# Patient Record
Sex: FEMALE | Race: WHITE | Employment: UNEMPLOYED | URBAN - METROPOLITAN AREA
[De-identification: names, ages, dates, MRNs, and addresses within clinical notes are randomized per-mention and may not be internally consistent; named-entity substitution may affect disease eponyms.]

---

## 2018-01-22 PROBLEM — K80.20 SYMPTOMATIC CHOLELITHIASIS: Status: ACTIVE | Noted: 2018-01-22

## 2018-05-05 ENCOUNTER — HOSPITAL ENCOUNTER (EMERGENCY)
Age: 33
Discharge: HOME | End: 2018-05-06
Attending: EMERGENCY MEDICINE
Payer: COMMERCIAL

## 2018-05-05 DIAGNOSIS — K50.119 CROHN'S DISEASE OF COLON WITH COMPLICATION (HCC): Primary | ICD-10-CM

## 2018-05-05 LAB
BACTERIA: ABNORMAL /HPF
BASOPHILS ABSOLUTE: 0.09 E9/L (ref 0–0.2)
BASOPHILS RELATIVE PERCENT: 0.4 % (ref 0–2)
BILIRUBIN URINE: NEGATIVE
BLOOD, URINE: NEGATIVE
CHP ED QC CHECK: YES
CLARITY: CLEAR
COLOR: YELLOW
EOSINOPHILS ABSOLUTE: 0.07 E9/L (ref 0.05–0.5)
EOSINOPHILS RELATIVE PERCENT: 0.3 % (ref 0–6)
EPITHELIAL CELLS, UA: ABNORMAL /HPF
GLUCOSE URINE: NEGATIVE MG/DL
HCT VFR BLD CALC: 46.9 % (ref 34–48)
HEMOGLOBIN: 15.9 G/DL (ref 11.5–15.5)
IMMATURE GRANULOCYTES #: 0.18 E9/L
IMMATURE GRANULOCYTES %: 0.8 % (ref 0–5)
KETONES, URINE: >=80 MG/DL
LACTIC ACID: 2.6 MMOL/L (ref 0.5–2.2)
LEUKOCYTE ESTERASE, URINE: NEGATIVE
LYMPHOCYTES ABSOLUTE: 2.35 E9/L (ref 1.5–4)
LYMPHOCYTES RELATIVE PERCENT: 10.6 % (ref 20–42)
MCH RBC QN AUTO: 32.1 PG (ref 26–35)
MCHC RBC AUTO-ENTMCNC: 33.9 % (ref 32–34.5)
MCV RBC AUTO: 94.6 FL (ref 80–99.9)
MONOCYTES ABSOLUTE: 0.66 E9/L (ref 0.1–0.95)
MONOCYTES RELATIVE PERCENT: 3 % (ref 2–12)
NEUTROPHILS ABSOLUTE: 18.83 E9/L (ref 1.8–7.3)
NEUTROPHILS RELATIVE PERCENT: 84.9 % (ref 43–80)
NITRITE, URINE: NEGATIVE
PDW BLD-RTO: 14.1 FL (ref 11.5–15)
PH UA: 7 (ref 5–9)
PLATELET # BLD: 448 E9/L (ref 130–450)
PMV BLD AUTO: 9.7 FL (ref 7–12)
PREGNANCY TEST URINE, POC: NORMAL
PROTEIN UA: 30 MG/DL
RBC # BLD: 4.96 E12/L (ref 3.5–5.5)
RBC UA: ABNORMAL /HPF (ref 0–2)
SPECIFIC GRAVITY UA: >=1.03 (ref 1–1.03)
UROBILINOGEN, URINE: 0.2 E.U./DL
WBC # BLD: 22.2 E9/L (ref 4.5–11.5)
WBC UA: ABNORMAL /HPF (ref 0–5)

## 2018-05-05 PROCEDURE — 85651 RBC SED RATE NONAUTOMATED: CPT

## 2018-05-05 PROCEDURE — 96374 THER/PROPH/DIAG INJ IV PUSH: CPT

## 2018-05-05 PROCEDURE — 81001 URINALYSIS AUTO W/SCOPE: CPT

## 2018-05-05 PROCEDURE — 96361 HYDRATE IV INFUSION ADD-ON: CPT

## 2018-05-05 PROCEDURE — 83605 ASSAY OF LACTIC ACID: CPT

## 2018-05-05 PROCEDURE — 80053 COMPREHEN METABOLIC PANEL: CPT

## 2018-05-05 PROCEDURE — 83690 ASSAY OF LIPASE: CPT

## 2018-05-05 PROCEDURE — 6360000002 HC RX W HCPCS: Performed by: EMERGENCY MEDICINE

## 2018-05-05 PROCEDURE — 96375 TX/PRO/DX INJ NEW DRUG ADDON: CPT

## 2018-05-05 PROCEDURE — 85025 COMPLETE CBC W/AUTO DIFF WBC: CPT

## 2018-05-05 PROCEDURE — 99284 EMERGENCY DEPT VISIT MOD MDM: CPT

## 2018-05-05 PROCEDURE — 2580000003 HC RX 258: Performed by: EMERGENCY MEDICINE

## 2018-05-05 RX ORDER — SODIUM CHLORIDE 0.9 % (FLUSH) 0.9 %
10 SYRINGE (ML) INJECTION PRN
Status: DISCONTINUED | OUTPATIENT
Start: 2018-05-05 | End: 2018-05-06 | Stop reason: HOSPADM

## 2018-05-05 RX ORDER — MORPHINE SULFATE 2 MG/ML
INJECTION, SOLUTION INTRAMUSCULAR; INTRAVENOUS
Status: DISCONTINUED
Start: 2018-05-05 | End: 2018-05-06 | Stop reason: HOSPADM

## 2018-05-05 RX ORDER — ONDANSETRON 2 MG/ML
4 INJECTION INTRAMUSCULAR; INTRAVENOUS ONCE
Status: COMPLETED | OUTPATIENT
Start: 2018-05-05 | End: 2018-05-05

## 2018-05-05 RX ORDER — MORPHINE SULFATE 4 MG/ML
4 INJECTION, SOLUTION INTRAMUSCULAR; INTRAVENOUS ONCE
Status: COMPLETED | OUTPATIENT
Start: 2018-05-05 | End: 2018-05-05

## 2018-05-05 RX ORDER — 0.9 % SODIUM CHLORIDE 0.9 %
1000 INTRAVENOUS SOLUTION INTRAVENOUS ONCE
Status: COMPLETED | OUTPATIENT
Start: 2018-05-05 | End: 2018-05-06

## 2018-05-05 RX ADMIN — ONDANSETRON 4 MG: 2 SOLUTION INTRAMUSCULAR; INTRAVENOUS at 23:38

## 2018-05-05 RX ADMIN — Medication 10 ML: at 23:40

## 2018-05-05 RX ADMIN — SODIUM CHLORIDE 1000 ML: 9 INJECTION, SOLUTION INTRAVENOUS at 23:37

## 2018-05-05 RX ADMIN — MORPHINE SULFATE 4 MG: 4 INJECTION, SOLUTION INTRAMUSCULAR; INTRAVENOUS at 23:39

## 2018-05-05 ASSESSMENT — PAIN DESCRIPTION - PAIN TYPE: TYPE: ACUTE PAIN

## 2018-05-05 ASSESSMENT — PAIN SCALES - GENERAL
PAINLEVEL_OUTOF10: 10
PAINLEVEL_OUTOF10: 10

## 2018-05-05 ASSESSMENT — PAIN DESCRIPTION - FREQUENCY: FREQUENCY: CONTINUOUS

## 2018-05-05 ASSESSMENT — PAIN DESCRIPTION - ONSET: ONSET: ON-GOING

## 2018-05-05 ASSESSMENT — PAIN DESCRIPTION - ORIENTATION: ORIENTATION: RIGHT;LEFT;LOWER

## 2018-05-05 ASSESSMENT — PAIN DESCRIPTION - LOCATION: LOCATION: ABDOMEN

## 2018-05-05 ASSESSMENT — PAIN DESCRIPTION - PROGRESSION: CLINICAL_PROGRESSION: GRADUALLY WORSENING

## 2018-05-05 ASSESSMENT — PAIN DESCRIPTION - DESCRIPTORS: DESCRIPTORS: ACHING

## 2018-05-06 ENCOUNTER — APPOINTMENT (OUTPATIENT)
Dept: CT IMAGING | Age: 33
End: 2018-05-06
Payer: COMMERCIAL

## 2018-05-06 VITALS
TEMPERATURE: 98 F | SYSTOLIC BLOOD PRESSURE: 152 MMHG | OXYGEN SATURATION: 98 % | HEIGHT: 64 IN | HEART RATE: 64 BPM | RESPIRATION RATE: 18 BRPM | BODY MASS INDEX: 23.9 KG/M2 | WEIGHT: 140 LBS | DIASTOLIC BLOOD PRESSURE: 96 MMHG

## 2018-05-06 LAB
ALBUMIN SERPL-MCNC: 5 G/DL (ref 3.5–5.2)
ALP BLD-CCNC: 57 U/L (ref 35–104)
ALT SERPL-CCNC: 22 U/L (ref 0–32)
ANION GAP SERPL CALCULATED.3IONS-SCNC: 19 MMOL/L (ref 7–16)
AST SERPL-CCNC: 21 U/L (ref 0–31)
BILIRUB SERPL-MCNC: 0.6 MG/DL (ref 0–1.2)
BUN BLDV-MCNC: 15 MG/DL (ref 6–20)
CALCIUM SERPL-MCNC: 10.3 MG/DL (ref 8.6–10.2)
CHLORIDE BLD-SCNC: 96 MMOL/L (ref 98–107)
CO2: 25 MMOL/L (ref 22–29)
CREAT SERPL-MCNC: 0.8 MG/DL (ref 0.5–1)
GFR AFRICAN AMERICAN: >60
GFR NON-AFRICAN AMERICAN: >60 ML/MIN/1.73
GLUCOSE BLD-MCNC: 134 MG/DL (ref 74–109)
LIPASE: 12 U/L (ref 13–60)
POTASSIUM SERPL-SCNC: 4.9 MMOL/L (ref 3.5–5)
SEDIMENTATION RATE, ERYTHROCYTE: 2 MM/HR (ref 0–20)
SODIUM BLD-SCNC: 140 MMOL/L (ref 132–146)
TOTAL PROTEIN: 8.4 G/DL (ref 6.4–8.3)

## 2018-05-06 PROCEDURE — 6360000004 HC RX CONTRAST MEDICATION: Performed by: RADIOLOGY

## 2018-05-06 PROCEDURE — 96376 TX/PRO/DX INJ SAME DRUG ADON: CPT

## 2018-05-06 PROCEDURE — 6360000002 HC RX W HCPCS

## 2018-05-06 PROCEDURE — 74177 CT ABD & PELVIS W/CONTRAST: CPT

## 2018-05-06 PROCEDURE — 96361 HYDRATE IV INFUSION ADD-ON: CPT

## 2018-05-06 PROCEDURE — 6360000002 HC RX W HCPCS: Performed by: EMERGENCY MEDICINE

## 2018-05-06 RX ORDER — ONDANSETRON 2 MG/ML
4 INJECTION INTRAMUSCULAR; INTRAVENOUS ONCE
Status: COMPLETED | OUTPATIENT
Start: 2018-05-06 | End: 2018-05-06

## 2018-05-06 RX ORDER — MORPHINE SULFATE 4 MG/ML
4 INJECTION, SOLUTION INTRAMUSCULAR; INTRAVENOUS ONCE
Status: DISCONTINUED | OUTPATIENT
Start: 2018-05-06 | End: 2018-05-06 | Stop reason: HOSPADM

## 2018-05-06 RX ORDER — ONDANSETRON 4 MG/1
4 TABLET, ORALLY DISINTEGRATING ORAL EVERY 8 HOURS PRN
Qty: 24 TABLET | Refills: 0 | Status: ON HOLD | OUTPATIENT
Start: 2018-05-06 | End: 2018-07-19 | Stop reason: HOSPADM

## 2018-05-06 RX ORDER — HYDROCODONE BITARTRATE AND ACETAMINOPHEN 5; 325 MG/1; MG/1
1 TABLET ORAL EVERY 6 HOURS PRN
Qty: 10 TABLET | Refills: 0 | Status: SHIPPED | OUTPATIENT
Start: 2018-05-06 | End: 2018-05-09

## 2018-05-06 RX ORDER — MORPHINE SULFATE 2 MG/ML
INJECTION, SOLUTION INTRAMUSCULAR; INTRAVENOUS
Status: COMPLETED
Start: 2018-05-06 | End: 2018-05-06

## 2018-05-06 RX ORDER — CIPROFLOXACIN 500 MG/1
500 TABLET, FILM COATED ORAL 2 TIMES DAILY
Qty: 14 TABLET | Refills: 0 | Status: SHIPPED | OUTPATIENT
Start: 2018-05-06 | End: 2018-05-13

## 2018-05-06 RX ORDER — METRONIDAZOLE 500 MG/1
500 TABLET ORAL 2 TIMES DAILY
Qty: 14 TABLET | Refills: 0 | Status: SHIPPED | OUTPATIENT
Start: 2018-05-06 | End: 2018-05-13

## 2018-05-06 RX ADMIN — MORPHINE SULFATE 4 MG: 2 INJECTION, SOLUTION INTRAMUSCULAR; INTRAVENOUS at 01:42

## 2018-05-06 RX ADMIN — IOPAMIDOL 110 ML: 755 INJECTION, SOLUTION INTRAVENOUS at 00:43

## 2018-05-06 RX ADMIN — ONDANSETRON 4 MG: 2 SOLUTION INTRAMUSCULAR; INTRAVENOUS at 01:42

## 2018-05-06 ASSESSMENT — ENCOUNTER SYMPTOMS
ABDOMINAL PAIN: 1
BACK PAIN: 0
NAUSEA: 1
SHORTNESS OF BREATH: 0
VOMITING: 1
COUGH: 0
BLOOD IN STOOL: 0

## 2018-05-06 ASSESSMENT — PAIN SCALES - GENERAL
PAINLEVEL_OUTOF10: 6
PAINLEVEL_OUTOF10: 4

## 2018-05-06 ASSESSMENT — PAIN DESCRIPTION - ONSET: ONSET: ON-GOING

## 2018-05-06 ASSESSMENT — PAIN DESCRIPTION - FREQUENCY: FREQUENCY: CONTINUOUS

## 2018-05-06 ASSESSMENT — PAIN DESCRIPTION - DESCRIPTORS: DESCRIPTORS: ACHING;DULL

## 2018-05-06 ASSESSMENT — PAIN DESCRIPTION - PAIN TYPE: TYPE: ACUTE PAIN

## 2018-05-06 ASSESSMENT — PAIN DESCRIPTION - PROGRESSION: CLINICAL_PROGRESSION: GRADUALLY IMPROVING

## 2018-05-06 ASSESSMENT — PAIN DESCRIPTION - LOCATION: LOCATION: ABDOMEN

## 2018-07-16 ENCOUNTER — HOSPITAL ENCOUNTER (INPATIENT)
Age: 33
LOS: 3 days | Discharge: HOME | DRG: 245 | End: 2018-07-19
Attending: EMERGENCY MEDICINE | Admitting: INTERNAL MEDICINE
Payer: COMMERCIAL

## 2018-07-16 ENCOUNTER — APPOINTMENT (OUTPATIENT)
Dept: CT IMAGING | Age: 33
DRG: 245 | End: 2018-07-16
Payer: COMMERCIAL

## 2018-07-16 DIAGNOSIS — K50.119 CROHN'S DISEASE OF COLON WITH COMPLICATION (HCC): ICD-10-CM

## 2018-07-16 DIAGNOSIS — R11.2 NON-INTRACTABLE VOMITING WITH NAUSEA, UNSPECIFIED VOMITING TYPE: ICD-10-CM

## 2018-07-16 DIAGNOSIS — R10.9 ABDOMINAL PAIN, UNSPECIFIED ABDOMINAL LOCATION: Primary | ICD-10-CM

## 2018-07-16 PROBLEM — K50.90 CROHN DISEASE (HCC): Status: ACTIVE | Noted: 2018-07-16

## 2018-07-16 PROBLEM — K50.10 CROHN'S COLITIS, WITHOUT COMPLICATIONS (HCC): Status: ACTIVE | Noted: 2018-07-16

## 2018-07-16 LAB
ALBUMIN SERPL-MCNC: 4.7 G/DL (ref 3.5–5.2)
ALP BLD-CCNC: 62 U/L (ref 35–104)
ALT SERPL-CCNC: 10 U/L (ref 0–32)
AMPHETAMINE SCREEN, URINE: NOT DETECTED
ANION GAP SERPL CALCULATED.3IONS-SCNC: 14 MMOL/L (ref 7–16)
AST SERPL-CCNC: 15 U/L (ref 0–31)
BACTERIA: ABNORMAL /HPF
BARBITURATE SCREEN URINE: NOT DETECTED
BASOPHILS ABSOLUTE: 0.08 E9/L (ref 0–0.2)
BASOPHILS RELATIVE PERCENT: 0.5 % (ref 0–2)
BENZODIAZEPINE SCREEN, URINE: POSITIVE
BILIRUB SERPL-MCNC: 0.4 MG/DL (ref 0–1.2)
BILIRUBIN URINE: NEGATIVE
BLOOD, URINE: NEGATIVE
BUN BLDV-MCNC: 9 MG/DL (ref 6–20)
CALCIUM SERPL-MCNC: 9.6 MG/DL (ref 8.6–10.2)
CANNABINOID SCREEN URINE: POSITIVE
CHLORIDE BLD-SCNC: 102 MMOL/L (ref 98–107)
CHP ED QC CHECK: NORMAL
CLARITY: ABNORMAL
CO2: 25 MMOL/L (ref 22–29)
COCAINE METABOLITE SCREEN URINE: NOT DETECTED
COLOR: ABNORMAL
CREAT SERPL-MCNC: 0.7 MG/DL (ref 0.5–1)
EKG ATRIAL RATE: 72 BPM
EKG P AXIS: 29 DEGREES
EKG P-R INTERVAL: 112 MS
EKG Q-T INTERVAL: 382 MS
EKG QRS DURATION: 102 MS
EKG QTC CALCULATION (BAZETT): 418 MS
EKG R AXIS: 1 DEGREES
EKG T AXIS: 58 DEGREES
EKG VENTRICULAR RATE: 72 BPM
EOSINOPHILS ABSOLUTE: 0.12 E9/L (ref 0.05–0.5)
EOSINOPHILS RELATIVE PERCENT: 0.7 % (ref 0–6)
EPITHELIAL CELLS, UA: ABNORMAL /HPF
GFR AFRICAN AMERICAN: >60
GFR NON-AFRICAN AMERICAN: >60 ML/MIN/1.73
GLUCOSE BLD-MCNC: 139 MG/DL (ref 74–109)
GLUCOSE URINE: NEGATIVE MG/DL
HCT VFR BLD CALC: 42.7 % (ref 34–48)
HEMOGLOBIN: 14.5 G/DL (ref 11.5–15.5)
IMMATURE GRANULOCYTES #: 0.09 E9/L
IMMATURE GRANULOCYTES %: 0.5 % (ref 0–5)
KETONES, URINE: ABNORMAL MG/DL
LACTIC ACID: 2 MMOL/L (ref 0.5–2.2)
LEUKOCYTE ESTERASE, URINE: NEGATIVE
LIPASE: 17 U/L (ref 13–60)
LYMPHOCYTES ABSOLUTE: 1.93 E9/L (ref 1.5–4)
LYMPHOCYTES RELATIVE PERCENT: 11.5 % (ref 20–42)
MCH RBC QN AUTO: 32.1 PG (ref 26–35)
MCHC RBC AUTO-ENTMCNC: 34 % (ref 32–34.5)
MCV RBC AUTO: 94.5 FL (ref 80–99.9)
METHADONE SCREEN, URINE: NOT DETECTED
MONOCYTES ABSOLUTE: 0.44 E9/L (ref 0.1–0.95)
MONOCYTES RELATIVE PERCENT: 2.6 % (ref 2–12)
MUCUS: PRESENT
NEUTROPHILS ABSOLUTE: 14.06 E9/L (ref 1.8–7.3)
NEUTROPHILS RELATIVE PERCENT: 84.2 % (ref 43–80)
NITRITE, URINE: NEGATIVE
OPIATE SCREEN URINE: NOT DETECTED
PDW BLD-RTO: 13.3 FL (ref 11.5–15)
PH UA: 6.5 (ref 5–9)
PHENCYCLIDINE SCREEN URINE: NOT DETECTED
PLATELET # BLD: 454 E9/L (ref 130–450)
PMV BLD AUTO: 9.4 FL (ref 7–12)
POTASSIUM SERPL-SCNC: 4.2 MMOL/L (ref 3.5–5)
PREGNANCY TEST URINE, POC: NORMAL
PROPOXYPHENE SCREEN: NOT DETECTED
PROTEIN UA: 30 MG/DL
RBC # BLD: 4.52 E12/L (ref 3.5–5.5)
RBC UA: ABNORMAL /HPF (ref 0–2)
SODIUM BLD-SCNC: 141 MMOL/L (ref 132–146)
SPECIFIC GRAVITY UA: 1.02 (ref 1–1.03)
TOTAL PROTEIN: 7.3 G/DL (ref 6.4–8.3)
UROBILINOGEN, URINE: 1 E.U./DL
WBC # BLD: 16.7 E9/L (ref 4.5–11.5)
WBC UA: ABNORMAL /HPF (ref 0–5)

## 2018-07-16 PROCEDURE — 6360000002 HC RX W HCPCS: Performed by: EMERGENCY MEDICINE

## 2018-07-16 PROCEDURE — 2580000003 HC RX 258: Performed by: EMERGENCY MEDICINE

## 2018-07-16 PROCEDURE — C9113 INJ PANTOPRAZOLE SODIUM, VIA: HCPCS | Performed by: INTERNAL MEDICINE

## 2018-07-16 PROCEDURE — 85025 COMPLETE CBC W/AUTO DIFF WBC: CPT

## 2018-07-16 PROCEDURE — 93005 ELECTROCARDIOGRAM TRACING: CPT | Performed by: PHYSICIAN ASSISTANT

## 2018-07-16 PROCEDURE — 96376 TX/PRO/DX INJ SAME DRUG ADON: CPT

## 2018-07-16 PROCEDURE — G0480 DRUG TEST DEF 1-7 CLASSES: HCPCS

## 2018-07-16 PROCEDURE — 96374 THER/PROPH/DIAG INJ IV PUSH: CPT

## 2018-07-16 PROCEDURE — 81001 URINALYSIS AUTO W/SCOPE: CPT

## 2018-07-16 PROCEDURE — 83690 ASSAY OF LIPASE: CPT

## 2018-07-16 PROCEDURE — 6360000004 HC RX CONTRAST MEDICATION: Performed by: RADIOLOGY

## 2018-07-16 PROCEDURE — 96375 TX/PRO/DX INJ NEW DRUG ADDON: CPT

## 2018-07-16 PROCEDURE — 2580000003 HC RX 258

## 2018-07-16 PROCEDURE — 83605 ASSAY OF LACTIC ACID: CPT

## 2018-07-16 PROCEDURE — 80307 DRUG TEST PRSMV CHEM ANLYZR: CPT

## 2018-07-16 PROCEDURE — 74177 CT ABD & PELVIS W/CONTRAST: CPT

## 2018-07-16 PROCEDURE — 80053 COMPREHEN METABOLIC PANEL: CPT

## 2018-07-16 PROCEDURE — 1200000000 HC SEMI PRIVATE

## 2018-07-16 PROCEDURE — 6360000002 HC RX W HCPCS: Performed by: INTERNAL MEDICINE

## 2018-07-16 PROCEDURE — 2580000003 HC RX 258: Performed by: INTERNAL MEDICINE

## 2018-07-16 PROCEDURE — 99285 EMERGENCY DEPT VISIT HI MDM: CPT

## 2018-07-16 RX ORDER — DICYCLOMINE HYDROCHLORIDE 10 MG/1
20 CAPSULE ORAL 4 TIMES DAILY PRN
Qty: 20 CAPSULE | Refills: 0 | Status: SHIPPED | OUTPATIENT
Start: 2018-07-16 | End: 2018-10-24

## 2018-07-16 RX ORDER — METHYLPREDNISOLONE SODIUM SUCCINATE 125 MG/2ML
60 INJECTION, POWDER, LYOPHILIZED, FOR SOLUTION INTRAMUSCULAR; INTRAVENOUS EVERY 6 HOURS
Status: DISCONTINUED | OUTPATIENT
Start: 2018-07-16 | End: 2018-07-17

## 2018-07-16 RX ORDER — PROMETHAZINE HYDROCHLORIDE 25 MG/ML
12.5 INJECTION, SOLUTION INTRAMUSCULAR; INTRAVENOUS ONCE
Status: COMPLETED | OUTPATIENT
Start: 2018-07-16 | End: 2018-07-16

## 2018-07-16 RX ORDER — 0.9 % SODIUM CHLORIDE 0.9 %
1000 INTRAVENOUS SOLUTION INTRAVENOUS ONCE
Status: COMPLETED | OUTPATIENT
Start: 2018-07-16 | End: 2018-07-16

## 2018-07-16 RX ORDER — METHYLPREDNISOLONE SODIUM SUCCINATE 125 MG/2ML
125 INJECTION, POWDER, LYOPHILIZED, FOR SOLUTION INTRAMUSCULAR; INTRAVENOUS ONCE
Status: COMPLETED | OUTPATIENT
Start: 2018-07-16 | End: 2018-07-16

## 2018-07-16 RX ORDER — PANTOPRAZOLE SODIUM 40 MG/10ML
40 INJECTION, POWDER, LYOPHILIZED, FOR SOLUTION INTRAVENOUS DAILY
Status: DISCONTINUED | OUTPATIENT
Start: 2018-07-16 | End: 2018-07-19 | Stop reason: HOSPADM

## 2018-07-16 RX ORDER — KETOROLAC TROMETHAMINE 30 MG/ML
30 INJECTION, SOLUTION INTRAMUSCULAR; INTRAVENOUS EVERY 6 HOURS PRN
Status: DISCONTINUED | OUTPATIENT
Start: 2018-07-16 | End: 2018-07-19 | Stop reason: HOSPADM

## 2018-07-16 RX ORDER — ONDANSETRON 4 MG/1
4 TABLET, FILM COATED ORAL EVERY 8 HOURS PRN
Qty: 12 TABLET | Refills: 0 | Status: SHIPPED | OUTPATIENT
Start: 2018-07-16 | End: 2018-07-21

## 2018-07-16 RX ORDER — PROMETHAZINE HYDROCHLORIDE 25 MG/1
25 SUPPOSITORY RECTAL EVERY 4 HOURS PRN
Qty: 10 SUPPOSITORY | Refills: 0 | Status: SHIPPED | OUTPATIENT
Start: 2018-07-16 | End: 2018-07-16

## 2018-07-16 RX ORDER — MORPHINE SULFATE 4 MG/ML
4 INJECTION, SOLUTION INTRAMUSCULAR; INTRAVENOUS ONCE
Status: COMPLETED | OUTPATIENT
Start: 2018-07-16 | End: 2018-07-16

## 2018-07-16 RX ORDER — SODIUM CHLORIDE 0.9 % (FLUSH) 0.9 %
SYRINGE (ML) INJECTION
Status: DISPENSED
Start: 2018-07-16 | End: 2018-07-17

## 2018-07-16 RX ORDER — SODIUM CHLORIDE 9 MG/ML
INJECTION, SOLUTION INTRAVENOUS CONTINUOUS
Status: DISCONTINUED | OUTPATIENT
Start: 2018-07-16 | End: 2018-07-19 | Stop reason: HOSPADM

## 2018-07-16 RX ORDER — PROMETHAZINE HYDROCHLORIDE 25 MG/1
25 SUPPOSITORY RECTAL EVERY 4 HOURS PRN
Qty: 10 SUPPOSITORY | Refills: 0 | Status: SHIPPED | OUTPATIENT
Start: 2018-07-16 | End: 2018-07-23

## 2018-07-16 RX ORDER — SODIUM CHLORIDE 0.9 % (FLUSH) 0.9 %
10 SYRINGE (ML) INJECTION EVERY 12 HOURS SCHEDULED
Status: DISCONTINUED | OUTPATIENT
Start: 2018-07-16 | End: 2018-07-19 | Stop reason: HOSPADM

## 2018-07-16 RX ORDER — ACETAMINOPHEN 325 MG/1
650 TABLET ORAL EVERY 4 HOURS PRN
Status: DISCONTINUED | OUTPATIENT
Start: 2018-07-16 | End: 2018-07-19 | Stop reason: HOSPADM

## 2018-07-16 RX ORDER — ONDANSETRON 2 MG/ML
4 INJECTION INTRAMUSCULAR; INTRAVENOUS EVERY 6 HOURS PRN
Status: DISCONTINUED | OUTPATIENT
Start: 2018-07-16 | End: 2018-07-19 | Stop reason: HOSPADM

## 2018-07-16 RX ORDER — ONDANSETRON 2 MG/ML
8 INJECTION INTRAMUSCULAR; INTRAVENOUS ONCE
Status: COMPLETED | OUTPATIENT
Start: 2018-07-16 | End: 2018-07-16

## 2018-07-16 RX ORDER — SODIUM CHLORIDE 0.9 % (FLUSH) 0.9 %
10 SYRINGE (ML) INJECTION PRN
Status: DISCONTINUED | OUTPATIENT
Start: 2018-07-16 | End: 2018-07-19 | Stop reason: HOSPADM

## 2018-07-16 RX ORDER — DICYCLOMINE HYDROCHLORIDE 10 MG/1
20 CAPSULE ORAL 4 TIMES DAILY PRN
Qty: 20 CAPSULE | Refills: 0 | Status: SHIPPED | OUTPATIENT
Start: 2018-07-16 | End: 2018-07-16

## 2018-07-16 RX ORDER — DICYCLOMINE HYDROCHLORIDE 10 MG/1
CAPSULE ORAL 3 TIMES DAILY
Status: ON HOLD | COMMUNITY
End: 2018-07-19 | Stop reason: HOSPADM

## 2018-07-16 RX ORDER — MORPHINE SULFATE 8 MG/ML
6 INJECTION, SOLUTION INTRAMUSCULAR; INTRAVENOUS ONCE
Status: COMPLETED | OUTPATIENT
Start: 2018-07-16 | End: 2018-07-16

## 2018-07-16 RX ADMIN — METHYLPREDNISOLONE SODIUM SUCCINATE 60 MG: 125 INJECTION, POWDER, LYOPHILIZED, FOR SOLUTION INTRAMUSCULAR; INTRAVENOUS at 21:53

## 2018-07-16 RX ADMIN — MORPHINE SULFATE 6 MG: 8 INJECTION INTRAVENOUS at 17:01

## 2018-07-16 RX ADMIN — SODIUM CHLORIDE: 9 INJECTION, SOLUTION INTRAVENOUS at 21:49

## 2018-07-16 RX ADMIN — MORPHINE SULFATE 4 MG: 4 INJECTION, SOLUTION INTRAMUSCULAR; INTRAVENOUS at 19:01

## 2018-07-16 RX ADMIN — WATER 10 ML: 1 INJECTION INTRAMUSCULAR; INTRAVENOUS; SUBCUTANEOUS at 21:53

## 2018-07-16 RX ADMIN — PROMETHAZINE HYDROCHLORIDE 12.5 MG: 25 INJECTION INTRAMUSCULAR; INTRAVENOUS at 19:01

## 2018-07-16 RX ADMIN — ONDANSETRON 4 MG: 2 INJECTION INTRAMUSCULAR; INTRAVENOUS at 21:15

## 2018-07-16 RX ADMIN — SODIUM CHLORIDE 1000 ML: 9 INJECTION, SOLUTION INTRAVENOUS at 19:00

## 2018-07-16 RX ADMIN — PANTOPRAZOLE SODIUM 40 MG: 40 INJECTION, POWDER, FOR SOLUTION INTRAVENOUS at 21:52

## 2018-07-16 RX ADMIN — SODIUM CHLORIDE 1000 ML: 9 INJECTION, SOLUTION INTRAVENOUS at 17:00

## 2018-07-16 RX ADMIN — ONDANSETRON 8 MG: 2 INJECTION, SOLUTION INTRAMUSCULAR; INTRAVENOUS at 17:01

## 2018-07-16 RX ADMIN — METHYLPREDNISOLONE SODIUM SUCCINATE 125 MG: 125 INJECTION, POWDER, FOR SOLUTION INTRAMUSCULAR; INTRAVENOUS at 19:47

## 2018-07-16 RX ADMIN — IOPAMIDOL 110 ML: 755 INJECTION, SOLUTION INTRAVENOUS at 18:08

## 2018-07-16 RX ADMIN — ENOXAPARIN SODIUM 40 MG: 40 INJECTION, SOLUTION INTRAVENOUS; SUBCUTANEOUS at 21:59

## 2018-07-16 RX ADMIN — Medication 10 ML: at 21:16

## 2018-07-16 ASSESSMENT — ENCOUNTER SYMPTOMS
SINUS PRESSURE: 0
SHORTNESS OF BREATH: 0
CONSTIPATION: 0
COUGH: 0
WHEEZING: 0
VOMITING: 1
SORE THROAT: 0
COLOR CHANGE: 0
DIARRHEA: 1
EYE PAIN: 0
EYE REDNESS: 0
NAUSEA: 1
ABDOMINAL PAIN: 1
HEMATEMESIS: 0
HEMATOCHEZIA: 0
RHINORRHEA: 0

## 2018-07-16 ASSESSMENT — PAIN SCALES - GENERAL
PAINLEVEL_OUTOF10: 9
PAINLEVEL_OUTOF10: 9
PAINLEVEL_OUTOF10: 8
PAINLEVEL_OUTOF10: 8

## 2018-07-16 ASSESSMENT — PAIN DESCRIPTION - LOCATION: LOCATION: ABDOMEN

## 2018-07-16 ASSESSMENT — PAIN DESCRIPTION - PAIN TYPE: TYPE: ACUTE PAIN

## 2018-07-16 NOTE — ED PROVIDER NOTES
g/dL    Hematocrit 42.7 34.0 - 48.0 %    MCV 94.5 80.0 - 99.9 fL    MCH 32.1 26.0 - 35.0 pg    MCHC 34.0 32.0 - 34.5 %    RDW 13.3 11.5 - 15.0 fL    Platelets 328 (H) 783 - 450 E9/L    MPV 9.4 7.0 - 12.0 fL    Neutrophils % 84.2 (H) 43.0 - 80.0 %    Immature Granulocytes % 0.5 0.0 - 5.0 %    Lymphocytes % 11.5 (L) 20.0 - 42.0 %    Monocytes % 2.6 2.0 - 12.0 %    Eosinophils % 0.7 0.0 - 6.0 %    Basophils % 0.5 0.0 - 2.0 %    Neutrophils # 14.06 (H) 1.80 - 7.30 E9/L    Immature Granulocytes # 0.09 E9/L    Lymphocytes # 1.93 1.50 - 4.00 E9/L    Monocytes # 0.44 0.10 - 0.95 E9/L    Eosinophils # 0.12 0.05 - 0.50 E9/L    Basophils # 0.08 0.00 - 0.20 E9/L   Comprehensive Metabolic Panel   Result Value Ref Range    Sodium 141 132 - 146 mmol/L    Potassium 4.2 3.5 - 5.0 mmol/L    Chloride 102 98 - 107 mmol/L    CO2 25 22 - 29 mmol/L    Anion Gap 14 7 - 16 mmol/L    Glucose 139 (H) 74 - 109 mg/dL    BUN 9 6 - 20 mg/dL    CREATININE 0.7 0.5 - 1.0 mg/dL    GFR Non-African American >60 >=60 mL/min/1.73    GFR African American >60     Calcium 9.6 8.6 - 10.2 mg/dL    Total Protein 7.3 6.4 - 8.3 g/dL    Alb 4.7 3.5 - 5.2 g/dL    Total Bilirubin 0.4 0.0 - 1.2 mg/dL    Alkaline Phosphatase 62 35 - 104 U/L    ALT 10 0 - 32 U/L    AST 15 0 - 31 U/L   Lipase   Result Value Ref Range    Lipase 17 13 - 60 U/L   Lactic Acid, Plasma   Result Value Ref Range    Lactic Acid 2.0 0.5 - 2.2 mmol/L   Urinalysis   Result Value Ref Range    Color, UA DARK YELLOW (A) Straw/Yellow    Clarity, UA CLOUDY (A) Clear    Glucose, Ur Negative Negative mg/dL    Bilirubin Urine Negative Negative    Ketones, Urine TRACE (A) Negative mg/dL    Specific Gravity, UA 1.025 1.005 - 1.030    Blood, Urine Negative Negative    pH, UA 6.5 5.0 - 9.0    Protein, UA 30 (A) Negative mg/dL    Urobilinogen, Urine 1.0 <2.0 E.U./dL    Nitrite, Urine Negative Negative    Leukocyte Esterase, Urine Negative Negative   Microscopic Urinalysis   Result Value Ref Range    Mucus, UA spoken with the patient and discussed todays results, in addition to providing specific details for the plan of care and counseling regarding the diagnosis and prognosis. Their questions are answered at this time and they are agreeable with the plan. I discussed at length with them reasons for immediate return here for re evaluation. They will followup with their Gastroenterologist and primary care physician by calling their office tomorrow. RE_EVALUATION  7pm  Pt unable to tolerate po, unable to take fluids. Pt to be admitted      --------------------------------- ADDITIONAL PROVIDER NOTES ---------------------------------  At this time the patient is without objective evidence of an acute process requiring hospitalization or inpatient management. They have remained hemodynamically stable throughout their entire ED visit and are stable for discharge with outpatient follow-up. The plan has been discussed in detail and they are aware of the specific conditions for emergent return, as well as the importance of follow-up. Current Discharge Medication List      START taking these medications    Details   ondansetron (ZOFRAN) 4 MG tablet Take 1 tablet by mouth every 8 hours as needed for Nausea or Vomiting  Qty: 12 tablet, Refills: 0      !! dicyclomine (BENTYL) 10 MG capsule Take 2 capsules by mouth 4 times daily as needed (abdominal pain)  Qty: 20 capsule, Refills: none      promethazine (PROMETHEGAN) 25 MG suppository Place 1 suppository rectally every 4 hours as needed for Nausea  Qty: 10 suppository, Refills: 0       !! - Potential duplicate medications found. Please discuss with provider. Diagnosis:  1. Abdominal pain, unspecified abdominal location    2. Non-intractable vomiting with nausea, unspecified vomiting type    3. [facility]'s disease of colon with complication ([facility]        Disposition:  Patient's disposition: admit  Patient's condition is stable.       ATTENDING PROVIDER ATTESTATION: [address] presented to the emergency department for evaluation of Abdominal Pain (LL abd pain, reports \"feels like crohns flare\" started this morning) and Emesis   and was initially evaluated by the Medical Resident. See Original ED Note for H&P and ED course above. I have reviewed and discussed the case, including pertinent history (medical, surgical, family and social) and exam findings with the Medical Resident assigned to [address] I have personally performed and/or participated in the history, exam, medical decision making, and procedures and agree with all pertinent clinical information. I have reviewed my findings and recommendations with the assigned Medical Resident, [address] [place] [address] and members of family present at the time of disposition. My findings/plan: The primary encounter diagnosis was Abdominal pain, unspecified abdominal location. Diagnoses of Non-intractable vomiting with nausea, unspecified vomiting type and Crohn's disease of colon with complication [facility]) were also pertinent to this visit. Current Discharge Medication List      START taking these medications    Details   ondansetron (ZOFRAN) 4 MG tablet Take 1 tablet by mouth every 8 hours as needed for Nausea or Vomiting  Qty: 12 tablet, Refills: 0      !! dicyclomine (BENTYL) 10 MG capsule Take 2 capsules by mouth 4 times daily as needed (abdominal pain)  Qty: 20 capsule, Refills: none      promethazine (PROMETHEGAN) 25 MG suppository Place 1 suppository rectally every 4 hours as needed for Nausea  Qty: 10 suppository, Refills: 0       !! - Potential duplicate medications found. Please discuss with provider.         DO Dorothea Marin DO  07/17/18 0131

## 2018-07-16 NOTE — ED NOTES
FIRST PROVIDER CONTACT ASSESSMENT NOTE      Department of Emergency Medicine   Admit Date: No admission date for patient encounter.     Chief Complaint: Abdominal Pain (LL abd pain, reports \"feels like crohns flare\" started this morning) and Emesis      History of Present Illness:    Terese Will is a 29 y.o. female who presents to the ED for diffuse abdominal pain consistent with chron's with vomiting        -----------------END OF FIRST PROVIDER CONTACT ASSESSMENT NOTE--------------  Electronically signed by TIMMY Diaz   DD: 7/16/18               TIMMY Diaz  07/16/18 1534

## 2018-07-17 LAB
ANION GAP SERPL CALCULATED.3IONS-SCNC: 15 MMOL/L (ref 7–16)
BUN BLDV-MCNC: 6 MG/DL (ref 6–20)
C-REACTIVE PROTEIN: 0.2 MG/DL (ref 0–0.4)
CALCIUM SERPL-MCNC: 8.8 MG/DL (ref 8.6–10.2)
CHLORIDE BLD-SCNC: 101 MMOL/L (ref 98–107)
CO2: 22 MMOL/L (ref 22–29)
CREAT SERPL-MCNC: 0.5 MG/DL (ref 0.5–1)
GFR AFRICAN AMERICAN: >60
GFR NON-AFRICAN AMERICAN: >60 ML/MIN/1.73
GLUCOSE BLD-MCNC: 138 MG/DL (ref 74–109)
HCT VFR BLD CALC: 40.9 % (ref 34–48)
HEMOGLOBIN: 13.9 G/DL (ref 11.5–15.5)
LIPASE: 14 U/L (ref 13–60)
MCH RBC QN AUTO: 32.3 PG (ref 26–35)
MCHC RBC AUTO-ENTMCNC: 34 % (ref 32–34.5)
MCV RBC AUTO: 95.1 FL (ref 80–99.9)
PDW BLD-RTO: 13.4 FL (ref 11.5–15)
PLATELET # BLD: 361 E9/L (ref 130–450)
PMV BLD AUTO: 10.2 FL (ref 7–12)
POTASSIUM SERPL-SCNC: 3.7 MMOL/L (ref 3.5–5)
RBC # BLD: 4.3 E12/L (ref 3.5–5.5)
SEDIMENTATION RATE, ERYTHROCYTE: 3 MM/HR (ref 0–20)
SODIUM BLD-SCNC: 138 MMOL/L (ref 132–146)
WBC # BLD: 16.9 E9/L (ref 4.5–11.5)

## 2018-07-17 PROCEDURE — 1200000000 HC SEMI PRIVATE

## 2018-07-17 PROCEDURE — 6370000000 HC RX 637 (ALT 250 FOR IP): Performed by: NURSE PRACTITIONER

## 2018-07-17 PROCEDURE — 83690 ASSAY OF LIPASE: CPT

## 2018-07-17 PROCEDURE — 85027 COMPLETE CBC AUTOMATED: CPT

## 2018-07-17 PROCEDURE — 36415 COLL VENOUS BLD VENIPUNCTURE: CPT

## 2018-07-17 PROCEDURE — 83993 ASSAY FOR CALPROTECTIN FECAL: CPT

## 2018-07-17 PROCEDURE — 85651 RBC SED RATE NONAUTOMATED: CPT

## 2018-07-17 PROCEDURE — 2580000003 HC RX 258: Performed by: INTERNAL MEDICINE

## 2018-07-17 PROCEDURE — 80048 BASIC METABOLIC PNL TOTAL CA: CPT

## 2018-07-17 PROCEDURE — 2580000003 HC RX 258

## 2018-07-17 PROCEDURE — C9113 INJ PANTOPRAZOLE SODIUM, VIA: HCPCS | Performed by: INTERNAL MEDICINE

## 2018-07-17 PROCEDURE — 6360000002 HC RX W HCPCS: Performed by: NURSE PRACTITIONER

## 2018-07-17 PROCEDURE — 6360000002 HC RX W HCPCS: Performed by: INTERNAL MEDICINE

## 2018-07-17 PROCEDURE — 86140 C-REACTIVE PROTEIN: CPT

## 2018-07-17 RX ORDER — METHYLPREDNISOLONE SODIUM SUCCINATE 40 MG/ML
40 INJECTION, POWDER, LYOPHILIZED, FOR SOLUTION INTRAMUSCULAR; INTRAVENOUS EVERY 8 HOURS
Status: DISCONTINUED | OUTPATIENT
Start: 2018-07-17 | End: 2018-07-17

## 2018-07-17 RX ORDER — MORPHINE SULFATE 2 MG/ML
4 INJECTION, SOLUTION INTRAMUSCULAR; INTRAVENOUS EVERY 4 HOURS PRN
Status: DISCONTINUED | OUTPATIENT
Start: 2018-07-17 | End: 2018-07-19 | Stop reason: HOSPADM

## 2018-07-17 RX ORDER — METOCLOPRAMIDE HYDROCHLORIDE 5 MG/ML
5 INJECTION INTRAMUSCULAR; INTRAVENOUS EVERY 6 HOURS
Status: DISCONTINUED | OUTPATIENT
Start: 2018-07-17 | End: 2018-07-17

## 2018-07-17 RX ORDER — DOCUSATE SODIUM 100 MG/1
200 CAPSULE, LIQUID FILLED ORAL ONCE
Status: COMPLETED | OUTPATIENT
Start: 2018-07-17 | End: 2018-07-17

## 2018-07-17 RX ORDER — METHYLPREDNISOLONE SODIUM SUCCINATE 40 MG/ML
40 INJECTION, POWDER, LYOPHILIZED, FOR SOLUTION INTRAMUSCULAR; INTRAVENOUS EVERY 12 HOURS
Status: DISCONTINUED | OUTPATIENT
Start: 2018-07-17 | End: 2018-07-19 | Stop reason: HOSPADM

## 2018-07-17 RX ORDER — METOCLOPRAMIDE HYDROCHLORIDE 5 MG/ML
5 INJECTION INTRAMUSCULAR; INTRAVENOUS 3 TIMES DAILY
Status: DISCONTINUED | OUTPATIENT
Start: 2018-07-17 | End: 2018-07-19 | Stop reason: HOSPADM

## 2018-07-17 RX ORDER — MORPHINE SULFATE 4 MG/ML
4 INJECTION, SOLUTION INTRAMUSCULAR; INTRAVENOUS EVERY 4 HOURS PRN
Status: DISCONTINUED | OUTPATIENT
Start: 2018-07-17 | End: 2018-07-17 | Stop reason: RX

## 2018-07-17 RX ADMIN — ONDANSETRON 4 MG: 2 INJECTION INTRAMUSCULAR; INTRAVENOUS at 12:27

## 2018-07-17 RX ADMIN — METHYLPREDNISOLONE SODIUM SUCCINATE 40 MG: 40 INJECTION, POWDER, LYOPHILIZED, FOR SOLUTION INTRAMUSCULAR; INTRAVENOUS at 16:20

## 2018-07-17 RX ADMIN — KETOROLAC TROMETHAMINE 30 MG: 30 INJECTION, SOLUTION INTRAMUSCULAR at 06:18

## 2018-07-17 RX ADMIN — Medication 10 ML: at 09:09

## 2018-07-17 RX ADMIN — METHYLPREDNISOLONE SODIUM SUCCINATE 60 MG: 125 INJECTION, POWDER, LYOPHILIZED, FOR SOLUTION INTRAMUSCULAR; INTRAVENOUS at 03:51

## 2018-07-17 RX ADMIN — PANTOPRAZOLE SODIUM 40 MG: 40 INJECTION, POWDER, FOR SOLUTION INTRAVENOUS at 09:08

## 2018-07-17 RX ADMIN — SODIUM CHLORIDE: 9 INJECTION, SOLUTION INTRAVENOUS at 16:20

## 2018-07-17 RX ADMIN — METOCLOPRAMIDE 5 MG: 5 INJECTION, SOLUTION INTRAMUSCULAR; INTRAVENOUS at 01:31

## 2018-07-17 RX ADMIN — ONDANSETRON 4 MG: 2 INJECTION INTRAMUSCULAR; INTRAVENOUS at 18:31

## 2018-07-17 RX ADMIN — DOCUSATE SODIUM 200 MG: 100 CAPSULE, LIQUID FILLED ORAL at 13:54

## 2018-07-17 RX ADMIN — ENOXAPARIN SODIUM 40 MG: 40 INJECTION, SOLUTION INTRAVENOUS; SUBCUTANEOUS at 09:08

## 2018-07-17 RX ADMIN — ONDANSETRON 4 MG: 2 INJECTION INTRAMUSCULAR; INTRAVENOUS at 03:48

## 2018-07-17 RX ADMIN — METOCLOPRAMIDE 5 MG: 5 INJECTION, SOLUTION INTRAMUSCULAR; INTRAVENOUS at 21:43

## 2018-07-17 RX ADMIN — METOCLOPRAMIDE 5 MG: 5 INJECTION, SOLUTION INTRAMUSCULAR; INTRAVENOUS at 13:53

## 2018-07-17 RX ADMIN — WATER: 1 INJECTION INTRAMUSCULAR; INTRAVENOUS; SUBCUTANEOUS at 16:21

## 2018-07-17 RX ADMIN — METOCLOPRAMIDE 5 MG: 5 INJECTION, SOLUTION INTRAMUSCULAR; INTRAVENOUS at 09:08

## 2018-07-17 RX ADMIN — KETOROLAC TROMETHAMINE 30 MG: 30 INJECTION, SOLUTION INTRAMUSCULAR at 00:10

## 2018-07-17 RX ADMIN — KETOROLAC TROMETHAMINE 30 MG: 30 INJECTION, SOLUTION INTRAMUSCULAR at 18:31

## 2018-07-17 RX ADMIN — KETOROLAC TROMETHAMINE 30 MG: 30 INJECTION, SOLUTION INTRAMUSCULAR at 12:27

## 2018-07-17 RX ADMIN — MORPHINE SULFATE 4 MG: 2 INJECTION, SOLUTION INTRAMUSCULAR; INTRAVENOUS at 21:41

## 2018-07-17 ASSESSMENT — PAIN SCALES - GENERAL
PAINLEVEL_OUTOF10: 0
PAINLEVEL_OUTOF10: 7
PAINLEVEL_OUTOF10: 6
PAINLEVEL_OUTOF10: 7
PAINLEVEL_OUTOF10: 0
PAINLEVEL_OUTOF10: 7
PAINLEVEL_OUTOF10: 8
PAINLEVEL_OUTOF10: 7
PAINLEVEL_OUTOF10: 5

## 2018-07-17 ASSESSMENT — PAIN DESCRIPTION - FREQUENCY
FREQUENCY: CONTINUOUS
FREQUENCY: CONTINUOUS

## 2018-07-17 ASSESSMENT — PAIN DESCRIPTION - ORIENTATION
ORIENTATION: RIGHT;MID;LOWER
ORIENTATION: MID;LOWER

## 2018-07-17 ASSESSMENT — PAIN DESCRIPTION - PAIN TYPE
TYPE: ACUTE PAIN

## 2018-07-17 ASSESSMENT — PAIN DESCRIPTION - LOCATION
LOCATION: ABDOMEN

## 2018-07-17 ASSESSMENT — PAIN DESCRIPTION - DESCRIPTORS
DESCRIPTORS: ACHING;CRAMPING;DISCOMFORT
DESCRIPTORS: ACHING;CRAMPING;SHARP
DESCRIPTORS: CRAMPING;STABBING
DESCRIPTORS: ACHING;CRAMPING;CONSTANT;DISCOMFORT
DESCRIPTORS: ACHING;CRAMPING

## 2018-07-17 NOTE — PLAN OF CARE
Problem: Nausea/Vomiting:  Goal: Absence of nausea/vomiting  Absence of nausea/vomiting  Outcome: Ongoing

## 2018-07-17 NOTE — CONSULTS
Gastroenterology Consult Note   Maddison Peraza NP-C with Sayra Alabrado M.D. Consult Note        Date of Service: 7/17/2018  Reason for Consult: Crohn's colitis  Requesting Physician: Dr Loulou Ocasio:  Abdominal pain    History Obtained From:  patient, electronic medical record    HISTORY OF PRESENT ILLNESS:       Claudell Best is a 29 y.o. female with significant past medical history of Crohn's disease, asthma and marijuana abuse admitted via ED for abdominal pain. Pt reports onset of nausea with emesis of bile colored liquid onset yesterday at 7 AM.  Reports she was having emesis every 7-10 minutes. States the night prior she ate a salad at [employer] and also had a few bites of [employer] bell. Denies fever, chills or sweats. S/P lap piper 1/22/18. Reports prior to admission she had one mushy burgundy stool and than another stool that remained mushy but changed to dark brown color. Reports she has had a 10# weight loss since January of this year. Denies any recent change in medications. Reports prior to being admitted, she had a \"descen\"t appetite and would normally move her bowels once a day, normal brown formed stool. Admission labs LFT's WNL, lipase 17, WBC 16.7, Hgb 14.5, Hct 42.7,Neutrophils 84.2%, Lymph 11.5%, . CT Abdomen/pelvis W IV contrast demonstrated no acute inflammatory process; stable wall thickening involving ascending colon and moderate retained stool. Consultation for Crohn's disease. Pt is  known to Dr. Lien Carter, last seen in office on [address] for follow up for her terminal ileitis, Crohn's and diarrhea--she was maintained on Budesonide 9 mg daily at that visit. EGD/Colonoscopy per Dr Kieran Real on 1/5/18. Surgical pathology from procedure demonstrated mild chronic gastritis with immunostain negative for H Pylori; Terminal ileum demonstrated mild active ileitis  Currently, pt reports she continues to be nauseated with emesis.   Has not moved her bowels since being admitted. Reports abdominal pain mainly to RLQ with radiation to suprapubic area. Labs today WBC 16.9, Hgb 13.9, Hct 40.9, . Past Medical History:        Diagnosis Date    Anxiety attack since age 16    diagnosed with seizure due to convulsions from this    Asthma     seasonal; no inhaler     [facility]     Marijuana abuse     occas    Multiple lacerations 2015    stabbing; resolved    Seizure ([facility]     loses focus, disoriented unpon arousing; etiology unknown per pt; none since 2015    Tobacco abuse      Past Surgical History:        Procedure Laterality Date    CHOLECYSTECTOMY  01/22/2018    COLONOSCOPY      DILATION AND CURETTAGE OF UTERUS  2011    ENDOSCOPY, COLON, DIAGNOSTIC      HERNIA REPAIR      HERNIA REPAIR       Current Medications:    Current Facility-Administered Medications: metoclopramide (REGLAN) injection 5 mg, 5 mg, Intravenous, Q6H  methylPREDNISolone sodium (SOLU-MEDROL) injection 40 mg, 40 mg, Intravenous, Q12H  pantoprazole (PROTONIX) injection 40 mg, 40 mg, Intravenous, Daily  ondansetron (ZOFRAN) injection 4 mg, 4 mg, Intravenous, Q6H PRN  sodium chloride flush 0.9 % injection 10 mL, 10 mL, Intravenous, 2 times per day  sodium chloride flush 0.9 % injection 10 mL, 10 mL, Intravenous, PRN  enoxaparin (LOVENOX) injection 40 mg, 40 mg, Subcutaneous, Daily  0.9 % sodium chloride infusion, , Intravenous, Continuous  acetaminophen (TYLENOL) tablet 650 mg, 650 mg, Oral, Q4H PRN  ketorolac (TORADOL) injection 30 mg, 30 mg, Intravenous, Q6H PRN    Allergies:  Patient has no known allergies. Social History:    Tobacco:  Reports she has been smoking for 15 yrs anywhere from 1/2-1 ppd  Alcohol:  States she drinks socially  Illicit Drugs: Reports Marijuana use--states last use was 1 1/2 weeks ago (noted drug screen positive for Marijuana)    Family History:    Mother--alive with medical hx of diabetes, heart disease, leukemia, ovarian cancer, breast cancer  Father--alive with medical history of schizophrenia, rheumatoid arthritis  1 Sister alive and healthy  1 Daughter--alive with type 1 diabetes  1 Son--alive with nerve damage secondary to Guillain [place]'s Syndrome at age 3    REVIEW OF SYSTEMS:    Aside from what was mentioned in the PMH and HPI, essentially unremarkable, all others negative. PHYSICAL EXAM:      Vitals:    /67   Pulse 86   Temp 98.5 °F (36.9 °C) (Oral)   Resp 16   Ht 5' 4\" (1.626 m)   Wt 133 lb 4.8 oz (60.5 kg)   SpO2 97%   BMI 22.88 kg/m²        CONSTITUTIONAL:  awake, alert, cooperative, ill appearing and appears stated age  EYES:  pupils equal, round and reactive to light, sclera anicteric and conjunctiva normal  ENT:  normocephalic, oral pharynx with moist mucous membranes  NECK:  supple   LUNGS:  No increased work of breathing, good air exchange, clear to auscultation bilaterally.   CARDIOVASCULAR:  Normal apical impulse, regular rate and rhythm, no murmur noted; 2+ pulses; without edema  ABDOMEN:  normal bowel sounds, soft, non-distended, tender to palpation of RLQ and suprapubic area without guarding or rebound, no masses palpated, no hepatosplenomegally  MUSCULOSKELETAL:  full range of motion noted  motor strength is 5 out of 5 all extremities bilaterally  NEUROLOGIC:  Mental Status Exam:  Level of Alertness:   awake  Orientation:   person, place, time  Motor Exam:  Motor exam is symmetrical 5 out of 5 all extremities bilaterally  SKIN:  normal skin color, texture, turgor    DATA:    CBC with Differential:    Lab Results   Component Value Date    WBC 16.9 07/17/2018    RBC 4.30 07/17/2018    HGB 13.9 07/17/2018    HCT 40.9 07/17/2018     07/17/2018    MCV 95.1 07/17/2018    MCH 32.3 07/17/2018    MCHC 34.0 07/17/2018    RDW 13.4 07/17/2018    SEGSPCT 88 12/31/2013    LYMPHOPCT 11.5 07/16/2018    MONOPCT 2.6 07/16/2018    BASOPCT 0.5 07/16/2018    MONOSABS 0.44 07/16/2018    LYMPHSABS 1.93 07/16/2018    EOSABS 0.12 07/16/2018    BASOSABS 0.08 07/16/2018     CMP:    Lab Results   Component Value Date     07/17/2018    K 3.7 07/17/2018     07/17/2018    CO2 22 07/17/2018    BUN 6 07/17/2018    CREATININE 0.5 07/17/2018    GFRAA >60 07/17/2018    LABGLOM >60 07/17/2018    GLUCOSE 138 07/17/2018    GLUCOSE 92 05/08/2011    PROT 7.3 07/16/2018    LABALBU 4.7 07/16/2018    CALCIUM 8.8 07/17/2018    BILITOT 0.4 07/16/2018    ALKPHOS 62 07/16/2018    AST 15 07/16/2018    ALT 10 07/16/2018     Hepatic Function Panel:    Lab Results   Component Value Date    ALKPHOS 62 07/16/2018    ALT 10 07/16/2018    AST 15 07/16/2018    PROT 7.3 07/16/2018    BILITOT 0.4 07/16/2018    BILIDIR <0.2 01/22/2018    IBILI see below 01/22/2018    LABALBU 4.7 07/16/2018     PT/INR:    Lab Results   Component Value Date    PROTIME 10.5 09/25/2016    INR 1.0 09/25/2016     PTT:    Lab Results   Component Value Date    APTT 24.3 09/25/2016   [APTT}  Last 3 Troponin:    Lab Results   Component Value Date    TROPONINI <0.01 05/02/2014     TSH:    Lab Results   Component Value Date    TSH 3.470 03/27/2015        Ref.  Range 7/16/2018 17:04   Color, UA Latest Ref Range: Straw/Yellow  DARK YELLOW (A)   Clarity, UA Latest Ref Range: Clear  CLOUDY (A)   Glucose, UA Latest Ref Range: Negative mg/dL Negative   Bilirubin, Urine Latest Ref Range: Negative  Negative   Ketones, Urine Latest Ref Range: Negative mg/dL TRACE (A)   Specific [place], UA Latest Ref Range: 1.005 - 1.030  1.025   Blood, Urine Latest Ref Range: Negative  Negative   pH, UA Latest Ref Range: 5.0 - 9.0  6.5   Protein, UA Latest Ref Range: Negative mg/dL 30 (A)   Urobilinogen, Urine Latest Ref Range: <2.0 E.U./dL 1.0   Nitrite, Urine Latest Ref Range: Negative  Negative   Leukocyte Esterase, Urine Latest Ref Range: Negative  Negative   Mucus, UA Unknown Present   WBC, UA Latest Ref Range: 0 - 5 /HPF 1-3   RBC, UA Latest Ref Range: 0 - 2 /HPF 0-1   Epi Cells Latest Units: /HPF MODERATE you.    Discussed with Dr. Juan J Pearson developed by Dr. Stephanie FORBES-CNP 7/17/18 12:13 PM  for Dr. Cookie Baumann

## 2018-07-17 NOTE — PLAN OF CARE
Problem: Pain:  Goal: Pain level will decrease  Pain level will decrease   Outcome: Ongoing  Remains comfortable. Monitor bowel function. Remains on clear liquids at this time.  Encouraged to call for assistance as needed

## 2018-07-17 NOTE — PROGRESS NOTES
[facility] Dr. Yesy Hines and was instructed to call and leave a detail message at this time. Updated Dr. Yesy Hines that patient continues to be nauseated and could we have have something else to help with nausea. Awaiting return call/orders. Hourly rounding continues.

## 2018-07-17 NOTE — CARE COORDINATION
Introduced my self and provided explanation of CM role to patient. Patient is awake, alert, and aware of current diagnosis and treatment plan including iv steroids, iv fluids, pain control. Patient verbalizes she lives at home with parents and her own children. She plans to return to same environment upon discharge where she functions independently. Patient verbalizes no concerns and identifies no barriers to discharge. Parents will be available for transportation home. BRUCE 3 days  Brodie Daya.  Donna, MSN, RN  [facility] Case Management  [phone]

## 2018-07-17 NOTE — H&P
obvious abnormality, atraumatic  Eyes: conjunctivae/corneas clear. PERRL, EOM's intact. Fundi benign. Ears: normal TM's and external ear canals both ears  Nose: Nares normal. Septum midline. Mucosa normal. No drainage or sinus tenderness.   Throat: lips, mucosa, and tongue normal; teeth and gums normal  Neck: no adenopathy, no carotid bruit, no JVD, supple, symmetrical, trachea midline and thyroid not enlarged, symmetric, no tenderness/mass/nodules  Lungs: clear to auscultation bilaterally  Heart: regular rate and rhythm, S1, S2 normal, no murmur, click, rub or gallop  Abdomen: Soft, tender periumbilical and suprapubic area; bowel sounds hyperactive; voluntary guarding, no rebound; no organomegaly or masses  Extremities: extremities normal, atraumatic, no cyanosis or edema  Pulses: 2+ and symmetric  Skin: Skin color, texture, turgor normal. No rashes or lesions  Neurologic: Grossly normal    LABS:    CBC with Differential:    Lab Results   Component Value Date    WBC 16.9 07/17/2018    RBC 4.30 07/17/2018    HGB 13.9 07/17/2018    HCT 40.9 07/17/2018     07/17/2018    MCV 95.1 07/17/2018    MCH 32.3 07/17/2018    MCHC 34.0 07/17/2018    RDW 13.4 07/17/2018    SEGSPCT 88 12/31/2013    LYMPHOPCT 11.5 07/16/2018    MONOPCT 2.6 07/16/2018    BASOPCT 0.5 07/16/2018    MONOSABS 0.44 07/16/2018    LYMPHSABS 1.93 07/16/2018    EOSABS 0.12 07/16/2018    BASOSABS 0.08 07/16/2018     BMP:    Lab Results   Component Value Date     07/17/2018    K 3.7 07/17/2018     07/17/2018    CO2 22 07/17/2018    BUN 6 07/17/2018    LABALBU 4.7 07/16/2018    CREATININE 0.5 07/17/2018    CALCIUM 8.8 07/17/2018    GFRAA >60 07/17/2018    LABGLOM >60 07/17/2018    GLUCOSE 138 07/17/2018    GLUCOSE 92 05/08/2011     Results for orders placed or performed during the hospital encounter of 07/16/18   CBC Auto Differential   Result Value Ref Range     WBC 16.7 (H) 4.5 - 11.5 E9/L     RBC 4.52 3.50 - 5.50 E12/L     Hemoglobin 14.5 Microscopic Urinalysis   Result Value Ref Range     Mucus, UA Present       WBC, UA 1-3 0 - 5 /HPF     RBC, UA 0-1 0 - 2 /HPF     Epi Cells MODERATE /HPF     Bacteria, UA RARE (A) /HPF   Urine Drug Screen   Result Value Ref Range     Amphetamine Screen, Urine NOT DETECTED Negative <1000 ng/mL     Barbiturate Screen, Ur NOT DETECTED Negative < 200 ng/mL     Benzodiazepine Screen, Urine POSITIVE (A) Negative < 200 ng/mL     Cannabinoid Scrn, Ur POSITIVE (A) Negative < 50ng/mL     COCAINE METABOLITE SCREEN URINE NOT DETECTED Negative < 300 ng/mL     Opiate Scrn, Ur NOT DETECTED Negative < 300ng/mL     PCP Scrn, Ur NOT DETECTED Negative < 25 ng/mL     Methadone Screen, Urine NOT DETECTED Negative <300 ng/mL     Propoxyphene Scrn, Ur NOT DETECTED Negative <300 ng/mL   POC Pregnancy Urine Qual   Result Value Ref Range     Preg Test, Ur neg       QC OK? ok         Narrative   Patient MRN:  [id number]   : 1985   Age: 29 years   Gender: Female       EXAM: CT ABDOMEN PELVIS W IV CONTRAST       INDICATION:  Lower abdominal pain, N/V/D, Hx crohn's         COMPARISON: 2018       TECHNIQUE: Low-dose CT  acquisition technique included one of   following options:   1 . Automated exposure control   2. Adjustment of MA and or KV according to patient's size or    3. Use of iterative reconstruction.       FINDINGS:   Lung bases are clear. Mild periportal hypodensity suggestive of edema. Spleen, adrenal glands, kidneys, and pancreas are unremarkable. Moderate retained stool throughout the colon without bowel   obstruction. The appendix unremarkable. Mild mural thickening right   hemicolon noted could related to underdistention versus chronic   inflammatory changes. No pericolonic fat stranding. IUD appears   suboptimally positioned within the lower uterine segment as on prior   examination. No adnexal mass. No free air or free fluid.  Stable   gray-white diseases L5 on S1 due to pars interarticularis defects   measuring 6 mm.           Impression       1. No acute inflammatory process. 2. Stable periportal edema. 3. Stable suboptimal positioning of the IUD device    4. Stable wall thickening involving the ascending colon. 5. 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     EKG: This EKG is signed and interpreted by me. Rate: 72  Rhythm: Sinus  Interpretation: no acute changes, non-specific EKG and sinus arrhythmia  Comparison: stable as compared to patient's most recent EKG (03/31/2015)  Problem list:    Patient Active Problem List   Diagnosis    Cannabis dependence ([facility]    Asthma    Tobacco abuse    Symptomatic cholelithiasis    Crohn disease ([facility]    Crohn's colitis, without complications ([facility]         ASSESSMENT:      1. Acute exacerbation of Crohn's disease. 2. Cannabis abuse    3. Chronic stable asthma    4. Tobacco abuse    PLAN:     1. Admit for intravenous hydration and antiemetic therapy    2. Intravenous Solumedrol    3. GI consult pending    4. Continue clear liquid diet    5.  Continue PPI    ESTEFANI Gordon D.O.  11:18 AM  7/17/2018

## 2018-07-17 NOTE — PLAN OF CARE
Problem: Pain:  Goal: Pain level will decrease  Pain level will decrease   Outcome: Met This Shift      Problem: Nausea/Vomiting:  Goal: Absence of nausea/vomiting  Absence of nausea/vomiting   Outcome: Ongoing

## 2018-07-18 LAB
BASOPHILS ABSOLUTE: 0.02 E9/L (ref 0–0.2)
BASOPHILS RELATIVE PERCENT: 0.1 % (ref 0–2)
EOSINOPHILS ABSOLUTE: 0.02 E9/L (ref 0.05–0.5)
EOSINOPHILS RELATIVE PERCENT: 0.1 % (ref 0–6)
HCT VFR BLD CALC: 36 % (ref 34–48)
HEMOGLOBIN: 12.2 G/DL (ref 11.5–15.5)
IMMATURE GRANULOCYTES #: 0.09 E9/L
IMMATURE GRANULOCYTES %: 0.6 % (ref 0–5)
LYMPHOCYTES ABSOLUTE: 1.75 E9/L (ref 1.5–4)
LYMPHOCYTES RELATIVE PERCENT: 12.4 % (ref 20–42)
MCH RBC QN AUTO: 31.8 PG (ref 26–35)
MCHC RBC AUTO-ENTMCNC: 33.9 % (ref 32–34.5)
MCV RBC AUTO: 93.8 FL (ref 80–99.9)
MONOCYTES ABSOLUTE: 0.43 E9/L (ref 0.1–0.95)
MONOCYTES RELATIVE PERCENT: 3 % (ref 2–12)
NEUTROPHILS ABSOLUTE: 11.82 E9/L (ref 1.8–7.3)
NEUTROPHILS RELATIVE PERCENT: 83.8 % (ref 43–80)
PDW BLD-RTO: 13.7 FL (ref 11.5–15)
PLATELET # BLD: 326 E9/L (ref 130–450)
PMV BLD AUTO: 9.9 FL (ref 7–12)
RBC # BLD: 3.84 E12/L (ref 3.5–5.5)
WBC # BLD: 14.1 E9/L (ref 4.5–11.5)

## 2018-07-18 PROCEDURE — 6360000002 HC RX W HCPCS: Performed by: NURSE PRACTITIONER

## 2018-07-18 PROCEDURE — 85025 COMPLETE CBC W/AUTO DIFF WBC: CPT

## 2018-07-18 PROCEDURE — 36415 COLL VENOUS BLD VENIPUNCTURE: CPT

## 2018-07-18 PROCEDURE — 2580000003 HC RX 258: Performed by: INTERNAL MEDICINE

## 2018-07-18 PROCEDURE — 6360000002 HC RX W HCPCS: Performed by: INTERNAL MEDICINE

## 2018-07-18 PROCEDURE — 1200000000 HC SEMI PRIVATE

## 2018-07-18 PROCEDURE — C9113 INJ PANTOPRAZOLE SODIUM, VIA: HCPCS | Performed by: INTERNAL MEDICINE

## 2018-07-18 RX ADMIN — KETOROLAC TROMETHAMINE 30 MG: 30 INJECTION, SOLUTION INTRAMUSCULAR at 04:58

## 2018-07-18 RX ADMIN — PANTOPRAZOLE SODIUM 40 MG: 40 INJECTION, POWDER, FOR SOLUTION INTRAVENOUS at 08:56

## 2018-07-18 RX ADMIN — MORPHINE SULFATE 4 MG: 2 INJECTION, SOLUTION INTRAMUSCULAR; INTRAVENOUS at 13:14

## 2018-07-18 RX ADMIN — METHYLPREDNISOLONE SODIUM SUCCINATE 40 MG: 40 INJECTION, POWDER, LYOPHILIZED, FOR SOLUTION INTRAMUSCULAR; INTRAVENOUS at 16:38

## 2018-07-18 RX ADMIN — ENOXAPARIN SODIUM 40 MG: 40 INJECTION, SOLUTION INTRAVENOUS; SUBCUTANEOUS at 08:56

## 2018-07-18 RX ADMIN — METHYLPREDNISOLONE SODIUM SUCCINATE 40 MG: 40 INJECTION, POWDER, LYOPHILIZED, FOR SOLUTION INTRAMUSCULAR; INTRAVENOUS at 05:00

## 2018-07-18 RX ADMIN — MORPHINE SULFATE 4 MG: 2 INJECTION, SOLUTION INTRAMUSCULAR; INTRAVENOUS at 21:55

## 2018-07-18 RX ADMIN — Medication 10 ML: at 08:57

## 2018-07-18 RX ADMIN — METOCLOPRAMIDE 5 MG: 5 INJECTION, SOLUTION INTRAMUSCULAR; INTRAVENOUS at 19:47

## 2018-07-18 RX ADMIN — Medication 10 ML: at 22:54

## 2018-07-18 RX ADMIN — SODIUM CHLORIDE: 9 INJECTION, SOLUTION INTRAVENOUS at 14:50

## 2018-07-18 RX ADMIN — Medication 10 ML: at 21:55

## 2018-07-18 RX ADMIN — METOCLOPRAMIDE 5 MG: 5 INJECTION, SOLUTION INTRAMUSCULAR; INTRAVENOUS at 08:56

## 2018-07-18 RX ADMIN — METOCLOPRAMIDE 5 MG: 5 INJECTION, SOLUTION INTRAMUSCULAR; INTRAVENOUS at 14:30

## 2018-07-18 ASSESSMENT — PAIN DESCRIPTION - PAIN TYPE
TYPE: ACUTE PAIN
TYPE: ACUTE PAIN

## 2018-07-18 ASSESSMENT — PAIN SCALES - GENERAL
PAINLEVEL_OUTOF10: 8
PAINLEVEL_OUTOF10: 2
PAINLEVEL_OUTOF10: 7
PAINLEVEL_OUTOF10: 0
PAINLEVEL_OUTOF10: 0
PAINLEVEL_OUTOF10: 9
PAINLEVEL_OUTOF10: 2

## 2018-07-18 ASSESSMENT — PAIN DESCRIPTION - DESCRIPTORS
DESCRIPTORS: ACHING;DISCOMFORT
DESCRIPTORS: ACHING;DISCOMFORT

## 2018-07-18 ASSESSMENT — PAIN DESCRIPTION - PROGRESSION
CLINICAL_PROGRESSION: GRADUALLY WORSENING
CLINICAL_PROGRESSION: GRADUALLY WORSENING

## 2018-07-18 ASSESSMENT — PAIN DESCRIPTION - ORIENTATION
ORIENTATION: RIGHT;MID;LOWER
ORIENTATION: RIGHT;MID;LOWER

## 2018-07-18 ASSESSMENT — PAIN DESCRIPTION - FREQUENCY
FREQUENCY: CONTINUOUS
FREQUENCY: CONTINUOUS

## 2018-07-18 ASSESSMENT — PAIN DESCRIPTION - LOCATION
LOCATION: ABDOMEN
LOCATION: ABDOMEN

## 2018-07-18 NOTE — PATIENT CARE CONFERENCE
P Quality Flow/Interdisciplinary Rounds Progress Note        Quality Flow Rounds held on July 18, 2018    Disciplines Attending:  Bedside Nurse, ,  and Nursing Unit [address] ALBERT Garay was admitted on 7/16/2018  3:58 PM    Anticipated Discharge Date:  Expected Discharge Date: 07/19/18    Disposition:    King Score:  King Scale Score: 20    Readmission Risk              Risk of Unplanned Readmission:        11             Discussed patient goal for the day, patient clinical progression, and barriers to discharge. The following Goal(s) of the Day/Commitment(s) have been identified:   Tolerate diet and discharge planning      Saint Braun  July 18, 2018

## 2018-07-18 NOTE — PROGRESS NOTES
MCH 31.8 07/18/2018    MCHC 33.9 07/18/2018    RDW 13.7 07/18/2018     07/18/2018    MPV 9.9 07/18/2018     CMP:  Lab Results   Component Value Date     07/17/2018    K 3.7 07/17/2018     07/17/2018    CO2 22 07/17/2018    BUN 6 07/17/2018    CREATININE 0.5 07/17/2018    GFRAA >60 07/17/2018    LABGLOM >60 07/17/2018    GLUCOSE 138 07/17/2018    GLUCOSE 92 05/08/2011    PROT 7.3 07/16/2018    LABALBU 4.7 07/16/2018    CALCIUM 8.8 07/17/2018    BILITOT 0.4 07/16/2018    ALKPHOS 62 07/16/2018    AST 15 07/16/2018    ALT 10 07/16/2018     Hepatic Function Panel:  Lab Results   Component Value Date    ALKPHOS 62 07/16/2018    ALT 10 07/16/2018    AST 15 07/16/2018    PROT 7.3 07/16/2018    BILITOT 0.4 07/16/2018    BILIDIR <0.2 01/22/2018    IBILI see below 01/22/2018    LABALBU 4.7 07/16/2018     No components found for: CHLPL  No results found for: TRIG  No results found for: HDL  No results found for: LDLCALC  No results found for: LABVLDL   PT/INR:    Lab Results   Component Value Date    PROTIME 10.5 09/25/2016    INR 1.0 09/25/2016         Assessment:     Principal Problem:    Crohn disease ([facility]  Active Problems:  · Cyclic vomiting from Marijuana use  · Cannabis dependence  · Hx asthma      Plan:     · Clear liquid diet, advance as tolerated  · Continue IV fluids  · Calprotectin- pending  · IV steroids as ordered  · Continue Colace as ordered   · Continue Protonix as ordered  · Antiemetics and pain management as per PCP  · Monitor CBC  · Will continue to follow. Discussed with Dr. Olga Santana per Dr. Chauncey Knott, NP-C 7/18/2018 8:59 AM For Dr. Vela [facility]. I HAVE REVIEWED AND AGREE WITH THE PLAN OF CARE. REVIEWED LABS/RADIOLOGY.   HISTORY AND EXAM BY ME SHOWS: ABDOMEN IS SOFT, LAX, WITH TENDERNESS   LIKELY SECONDARY TO MARIJUANA SMOKING RECENTLY  SINCE CROHNS INDICATORS ARE WNL, GIVE 2 MORE DOSES OF SOLUMEDROL THEN DC.  OK FOR DISCHARGE IN AM, HOME WITH Talia Barrett

## 2018-07-19 VITALS
HEART RATE: 51 BPM | WEIGHT: 140 LBS | OXYGEN SATURATION: 98 % | SYSTOLIC BLOOD PRESSURE: 129 MMHG | RESPIRATION RATE: 14 BRPM | DIASTOLIC BLOOD PRESSURE: 90 MMHG | HEIGHT: 64 IN | TEMPERATURE: 97.8 F | BODY MASS INDEX: 23.9 KG/M2

## 2018-07-19 PROCEDURE — C9113 INJ PANTOPRAZOLE SODIUM, VIA: HCPCS | Performed by: INTERNAL MEDICINE

## 2018-07-19 PROCEDURE — 6360000002 HC RX W HCPCS: Performed by: INTERNAL MEDICINE

## 2018-07-19 PROCEDURE — 6360000002 HC RX W HCPCS: Performed by: NURSE PRACTITIONER

## 2018-07-19 RX ADMIN — ONDANSETRON 4 MG: 2 INJECTION INTRAMUSCULAR; INTRAVENOUS at 02:52

## 2018-07-19 RX ADMIN — METHYLPREDNISOLONE SODIUM SUCCINATE 40 MG: 40 INJECTION, POWDER, LYOPHILIZED, FOR SOLUTION INTRAMUSCULAR; INTRAVENOUS at 02:57

## 2018-07-19 RX ADMIN — PANTOPRAZOLE SODIUM 40 MG: 40 INJECTION, POWDER, FOR SOLUTION INTRAVENOUS at 08:33

## 2018-07-19 RX ADMIN — MORPHINE SULFATE 4 MG: 2 INJECTION, SOLUTION INTRAMUSCULAR; INTRAVENOUS at 02:52

## 2018-07-19 RX ADMIN — METOCLOPRAMIDE 5 MG: 5 INJECTION, SOLUTION INTRAMUSCULAR; INTRAVENOUS at 08:33

## 2018-07-19 ASSESSMENT — PAIN DESCRIPTION - PAIN TYPE: TYPE: ACUTE PAIN

## 2018-07-19 ASSESSMENT — PAIN DESCRIPTION - DESCRIPTORS: DESCRIPTORS: ACHING;DISCOMFORT

## 2018-07-19 ASSESSMENT — PAIN DESCRIPTION - FREQUENCY: FREQUENCY: CONTINUOUS

## 2018-07-19 ASSESSMENT — PAIN SCALES - GENERAL
PAINLEVEL_OUTOF10: 0
PAINLEVEL_OUTOF10: 8

## 2018-07-19 ASSESSMENT — PAIN DESCRIPTION - PROGRESSION: CLINICAL_PROGRESSION: GRADUALLY WORSENING

## 2018-07-19 ASSESSMENT — PAIN DESCRIPTION - ORIENTATION: ORIENTATION: RIGHT;MID;LOWER

## 2018-07-19 ASSESSMENT — PAIN DESCRIPTION - LOCATION: LOCATION: ABDOMEN

## 2018-07-19 NOTE — PATIENT CARE CONFERENCE
[facility] Quality Flow/Interdisciplinary Rounds Progress Note        Quality Flow Rounds held on July 19, 2018    Disciplines Attending:  Bedside Nurse, ,  and Nursing Unit 321 ALBERT Garay was admitted on 7/16/2018  3:58 PM    Anticipated Discharge Date:  Expected Discharge Date: 07/19/18    Disposition:    King Score:  King Scale Score: 20    Readmission Risk              Risk of Unplanned Readmission:        11             Discussed patient goal for the day, patient clinical progression, and barriers to discharge. The following Goal(s) of the Day/Commitment(s) have been identified:  Discharge planning.        Ivette Lozano  July 19, 2018

## 2018-07-19 NOTE — PROGRESS NOTES
Called Nurse Practioner  [phone]. Per RN [address]. Patient will need CBC with Diff,CMP,CRP and ESR  3 to 5 days prior to office visit with Dr. Jill Corbett.   Lashell Martinez  7/19/2018  9:27 AM

## 2018-07-20 LAB
7-AMINOCLONAZEPAM, URINE: <5 NG/ML
ALPHA-HYDROXYALPRAZOLAM, URINE: <5 NG/ML
ALPHA-HYDROXYMIDAZOLAM, URINE: <20 NG/ML
ALPRAZOLAM, URINE: <5 NG/ML
CHLORDIAZEPOXIDE, URINE: <20 NG/ML
CLONAZEPAM, URINE: <5 NG/ML
DIAZEPAM, URINE: <20 NG/ML
LORAZEPAM, URINE: <20 NG/ML
MIDAZOLAM, URINE: <20 NG/ML
NORDIAZEPAM, URINE: 463 NG/ML
OXAZEPAM, URINE: 400 NG/ML
TEMAZEPAM, URINE: 1042 NG/ML

## 2018-07-21 LAB — CANNABINOIDS CONF, URINE: >500 NG/ML

## 2018-07-26 LAB
Lab: NORMAL
REPORT: NORMAL
THIS TEST SENT TO: NORMAL

## 2018-10-24 ENCOUNTER — APPOINTMENT (OUTPATIENT)
Dept: CT IMAGING | Age: 33
End: 2018-10-24
Payer: COMMERCIAL

## 2018-10-24 ENCOUNTER — HOSPITAL ENCOUNTER (EMERGENCY)
Age: 33
Discharge: HOME | End: 2018-10-24
Attending: EMERGENCY MEDICINE
Payer: COMMERCIAL

## 2018-10-24 VITALS
WEIGHT: 132 LBS | HEART RATE: 51 BPM | SYSTOLIC BLOOD PRESSURE: 148 MMHG | RESPIRATION RATE: 16 BRPM | OXYGEN SATURATION: 97 % | DIASTOLIC BLOOD PRESSURE: 72 MMHG | TEMPERATURE: 98.4 F | BODY MASS INDEX: 22.53 KG/M2 | HEIGHT: 64 IN

## 2018-10-24 DIAGNOSIS — R10.31 RIGHT LOWER QUADRANT ABDOMINAL PAIN: Primary | ICD-10-CM

## 2018-10-24 DIAGNOSIS — R11.2 NON-INTRACTABLE VOMITING WITH NAUSEA, UNSPECIFIED VOMITING TYPE: ICD-10-CM

## 2018-10-24 LAB
ALBUMIN SERPL-MCNC: 5.1 G/DL (ref 3.5–5.2)
ALP BLD-CCNC: 62 U/L (ref 35–104)
ALT SERPL-CCNC: 13 U/L (ref 0–32)
ANION GAP SERPL CALCULATED.3IONS-SCNC: 18 MMOL/L (ref 7–16)
AST SERPL-CCNC: 21 U/L (ref 0–31)
BACTERIA: NORMAL /HPF
BASOPHILS ABSOLUTE: 0.03 E9/L (ref 0–0.2)
BASOPHILS RELATIVE PERCENT: 0.2 % (ref 0–2)
BILIRUB SERPL-MCNC: 0.6 MG/DL (ref 0–1.2)
BILIRUBIN URINE: NEGATIVE
BLOOD, URINE: ABNORMAL
BUN BLDV-MCNC: 14 MG/DL (ref 6–20)
CALCIUM SERPL-MCNC: 9.5 MG/DL (ref 8.6–10.2)
CHLORIDE BLD-SCNC: 97 MMOL/L (ref 98–107)
CHP ED QC CHECK: NORMAL
CLARITY: CLEAR
CO2: 22 MMOL/L (ref 22–29)
COLOR: YELLOW
CREAT SERPL-MCNC: 0.6 MG/DL (ref 0.5–1)
EOSINOPHILS ABSOLUTE: 0 E9/L (ref 0.05–0.5)
EOSINOPHILS RELATIVE PERCENT: 0 % (ref 0–6)
GFR AFRICAN AMERICAN: >60
GFR NON-AFRICAN AMERICAN: >60 ML/MIN/1.73
GLUCOSE BLD-MCNC: 138 MG/DL (ref 74–109)
GLUCOSE URINE: NEGATIVE MG/DL
HCT VFR BLD CALC: 43.7 % (ref 34–48)
HEMOGLOBIN: 15.1 G/DL (ref 11.5–15.5)
IMMATURE GRANULOCYTES #: 0.1 E9/L
IMMATURE GRANULOCYTES %: 0.6 % (ref 0–5)
KETONES, URINE: >=80 MG/DL
LACTIC ACID: 1.5 MMOL/L (ref 0.5–2.2)
LACTIC ACID: 2.5 MMOL/L (ref 0.5–2.2)
LEUKOCYTE ESTERASE, URINE: NEGATIVE
LIPASE: 9 U/L (ref 13–60)
LYMPHOCYTES ABSOLUTE: 1.36 E9/L (ref 1.5–4)
LYMPHOCYTES RELATIVE PERCENT: 7.9 % (ref 20–42)
MCH RBC QN AUTO: 32.2 PG (ref 26–35)
MCHC RBC AUTO-ENTMCNC: 34.6 % (ref 32–34.5)
MCV RBC AUTO: 93.2 FL (ref 80–99.9)
MONOCYTES ABSOLUTE: 0.25 E9/L (ref 0.1–0.95)
MONOCYTES RELATIVE PERCENT: 1.5 % (ref 2–12)
NEUTROPHILS ABSOLUTE: 15.37 E9/L (ref 1.8–7.3)
NEUTROPHILS RELATIVE PERCENT: 89.8 % (ref 43–80)
NITRITE, URINE: NEGATIVE
PDW BLD-RTO: 13.5 FL (ref 11.5–15)
PH UA: 6.5 (ref 5–9)
PLATELET # BLD: 366 E9/L (ref 130–450)
PMV BLD AUTO: 9.4 FL (ref 7–12)
POTASSIUM SERPL-SCNC: 4.1 MMOL/L (ref 3.5–5)
PREGNANCY TEST URINE, POC: NEGATIVE
PROTEIN UA: 30 MG/DL
RBC # BLD: 4.69 E12/L (ref 3.5–5.5)
RBC UA: NORMAL /HPF (ref 0–2)
SODIUM BLD-SCNC: 137 MMOL/L (ref 132–146)
SPECIFIC GRAVITY UA: >=1.03 (ref 1–1.03)
TOTAL PROTEIN: 8.1 G/DL (ref 6.4–8.3)
UROBILINOGEN, URINE: 0.2 E.U./DL
WBC # BLD: 17.1 E9/L (ref 4.5–11.5)
WBC UA: NORMAL /HPF (ref 0–5)

## 2018-10-24 PROCEDURE — 96374 THER/PROPH/DIAG INJ IV PUSH: CPT

## 2018-10-24 PROCEDURE — 83605 ASSAY OF LACTIC ACID: CPT

## 2018-10-24 PROCEDURE — 85025 COMPLETE CBC W/AUTO DIFF WBC: CPT

## 2018-10-24 PROCEDURE — 99284 EMERGENCY DEPT VISIT MOD MDM: CPT

## 2018-10-24 PROCEDURE — 6360000004 HC RX CONTRAST MEDICATION: Performed by: RADIOLOGY

## 2018-10-24 PROCEDURE — 2580000003 HC RX 258: Performed by: NURSE PRACTITIONER

## 2018-10-24 PROCEDURE — 96376 TX/PRO/DX INJ SAME DRUG ADON: CPT

## 2018-10-24 PROCEDURE — 6360000002 HC RX W HCPCS: Performed by: NURSE PRACTITIONER

## 2018-10-24 PROCEDURE — 74177 CT ABD & PELVIS W/CONTRAST: CPT

## 2018-10-24 PROCEDURE — 96375 TX/PRO/DX INJ NEW DRUG ADDON: CPT

## 2018-10-24 PROCEDURE — 83690 ASSAY OF LIPASE: CPT

## 2018-10-24 PROCEDURE — 81001 URINALYSIS AUTO W/SCOPE: CPT

## 2018-10-24 PROCEDURE — 80053 COMPREHEN METABOLIC PANEL: CPT

## 2018-10-24 RX ORDER — DICYCLOMINE HYDROCHLORIDE 10 MG/1
20 CAPSULE ORAL 3 TIMES DAILY PRN
Qty: 18 CAPSULE | Refills: 0 | Status: SHIPPED | OUTPATIENT
Start: 2018-10-24 | End: 2021-04-24 | Stop reason: ALTCHOICE

## 2018-10-24 RX ORDER — MORPHINE SULFATE 2 MG/ML
4 INJECTION, SOLUTION INTRAMUSCULAR; INTRAVENOUS ONCE
Status: COMPLETED | OUTPATIENT
Start: 2018-10-24 | End: 2018-10-24

## 2018-10-24 RX ORDER — ONDANSETRON 2 MG/ML
4 INJECTION INTRAMUSCULAR; INTRAVENOUS ONCE
Status: COMPLETED | OUTPATIENT
Start: 2018-10-24 | End: 2018-10-24

## 2018-10-24 RX ORDER — PROMETHAZINE HYDROCHLORIDE 25 MG/1
25 TABLET ORAL EVERY 8 HOURS PRN
Qty: 12 TABLET | Refills: 0 | Status: SHIPPED | OUTPATIENT
Start: 2018-10-24 | End: 2018-10-31

## 2018-10-24 RX ORDER — 0.9 % SODIUM CHLORIDE 0.9 %
1000 INTRAVENOUS SOLUTION INTRAVENOUS ONCE
Status: COMPLETED | OUTPATIENT
Start: 2018-10-24 | End: 2018-10-24

## 2018-10-24 RX ORDER — PROMETHAZINE HYDROCHLORIDE 25 MG/ML
12.5 INJECTION, SOLUTION INTRAMUSCULAR; INTRAVENOUS ONCE
Status: COMPLETED | OUTPATIENT
Start: 2018-10-24 | End: 2018-10-24

## 2018-10-24 RX ORDER — FAMOTIDINE 20 MG/1
20 TABLET, FILM COATED ORAL 2 TIMES DAILY
Qty: 14 TABLET | Refills: 0 | Status: SHIPPED | OUTPATIENT
Start: 2018-10-24 | End: 2022-02-18

## 2018-10-24 RX ADMIN — IOPAMIDOL 110 ML: 755 INJECTION, SOLUTION INTRAVENOUS at 20:46

## 2018-10-24 RX ADMIN — MORPHINE SULFATE 4 MG: 2 INJECTION, SOLUTION INTRAMUSCULAR; INTRAVENOUS at 21:49

## 2018-10-24 RX ADMIN — MORPHINE SULFATE 4 MG: 2 INJECTION, SOLUTION INTRAMUSCULAR; INTRAVENOUS at 20:07

## 2018-10-24 RX ADMIN — SODIUM CHLORIDE 1000 ML: 9 INJECTION, SOLUTION INTRAVENOUS at 20:07

## 2018-10-24 RX ADMIN — PROMETHAZINE HYDROCHLORIDE 12.5 MG: 25 INJECTION INTRAMUSCULAR; INTRAVENOUS at 20:07

## 2018-10-24 RX ADMIN — ONDANSETRON 4 MG: 2 INJECTION INTRAMUSCULAR; INTRAVENOUS at 21:49

## 2018-10-24 ASSESSMENT — PAIN SCALES - WONG BAKER
WONGBAKER_NUMERICALRESPONSE: 2
WONGBAKER_NUMERICALRESPONSE: 2

## 2018-10-24 ASSESSMENT — PAIN DESCRIPTION - DESCRIPTORS
DESCRIPTORS: ACHING
DESCRIPTORS: OTHER (COMMENT)
DESCRIPTORS: DISCOMFORT

## 2018-10-24 ASSESSMENT — PAIN DESCRIPTION - LOCATION
LOCATION: ABDOMEN

## 2018-10-24 ASSESSMENT — PAIN SCALES - GENERAL
PAINLEVEL_OUTOF10: 6
PAINLEVEL_OUTOF10: 7
PAINLEVEL_OUTOF10: 10
PAINLEVEL_OUTOF10: 4

## 2018-10-24 ASSESSMENT — PAIN DESCRIPTION - PAIN TYPE
TYPE: ACUTE PAIN

## 2018-10-24 ASSESSMENT — PAIN DESCRIPTION - FREQUENCY: FREQUENCY: CONTINUOUS

## 2018-10-24 ASSESSMENT — PAIN DESCRIPTION - ORIENTATION: ORIENTATION: LOWER;RIGHT

## 2018-10-24 NOTE — ED NOTES
Bed:  [place] [address]  Expected date:   Expected time:   Means of arrival:   Comments:  Desiree Laboy RN  10/24/18 1936

## 2018-10-25 NOTE — ED PROVIDER NOTES
verbally. All questions were answered. Patient is comfortable and agreeable with discharge plan. Patient in no acute distress and non-toxic in appearance. At this time the patient is without objective evidence of an acute process requiring hospitalization or inpatient management. They have remained hemodynamically stable throughout their entire ED visit and are stable for discharge with outpatient follow-up. The plan has been discussed in detail and they are aware of the specific conditions for emergent return, as well as the importance of follow-up. Counseling:   I have spoken with the significant other and patient and discussed todays results, in addition to providing specific details for the plan of care and counseling regarding the diagnosis and prognosis and are agreeable with the plan. Assessment      1. Right lower quadrant abdominal pain    2. Non-intractable vomiting with nausea, unspecified vomiting type      This patient's ED course included: a personal history and physicial examination, re-evaluation prior to disposition, multiple bedside re-evaluations, IV medications and complex medical decision making and emergency management  This patient has remained hemodynamically stable during their ED course. Plan   Discharge to home. Patient condition is stable. New Medications     New Prescriptions    DICYCLOMINE (BENTYL) 10 MG CAPSULE    Take 2 capsules by mouth 3 times daily as needed (abdominal pain)    FAMOTIDINE (PEPCID) 20 MG TABLET    Take 1 tablet by mouth 2 times daily    PROMETHAZINE (PHENERGAN) 25 MG TABLET    Take 1 tablet by mouth every 8 hours as needed for Nausea     Electronically signed by LEIDY Lockett CNP   DD: 10/24/18  **This report was transcribed using voice recognition software. Every effort was made to ensure accuracy; however, inadvertent computerized transcription errors may be present.   END OF PROVIDER NOTE          LEIDY Lockett -

## 2020-05-22 ENCOUNTER — APPOINTMENT (OUTPATIENT)
Dept: CT IMAGING | Age: 35
End: 2020-05-22
Payer: COMMERCIAL

## 2020-05-22 ENCOUNTER — HOSPITAL ENCOUNTER (EMERGENCY)
Age: 35
Discharge: HOME | End: 2020-05-22
Attending: EMERGENCY MEDICINE
Payer: COMMERCIAL

## 2020-05-22 VITALS
BODY MASS INDEX: 23.39 KG/M2 | DIASTOLIC BLOOD PRESSURE: 80 MMHG | HEIGHT: 64 IN | HEART RATE: 80 BPM | OXYGEN SATURATION: 100 % | WEIGHT: 137 LBS | RESPIRATION RATE: 18 BRPM | TEMPERATURE: 96.8 F | SYSTOLIC BLOOD PRESSURE: 119 MMHG

## 2020-05-22 LAB
ALBUMIN SERPL-MCNC: 4.7 G/DL (ref 3.5–5.2)
ALP BLD-CCNC: 72 U/L (ref 35–104)
ALT SERPL-CCNC: 17 U/L (ref 0–32)
ANION GAP SERPL CALCULATED.3IONS-SCNC: 13 MMOL/L (ref 7–16)
AST SERPL-CCNC: 21 U/L (ref 0–31)
BASOPHILS ABSOLUTE: 0.05 E9/L (ref 0–0.2)
BASOPHILS RELATIVE PERCENT: 0.4 % (ref 0–2)
BILIRUB SERPL-MCNC: 0.4 MG/DL (ref 0–1.2)
BUN BLDV-MCNC: 11 MG/DL (ref 6–20)
CALCIUM SERPL-MCNC: 9.8 MG/DL (ref 8.6–10.2)
CHLORIDE BLD-SCNC: 103 MMOL/L (ref 98–107)
CO2: 20 MMOL/L (ref 22–29)
CREAT SERPL-MCNC: 0.6 MG/DL (ref 0.5–1)
EOSINOPHILS ABSOLUTE: 0.01 E9/L (ref 0.05–0.5)
EOSINOPHILS RELATIVE PERCENT: 0.1 % (ref 0–6)
GFR AFRICAN AMERICAN: >60
GFR NON-AFRICAN AMERICAN: >60 ML/MIN/1.73
GLUCOSE BLD-MCNC: 130 MG/DL (ref 74–99)
HCG, URINE, POC: NEGATIVE
HCT VFR BLD CALC: 49.6 % (ref 34–48)
HEMOGLOBIN: 16.5 G/DL (ref 11.5–15.5)
IMMATURE GRANULOCYTES #: 0.08 E9/L
IMMATURE GRANULOCYTES %: 0.6 % (ref 0–5)
LACTIC ACID: 2.4 MMOL/L (ref 0.5–2.2)
LIPASE: 12 U/L (ref 13–60)
LYMPHOCYTES ABSOLUTE: 1.36 E9/L (ref 1.5–4)
LYMPHOCYTES RELATIVE PERCENT: 9.5 % (ref 20–42)
Lab: NORMAL
MCH RBC QN AUTO: 32 PG (ref 26–35)
MCHC RBC AUTO-ENTMCNC: 33.3 % (ref 32–34.5)
MCV RBC AUTO: 96.3 FL (ref 80–99.9)
MONOCYTES ABSOLUTE: 0.18 E9/L (ref 0.1–0.95)
MONOCYTES RELATIVE PERCENT: 1.3 % (ref 2–12)
NEGATIVE QC PASS/FAIL: NORMAL
NEUTROPHILS ABSOLUTE: 12.58 E9/L (ref 1.8–7.3)
NEUTROPHILS RELATIVE PERCENT: 88.1 % (ref 43–80)
PDW BLD-RTO: 13.2 FL (ref 11.5–15)
PLATELET # BLD: 399 E9/L (ref 130–450)
PMV BLD AUTO: 10 FL (ref 7–12)
POSITIVE QC PASS/FAIL: NORMAL
POTASSIUM REFLEX MAGNESIUM: 4.1 MMOL/L (ref 3.5–5)
RBC # BLD: 5.15 E12/L (ref 3.5–5.5)
SODIUM BLD-SCNC: 136 MMOL/L (ref 132–146)
TOTAL PROTEIN: 8.2 G/DL (ref 6.4–8.3)
WBC # BLD: 14.3 E9/L (ref 4.5–11.5)

## 2020-05-22 PROCEDURE — 83690 ASSAY OF LIPASE: CPT

## 2020-05-22 PROCEDURE — 74177 CT ABD & PELVIS W/CONTRAST: CPT

## 2020-05-22 PROCEDURE — 96372 THER/PROPH/DIAG INJ SC/IM: CPT

## 2020-05-22 PROCEDURE — 83605 ASSAY OF LACTIC ACID: CPT

## 2020-05-22 PROCEDURE — 80053 COMPREHEN METABOLIC PANEL: CPT

## 2020-05-22 PROCEDURE — 6360000002 HC RX W HCPCS: Performed by: EMERGENCY MEDICINE

## 2020-05-22 PROCEDURE — 36415 COLL VENOUS BLD VENIPUNCTURE: CPT

## 2020-05-22 PROCEDURE — 96375 TX/PRO/DX INJ NEW DRUG ADDON: CPT

## 2020-05-22 PROCEDURE — 2580000003 HC RX 258: Performed by: EMERGENCY MEDICINE

## 2020-05-22 PROCEDURE — 96374 THER/PROPH/DIAG INJ IV PUSH: CPT

## 2020-05-22 PROCEDURE — 99284 EMERGENCY DEPT VISIT MOD MDM: CPT

## 2020-05-22 PROCEDURE — 6360000004 HC RX CONTRAST MEDICATION: Performed by: RADIOLOGY

## 2020-05-22 PROCEDURE — 85025 COMPLETE CBC W/AUTO DIFF WBC: CPT

## 2020-05-22 RX ORDER — ONDANSETRON 2 MG/ML
4 INJECTION INTRAMUSCULAR; INTRAVENOUS ONCE
Status: COMPLETED | OUTPATIENT
Start: 2020-05-22 | End: 2020-05-22

## 2020-05-22 RX ORDER — ONDANSETRON 4 MG/1
4 TABLET, FILM COATED ORAL 3 TIMES DAILY PRN
Qty: 15 TABLET | Refills: 0 | Status: SHIPPED | OUTPATIENT
Start: 2020-05-22 | End: 2021-11-03

## 2020-05-22 RX ORDER — MORPHINE SULFATE 4 MG/ML
6 INJECTION, SOLUTION INTRAMUSCULAR; INTRAVENOUS ONCE
Status: COMPLETED | OUTPATIENT
Start: 2020-05-22 | End: 2020-05-22

## 2020-05-22 RX ORDER — HYDROCODONE BITARTRATE AND ACETAMINOPHEN 5; 325 MG/1; MG/1
1 TABLET ORAL EVERY 6 HOURS PRN
Qty: 10 TABLET | Refills: 0 | Status: SHIPPED | OUTPATIENT
Start: 2020-05-22 | End: 2020-05-25

## 2020-05-22 RX ORDER — 0.9 % SODIUM CHLORIDE 0.9 %
2000 INTRAVENOUS SOLUTION INTRAVENOUS ONCE
Status: COMPLETED | OUTPATIENT
Start: 2020-05-22 | End: 2020-05-22

## 2020-05-22 RX ORDER — HALOPERIDOL 5 MG/ML
5 INJECTION INTRAMUSCULAR ONCE
Status: COMPLETED | OUTPATIENT
Start: 2020-05-22 | End: 2020-05-22

## 2020-05-22 RX ADMIN — SODIUM CHLORIDE 2000 ML: 9 INJECTION, SOLUTION INTRAVENOUS at 18:08

## 2020-05-22 RX ADMIN — MORPHINE SULFATE 6 MG: 4 INJECTION, SOLUTION INTRAMUSCULAR; INTRAVENOUS at 18:16

## 2020-05-22 RX ADMIN — HALOPERIDOL LACTATE 5 MG: 5 INJECTION INTRAMUSCULAR at 18:16

## 2020-05-22 RX ADMIN — ONDANSETRON 4 MG: 2 INJECTION INTRAMUSCULAR; INTRAVENOUS at 18:16

## 2020-05-22 RX ADMIN — IOPAMIDOL 110 ML: 755 INJECTION, SOLUTION INTRAVENOUS at 18:47

## 2020-05-22 ASSESSMENT — PAIN SCALES - GENERAL
PAINLEVEL_OUTOF10: 10
PAINLEVEL_OUTOF10: 0

## 2020-05-22 NOTE — ED PROVIDER NOTES
jejunal bowel   loops. No submucosal or mesenteric edema appreciated. 2.  Mild submucosal fat in the colon is a nonspecific finding and may   be seen incidentally or signify chronic inflammation. 3.  Malpositioned IUD, with the stem in the cervix. 4.  Bilateral L5 spondylolysis with grade 1 spondylolisthesis.          ------------------------- NURSING NOTES AND VITALS REVIEWED ---------------------------   The nursing notes within the ED encounter and vital signs as below have been reviewed. /81   Pulse 63   Temp 96.8 °F (36 °C) (Tympanic)   Resp 18   Ht 5' 4\" (1.626 m)   Wt 137 lb (62.1 kg)   SpO2 96%   BMI 23.52 kg/m²   Oxygen Saturation Interpretation: Normal      ---------------------------------------------------PHYSICAL EXAM--------------------------------------      Constitutional/General: Alert and oriented x3, well appearing, non toxic in NAD  Head: NC/AT  Eyes: PERRL, EOMI  Mouth: Oropharynx clear, handling secretions, no trismus  Neck: Supple, full ROM, no meningeal signs  Pulmonary: Lungs clear to auscultation bilaterally, no wheezes, rales, or rhonchi. Not in respiratory distress  Cardiovascular:  Regular rate and rhythm, no murmurs, gallops, or rubs. 2+ distal pulses  Abdomen: Soft, diffusely tender without guarding or rebound   extremities: Moves all extremities x 4.  Warm and well perfused  Skin: warm and dry without rash  Neurologic: GCS 15,  Psych: Normal Affect      ------------------------------ ED COURSE/MEDICAL DECISION MAKING----------------------  Medications   0.9 % sodium chloride bolus (2,000 mLs Intravenous New Bag 5/22/20 1808)   haloperidol lactate (HALDOL) injection 5 mg (5 mg Intramuscular Given 5/22/20 1816)   morphine (PF) injection 6 mg (6 mg Intravenous Given 5/22/20 1816)   ondansetron (ZOFRAN) injection 4 mg (4 mg Intravenous Given 5/22/20 1816)   iopamidol (ISOVUE-370) 76 % injection 110 mL (110 mLs Intravenous Given 5/22/20 1847)         Medical Decision

## 2020-05-23 ENCOUNTER — CARE COORDINATION (OUTPATIENT)
Dept: CARE COORDINATION | Age: 35
End: 2020-05-23

## 2020-05-26 NOTE — CARE COORDINATION
Unable to leave Second message for patient to return call re: ED Follow-up for At Risk COVID-19   Plan to offer Loop Enrollment  Episode to be resolved
explained to patient. Patient Agrees and note routed to [address] [place] [address] to enroll  Email Address patient wants to use:  [email or web address]  Phone Number of patient:  [phone]     Patient said she feels \"a whole lot better. \" Pt denies questions/concerns. Agreeable to utilize LOOP.

## 2021-01-06 ENCOUNTER — APPOINTMENT (OUTPATIENT)
Dept: CT IMAGING | Age: 36
End: 2021-01-06
Payer: COMMERCIAL

## 2021-01-06 ENCOUNTER — HOSPITAL ENCOUNTER (OUTPATIENT)
Age: 36
Setting detail: OBSERVATION
Discharge: HOME | End: 2021-01-08
Attending: EMERGENCY MEDICINE | Admitting: INTERNAL MEDICINE
Payer: COMMERCIAL

## 2021-01-06 DIAGNOSIS — K50.919 ACUTE CROHN'S DISEASE WITH COMPLICATION (HCC): Primary | ICD-10-CM

## 2021-01-06 DIAGNOSIS — R11.2 INTRACTABLE VOMITING WITH NAUSEA, UNSPECIFIED VOMITING TYPE: ICD-10-CM

## 2021-01-06 DIAGNOSIS — R10.84 GENERALIZED ABDOMINAL PAIN: ICD-10-CM

## 2021-01-06 LAB
ALBUMIN SERPL-MCNC: 4.6 G/DL (ref 3.5–5.2)
ALP BLD-CCNC: 76 U/L (ref 35–104)
ALT SERPL-CCNC: 19 U/L (ref 0–32)
ANION GAP SERPL CALCULATED.3IONS-SCNC: 10 MMOL/L (ref 7–16)
AST SERPL-CCNC: 27 U/L (ref 0–31)
BASOPHILS ABSOLUTE: 0.1 E9/L (ref 0–0.2)
BASOPHILS RELATIVE PERCENT: 0.5 % (ref 0–2)
BILIRUB SERPL-MCNC: 0.5 MG/DL (ref 0–1.2)
BUN BLDV-MCNC: 10 MG/DL (ref 6–20)
CALCIUM SERPL-MCNC: 9.4 MG/DL (ref 8.6–10.2)
CHLORIDE BLD-SCNC: 104 MMOL/L (ref 98–107)
CO2: 28 MMOL/L (ref 22–29)
CREAT SERPL-MCNC: 0.7 MG/DL (ref 0.5–1)
EOSINOPHILS ABSOLUTE: 0.1 E9/L (ref 0.05–0.5)
EOSINOPHILS RELATIVE PERCENT: 0.5 % (ref 0–6)
GFR AFRICAN AMERICAN: >60
GFR NON-AFRICAN AMERICAN: >60 ML/MIN/1.73
GLUCOSE BLD-MCNC: 117 MG/DL (ref 74–99)
HCG QUALITATIVE: NEGATIVE
HCT VFR BLD CALC: 41.3 % (ref 34–48)
HEMOGLOBIN: 14.1 G/DL (ref 11.5–15.5)
IMMATURE GRANULOCYTES #: 0.09 E9/L
IMMATURE GRANULOCYTES %: 0.5 % (ref 0–5)
LACTIC ACID: 1.6 MMOL/L (ref 0.5–2.2)
LIPASE: 15 U/L (ref 13–60)
LYMPHOCYTES ABSOLUTE: 2.18 E9/L (ref 1.5–4)
LYMPHOCYTES RELATIVE PERCENT: 11.8 % (ref 20–42)
MCH RBC QN AUTO: 32.4 PG (ref 26–35)
MCHC RBC AUTO-ENTMCNC: 34.1 % (ref 32–34.5)
MCV RBC AUTO: 94.9 FL (ref 80–99.9)
MONOCYTES ABSOLUTE: 0.41 E9/L (ref 0.1–0.95)
MONOCYTES RELATIVE PERCENT: 2.2 % (ref 2–12)
NEUTROPHILS ABSOLUTE: 15.62 E9/L (ref 1.8–7.3)
NEUTROPHILS RELATIVE PERCENT: 84.5 % (ref 43–80)
PDW BLD-RTO: 13.1 FL (ref 11.5–15)
PLATELET # BLD: 444 E9/L (ref 130–450)
PMV BLD AUTO: 9.2 FL (ref 7–12)
POTASSIUM REFLEX MAGNESIUM: 3.7 MMOL/L (ref 3.5–5)
RBC # BLD: 4.35 E12/L (ref 3.5–5.5)
SODIUM BLD-SCNC: 142 MMOL/L (ref 132–146)
TOTAL PROTEIN: 7.5 G/DL (ref 6.4–8.3)
WBC # BLD: 18.5 E9/L (ref 4.5–11.5)

## 2021-01-06 PROCEDURE — 96375 TX/PRO/DX INJ NEW DRUG ADDON: CPT

## 2021-01-06 PROCEDURE — 6360000004 HC RX CONTRAST MEDICATION: Performed by: RADIOLOGY

## 2021-01-06 PROCEDURE — 2580000003 HC RX 258: Performed by: STUDENT IN AN ORGANIZED HEALTH CARE EDUCATION/TRAINING PROGRAM

## 2021-01-06 PROCEDURE — 85025 COMPLETE CBC W/AUTO DIFF WBC: CPT

## 2021-01-06 PROCEDURE — 80053 COMPREHEN METABOLIC PANEL: CPT

## 2021-01-06 PROCEDURE — 2580000003 HC RX 258: Performed by: RADIOLOGY

## 2021-01-06 PROCEDURE — 6360000002 HC RX W HCPCS: Performed by: STUDENT IN AN ORGANIZED HEALTH CARE EDUCATION/TRAINING PROGRAM

## 2021-01-06 PROCEDURE — 99219 PR INITIAL OBSERVATION CARE/DAY 50 MINUTES: CPT | Performed by: INTERNAL MEDICINE

## 2021-01-06 PROCEDURE — 2580000003 HC RX 258: Performed by: INTERNAL MEDICINE

## 2021-01-06 PROCEDURE — G0378 HOSPITAL OBSERVATION PER HR: HCPCS

## 2021-01-06 PROCEDURE — 96361 HYDRATE IV INFUSION ADD-ON: CPT

## 2021-01-06 PROCEDURE — 6360000002 HC RX W HCPCS: Performed by: EMERGENCY MEDICINE

## 2021-01-06 PROCEDURE — 6360000002 HC RX W HCPCS: Performed by: INTERNAL MEDICINE

## 2021-01-06 PROCEDURE — 96372 THER/PROPH/DIAG INJ SC/IM: CPT

## 2021-01-06 PROCEDURE — 83605 ASSAY OF LACTIC ACID: CPT

## 2021-01-06 PROCEDURE — 36415 COLL VENOUS BLD VENIPUNCTURE: CPT

## 2021-01-06 PROCEDURE — 96374 THER/PROPH/DIAG INJ IV PUSH: CPT

## 2021-01-06 PROCEDURE — 99284 EMERGENCY DEPT VISIT MOD MDM: CPT

## 2021-01-06 PROCEDURE — 83690 ASSAY OF LIPASE: CPT

## 2021-01-06 PROCEDURE — 84703 CHORIONIC GONADOTROPIN ASSAY: CPT

## 2021-01-06 PROCEDURE — 74177 CT ABD & PELVIS W/CONTRAST: CPT

## 2021-01-06 PROCEDURE — 2500000003 HC RX 250 WO HCPCS: Performed by: INTERNAL MEDICINE

## 2021-01-06 PROCEDURE — 96376 TX/PRO/DX INJ SAME DRUG ADON: CPT

## 2021-01-06 RX ORDER — SODIUM CHLORIDE 0.9 % (FLUSH) 0.9 %
10 SYRINGE (ML) INJECTION PRN
Status: COMPLETED | OUTPATIENT
Start: 2021-01-06 | End: 2021-01-06

## 2021-01-06 RX ORDER — ONDANSETRON 2 MG/ML
4 INJECTION INTRAMUSCULAR; INTRAVENOUS ONCE
Status: DISCONTINUED | OUTPATIENT
Start: 2021-01-06 | End: 2021-01-06

## 2021-01-06 RX ORDER — 0.9 % SODIUM CHLORIDE 0.9 %
1000 INTRAVENOUS SOLUTION INTRAVENOUS ONCE
Status: COMPLETED | OUTPATIENT
Start: 2021-01-06 | End: 2021-01-06

## 2021-01-06 RX ORDER — POLYETHYLENE GLYCOL 3350 17 G/17G
17 POWDER, FOR SOLUTION ORAL DAILY PRN
Status: DISCONTINUED | OUTPATIENT
Start: 2021-01-06 | End: 2021-01-08 | Stop reason: HOSPADM

## 2021-01-06 RX ORDER — KETOROLAC TROMETHAMINE 30 MG/ML
30 INJECTION, SOLUTION INTRAMUSCULAR; INTRAVENOUS ONCE
Status: COMPLETED | OUTPATIENT
Start: 2021-01-06 | End: 2021-01-06

## 2021-01-06 RX ORDER — ACETAMINOPHEN 325 MG/1
650 TABLET ORAL EVERY 6 HOURS PRN
Status: DISCONTINUED | OUTPATIENT
Start: 2021-01-06 | End: 2021-01-08 | Stop reason: HOSPADM

## 2021-01-06 RX ORDER — CAPSAICIN 0.025 %
CREAM (GRAM) TOPICAL ONCE
Status: DISCONTINUED | OUTPATIENT
Start: 2021-01-06 | End: 2021-01-08 | Stop reason: HOSPADM

## 2021-01-06 RX ORDER — PROMETHAZINE HYDROCHLORIDE 25 MG/1
12.5 TABLET ORAL EVERY 6 HOURS PRN
Status: DISCONTINUED | OUTPATIENT
Start: 2021-01-06 | End: 2021-01-08 | Stop reason: HOSPADM

## 2021-01-06 RX ORDER — DEXTROSE, SODIUM CHLORIDE, AND POTASSIUM CHLORIDE 5; .45; .15 G/100ML; G/100ML; G/100ML
INJECTION INTRAVENOUS CONTINUOUS
Status: DISCONTINUED | OUTPATIENT
Start: 2021-01-06 | End: 2021-01-08 | Stop reason: HOSPADM

## 2021-01-06 RX ORDER — MORPHINE SULFATE 2 MG/ML
2 INJECTION, SOLUTION INTRAMUSCULAR; INTRAVENOUS EVERY 4 HOURS PRN
Status: DISCONTINUED | OUTPATIENT
Start: 2021-01-06 | End: 2021-01-08 | Stop reason: HOSPADM

## 2021-01-06 RX ORDER — ONDANSETRON 2 MG/ML
4 INJECTION INTRAMUSCULAR; INTRAVENOUS EVERY 6 HOURS PRN
Status: DISCONTINUED | OUTPATIENT
Start: 2021-01-06 | End: 2021-01-08 | Stop reason: HOSPADM

## 2021-01-06 RX ORDER — MORPHINE SULFATE 4 MG/ML
4 INJECTION, SOLUTION INTRAMUSCULAR; INTRAVENOUS ONCE
Status: COMPLETED | OUTPATIENT
Start: 2021-01-06 | End: 2021-01-06

## 2021-01-06 RX ORDER — SODIUM CHLORIDE 0.9 % (FLUSH) 0.9 %
10 SYRINGE (ML) INJECTION PRN
Status: DISCONTINUED | OUTPATIENT
Start: 2021-01-06 | End: 2021-01-08 | Stop reason: HOSPADM

## 2021-01-06 RX ORDER — PROMETHAZINE HYDROCHLORIDE 25 MG/ML
25 INJECTION, SOLUTION INTRAMUSCULAR; INTRAVENOUS ONCE
Status: COMPLETED | OUTPATIENT
Start: 2021-01-06 | End: 2021-01-06

## 2021-01-06 RX ORDER — HALOPERIDOL 5 MG/ML
1 INJECTION INTRAMUSCULAR ONCE
Status: DISCONTINUED | OUTPATIENT
Start: 2021-01-06 | End: 2021-01-06

## 2021-01-06 RX ORDER — PANTOPRAZOLE SODIUM 40 MG/10ML
40 INJECTION, POWDER, LYOPHILIZED, FOR SOLUTION INTRAVENOUS DAILY
Status: DISCONTINUED | OUTPATIENT
Start: 2021-01-07 | End: 2021-01-08 | Stop reason: HOSPADM

## 2021-01-06 RX ORDER — SODIUM CHLORIDE 0.9 % (FLUSH) 0.9 %
10 SYRINGE (ML) INJECTION EVERY 12 HOURS SCHEDULED
Status: DISCONTINUED | OUTPATIENT
Start: 2021-01-06 | End: 2021-01-08 | Stop reason: HOSPADM

## 2021-01-06 RX ORDER — DICYCLOMINE HYDROCHLORIDE 10 MG/1
20 CAPSULE ORAL 3 TIMES DAILY PRN
Status: DISCONTINUED | OUTPATIENT
Start: 2021-01-06 | End: 2021-01-08 | Stop reason: HOSPADM

## 2021-01-06 RX ORDER — ACETAMINOPHEN 650 MG/1
650 SUPPOSITORY RECTAL EVERY 6 HOURS PRN
Status: DISCONTINUED | OUTPATIENT
Start: 2021-01-06 | End: 2021-01-08 | Stop reason: HOSPADM

## 2021-01-06 RX ADMIN — SODIUM CHLORIDE, PRESERVATIVE FREE 10 ML: 5 INJECTION INTRAVENOUS at 21:36

## 2021-01-06 RX ADMIN — PROMETHAZINE HYDROCHLORIDE 25 MG: 25 INJECTION INTRAMUSCULAR; INTRAVENOUS at 13:00

## 2021-01-06 RX ADMIN — SODIUM CHLORIDE 1000 ML: 9 INJECTION, SOLUTION INTRAVENOUS at 13:00

## 2021-01-06 RX ADMIN — POTASSIUM CHLORIDE, DEXTROSE MONOHYDRATE AND SODIUM CHLORIDE: 150; 5; 450 INJECTION, SOLUTION INTRAVENOUS at 21:35

## 2021-01-06 RX ADMIN — MORPHINE SULFATE 4 MG: 4 INJECTION, SOLUTION INTRAMUSCULAR; INTRAVENOUS at 13:00

## 2021-01-06 RX ADMIN — ONDANSETRON 4 MG: 2 INJECTION INTRAMUSCULAR; INTRAVENOUS at 21:36

## 2021-01-06 RX ADMIN — Medication 10 ML: at 13:58

## 2021-01-06 RX ADMIN — MORPHINE SULFATE 2 MG: 2 INJECTION, SOLUTION INTRAMUSCULAR; INTRAVENOUS at 21:44

## 2021-01-06 RX ADMIN — KETOROLAC TROMETHAMINE 30 MG: 30 INJECTION, SOLUTION INTRAMUSCULAR at 15:47

## 2021-01-06 RX ADMIN — IOPAMIDOL 75 ML: 755 INJECTION, SOLUTION INTRAVENOUS at 14:00

## 2021-01-06 RX ADMIN — TRIMETHOBENZAMIDE HYDROCHLORIDE 200 MG: 100 INJECTION INTRAMUSCULAR at 15:47

## 2021-01-06 ASSESSMENT — PAIN SCALES - GENERAL
PAINLEVEL_OUTOF10: 10
PAINLEVEL_OUTOF10: 10
PAINLEVEL_OUTOF10: 8

## 2021-01-06 ASSESSMENT — ENCOUNTER SYMPTOMS
SHORTNESS OF BREATH: 0
ABDOMINAL PAIN: 1
RECTAL PAIN: 0
CONSTIPATION: 0
SINUS PRESSURE: 0
VOMITING: 1
NAUSEA: 1
DIARRHEA: 1
ABDOMINAL DISTENTION: 0
CHEST TIGHTNESS: 0
BLOOD IN STOOL: 0
COUGH: 0

## 2021-01-06 ASSESSMENT — PAIN DESCRIPTION - FREQUENCY: FREQUENCY: CONTINUOUS

## 2021-01-06 ASSESSMENT — PAIN DESCRIPTION - ORIENTATION: ORIENTATION: RIGHT;INNER
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
## 2021-01-06 NOTE — ED NOTES
Bed:  [facility]-  Expected date:   Expected time:   Means of arrival:   Comments:  saba Mendiola, ADEEL  01/06/21 0386

## 2021-01-07 LAB
ALBUMIN SERPL-MCNC: 4.2 G/DL (ref 3.5–5.2)
ALP BLD-CCNC: 71 U/L (ref 35–104)
ALT SERPL-CCNC: 26 U/L (ref 0–32)
ANION GAP SERPL CALCULATED.3IONS-SCNC: 9 MMOL/L (ref 7–16)
AST SERPL-CCNC: 32 U/L (ref 0–31)
BASOPHILS ABSOLUTE: 0.03 E9/L (ref 0–0.2)
BASOPHILS RELATIVE PERCENT: 0.2 % (ref 0–2)
BILIRUB SERPL-MCNC: 0.4 MG/DL (ref 0–1.2)
BUN BLDV-MCNC: 10 MG/DL (ref 6–20)
C-REACTIVE PROTEIN: <0.1 MG/DL (ref 0–0.4)
CALCIUM SERPL-MCNC: 9.1 MG/DL (ref 8.6–10.2)
CHLORIDE BLD-SCNC: 103 MMOL/L (ref 98–107)
CO2: 23 MMOL/L (ref 22–29)
CREAT SERPL-MCNC: 0.7 MG/DL (ref 0.5–1)
EOSINOPHILS ABSOLUTE: 0 E9/L (ref 0.05–0.5)
EOSINOPHILS RELATIVE PERCENT: 0 % (ref 0–6)
GFR AFRICAN AMERICAN: >60
GFR NON-AFRICAN AMERICAN: >60 ML/MIN/1.73
GLUCOSE BLD-MCNC: 136 MG/DL (ref 74–99)
HCT VFR BLD CALC: 41.7 % (ref 34–48)
HEMOGLOBIN: 13.9 G/DL (ref 11.5–15.5)
IMMATURE GRANULOCYTES #: 0.07 E9/L
IMMATURE GRANULOCYTES %: 0.4 % (ref 0–5)
LYMPHOCYTES ABSOLUTE: 2.47 E9/L (ref 1.5–4)
LYMPHOCYTES RELATIVE PERCENT: 15 % (ref 20–42)
MCH RBC QN AUTO: 32.3 PG (ref 26–35)
MCHC RBC AUTO-ENTMCNC: 33.3 % (ref 32–34.5)
MCV RBC AUTO: 97 FL (ref 80–99.9)
MONOCYTES ABSOLUTE: 0.83 E9/L (ref 0.1–0.95)
MONOCYTES RELATIVE PERCENT: 5 % (ref 2–12)
NEUTROPHILS ABSOLUTE: 13.06 E9/L (ref 1.8–7.3)
NEUTROPHILS RELATIVE PERCENT: 79.4 % (ref 43–80)
PDW BLD-RTO: 13.2 FL (ref 11.5–15)
PLATELET # BLD: 392 E9/L (ref 130–450)
PMV BLD AUTO: 9.2 FL (ref 7–12)
POTASSIUM REFLEX MAGNESIUM: 3.6 MMOL/L (ref 3.5–5)
RBC # BLD: 4.3 E12/L (ref 3.5–5.5)
SEDIMENTATION RATE, ERYTHROCYTE: 0 MM/HR (ref 0–20)
SODIUM BLD-SCNC: 135 MMOL/L (ref 132–146)
TOTAL PROTEIN: 6.5 G/DL (ref 6.4–8.3)
WBC # BLD: 16.5 E9/L (ref 4.5–11.5)

## 2021-01-07 PROCEDURE — 99226 PR SBSQ OBSERVATION CARE/DAY 35 MINUTES: CPT | Performed by: INTERNAL MEDICINE

## 2021-01-07 PROCEDURE — C9113 INJ PANTOPRAZOLE SODIUM, VIA: HCPCS | Performed by: INTERNAL MEDICINE

## 2021-01-07 PROCEDURE — 96376 TX/PRO/DX INJ SAME DRUG ADON: CPT

## 2021-01-07 PROCEDURE — 2500000003 HC RX 250 WO HCPCS: Performed by: INTERNAL MEDICINE

## 2021-01-07 PROCEDURE — 85651 RBC SED RATE NONAUTOMATED: CPT

## 2021-01-07 PROCEDURE — G0378 HOSPITAL OBSERVATION PER HR: HCPCS

## 2021-01-07 PROCEDURE — 85025 COMPLETE CBC W/AUTO DIFF WBC: CPT

## 2021-01-07 PROCEDURE — 2580000003 HC RX 258: Performed by: INTERNAL MEDICINE

## 2021-01-07 PROCEDURE — 6370000000 HC RX 637 (ALT 250 FOR IP): Performed by: INTERNAL MEDICINE

## 2021-01-07 PROCEDURE — 86140 C-REACTIVE PROTEIN: CPT

## 2021-01-07 PROCEDURE — 80053 COMPREHEN METABOLIC PANEL: CPT

## 2021-01-07 PROCEDURE — 96372 THER/PROPH/DIAG INJ SC/IM: CPT

## 2021-01-07 PROCEDURE — 36415 COLL VENOUS BLD VENIPUNCTURE: CPT

## 2021-01-07 PROCEDURE — 6360000002 HC RX W HCPCS: Performed by: INTERNAL MEDICINE

## 2021-01-07 RX ADMIN — ONDANSETRON 4 MG: 2 INJECTION INTRAMUSCULAR; INTRAVENOUS at 04:14

## 2021-01-07 RX ADMIN — MORPHINE SULFATE 2 MG: 2 INJECTION, SOLUTION INTRAMUSCULAR; INTRAVENOUS at 03:19

## 2021-01-07 RX ADMIN — PANTOPRAZOLE SODIUM 40 MG: 40 INJECTION, POWDER, FOR SOLUTION INTRAVENOUS at 08:13

## 2021-01-07 RX ADMIN — ENOXAPARIN SODIUM 40 MG: 40 INJECTION SUBCUTANEOUS at 08:13

## 2021-01-07 RX ADMIN — MORPHINE SULFATE 2 MG: 2 INJECTION, SOLUTION INTRAMUSCULAR; INTRAVENOUS at 16:27

## 2021-01-07 RX ADMIN — DICYCLOMINE HYDROCHLORIDE 20 MG: 10 CAPSULE ORAL at 11:33

## 2021-01-07 RX ADMIN — SODIUM CHLORIDE, PRESERVATIVE FREE 10 ML: 5 INJECTION INTRAVENOUS at 08:13

## 2021-01-07 RX ADMIN — DICYCLOMINE HYDROCHLORIDE 20 MG: 10 CAPSULE ORAL at 21:53

## 2021-01-07 RX ADMIN — POTASSIUM CHLORIDE, DEXTROSE MONOHYDRATE AND SODIUM CHLORIDE: 150; 5; 450 INJECTION, SOLUTION INTRAVENOUS at 16:26

## 2021-01-07 RX ADMIN — POTASSIUM CHLORIDE, DEXTROSE MONOHYDRATE AND SODIUM CHLORIDE: 150; 5; 450 INJECTION, SOLUTION INTRAVENOUS at 05:19

## 2021-01-07 ASSESSMENT — PAIN SCALES - GENERAL
PAINLEVEL_OUTOF10: 9
PAINLEVEL_OUTOF10: 0

## 2021-01-07 NOTE — PROGRESS NOTES
Consult out to Dr. Bello [place] via [employer]. Spoke to service.     Electronically signed by Karina Perea RN on 1/6/2021 at 11:30 PM

## 2021-01-07 NOTE — PATIENT CARE CONFERENCE
[facility] Quality Flow/Interdisciplinary Rounds Progress Note        Quality Flow Rounds held on January 7, 2021    Disciplines Attending:  , Case management, Nursing unit leadership     Priscila Valdez was admitted on 1/6/2021 12:01 PM    Anticipated Discharge Date:  Expected Discharge Date: 01/09/21    Disposition:    King Score:  King Scale Score: 20    Readmission Risk              Risk of Unplanned Readmission:        0           Discussed patient goal for the day, patient clinical progression, and barriers to discharge.   The following Goal(s) of the Day/Commitment(s) have been identified:  Diagnostics - Report Results      Guillermo Alegre  January 7, 2021

## 2021-01-07 NOTE — CONSULTS
Gastroenterology Consult Note   LEIDY Bridges NP-C with Margo Strauss M.D. Consult Note        Date of Service: 1/7/2021  Reason for Consult: crohn's exacerbation  Requesting Physician: Dr. Providence Rubinstein:  Abdominal pain    History Obtained From:  Patient, EMR    HISTORY OF PRESENT ILLNESS:       Abdias Erickson is a 30 y.o. female with significant past medical history of Crohn's disease, asthma and marijuana abuse admitted via ED for abdominal pain. Pt reports she was in a \"flare\". Stating last time was in September of 2018. Patient denies any medication use for her Crohn's and states she's been controlling it with her diet. Patient reports sharp, cramping 8/10 lower abdominal pain and nausea at home. She states she was nauseous and vomited once, \"stomach acid\", however able to tolerate diet. Patient states over the last couple weeks she's had watery, brown bowel movements, 3-4 daily. Last BM was yesterday, once brown and soft. Patient denies recent illness, change in medication, fever, chills, weight loss, hematemesis, melena or hematochezia. EGD/Colonoscopy per Dr Neil Hammer on 1/5/18. Surgical pathology from procedure demonstrated mild chronic gastritis with immunostain negative for H Pylori; Terminal ileum demonstrated mild active ileitis. Admission labs WBC 18.5; neut 84.5%; lymph 11.8%; absolute neut 15.6. CT abdomen/pelvis W IV contrast- 1. no evidence of acute active inflammation within the small bowel or large bowel at this time. Grossly normal appearance of the terminal ileum. Diffuse small bowel and large bowel mucosal thickening and deposition of submucosal fat which are findings which suggest a chronic sequela of infection/inflammation. 2. No obstructive uropathy. Nonobstructing nephrolith in the midpole the right kidney. 3. Nonspecific periportal edema and hepatic perivascular lymphedema which is new from prior exam. 4. Minimal atherosclerotic disease.  5. Bilateral L5 spondylolysis with 11 mm of anterolisthesis of L5 on S1. Advanced degenerative disc disease at L5-S1. Consultation for crohn's exacerbation. Pt is known to Dr. Romelia Walls, last seen on inpatient 7/17/18. Patient was lost to follow up. Currently, pt reports lower abdominal pain 6/10, cramping stating the sharp pains have gone away. Pt with mild nausea no vomiting. Last BM yesterday soft brown. Pt denies hematochezia, melena or hematemesis. Labs today WBC 16.5; lymph 15%; absolute dylon 13.06.     Past Medical History:        Diagnosis Date    Anxiety attack since age 16    diagnosed with seizure due to convulsions from this    Asthma     seasonal; no inhaler     Crohn's colitis ([facility] [address]     Marijuana abuse     occas    Multiple lacerations 2015    stabbing; resolved    Seizure ([address]     loses focus, disoriented unpon arousing; etiology unknown per pt; none since 2015    Tobacco abuse      Past Surgical History:        Procedure Laterality Date    CHOLECYSTECTOMY  01/22/2018    COLONOSCOPY      DILATION AND CURETTAGE OF UTERUS  2011    ENDOSCOPY, COLON, DIAGNOSTIC      HERNIA REPAIR      HERNIA REPAIR       Current Medications:    Current Facility-Administered Medications: capsaicin (ZOSTRIX) 0.025 % cream, , Topical, Once  morphine (PF) injection 2 mg, 2 mg, Intravenous, Q4H PRN  dicyclomine (BENTYL) capsule 20 mg, 20 mg, Oral, TID PRN  pantoprazole (PROTONIX) injection 40 mg, 40 mg, Intravenous, Daily  sodium chloride flush 0.9 % injection 10 mL, 10 mL, Intravenous, 2 times per day  sodium chloride flush 0.9 % injection 10 mL, 10 mL, Intravenous, PRN  enoxaparin (LOVENOX) injection 40 mg, 40 mg, Subcutaneous, Daily  promethazine (PHENERGAN) tablet 12.5 mg, 12.5 mg, Oral, Q6H PRN **OR** ondansetron (ZOFRAN) injection 4 mg, 4 mg, Intravenous, Q6H PRN  polyethylene glycol (GLYCOLAX) packet 17 g, 17 g, Oral, Daily PRN  acetaminophen (TYLENOL) tablet 650 mg, 650 mg, Oral, Q6H PRN **OR** acetaminophen (TYLENOL) suppository 650 mg, 650 mg, Rectal, Q6H PRN  dextrose 5 % and 0.45 % NaCl with KCl 20 mEq infusion, , Intravenous, Continuous  influenza quadrivalent split vaccine (FLUZONE;FLUARIX;FLULAVAL;AFLURIA) injection 0.5 mL, 0.5 mL, Intramuscular, Prior to discharge    Allergies:  Patient has no known allergies. Social History:    Tobacco:  Pt reports 1PPD for \"long time\"  Alcohol:  Pt reports occasional, once weekly  Illicit Drugs: Pt reports occasional marijuana use    Family History: Mother--alive with medical hx of diabetes, heart disease, leukemia, ovarian cancer, breast cancer  Father--alive with medical history of schizophrenia, rheumatoid arthritis  1 Sister alive and healthy  1 Daughter--alive with type 1 diabetes  1 Son--alive with nerve damage secondary to Guillain Eldridge's Syndrome at age 3    REVIEW OF SYSTEMS:    Aside from what was mentioned in the PMH and HPI, essentially unremarkable, all others negative. PHYSICAL EXAM:      Vitals:    BP (!) 144/87   Pulse 72   Temp 98.8 °F (37.1 °C) (Oral)   Resp 18   Ht 5' 4\" (1.626 m)   Wt 137 lb 9.6 oz (62.4 kg)   SpO2 98%   BMI 23.62 kg/m²       CONSTITUTIONAL:  awake, alert, cooperative, no apparent distress, and appears stated age  EYES:  pupils equal, round and reactive to light, sclera anicteric and conjunctiva normal  ENT:  normocephalic, oral pharynx with moist mucous membranes  NECK:  supple   HEMATOLOGIC/LYMPHATICS:  no cervical lymphadenopathy and no supraclavicular lymphadenopathy  LUNGS:  No increased work of breathing, good air exchange, clear to auscultation bilaterally.   CARDIOVASCULAR:  Normal apical impulse, regular rate and rhythm, no murmur noted; 2+ pulses; no edema  ABDOMEN:  normal bowel sounds, soft, non-distended, tender, no masses palpated, no hepatosplenomegally  MUSCULOSKELETAL:  full range of motion noted  motor strength is 5 out of 5 all extremities bilaterally  NEUROLOGIC:  Mental Status Exam:  Level of Alertness: awake  Orientation:   person, place, time  Motor Exam:  Motor exam is symmetrical 5 out of 5 all extremities bilaterally  SKIN:  normal skin color, texture, turgor    DATA:    CBC with Differential:    Lab Results   Component Value Date    WBC 16.5 01/07/2021    RBC 4.30 01/07/2021    HGB 13.9 01/07/2021    HCT 41.7 01/07/2021     01/07/2021    MCV 97.0 01/07/2021    MCH 32.3 01/07/2021    MCHC 33.3 01/07/2021    RDW 13.2 01/07/2021    SEGSPCT 88 12/31/2013    LYMPHOPCT 15.0 01/07/2021    MONOPCT 5.0 01/07/2021    BASOPCT 0.2 01/07/2021    MONOSABS 0.83 01/07/2021    LYMPHSABS 2.47 01/07/2021    EOSABS 0.00 01/07/2021    BASOSABS 0.03 01/07/2021     CMP:    Lab Results   Component Value Date     01/07/2021    K 3.6 01/07/2021     01/07/2021    CO2 23 01/07/2021    BUN 10 01/07/2021    CREATININE 0.7 01/07/2021    GFRAA >60 01/07/2021    LABGLOM >60 01/07/2021    GLUCOSE 136 01/07/2021    GLUCOSE 92 05/08/2011    PROT 6.5 01/07/2021    LABALBU 4.2 01/07/2021    CALCIUM 9.1 01/07/2021    BILITOT 0.4 01/07/2021    ALKPHOS 71 01/07/2021    AST 32 01/07/2021    ALT 26 01/07/2021     Hepatic Function Panel:    Lab Results   Component Value Date    ALKPHOS 71 01/07/2021    ALT 26 01/07/2021    AST 32 01/07/2021    PROT 6.5 01/07/2021    BILITOT 0.4 01/07/2021    BILIDIR <0.2 01/22/2018    IBILI see below 01/22/2018    LABALBU 4.2 01/07/2021     PT/INR:    Lab Results   Component Value Date    PROTIME 10.5 09/25/2016    INR 1.0 09/25/2016     PTT:    Lab Results   Component Value Date    APTT 24.3 09/25/2016   [APTT}  Last 3 Troponin:    Lab Results   Component Value Date    TROPONINI <0.01 05/02/2014     TSH:    Lab Results   Component Value Date    TSH 3.470 03/27/2015         Ct Abdomen Pelvis W Iv Contrast Additional Contrast? None    Result Date: 1/6/2021  EXAMINATION: CT OF THE ABDOMEN AND PELVIS WITH CONTRAST 1/6/2021 2:01 pm TECHNIQUE: CT of the abdomen and pelvis was performed with the administration of intravenous contrast. Multiplanar reformatted images are provided for review. Dose modulation, iterative reconstruction, and/or weight based adjustment of the mA/kV was utilized to reduce the radiation dose to as low as reasonably achievable. COMPARISON: CT abdomen and pelvis from May 22, 2020 HISTORY: ORDERING SYSTEM PROVIDED HISTORY: diffuse abdominal tenderness TECHNOLOGIST PROVIDED HISTORY: Reason for exam:->diffuse abdominal tenderness Additional Contrast?->None FINDINGS: Lower Chest: Lung bases appear clear. The mediastinum as imaged is unremarkable. Organs: Liver parenchyma demonstrates no mass lesions nor evidence of intrahepatic ductal dilatation. There is prominent periportal edema and prominent hepatic perivascular lymphedema. The gallbladder is not present. Normal appearance of the pancreas parenchyma, spleen, and adrenal glands. Kidneys, ureters, bladder: No definite renal mass or cyst.  2 mm nonobstructing nephrolith in the midpole the right kidney series 2, image 82. Symmetric enhancement of the bilateral kidneys. No hydronephrosis. No obstructive uropathy. Normal appearance of the bladder. Vasculature: There are few scattered foci of atherosclerotic plaque in the distal abdominal aorta and in the proximal branch vessels. GI/Bowel: Normal appearance of the stomach. No evidence of active inflammation in the small bowel. No evidence of active inflammation in the colon. The majority of the small bowel and large bowel demonstrates a larger than normal deposition of submucosal fat within the walls and diffuse mild mucosal thickening which is a nonspecific finding that suggests sequela of chronic infection/inflammation. The appendix is normal. Pelvis: Normal appearance of the uterus. There are no adnexal masses. Intrauterine device previously described is not identified on this exam. Peritoneum/Retroperitoneum: There is no free fluid nor free intraperitoneal air.   No significant mesenteric stranding. There are few scattered non pathologically enlarged mesenteric lymph nodes which also likely reflect a sequela of chronic infection/inflammation. These are unchanged from prior exam. Bones/Soft Tissues: Anterior abdominal wall appears intact. Bilateral mild sacroiliitis. Bilateral pars interarticularis defects at L5 with 11 mm of anterolisthesis of L5 on S1. Severe disc space narrowing at L5-S1. 1. no evidence of acute active inflammation within the small bowel or large bowel at this time. Grossly normal appearance of the terminal ileum. Diffuse small bowel and large bowel mucosal thickening and deposition of submucosal fat which are findings which suggest a chronic sequela of infection/inflammation. 2.  No obstructive uropathy. Nonobstructing nephrolith in the midpole the right kidney. 3.  Nonspecific periportal edema and hepatic perivascular lymphedema which is new from prior exam. 4.  Minimal atherosclerotic disease. 5.  Bilateral L5 spondylolysis with 11 mm of anterolisthesis of L5 on S1. Advanced degenerative disc disease at L5-S1. IMPRESSION:  · Abdominal pain, lower- questionable crohn's exascerbation  · Nausea  · Marijuana abuse  · Gastritis  · Hx ileitis     RECOMMENDATIONS:    · CRP, sed rate today  · Calprotectin stool  · UGI and SBFT today  · OK for soft diet after SBFT  · Continue Protonix as ordered  · Medicate for pain and nausea per PCP  · Medical management per PCP  · Supportive care  · Continue to monitor    Note: This report was completed utilizing computer voice recognition software. Every effort has been made to ensure accuracy, however; inadvertent computerized transcription errors may be present. Thank you very much for your consultation. We will follow closely with you. Discussed with Dr. Cris Tam developed by Dr. Nayeli Cheng, APRN, NP-C 1/7/2021 2:34 PM for Dr. Damon Pinzon.  I HAD A FACE TO FACE ENCOUNTER WITH THE PATIENT. AGREE WITH THE EXAM, ASSESSMENT, AND PLAN AS OUTLINED ABOVE. ADDITION AND CORRECTIONS WERE DONE AS DEEMED APPROPRIATE. MY EXAM AND PLAN INCLUDE:   ODD CLINICAL HISTORY. [address] IN 2018 AND LOST TO FOLLOW UP. 2018 HAD COLON BY SURGICAL COLLEAGUE WITH ACTIVE ILEITIS AS DIAGNOSIS. HAD BRIEF COURSE OF PREDNISONE AND SHE IS MANAGING HER DISEASE WITH DIET ONLY. CT NEGATIVE. CHRONIC MARIJUANA USE WHICH CAN PRODUCE SAME SYMPTOMS TO HER CURRENT  WE NEED FIRM DIAGNOSIS PRIOR TO RECOMMENDATIONS . SINCE THERE IS A POSSIBILITY SHE DOESN'T HAVE CROHN'S. WILL CHECK SBFT AND SEROLOGY WAS ORDERED.

## 2021-01-07 NOTE — ED NOTES
Ambulatory in [facility]. Awaiting admission. Emesis intermittently and also dozing intermittently.       Pantera Gtz RN  01/06/21 2022

## 2021-01-07 NOTE — PROGRESS NOTES
[facility] Progress Note    Admitting Date and Time: 1/6/2021 12:01 PM  Admit Dx: Intractable vomiting with nausea [R11.2]    Subjective:  Patient is being followed for Intractable vomiting with nausea [R11.2]   Pt feels terrible, frustrated that no one has answers for her.        capsaicin   Topical Once    pantoprazole  40 mg Intravenous Daily    sodium chloride flush  10 mL Intravenous 2 times per day    enoxaparin  40 mg Subcutaneous Daily    influenza virus vaccine  0.5 mL Intramuscular Prior to discharge         morphine, 2 mg, Q4H PRN      dicyclomine, 20 mg, TID PRN      sodium chloride flush, 10 mL, PRN      promethazine, 12.5 mg, Q6H PRN    Or      ondansetron, 4 mg, Q6H PRN      polyethylene glycol, 17 g, Daily PRN      acetaminophen, 650 mg, Q6H PRN    Or      acetaminophen, 650 mg, Q6H PRN         Objective:    BP (!) 137/92   Pulse 65   Temp 98.2 °F (36.8 °C) (Oral)   Resp 16   Ht 5' 4\" (1.626 m)   Wt 137 lb 9.6 oz (62.4 kg)   SpO2 98%   BMI 23.62 kg/m²     General Appearance: alert and oriented to person, place and time and in moderate acute distress  Skin: warm and dry  Head: normocephalic and atraumatic  Eyes: pupils equal, round, and reactive to light, extraocular eye movements intact, conjunctivae normal  Neck: neck supple and non tender without mass   Pulmonary/Chest: clear to auscultation bilaterally- no wheezes, rales or rhonchi, normal air movement, no respiratory distress  Cardiovascular: normal rate, normal S1 and S2 and no carotid bruits  Abdomen: soft, lower abd tenderness, non-distended, normal bowel sounds  Extremities: no cyanosis, no clubbing and no edema  Neurologic: no cranial nerve deficit and speech normal        Recent Labs     01/06/21  1222 01/07/21  0520    135   K 3.7 3.6    103   CO2 28 23   BUN 10 10   CREATININE 0.7 0.7   GLUCOSE 117* 136*   CALCIUM 9.4 9.1       Recent Labs     01/06/21  1222 01/07/21  0520   WBC 18.5* 16.5*   RBC 4.35 4.30   HGB 14.1 13.9   HCT 41.3 41.7   MCV 94.9 97.0   MCH 32.4 32.3   MCHC 34.1 33.3   RDW 13.1 13.2    392   MPV 9.2 9.2         Assessment:    Active Problems:    Intractable vomiting with nausea  Resolved Problems:    * No resolved hospital problems. *      Plan:  1. Abdominal pain with intractable nausea and vomiting, not sure about the etiology whether related to Crohn's disease versus marijuana induced CT of the abdomen did not show diffuse small and large bowel mucosal thickening questioning a flare of Crohn's disease. We consulted GI. I will start the patient on steroids  2. Leukocytosis, might be reflective of inflammation/reactive and contraction/dehydration, trending down, will monitor off antibiotics      NOTE: This report was transcribed using voice recognition software. Every effort was made to ensure accuracy; however, inadvertent computerized transcription errors may be present.   Electronically signed by Sanya Staley MD on 1/7/2021 at 10:37 AM

## 2021-01-07 NOTE — CARE COORDINATION
Spoke with patient, introduced myself and explained my role as RN case manager. Discussed plan of care and discharge planning. Pt noted that she is independent and lives home alone. She jonn any discharge needs. GI consulted- await their eval and further plan of care. Await clinical progress for further plan of care.      Informed patient that case management and social work will continue to follow to assist with the transition of care

## 2021-01-08 ENCOUNTER — APPOINTMENT (OUTPATIENT)
Dept: GENERAL RADIOLOGY | Age: 36
End: 2021-01-08
Payer: COMMERCIAL

## 2021-01-08 VITALS
RESPIRATION RATE: 16 BRPM | HEART RATE: 54 BPM | TEMPERATURE: 97.7 F | BODY MASS INDEX: 23.22 KG/M2 | OXYGEN SATURATION: 96 % | HEIGHT: 64 IN | DIASTOLIC BLOOD PRESSURE: 83 MMHG | SYSTOLIC BLOOD PRESSURE: 148 MMHG | WEIGHT: 136 LBS

## 2021-01-08 PROCEDURE — G0008 ADMIN INFLUENZA VIRUS VAC: HCPCS | Performed by: INTERNAL MEDICINE

## 2021-01-08 PROCEDURE — 74240 X-RAY XM UPR GI TRC 1CNTRST: CPT

## 2021-01-08 PROCEDURE — 90686 IIV4 VACC NO PRSV 0.5 ML IM: CPT | Performed by: INTERNAL MEDICINE

## 2021-01-08 PROCEDURE — 96376 TX/PRO/DX INJ SAME DRUG ADON: CPT

## 2021-01-08 PROCEDURE — 6360000002 HC RX W HCPCS: Performed by: INTERNAL MEDICINE

## 2021-01-08 PROCEDURE — 86671 FUNGUS NES ANTIBODY: CPT

## 2021-01-08 PROCEDURE — 2500000003 HC RX 250 WO HCPCS: Performed by: RADIOLOGY

## 2021-01-08 PROCEDURE — C9113 INJ PANTOPRAZOLE SODIUM, VIA: HCPCS | Performed by: INTERNAL MEDICINE

## 2021-01-08 PROCEDURE — G0378 HOSPITAL OBSERVATION PER HR: HCPCS

## 2021-01-08 PROCEDURE — 2500000003 HC RX 250 WO HCPCS: Performed by: INTERNAL MEDICINE

## 2021-01-08 PROCEDURE — 96372 THER/PROPH/DIAG INJ SC/IM: CPT

## 2021-01-08 PROCEDURE — 6370000000 HC RX 637 (ALT 250 FOR IP): Performed by: RADIOLOGY

## 2021-01-08 PROCEDURE — 83516 IMMUNOASSAY NONANTIBODY: CPT

## 2021-01-08 PROCEDURE — 99217 PR OBSERVATION CARE DISCHARGE MANAGEMENT: CPT | Performed by: INTERNAL MEDICINE

## 2021-01-08 RX ORDER — HYDROCODONE BITARTRATE AND ACETAMINOPHEN 5; 325 MG/1; MG/1
1 TABLET ORAL EVERY 8 HOURS PRN
Qty: 9 TABLET | Refills: 0 | Status: SHIPPED | OUTPATIENT
Start: 2021-01-08 | End: 2021-01-11

## 2021-01-08 RX ADMIN — BARIUM SULFATE 176 G: 960 POWDER, FOR SUSPENSION ORAL at 09:58

## 2021-01-08 RX ADMIN — ENOXAPARIN SODIUM 40 MG: 40 INJECTION SUBCUTANEOUS at 09:15

## 2021-01-08 RX ADMIN — ANTACID/ANTIFLATULENT 1 EACH: 380; 550; 10; 10 GRANULE, EFFERVESCENT ORAL at 09:59

## 2021-01-08 RX ADMIN — MORPHINE SULFATE 2 MG: 2 INJECTION, SOLUTION INTRAMUSCULAR; INTRAVENOUS at 07:24

## 2021-01-08 RX ADMIN — MORPHINE SULFATE 2 MG: 2 INJECTION, SOLUTION INTRAMUSCULAR; INTRAVENOUS at 16:40

## 2021-01-08 RX ADMIN — POTASSIUM CHLORIDE, DEXTROSE MONOHYDRATE AND SODIUM CHLORIDE: 150; 5; 450 INJECTION, SOLUTION INTRAVENOUS at 03:14

## 2021-01-08 RX ADMIN — PANTOPRAZOLE SODIUM 40 MG: 40 INJECTION, POWDER, FOR SOLUTION INTRAVENOUS at 09:15

## 2021-01-08 RX ADMIN — INFLUENZA A VIRUS A/VICTORIA/2454/2019 IVR-207 (H1N1) ANTIGEN (PROPIOLACTONE INACTIVATED), INFLUENZA A VIRUS A/HONG KONG/2671/2019 IVR-208 (H3N2) ANTIGEN (PROPIOLACTONE INACTIVATED), INFLUENZA B VIRUS B/VICTORIA/705/2018 BVR-11 ANTIGEN (PROPIOLACTONE INACTIVATED), INFLUENZA B VIRUS B/PHUKET/3073/2013 BVR-1B ANTIGEN (PROPIOLACTONE INACTIVATED) 0.5 ML: 15; 15; 15; 15 INJECTION, SUSPENSION INTRAMUSCULAR at 19:00

## 2021-01-08 RX ADMIN — BARIUM SULFATE 140 ML: 980 POWDER, FOR SUSPENSION ORAL at 09:59

## 2021-01-08 RX ADMIN — MORPHINE SULFATE 2 MG: 2 INJECTION, SOLUTION INTRAMUSCULAR; INTRAVENOUS at 12:38

## 2021-01-08 RX ADMIN — MORPHINE SULFATE 2 MG: 2 INJECTION, SOLUTION INTRAMUSCULAR; INTRAVENOUS at 03:18

## 2021-01-08 RX ADMIN — POTASSIUM CHLORIDE, DEXTROSE MONOHYDRATE AND SODIUM CHLORIDE: 150; 5; 450 INJECTION, SOLUTION INTRAVENOUS at 12:38

## 2021-01-08 ASSESSMENT — PAIN DESCRIPTION - PROGRESSION: CLINICAL_PROGRESSION: NOT CHANGED

## 2021-01-08 ASSESSMENT — PAIN - FUNCTIONAL ASSESSMENT: PAIN_FUNCTIONAL_ASSESSMENT: PREVENTS OR INTERFERES SOME ACTIVE ACTIVITIES AND ADLS

## 2021-01-08 ASSESSMENT — PAIN DESCRIPTION - LOCATION: LOCATION: ABDOMEN

## 2021-01-08 ASSESSMENT — PAIN DESCRIPTION - FREQUENCY: FREQUENCY: CONTINUOUS

## 2021-01-08 ASSESSMENT — PAIN DESCRIPTION - PAIN TYPE: TYPE: ACUTE PAIN

## 2021-01-08 ASSESSMENT — PAIN DESCRIPTION - ORIENTATION: ORIENTATION: RIGHT

## 2021-01-08 NOTE — PLAN OF CARE
Problem: Pain:  Goal: Pain level will decrease  Description: Pain level will decrease  Outcome: Ongoing  Goal: Control of acute pain  Description: Control of acute pain  1/8/2021 0120 by Deb Neff  Outcome: Ongoing  1/7/2021 1842 by Eamon Catherine RN  Outcome: Met This Shift  Goal: Control of chronic pain  Description: Control of chronic pain  Outcome: Ongoing

## 2021-01-08 NOTE — PROGRESS NOTES
PROGRESS NOTE    Patient Presents with/Seen in Consultation For      *Reason for Consult: crohn's exacerbation  CHIEF COMPLAINT:  Abdominal pain    Subjective:     Patient seen laying in bed in no apparent distress. Patient complains of lower abdominal pain rated 8/10 and described as stabbing, nonradiating. States no bowel movement today and states she has not eaten since admission. Patient denies nausea, vomiting, hematochezia, hematemesis and melena. Review of Systems  Aside from what was mentioned in the PMH and HPI, essentially unremarkable, all others negative. Objective:     BP (!) 148/83   Pulse 54   Temp 97.7 °F (36.5 °C) (Oral)   Resp 16   Ht 5' 4\" (1.626 m)   Wt 136 lb (61.7 kg)   SpO2 96%   BMI 23.34 kg/m²     General appearance: alert, awake, laying in bed, and cooperative  Eyes: conjunctiva normal, sclera anicteric. PERRL.   Lungs: clear to auscultation bilaterally  Heart: regular rate and rhythm, no murmur, 2+ pulses; no edema  Abdomen: soft, tender to palpation; bowel sounds normal; no masses,  no organomegaly  Extremities: extremities without edema  Pulses: 2+ and symmetric  Skin: Skin color, texture, turgor normal.   Neurologic: Grossly normal        capsaicin (ZOSTRIX) 0.025 % cream, Once      morphine (PF) injection 2 mg, Q4H PRN      dicyclomine (BENTYL) capsule 20 mg, TID PRN      pantoprazole (PROTONIX) injection 40 mg, Daily      sodium chloride flush 0.9 % injection 10 mL, 2 times per day      sodium chloride flush 0.9 % injection 10 mL, PRN      enoxaparin (LOVENOX) injection 40 mg, Daily      promethazine (PHENERGAN) tablet 12.5 mg, Q6H PRN    Or      ondansetron (ZOFRAN) injection 4 mg, Q6H PRN      polyethylene glycol (GLYCOLAX) packet 17 g, Daily PRN      acetaminophen (TYLENOL) tablet 650 mg, Q6H PRN    Or      acetaminophen (TYLENOL) suppository 650 mg, Q6H PRN      dextrose 5 % and 0.45 % NaCl with KCl 20 mEq infusion, Continuous      influenza quadrivalent split vaccine (FLUZONE;FLUARIX;FLULAVAL;AFLURIA) injection 0.5 mL, Prior to discharge         Data Review  CBC:   Lab Results   Component Value Date    WBC 16.5 01/07/2021    RBC 4.30 01/07/2021    HGB 13.9 01/07/2021    HCT 41.7 01/07/2021    MCV 97.0 01/07/2021    MCH 32.3 01/07/2021    MCHC 33.3 01/07/2021    RDW 13.2 01/07/2021     01/07/2021    MPV 9.2 01/07/2021     CMP:    Lab Results   Component Value Date     01/07/2021    K 3.6 01/07/2021     01/07/2021    CO2 23 01/07/2021    BUN 10 01/07/2021    CREATININE 0.7 01/07/2021    GFRAA >60 01/07/2021    LABGLOM >60 01/07/2021    GLUCOSE 136 01/07/2021    GLUCOSE 92 05/08/2011    PROT 6.5 01/07/2021    LABALBU 4.2 01/07/2021    CALCIUM 9.1 01/07/2021    BILITOT 0.4 01/07/2021    ALKPHOS 71 01/07/2021    AST 32 01/07/2021    ALT 26 01/07/2021     Hepatic Function Panel:    Lab Results   Component Value Date    ALKPHOS 71 01/07/2021    ALT 26 01/07/2021    AST 32 01/07/2021    PROT 6.5 01/07/2021    BILITOT 0.4 01/07/2021    BILIDIR <0.2 01/22/2018    IBILI see below 01/22/2018    LABALBU 4.2 01/07/2021     No components found for: CHLPL  No results found for: TRIG  No results found for: HDL  No results found for: LDLCALC  No results found for: LABVLDL   PT/INR:    Lab Results   Component Value Date    PROTIME 10.5 09/25/2016    INR 1.0 09/25/2016     IRON:  No results found for: IRON  Iron Saturation:  No components found for: PERCENTFE  FERRITIN:  No results found for: FERRITIN      Assessment:     ? Abdominal pain, lower- questionable crohn's exascerbation  ? Nausea- resolved  ? Marijuana abuse  ? Gastritis  ? Hx ileitis       Plan:     ? OK to discharge from GI POV-follow up in the office  ? UGI and SBFT results noted  ? Crohn's panel as ordered  ? CRP, sed rate results noted  ? Calprotectin stool- pending  ? OK for soft diet after SBFT  ? Continue Protonix as ordered  ? Medicate for nausea per PCP  ?  Medical management per PCP  ? Supportive care  ? Continue to monitor      Note: This report was completed utilizing computer voice recognition software. Every effort has been made to ensure accuracy, however; inadvertent computerized transcription errors may be present.      Discussed with Dr. Bautista Magdaleno per Dr. Salvatore Decker, APRN-NP-C 1/8/2021  3:32 PM For Dr. Ranjan Pan

## 2021-01-08 NOTE — DISCHARGE SUMMARY
[facility] Physician Discharge Summary       No follow-up provider specified. Activity level: As tolerated     Dispo: home    Condition on discharge: Stable     Patient ID:  Heike Kyle  [id number]  89 y.o.  1985    Admit date: 1/6/2021    Discharge date and time:  1/8/2021  6:18 PM    Admission Diagnoses: Active Problems:    Intractable vomiting with nausea  Resolved Problems:    * No resolved hospital problems. *      Discharge Diagnoses: Active Problems:    Intractable vomiting with nausea  Resolved Problems:    * No resolved hospital problems. *      Consults:  IP CONSULT TO GI      Hospital Course:   Patient Heike Kyle is a 30 y.o. presented with Intractable vomiting with nausea [R11.2]    1. Abdominal pain with intractable nausea and vomiting, not sure about the etiology whether related to gastritis vs marijuana induced, CT of the abdomen did not show diffuse small and large bowel mucosal thickening questioning a flare of Crohn's disease. sed rate is 0 and CRP is negative, goes against crohn's. Appreciate GI input, SBFT showed possible hypertrophic gastritis, ok to discharge the patient home to follow up with GI as outpatient, patient tolerated diet before discharge  2.   Leukocytosis, might be reflective of inflammation/reactive and contraction/dehydration, trended down, off antibiotics    Discharge Exam:    General Appearance: alert and oriented to person, place and time and in no acute distress  Skin: warm and dry  Head: normocephalic and atraumatic  Eyes: pupils equal, round, and reactive to light, extraocular eye movements intact, conjunctivae normal  Neck: neck supple and non tender without mass   Pulmonary/Chest: clear to auscultation bilaterally- no wheezes, rales or rhonchi, normal air movement, no respiratory distress  Cardiovascular: normal rate, normal S1 and S2 and no carotid bruits  Abdomen: soft, mild diffused tenderness, non-distended, normal bowel sounds, no masses or organomegaly  Extremities: no cyanosis, no clubbing and no edema  Neurologic: no cranial nerve deficit and speech normal    I/O last 3 completed shifts: In: 2227 [I.V.:2227]  Out: -   No intake/output data recorded. LABS:  Recent Labs     01/06/21  1222 01/07/21  0520    135   K 3.7 3.6    103   CO2 28 23   BUN 10 10   CREATININE 0.7 0.7   GLUCOSE 117* 136*   CALCIUM 9.4 9.1       Recent Labs     01/06/21  1222 01/07/21  0520   WBC 18.5* 16.5*   RBC 4.35 4.30   HGB 14.1 13.9   HCT 41.3 41.7   MCV 94.9 97.0   MCH 32.4 32.3   MCHC 34.1 33.3   RDW 13.1 13.2    392   MPV 9.2 9.2       No results for input(s): POCGLU in the last 72 hours. Imaging:  Ct Abdomen Pelvis W Iv Contrast Additional Contrast? None    Result Date: 1/6/2021  EXAMINATION: CT OF THE ABDOMEN AND PELVIS WITH CONTRAST 1/6/2021 2:01 pm TECHNIQUE: CT of the abdomen and pelvis was performed with the administration of intravenous contrast. Multiplanar reformatted images are provided for review. Dose modulation, iterative reconstruction, and/or weight based adjustment of the mA/kV was utilized to reduce the radiation dose to as low as reasonably achievable. COMPARISON: CT abdomen and pelvis from May 22, 2020 HISTORY: ORDERING SYSTEM PROVIDED HISTORY: diffuse abdominal tenderness TECHNOLOGIST PROVIDED HISTORY: Reason for exam:->diffuse abdominal tenderness Additional Contrast?->None FINDINGS: Lower Chest: Lung bases appear clear. The mediastinum as imaged is unremarkable. Organs: Liver parenchyma demonstrates no mass lesions nor evidence of intrahepatic ductal dilatation. There is prominent periportal edema and prominent hepatic perivascular lymphedema. The gallbladder is not present. Normal appearance of the pancreas parenchyma, spleen, and adrenal glands. Kidneys, ureters, bladder: No definite renal mass or cyst.  2 mm nonobstructing nephrolith in the midpole the right kidney series 2, image 82.  Symmetric enhancement of the bilateral kidneys. No hydronephrosis. No obstructive uropathy. Normal appearance of the bladder. Vasculature: There are few scattered foci of atherosclerotic plaque in the distal abdominal aorta and in the proximal branch vessels. GI/Bowel: Normal appearance of the stomach. No evidence of active inflammation in the small bowel. No evidence of active inflammation in the colon. The majority of the small bowel and large bowel demonstrates a larger than normal deposition of submucosal fat within the walls and diffuse mild mucosal thickening which is a nonspecific finding that suggests sequela of chronic infection/inflammation. The appendix is normal. Pelvis: Normal appearance of the uterus. There are no adnexal masses. Intrauterine device previously described is not identified on this exam. Peritoneum/Retroperitoneum: There is no free fluid nor free intraperitoneal air. No significant mesenteric stranding. There are few scattered non pathologically enlarged mesenteric lymph nodes which also likely reflect a sequela of chronic infection/inflammation. These are unchanged from prior exam. Bones/Soft Tissues: Anterior abdominal wall appears intact. Bilateral mild sacroiliitis. Bilateral pars interarticularis defects at L5 with 11 mm of anterolisthesis of L5 on S1. Severe disc space narrowing at L5-S1. 1. no evidence of acute active inflammation within the small bowel or large bowel at this time. Grossly normal appearance of the terminal ileum. Diffuse small bowel and large bowel mucosal thickening and deposition of submucosal fat which are findings which suggest a chronic sequela of infection/inflammation. 2.  No obstructive uropathy. Nonobstructing nephrolith in the midpole the right kidney. 3.  Nonspecific periportal edema and hepatic perivascular lymphedema which is new from prior exam. 4.  Minimal atherosclerotic disease.  5.  Bilateral L5 spondylolysis with 11 mm of anterolisthesis of L5 on S1. Advanced degenerative disc disease at L5-S1. Patient Instructions:      Medication List      START taking these medications    HYDROcodone-acetaminophen 5-325 MG per tablet  Commonly known as: Norco  Take 1 tablet by mouth every 8 hours as needed for Pain for up to 3 days. Intended supply: 3 days.  Take lowest dose possible to manage pain        CONTINUE taking these medications    dicyclomine 10 MG capsule  Commonly known as: Bentyl  Take 2 capsules by mouth 3 times daily as needed (abdominal pain)     famotidine 20 MG tablet  Commonly known as: Pepcid  Take 1 tablet by mouth 2 times daily     ondansetron 4 MG tablet  Commonly known as: ZOFRAN  Take 1 tablet by mouth 3 times daily as needed for Nausea or Vomiting     PriLOSEC 40 MG delayed release capsule  Generic drug: omeprazole           Where to Get Your Medications      These medications were sent to [facility] [address]  [address] [address]    Phone: [phone]   · HYDROcodone-acetaminophen 5-325 MG per tablet           Note that more than 30 minutes was spent in preparing discharge papers, discussing discharge with patient, medication review, etc.    Signed:  Electronically signed by Juli Man MD on 1/8/2021 at 6:18 PM

## 2021-01-08 NOTE — PATIENT CARE CONFERENCE
[facility] Quality Flow/Interdisciplinary Rounds Progress Note        Quality Flow Rounds held on January 8, 2021    Disciplines Attending:  Bedside Nurse, ,  and Nursing Unit [address] ALBERT Garay was admitted on 1/6/2021 12:01 PM    Anticipated Discharge Date:  Expected Discharge Date: 01/09/21    Disposition:    King Score:  King Scale Score: 21    Readmission Risk              Risk of Unplanned Readmission:        0           Discussed patient goal for the day, patient clinical progression, and barriers to discharge.   The following Goal(s) of the Day/Commitment(s) have been identified:  Diagnostics - Report Results      Judy Harding  January 8, 2021

## 2021-01-08 NOTE — CARE COORDINATION
Pt for SBFT today- await results for further plan of care. Discharge plan remains home- pt independent Denies any discharge needs.

## 2021-01-12 ENCOUNTER — APPOINTMENT (OUTPATIENT)
Dept: CT IMAGING | Age: 36
End: 2021-01-12
Payer: COMMERCIAL

## 2021-01-12 ENCOUNTER — HOSPITAL ENCOUNTER (EMERGENCY)
Age: 36
Discharge: HOME | End: 2021-01-12
Payer: COMMERCIAL

## 2021-01-12 VITALS
OXYGEN SATURATION: 95 % | DIASTOLIC BLOOD PRESSURE: 79 MMHG | TEMPERATURE: 98 F | HEART RATE: 70 BPM | BODY MASS INDEX: 23.22 KG/M2 | SYSTOLIC BLOOD PRESSURE: 157 MMHG | WEIGHT: 136 LBS | HEIGHT: 64 IN | RESPIRATION RATE: 14 BRPM

## 2021-01-12 DIAGNOSIS — R11.15 CYCLICAL VOMITING WITH NAUSEA: ICD-10-CM

## 2021-01-12 DIAGNOSIS — Z87.19 HISTORY OF CROHN'S DISEASE: ICD-10-CM

## 2021-01-12 DIAGNOSIS — F12.90 MARIJUANA USER: ICD-10-CM

## 2021-01-12 DIAGNOSIS — R10.9 ABDOMINAL PAIN, UNSPECIFIED ABDOMINAL LOCATION: Primary | ICD-10-CM

## 2021-01-12 LAB
ALBUMIN SERPL-MCNC: 4.9 G/DL (ref 3.5–5.2)
ALP BLD-CCNC: 98 U/L (ref 35–104)
ALT SERPL-CCNC: 45 U/L (ref 0–32)
ANION GAP SERPL CALCULATED.3IONS-SCNC: 10 MMOL/L (ref 7–16)
APTT: 29.1 SEC (ref 24.5–35.1)
AST SERPL-CCNC: 39 U/L (ref 0–31)
BASOPHILS ABSOLUTE: 0.08 E9/L (ref 0–0.2)
BASOPHILS RELATIVE PERCENT: 0.6 % (ref 0–2)
BILIRUB SERPL-MCNC: <0.2 MG/DL (ref 0–1.2)
BILIRUBIN URINE: NEGATIVE
BLOOD, URINE: NEGATIVE
BUN BLDV-MCNC: 10 MG/DL (ref 6–20)
C-REACTIVE PROTEIN: 0.2 MG/DL (ref 0–0.4)
CALCIUM SERPL-MCNC: 9.9 MG/DL (ref 8.6–10.2)
CHLORIDE BLD-SCNC: 96 MMOL/L (ref 98–107)
CLARITY: CLEAR
CO2: 31 MMOL/L (ref 22–29)
COLOR: YELLOW
CREAT SERPL-MCNC: 0.7 MG/DL (ref 0.5–1)
EOSINOPHILS ABSOLUTE: 0.51 E9/L (ref 0.05–0.5)
EOSINOPHILS RELATIVE PERCENT: 3.7 % (ref 0–6)
GFR AFRICAN AMERICAN: >60
GFR NON-AFRICAN AMERICAN: >60 ML/MIN/1.73
GLUCOSE BLD-MCNC: 102 MG/DL (ref 74–99)
GLUCOSE URINE: NEGATIVE MG/DL
GONADOTROPIN, CHORIONIC (HCG) QUANT: <0.1 MIU/ML
HCG, URINE, POC: NEGATIVE
HCT VFR BLD CALC: 49.5 % (ref 34–48)
HEMOGLOBIN: 16.5 G/DL (ref 11.5–15.5)
IMMATURE GRANULOCYTES #: 0.1 E9/L
IMMATURE GRANULOCYTES %: 0.7 % (ref 0–5)
KETONES, URINE: NEGATIVE MG/DL
LEUKOCYTE ESTERASE, URINE: NEGATIVE
LIPASE: 27 U/L (ref 13–60)
LYMPHOCYTES ABSOLUTE: 3.82 E9/L (ref 1.5–4)
LYMPHOCYTES RELATIVE PERCENT: 28 % (ref 20–42)
Lab: NORMAL
MCH RBC QN AUTO: 32.4 PG (ref 26–35)
MCHC RBC AUTO-ENTMCNC: 33.3 % (ref 32–34.5)
MCV RBC AUTO: 97.2 FL (ref 80–99.9)
MONOCYTES ABSOLUTE: 0.94 E9/L (ref 0.1–0.95)
MONOCYTES RELATIVE PERCENT: 6.9 % (ref 2–12)
NEGATIVE QC PASS/FAIL: NORMAL
NEUTROPHILS ABSOLUTE: 8.18 E9/L (ref 1.8–7.3)
NEUTROPHILS RELATIVE PERCENT: 60.1 % (ref 43–80)
NITRITE, URINE: NEGATIVE
PDW BLD-RTO: 13.1 FL (ref 11.5–15)
PH UA: 8 (ref 5–9)
PLATELET # BLD: 423 E9/L (ref 130–450)
PMV BLD AUTO: 9.4 FL (ref 7–12)
POSITIVE QC PASS/FAIL: NORMAL
POTASSIUM SERPL-SCNC: 4.1 MMOL/L (ref 3.5–5)
PROTEIN UA: NEGATIVE MG/DL
RBC # BLD: 5.09 E12/L (ref 3.5–5.5)
REASON FOR REJECTION: NORMAL
REJECTED TEST: NORMAL
SEDIMENTATION RATE, ERYTHROCYTE: 2 MM/HR (ref 0–20)
SODIUM BLD-SCNC: 137 MMOL/L (ref 132–146)
SPECIFIC GRAVITY UA: 1.01 (ref 1–1.03)
TOTAL PROTEIN: 8.4 G/DL (ref 6.4–8.3)
UROBILINOGEN, URINE: 0.2 E.U./DL
WBC # BLD: 13.6 E9/L (ref 4.5–11.5)

## 2021-01-12 PROCEDURE — 36415 COLL VENOUS BLD VENIPUNCTURE: CPT

## 2021-01-12 PROCEDURE — 96374 THER/PROPH/DIAG INJ IV PUSH: CPT

## 2021-01-12 PROCEDURE — 96372 THER/PROPH/DIAG INJ SC/IM: CPT

## 2021-01-12 PROCEDURE — 85651 RBC SED RATE NONAUTOMATED: CPT

## 2021-01-12 PROCEDURE — 99284 EMERGENCY DEPT VISIT MOD MDM: CPT

## 2021-01-12 PROCEDURE — 2580000003 HC RX 258: Performed by: NURSE PRACTITIONER

## 2021-01-12 PROCEDURE — 85025 COMPLETE CBC W/AUTO DIFF WBC: CPT

## 2021-01-12 PROCEDURE — 83690 ASSAY OF LIPASE: CPT

## 2021-01-12 PROCEDURE — 93005 ELECTROCARDIOGRAM TRACING: CPT | Performed by: NURSE PRACTITIONER

## 2021-01-12 PROCEDURE — 74177 CT ABD & PELVIS W/CONTRAST: CPT

## 2021-01-12 PROCEDURE — 80053 COMPREHEN METABOLIC PANEL: CPT

## 2021-01-12 PROCEDURE — 6360000002 HC RX W HCPCS: Performed by: NURSE PRACTITIONER

## 2021-01-12 PROCEDURE — 81003 URINALYSIS AUTO W/O SCOPE: CPT

## 2021-01-12 PROCEDURE — 84702 CHORIONIC GONADOTROPIN TEST: CPT

## 2021-01-12 PROCEDURE — 6360000004 HC RX CONTRAST MEDICATION: Performed by: RADIOLOGY

## 2021-01-12 PROCEDURE — 85730 THROMBOPLASTIN TIME PARTIAL: CPT

## 2021-01-12 PROCEDURE — 86140 C-REACTIVE PROTEIN: CPT

## 2021-01-12 RX ORDER — HYDROCODONE BITARTRATE AND ACETAMINOPHEN 5; 325 MG/1; MG/1
1 TABLET ORAL EVERY 8 HOURS PRN
Qty: 3 TABLET | Refills: 0 | Status: SHIPPED | OUTPATIENT
Start: 2021-01-12 | End: 2021-01-13

## 2021-01-12 RX ORDER — HALOPERIDOL 5 MG/ML
1 INJECTION INTRAMUSCULAR ONCE
Status: COMPLETED | OUTPATIENT
Start: 2021-01-12 | End: 2021-01-12

## 2021-01-12 RX ORDER — 0.9 % SODIUM CHLORIDE 0.9 %
1000 INTRAVENOUS SOLUTION INTRAVENOUS ONCE
Status: COMPLETED | OUTPATIENT
Start: 2021-01-12 | End: 2021-01-12

## 2021-01-12 RX ORDER — PROMETHAZINE HYDROCHLORIDE 25 MG/1
25 TABLET ORAL EVERY 8 HOURS PRN
Qty: 12 TABLET | Refills: 0 | Status: SHIPPED | OUTPATIENT
Start: 2021-01-12 | End: 2021-01-16

## 2021-01-12 RX ORDER — ONDANSETRON 2 MG/ML
4 INJECTION INTRAMUSCULAR; INTRAVENOUS ONCE
Status: COMPLETED | OUTPATIENT
Start: 2021-01-12 | End: 2021-01-12

## 2021-01-12 RX ADMIN — IOPAMIDOL 75 ML: 755 INJECTION, SOLUTION INTRAVENOUS at 15:34

## 2021-01-12 RX ADMIN — SODIUM CHLORIDE 1000 ML: 9 INJECTION, SOLUTION INTRAVENOUS at 16:13

## 2021-01-12 RX ADMIN — ONDANSETRON 4 MG: 2 INJECTION INTRAMUSCULAR; INTRAVENOUS at 14:30

## 2021-01-12 RX ADMIN — SODIUM CHLORIDE 1000 ML: 9 INJECTION, SOLUTION INTRAVENOUS at 13:48

## 2021-01-12 RX ADMIN — HALOPERIDOL LACTATE 1 MG: 5 INJECTION, SOLUTION INTRAMUSCULAR at 16:13

## 2021-01-12 ASSESSMENT — PAIN DESCRIPTION - PAIN TYPE: TYPE: ACUTE PAIN

## 2021-01-12 ASSESSMENT — PAIN DESCRIPTION - LOCATION: LOCATION: ABDOMEN

## 2021-01-12 NOTE — ED NOTES
Pt in waiting room heaving loudly but not actually vomiting. C/o abdominal pain then seemed to \"loose consciousness\" Resp unlabored and regular with SaO2 99% on R/A Pt sitting forward in wheelchair, flaccid, not following commands but does grimace and open eyes to deep sternal rub. Skin w/d color pink.     Mukesh Arenas examined pt and pt to room [address] [place] [address] Sutton, RN  01/12/21 [address] [facility]  01/12/21 134

## 2021-01-12 NOTE — ED PROVIDER NOTES
[facility]  Department of Emergency Medicine   ED  Encounter Note  Admit Date/RoomTime: 2021  1:32 PM  ED Room:     NAME: Sandra Watson  : 1985  MRN: [id number]     Chief Complaint:  Emesis (pt recently admitted for crohns, pt friend states they ate crab last night and thinks that its food poisoning) and Loss of Consciousness (according to triage RN, pt passed out during triage)    History of Present Illness       Sandra Watson is a 30 y.o. old female who presents to the emergency department by private vehicle, for nausea and vomiting that started this morning boyfriend states that she ate seafood and thinks she had food poisoning and he did not get the symptoms. He reports she was recently discharged from the hospital 2 days ago and states she has a history of Crohn's disease and she is having worsening pain and is out of pain medication. Patient also has a past history of cannabis use and denies any use today. Patient having several green bile emesis in the ED. She states her last BM was today soft formed brown denies any bloody or tarry stools. There has been similar episodes in the past.  Since onset the symptoms have been persistent and worsening. The pain is associated with nothing additional.  The pain is aggravated by nothing in particular and relieved by nothing. There has been NO back pain, chills, cloudy urine, constipation, diarrhea, dysuria, headache, hematuria, sweating, urinary frequency, urinary incontinence, urinary urgency, vaginal discharge or vaginal itching. No LMP recorded. [id number]. No prior history of sexually transmitted disease. ROS   Pertinent positives and negatives are stated within HPI, all other systems reviewed and are negative. Past Medical History:  has a past medical history of Anxiety attack, Asthma, Crohn's colitis ([address] Marijuana abuse, Multiple lacerations, Seizure ([facility] [address] and Tobacco abuse. Surgical History has a past surgical history that includes Dilation and curettage of uterus (2011); Colonoscopy; Endoscopy, colon, diagnostic; Cholecystectomy (01/22/2018); hernia repair; and hernia repair. Social History:  reports that she has been smoking cigarettes. She has a 10.00 pack-year smoking history. She has never used smokeless tobacco. She reports current alcohol use. She reports current drug use. Frequency: 2.00 times per week. Drug: Marijuana. Family History: family history includes Cancer in her mother; Depression in her father and mother; Heart Disease in her father. Allergies: Patient has no known allergies. Physical Exam   Oxygen Saturation Interpretation: Normal.        ED Triage Vitals   BP Temp Temp Source Pulse Resp SpO2 Height Weight   01/12/21 1327 01/12/21 1339 01/12/21 1339 01/12/21 1327 01/12/21 1327 01/12/21 1327 01/12/21 1339 01/12/21 1339   (!) 158/103 98 °F (36.7 °C) Axillary 61 14 99 % 5' 4\" (1.626 m) 136 lb (61.7 kg)          General Appearance/Constitutional:  Alert, development consistent with age. HEENT:  NC/NT. PERRLA. Airway patent. Neck:  Supple. No lymphadenopathy. Respiratory:  No retractions. Lungs Clear to auscultation and breath sounds equal.  CV:  Regular rate and rhythm. GI:  normal appearing, non-distended with no visible hernias. Bowel sounds: normal bowel sounds. Tenderness: There is moderate tenderness present - located diffusely in the lower abdomen., There is no rebound tenderness. , There is no guarding. , There is no distension. , There is no pulsatile mass. .           Liver: non-tender and non-palpable. Spleen:  non-tender and non-palpable. Back: CVA Tenderness: No.  : (chaperone present during examination). Denies any vaginal discharge or concern for STI. Integument:  Normal turgor. Warm, dry, without visible rash, unless noted elsewhere. Lymphatics: No edema, cap.refill <3sec. Neurological:  Orientation age-appropriate. Motor functions intact.     Lab / Imaging Results   (All laboratory and radiology results have been personally reviewed by myself)  Labs:  Results for orders placed or performed during the hospital encounter of 01/12/21   Urinalysis   Result Value Ref Range    Color, UA Yellow Straw/Yellow    Clarity, UA Clear Clear    Glucose, Ur Negative Negative mg/dL    Bilirubin Urine Negative Negative    Ketones, Urine Negative Negative mg/dL    Specific Gravity, UA 1.010 1.005 - 1.030    Blood, Urine Negative Negative    pH, UA 8.0 5.0 - 9.0    Protein, UA Negative Negative mg/dL    Urobilinogen, Urine 0.2 <2.0 E.U./dL    Nitrite, Urine Negative Negative    Leukocyte Esterase, Urine Negative Negative   CBC Auto Differential   Result Value Ref Range    WBC 13.6 (H) 4.5 - 11.5 E9/L    RBC 5.09 3.50 - 5.50 E12/L    Hemoglobin 16.5 (H) 11.5 - 15.5 g/dL    Hematocrit 49.5 (H) 34.0 - 48.0 %    MCV 97.2 80.0 - 99.9 fL    MCH 32.4 26.0 - 35.0 pg    MCHC 33.3 32.0 - 34.5 %    RDW 13.1 11.5 - 15.0 fL    Platelets 538 949 - 347 E9/L    MPV 9.4 7.0 - 12.0 fL    Neutrophils % 60.1 43.0 - 80.0 %    Immature Granulocytes % 0.7 0.0 - 5.0 %    Lymphocytes % 28.0 20.0 - 42.0 %    Monocytes % 6.9 2.0 - 12.0 %    Eosinophils % 3.7 0.0 - 6.0 %    Basophils % 0.6 0.0 - 2.0 %    Neutrophils Absolute 8.18 (H) 1.80 - 7.30 E9/L    Immature Granulocytes # 0.10 E9/L    Lymphocytes Absolute 3.82 1.50 - 4.00 E9/L    Monocytes Absolute 0.94 0.10 - 0.95 E9/L    Eosinophils Absolute 0.51 (H) 0.05 - 0.50 E9/L    Basophils Absolute 0.08 0.00 - 0.20 E9/L   Comprehensive Metabolic Panel   Result Value Ref Range    Sodium 137 132 - 146 mmol/L    Potassium 4.1 3.5 - 5.0 mmol/L    Chloride 96 (L) 98 - 107 mmol/L    CO2 31 (H) 22 - 29 mmol/L    Anion Gap 10 7 - 16 mmol/L    Glucose 102 (H) 74 - 99 mg/dL    BUN 10 6 - 20 mg/dL    CREATININE 0.7 0.5 - 1.0 mg/dL GFR Non-African American >60 >=60 mL/min/1.73    GFR African American >60     Calcium 9.9 8.6 - 10.2 mg/dL    Total Protein 8.4 (H) 6.4 - 8.3 g/dL    Alb 4.9 3.5 - 5.2 g/dL    Total Bilirubin <0.2 0.0 - 1.2 mg/dL    Alkaline Phosphatase 98 35 - 104 U/L    ALT 45 (H) 0 - 32 U/L    AST 39 (H) 0 - 31 U/L   APTT   Result Value Ref Range    aPTT 29.1 24.5 - 35.1 sec   hCG, quantitative, pregnancy   Result Value Ref Range    hCG Quant <0.1 <10 mIU/mL   Lipase   Result Value Ref Range    Lipase 27 13 - 60 U/L   Sedimentation Rate   Result Value Ref Range    Sed Rate 2 0 - 20 mm/Hr   C-reactive protein   Result Value Ref Range    CRP 0.2 0.0 - 0.4 mg/dL   SPECIMEN REJECTION   Result Value Ref Range    Rejected Test lacid     Reason for Rejection see below    POC Pregnancy Urine Qual   Result Value Ref Range    HCG, Urine, POC Negative Negative    Lot Number [id number]     Positive QC Pass/Fail Pass     Negative QC Pass/Fail Pass    EKG 12 Lead   Result Value Ref Range    Ventricular Rate 55 BPM    Atrial Rate 55 BPM    P-R Interval 122 ms    QRS Duration 104 ms    Q-T Interval 412 ms    QTc Calculation (Bazett) 394 ms    P Axis 18 degrees    R Axis 38 degrees    T Axis 63 degrees     Imaging: All Radiology results interpreted by Radiologist unless otherwise noted. CT ABDOMEN PELVIS W IV CONTRAST Additional Contrast? None   Final Result   Underdistention versus circumferential wall thickening involving the distal   descending and proximal sigmoid colon. Findings are nonspecific but   suggestive of colitis, in spite of a lack of significant pericolonic   inflammatory changes. New, metallic density material within the right lower quadrant. This was not   present on the study from January 6, 0374 and is of uncertain etiology or   clinical significance. This may reflect concentrated, residual barium   contrast related to the patient's upper GI/small bowel follow-through. Please correlate for interval surgical history or trauma to the area. Additional findings as above. ED Course / Medical Decision Making     Medications   0.9 % sodium chloride bolus (0 mLs Intravenous Stopped 1/12/21 1525)   ondansetron (ZOFRAN) injection 4 mg (4 mg Intravenous Given 1/12/21 1430)   0.9 % sodium chloride bolus (0 mLs Intravenous Stopped 1/12/21 1750)   iopamidol (ISOVUE-370) 76 % injection 75 mL (75 mLs Intravenous Given 1/12/21 1534)   haloperidol lactate (HALDOL) injection 1 mg (1 mg Intramuscular Given 1/12/21 1613)       EKG Interpretation  Time: 1523  Interpreted by emergency department physician  Rhythm: sinus bradycardia  Rate: 55  Axis: normal  Ectopy: none  Conduction: right bundle branch block (incomplete)  ST Segments: no acute change  T Waves: no acute change  Q Waves: none  Clinical Impression: no acute changes; stable as compared to most recent ECG on 7/16/2018. Dave Garcia     Re-examination:  1/12/21       Time: Patient in ER triage room and was in a wheelchair leaning back and not responding verbally to questions and had a good oxygen saturation, pulse and blood pressure when RN Santiago Mercado did sternal rub patient responded immediately and woke up and was placed in room 29.    1458Patients condition is normally improving after Zofran and continues to have several small green emesis with dry heaving. 32 61 16 will order Haldol since EKG not have a prolonged QT.1640 patient appears to be resting comfortably no further emesis noted. 1625 CT of the abdomen results discussed with patient who is resting comfortably and had to be awakened. 1800 patient had to be awakened and is not longer having emesis or nausea since getting 1849 discussed results with patient and she will call for a ride she has no further nausea or vomiting she does want a prescription for pain medication. Patient was additionally given Phenergan and instructed to use either the Zofran or the Phenergan but not both. Consults:   None    Procedures:   none    MDM:   Patient here and has nausea and vomiting has a history of being admitted to the hospital recently discharged and states that she woke up this morning had nausea and several episodes of emesis that were greenish in her and think she was food poisoning but has a history of Crohn's and has left lower quadrant diffuse pain and will obtain labs, urinalysis and CT of the abdomen. Patient was medicated with Zofran experiencing no relief continues to have dry heaving. Patient denies any recent marijuana use. WBCs 13.6; Hgb 16.5; lipase normal.  Sed rate 2; CRP is 0.2 which is reassuring. Urinalysis negative for UTI or pregnancy. Lactic acid normal.  Advised on marijuana use and cyclic vomiting and she reports she thinks it is the food poisoning. Patient has no bloody stools. EKG obtained and will administer Haldol for cyclic vomiting. Patient experiencing complete relief of nausea and vomiting after Haldol and will discharge. CT the abdomen reveals a new metallic density which may be from barium residual MI: Multiple right ovarian cyst.  She has no concerns for any STDs. Patient states that she has no more pain medication for the abdominal pain and will only give her 1 day supply otherwise she can use Tylenol and she does have Bentyl that was previously prescribed. Patient advised to follow-up with her PCP and her gastroenterologist for reevaluation and instructed on signs and symptoms warranting immediate return. Case and CT of the abdomen was discussed with Dr. Urmila Gann who feels that she can be discharged. Controlled Substance Monitoring:    Acute and Chronic Pain Monitoring:   RX Monitoring 1/12/2021   Periodic Controlled Substance Monitoring Possible medication side effects, risk of tolerance/dependence & alternative treatments discussed. ;No signs of potential drug abuse or diversion identified. Plan of Care/Counseling:  I reviewed today's visit with the patient in addition to providing specific details for the plan of care and counseling regarding the diagnosis and prognosis. Questions are answered at this time and are agreeable with the plan. Assessment      1. Abdominal pain, unspecified abdominal location    2. Cyclical vomiting with nausea    3. History of Crohn's disease    4. Marijuana user      Plan   Discharged home  Patient condition is good    New Medications     Discharge Medication List as of 1/12/2021  6:49 PM      START taking these medications    Details   HYDROcodone-acetaminophen (NORCO) 5-325 MG per tablet Take 1 tablet by mouth every 8 hours as needed for Pain for up to 3 doses. , Disp-3 tablet, R-0Print      promethazine (PHENERGAN) 25 MG tablet Take 1 tablet by mouth every 8 hours as needed for Nausea, Disp-12 tablet, R-0Print           Electronically signed by LEIDY Wong CNP   DD: 1/12/21  **This report was transcribed using voice recognition software. Every effort was made to ensure accuracy; however, inadvertent computerized transcription errors may be present.   END OF ED PROVIDER NOTE     LEIDY Wong CNP  01/12/21 4449

## 2021-01-13 LAB
EKG ATRIAL RATE: 55 BPM
EKG P AXIS: 18 DEGREES
EKG P-R INTERVAL: 122 MS
EKG Q-T INTERVAL: 412 MS
EKG QRS DURATION: 104 MS
EKG QTC CALCULATION (BAZETT): 394 MS
EKG R AXIS: 38 DEGREES
EKG T AXIS: 63 DEGREES
EKG VENTRICULAR RATE: 55 BPM
Lab: NORMAL
REPORT: NORMAL
THIS TEST SENT TO: NORMAL

## 2021-01-13 PROCEDURE — 93010 ELECTROCARDIOGRAM REPORT: CPT | Performed by: INTERNAL MEDICINE

## 2021-04-24 ENCOUNTER — HOSPITAL ENCOUNTER (EMERGENCY)
Age: 36
Discharge: HOME | End: 2021-04-24
Attending: EMERGENCY MEDICINE
Payer: COMMERCIAL

## 2021-04-24 ENCOUNTER — APPOINTMENT (OUTPATIENT)
Dept: CT IMAGING | Age: 36
End: 2021-04-24
Payer: COMMERCIAL

## 2021-04-24 VITALS
SYSTOLIC BLOOD PRESSURE: 175 MMHG | HEIGHT: 64 IN | DIASTOLIC BLOOD PRESSURE: 98 MMHG | BODY MASS INDEX: 23.05 KG/M2 | HEART RATE: 60 BPM | TEMPERATURE: 98 F | OXYGEN SATURATION: 100 % | WEIGHT: 135 LBS | RESPIRATION RATE: 14 BRPM

## 2021-04-24 DIAGNOSIS — R11.2 NON-INTRACTABLE VOMITING WITH NAUSEA, UNSPECIFIED VOMITING TYPE: ICD-10-CM

## 2021-04-24 DIAGNOSIS — K52.9 ENTERITIS: ICD-10-CM

## 2021-04-24 DIAGNOSIS — R10.30 LOWER ABDOMINAL PAIN: Primary | ICD-10-CM

## 2021-04-24 LAB
ALBUMIN SERPL-MCNC: 4.5 G/DL (ref 3.5–5.2)
ALP BLD-CCNC: 59 U/L (ref 35–104)
ALT SERPL-CCNC: 18 U/L (ref 0–32)
AMPHETAMINE SCREEN, URINE: NOT DETECTED
ANION GAP SERPL CALCULATED.3IONS-SCNC: 15 MMOL/L (ref 7–16)
AST SERPL-CCNC: 23 U/L (ref 0–31)
BACTERIA: ABNORMAL /HPF
BARBITURATE SCREEN URINE: NOT DETECTED
BASOPHILS ABSOLUTE: 0.06 E9/L (ref 0–0.2)
BASOPHILS RELATIVE PERCENT: 0.3 % (ref 0–2)
BENZODIAZEPINE SCREEN, URINE: NOT DETECTED
BILIRUB SERPL-MCNC: 0.4 MG/DL (ref 0–1.2)
BILIRUBIN URINE: NEGATIVE
BLOOD, URINE: NEGATIVE
BUN BLDV-MCNC: 12 MG/DL (ref 6–20)
CALCIUM SERPL-MCNC: 9.3 MG/DL (ref 8.6–10.2)
CANNABINOID SCREEN URINE: POSITIVE
CHLORIDE BLD-SCNC: 98 MMOL/L (ref 98–107)
CLARITY: CLEAR
CO2: 24 MMOL/L (ref 22–29)
COCAINE METABOLITE SCREEN URINE: NOT DETECTED
COLOR: YELLOW
CREAT SERPL-MCNC: 0.6 MG/DL (ref 0.5–1)
EOSINOPHILS ABSOLUTE: 0.01 E9/L (ref 0.05–0.5)
EOSINOPHILS RELATIVE PERCENT: 0.1 % (ref 0–6)
FENTANYL SCREEN, URINE: NOT DETECTED
GFR AFRICAN AMERICAN: >60
GFR NON-AFRICAN AMERICAN: >60 ML/MIN/1.73
GLUCOSE BLD-MCNC: 106 MG/DL (ref 74–99)
GLUCOSE URINE: NEGATIVE MG/DL
GONADOTROPIN, CHORIONIC (HCG) QUANT: <0.1 MIU/ML
HCT VFR BLD CALC: 46.1 % (ref 34–48)
HEMOGLOBIN: 15.7 G/DL (ref 11.5–15.5)
IMMATURE GRANULOCYTES #: 0.08 E9/L
IMMATURE GRANULOCYTES %: 0.4 % (ref 0–5)
KETONES, URINE: NEGATIVE MG/DL
LACTIC ACID: 2.1 MMOL/L (ref 0.5–2.2)
LEUKOCYTE ESTERASE, URINE: NEGATIVE
LIPASE: 21 U/L (ref 13–60)
LYMPHOCYTES ABSOLUTE: 1.89 E9/L (ref 1.5–4)
LYMPHOCYTES RELATIVE PERCENT: 9.6 % (ref 20–42)
Lab: ABNORMAL
MCH RBC QN AUTO: 32.5 PG (ref 26–35)
MCHC RBC AUTO-ENTMCNC: 34.1 % (ref 32–34.5)
MCV RBC AUTO: 95.4 FL (ref 80–99.9)
METHADONE SCREEN, URINE: NOT DETECTED
MONOCYTES ABSOLUTE: 0.43 E9/L (ref 0.1–0.95)
MONOCYTES RELATIVE PERCENT: 2.2 % (ref 2–12)
NEUTROPHILS ABSOLUTE: 17.18 E9/L (ref 1.8–7.3)
NEUTROPHILS RELATIVE PERCENT: 87.4 % (ref 43–80)
NITRITE, URINE: NEGATIVE
OPIATE SCREEN URINE: POSITIVE
OXYCODONE URINE: NOT DETECTED
PDW BLD-RTO: 13.2 FL (ref 11.5–15)
PH UA: 7.5 (ref 5–9)
PHENCYCLIDINE SCREEN URINE: NOT DETECTED
PLATELET # BLD: 418 E9/L (ref 130–450)
PMV BLD AUTO: 9.4 FL (ref 7–12)
POTASSIUM SERPL-SCNC: 3.5 MMOL/L (ref 3.5–5)
PROTEIN UA: NEGATIVE MG/DL
RBC # BLD: 4.83 E12/L (ref 3.5–5.5)
RBC UA: ABNORMAL /HPF (ref 0–2)
SODIUM BLD-SCNC: 137 MMOL/L (ref 132–146)
SPECIFIC GRAVITY UA: <=1.005 (ref 1–1.03)
TOTAL PROTEIN: 7.2 G/DL (ref 6.4–8.3)
UROBILINOGEN, URINE: 0.2 E.U./DL
WBC # BLD: 19.7 E9/L (ref 4.5–11.5)
WBC UA: ABNORMAL /HPF (ref 0–5)

## 2021-04-24 PROCEDURE — 99283 EMERGENCY DEPT VISIT LOW MDM: CPT

## 2021-04-24 PROCEDURE — 83690 ASSAY OF LIPASE: CPT

## 2021-04-24 PROCEDURE — 2580000003 HC RX 258: Performed by: PHYSICIAN ASSISTANT

## 2021-04-24 PROCEDURE — 80053 COMPREHEN METABOLIC PANEL: CPT

## 2021-04-24 PROCEDURE — 96374 THER/PROPH/DIAG INJ IV PUSH: CPT

## 2021-04-24 PROCEDURE — 80307 DRUG TEST PRSMV CHEM ANLYZR: CPT

## 2021-04-24 PROCEDURE — 85025 COMPLETE CBC W/AUTO DIFF WBC: CPT

## 2021-04-24 PROCEDURE — 83605 ASSAY OF LACTIC ACID: CPT

## 2021-04-24 PROCEDURE — 6360000004 HC RX CONTRAST MEDICATION: Performed by: RADIOLOGY

## 2021-04-24 PROCEDURE — 81001 URINALYSIS AUTO W/SCOPE: CPT

## 2021-04-24 PROCEDURE — 96376 TX/PRO/DX INJ SAME DRUG ADON: CPT

## 2021-04-24 PROCEDURE — 84702 CHORIONIC GONADOTROPIN TEST: CPT

## 2021-04-24 PROCEDURE — 6360000002 HC RX W HCPCS: Performed by: PHYSICIAN ASSISTANT

## 2021-04-24 PROCEDURE — 74177 CT ABD & PELVIS W/CONTRAST: CPT

## 2021-04-24 PROCEDURE — 96375 TX/PRO/DX INJ NEW DRUG ADDON: CPT

## 2021-04-24 RX ORDER — MORPHINE SULFATE 4 MG/ML
4 INJECTION, SOLUTION INTRAMUSCULAR; INTRAVENOUS ONCE
Status: COMPLETED | OUTPATIENT
Start: 2021-04-24 | End: 2021-04-24

## 2021-04-24 RX ORDER — 0.9 % SODIUM CHLORIDE 0.9 %
1000 INTRAVENOUS SOLUTION INTRAVENOUS ONCE
Status: DISCONTINUED | OUTPATIENT
Start: 2021-04-24 | End: 2021-04-24 | Stop reason: HOSPADM

## 2021-04-24 RX ORDER — ONDANSETRON 4 MG/1
4 TABLET, ORALLY DISINTEGRATING ORAL EVERY 8 HOURS PRN
Qty: 30 TABLET | Refills: 0 | Status: SHIPPED | OUTPATIENT
Start: 2021-04-24 | End: 2021-05-04

## 2021-04-24 RX ORDER — 0.9 % SODIUM CHLORIDE 0.9 %
1000 INTRAVENOUS SOLUTION INTRAVENOUS ONCE
Status: COMPLETED | OUTPATIENT
Start: 2021-04-24 | End: 2021-04-24

## 2021-04-24 RX ORDER — DICYCLOMINE HCL 20 MG
20 TABLET ORAL 4 TIMES DAILY
Qty: 40 TABLET | Refills: 0 | Status: SHIPPED | OUTPATIENT
Start: 2021-04-24 | End: 2022-02-18

## 2021-04-24 RX ORDER — HALOPERIDOL 5 MG/ML
5 INJECTION INTRAMUSCULAR ONCE
Status: DISCONTINUED | OUTPATIENT
Start: 2021-04-24 | End: 2021-04-24

## 2021-04-24 RX ORDER — ONDANSETRON 2 MG/ML
4 INJECTION INTRAMUSCULAR; INTRAVENOUS ONCE
Status: COMPLETED | OUTPATIENT
Start: 2021-04-24 | End: 2021-04-24

## 2021-04-24 RX ADMIN — IOPAMIDOL 90 ML: 755 INJECTION, SOLUTION INTRAVENOUS at 16:16

## 2021-04-24 RX ADMIN — MORPHINE SULFATE 4 MG: 4 INJECTION, SOLUTION INTRAMUSCULAR; INTRAVENOUS at 14:28

## 2021-04-24 RX ADMIN — SODIUM CHLORIDE 1000 ML: 0.9 INJECTION, SOLUTION INTRAVENOUS at 14:28

## 2021-04-24 RX ADMIN — ONDANSETRON 4 MG: 2 INJECTION INTRAMUSCULAR; INTRAVENOUS at 14:28

## 2021-04-24 RX ADMIN — MORPHINE SULFATE 4 MG: 4 INJECTION, SOLUTION INTRAMUSCULAR; INTRAVENOUS at 17:21

## 2021-04-24 ASSESSMENT — PAIN SCALES - GENERAL: PAINLEVEL_OUTOF10: 10

## 2021-04-24 NOTE — ED PROVIDER NOTES
ED Attending  CC: Crista Joya [address] Sanje[id number]  Department of Emergency Medicine   ED  Encounter Note  Admit Date/RoomTime: 2021  1:35 PM  ED Room: ALICIA/ALICIA    NAME: Prerna Brooks  : 1985  MRN: [id number]     Chief Complaint:  Abdominal Pain (generalized abdominal pain, NV x5 days)    History of Present Illness       Prerna Brooks is a 30 y.o. old female who presents to the emergency department by ambulance, for gradual onset of significant lower abdominal pain, nausea, and intermittent vomiting and diarrhea. Patient has been evaluated for similar symptoms before and reports that she has received the diagnosis of Crohn's from a GI doctor. Patient has followed with 3 GI doctors but continues to switch to has not been initiated on any medications. Pt notes that she has not been able to eat in the last 5 days, and is only having minimal fluid intake. Since onset the symptoms have been persistent. The pain is associated with abdominal pain, diarrhea and vomiting. The pain is aggravated by eating, drinking and certain movements and relieved by nothing. There has been NO back pain, dysuria, headache, urinary frequency, urinary urgency or vaginal discharge. Patient's last menstrual period was 2021. . [id number]. She reports she just started her last menstrual period yesterday and took a home pregnancy test prior to that which was negative. Pt denies chest pain, SOB, headache, urinary sx, fever, and chills. ROS   Pertinent positives and negatives are stated within HPI, all other systems reviewed and are negative. Past Medical History:  has a past medical history of Anxiety attack, Asthma, Crohn's colitis ([address] Marijuana abuse, Multiple lacerations, Seizure ([address] and Tobacco abuse. Surgical History has a past surgical history that includes Dilation and curettage of uterus (); Colonoscopy; Endoscopy, colon, diagnostic;  Cholecystectomy (2018); hernia repair; and hernia repair. Social History:  reports that she has been smoking cigarettes. She has a 10.00 pack-year smoking history. She has never used smokeless tobacco. She reports current alcohol use. She reports current drug use. Frequency: 2.00 times per week. Drug: Marijuana. Family History: family history includes Cancer in her mother; Depression in her father and mother; Heart Disease in her father. Allergies: Patient has no known allergies. Physical Exam   Oxygen Saturation Interpretation: Normal.        ED Triage Vitals [04/24/21 1337]   BP Temp Temp src Pulse Resp SpO2 Height Weight   (!) 175/98 98 °F (36.7 °C) -- 60 14 100 % 5' 4\" (1.626 m) 135 lb (61.2 kg)          General Appearance/Constitutional:  Alert, development consistent with age. HEENT:  NC/NT. PERRLA. Airway patent. Neck:  Supple. No lymphadenopathy. Respiratory:  No retractions. Lungs Clear to auscultation and breath sounds equal.  CV:  Regular rate and rhythm. GI:  normal appearing, non-distended with no visible hernias. Bowel sounds: hypoactive bowel sounds. Tenderness: There is moderate tenderness present - located diffusely in the lower abdomen. No guarding, rebound, rigidity or pulsatile mass. Nonsurgical abdomen          liver: non-tender. Spleen:  non-tender. Back: CVA Tenderness: No.  Integument:  Normal turgor. Warm, dry, without visible rash, unless noted elsewhere. Lymphatics: No edema, cap.refill <3sec. Neurological:  Orientation age-appropriate. Motor functions intact.     Lab / Imaging Results   (All laboratory and radiology results have been personally reviewed by myself)  Labs:  Results for orders placed or performed during the hospital encounter of 04/24/21   Comprehensive Metabolic Panel   Result Value Ref Range    Sodium 137 132 - 146 mmol/L    Potassium 3.5 3.5 - 5.0 mmol/L    Chloride 98 98 - 107 mmol/L    CO2 24 22 - 29 mmol/L Anion Gap 15 7 - 16 mmol/L    Glucose 106 (H) 74 - 99 mg/dL    BUN 12 6 - 20 mg/dL    CREATININE 0.6 0.5 - 1.0 mg/dL    GFR Non-African American >60 >=60 mL/min/1.73    GFR African American >60     Calcium 9.3 8.6 - 10.2 mg/dL    Total Protein 7.2 6.4 - 8.3 g/dL    Albumin 4.5 3.5 - 5.2 g/dL    Total Bilirubin 0.4 0.0 - 1.2 mg/dL    Alkaline Phosphatase 59 35 - 104 U/L    ALT 18 0 - 32 U/L    AST 23 0 - 31 U/L   CBC Auto Differential   Result Value Ref Range    WBC 19.7 (H) 4.5 - 11.5 E9/L    RBC 4.83 3.50 - 5.50 E12/L    Hemoglobin 15.7 (H) 11.5 - 15.5 g/dL    Hematocrit 46.1 34.0 - 48.0 %    MCV 95.4 80.0 - 99.9 fL    MCH 32.5 26.0 - 35.0 pg    MCHC 34.1 32.0 - 34.5 %    RDW 13.2 11.5 - 15.0 fL    Platelets 670 815 - 676 E9/L    MPV 9.4 7.0 - 12.0 fL    Neutrophils % 87.4 (H) 43.0 - 80.0 %    Immature Granulocytes % 0.4 0.0 - 5.0 %    Lymphocytes % 9.6 (L) 20.0 - 42.0 %    Monocytes % 2.2 2.0 - 12.0 %    Eosinophils % 0.1 0.0 - 6.0 %    Basophils % 0.3 0.0 - 2.0 %    Neutrophils Absolute 17.18 (H) 1.80 - 7.30 E9/L    Immature Granulocytes # 0.08 E9/L    Lymphocytes Absolute 1.89 1.50 - 4.00 E9/L    Monocytes Absolute 0.43 0.10 - 0.95 E9/L    Eosinophils Absolute 0.01 (L) 0.05 - 0.50 E9/L    Basophils Absolute 0.06 0.00 - 0.20 E9/L   Lipase   Result Value Ref Range    Lipase 21 13 - 60 U/L   Urinalysis with Microscopic   Result Value Ref Range    Color, UA Yellow Straw/Yellow    Clarity, UA Clear Clear    Glucose, Ur Negative Negative mg/dL    Bilirubin Urine Negative Negative    Ketones, Urine Negative Negative mg/dL    Specific Gravity, UA <=1.005 1.005 - 1.030    Blood, Urine Negative Negative    pH, UA 7.5 5.0 - 9.0    Protein, UA Negative Negative mg/dL    Urobilinogen, Urine 0.2 <2.0 E.U./dL    Nitrite, Urine Negative Negative    Leukocyte Esterase, Urine Negative Negative    WBC, UA NONE 0 - 5 /HPF    RBC, UA NONE 0 - 2 /HPF    Bacteria, UA RARE (A) None Seen /HPF   Lactic Acid, Plasma   Result Value Ref Range    Lactic Acid 2.1 0.5 - 2.2 mmol/L   hCG, quantitative, pregnancy   Result Value Ref Range    hCG Quant <0.1 <10 mIU/mL   Urine Drug Screen   Result Value Ref Range    Amphetamine Screen, Urine NOT DETECTED Negative <1000 ng/mL    Barbiturate Screen, Ur NOT DETECTED Negative < 200 ng/mL    Benzodiazepine Screen, Urine NOT DETECTED Negative < 200 ng/mL    Cannabinoid Scrn, Ur POSITIVE (A) Negative < 50ng/mL    Cocaine Metabolite Screen, Urine NOT DETECTED Negative < 300 ng/mL    Opiate Scrn, Ur POSITIVE (A) Negative < 300ng/mL    PCP Screen, Urine NOT DETECTED Negative < 25 ng/mL    Methadone Screen, Urine NOT DETECTED Negative <300 ng/mL    Oxycodone Urine NOT DETECTED Negative <100 ng/mL    FENTANYL SCREEN, URINE NOT DETECTED Negative <1 ng/mL    Drug Screen Comment: see below      Imaging: All Radiology results interpreted by Radiologist unless otherwise noted. CT ABDOMEN PELVIS W IV CONTRAST Additional Contrast? None   Final Result   Mildly dilated and thickened loops of small bowel, suspicious for enteritis. No evidence of obstructive uropathy. ED Course / Medical Decision Making     Medications   0.9 % sodium chloride bolus (1,000 mLs Intravenous Not Given 4/24/21 1923)   0.9 % sodium chloride bolus (0 mLs Intravenous Stopped 4/24/21 1654)   ondansetron (ZOFRAN) injection 4 mg (4 mg Intravenous Given 4/24/21 1428)   morphine sulfate (PF) injection 4 mg (4 mg Intravenous Given 4/24/21 1428)   iopamidol (ISOVUE-370) 76 % injection 90 mL (90 mLs Intravenous Given 4/24/21 1616)   morphine sulfate (PF) injection 4 mg (4 mg Intravenous Given 4/24/21 1721)        Re-examination:  4/24/21       Time: 14:13  I took the time to review the patient's past medical history. She had a GI consult when she was in the hospital in January 2021. Per consult note, the patient has history of ileitis diagnosed by colonoscopy in 2018 which she chose to manage her disease with diet only.   The patient does not have a firm diagnosis of Crohn's. She was concerned that chronic marijuana use may be the cause of symptoms. 4/24/21  17:10  Pt reports her symptoms initially improved with the pain medication. She was able to rest comfortably for a period of time. Report her pain is increasing again and rated a 7 out of 10. Pt will be given morphine for pain control and IV fluids. Consults:   None    Procedures:   none    MDM:   Pt is a 41-year-old female ending for evaluation of severe abdominal pain, nausea, vomiting, and diarrhea over the last 5 days. Pt has experienced similar episodes in the past and reports she has been diagnosed with Crohn's. Pt was hospitalized in January and was seen by GI who did not give pt a firm diagnosis of Crohn's and expressed concern that her marijuana use was contributing to symptoms. Pt denies sick contacts. Denies urinary symptoms, chest pain, SOB, headache, fever, and chills. Pt has moderate diffuse lower abdominal tenderness to palpation. Non surgical abdomen. Pt will be given zofran, IV fluids, and morphine. Pt has leukocytosis of 19.7. CT scan showed mild dilation and thickened loops of small bowel, suspicious for enteritis. No evidence of appendicitis or diverticulitis. UA negative for UTI. Pt is in no acute distress and nontoxic appearing. Advised pt to avoid smoking marijuana as it may be contributing to her symptoms. Non-surgical abdomen. Pt will be discharged home with prescription for bentyl and zofran. Pt instructed to use medications for symptom relief. Pt was instructed to follow up with GI and her PCP. Pt instructed to return to the ED for fever, chills, severe abdominal pain, back pain, chest pain, SOB, hematochezia, or rectal bleeding. Pt verbalized understanding and was agreeable to plan of discharge. Patient was explicitly instructed on specific signs and symptoms on which to return to the emergency room for.  Patient was instructed to return to the ER for any new or worsening symptoms. Additional discharge instructions were given verbally. All questions were answered. Patient is comfortable and agreeable with discharge plan. Patient in no acute distress and non-toxic in appearance. Plan of Care/Counseling:  I reviewed today's visit with the patient in addition to providing specific details for the plan of care and counseling regarding the diagnosis and prognosis. Questions are answered at this time and are agreeable with the plan. Assessment      1. Lower abdominal pain    2. Non-intractable vomiting with nausea, unspecified vomiting type    3. Enteritis      Plan   Discharge home  Patient condition is good    New Medications     New Prescriptions    DICYCLOMINE (BENTYL) 20 MG TABLET    Take 1 tablet by mouth 4 times daily for 10 days    ONDANSETRON (ZOFRAN ODT) 4 MG DISINTEGRATING TABLET    Take 1 tablet by mouth every 8 hours as needed for Nausea or Vomiting     Electronically signed by Briana Warren PA-C   DD: 4/24/21  **This report was transcribed using voice recognition software. Every effort was made to ensure accuracy; however, inadvertent computerized transcription errors may be present.   END OF ED PROVIDER NOTE        Briana Warren PA-C  04/24/21 1936

## 2021-04-26 ENCOUNTER — HOSPITAL ENCOUNTER (EMERGENCY)
Age: 36
Discharge: HOME | End: 2021-04-26
Attending: EMERGENCY MEDICINE
Payer: COMMERCIAL

## 2021-04-26 VITALS
DIASTOLIC BLOOD PRESSURE: 88 MMHG | SYSTOLIC BLOOD PRESSURE: 184 MMHG | HEART RATE: 64 BPM | OXYGEN SATURATION: 98 % | RESPIRATION RATE: 20 BRPM | HEIGHT: 64 IN | WEIGHT: 135 LBS | BODY MASS INDEX: 23.05 KG/M2 | TEMPERATURE: 98.2 F

## 2021-04-26 DIAGNOSIS — Z87.19 HISTORY OF CROHN'S DISEASE: ICD-10-CM

## 2021-04-26 DIAGNOSIS — R11.2 NON-INTRACTABLE VOMITING WITH NAUSEA, UNSPECIFIED VOMITING TYPE: ICD-10-CM

## 2021-04-26 DIAGNOSIS — R10.30 LOWER ABDOMINAL PAIN: Primary | ICD-10-CM

## 2021-04-26 LAB
ALBUMIN SERPL-MCNC: 4.5 G/DL (ref 3.5–5.2)
ALP BLD-CCNC: 70 U/L (ref 35–104)
ALT SERPL-CCNC: 18 U/L (ref 0–32)
AMPHETAMINE SCREEN, URINE: NOT DETECTED
ANION GAP SERPL CALCULATED.3IONS-SCNC: 13 MMOL/L (ref 7–16)
AST SERPL-CCNC: 19 U/L (ref 0–31)
BARBITURATE SCREEN URINE: NOT DETECTED
BASOPHILS ABSOLUTE: 0.09 E9/L (ref 0–0.2)
BASOPHILS RELATIVE PERCENT: 0.5 % (ref 0–2)
BENZODIAZEPINE SCREEN, URINE: NOT DETECTED
BILIRUB SERPL-MCNC: 0.2 MG/DL (ref 0–1.2)
BILIRUBIN URINE: NEGATIVE
BLOOD, URINE: NEGATIVE
BUN BLDV-MCNC: 11 MG/DL (ref 6–20)
CALCIUM SERPL-MCNC: 9.1 MG/DL (ref 8.6–10.2)
CANNABINOID SCREEN URINE: POSITIVE
CHLORIDE BLD-SCNC: 101 MMOL/L (ref 98–107)
CLARITY: CLEAR
CO2: 25 MMOL/L (ref 22–29)
COCAINE METABOLITE SCREEN URINE: NOT DETECTED
COLOR: YELLOW
CREAT SERPL-MCNC: 0.7 MG/DL (ref 0.5–1)
EOSINOPHILS ABSOLUTE: 0.22 E9/L (ref 0.05–0.5)
EOSINOPHILS RELATIVE PERCENT: 1.1 % (ref 0–6)
FENTANYL SCREEN, URINE: NOT DETECTED
GFR AFRICAN AMERICAN: >60
GFR NON-AFRICAN AMERICAN: >60 ML/MIN/1.73
GLUCOSE BLD-MCNC: 109 MG/DL (ref 74–99)
GLUCOSE URINE: NEGATIVE MG/DL
HCG, URINE, POC: NEGATIVE
HCT VFR BLD CALC: 45.4 % (ref 34–48)
HEMOGLOBIN: 15.4 G/DL (ref 11.5–15.5)
IMMATURE GRANULOCYTES #: 0.11 E9/L
IMMATURE GRANULOCYTES %: 0.6 % (ref 0–5)
KETONES, URINE: NEGATIVE MG/DL
LACTIC ACID: 2 MMOL/L (ref 0.5–2.2)
LEUKOCYTE ESTERASE, URINE: NEGATIVE
LIPASE: 28 U/L (ref 13–60)
LYMPHOCYTES ABSOLUTE: 2.35 E9/L (ref 1.5–4)
LYMPHOCYTES RELATIVE PERCENT: 12 % (ref 20–42)
Lab: ABNORMAL
Lab: NORMAL
MCH RBC QN AUTO: 32.8 PG (ref 26–35)
MCHC RBC AUTO-ENTMCNC: 33.9 % (ref 32–34.5)
MCV RBC AUTO: 96.6 FL (ref 80–99.9)
METHADONE SCREEN, URINE: NOT DETECTED
MONOCYTES ABSOLUTE: 0.9 E9/L (ref 0.1–0.95)
MONOCYTES RELATIVE PERCENT: 4.6 % (ref 2–12)
NEGATIVE QC PASS/FAIL: NORMAL
NEUTROPHILS ABSOLUTE: 15.91 E9/L (ref 1.8–7.3)
NEUTROPHILS RELATIVE PERCENT: 81.2 % (ref 43–80)
NITRITE, URINE: NEGATIVE
OPIATE SCREEN URINE: NOT DETECTED
OXYCODONE URINE: NOT DETECTED
PDW BLD-RTO: 13.2 FL (ref 11.5–15)
PH UA: 7 (ref 5–9)
PHENCYCLIDINE SCREEN URINE: NOT DETECTED
PLATELET # BLD: 426 E9/L (ref 130–450)
PMV BLD AUTO: 9.2 FL (ref 7–12)
POSITIVE QC PASS/FAIL: NORMAL
POTASSIUM REFLEX MAGNESIUM: 3.6 MMOL/L (ref 3.5–5)
PROTEIN UA: NEGATIVE MG/DL
RBC # BLD: 4.7 E12/L (ref 3.5–5.5)
SODIUM BLD-SCNC: 139 MMOL/L (ref 132–146)
SPECIFIC GRAVITY UA: 1.02 (ref 1–1.03)
TOTAL PROTEIN: 7.3 G/DL (ref 6.4–8.3)
UROBILINOGEN, URINE: 0.2 E.U./DL
WBC # BLD: 19.6 E9/L (ref 4.5–11.5)

## 2021-04-26 PROCEDURE — 85025 COMPLETE CBC W/AUTO DIFF WBC: CPT

## 2021-04-26 PROCEDURE — 83605 ASSAY OF LACTIC ACID: CPT

## 2021-04-26 PROCEDURE — 81003 URINALYSIS AUTO W/O SCOPE: CPT

## 2021-04-26 PROCEDURE — 80053 COMPREHEN METABOLIC PANEL: CPT

## 2021-04-26 PROCEDURE — 80307 DRUG TEST PRSMV CHEM ANLYZR: CPT

## 2021-04-26 PROCEDURE — 96375 TX/PRO/DX INJ NEW DRUG ADDON: CPT

## 2021-04-26 PROCEDURE — 99284 EMERGENCY DEPT VISIT MOD MDM: CPT

## 2021-04-26 PROCEDURE — 96376 TX/PRO/DX INJ SAME DRUG ADON: CPT

## 2021-04-26 PROCEDURE — 6360000002 HC RX W HCPCS: Performed by: STUDENT IN AN ORGANIZED HEALTH CARE EDUCATION/TRAINING PROGRAM

## 2021-04-26 PROCEDURE — 83690 ASSAY OF LIPASE: CPT

## 2021-04-26 PROCEDURE — 96374 THER/PROPH/DIAG INJ IV PUSH: CPT

## 2021-04-26 PROCEDURE — 2580000003 HC RX 258: Performed by: STUDENT IN AN ORGANIZED HEALTH CARE EDUCATION/TRAINING PROGRAM

## 2021-04-26 RX ORDER — PROCHLORPERAZINE EDISYLATE 5 MG/ML
10 INJECTION INTRAMUSCULAR; INTRAVENOUS ONCE
Status: COMPLETED | OUTPATIENT
Start: 2021-04-26 | End: 2021-04-26

## 2021-04-26 RX ORDER — PROCHLORPERAZINE MALEATE 10 MG
10 TABLET ORAL EVERY 6 HOURS PRN
Qty: 15 TABLET | Refills: 0 | Status: SHIPPED | OUTPATIENT
Start: 2021-04-26 | End: 2022-02-18

## 2021-04-26 RX ORDER — 0.9 % SODIUM CHLORIDE 0.9 %
1000 INTRAVENOUS SOLUTION INTRAVENOUS ONCE
Status: COMPLETED | OUTPATIENT
Start: 2021-04-26 | End: 2021-04-26

## 2021-04-26 RX ORDER — MORPHINE SULFATE 4 MG/ML
4 INJECTION, SOLUTION INTRAMUSCULAR; INTRAVENOUS ONCE
Status: COMPLETED | OUTPATIENT
Start: 2021-04-26 | End: 2021-04-26

## 2021-04-26 RX ORDER — ONDANSETRON 2 MG/ML
8 INJECTION INTRAMUSCULAR; INTRAVENOUS ONCE
Status: COMPLETED | OUTPATIENT
Start: 2021-04-26 | End: 2021-04-26

## 2021-04-26 RX ADMIN — SODIUM CHLORIDE 1000 ML: 9 INJECTION, SOLUTION INTRAVENOUS at 11:42

## 2021-04-26 RX ADMIN — PROCHLORPERAZINE EDISYLATE 10 MG: 5 INJECTION INTRAMUSCULAR; INTRAVENOUS at 14:48

## 2021-04-26 RX ADMIN — MORPHINE SULFATE 4 MG: 4 INJECTION, SOLUTION INTRAMUSCULAR; INTRAVENOUS at 13:14

## 2021-04-26 RX ADMIN — MORPHINE SULFATE 4 MG: 4 INJECTION, SOLUTION INTRAMUSCULAR; INTRAVENOUS at 11:44

## 2021-04-26 RX ADMIN — ONDANSETRON 8 MG: 2 INJECTION INTRAMUSCULAR; INTRAVENOUS at 13:13

## 2021-04-26 ASSESSMENT — PAIN DESCRIPTION - LOCATION: LOCATION: ABDOMEN

## 2021-04-26 ASSESSMENT — PAIN DESCRIPTION - PAIN TYPE: TYPE: ACUTE PAIN

## 2021-04-26 ASSESSMENT — PAIN DESCRIPTION - DESCRIPTORS
DESCRIPTORS: DISCOMFORT;SHARP
DESCRIPTORS: DISCOMFORT;SHARP

## 2021-04-26 ASSESSMENT — PAIN DESCRIPTION - FREQUENCY: FREQUENCY: CONTINUOUS

## 2021-04-26 ASSESSMENT — PAIN DESCRIPTION - ONSET: ONSET: ON-GOING

## 2021-04-26 ASSESSMENT — PAIN SCALES - GENERAL
PAINLEVEL_OUTOF10: 10
PAINLEVEL_OUTOF10: 8

## 2021-04-26 NOTE — ED PROVIDER NOTES
Is a 61-year-old female with past medical history of Crohn's, marijuana use who presents to the emergency department with a complaint of abdominal pain and reported facial swelling that has resolved. Patient presents complaining of lower abdominal pain, gradual onset, moderate in severity, nothing makes it better, nothing makes it worse, cramping, nonradiating, constant and progressive. Patient denies any chest pain or shortness of breath, nausea or vomiting, urinary symptoms. Patient does endorse intermittent diarrhea, remittent minimal amounts of blood around stool. Patient denies any headache, blurred vision, double vision. Patient has been not eating well for the past few days. Patient states symptoms onset approximately 5 to 6 days ago and have been progressive. Patient states symptoms are consistent with her Crohn's disease. Patient is followed by GI, and endorses having been seen by multiple different GI doctors. Patient is uncertain of who her last one was. Patient is not on any treatment at home. Patient was seen in the emergency department per EMR on 4/24 for similar complaints and was discharged with Zofran and Bentyl. Patient states that she took the Bentyl this morning and had some facial swelling, no airway compromise. Patient states the swelling in her face has resolved. She believes this to be an allergy. This was the first time she took Bentyl. Patient has not yet followed up with her GI doctor from that visit. Patient states that her pain recurred shortly after discharge from the previous visit and has been progressive since that time. She has been intolerant of food and fluids, despite Zofran. Is not on anticoagulation, has not sustained trauma. Review of Systems   Constitutional: Positive for appetite change. Negative for chills, fatigue and fever. HENT: Negative for congestion, rhinorrhea, trouble swallowing and voice change.     Eyes: Negative for photophobia and visual disturbance. Respiratory: Negative for cough and shortness of breath. Cardiovascular: Negative for chest pain and palpitations. Gastrointestinal: Positive for abdominal pain, blood in stool and diarrhea. Negative for constipation, nausea and vomiting. Endocrine: Negative for polyuria. Genitourinary: Negative for dysuria, frequency, hematuria and urgency. Musculoskeletal: Negative for arthralgias and myalgias. Skin: Negative for rash and wound. Allergic/Immunologic: Negative for immunocompromised state. Neurological: Negative for dizziness, syncope, weakness, light-headedness, numbness and headaches. Psychiatric/Behavioral: Negative for confusion. The patient is not nervous/anxious. Physical Exam  Vitals signs and nursing note reviewed. Constitutional:       General: She is awake. She is not in acute distress. Appearance: She is normal weight. She is ill-appearing. She is not toxic-appearing. HENT:      Head: Normocephalic and atraumatic. Mouth/Throat:      Mouth: Mucous membranes are moist.      Pharynx: Oropharynx is clear. Eyes:      Extraocular Movements: Extraocular movements intact. Pupils: Pupils are equal, round, and reactive to light. Neck:      Musculoskeletal: Normal range of motion and neck supple. Cardiovascular:      Rate and Rhythm: Normal rate and regular rhythm. Pulses: Normal pulses. Heart sounds: Normal heart sounds. Pulmonary:      Effort: Pulmonary effort is normal.      Breath sounds: Normal breath sounds. Abdominal:      General: Bowel sounds are increased. There is no distension. Palpations: Abdomen is soft. Tenderness: There is abdominal tenderness. There is no guarding or rebound. Negative signs include Peoples's sign and McBurney's sign. Comments: Patient with lower abdominal tenderness. No suprapubic tenderness. Musculoskeletal: Normal range of motion. Skin:     General: Skin is warm and dry. Capillary Refill: Capillary refill takes less than 2 seconds. Neurological:      General: No focal deficit present. Mental Status: She is alert and oriented to person, place, and time. GCS: GCS eye subscore is 4. GCS verbal subscore is 5. GCS motor subscore is 6. Cranial Nerves: Cranial nerves are intact. Sensory: Sensation is intact. Motor: Motor function is intact. Psychiatric:         Behavior: Behavior is cooperative. MDM  Number of Diagnoses or Management Options  History of Crohn's disease  Lower abdominal pain  Non-intractable vomiting with nausea, unspecified vomiting type  Diagnosis management comments: Patient is a 43-year-old female with a history of Crohn's who presents to the emergency department with lower abdominal pain and tenderness as well as reported facial swelling that resolved. Patient states her facial swelling was after taking Bentyl but it is resolved 100%, and she is not having any airway compromise or swelling. Patient believe this is from the Bentyl that she took last night. Patient complains of severe lower abdominal pain as well as diarrhea with slight blood. Symptoms are consistent with her Crohn's disease. Patient was seen in the emergency department 2 days ago for similar symptoms and CT was reviewed showing enteritis. Patient was discharged on Zofran and Bentyl. Patient now states intensity but similar symptoms. Patient has not yet followed up with GI from that visit, and does not know which GI specialist out of the 3 that she is seen previously is her current. Patient presents awake, alert, ill-appearing, hemodynamic stable, neurologically intact. Physical exam shows tenderness to the lower abdomen and an obviously in discomfort female work-up including labs show leukocytosis, consistent with lab 2 days ago. Anemia. Electrolytes within normal limits.   Lipase is negative, lactic acid is negative, urinalysis is not consistent with --------------------------------------------- PAST HISTORY ---------------------------------------------  Past Medical History:  has a past medical history of Anxiety attack, Asthma, Crohn's colitis ([facility] [address] Marijuana abuse, Multiple lacerations, Seizure ([facility] and Tobacco abuse. Past Surgical History:  has a past surgical history that includes Dilation and curettage of uterus (2011); Colonoscopy; Endoscopy, colon, diagnostic; Cholecystectomy (01/22/2018); hernia repair; and hernia repair. Social History:  reports that she has been smoking cigarettes. She has a 10.00 pack-year smoking history. She has never used smokeless tobacco. She reports current alcohol use. She reports current drug use. Frequency: 2.00 times per week. Drug: Marijuana. Family History: family history includes Cancer in her mother; Depression in her father and mother; Heart Disease in her father. The patients home medications have been reviewed. Allergies: Patient has no known allergies.     -------------------------------------------------- RESULTS -------------------------------------------------  Labs:  Results for orders placed or performed during the hospital encounter of 04/26/21   CBC Auto Differential   Result Value Ref Range    WBC 19.6 (H) 4.5 - 11.5 E9/L    RBC 4.70 3.50 - 5.50 E12/L    Hemoglobin 15.4 11.5 - 15.5 g/dL    Hematocrit 45.4 34.0 - 48.0 %    MCV 96.6 80.0 - 99.9 fL    MCH 32.8 26.0 - 35.0 pg    MCHC 33.9 32.0 - 34.5 %    RDW 13.2 11.5 - 15.0 fL    Platelets 240 991 - 248 E9/L    MPV 9.2 7.0 - 12.0 fL    Neutrophils % 81.2 (H) 43.0 - 80.0 %    Immature Granulocytes % 0.6 0.0 - 5.0 %    Lymphocytes % 12.0 (L) 20.0 - 42.0 %    Monocytes % 4.6 2.0 - 12.0 %    Eosinophils % 1.1 0.0 - 6.0 %    Basophils % 0.5 0.0 - 2.0 %    Neutrophils Absolute 15.91 (H) 1.80 - 7.30 E9/L    Immature Granulocytes # 0.11 E9/L    Lymphocytes Absolute 2.35 1.50 - 4.00 E9/L    Monocytes Absolute 0.90 0.10 - 0.95 E9/L Eosinophils Absolute 0.22 0.05 - 0.50 E9/L    Basophils Absolute 0.09 0.00 - 0.20 E9/L   Comprehensive Metabolic Panel w/ Reflex to MG   Result Value Ref Range    Sodium 139 132 - 146 mmol/L    Potassium reflex Magnesium 3.6 3.5 - 5.0 mmol/L    Chloride 101 98 - 107 mmol/L    CO2 25 22 - 29 mmol/L    Anion Gap 13 7 - 16 mmol/L    Glucose 109 (H) 74 - 99 mg/dL    BUN 11 6 - 20 mg/dL    CREATININE 0.7 0.5 - 1.0 mg/dL    GFR Non-African American >60 >=60 mL/min/1.73    GFR African American >60     Calcium 9.1 8.6 - 10.2 mg/dL    Total Protein 7.3 6.4 - 8.3 g/dL    Albumin 4.5 3.5 - 5.2 g/dL    Total Bilirubin 0.2 0.0 - 1.2 mg/dL    Alkaline Phosphatase 70 35 - 104 U/L    ALT 18 0 - 32 U/L    AST 19 0 - 31 U/L   Lipase   Result Value Ref Range    Lipase 28 13 - 60 U/L   Urinalysis, reflex to microscopic   Result Value Ref Range    Color, UA Yellow Straw/Yellow    Clarity, UA Clear Clear    Glucose, Ur Negative Negative mg/dL    Bilirubin Urine Negative Negative    Ketones, Urine Negative Negative mg/dL    Specific Gravity, UA 1.025 1.005 - 1.030    Blood, Urine Negative Negative    pH, UA 7.0 5.0 - 9.0    Protein, UA Negative Negative mg/dL    Urobilinogen, Urine 0.2 <2.0 E.U./dL    Nitrite, Urine Negative Negative    Leukocyte Esterase, Urine Negative Negative   Lactic Acid, Plasma   Result Value Ref Range    Lactic Acid 2.0 0.5 - 2.2 mmol/L   Urine Drug Screen   Result Value Ref Range    Amphetamine Screen, Urine NOT DETECTED Negative <1000 ng/mL    Barbiturate Screen, Ur NOT DETECTED Negative < 200 ng/mL    Benzodiazepine Screen, Urine NOT DETECTED Negative < 200 ng/mL    Cannabinoid Scrn, Ur POSITIVE (A) Negative < 50ng/mL    Cocaine Metabolite Screen, Urine NOT DETECTED Negative < 300 ng/mL    Opiate Scrn, Ur NOT DETECTED Negative < 300ng/mL    PCP Screen, Urine NOT DETECTED Negative < 25 ng/mL    Methadone Screen, Urine NOT DETECTED Negative <300 ng/mL    Oxycodone Urine NOT DETECTED Negative <100 ng/mL FENTANYL SCREEN, URINE NOT DETECTED Negative <1 ng/mL    Drug Screen Comment: see below    POC Pregnancy Urine   Result Value Ref Range    HCG, Urine, POC Negative Negative    Lot Number [id number]     Positive QC Pass/Fail Pass     Negative QC Pass/Fail Pass        Radiology:  No orders to display     ------------------------- NURSING NOTES AND VITALS REVIEWED ---------------------------  Date / Time Roomed:  4/26/2021 10:39 AM  ED Bed Assignment:  28/28    The nursing notes within the ED encounter and vital signs as below have been reviewed. BP (!) 184/88   Pulse 64   Temp 98.2 °F (36.8 °C) (Oral)   Resp 20   Ht 5' 4\" (1.626 m)   Wt 135 lb (61.2 kg)   LMP 04/23/2021   SpO2 98%   BMI 23.17 kg/m²   Oxygen Saturation Interpretation: Normal      ------------------------------------------ PROGRESS NOTES ------------------------------------------  8:14 PM EDT  I have spoken with the patient and discussed todays results, in addition to providing specific details for the plan of care and counseling regarding the diagnosis and prognosis. Their questions are answered at this time and they are agreeable with the plan. I discussed at length with them reasons for immediate return here for re evaluation. They will followup with their Gastroenterologist and primary care physician by calling their office tomorrow. --------------------------------- ADDITIONAL PROVIDER NOTES ---------------------------------  At this time the patient is without objective evidence of an acute process requiring hospitalization or inpatient management. They have remained hemodynamically stable throughout their entire ED visit and are stable for discharge with outpatient follow-up. The plan has been discussed in detail and they are aware of the specific conditions for emergent return, as well as the importance of follow-up.       Discharge Medication List as of 4/26/2021  3:54 PM      START taking these medications    Details

## 2021-04-28 ASSESSMENT — ENCOUNTER SYMPTOMS
VOMITING: 0
VOICE CHANGE: 0
COUGH: 0
CONSTIPATION: 0
NAUSEA: 0
DIARRHEA: 1
TROUBLE SWALLOWING: 0
RHINORRHEA: 0
SHORTNESS OF BREATH: 0
BLOOD IN STOOL: 1
ABDOMINAL PAIN: 1
PHOTOPHOBIA: 0

## 2021-07-13 ENCOUNTER — APPOINTMENT (OUTPATIENT)
Dept: CT IMAGING | Age: 36
End: 2021-07-13
Payer: COMMERCIAL

## 2021-07-13 ENCOUNTER — HOSPITAL ENCOUNTER (EMERGENCY)
Age: 36
Discharge: HOME | End: 2021-07-14
Attending: EMERGENCY MEDICINE
Payer: COMMERCIAL

## 2021-07-13 DIAGNOSIS — K52.9 COLITIS: Primary | ICD-10-CM

## 2021-07-13 LAB
ALBUMIN SERPL-MCNC: 4.9 G/DL (ref 3.5–5.2)
ALP BLD-CCNC: 71 U/L (ref 35–104)
ALT SERPL-CCNC: 13 U/L (ref 0–32)
ANION GAP SERPL CALCULATED.3IONS-SCNC: 13 MMOL/L (ref 7–16)
AST SERPL-CCNC: 17 U/L (ref 0–31)
BASOPHILS ABSOLUTE: 0.04 E9/L (ref 0–0.2)
BASOPHILS RELATIVE PERCENT: 0.3 % (ref 0–2)
BILIRUB SERPL-MCNC: 0.4 MG/DL (ref 0–1.2)
BUN BLDV-MCNC: 12 MG/DL (ref 6–20)
CALCIUM SERPL-MCNC: 9.8 MG/DL (ref 8.6–10.2)
CHLORIDE BLD-SCNC: 100 MMOL/L (ref 98–107)
CO2: 23 MMOL/L (ref 22–29)
CREAT SERPL-MCNC: 0.6 MG/DL (ref 0.5–1)
EOSINOPHILS ABSOLUTE: 0 E9/L (ref 0.05–0.5)
EOSINOPHILS RELATIVE PERCENT: 0 % (ref 0–6)
GFR AFRICAN AMERICAN: >60
GFR NON-AFRICAN AMERICAN: >60 ML/MIN/1.73
GLUCOSE BLD-MCNC: 151 MG/DL (ref 74–99)
GONADOTROPIN, CHORIONIC (HCG) QUANT: <0.1 MIU/ML
HCT VFR BLD CALC: 42.8 % (ref 34–48)
HEMOGLOBIN: 14.5 G/DL (ref 11.5–15.5)
IMMATURE GRANULOCYTES #: 0.08 E9/L
IMMATURE GRANULOCYTES %: 0.5 % (ref 0–5)
LACTIC ACID: 3.3 MMOL/L (ref 0.5–2.2)
LIPASE: 13 U/L (ref 13–60)
LYMPHOCYTES ABSOLUTE: 1.19 E9/L (ref 1.5–4)
LYMPHOCYTES RELATIVE PERCENT: 7.5 % (ref 20–42)
MCH RBC QN AUTO: 32.2 PG (ref 26–35)
MCHC RBC AUTO-ENTMCNC: 33.9 % (ref 32–34.5)
MCV RBC AUTO: 94.9 FL (ref 80–99.9)
MONOCYTES ABSOLUTE: 0.3 E9/L (ref 0.1–0.95)
MONOCYTES RELATIVE PERCENT: 1.9 % (ref 2–12)
NEUTROPHILS ABSOLUTE: 14.19 E9/L (ref 1.8–7.3)
NEUTROPHILS RELATIVE PERCENT: 89.8 % (ref 43–80)
PDW BLD-RTO: 13.6 FL (ref 11.5–15)
PLATELET # BLD: 438 E9/L (ref 130–450)
PMV BLD AUTO: 9.2 FL (ref 7–12)
POTASSIUM SERPL-SCNC: 3.8 MMOL/L (ref 3.5–5)
RBC # BLD: 4.51 E12/L (ref 3.5–5.5)
SODIUM BLD-SCNC: 136 MMOL/L (ref 132–146)
TOTAL PROTEIN: 7.9 G/DL (ref 6.4–8.3)
WBC # BLD: 15.8 E9/L (ref 4.5–11.5)

## 2021-07-13 PROCEDURE — 83690 ASSAY OF LIPASE: CPT

## 2021-07-13 PROCEDURE — 6360000002 HC RX W HCPCS: Performed by: EMERGENCY MEDICINE

## 2021-07-13 PROCEDURE — 74177 CT ABD & PELVIS W/CONTRAST: CPT

## 2021-07-13 PROCEDURE — 84702 CHORIONIC GONADOTROPIN TEST: CPT

## 2021-07-13 PROCEDURE — 83605 ASSAY OF LACTIC ACID: CPT

## 2021-07-13 PROCEDURE — 96376 TX/PRO/DX INJ SAME DRUG ADON: CPT

## 2021-07-13 PROCEDURE — 96374 THER/PROPH/DIAG INJ IV PUSH: CPT

## 2021-07-13 PROCEDURE — 80053 COMPREHEN METABOLIC PANEL: CPT

## 2021-07-13 PROCEDURE — 99284 EMERGENCY DEPT VISIT MOD MDM: CPT

## 2021-07-13 PROCEDURE — 85025 COMPLETE CBC W/AUTO DIFF WBC: CPT

## 2021-07-13 RX ORDER — ONDANSETRON 2 MG/ML
4 INJECTION INTRAMUSCULAR; INTRAVENOUS ONCE
Status: COMPLETED | OUTPATIENT
Start: 2021-07-13 | End: 2021-07-13

## 2021-07-13 RX ORDER — KETOROLAC TROMETHAMINE 30 MG/ML
15 INJECTION, SOLUTION INTRAMUSCULAR; INTRAVENOUS ONCE
Status: COMPLETED | OUTPATIENT
Start: 2021-07-13 | End: 2021-07-13

## 2021-07-13 RX ORDER — MORPHINE SULFATE 4 MG/ML
4 INJECTION, SOLUTION INTRAMUSCULAR; INTRAVENOUS ONCE
Status: COMPLETED | OUTPATIENT
Start: 2021-07-13 | End: 2021-07-13

## 2021-07-13 RX ADMIN — KETOROLAC TROMETHAMINE 15 MG: 30 INJECTION, SOLUTION INTRAMUSCULAR; INTRAVENOUS at 21:52

## 2021-07-13 RX ADMIN — MORPHINE SULFATE 4 MG: 4 INJECTION, SOLUTION INTRAMUSCULAR; INTRAVENOUS at 21:52

## 2021-07-13 RX ADMIN — ONDANSETRON 4 MG: 2 INJECTION INTRAMUSCULAR; INTRAVENOUS at 21:52

## 2021-07-13 ASSESSMENT — PAIN SCALES - GENERAL
PAINLEVEL_OUTOF10: 8
PAINLEVEL_OUTOF10: 10

## 2021-07-13 NOTE — ED NOTES
FIRST PROVIDER CONTACT ASSESSMENT NOTE      Department of Emergency Medicine   7/13/21  7:41 PM EDT    Chief Complaint: No chief complaint on file. History of Present Illness:   Darek Reyes is a 29 y.o. female who presents to the ED for    Medical History:  has a past medical history of Anxiety attack, Asthma, Crohn's colitis ([facility] [address] Marijuana abuse, Multiple lacerations, Seizure ([facility] [address] and Tobacco abuse. Surgical History:  has a past surgical history that includes Dilation and curettage of uterus (2011); Colonoscopy; Endoscopy, colon, diagnostic; Cholecystectomy (01/22/2018); hernia repair; and hernia repair. Social History:  reports that she has been smoking cigarettes. She has a 10.00 pack-year smoking history. She has never used smokeless tobacco. She reports current alcohol use. She reports current drug use. Frequency: 2.00 times per week. Drug: Marijuana. Family History: family history includes Cancer in her mother; Depression in her father and mother; Heart Disease in her father. *ALLERGIES*     Patient has no known allergies. Physical Exam:      VS:  There were no vitals taken for this visit.      Initial Plan of Care:  Initiate Treatment-Testing, Proceed toTreatment Area When Bed Available for ED Attending/MLP to Continue Care    -----------------END OF FIRST PROVIDER CONTACT ASSESSMENT NOTE--------------  Electronically signed by LEIDY Villagran CNP   DD: 7/13/21             LEIDY Wing CNP  07/13/21 1941

## 2021-07-14 VITALS
OXYGEN SATURATION: 97 % | RESPIRATION RATE: 18 BRPM | HEIGHT: 64 IN | BODY MASS INDEX: 23.39 KG/M2 | DIASTOLIC BLOOD PRESSURE: 78 MMHG | SYSTOLIC BLOOD PRESSURE: 159 MMHG | HEART RATE: 70 BPM | TEMPERATURE: 97.1 F | WEIGHT: 137 LBS

## 2021-07-14 PROCEDURE — 6360000002 HC RX W HCPCS: Performed by: EMERGENCY MEDICINE

## 2021-07-14 PROCEDURE — 2580000003 HC RX 258: Performed by: EMERGENCY MEDICINE

## 2021-07-14 PROCEDURE — 96375 TX/PRO/DX INJ NEW DRUG ADDON: CPT

## 2021-07-14 PROCEDURE — 6360000004 HC RX CONTRAST MEDICATION: Performed by: RADIOLOGY

## 2021-07-14 PROCEDURE — 74177 CT ABD & PELVIS W/CONTRAST: CPT

## 2021-07-14 PROCEDURE — 96376 TX/PRO/DX INJ SAME DRUG ADON: CPT

## 2021-07-14 RX ORDER — 0.9 % SODIUM CHLORIDE 0.9 %
2000 INTRAVENOUS SOLUTION INTRAVENOUS ONCE
Status: COMPLETED | OUTPATIENT
Start: 2021-07-14 | End: 2021-07-14

## 2021-07-14 RX ORDER — MORPHINE SULFATE 4 MG/ML
4 INJECTION, SOLUTION INTRAMUSCULAR; INTRAVENOUS ONCE
Status: COMPLETED | OUTPATIENT
Start: 2021-07-14 | End: 2021-07-14

## 2021-07-14 RX ORDER — DIPHENOXYLATE HYDROCHLORIDE AND ATROPINE SULFATE 2.5; .025 MG/1; MG/1
1 TABLET ORAL 3 TIMES DAILY
Qty: 15 TABLET | Refills: 0 | Status: SHIPPED | OUTPATIENT
Start: 2021-07-14 | End: 2021-07-19

## 2021-07-14 RX ORDER — ONDANSETRON 4 MG/1
4 TABLET, FILM COATED ORAL EVERY 8 HOURS PRN
Qty: 12 TABLET | Refills: 0 | Status: SHIPPED | OUTPATIENT
Start: 2021-07-14 | End: 2021-07-19

## 2021-07-14 RX ORDER — ONDANSETRON 2 MG/ML
4 INJECTION INTRAMUSCULAR; INTRAVENOUS ONCE
Status: COMPLETED | OUTPATIENT
Start: 2021-07-14 | End: 2021-07-14

## 2021-07-14 RX ORDER — METHYLPREDNISOLONE SODIUM SUCCINATE 125 MG/2ML
125 INJECTION, POWDER, LYOPHILIZED, FOR SOLUTION INTRAMUSCULAR; INTRAVENOUS ONCE
Status: COMPLETED | OUTPATIENT
Start: 2021-07-14 | End: 2021-07-14

## 2021-07-14 RX ADMIN — MORPHINE SULFATE 4 MG: 4 INJECTION, SOLUTION INTRAMUSCULAR; INTRAVENOUS at 03:54

## 2021-07-14 RX ADMIN — SODIUM CHLORIDE 2000 ML: 9 INJECTION, SOLUTION INTRAVENOUS at 00:39

## 2021-07-14 RX ADMIN — ONDANSETRON 4 MG: 2 INJECTION INTRAMUSCULAR; INTRAVENOUS at 00:39

## 2021-07-14 RX ADMIN — METHYLPREDNISOLONE SODIUM SUCCINATE 125 MG: 125 INJECTION, POWDER, FOR SOLUTION INTRAMUSCULAR; INTRAVENOUS at 01:34

## 2021-07-14 RX ADMIN — MORPHINE SULFATE 4 MG: 4 INJECTION, SOLUTION INTRAMUSCULAR; INTRAVENOUS at 00:39

## 2021-07-14 RX ADMIN — ONDANSETRON 4 MG: 2 INJECTION INTRAMUSCULAR; INTRAVENOUS at 03:54

## 2021-07-14 RX ADMIN — IOPAMIDOL 75 ML: 755 INJECTION, SOLUTION INTRAVENOUS at 00:06

## 2021-07-14 ASSESSMENT — PAIN SCALES - GENERAL
PAINLEVEL_OUTOF10: 8
PAINLEVEL_OUTOF10: 10

## 2021-07-14 NOTE — ED PROVIDER NOTES
HPI:  7/13/21,   Time: 9:47 PM EDT         Satinder Acosta is a 29 y.o. female presenting to the ED for lower abdominal pain and is on menses at this time but complains primarily of right lower quadrant pain, beginning last few days ago. The complaint has been constant, moderate in severity, and worsened by changing position. No fever chills or night sweats and no chest pain or shortness of breath or cough. No diarrhea but does have nausea and vomiting. No urinary complaints    ROS:   Pertinent positives and negatives are stated within HPI, all other systems reviewed and are negative.  --------------------------------------------- PAST HISTORY ---------------------------------------------  Past Medical History:  has a past medical history of Anxiety attack, Asthma, Crohn's colitis ([facility] [address] Marijuana abuse, Multiple lacerations, Seizure ([facility] [address] and Tobacco abuse. Past Surgical History:  has a past surgical history that includes Dilation and curettage of uterus (2011); Colonoscopy; Endoscopy, colon, diagnostic; Cholecystectomy (01/22/2018); hernia repair; and hernia repair. Social History:  reports that she has been smoking cigarettes. She has a 10.00 pack-year smoking history. She has never used smokeless tobacco. She reports current alcohol use. She reports previous drug use. Frequency: 2.00 times per week. Drug: Marijuana. Family History: family history includes Cancer in her mother; Depression in her father and mother; Heart Disease in her father. The patients home medications have been reviewed. Allergies: Patient has no known allergies.     -------------------------------------------------- RESULTS -------------------------------------------------  All laboratory and radiology results have been personally reviewed by myself   LABS:  Results for orders placed or performed during the hospital encounter of 07/13/21   CBC Auto Differential   Result Value Ref Range    WBC 15.8 (H) 4.5 - 11.5 E9/L RBC 4.51 3.50 - 5.50 E12/L    Hemoglobin 14.5 11.5 - 15.5 g/dL    Hematocrit 42.8 34.0 - 48.0 %    MCV 94.9 80.0 - 99.9 fL    MCH 32.2 26.0 - 35.0 pg    MCHC 33.9 32.0 - 34.5 %    RDW 13.6 11.5 - 15.0 fL    Platelets 059 737 - 492 E9/L    MPV 9.2 7.0 - 12.0 fL    Neutrophils % 89.8 (H) 43.0 - 80.0 %    Immature Granulocytes % 0.5 0.0 - 5.0 %    Lymphocytes % 7.5 (L) 20.0 - 42.0 %    Monocytes % 1.9 (L) 2.0 - 12.0 %    Eosinophils % 0.0 0.0 - 6.0 %    Basophils % 0.3 0.0 - 2.0 %    Neutrophils Absolute 14.19 (H) 1.80 - 7.30 E9/L    Immature Granulocytes # 0.08 E9/L    Lymphocytes Absolute 1.19 (L) 1.50 - 4.00 E9/L    Monocytes Absolute 0.30 0.10 - 0.95 E9/L    Eosinophils Absolute 0.00 (L) 0.05 - 0.50 E9/L    Basophils Absolute 0.04 0.00 - 0.20 E9/L   Comprehensive Metabolic Panel   Result Value Ref Range    Sodium 136 132 - 146 mmol/L    Potassium 3.8 3.5 - 5.0 mmol/L    Chloride 100 98 - 107 mmol/L    CO2 23 22 - 29 mmol/L    Anion Gap 13 7 - 16 mmol/L    Glucose 151 (H) 74 - 99 mg/dL    BUN 12 6 - 20 mg/dL    CREATININE 0.6 0.5 - 1.0 mg/dL    GFR Non-African American >60 >=60 mL/min/1.73    GFR African American >60     Calcium 9.8 8.6 - 10.2 mg/dL    Total Protein 7.9 6.4 - 8.3 g/dL    Albumin 4.9 3.5 - 5.2 g/dL    Total Bilirubin 0.4 0.0 - 1.2 mg/dL    Alkaline Phosphatase 71 35 - 104 U/L    ALT 13 0 - 32 U/L    AST 17 0 - 31 U/L   Lipase   Result Value Ref Range    Lipase 13 13 - 60 U/L   Lactic Acid, Plasma   Result Value Ref Range    Lactic Acid 3.3 (H) 0.5 - 2.2 mmol/L   hCG, quantitative, pregnancy   Result Value Ref Range    hCG Quant <0.1 <10 mIU/mL       RADIOLOGY:  Interpreted by Radiologist.  CT ABDOMEN PELVIS W IV CONTRAST Additional Contrast? None   Final Result   Normal appendix. Infectious/inflammatory colitis involving the ascending, transverse, and   descending colon. A 0.3 cm nonobstructing stone in the right kidney.              ------------------------- NURSING NOTES AND VITALS REVIEWED ---------------------------   The nursing notes within the ED encounter and vital signs as below have been reviewed. BP (!) 162/86   Pulse 60   Temp 97.1 °F (36.2 °C) (Temporal)   Resp 18   Ht 5' 4\" (1.626 m)   Wt 137 lb (62.1 kg)   SpO2 100%   BMI 23.52 kg/m²   Oxygen Saturation Interpretation: Normal      ---------------------------------------------------PHYSICAL EXAM--------------------------------------      Constitutional/General: Alert and oriented x3, well appearing, non toxic in moderate distress secondary to pain  Head: NC/AT  Eyes: PERRL, EOMI  Mouth: Oropharynx clear, handling secretions, no trismus  Neck: Supple, full ROM, no meningeal signs  Pulmonary: Lungs clear to auscultation bilaterally, no wheezes, rales, or rhonchi. Not in respiratory distress  Cardiovascular:  Regular rate and rhythm, no murmurs, gallops, or rubs. 2+ distal pulses  Abdomen: Soft, moderate lower abdominal tenderness especially right lower quadrant with guarding no rebound, non distended,   Extremities: Moves all extremities x 4.  Warm and well perfused  Skin: warm and dry without rash  Neurologic: GCS 15,  Psych: Normal Affect      ------------------------------ ED COURSE/MEDICAL DECISION MAKING----------------------  Medications   0.9 % sodium chloride bolus (2,000 mLs Intravenous New Bag 7/14/21 0039)   ketorolac (TORADOL) injection 15 mg (15 mg Intravenous Given 7/13/21 2152)   morphine sulfate (PF) injection 4 mg (4 mg Intravenous Given 7/13/21 2152)   ondansetron (ZOFRAN) injection 4 mg (4 mg Intravenous Given 7/13/21 2152)   iopamidol (ISOVUE-370) 76 % injection 75 mL (75 mLs Intravenous Given 7/14/21 0006)   morphine sulfate (PF) injection 4 mg (4 mg Intravenous Given 7/14/21 0039)   ondansetron (ZOFRAN) injection 4 mg (4 mg Intravenous Given 7/14/21 0039)         Medical Decision Making:    Lower abdominal pain prominently right lower quadrant rule out appendicitis but probably secondary to dysmenorrhea    Counseling: The emergency provider has spoken with the patient and discussed todays results, in addition to providing specific details for the plan of care and counseling regarding the diagnosis and prognosis. Questions are answered at this time and they are agreeable with the plan.      --------------------------------- IMPRESSION AND DISPOSITION ---------------------------------    IMPRESSION  1. Colitis New Problem       DISPOSITION  Disposition: Discharge to home  Patient condition is stable      ED course:  We will give 2 L of fluid and control pain and sent home with Crystal and Nida Rashid MD  07/14/21 0109

## 2021-07-15 ENCOUNTER — HOSPITAL ENCOUNTER (EMERGENCY)
Age: 36
Discharge: HOME | End: 2021-07-15
Attending: EMERGENCY MEDICINE
Payer: COMMERCIAL

## 2021-07-15 VITALS
DIASTOLIC BLOOD PRESSURE: 82 MMHG | SYSTOLIC BLOOD PRESSURE: 150 MMHG | WEIGHT: 137 LBS | HEIGHT: 64 IN | OXYGEN SATURATION: 98 % | HEART RATE: 60 BPM | BODY MASS INDEX: 23.39 KG/M2 | TEMPERATURE: 98 F | RESPIRATION RATE: 16 BRPM

## 2021-07-15 DIAGNOSIS — R10.9 ABDOMINAL PAIN, UNSPECIFIED ABDOMINAL LOCATION: Primary | ICD-10-CM

## 2021-07-15 DIAGNOSIS — K52.9 COLITIS: ICD-10-CM

## 2021-07-15 LAB
ALBUMIN SERPL-MCNC: 4.3 G/DL (ref 3.5–5.2)
ALP BLD-CCNC: 59 U/L (ref 35–104)
ALT SERPL-CCNC: 18 U/L (ref 0–32)
ANION GAP SERPL CALCULATED.3IONS-SCNC: 12 MMOL/L (ref 7–16)
AST SERPL-CCNC: 23 U/L (ref 0–31)
BACTERIA: ABNORMAL /HPF
BILIRUB SERPL-MCNC: 0.5 MG/DL (ref 0–1.2)
BILIRUBIN URINE: NEGATIVE
BLOOD, URINE: ABNORMAL
BUN BLDV-MCNC: 12 MG/DL (ref 6–20)
CALCIUM SERPL-MCNC: 8.7 MG/DL (ref 8.6–10.2)
CHLORIDE BLD-SCNC: 106 MMOL/L (ref 98–107)
CLARITY: CLEAR
CO2: 22 MMOL/L (ref 22–29)
COLOR: YELLOW
CREAT SERPL-MCNC: 0.7 MG/DL (ref 0.5–1)
EKG ATRIAL RATE: 66 BPM
EKG P AXIS: 4 DEGREES
EKG P-R INTERVAL: 100 MS
EKG Q-T INTERVAL: 414 MS
EKG QRS DURATION: 102 MS
EKG QTC CALCULATION (BAZETT): 434 MS
EKG R AXIS: 35 DEGREES
EKG T AXIS: 56 DEGREES
EKG VENTRICULAR RATE: 66 BPM
GFR AFRICAN AMERICAN: >60
GFR NON-AFRICAN AMERICAN: >60 ML/MIN/1.73
GLUCOSE BLD-MCNC: 111 MG/DL (ref 74–99)
GLUCOSE URINE: NEGATIVE MG/DL
HCG, URINE, POC: NEGATIVE
HCT VFR BLD CALC: 40.7 % (ref 34–48)
HEMOGLOBIN: 14 G/DL (ref 11.5–15.5)
KETONES, URINE: NEGATIVE MG/DL
LEUKOCYTE ESTERASE, URINE: ABNORMAL
LIPASE: 22 U/L (ref 13–60)
Lab: NORMAL
MCH RBC QN AUTO: 32.7 PG (ref 26–35)
MCHC RBC AUTO-ENTMCNC: 34.4 % (ref 32–34.5)
MCV RBC AUTO: 95.1 FL (ref 80–99.9)
NEGATIVE QC PASS/FAIL: NORMAL
NITRITE, URINE: POSITIVE
PDW BLD-RTO: 13.8 FL (ref 11.5–15)
PH UA: 6 (ref 5–9)
PLATELET # BLD: 458 E9/L (ref 130–450)
PMV BLD AUTO: 9.9 FL (ref 7–12)
POSITIVE QC PASS/FAIL: NORMAL
POTASSIUM SERPL-SCNC: 3.5 MMOL/L (ref 3.5–5)
PROTEIN UA: NEGATIVE MG/DL
RBC # BLD: 4.28 E12/L (ref 3.5–5.5)
RBC UA: ABNORMAL /HPF (ref 0–2)
SODIUM BLD-SCNC: 140 MMOL/L (ref 132–146)
SPECIFIC GRAVITY UA: >=1.03 (ref 1–1.03)
TOTAL PROTEIN: 6.8 G/DL (ref 6.4–8.3)
UROBILINOGEN, URINE: 0.2 E.U./DL
WBC # BLD: 20.6 E9/L (ref 4.5–11.5)
WBC UA: ABNORMAL /HPF (ref 0–5)

## 2021-07-15 PROCEDURE — 85027 COMPLETE CBC AUTOMATED: CPT

## 2021-07-15 PROCEDURE — 83690 ASSAY OF LIPASE: CPT

## 2021-07-15 PROCEDURE — 6360000002 HC RX W HCPCS: Performed by: EMERGENCY MEDICINE

## 2021-07-15 PROCEDURE — 96366 THER/PROPH/DIAG IV INF ADDON: CPT

## 2021-07-15 PROCEDURE — 96372 THER/PROPH/DIAG INJ SC/IM: CPT

## 2021-07-15 PROCEDURE — 96375 TX/PRO/DX INJ NEW DRUG ADDON: CPT

## 2021-07-15 PROCEDURE — 2580000003 HC RX 258: Performed by: EMERGENCY MEDICINE

## 2021-07-15 PROCEDURE — 93010 ELECTROCARDIOGRAM REPORT: CPT | Performed by: INTERNAL MEDICINE

## 2021-07-15 PROCEDURE — 93005 ELECTROCARDIOGRAM TRACING: CPT | Performed by: EMERGENCY MEDICINE

## 2021-07-15 PROCEDURE — 87186 SC STD MICRODIL/AGAR DIL: CPT

## 2021-07-15 PROCEDURE — 2500000003 HC RX 250 WO HCPCS: Performed by: EMERGENCY MEDICINE

## 2021-07-15 PROCEDURE — 96365 THER/PROPH/DIAG IV INF INIT: CPT

## 2021-07-15 PROCEDURE — 81001 URINALYSIS AUTO W/SCOPE: CPT

## 2021-07-15 PROCEDURE — 99284 EMERGENCY DEPT VISIT MOD MDM: CPT

## 2021-07-15 PROCEDURE — 6370000000 HC RX 637 (ALT 250 FOR IP): Performed by: EMERGENCY MEDICINE

## 2021-07-15 PROCEDURE — 87088 URINE BACTERIA CULTURE: CPT

## 2021-07-15 PROCEDURE — 6360000002 HC RX W HCPCS

## 2021-07-15 PROCEDURE — 80053 COMPREHEN METABOLIC PANEL: CPT

## 2021-07-15 RX ORDER — ONDANSETRON 2 MG/ML
INJECTION INTRAMUSCULAR; INTRAVENOUS
Status: DISCONTINUED
Start: 2021-07-15 | End: 2021-07-15 | Stop reason: WASHOUT

## 2021-07-15 RX ORDER — KETOROLAC TROMETHAMINE 30 MG/ML
30 INJECTION, SOLUTION INTRAMUSCULAR; INTRAVENOUS ONCE
Status: COMPLETED | OUTPATIENT
Start: 2021-07-15 | End: 2021-07-15

## 2021-07-15 RX ORDER — CAPSAICIN 0.07 G/100G
CREAM TOPICAL 3 TIMES DAILY
Status: DISCONTINUED | OUTPATIENT
Start: 2021-07-15 | End: 2021-07-15 | Stop reason: HOSPADM

## 2021-07-15 RX ORDER — 0.9 % SODIUM CHLORIDE 0.9 %
1000 INTRAVENOUS SOLUTION INTRAVENOUS ONCE
Status: COMPLETED | OUTPATIENT
Start: 2021-07-15 | End: 2021-07-15

## 2021-07-15 RX ORDER — ONDANSETRON 2 MG/ML
8 INJECTION INTRAMUSCULAR; INTRAVENOUS ONCE
Status: COMPLETED | OUTPATIENT
Start: 2021-07-15 | End: 2021-07-15

## 2021-07-15 RX ORDER — METOCLOPRAMIDE HYDROCHLORIDE 5 MG/ML
10 INJECTION INTRAMUSCULAR; INTRAVENOUS ONCE
Status: COMPLETED | OUTPATIENT
Start: 2021-07-15 | End: 2021-07-15

## 2021-07-15 RX ORDER — METRONIDAZOLE 500 MG/1
500 TABLET ORAL 2 TIMES DAILY
Qty: 20 TABLET | Refills: 0 | Status: SHIPPED | OUTPATIENT
Start: 2021-07-15 | End: 2021-07-25

## 2021-07-15 RX ORDER — CEFDINIR 300 MG/1
300 CAPSULE ORAL 2 TIMES DAILY
Qty: 20 CAPSULE | Refills: 0 | Status: SHIPPED | OUTPATIENT
Start: 2021-07-15 | End: 2021-07-25

## 2021-07-15 RX ORDER — HALOPERIDOL 5 MG/ML
5 INJECTION INTRAMUSCULAR ONCE
Status: COMPLETED | OUTPATIENT
Start: 2021-07-15 | End: 2021-07-15

## 2021-07-15 RX ORDER — DROPERIDOL 2.5 MG/ML
1.25 INJECTION, SOLUTION INTRAMUSCULAR; INTRAVENOUS EVERY 6 HOURS PRN
Status: DISCONTINUED | OUTPATIENT
Start: 2021-07-15 | End: 2021-07-15 | Stop reason: HOSPADM

## 2021-07-15 RX ORDER — PROMETHAZINE HYDROCHLORIDE 25 MG/1
25 TABLET ORAL EVERY 6 HOURS PRN
Qty: 20 TABLET | Refills: 0 | Status: SHIPPED | OUTPATIENT
Start: 2021-07-15 | End: 2021-07-20

## 2021-07-15 RX ORDER — PROMETHAZINE HYDROCHLORIDE 25 MG/ML
25 INJECTION, SOLUTION INTRAMUSCULAR; INTRAVENOUS ONCE
Status: COMPLETED | OUTPATIENT
Start: 2021-07-15 | End: 2021-07-15

## 2021-07-15 RX ADMIN — ONDANSETRON 8 MG: 2 INJECTION INTRAMUSCULAR; INTRAVENOUS at 12:24

## 2021-07-15 RX ADMIN — HALOPERIDOL LACTATE 5 MG: 5 INJECTION, SOLUTION INTRAMUSCULAR at 13:44

## 2021-07-15 RX ADMIN — KETOROLAC TROMETHAMINE 30 MG: 30 INJECTION, SOLUTION INTRAMUSCULAR; INTRAVENOUS at 12:23

## 2021-07-15 RX ADMIN — SODIUM CHLORIDE 1000 ML: 9 INJECTION, SOLUTION INTRAVENOUS at 12:22

## 2021-07-15 RX ADMIN — METRONIDAZOLE 500 MG: 500 INJECTION, SOLUTION INTRAVENOUS at 16:31

## 2021-07-15 RX ADMIN — PROMETHAZINE HYDROCHLORIDE 25 MG: 25 INJECTION INTRAMUSCULAR; INTRAVENOUS at 12:23

## 2021-07-15 RX ADMIN — METOCLOPRAMIDE HYDROCHLORIDE 10 MG: 5 INJECTION INTRAMUSCULAR; INTRAVENOUS at 12:22

## 2021-07-15 RX ADMIN — CAPSAICIN: 0.75 CREAM TOPICAL at 18:25

## 2021-07-15 RX ADMIN — CEFTRIAXONE 1000 MG: 1 INJECTION, POWDER, FOR SOLUTION INTRAMUSCULAR; INTRAVENOUS at 16:31

## 2021-07-15 RX ADMIN — DROPERIDOL 1.25 MG: 2.5 INJECTION, SOLUTION INTRAMUSCULAR; INTRAVENOUS at 16:32

## 2021-07-15 ASSESSMENT — ENCOUNTER SYMPTOMS
DIARRHEA: 0
VOMITING: 1
ABDOMINAL PAIN: 1
NAUSEA: 1
CHEST TIGHTNESS: 0
SHORTNESS OF BREATH: 0

## 2021-07-15 ASSESSMENT — PAIN SCALES - GENERAL
PAINLEVEL_OUTOF10: 10
PAINLEVEL_OUTOF10: 10

## 2021-07-15 ASSESSMENT — PAIN DESCRIPTION - LOCATION: LOCATION: ABDOMEN

## 2021-07-15 ASSESSMENT — PAIN DESCRIPTION - PAIN TYPE: TYPE: ACUTE PAIN

## 2021-07-15 NOTE — ED PROVIDER NOTES
50-year-old female presenting from home with concern about ongoing emesis and abdominal discomfort. States she was at an outside facility few days ago and diagnosed with \"infection\" in her intestines. She has a history of cyclic vomiting and abdominal discomforts. Stop smoking 1 about 2 weeks ag  No diarrhea currently, no fevers or chills, appears uncomfortable. This is gradual onset, persistent, moderate severity, several days duration, associated with her prior Pranav discomfort, and or possible marijuana usage. Family History   Problem Relation Age of Onset    Depression Mother     Cancer Mother     Heart Disease Father     Depression Father      Past Surgical History:   Procedure Laterality Date    CHOLECYSTECTOMY  01/22/2018    COLONOSCOPY      DILATION AND CURETTAGE OF UTERUS  2011    ENDOSCOPY, COLON, DIAGNOSTIC      HERNIA REPAIR      HERNIA REPAIR         Review of Systems   Constitutional: Negative for chills and fever. Respiratory: Negative for chest tightness and shortness of breath. Cardiovascular: Negative for chest pain. Gastrointestinal: Positive for abdominal pain, nausea and vomiting. Negative for diarrhea. All other systems reviewed and are negative. Physical Exam  Constitutional:       Appearance: She is well-developed. Comments: Appears very anxious, fidgeting and jittery   HENT:      Head: Normocephalic and atraumatic. Eyes:      Conjunctiva/sclera: Conjunctivae normal.      Pupils: Pupils are equal, round, and reactive to light. Neck:      Thyroid: No thyromegaly. Cardiovascular:      Rate and Rhythm: Normal rate and regular rhythm. Pulmonary:      Effort: Pulmonary effort is normal. No respiratory distress. Breath sounds: Normal breath sounds. Abdominal:      General: There is no distension. Palpations: Abdomen is soft. Tenderness: There is abdominal tenderness. There is no guarding or rebound.       Comments: General reports previous drug use. Frequency: 2.00 times per week. Drug: Marijuana. Family History: family history includes Cancer in her mother; Depression in her father and mother; Heart Disease in her father. The patients home medications have been reviewed. Allergies: Patient has no known allergies.     -------------------------------------------------- RESULTS -------------------------------------------------  Labs:  Results for orders placed or performed during the hospital encounter of 07/15/21   CBC   Result Value Ref Range    WBC 20.6 (H) 4.5 - 11.5 E9/L    RBC 4.28 3.50 - 5.50 E12/L    Hemoglobin 14.0 11.5 - 15.5 g/dL    Hematocrit 40.7 34.0 - 48.0 %    MCV 95.1 80.0 - 99.9 fL    MCH 32.7 26.0 - 35.0 pg    MCHC 34.4 32.0 - 34.5 %    RDW 13.8 11.5 - 15.0 fL    Platelets 717 (H) 885 - 450 E9/L    MPV 9.9 7.0 - 12.0 fL   Comprehensive metabolic panel   Result Value Ref Range    Sodium 140 132 - 146 mmol/L    Potassium 3.5 3.5 - 5.0 mmol/L    Chloride 106 98 - 107 mmol/L    CO2 22 22 - 29 mmol/L    Anion Gap 12 7 - 16 mmol/L    Glucose 111 (H) 74 - 99 mg/dL    BUN 12 6 - 20 mg/dL    CREATININE 0.7 0.5 - 1.0 mg/dL    GFR Non-African American >60 >=60 mL/min/1.73    GFR African American >60     Calcium 8.7 8.6 - 10.2 mg/dL    Total Protein 6.8 6.4 - 8.3 g/dL    Albumin 4.3 3.5 - 5.2 g/dL    Total Bilirubin 0.5 0.0 - 1.2 mg/dL    Alkaline Phosphatase 59 35 - 104 U/L    ALT 18 0 - 32 U/L    AST 23 0 - 31 U/L   Lipase   Result Value Ref Range    Lipase 22 13 - 60 U/L   Urinalysis   Result Value Ref Range    Color, UA Yellow Straw/Yellow    Clarity, UA Clear Clear    Glucose, Ur Negative Negative mg/dL    Bilirubin Urine Negative Negative    Ketones, Urine Negative Negative mg/dL    Specific Gravity, UA >=1.030 1.005 - 1.030    Blood, Urine MODERATE (A) Negative    pH, UA 6.0 5.0 - 9.0    Protein, UA Negative Negative mg/dL    Urobilinogen, Urine 0.2 <2.0 E.U./dL    Nitrite, Urine POSITIVE (A) Negative Leukocyte Esterase, Urine SMALL (A) Negative   Microscopic Urinalysis   Result Value Ref Range    WBC, UA 2-5 0 - 5 /HPF    RBC, UA 2-5 0 - 2 /HPF    Bacteria, UA MANY (A) None Seen /HPF   POC Pregnancy Urine Qual   Result Value Ref Range    HCG, Urine, POC Negative Negative    Lot Number [id number]     Positive QC Pass/Fail Pass     Negative QC Pass/Fail Pass    EKG 12 Lead   Result Value Ref Range    Ventricular Rate 66 BPM    Atrial Rate 66 BPM    P-R Interval 100 ms    QRS Duration 102 ms    Q-T Interval 414 ms    QTc Calculation (Bazett) 434 ms    P Axis 4 degrees    R Axis 35 degrees    T Axis 56 degrees       Radiology:  No orders to display       ------------------------- NURSING NOTES AND VITALS REVIEWED ---------------------------  Date / Time Roomed:  7/15/2021 11:44 AM  ED Bed Assignment:  17B/17B-17    The nursing notes within the ED encounter and vital signs as below have been reviewed. BP (!) 150/82   Pulse 60   Temp 98 °F (36.7 °C)   Resp 16   Ht 5' 4\" (1.626 m)   Wt 137 lb (62.1 kg)   LMP 07/10/2021   SpO2 98%   BMI 23.52 kg/m²   Oxygen Saturation Interpretation: Normal      ------------------------------------------ PROGRESS NOTES ------------------------------------------  I have spoken with the patient and discussed todays results, in addition to providing specific details for the plan of care and counseling regarding the diagnosis and prognosis. Their questions are answered at this time and they are agreeable with the plan. I discussed at length with them reasons for immediate return here for re evaluation. They will followup with primary care by calling their office tomorrow. --------------------------------- ADDITIONAL PROVIDER NOTES ---------------------------------  At this time the patient is without objective evidence of an acute process requiring hospitalization or inpatient management.   They have remained hemodynamically stable throughout their entire ED visit and are stable for discharge with outpatient follow-up. The plan has been discussed in detail and they are aware of the specific conditions for emergent return, as well as the importance of follow-up. Discharge Medication List as of 7/15/2021  6:20 PM      START taking these medications    Details   cefdinir (OMNICEF) 300 MG capsule Take 1 capsule by mouth 2 times daily for 10 days, Disp-20 capsule, R-0Print      metroNIDAZOLE (FLAGYL) 500 MG tablet Take 1 tablet by mouth 2 times daily for 10 days, Disp-20 tablet, R-0Print      promethazine (PHENERGAN) 25 MG tablet Take 1 tablet by mouth every 6 hours as needed for Nausea, Disp-20 tablet, R-0Print             Diagnosis:  1. Abdominal pain, unspecified abdominal location    2. Colitis        Disposition:  Patient's disposition: Discharge to home  Patient's condition is stable.            Carlos Palacio DO  07/15/21 2050

## 2021-07-15 NOTE — ED NOTES
Bed: 17B-17  Expected date:   Expected time:   Means of arrival:   Comments:  lee Álvarez RN  07/15/21 1142

## 2021-07-17 LAB
ORGANISM: ABNORMAL
URINE CULTURE, ROUTINE: ABNORMAL

## 2021-11-02 PROCEDURE — 96374 THER/PROPH/DIAG INJ IV PUSH: CPT

## 2021-11-02 PROCEDURE — 99284 EMERGENCY DEPT VISIT MOD MDM: CPT

## 2021-11-02 PROCEDURE — 96361 HYDRATE IV INFUSION ADD-ON: CPT

## 2021-11-02 RX ORDER — 0.9 % SODIUM CHLORIDE 0.9 %
1000 INTRAVENOUS SOLUTION INTRAVENOUS ONCE
Status: COMPLETED | OUTPATIENT
Start: 2021-11-02 | End: 2021-11-03

## 2021-11-02 ASSESSMENT — PAIN SCALES - GENERAL
PAINLEVEL_OUTOF10: 8
PAINLEVEL_OUTOF10: 8

## 2021-11-02 ASSESSMENT — PAIN DESCRIPTION - LOCATION: LOCATION: GENERALIZED

## 2021-11-02 ASSESSMENT — PAIN DESCRIPTION - PAIN TYPE: TYPE: ACUTE PAIN

## 2021-11-03 ENCOUNTER — HOSPITAL ENCOUNTER (EMERGENCY)
Age: 36
Discharge: HOME | End: 2021-11-03
Attending: EMERGENCY MEDICINE
Payer: COMMERCIAL

## 2021-11-03 VITALS
BODY MASS INDEX: 23.9 KG/M2 | DIASTOLIC BLOOD PRESSURE: 62 MMHG | HEART RATE: 70 BPM | HEIGHT: 64 IN | OXYGEN SATURATION: 97 % | SYSTOLIC BLOOD PRESSURE: 111 MMHG | WEIGHT: 140 LBS | RESPIRATION RATE: 16 BRPM | TEMPERATURE: 97.5 F

## 2021-11-03 DIAGNOSIS — O21.9 NAUSEA AND VOMITING IN PREGNANCY: Primary | ICD-10-CM

## 2021-11-03 DIAGNOSIS — E86.0 DEHYDRATION: ICD-10-CM

## 2021-11-03 LAB
ALBUMIN SERPL-MCNC: 4.5 G/DL (ref 3.5–5.2)
ALP BLD-CCNC: 71 U/L (ref 35–104)
ALT SERPL-CCNC: 12 U/L (ref 0–32)
ANION GAP SERPL CALCULATED.3IONS-SCNC: 15 MMOL/L (ref 7–16)
AST SERPL-CCNC: 15 U/L (ref 0–31)
BASOPHILS ABSOLUTE: 0.09 E9/L (ref 0–0.2)
BASOPHILS RELATIVE PERCENT: 0.6 % (ref 0–2)
BILIRUB SERPL-MCNC: 1 MG/DL (ref 0–1.2)
BILIRUBIN URINE: ABNORMAL
BLOOD, URINE: NEGATIVE
BUN BLDV-MCNC: 11 MG/DL (ref 6–20)
CALCIUM SERPL-MCNC: 9.1 MG/DL (ref 8.6–10.2)
CHLORIDE BLD-SCNC: 98 MMOL/L (ref 98–107)
CLARITY: CLEAR
CO2: 23 MMOL/L (ref 22–29)
COLOR: YELLOW
CREAT SERPL-MCNC: 0.6 MG/DL (ref 0.5–1)
EOSINOPHILS ABSOLUTE: 0.22 E9/L (ref 0.05–0.5)
EOSINOPHILS RELATIVE PERCENT: 1.5 % (ref 0–6)
GFR AFRICAN AMERICAN: >60
GFR NON-AFRICAN AMERICAN: >60 ML/MIN/1.73
GLUCOSE BLD-MCNC: 120 MG/DL (ref 74–99)
GLUCOSE URINE: NEGATIVE MG/DL
GONADOTROPIN, CHORIONIC (HCG) QUANT: 2346 MIU/ML
HCT VFR BLD CALC: 42.8 % (ref 34–48)
HEMOGLOBIN: 14.6 G/DL (ref 11.5–15.5)
IMMATURE GRANULOCYTES #: 0.08 E9/L
IMMATURE GRANULOCYTES %: 0.6 % (ref 0–5)
KETONES, URINE: 15 MG/DL
LEUKOCYTE ESTERASE, URINE: NEGATIVE
LYMPHOCYTES ABSOLUTE: 6.11 E9/L (ref 1.5–4)
LYMPHOCYTES RELATIVE PERCENT: 42.5 % (ref 20–42)
MCH RBC QN AUTO: 31.6 PG (ref 26–35)
MCHC RBC AUTO-ENTMCNC: 34.1 % (ref 32–34.5)
MCV RBC AUTO: 92.6 FL (ref 80–99.9)
MONOCYTES ABSOLUTE: 1.01 E9/L (ref 0.1–0.95)
MONOCYTES RELATIVE PERCENT: 7 % (ref 2–12)
NEUTROPHILS ABSOLUTE: 6.88 E9/L (ref 1.8–7.3)
NEUTROPHILS RELATIVE PERCENT: 47.8 % (ref 43–80)
NITRITE, URINE: NEGATIVE
PDW BLD-RTO: 12.8 FL (ref 11.5–15)
PH UA: 7 (ref 5–9)
PLATELET # BLD: 476 E9/L (ref 130–450)
PMV BLD AUTO: 8.8 FL (ref 7–12)
POTASSIUM SERPL-SCNC: 3.2 MMOL/L (ref 3.5–5)
PROTEIN UA: NEGATIVE MG/DL
RBC # BLD: 4.62 E12/L (ref 3.5–5.5)
RBC # BLD: NORMAL 10*6/UL
SODIUM BLD-SCNC: 136 MMOL/L (ref 132–146)
SPECIFIC GRAVITY UA: 1.01 (ref 1–1.03)
TOTAL PROTEIN: 7.5 G/DL (ref 6.4–8.3)
UROBILINOGEN, URINE: 0.2 E.U./DL
WBC # BLD: 14.4 E9/L (ref 4.5–11.5)

## 2021-11-03 PROCEDURE — 85025 COMPLETE CBC W/AUTO DIFF WBC: CPT

## 2021-11-03 PROCEDURE — 2580000003 HC RX 258: Performed by: PHYSICIAN ASSISTANT

## 2021-11-03 PROCEDURE — 6370000000 HC RX 637 (ALT 250 FOR IP): Performed by: EMERGENCY MEDICINE

## 2021-11-03 PROCEDURE — 96374 THER/PROPH/DIAG INJ IV PUSH: CPT

## 2021-11-03 PROCEDURE — 2580000003 HC RX 258: Performed by: EMERGENCY MEDICINE

## 2021-11-03 PROCEDURE — 80053 COMPREHEN METABOLIC PANEL: CPT

## 2021-11-03 PROCEDURE — 84702 CHORIONIC GONADOTROPIN TEST: CPT

## 2021-11-03 PROCEDURE — 81003 URINALYSIS AUTO W/O SCOPE: CPT

## 2021-11-03 PROCEDURE — 6360000002 HC RX W HCPCS: Performed by: PHYSICIAN ASSISTANT

## 2021-11-03 PROCEDURE — 96361 HYDRATE IV INFUSION ADD-ON: CPT

## 2021-11-03 RX ORDER — 0.9 % SODIUM CHLORIDE 0.9 %
1000 INTRAVENOUS SOLUTION INTRAVENOUS ONCE
Status: COMPLETED | OUTPATIENT
Start: 2021-11-03 | End: 2021-11-03

## 2021-11-03 RX ORDER — ONDANSETRON 4 MG/1
4 TABLET, FILM COATED ORAL EVERY 8 HOURS PRN
Qty: 12 TABLET | Refills: 0 | Status: SHIPPED | OUTPATIENT
Start: 2021-11-03 | End: 2021-11-08

## 2021-11-03 RX ORDER — ONDANSETRON 2 MG/ML
4 INJECTION INTRAMUSCULAR; INTRAVENOUS ONCE
Status: COMPLETED | OUTPATIENT
Start: 2021-11-03 | End: 2021-11-03

## 2021-11-03 RX ORDER — SODIUM CHLORIDE, SODIUM LACTATE, POTASSIUM CHLORIDE, CALCIUM CHLORIDE 600; 310; 30; 20 MG/100ML; MG/100ML; MG/100ML; MG/100ML
INJECTION, SOLUTION INTRAVENOUS ONCE
Status: COMPLETED | OUTPATIENT
Start: 2021-11-03 | End: 2021-11-03

## 2021-11-03 RX ORDER — DOXYLAMINE SUCCINATE AND PYRIDOXINE HYDROCHLORIDE, DELAYED RELEASE TABLETS 10 MG/10 MG 10; 10 MG/1; MG/1
1 TABLET, DELAYED RELEASE ORAL DAILY
Qty: 60 TABLET | Refills: 0 | Status: SHIPPED | OUTPATIENT
Start: 2021-11-03 | End: 2022-02-18

## 2021-11-03 RX ORDER — POTASSIUM CHLORIDE 20 MEQ/1
40 TABLET, EXTENDED RELEASE ORAL ONCE
Status: COMPLETED | OUTPATIENT
Start: 2021-11-03 | End: 2021-11-03

## 2021-11-03 RX ADMIN — SODIUM CHLORIDE 1000 ML: 9 INJECTION, SOLUTION INTRAVENOUS at 04:55

## 2021-11-03 RX ADMIN — POTASSIUM CHLORIDE 40 MEQ: 1500 TABLET, EXTENDED RELEASE ORAL at 06:16

## 2021-11-03 RX ADMIN — SODIUM CHLORIDE, POTASSIUM CHLORIDE, SODIUM LACTATE AND CALCIUM CHLORIDE: 600; 310; 30; 20 INJECTION, SOLUTION INTRAVENOUS at 04:54

## 2021-11-03 RX ADMIN — ONDANSETRON 4 MG: 2 INJECTION INTRAMUSCULAR; INTRAVENOUS at 04:55

## 2021-11-03 RX ADMIN — SODIUM CHLORIDE 1000 ML: 9 INJECTION, SOLUTION INTRAVENOUS at 06:18

## 2021-11-03 NOTE — Clinical Note
Morales Gibson was seen and treated in our emergency department on 11/2/2021. She may return to work on 11/04/2021. If you have any questions or concerns, please don't hesitate to call.       Luis Enrique Daly PA-C

## 2021-11-03 NOTE — Clinical Note
Joseph Letitia was seen and treated in our emergency department on 11/2/2021. She may return to work on 11/04/2021. If you have any questions or concerns, please don't hesitate to call.       Cinthya Saundres PA-C

## 2021-11-03 NOTE — ED NOTES
Assumed care of patient. Introduced self to patient as RN. Patient states she feels  A little nauseated. But otherwise feels better. Call light within reach, continue to monitor. Awaiting disposititoning.      Nusrat Garcia RN  11/03/21 3416

## 2021-11-03 NOTE — ED PROVIDER NOTES
[address] [place] [address]  Department of Emergency Medicine   ED  Encounter Note  Admit Date/RoomTime: 11/3/2021  2:39 AM  ED Room:     NAME: Alison Severance  : 1985  MRN: [id number]     Chief Complaint:  Emesis During Pregnancy (5 wks pregnant, emesis since saturday, unable to keep anything down ) and Generalized Body Aches    History of Present Illness        Alison Severance is a 29 y.o. old female who presents to the emergency department by private vehicle, with persistent of nausea and vomiting of dry heaves or undigested food which began 3 day(s) prior to arrival.  There has been similar episodes in the past (patient reports she has had this with every one of her other pregnancies which has been 3. Her oldest child is 25 her youngest is 9). She reports she was scheduled to have a tubal ligation today. She reports she is 5 weeks and 4 days pregnant. The symptoms are associated with no additional symptoms. Since inset the symptoms have been persistent. Her symptoms are aggravated by eating and liquids and relieved by nothing. There has been no back pain, chest pain, shortness of breath, fever, chills, urinary urgency, vaginal spotting or vaginal bleeding. ROS   Pertinent positives and negatives are stated within HPI, all other systems reviewed and are negative. Past Medical History:  has a past medical history of Anxiety attack, Asthma, Crohn's colitis ([address] Marijuana abuse, Multiple lacerations, Seizure ([address] and Tobacco abuse. Surgical History:  has a past surgical history that includes Dilation and curettage of uterus (); Colonoscopy; Endoscopy, colon, diagnostic; Cholecystectomy (2018); hernia repair; and hernia repair. Social History:  reports that she has been smoking cigarettes. She has a 10.00 pack-year smoking history. She has never used smokeless tobacco. She reports current alcohol use. She reports previous drug use.  Frequency: 2.00 times per week. Drug: Marijuana Letha Dickinson). Family History: family history includes Cancer in her mother; Depression in her father and mother; Heart Disease in her father. Allergies: Patient has no known allergies. Physical Exam   Oxygen Saturation Interpretation: Normal on room air analysis. ED Triage Vitals [11/02/21 1756]   BP Temp Temp Source Pulse Resp SpO2 Height Weight   (!) 173/89 97.5 °F (36.4 °C) Temporal 73 18 99 % 5' 4\" (1.626 m) 140 lb (63.5 kg)         Constitutional:  Alert, development consistent with age. HEENT:  NC/NT. Airway patent. Neck:  Normal ROM. Supple. Respiratory:  Clear to auscultation and breath sounds equal.  CV:  Regular rate and rhythm, normal heart sounds, without pathological murmurs, ectopy, gallops, or rubs. GI:  normal appearing, non-distended with no visible hernias. Bowel sounds: normal bowel sounds. Tenderness: No abdominal tenderness, guarding, rebound, rigidity or pulsatile mass. .        Liver: non-tender. Spleen:  non-tender. /Rectal: Rectal Examination deferred, N/A or declined by patient  Back: CVA Tenderness: No CVA tenderness. Integument:  Normal turgor. Warm, dry, without visible rash, unless noted elsewhere. Lymphatics: No lymphangitis or adenopathy noted. Neurological:  Oriented. Motor functions intact.     Lab / Imaging Results   (All laboratory and radiology results have been personally reviewed by myself)  Labs:  Results for orders placed or performed during the hospital encounter of 11/03/21   CBC Auto Differential   Result Value Ref Range    WBC 14.4 (H) 4.5 - 11.5 E9/L    RBC 4.62 3.50 - 5.50 E12/L    Hemoglobin 14.6 11.5 - 15.5 g/dL    Hematocrit 42.8 34.0 - 48.0 %    MCV 92.6 80.0 - 99.9 fL    MCH 31.6 26.0 - 35.0 pg    MCHC 34.1 32.0 - 34.5 %    RDW 12.8 11.5 - 15.0 fL    Platelets 202 (H) 525 - 450 E9/L    MPV 8.8 7.0 - 12.0 fL    Neutrophils % 47.8 43.0 - 80.0 %    Immature Granulocytes % 0.6 0.0 - 5.0 %    Lymphocytes % 42.5 (H) 20.0 - 42.0 %    Monocytes % 7.0 2.0 - 12.0 %    Eosinophils % 1.5 0.0 - 6.0 %    Basophils % 0.6 0.0 - 2.0 %    Neutrophils Absolute 6.88 1.80 - 7.30 E9/L    Immature Granulocytes # 0.08 E9/L    Lymphocytes Absolute 6.11 (H) 1.50 - 4.00 E9/L    Monocytes Absolute 1.01 (H) 0.10 - 0.95 E9/L    Eosinophils Absolute 0.22 0.05 - 0.50 E9/L    Basophils Absolute 0.09 0.00 - 0.20 E9/L    RBC Morphology Normal    Comprehensive Metabolic Panel   Result Value Ref Range    Sodium 136 132 - 146 mmol/L    Potassium 3.2 (L) 3.5 - 5.0 mmol/L    Chloride 98 98 - 107 mmol/L    CO2 23 22 - 29 mmol/L    Anion Gap 15 7 - 16 mmol/L    Glucose 120 (H) 74 - 99 mg/dL    BUN 11 6 - 20 mg/dL    CREATININE 0.6 0.5 - 1.0 mg/dL    GFR Non-African American >60 >=60 mL/min/1.73    GFR African American >60     Calcium 9.1 8.6 - 10.2 mg/dL    Total Protein 7.5 6.4 - 8.3 g/dL    Albumin 4.5 3.5 - 5.2 g/dL    Total Bilirubin 1.0 0.0 - 1.2 mg/dL    Alkaline Phosphatase 71 35 - 104 U/L    ALT 12 0 - 32 U/L    AST 15 0 - 31 U/L   Urinalysis   Result Value Ref Range    Color, UA Yellow Straw/Yellow    Clarity, UA Clear Clear    Glucose, Ur Negative Negative mg/dL    Bilirubin Urine SMALL (A) Negative    Ketones, Urine 15 (A) Negative mg/dL    Specific Gravity, UA 1.015 1.005 - 1.030    Blood, Urine Negative Negative    pH, UA 7.0 5.0 - 9.0    Protein, UA Negative Negative mg/dL    Urobilinogen, Urine 0.2 <2.0 E.U./dL    Nitrite, Urine Negative Negative    Leukocyte Esterase, Urine Negative Negative   HCG, Quantitative, Pregnancy   Result Value Ref Range    hCG Quant 2346.0 (H) <10 mIU/mL     Imaging: All Radiology results interpreted by Radiologist unless otherwise noted.   No orders to display     ED Course / Medical Decision Making     Medications   0.9 % sodium chloride bolus (0 mLs IntraVENous Stopped 11/3/21 0622)   0.9 % sodium chloride bolus (0 mLs IntraVENous Stopped 11/3/21 0946)   lactated ringers infusion ( IntraVENous Stopped 11/3/21 0622)   ondansetron (ZOFRAN) injection 4 mg (4 mg IntraVENous Given 11/3/21 5237)   potassium chloride (KLOR-CON M) extended release tablet 40 mEq (40 mEq Oral Given 11/3/21 0616)        Re-examination:  11/3/21    Per Attending    Consultations:             None    Procedure(s):   none    MDM:   Presents to emergency with 3-day history of nausea and vomiting. She she is 5 weeks 4 days pregnant. She reports this happens her with every pregnancy worse when she was pregnant with boys them with her daughter. Patient denies abdominal pain or vaginal discharge/bleeding. Plan is for hydration and blood work in department. Care is endorsed to Dr. Ran Godinez to follow-up with laboratory and reevaluation. Patient already has appointment established with OB/GYN. Plan of Care/Counseling:  Daniel Parra PA-C reviewed today's visit with the patient in addition to providing specific details for the plan of care and counseling regarding the diagnosis and prognosis. Questions are answered at this time and are agreeable with the plan. Assessment     1. Nausea and vomiting in pregnancy    2. Dehydration      Plan   Discharged home. Patient condition is stable    New Medications     Discharge Medication List as of 11/3/2021 10:31 AM      START taking these medications    Details   doxylamine-pyridoxine (DICLEGIS) 10-10 MG TBEC Take 1 tablet by mouth daily Start: 2 tabs PO qhs, if sx persist after 2 days, increase to 1 tab PO qam and 2 tabs PO qhs. May further increased to 1 tab by mouth in the morning and one tablet by mouth every midafternoon and 2 tabs by mouth at night. Max  4 tabs/day,, Disp-60 tablet, R-0Print           Electronically signed by Daniel Parra PA-C   DD: 11/3/21  **This report was transcribed using voice recognition software. Every effort was made to ensure accuracy; however, inadvertent computerized transcription errors may be present.   END OF ED PROVIDER NOTE        Lula Potter Julianne Price PA-C  11/04/21 6231

## 2022-02-17 ENCOUNTER — APPOINTMENT (OUTPATIENT)
Dept: ULTRASOUND IMAGING | Age: 37
DRG: 566 | End: 2022-02-17
Payer: COMMERCIAL

## 2022-02-17 ENCOUNTER — HOSPITAL ENCOUNTER (EMERGENCY)
Age: 37
Discharge: HOME | DRG: 566 | End: 2022-02-17
Attending: STUDENT IN AN ORGANIZED HEALTH CARE EDUCATION/TRAINING PROGRAM
Payer: COMMERCIAL

## 2022-02-17 VITALS
SYSTOLIC BLOOD PRESSURE: 128 MMHG | OXYGEN SATURATION: 99 % | WEIGHT: 127 LBS | RESPIRATION RATE: 18 BRPM | BODY MASS INDEX: 21.68 KG/M2 | HEIGHT: 64 IN | HEART RATE: 100 BPM | DIASTOLIC BLOOD PRESSURE: 88 MMHG | TEMPERATURE: 98.4 F

## 2022-02-17 DIAGNOSIS — R10.31 RIGHT LOWER QUADRANT ABDOMINAL PAIN: Primary | ICD-10-CM

## 2022-02-17 DIAGNOSIS — E87.6 HYPOKALEMIA: ICD-10-CM

## 2022-02-17 DIAGNOSIS — D72.829 LEUKOCYTOSIS, UNSPECIFIED TYPE: ICD-10-CM

## 2022-02-17 DIAGNOSIS — O36.80X0 PREGNANCY, LOCATION UNKNOWN: ICD-10-CM

## 2022-02-17 LAB
ALBUMIN SERPL-MCNC: 4.8 G/DL (ref 3.5–5.2)
ALP BLD-CCNC: 56 U/L (ref 35–104)
ALT SERPL-CCNC: 11 U/L (ref 0–32)
ANION GAP SERPL CALCULATED.3IONS-SCNC: 11 MMOL/L (ref 7–16)
AST SERPL-CCNC: 16 U/L (ref 0–31)
BACTERIA: ABNORMAL /HPF
BASOPHILS ABSOLUTE: 0.04 E9/L (ref 0–0.2)
BASOPHILS RELATIVE PERCENT: 0.2 % (ref 0–2)
BILIRUB SERPL-MCNC: 0.7 MG/DL (ref 0–1.2)
BILIRUBIN URINE: NEGATIVE
BLOOD, URINE: NEGATIVE
BUN BLDV-MCNC: 9 MG/DL (ref 6–20)
CALCIUM SERPL-MCNC: 9.6 MG/DL (ref 8.6–10.2)
CHLORIDE BLD-SCNC: 98 MMOL/L (ref 98–107)
CLARITY: CLEAR
CO2: 26 MMOL/L (ref 22–29)
COLOR: YELLOW
CREAT SERPL-MCNC: 0.6 MG/DL (ref 0.5–1)
EOSINOPHILS ABSOLUTE: 0.05 E9/L (ref 0.05–0.5)
EOSINOPHILS RELATIVE PERCENT: 0.3 % (ref 0–6)
EPITHELIAL CELLS, UA: ABNORMAL /HPF
GFR AFRICAN AMERICAN: >60
GFR NON-AFRICAN AMERICAN: >60 ML/MIN/1.73
GLUCOSE BLD-MCNC: 118 MG/DL (ref 74–99)
GLUCOSE URINE: NEGATIVE MG/DL
GONADOTROPIN, CHORIONIC (HCG) QUANT: 91.9 MIU/ML
HCG(URINE) PREGNANCY TEST: POSITIVE
HCT VFR BLD CALC: 42.7 % (ref 34–48)
HEMOGLOBIN: 14.9 G/DL (ref 11.5–15.5)
IMMATURE GRANULOCYTES #: 0.1 E9/L
IMMATURE GRANULOCYTES %: 0.6 % (ref 0–5)
KETONES, URINE: 15 MG/DL
LEUKOCYTE ESTERASE, URINE: NEGATIVE
LIPASE: 18 U/L (ref 13–60)
LYMPHOCYTES ABSOLUTE: 3.67 E9/L (ref 1.5–4)
LYMPHOCYTES RELATIVE PERCENT: 20.5 % (ref 20–42)
MAGNESIUM: 2.1 MG/DL (ref 1.6–2.6)
MCH RBC QN AUTO: 31.4 PG (ref 26–35)
MCHC RBC AUTO-ENTMCNC: 34.9 % (ref 32–34.5)
MCV RBC AUTO: 89.9 FL (ref 80–99.9)
MONOCYTES ABSOLUTE: 1.07 E9/L (ref 0.1–0.95)
MONOCYTES RELATIVE PERCENT: 6 % (ref 2–12)
NEUTROPHILS ABSOLUTE: 13.01 E9/L (ref 1.8–7.3)
NEUTROPHILS RELATIVE PERCENT: 72.4 % (ref 43–80)
NITRITE, URINE: NEGATIVE
PDW BLD-RTO: 13.2 FL (ref 11.5–15)
PH UA: 7 (ref 5–9)
PLATELET # BLD: 471 E9/L (ref 130–450)
PMV BLD AUTO: 9 FL (ref 7–12)
POTASSIUM REFLEX MAGNESIUM: 3.3 MMOL/L (ref 3.5–5)
PROTEIN UA: NEGATIVE MG/DL
RBC # BLD: 4.75 E12/L (ref 3.5–5.5)
RBC UA: ABNORMAL /HPF (ref 0–2)
SODIUM BLD-SCNC: 135 MMOL/L (ref 132–146)
SPECIFIC GRAVITY UA: <=1.005 (ref 1–1.03)
TOTAL PROTEIN: 7.8 G/DL (ref 6.4–8.3)
UROBILINOGEN, URINE: 0.2 E.U./DL
WBC # BLD: 17.9 E9/L (ref 4.5–11.5)
WBC UA: ABNORMAL /HPF (ref 0–5)

## 2022-02-17 PROCEDURE — 84702 CHORIONIC GONADOTROPIN TEST: CPT

## 2022-02-17 PROCEDURE — 83690 ASSAY OF LIPASE: CPT

## 2022-02-17 PROCEDURE — 36415 COLL VENOUS BLD VENIPUNCTURE: CPT

## 2022-02-17 PROCEDURE — 96374 THER/PROPH/DIAG INJ IV PUSH: CPT

## 2022-02-17 PROCEDURE — 76801 OB US < 14 WKS SINGLE FETUS: CPT

## 2022-02-17 PROCEDURE — 6370000000 HC RX 637 (ALT 250 FOR IP): Performed by: STUDENT IN AN ORGANIZED HEALTH CARE EDUCATION/TRAINING PROGRAM

## 2022-02-17 PROCEDURE — 83735 ASSAY OF MAGNESIUM: CPT

## 2022-02-17 PROCEDURE — 81025 URINE PREGNANCY TEST: CPT

## 2022-02-17 PROCEDURE — 81001 URINALYSIS AUTO W/SCOPE: CPT

## 2022-02-17 PROCEDURE — 85025 COMPLETE CBC W/AUTO DIFF WBC: CPT

## 2022-02-17 PROCEDURE — 2580000003 HC RX 258: Performed by: STUDENT IN AN ORGANIZED HEALTH CARE EDUCATION/TRAINING PROGRAM

## 2022-02-17 PROCEDURE — 93975 VASCULAR STUDY: CPT

## 2022-02-17 PROCEDURE — 80053 COMPREHEN METABOLIC PANEL: CPT

## 2022-02-17 PROCEDURE — 96361 HYDRATE IV INFUSION ADD-ON: CPT

## 2022-02-17 PROCEDURE — 99284 EMERGENCY DEPT VISIT MOD MDM: CPT

## 2022-02-17 PROCEDURE — 2500000003 HC RX 250 WO HCPCS: Performed by: STUDENT IN AN ORGANIZED HEALTH CARE EDUCATION/TRAINING PROGRAM

## 2022-02-17 PROCEDURE — 76817 TRANSVAGINAL US OBSTETRIC: CPT

## 2022-02-17 RX ORDER — PNV NO.95/FERROUS FUM/FOLIC AC 28MG-0.8MG
1 TABLET ORAL DAILY
Qty: 30 TABLET | Refills: 0 | Status: SHIPPED | OUTPATIENT
Start: 2022-02-17 | End: 2022-06-07

## 2022-02-17 RX ORDER — 0.9 % SODIUM CHLORIDE 0.9 %
1000 INTRAVENOUS SOLUTION INTRAVENOUS ONCE
Status: COMPLETED | OUTPATIENT
Start: 2022-02-17 | End: 2022-02-17

## 2022-02-17 RX ORDER — POTASSIUM CHLORIDE 20 MEQ/1
40 TABLET, EXTENDED RELEASE ORAL ONCE
Status: COMPLETED | OUTPATIENT
Start: 2022-02-17 | End: 2022-02-17

## 2022-02-17 RX ADMIN — SODIUM CHLORIDE 1000 ML: 9 INJECTION, SOLUTION INTRAVENOUS at 13:40

## 2022-02-17 RX ADMIN — POTASSIUM CHLORIDE 40 MEQ: 20 TABLET, EXTENDED RELEASE ORAL at 19:34

## 2022-02-17 RX ADMIN — FAMOTIDINE 20 MG: 10 INJECTION, SOLUTION INTRAVENOUS at 13:40

## 2022-02-17 NOTE — ED PROVIDER NOTES
Department of Emergency Medicine   ED  Provider Note  Admit Date/RoomTime: 2/17/2022 12:34 PM  [facility]          History of Present Illness:  2/17/22, Time: 1:10 PM EST  Chief Complaint   Patient presents with    Abdominal Pain     ruq abd pain    Hematemesis       [address] is a 29 y.o. female presenting to the ED for abdominal pain and vomiting. The patient has past medical history of Crohn's disease, ovarian cyst and cannabinoid hyperemesis syndrome. She states that she has been having 4 days of abdominal pain. It is worse in the right lower quadrant. It is a constant stabbing sensation. Taking a shower seems to help this. Nothing seems to improve it. For the past 2 days she has been having associated nausea and vomiting. More than 10 episodes of emesis a day. She did try to take her Zofran but is unable to keep anything down. She originally was noting clear emesis but has subsequently become bloody. She feels this is similar to her Crohn's disease and prior ovarian cysts. She denies any vaginal discharge, dysuria. She is starting to have hematuria and states that this is the beginning of her menstrual cycle. She denies any chest pain, shortness breath, fevers or cough. Patient is status post cholecystectomy but still has her appendix. She also had hernia surgery as a child. Prior to arrival, EMS administered 500 cc of normal saline and Zofran. This is improved the patient's symptoms.     Review of Systems:   Constitutional symptoms: Denies fever  Eyes: Denies eye pain  Ears, Nose, Mouth, Throat: Denies ear pain  Cardiovascular: Denies chest pain  Respiratory: Denies shortness of breath  Gastrointestinal: Positive for abdominal pain nausea and vomiting  Genitourinary: Denies vaginal discharge  Skin: Denies rashes  Neurological: Denies headache  Musculoskeletal: Denies extremity pain    --------------------------------------------- PAST HISTORY ---------------------------------------------  Past Medical History:  has a past medical history of Anxiety attack, Asthma, Crohn's colitis ([facility] [address] Marijuana abuse, Multiple lacerations, [facility] and Tobacco abuse. Past Surgical History:  has a past surgical history that includes Dilation and curettage of uterus (2011); Colonoscopy; Endoscopy, colon, diagnostic; Cholecystectomy (01/22/2018); hernia repair; and hernia repair. Social History:  reports that she has been smoking cigarettes. She has a 10.00 pack-year smoking history. She has never used smokeless tobacco. She reports current alcohol use. She reports previous drug use. Frequency: 2.00 times per week. Drug: Marijuana Veldon Bunting). Family History: family history includes Cancer in her mother; Depression in her father and mother; Heart Disease in her father. . Unless otherwise noted, family history is non contributory    The patients home medications have been reviewed. Allergies: Patient has no known allergies. I have reviewed the past medical history, past surgical history, social history, and family history    ---------------------------------------------------PHYSICAL EXAM--------------------------------------    General: The patient is conversational and lying comfortably in bed. Head: Normocephalic and atraumatic. Eyes: Sclera non-icteric and no conjunctival injection  ENT: Nasal and oral membranes appear dry  Neck: Trachea midline. No JVD  Respiratory: Lungs clear to auscultation bilaterally. Patient speaking in full sentences. Cardiovascular: Regular rate. No pedal edema. Gastrointestinal:  Abdomen is soft and nondistended. Bowel sounds present. Positive for right lower quadrant pain. Positive psoas and obturator. There is no guarding or rebound. Musculoskeletal: No deformity  Skin: Skin warm and dry. No rashes. Neurologic: No gross motor deficits. No aphasia.   Psychiatric: Normal affect.    -------------------------------------------------- RESULTS -------------------------------------------------  I have personally reviewed all laboratory and imaging results for this patient. Results are listed below.      LABS: (Lab results interpreted by me)  Results for orders placed or performed during the hospital encounter of 02/17/22   Comprehensive Metabolic Panel w/ Reflex to MG   Result Value Ref Range    Sodium 135 132 - 146 mmol/L    Potassium reflex Magnesium 3.3 (L) 3.5 - 5.0 mmol/L    Chloride 98 98 - 107 mmol/L    CO2 26 22 - 29 mmol/L    Anion Gap 11 7 - 16 mmol/L    Glucose 118 (H) 74 - 99 mg/dL    BUN 9 6 - 20 mg/dL    CREATININE 0.6 0.5 - 1.0 mg/dL    GFR Non-African American >60 >=60 mL/min/1.73    GFR African American >60     Calcium 9.6 8.6 - 10.2 mg/dL    Total Protein 7.8 6.4 - 8.3 g/dL    Albumin 4.8 3.5 - 5.2 g/dL    Total Bilirubin 0.7 0.0 - 1.2 mg/dL    Alkaline Phosphatase 56 35 - 104 U/L    ALT 11 0 - 32 U/L    AST 16 0 - 31 U/L   CBC with Auto Differential   Result Value Ref Range    WBC 17.9 (H) 4.5 - 11.5 E9/L    RBC 4.75 3.50 - 5.50 E12/L    Hemoglobin 14.9 11.5 - 15.5 g/dL    Hematocrit 42.7 34.0 - 48.0 %    MCV 89.9 80.0 - 99.9 fL    MCH 31.4 26.0 - 35.0 pg    MCHC 34.9 (H) 32.0 - 34.5 %    RDW 13.2 11.5 - 15.0 fL    Platelets 316 (H) 819 - 450 E9/L    MPV 9.0 7.0 - 12.0 fL    Neutrophils % 72.4 43.0 - 80.0 %    Immature Granulocytes % 0.6 0.0 - 5.0 %    Lymphocytes % 20.5 20.0 - 42.0 %    Monocytes % 6.0 2.0 - 12.0 %    Eosinophils % 0.3 0.0 - 6.0 %    Basophils % 0.2 0.0 - 2.0 %    Neutrophils Absolute 13.01 (H) 1.80 - 7.30 E9/L    Immature Granulocytes # 0.10 E9/L    Lymphocytes Absolute 3.67 1.50 - 4.00 E9/L    Monocytes Absolute 1.07 (H) 0.10 - 0.95 E9/L    Eosinophils Absolute 0.05 0.05 - 0.50 E9/L    Basophils Absolute 0.04 0.00 - 0.20 E9/L   Lipase   Result Value Ref Range    Lipase 18 13 - 60 U/L   Urinalysis with Microscopic   Result Value Ref Range    Color, UA Yellow Straw/Yellow    Clarity, UA Clear Clear    Glucose, Ur Negative Negative mg/dL    Bilirubin Urine Negative Negative    Ketones, Urine 15 (A) Negative mg/dL    Specific Gravity, UA <=1.005 1.005 - 1.030    Blood, Urine Negative Negative    pH, UA 7.0 5.0 - 9.0    Protein, UA Negative Negative mg/dL    Urobilinogen, Urine 0.2 <2.0 E.U./dL    Nitrite, Urine Negative Negative    Leukocyte Esterase, Urine Negative Negative    WBC, UA 0-1 0 - 5 /HPF    RBC, UA NONE 0 - 2 /HPF    Epithelial Cells, UA FEW /HPF    Bacteria, UA NONE SEEN None Seen /HPF   Pregnancy, urine   Result Value Ref Range    HCG(Urine) Pregnancy Test POSITIVE NEGATIVE   Magnesium   Result Value Ref Range    Magnesium 2.1 1.6 - 2.6 mg/dL   hCG, quantitative, pregnancy   Result Value Ref Range    hCG Quant 91.9 (H) <10 mIU/mL   ,     RADIOLOGY:  Interpreted by Radiologist unless otherwise specified  US DUP ABD PEL RETRO SCROT COMPLETE   Final Result   1. Pregnancy of unknown location. An intrauterine gestational sac is not   identified on this exam.  Endometrium is thickened and mildly heterogeneous. 2.  Normal appearance of the bilateral ovaries. There are no adnexal masses. Normal ovarian vascularity. No free fluid in the pelvis. 3.  Multiple small echogenic foci in the subendometrial region. Possibly   reflect changes related to adenomyosis. Recommendation: Suggest obtaining a quantitative beta hCG value. Repeat   pelvic ultrasound in 4-6 weeks also suggested. Imaging should occur sooner   for worsening symptoms. US OB TRANSVAGINAL   Final Result   1. Pregnancy of unknown location. An intrauterine gestational sac is not   identified on this exam.  Endometrium is thickened and mildly heterogeneous. 2.  Normal appearance of the bilateral ovaries. There are no adnexal masses. Normal ovarian vascularity. No free fluid in the pelvis. 3.  Multiple small echogenic foci in the subendometrial region. Possibly   reflect changes related to adenomyosis. Recommendation: Suggest obtaining a quantitative beta hCG value. Repeat   pelvic ultrasound in 4-6 weeks also suggested. Imaging should occur sooner   for worsening symptoms. US OB LESS THAN 14 WEEKS SINGLE OR FIRST GESTATION   Final Result   1. Pregnancy of unknown location. An intrauterine gestational sac is not   identified on this exam.  Endometrium is thickened and mildly heterogeneous. 2.  Normal appearance of the bilateral ovaries. There are no adnexal masses. Normal ovarian vascularity. No free fluid in the pelvis. 3.  Multiple small echogenic foci in the subendometrial region. Possibly   reflect changes related to adenomyosis. Recommendation: Suggest obtaining a quantitative beta hCG value. Repeat   pelvic ultrasound in 4-6 weeks also suggested. Imaging should occur sooner   for worsening symptoms. ------------------------- NURSING NOTES AND VITALS REVIEWED ---------------------------   The nursing notes within the ED encounter and vital signs as below have been reviewed by myself  /88   Pulse 100   Temp 98.4 °F (36.9 °C)   Resp 18   Ht 5' 4\" (1.626 m)   Wt 127 lb (57.6 kg)   LMP 02/17/2022   SpO2 99%   BMI 21.80 kg/m²     Oxygen Saturation Interpretation: Normal    The patients available past medical records and past encounters were reviewed. ------------------------------ ED COURSE/MEDICAL DECISION MAKING----------------------  Medications   0.9 % sodium chloride bolus (0 mLs IntraVENous Stopped 2/17/22 1410)   famotidine (PEPCID) injection 20 mg (20 mg IntraVENous Given 2/17/22 1340)   potassium chloride (KLOR-CON M) extended release tablet 40 mEq (40 mEq Oral Given 2/17/22 1934)       Medical Decision Making: This is a 29 y.o. female presenting to the ED for abdominal pain, nausea and vomiting.  On initial presentation, the patient's vital signs are interpreted as normotensive, afebrile and saturating well on room air. Based on history and physical exam, we have a broad differential.  We considered colitis, Crohn's exacerbation, ovarian cyst or torsion, cannabinoid hyperemesis syndrome, gastroenteritis and gastritis. Patient's abdominal exam is nonsurgical.  I did consider cervicitis or pelvic inflammatory disease but patient denies chance of STDs at this time. She does not want to be tested. She is of childbearing age and pregnancy also considered as well as ectopic. At this time the patient be symptomatically treated with famotidine and fluids. Her nausea is improved with the Zofran administered prior to arrival.  Will obtain CT abdomen pelvis, laboratory studies as well as an ultrasound. Laboratory studies show potassium of hypokalemia 3.3. Electrolytes otherwise within normal limits. LFTs within normal limits. Patient does have significant leukocytosis of 17.9. No anemia. Urinalysis shows ketonuria but no evidence of urinary tract infection. Pregnancy test positive. At this point, beta hCG is added on. Review of EMR shows the patient is O+ for blood type and does not require RhoGam.  I discussed the findings with her. She endorses that she had a miscarriage in November. She does follow with OB/GYN. She does endorse there is a chance this could be new pregnancy. She does not want a CT scan performed at this time because of this. We have had extensive conversations about evaluating for appendicitis. She continues to decline CT scan. Ultrasound shows unknown location pregnancy. Intrauterine gestational sac not identified. Thickened endometrium. Normal bilateral ovaries. No adnexal masses. Normal ovarian vascularity. No free fluid in the pelvis. Small echogenic foci of the subendometrial region. Possible adenomyosis. This is interpreted by radiology. Patient is hCG is 91.9. This is significantly low. She will require repeat hCG in 2 days.   Again we discussed CT scan. Patient is declining. We discussed strict return precautions in regards to appendicitis. We also offered the patient a pelvic exam.  She states that she just had one performed by her OB/GYN and had STD testing at that time. She does not want one today. She is not concerned for STDs. Patient is able to tolerate by mouth and is supplemented with potassium. She endorses improvement of her symptoms. We did discuss possible ectopic pregnancy versus threatened miscarriage. Patient is to follow-up with primary care physician as well as OB/GYN. She is given prescriptions for doxylamine and prenatal vitamins as well as vitamin B6. Abdominal exam nonsurgical.  Patient verbalized understanding and agreeable to the plan. This patient's ED course included: a personal history and physicial examination and re-evaluation prior to disposition    This patient has improved during their ED course. Consultations:  None    Oxygen Saturation Interpretation: 99 % on room air. Normal    Counseling:   I have spoken with the patient and discussed todays results, in addition to providing specific details for the plan of care and counseling regarding the diagnosis and prognosis. Questions are answered at this time and they are agreeable with the plan.     --------------------------------- IMPRESSION AND DISPOSITION ---------------------------------    IMPRESSION  1. Right lower quadrant abdominal pain    2. Pregnancy, location unknown    3. Leukocytosis, unspecified type    4. Hypokalemia        DISPOSITION  Disposition: Discharge to home  Patient condition is fair    I, Dr. Lorrie Scheuermann, am the primary provider of record    NOTE: This report was transcribed using voice recognition software.  Every effort was made to ensure accuracy; however, inadvertent computerized transcription errors may be present        Lorrie Scheuermann, MD  02/17/22 2205

## 2022-02-18 ENCOUNTER — HOSPITAL ENCOUNTER (INPATIENT)
Age: 37
LOS: 5 days | Discharge: HOME | DRG: 566 | End: 2022-02-23
Attending: EMERGENCY MEDICINE | Admitting: SURGERY
Payer: COMMERCIAL

## 2022-02-18 ENCOUNTER — APPOINTMENT (OUTPATIENT)
Dept: MRI IMAGING | Age: 37
DRG: 566 | End: 2022-02-18
Payer: COMMERCIAL

## 2022-02-18 DIAGNOSIS — R11.15 EMESIS, PERSISTENT: ICD-10-CM

## 2022-02-18 DIAGNOSIS — K50.10 CROHN'S COLITIS, WITHOUT COMPLICATIONS (HCC): ICD-10-CM

## 2022-02-18 DIAGNOSIS — R11.2 INTRACTABLE VOMITING WITH NAUSEA: ICD-10-CM

## 2022-02-18 DIAGNOSIS — K35.20 ACUTE APPENDICITIS WITH GENERALIZED PERITONITIS, UNSPECIFIED WHETHER ABSCESS PRESENT, UNSPECIFIED WHETHER GANGRENE PRESENT, UNSPECIFIED WHETHER PERFORATION PRESENT: Primary | ICD-10-CM

## 2022-02-18 DIAGNOSIS — F12.20 CANNABIS DEPENDENCE (HCC): ICD-10-CM

## 2022-02-18 DIAGNOSIS — K80.20 SYMPTOMATIC CHOLELITHIASIS: ICD-10-CM

## 2022-02-18 DIAGNOSIS — Z72.0 TOBACCO ABUSE: ICD-10-CM

## 2022-02-18 LAB
ALBUMIN SERPL-MCNC: 4.3 G/DL (ref 3.5–5.2)
ALP BLD-CCNC: 51 U/L (ref 35–104)
ALT SERPL-CCNC: 11 U/L (ref 0–32)
ANION GAP SERPL CALCULATED.3IONS-SCNC: 11 MMOL/L (ref 7–16)
AST SERPL-CCNC: 16 U/L (ref 0–31)
BACTERIA: ABNORMAL /HPF
BASOPHILS ABSOLUTE: 0.07 E9/L (ref 0–0.2)
BASOPHILS RELATIVE PERCENT: 0.4 % (ref 0–2)
BILIRUB SERPL-MCNC: 0.9 MG/DL (ref 0–1.2)
BILIRUBIN URINE: NEGATIVE
BLOOD, URINE: NEGATIVE
BUN BLDV-MCNC: 11 MG/DL (ref 6–20)
CALCIUM SERPL-MCNC: 9.1 MG/DL (ref 8.6–10.2)
CHLORIDE BLD-SCNC: 103 MMOL/L (ref 98–107)
CLARITY: CLEAR
CO2: 23 MMOL/L (ref 22–29)
COLOR: YELLOW
CREAT SERPL-MCNC: 0.7 MG/DL (ref 0.5–1)
EOSINOPHILS ABSOLUTE: 0.18 E9/L (ref 0.05–0.5)
EOSINOPHILS RELATIVE PERCENT: 1 % (ref 0–6)
GFR AFRICAN AMERICAN: >60
GFR NON-AFRICAN AMERICAN: >60 ML/MIN/1.73
GLUCOSE BLD-MCNC: 102 MG/DL (ref 74–99)
GLUCOSE URINE: NEGATIVE MG/DL
GONADOTROPIN, CHORIONIC (HCG) QUANT: 144.3 MIU/ML
HCT VFR BLD CALC: 42.2 % (ref 34–48)
HEMOGLOBIN: 14.2 G/DL (ref 11.5–15.5)
IMMATURE GRANULOCYTES #: 0.1 E9/L
IMMATURE GRANULOCYTES %: 0.5 % (ref 0–5)
KETONES, URINE: ABNORMAL MG/DL
LACTIC ACID, SEPSIS: 1.3 MMOL/L (ref 0.5–1.9)
LEUKOCYTE ESTERASE, URINE: ABNORMAL
LIPASE: 36 U/L (ref 13–60)
LYMPHOCYTES ABSOLUTE: 3.76 E9/L (ref 1.5–4)
LYMPHOCYTES RELATIVE PERCENT: 20.3 % (ref 20–42)
MCH RBC QN AUTO: 31.2 PG (ref 26–35)
MCHC RBC AUTO-ENTMCNC: 33.6 % (ref 32–34.5)
MCV RBC AUTO: 92.7 FL (ref 80–99.9)
MONOCYTES ABSOLUTE: 0.88 E9/L (ref 0.1–0.95)
MONOCYTES RELATIVE PERCENT: 4.7 % (ref 2–12)
NEUTROPHILS ABSOLUTE: 13.56 E9/L (ref 1.8–7.3)
NEUTROPHILS RELATIVE PERCENT: 73.1 % (ref 43–80)
NITRITE, URINE: NEGATIVE
PDW BLD-RTO: 13.7 FL (ref 11.5–15)
PH UA: 8 (ref 5–9)
PLATELET # BLD: 451 E9/L (ref 130–450)
PMV BLD AUTO: 9.1 FL (ref 7–12)
POTASSIUM SERPL-SCNC: 3.7 MMOL/L (ref 3.5–5)
PROTEIN UA: NEGATIVE MG/DL
RBC # BLD: 4.55 E12/L (ref 3.5–5.5)
RBC UA: ABNORMAL /HPF (ref 0–2)
RENAL EPITHELIAL, UA: ABNORMAL /HPF
SODIUM BLD-SCNC: 137 MMOL/L (ref 132–146)
SPECIFIC GRAVITY UA: 1.01 (ref 1–1.03)
TOTAL PROTEIN: 7 G/DL (ref 6.4–8.3)
UROBILINOGEN, URINE: 1 E.U./DL
WBC # BLD: 18.6 E9/L (ref 4.5–11.5)
WBC UA: ABNORMAL /HPF (ref 0–5)

## 2022-02-18 PROCEDURE — 6360000002 HC RX W HCPCS: Performed by: NURSE PRACTITIONER

## 2022-02-18 PROCEDURE — 2580000003 HC RX 258: Performed by: NURSE PRACTITIONER

## 2022-02-18 PROCEDURE — 2580000003 HC RX 258: Performed by: STUDENT IN AN ORGANIZED HEALTH CARE EDUCATION/TRAINING PROGRAM

## 2022-02-18 PROCEDURE — 96375 TX/PRO/DX INJ NEW DRUG ADDON: CPT

## 2022-02-18 PROCEDURE — 6370000000 HC RX 637 (ALT 250 FOR IP): Performed by: STUDENT IN AN ORGANIZED HEALTH CARE EDUCATION/TRAINING PROGRAM

## 2022-02-18 PROCEDURE — 1200000000 HC SEMI PRIVATE

## 2022-02-18 PROCEDURE — 96365 THER/PROPH/DIAG IV INF INIT: CPT

## 2022-02-18 PROCEDURE — 80053 COMPREHEN METABOLIC PANEL: CPT

## 2022-02-18 PROCEDURE — 36415 COLL VENOUS BLD VENIPUNCTURE: CPT

## 2022-02-18 PROCEDURE — 83690 ASSAY OF LIPASE: CPT

## 2022-02-18 PROCEDURE — 85025 COMPLETE CBC W/AUTO DIFF WBC: CPT

## 2022-02-18 PROCEDURE — 81001 URINALYSIS AUTO W/SCOPE: CPT

## 2022-02-18 PROCEDURE — 83605 ASSAY OF LACTIC ACID: CPT

## 2022-02-18 PROCEDURE — 6360000002 HC RX W HCPCS: Performed by: STUDENT IN AN ORGANIZED HEALTH CARE EDUCATION/TRAINING PROGRAM

## 2022-02-18 PROCEDURE — 84702 CHORIONIC GONADOTROPIN TEST: CPT

## 2022-02-18 PROCEDURE — 74181 MRI ABDOMEN W/O CONTRAST: CPT

## 2022-02-18 PROCEDURE — 99285 EMERGENCY DEPT VISIT HI MDM: CPT

## 2022-02-18 PROCEDURE — A4216 STERILE WATER/SALINE, 10 ML: HCPCS | Performed by: STUDENT IN AN ORGANIZED HEALTH CARE EDUCATION/TRAINING PROGRAM

## 2022-02-18 PROCEDURE — 96376 TX/PRO/DX INJ SAME DRUG ADON: CPT

## 2022-02-18 RX ORDER — FENTANYL CITRATE 50 UG/ML
25 INJECTION, SOLUTION INTRAMUSCULAR; INTRAVENOUS ONCE
Status: COMPLETED | OUTPATIENT
Start: 2022-02-18 | End: 2022-02-18

## 2022-02-18 RX ORDER — ONDANSETRON 2 MG/ML
4 INJECTION INTRAMUSCULAR; INTRAVENOUS EVERY 6 HOURS PRN
Status: DISCONTINUED | OUTPATIENT
Start: 2022-02-18 | End: 2022-02-23 | Stop reason: HOSPADM

## 2022-02-18 RX ORDER — PANTOPRAZOLE SODIUM 40 MG/10ML
40 INJECTION, POWDER, LYOPHILIZED, FOR SOLUTION INTRAVENOUS DAILY
Status: DISCONTINUED | OUTPATIENT
Start: 2022-02-19 | End: 2022-02-23 | Stop reason: HOSPADM

## 2022-02-18 RX ORDER — METOCLOPRAMIDE HYDROCHLORIDE 5 MG/ML
10 INJECTION INTRAMUSCULAR; INTRAVENOUS ONCE
Status: COMPLETED | OUTPATIENT
Start: 2022-02-18 | End: 2022-02-18

## 2022-02-18 RX ORDER — ONDANSETRON 4 MG/1
4 TABLET, FILM COATED ORAL EVERY 8 HOURS PRN
COMMUNITY
End: 2022-10-10

## 2022-02-18 RX ORDER — SODIUM CHLORIDE 9 MG/ML
10 INJECTION INTRAVENOUS 2 TIMES DAILY
Status: DISCONTINUED | OUTPATIENT
Start: 2022-02-18 | End: 2022-02-23 | Stop reason: HOSPADM

## 2022-02-18 RX ORDER — DIPHENHYDRAMINE HYDROCHLORIDE 50 MG/ML
25 INJECTION INTRAMUSCULAR; INTRAVENOUS ONCE
Status: COMPLETED | OUTPATIENT
Start: 2022-02-18 | End: 2022-02-18

## 2022-02-18 RX ORDER — SODIUM CHLORIDE 9 MG/ML
1000 INJECTION, SOLUTION INTRAVENOUS ONCE
Status: COMPLETED | OUTPATIENT
Start: 2022-02-18 | End: 2022-02-18

## 2022-02-18 RX ORDER — 0.9 % SODIUM CHLORIDE 0.9 %
1000 INTRAVENOUS SOLUTION INTRAVENOUS ONCE
Status: COMPLETED | OUTPATIENT
Start: 2022-02-18 | End: 2022-02-18

## 2022-02-18 RX ADMIN — SODIUM CHLORIDE 1000 ML: 9 INJECTION, SOLUTION INTRAVENOUS at 21:50

## 2022-02-18 RX ADMIN — HYDROMORPHONE HYDROCHLORIDE 0.5 MG: 1 INJECTION, SOLUTION INTRAMUSCULAR; INTRAVENOUS; SUBCUTANEOUS at 21:50

## 2022-02-18 RX ADMIN — METOCLOPRAMIDE HYDROCHLORIDE 10 MG: 5 INJECTION INTRAMUSCULAR; INTRAVENOUS at 09:55

## 2022-02-18 RX ADMIN — DIPHENHYDRAMINE HYDROCHLORIDE 25 MG: 50 INJECTION, SOLUTION INTRAMUSCULAR; INTRAVENOUS at 09:55

## 2022-02-18 RX ADMIN — ONDANSETRON 4 MG: 2 INJECTION INTRAMUSCULAR; INTRAVENOUS at 21:50

## 2022-02-18 RX ADMIN — FENTANYL CITRATE 25 MCG: 0.05 INJECTION, SOLUTION INTRAMUSCULAR; INTRAVENOUS at 13:21

## 2022-02-18 RX ADMIN — SODIUM CHLORIDE 1000 ML: 9 INJECTION, SOLUTION INTRAVENOUS at 09:55

## 2022-02-18 RX ADMIN — CEFOXITIN 2000 MG: 2 INJECTION, POWDER, FOR SOLUTION INTRAVENOUS at 20:58

## 2022-02-18 RX ADMIN — FENTANYL CITRATE 25 MCG: 0.05 INJECTION, SOLUTION INTRAMUSCULAR; INTRAVENOUS at 10:47

## 2022-02-18 RX ADMIN — LIDOCAINE HYDROCHLORIDE: 20 SOLUTION ORAL; TOPICAL at 21:37

## 2022-02-18 RX ADMIN — FENTANYL CITRATE 25 MCG: 0.05 INJECTION, SOLUTION INTRAMUSCULAR; INTRAVENOUS at 17:56

## 2022-02-18 RX ADMIN — SODIUM CHLORIDE, PRESERVATIVE FREE 10 ML: 5 INJECTION INTRAVENOUS at 21:36

## 2022-02-18 ASSESSMENT — PAIN SCALES - GENERAL
PAINLEVEL_OUTOF10: 0
PAINLEVEL_OUTOF10: 6
PAINLEVEL_OUTOF10: 9
PAINLEVEL_OUTOF10: 0
PAINLEVEL_OUTOF10: 0
PAINLEVEL_OUTOF10: 7
PAINLEVEL_OUTOF10: 4

## 2022-02-18 ASSESSMENT — PAIN DESCRIPTION - LOCATION
LOCATION: ABDOMEN
LOCATION: ABDOMEN

## 2022-02-18 ASSESSMENT — PAIN DESCRIPTION - PAIN TYPE: TYPE: ACUTE PAIN

## 2022-02-18 NOTE — ED PROVIDER NOTES
ED Attending shared visit  CC: [address]  Department of Emergency Medicine   ED  Encounter Note  Admit Date/RoomTime: 2022  9:37 AM  ED Room: 5254/7146-N    NAME: Sandra Silva  : 1985  MRN: [id number]     Chief Complaint:  Abdominal Pain (right sided, sharp/shooting to the ribs and back. seen yesterday for same thing, didn't get scanned because they found out she is pregnant.  )    History of Present Illness       Sandra Silva is a 29 y.o. old female who presents to the emergency department by ambulance, for still present aching, sharp pain in the RLQ without radiation which began 5 day(s) prior to arrival.   There has been similar episodes in the past she does have a history of crohn's disease. She was seen here yesterday for same and was found to be pregnant. Since onset the symptoms have been stable. The pain is associated with nausea and vomiting. The pain is aggravated by nothing in particular and relieved by nothing. She has tried zofran without improvement. She does endorse marijuana use. There has been NO additional complaints, denies any urinary symptoms, vaginal bleeding or discharge. Patient's last menstrual period was 2022. .   OB History        5    Para   2    Term   1       1    AB   1    Living   2       SAB   1    IAB        Ectopic        Molar        Multiple        Live Births   2              ROS   Pertinent positives and negatives are stated within HPI, all other systems reviewed and are negative. Past Medical History:  has a past medical history of Anxiety attack, Asthma, Crohn's colitis ([address] Marijuana abuse, Multiple lacerations, Seizure ([address] and Tobacco abuse. Surgical History has a past surgical history that includes Dilation and curettage of uterus (); Colonoscopy; Endoscopy, colon, diagnostic; Cholecystectomy (2018); hernia repair; and hernia repair.     Social History:  reports that she has 48.0 %    MCV 92.7 80.0 - 99.9 fL    MCH 31.2 26.0 - 35.0 pg    MCHC 33.6 32.0 - 34.5 %    RDW 13.7 11.5 - 15.0 fL    Platelets 144 (H) 574 - 450 E9/L    MPV 9.1 7.0 - 12.0 fL    Neutrophils % 73.1 43.0 - 80.0 %    Immature Granulocytes % 0.5 0.0 - 5.0 %    Lymphocytes % 20.3 20.0 - 42.0 %    Monocytes % 4.7 2.0 - 12.0 %    Eosinophils % 1.0 0.0 - 6.0 %    Basophils % 0.4 0.0 - 2.0 %    Neutrophils Absolute 13.56 (H) 1.80 - 7.30 E9/L    Immature Granulocytes # 0.10 E9/L    Lymphocytes Absolute 3.76 1.50 - 4.00 E9/L    Monocytes Absolute 0.88 0.10 - 0.95 E9/L    Eosinophils Absolute 0.18 0.05 - 0.50 E9/L    Basophils Absolute 0.07 0.00 - 0.20 E9/L   Comprehensive Metabolic Panel   Result Value Ref Range    Sodium 137 132 - 146 mmol/L    Potassium 3.7 3.5 - 5.0 mmol/L    Chloride 103 98 - 107 mmol/L    CO2 23 22 - 29 mmol/L    Anion Gap 11 7 - 16 mmol/L    Glucose 102 (H) 74 - 99 mg/dL    BUN 11 6 - 20 mg/dL    CREATININE 0.7 0.5 - 1.0 mg/dL    GFR Non-African American >60 >=60 mL/min/1.73    GFR African American >60     Calcium 9.1 8.6 - 10.2 mg/dL    Total Protein 7.0 6.4 - 8.3 g/dL    Albumin 4.3 3.5 - 5.2 g/dL    Total Bilirubin 0.9 0.0 - 1.2 mg/dL    Alkaline Phosphatase 51 35 - 104 U/L    ALT 11 0 - 32 U/L    AST 16 0 - 31 U/L   Lactate, Sepsis   Result Value Ref Range    Lactic Acid, Sepsis 1.3 0.5 - 1.9 mmol/L   Lipase   Result Value Ref Range    Lipase 36 13 - 60 U/L   Urinalysis   Result Value Ref Range    Color, UA Yellow Straw/Yellow    Clarity, UA Clear Clear    Glucose, Ur Negative Negative mg/dL    Bilirubin Urine Negative Negative    Ketones, Urine TRACE (A) Negative mg/dL    Specific Gravity, UA 1.010 1.005 - 1.030    Blood, Urine Negative Negative    pH, UA 8.0 5.0 - 9.0    Protein, UA Negative Negative mg/dL    Urobilinogen, Urine 1.0 <2.0 E.U./dL    Nitrite, Urine Negative Negative    Leukocyte Esterase, Urine TRACE (A) Negative   hCG, quantitative, pregnancy   Result Value Ref Range    hCG Quant 144.3 (H) <10 mIU/mL   Microscopic Urinalysis   Result Value Ref Range    WBC, UA 1-3 0 - 5 /HPF    RBC, UA NONE 0 - 2 /HPF    Renal Epithelial, UA FEW /HPF    Bacteria, UA FEW (A) None Seen /HPF   Comprehensive Metabolic Panel w/ Reflex to MG   Result Value Ref Range    Sodium 134 132 - 146 mmol/L    Potassium reflex Magnesium 3.3 (L) 3.5 - 5.0 mmol/L    Chloride 101 98 - 107 mmol/L    CO2 22 22 - 29 mmol/L    Anion Gap 11 7 - 16 mmol/L    Glucose 79 74 - 99 mg/dL    BUN 5 (L) 6 - 20 mg/dL    CREATININE 0.6 0.5 - 1.0 mg/dL    GFR Non-African American >60 >=60 mL/min/1.73    GFR African American >60     Calcium 7.9 (L) 8.6 - 10.2 mg/dL    Total Protein 6.0 (L) 6.4 - 8.3 g/dL    Albumin 3.6 3.5 - 5.2 g/dL    Total Bilirubin 0.7 0.0 - 1.2 mg/dL    Alkaline Phosphatase 46 35 - 104 U/L    ALT 12 0 - 32 U/L    AST 17 0 - 31 U/L   CBC auto differential   Result Value Ref Range    WBC 11.3 4.5 - 11.5 E9/L    RBC 4.09 3.50 - 5.50 E12/L    Hemoglobin 12.9 11.5 - 15.5 g/dL    Hematocrit 38.4 34.0 - 48.0 %    MCV 93.9 80.0 - 99.9 fL    MCH 31.5 26.0 - 35.0 pg    MCHC 33.6 32.0 - 34.5 %    RDW 13.4 11.5 - 15.0 fL    Platelets 986 494 - 488 E9/L    MPV 9.2 7.0 - 12.0 fL    Neutrophils % 43.8 43.0 - 80.0 %    Immature Granulocytes % 0.4 0.0 - 5.0 %    Lymphocytes % 46.3 (H) 20.0 - 42.0 %    Monocytes % 6.4 2.0 - 12.0 %    Eosinophils % 2.6 0.0 - 6.0 %    Basophils % 0.5 0.0 - 2.0 %    Neutrophils Absolute 4.92 1.80 - 7.30 E9/L    Immature Granulocytes # 0.05 E9/L    Lymphocytes Absolute 5.21 (H) 1.50 - 4.00 E9/L    Monocytes Absolute 0.72 0.10 - 0.95 E9/L    Eosinophils Absolute 0.29 0.05 - 0.50 E9/L    Basophils Absolute 0.06 0.00 - 0.20 E9/L   Magnesium   Result Value Ref Range    Magnesium 2.0 1.6 - 2.6 mg/dL     Imaging: All Radiology results interpreted by Radiologist unless otherwise noted. MRI ABDOMEN WO CONTRAST   Final Result   1.   A structure likely representing the appendix in the right lower quadrant   demonstrates a similar course to the definitive appendix seen on prior CT. This structure is dilated measuring 1.0 cm and demonstrates wall thickening,   concerning for acute appendicitis. Small volume free fluid is seen within   the pelvis, nonspecific. 2.  Thickened endometrium without definitive gestational sac seen.       Discussed with Dr.Katie Dhaval Valenzuela by Dr. Maddie Treviño at 8:33 pm on 2/18/2022           ED Course / Medical Decision Making     Medications   pantoprazole (PROTONIX) injection 40 mg (has no administration in time range)     And   sodium chloride (PF) 0.9 % injection 10 mL (10 mLs IntraVENous Given 2/18/22 2136)   HYDROmorphone (DILAUDID) injection 0.5 mg (0.5 mg IntraVENous Given 2/19/22 0714)   ondansetron (ZOFRAN) injection 4 mg (4 mg IntraVENous Given 2/19/22 0356)   sodium chloride flush 0.9 % injection 5-40 mL (has no administration in time range)   sodium chloride flush 0.9 % injection 10 mL (has no administration in time range)   0.9 % sodium chloride infusion (has no administration in time range)   0.9 % sodium chloride infusion ( IntraVENous New Bag 2/19/22 0100)   metoclopramide (REGLAN) injection 10 mg (10 mg IntraVENous Given 2/18/22 0955)   diphenhydrAMINE (BENADRYL) injection 25 mg (25 mg IntraVENous Given 2/18/22 0955)   0.9 % sodium chloride bolus (0 mLs IntraVENous Stopped 2/18/22 1047)   fentaNYL (SUBLIMAZE) injection 25 mcg (25 mcg IntraVENous Given 2/18/22 1047)   fentaNYL (SUBLIMAZE) injection 25 mcg (25 mcg IntraVENous Given 2/18/22 1321)   fentaNYL (SUBLIMAZE) injection 25 mcg (25 mcg IntraVENous Given 2/18/22 1756)   cefOXitin (MEFOXIN) 2000 mg in dextrose 5% 50 mL (mini-bag) (0 mg IntraVENous Stopped 2/18/22 2134)   0.9 % sodium chloride infusion (1,000 mLs IntraVENous New Bag 2/18/22 2150)   aluminum & magnesium hydroxide-simethicone (MAALOX) 30 mL, lidocaine viscous hcl (XYLOCAINE) 5 mL (GI COCKTAIL) ( Oral Given 2/18/22 2137)       Consultations:             [facility] SURGERY    Procedures:   none    MDM: Patient is well-appearing, afebrile. Presents with lower abdominal pain that began several days prior to arrival.  She was actually seen here yesterday and found out that she was pregnant at that time. Yesterday's labs reviewed slight leukocytosis beta hCG 91. Patient did have an ultrasound during yesterday's visit imaging was reviewed indicative of pregnancy however unknown location intrauterine gestational sac not identified endometrium thickened normal appearance of bilateral ovaries with normal ovarian vascularity. Patient declined further imaging at that time however returned today as her pain is worsening. Labs today leukocytosis WBC 18.6 CMP within normal limits lactate negative at 1.3 lipase 36. UA positive for trace ketones and trace leukocytes 1-3 WBCs no RBCs with few bacteria. Repeat beta hCG 144.3 trending up. MRI abdomen was ordered and pending, patient signed out to Dr. Francesco Madera at 1800. Plan of Care/Counseling:  LEIDY Preston CNP reviewed today's visit with the patient in addition to providing specific details for the plan of care and counseling regarding the diagnosis and prognosis. Questions are answered at this time and are agreeable with the plan. Assessment      1. Acute appendicitis with generalized peritonitis, unspecified whether abscess present, unspecified whether gangrene present, unspecified whether perforation present      This patient's ED course included: a personal history and physicial examination, re-evaluation prior to disposition and IV medications  This patient has remained hemodynamically stable during their ED course. Plan       New Medications     Current Discharge Medication List        Electronically signed by LEIDY Preston CNP   DD: 2/18/22  **This report was transcribed using voice recognition software.  Every effort was made to ensure accuracy; however, inadvertent computerized transcription errors may

## 2022-02-18 NOTE — ED TRIAGE NOTES
Radiology Procedure Waiver   Name: Nohemy Aguirre  : 1985  MRN: [id number]    Date:  22    Time: 1:17 PM EST    Benefits of immediately proceeding with Radiology exam(s) without pre-testing outweigh the risks or are not indicated as specified below and therefore the following is/are being waived:    [x] Pregnancy test   [] Patients LMP on-time and regular.   [] Patient had Tubal Ligation or has other Contraception Device. [] Patient  is Menopausal or Premenarcheal.    [] Patient had Full or Partial Hysterectomy. PREGNANT, WAIVER SIGNED  [] Protocol for Iodine allergy    [] MRI Questionnaire     [] BUN/Creatinine   [] Patient age w/no hx of renal dysfunction. [] Patient on Dialysis. [] Recent Normal Labs.   Electronically signed by LEIDY Blanca CNP on 22 at 1:17 PM EST

## 2022-02-18 NOTE — ED NOTES
Bed: 17A-17  Expected date:   Expected time:   Means of arrival:   Comments:  ABIMAEL Vargas RN  02/18/22 8103

## 2022-02-19 ENCOUNTER — ANESTHESIA EVENT (OUTPATIENT)
Dept: ENDOSCOPY | Age: 37
DRG: 566 | End: 2022-02-19
Payer: COMMERCIAL

## 2022-02-19 ENCOUNTER — ANESTHESIA (OUTPATIENT)
Dept: ENDOSCOPY | Age: 37
DRG: 566 | End: 2022-02-19
Payer: COMMERCIAL

## 2022-02-19 VITALS
DIASTOLIC BLOOD PRESSURE: 57 MMHG | SYSTOLIC BLOOD PRESSURE: 106 MMHG | RESPIRATION RATE: 9 BRPM | OXYGEN SATURATION: 100 %

## 2022-02-19 LAB
ALBUMIN SERPL-MCNC: 3.6 G/DL (ref 3.5–5.2)
ALP BLD-CCNC: 46 U/L (ref 35–104)
ALT SERPL-CCNC: 12 U/L (ref 0–32)
ANION GAP SERPL CALCULATED.3IONS-SCNC: 11 MMOL/L (ref 7–16)
AST SERPL-CCNC: 17 U/L (ref 0–31)
BASOPHILS ABSOLUTE: 0.06 E9/L (ref 0–0.2)
BASOPHILS RELATIVE PERCENT: 0.5 % (ref 0–2)
BILIRUB SERPL-MCNC: 0.7 MG/DL (ref 0–1.2)
BUN BLDV-MCNC: 5 MG/DL (ref 6–20)
CALCIUM SERPL-MCNC: 7.9 MG/DL (ref 8.6–10.2)
CHLORIDE BLD-SCNC: 101 MMOL/L (ref 98–107)
CO2: 22 MMOL/L (ref 22–29)
CREAT SERPL-MCNC: 0.6 MG/DL (ref 0.5–1)
EOSINOPHILS ABSOLUTE: 0.29 E9/L (ref 0.05–0.5)
EOSINOPHILS RELATIVE PERCENT: 2.6 % (ref 0–6)
GFR AFRICAN AMERICAN: >60
GFR NON-AFRICAN AMERICAN: >60 ML/MIN/1.73
GLUCOSE BLD-MCNC: 79 MG/DL (ref 74–99)
HCT VFR BLD CALC: 38.4 % (ref 34–48)
HEMOGLOBIN: 12.9 G/DL (ref 11.5–15.5)
IMMATURE GRANULOCYTES #: 0.05 E9/L
IMMATURE GRANULOCYTES %: 0.4 % (ref 0–5)
LYMPHOCYTES ABSOLUTE: 5.21 E9/L (ref 1.5–4)
LYMPHOCYTES RELATIVE PERCENT: 46.3 % (ref 20–42)
MAGNESIUM: 2 MG/DL (ref 1.6–2.6)
MCH RBC QN AUTO: 31.5 PG (ref 26–35)
MCHC RBC AUTO-ENTMCNC: 33.6 % (ref 32–34.5)
MCV RBC AUTO: 93.9 FL (ref 80–99.9)
MONOCYTES ABSOLUTE: 0.72 E9/L (ref 0.1–0.95)
MONOCYTES RELATIVE PERCENT: 6.4 % (ref 2–12)
NEUTROPHILS ABSOLUTE: 4.92 E9/L (ref 1.8–7.3)
NEUTROPHILS RELATIVE PERCENT: 43.8 % (ref 43–80)
PDW BLD-RTO: 13.4 FL (ref 11.5–15)
PLATELET # BLD: 386 E9/L (ref 130–450)
PMV BLD AUTO: 9.2 FL (ref 7–12)
POTASSIUM REFLEX MAGNESIUM: 3.3 MMOL/L (ref 3.5–5)
RBC # BLD: 4.09 E12/L (ref 3.5–5.5)
SODIUM BLD-SCNC: 134 MMOL/L (ref 132–146)
TOTAL PROTEIN: 6 G/DL (ref 6.4–8.3)
WBC # BLD: 11.3 E9/L (ref 4.5–11.5)

## 2022-02-19 PROCEDURE — 2709999900 HC NON-CHARGEABLE SUPPLY: Performed by: SURGERY

## 2022-02-19 PROCEDURE — 2580000003 HC RX 258: Performed by: STUDENT IN AN ORGANIZED HEALTH CARE EDUCATION/TRAINING PROGRAM

## 2022-02-19 PROCEDURE — 6360000002 HC RX W HCPCS: Performed by: ANESTHESIOLOGY

## 2022-02-19 PROCEDURE — 3700000001 HC ADD 15 MINUTES (ANESTHESIA): Performed by: SURGERY

## 2022-02-19 PROCEDURE — 0DB68ZX EXCISION OF STOMACH, VIA NATURAL OR ARTIFICIAL OPENING ENDOSCOPIC, DIAGNOSTIC: ICD-10-PCS | Performed by: SURGERY

## 2022-02-19 PROCEDURE — C9113 INJ PANTOPRAZOLE SODIUM, VIA: HCPCS | Performed by: STUDENT IN AN ORGANIZED HEALTH CARE EDUCATION/TRAINING PROGRAM

## 2022-02-19 PROCEDURE — 85025 COMPLETE CBC W/AUTO DIFF WBC: CPT

## 2022-02-19 PROCEDURE — 6360000002 HC RX W HCPCS: Performed by: STUDENT IN AN ORGANIZED HEALTH CARE EDUCATION/TRAINING PROGRAM

## 2022-02-19 PROCEDURE — 80053 COMPREHEN METABOLIC PANEL: CPT

## 2022-02-19 PROCEDURE — 3700000000 HC ANESTHESIA ATTENDED CARE: Performed by: SURGERY

## 2022-02-19 PROCEDURE — 83735 ASSAY OF MAGNESIUM: CPT

## 2022-02-19 PROCEDURE — 7100000000 HC PACU RECOVERY - FIRST 15 MIN: Performed by: SURGERY

## 2022-02-19 PROCEDURE — 36415 COLL VENOUS BLD VENIPUNCTURE: CPT

## 2022-02-19 PROCEDURE — 1200000000 HC SEMI PRIVATE

## 2022-02-19 PROCEDURE — 88305 TISSUE EXAM BY PATHOLOGIST: CPT

## 2022-02-19 PROCEDURE — 7100000001 HC PACU RECOVERY - ADDTL 15 MIN: Performed by: SURGERY

## 2022-02-19 PROCEDURE — 3609012400 HC EGD TRANSORAL BIOPSY SINGLE/MULTIPLE: Performed by: SURGERY

## 2022-02-19 RX ORDER — POTASSIUM CHLORIDE 7.45 MG/ML
10 INJECTION INTRAVENOUS
Status: DISPENSED | OUTPATIENT
Start: 2022-02-19 | End: 2022-02-19

## 2022-02-19 RX ORDER — POTASSIUM CHLORIDE 7.45 MG/ML
10 INJECTION INTRAVENOUS
Status: COMPLETED | OUTPATIENT
Start: 2022-02-19 | End: 2022-02-19

## 2022-02-19 RX ORDER — SODIUM CHLORIDE 0.9 % (FLUSH) 0.9 %
5-40 SYRINGE (ML) INJECTION EVERY 12 HOURS SCHEDULED
Status: DISCONTINUED | OUTPATIENT
Start: 2022-02-19 | End: 2022-02-23 | Stop reason: HOSPADM

## 2022-02-19 RX ORDER — SODIUM CHLORIDE 9 MG/ML
25 INJECTION, SOLUTION INTRAVENOUS PRN
Status: DISCONTINUED | OUTPATIENT
Start: 2022-02-19 | End: 2022-02-23 | Stop reason: HOSPADM

## 2022-02-19 RX ORDER — SODIUM CHLORIDE 9 MG/ML
INJECTION, SOLUTION INTRAVENOUS CONTINUOUS
Status: DISCONTINUED | OUTPATIENT
Start: 2022-02-19 | End: 2022-02-20

## 2022-02-19 RX ORDER — PROPOFOL 10 MG/ML
INJECTION, EMULSION INTRAVENOUS PRN
Status: DISCONTINUED | OUTPATIENT
Start: 2022-02-19 | End: 2022-02-19 | Stop reason: SDUPTHER

## 2022-02-19 RX ORDER — SODIUM CHLORIDE 0.9 % (FLUSH) 0.9 %
10 SYRINGE (ML) INJECTION PRN
Status: DISCONTINUED | OUTPATIENT
Start: 2022-02-19 | End: 2022-02-23 | Stop reason: HOSPADM

## 2022-02-19 RX ADMIN — ONDANSETRON 4 MG: 2 INJECTION INTRAMUSCULAR; INTRAVENOUS at 03:56

## 2022-02-19 RX ADMIN — HYDROMORPHONE HYDROCHLORIDE 0.5 MG: 1 INJECTION, SOLUTION INTRAMUSCULAR; INTRAVENOUS; SUBCUTANEOUS at 00:55

## 2022-02-19 RX ADMIN — SODIUM CHLORIDE: 9 INJECTION, SOLUTION INTRAVENOUS at 01:00

## 2022-02-19 RX ADMIN — HYDROMORPHONE HYDROCHLORIDE 0.5 MG: 1 INJECTION, SOLUTION INTRAMUSCULAR; INTRAVENOUS; SUBCUTANEOUS at 10:43

## 2022-02-19 RX ADMIN — SODIUM CHLORIDE, PRESERVATIVE FREE 10 ML: 5 INJECTION INTRAVENOUS at 09:23

## 2022-02-19 RX ADMIN — HYDROMORPHONE HYDROCHLORIDE 0.5 MG: 1 INJECTION, SOLUTION INTRAMUSCULAR; INTRAVENOUS; SUBCUTANEOUS at 22:27

## 2022-02-19 RX ADMIN — ONDANSETRON 4 MG: 2 INJECTION INTRAMUSCULAR; INTRAVENOUS at 18:57

## 2022-02-19 RX ADMIN — PANTOPRAZOLE SODIUM 40 MG: 40 INJECTION, POWDER, FOR SOLUTION INTRAVENOUS at 09:22

## 2022-02-19 RX ADMIN — POTASSIUM CHLORIDE 10 MEQ: 7.46 INJECTION, SOLUTION INTRAVENOUS at 18:56

## 2022-02-19 RX ADMIN — PROPOFOL 150 MG: 10 INJECTION, EMULSION INTRAVENOUS at 13:05

## 2022-02-19 RX ADMIN — PROPOFOL 100 MG: 10 INJECTION, EMULSION INTRAVENOUS at 13:04

## 2022-02-19 RX ADMIN — POTASSIUM CHLORIDE 10 MEQ: 7.46 INJECTION, SOLUTION INTRAVENOUS at 16:22

## 2022-02-19 RX ADMIN — POTASSIUM CHLORIDE 10 MEQ: 7.46 INJECTION, SOLUTION INTRAVENOUS at 12:23

## 2022-02-19 RX ADMIN — HYDROMORPHONE HYDROCHLORIDE 0.5 MG: 1 INJECTION, SOLUTION INTRAMUSCULAR; INTRAVENOUS; SUBCUTANEOUS at 03:56

## 2022-02-19 RX ADMIN — ONDANSETRON 4 MG: 2 INJECTION INTRAMUSCULAR; INTRAVENOUS at 10:43

## 2022-02-19 RX ADMIN — POTASSIUM CHLORIDE 10 MEQ: 7.46 INJECTION, SOLUTION INTRAVENOUS at 17:27

## 2022-02-19 RX ADMIN — HYDROMORPHONE HYDROCHLORIDE 0.5 MG: 1 INJECTION, SOLUTION INTRAMUSCULAR; INTRAVENOUS; SUBCUTANEOUS at 18:57

## 2022-02-19 RX ADMIN — HYDROMORPHONE HYDROCHLORIDE 0.5 MG: 1 INJECTION, SOLUTION INTRAMUSCULAR; INTRAVENOUS; SUBCUTANEOUS at 15:13

## 2022-02-19 RX ADMIN — HYDROMORPHONE HYDROCHLORIDE 0.5 MG: 1 INJECTION, SOLUTION INTRAMUSCULAR; INTRAVENOUS; SUBCUTANEOUS at 07:14

## 2022-02-19 ASSESSMENT — PAIN SCALES - GENERAL
PAINLEVEL_OUTOF10: 8
PAINLEVEL_OUTOF10: 3
PAINLEVEL_OUTOF10: 0
PAINLEVEL_OUTOF10: 9
PAINLEVEL_OUTOF10: 10
PAINLEVEL_OUTOF10: 9
PAINLEVEL_OUTOF10: 3
PAINLEVEL_OUTOF10: 9
PAINLEVEL_OUTOF10: 7
PAINLEVEL_OUTOF10: 7
PAINLEVEL_OUTOF10: 8
PAINLEVEL_OUTOF10: 4

## 2022-02-19 ASSESSMENT — PAIN DESCRIPTION - DESCRIPTORS
DESCRIPTORS: ACHING;DISCOMFORT
DESCRIPTORS: ACHING;DISCOMFORT;SORE
DESCRIPTORS: ACHING;DISCOMFORT;SORE
DESCRIPTORS: ACHING;CONSTANT;DISCOMFORT

## 2022-02-19 ASSESSMENT — PAIN DESCRIPTION - PAIN TYPE
TYPE: ACUTE PAIN

## 2022-02-19 ASSESSMENT — PAIN DESCRIPTION - ORIENTATION
ORIENTATION: LEFT;LOWER
ORIENTATION: RIGHT;LEFT;LOWER;MID;UPPER
ORIENTATION: LOWER;LEFT
ORIENTATION: LEFT;LOWER

## 2022-02-19 ASSESSMENT — PAIN DESCRIPTION - PROGRESSION
CLINICAL_PROGRESSION: GRADUALLY WORSENING
CLINICAL_PROGRESSION: GRADUALLY WORSENING

## 2022-02-19 ASSESSMENT — PAIN DESCRIPTION - LOCATION
LOCATION: ABDOMEN

## 2022-02-19 ASSESSMENT — PAIN - FUNCTIONAL ASSESSMENT: PAIN_FUNCTIONAL_ASSESSMENT: PREVENTS OR INTERFERES SOME ACTIVE ACTIVITIES AND ADLS

## 2022-02-19 ASSESSMENT — PAIN DESCRIPTION - FREQUENCY: FREQUENCY: CONTINUOUS

## 2022-02-19 ASSESSMENT — PAIN DESCRIPTION - ONSET: ONSET: ON-GOING

## 2022-02-19 NOTE — ANESTHESIA POSTPROCEDURE EVALUATION
Department of Anesthesiology  Postprocedure Note    Patient: Jacinta Winter  MRN: [id number]  YOB: 1985  Date of evaluation: 2/19/2022  Time:  1:22 PM     Procedure Summary     Date: 02/19/22 Room / Location: [address] [place] [address] 75    Anesthesia Start: 1301 Anesthesia Stop: 8814    Procedure: EGD BIOPSY (N/A ) Diagnosis: (.)    Surgeons: Denise Landrum MD Responsible Provider: Anastasiya Montes MD    Anesthesia Type: MAC ASA Status: 2          Anesthesia Type: MAC    Alan Phase I:      Alan Phase II:      Last vitals: Reviewed and per EMR flowsheets.        Anesthesia Post Evaluation    Patient location during evaluation: PACU  Patient participation: complete - patient participated  Level of consciousness: awake and alert  Airway patency: patent  Nausea & Vomiting: no nausea and no vomiting  Complications: no  Cardiovascular status: hemodynamically stable  Respiratory status: acceptable  Hydration status: euvolemic

## 2022-02-19 NOTE — H&P
GENERAL SURGERY  HISTORY AND PHYSICAL  2/18/2022    Chief Complaint   Patient presents with    Abdominal Pain     right sided, sharp/shooting to the ribs and back. seen yesterday for same thing, didn't get scanned because they found out she is pregnant. STEVAN Holley is a 29 y.o. female with a history of Crohn's Disease, cholecystectomy, cannabinoid hyperemesis, who presents for evaluation of nausea, vomiting, and upper abdominal pain. Pain worse epigastric and RUQ area and has been going on for 5 days. She states she was vomiting for 33 hours straight and had a couple episodes of hematemesis. She was seen in ED yesterday and said she thinks its her crohn's disease, found to be ?2 weeks pregnant. Symptoms improved and she went home.  states she had a Crohn's flare-up last week. MRI today showed 1cm dilated appendix with wall thickening without mesenteric stranding. She is afebrile with WBC 18k. Past Medical History:   Diagnosis Date    Anxiety attack since age 16    diagnosed with seizure due to convulsions from this    Asthma     seasonal; no inhaler     Crohn's colitis ([address]     Marijuana abuse     occas    Multiple lacerations 2015    stabbing; resolved    Seizure ([address]     loses focus, disoriented unpon arousing; etiology unknown per pt; none since 2015    Tobacco abuse        Past Surgical History:   Procedure Laterality Date    CHOLECYSTECTOMY  01/22/2018    COLONOSCOPY      DILATION AND CURETTAGE OF UTERUS  2011    ENDOSCOPY, COLON, DIAGNOSTIC      HERNIA REPAIR      HERNIA REPAIR         Prior to Admission medications    Medication Sig Start Date End Date Taking?  Authorizing Provider   ondansetron (ZOFRAN) 4 MG tablet Take 4 mg by mouth every 8 hours as needed for Nausea or Vomiting   Yes Historical Provider, MD   Prenatal Vit-Fe Fumarate-FA (PRENATAL VITAMIN) 27-0.8 MG TABS Take 1 tablet by mouth daily 2/17/22   Lorrie Scheuermann, MD       No Known Allergies    Family History   Problem Relation Age of Onset    Depression Mother     Cancer Mother     Heart Disease Father     Depression Father        Social History     Tobacco Use    Smoking status: Current Every Day Smoker     Packs/day: 1.00     Years: 10.00     Pack years: 10.00     Types: Cigarettes    Smokeless tobacco: Never Used   Vaping Use    Vaping Use: Never used   Substance Use Topics    Alcohol use: Yes     Comment: socially    Drug use: Not Currently     Frequency: 2.0 times per week     Types: Marijuana (Weed)         Review of Systems: pertinent ROS listed in HPI, all others negative       PHYSICAL EXAM:    Vitals:    02/18/22 1931   BP: (!) 140/66   Pulse: 70   Resp: 16   Temp: 98.1 °F (36.7 °C)   SpO2: 98%       GENERAL:  NAD. A&Ox3. Uncomfortable. HEAD:  Normocephalic, atraumatic. EYES:   No scleral icterus. PERRLA. LUNGS:  No increased work of breathing. CARDIOVASCULAR: RR  ABDOMEN:  Soft, non-distended, tender RUQ and epigastric. Minimal to no tenderness RLQ. No guarding, rigidity, rebound. ASSESSMENT/PLAN:  29 y.o. female with abdominal pain and flank pain, emesis for 5 days, +hCG. Hx Crohn's Disease, hyperemesis, cholecystectomy. Multiple episodes of emesis then 1-2 episodes of hematemesis  States pain worse epigastric, RUQ, and wraps to flank  Presentation fits more with ulcer or Kyra Falling tear rather than appendicitis  Would expect more inflammatory changes and possible perforation if her symptoms have been going on for 5 days. Also with her history of Crohn's Disease and no medication, hyperemesis related to cannabis, not a slam dunk for appendicitis  Will observe overnight, treat with PPI and GI cocktail, and monitor  May require diagnostic lap vs EGD depending on clinical course  NPO, IVF, PPI, Pain and nausea control     Plan discussed with Dr. Iain Stearns.     Sabrina Izaguirre DO  Surgery Resident PGY-4  2/18/2022  9:18 PM

## 2022-02-19 NOTE — PROGRESS NOTES
GENERAL SURGERY  DAILY PROGRESS NOTE  2/19/2022    CHIEF COMPLAINT:  Chief Complaint   Patient presents with    Abdominal Pain     right sided, sharp/shooting to the ribs and back. seen yesterday for same thing, didn't get scanned because they found out she is pregnant. SUBJECTIVE:  Patient with continued pain, states it is a little worse this morning. Having nausea, no vomiting. No BM. OBJECTIVE:  BP (!) 136/90   Pulse 89   Temp 97.6 °F (36.4 °C) (Oral)   Resp 18   Ht 5' 4\" (1.626 m)   Wt 127 lb (57.6 kg)   LMP 01/24/2022 Comment: states she found out yesterday here, that she was pregnant   SpO2 97%   BMI 21.80 kg/m²     GENERAL:  NAD. A&Ox3. Appears uncomfortable. LUNGS:  No increased work of breathing. CARDIOVASCULAR: RR  ABDOMEN:  Soft, non-distended, moderately tender to palpation LUQ, RUQ, and epigastrum. No guarding, rigidity, rebound. EXT: warm and well perfused    ASSESSMENT/PLAN:  29 y.o. female with PMH Crohn's, hyperemesis, cholecystectomy who presented to the ED with abdominal and flank pain, +hCG.      - plan for EGD today 2/19  - NPO + mIVF  - prn pain control  - prn anti-emetic    Francis Jo MD  Surgery Resident PGY-2  2/19/2022  6:05 AM

## 2022-02-19 NOTE — ANESTHESIA PRE PROCEDURE
Department of Anesthesiology  Preprocedure Note       Name:  Chelsie Lu   Age:  29 y.o.  :  1985                                          MRN:  [id number]         Date:  2022      Surgeon: Muna Logan):  Janet Nava MD    Procedure: Procedure(s):  EGD DIAGNOSTIC ONLY    Medications prior to admission:   Prior to Admission medications    Medication Sig Start Date End Date Taking?  Authorizing Provider   ondansetron (ZOFRAN) 4 MG tablet Take 4 mg by mouth every 8 hours as needed for Nausea or Vomiting   Yes Historical Provider, MD   Prenatal Vit-Fe Fumarate-FA (PRENATAL VITAMIN) 27-0.8 MG TABS Take 1 tablet by mouth daily 22   Jeniffer Lechuga MD       Current medications:    Current Facility-Administered Medications   Medication Dose Route Frequency Provider Last Rate Last Admin    sodium chloride flush 0.9 % injection 5-40 mL  5-40 mL IntraVENous 2 times per day Zo Shy B Capal, DO   10 mL at 22 0923    sodium chloride flush 0.9 % injection 10 mL  10 mL IntraVENous PRN Lucian B Capal, DO        0.9 % sodium chloride infusion  25 mL IntraVENous PRN Zo Shy B Capal, DO        0.9 % sodium chloride infusion   IntraVENous Continuous Lucian B Capal,  mL/hr at 22 0100 New Bag at 22 0100    potassium chloride 10 mEq/100 mL IVPB (Peripheral Line)  10 mEq IntraVENous Q1H Kristin Stewart  mL/hr at 22 1223 10 mEq at 22 1223    pantoprazole (PROTONIX) injection 40 mg  40 mg IntraVENous Daily Lucian B Capal, DO   40 mg at 22 7239    And    sodium chloride (PF) 0.9 % injection 10 mL  10 mL IntraVENous BID Lucian B Capal, DO   10 mL at 22 0923    HYDROmorphone (DILAUDID) injection 0.5 mg  0.5 mg IntraVENous Q3H PRN Zo Shy B Capal, DO   0.5 mg at 22 1043    ondansetron (ZOFRAN) injection 4 mg  4 mg IntraVENous Q6H PRN Jr Do, DO   4 mg at 22 1043       Allergies:  No Known Allergies    Problem List:    Patient Active Problem List   Diagnosis Code    Cannabis dependence ([facility] F12.20    Asthma J45.909    Tobacco abuse Z72.0    Symptomatic cholelithiasis K80.20    Crohn disease ([facility] K50.90    Crohn's colitis, without complications ([facility] C22.15    Intractable vomiting with nausea R11.2    Emesis, persistent R11.15       Past Medical History:        Diagnosis Date    Anxiety attack since age 16    diagnosed with seizure due to convulsions from this    Asthma     seasonal; no inhaler     Crohn's colitis ([facility]     Marijuana abuse     occas    Multiple lacerations 2015    stabbing; resolved    Seizure ([facility]     loses focus, disoriented unpon arousing; etiology unknown per pt; none since 2015    Tobacco abuse        Past Surgical History:        Procedure Laterality Date    CHOLECYSTECTOMY  01/22/2018    COLONOSCOPY      DILATION AND CURETTAGE OF UTERUS  2011    ENDOSCOPY, COLON, DIAGNOSTIC      HERNIA REPAIR      HERNIA REPAIR         Social History:    Social History     Tobacco Use    Smoking status: Current Every Day Smoker     Packs/day: 1.00     Years: 10.00     Pack years: 10.00     Types: Cigarettes    Smokeless tobacco: Never Used   Substance Use Topics    Alcohol use: Yes     Comment: socially                                Ready to quit: Not Answered  Counseling given: Not Answered      Vital Signs (Current):   Vitals:    02/18/22 1931 02/18/22 2347 02/19/22 0035 02/19/22 0915   BP: (!) 140/66 (!) 157/78 (!) 136/90 (!) 141/96   Pulse: 70 59 89 93   Resp: 16 20 18 18   Temp: 98.1 °F (36.7 °C)  97.6 °F (36.4 °C) 98.3 °F (36.8 °C)   TempSrc: Oral  Oral Temporal   SpO2: 98% 100% 97% 97%   Weight:       Height:                                                  BP Readings from Last 3 Encounters:   02/19/22 (!) 141/96   02/17/22 128/88   11/03/21 111/62       NPO Status:                                                                                 BMI:   Wt Readings from Last 3 Encounters:   02/18/22 127 lb (57.6 kg)   02/17/22 127 lb (57.6 kg)   11/02/21 140 lb (63.5 kg)     Body mass index is 21.8 kg/m². CBC:   Lab Results   Component Value Date    WBC 11.3 02/19/2022    RBC 4.09 02/19/2022    HGB 12.9 02/19/2022    HCT 38.4 02/19/2022    MCV 93.9 02/19/2022    RDW 13.4 02/19/2022     02/19/2022       CMP:   Lab Results   Component Value Date     02/19/2022    K 3.3 02/19/2022     02/19/2022    CO2 22 02/19/2022    BUN 5 02/19/2022    CREATININE 0.6 02/19/2022    GFRAA >60 02/19/2022    LABGLOM >60 02/19/2022    GLUCOSE 79 02/19/2022    GLUCOSE 92 05/08/2011    PROT 6.0 02/19/2022    CALCIUM 7.9 02/19/2022    BILITOT 0.7 02/19/2022    ALKPHOS 46 02/19/2022    AST 17 02/19/2022    ALT 12 02/19/2022       POC Tests: No results for input(s): POCGLU, POCNA, POCK, POCCL, POCBUN, POCHEMO, POCHCT in the last 72 hours. Coags:   Lab Results   Component Value Date    PROTIME 10.5 09/25/2016    INR 1.0 09/25/2016    APTT 29.1 01/12/2021       HCG (If Applicable):   Lab Results   Component Value Date    PREGTESTUR POSITIVE 02/17/2022    HCG 03921.5 (H) 07/30/2012        ABGs: No results found for: PHART, PO2ART, EUG7NXT, EUE3MDQ, BEART, D0QLKYBF     Type & Screen (If Applicable):  No results found for: LABABO, LABRH    Drug/Infectious Status (If Applicable):  No results found for: HIV, HEPCAB    COVID-19 Screening (If Applicable): No results found for: COVID19        Anesthesia Evaluation  Patient summary reviewed no history of anesthetic complications:   Airway: Mallampati: II  TM distance: >3 FB   Neck ROM: full  Mouth opening: > = 3 FB Dental:          Pulmonary: breath sounds clear to auscultation  (+) asthma:                            Cardiovascular:Negative CV ROS  Exercise tolerance: good (>4 METS),           Rhythm: regular  Rate: normal                    Neuro/Psych:   Negative Neuro/Psych ROS              GI/Hepatic/Renal:            ROS comment: Crohn's disease.    Endo/Other: ROS comment: Pregnancy  Cannabanoid hyperemesis Abdominal:             Vascular: negative vascular ROS. Other Findings:             Anesthesia Plan      MAC     ASA 2       Induction: intravenous. Anesthetic plan and risks discussed with patient.                       Colt Mujica MD   2/19/2022

## 2022-02-19 NOTE — ED NOTES
I received this patient at sign out from Dr. Davian Nicholson. I have discussed the patient's initial exam, treatment and plan of care with the out going physician. I have introduced my self to the patient / family and have answered their questions to this point. I have examined the patient myself and reviewed ordered tests / medications and  reviewed any available results to this point. If a resident is involved in the Emergency Department care, I have discussed my findings and plan with them as well. The patient was signed out to me by Dr. Davian Nicholson with MRI pending. Patient has a significant leukocytosis of over 18,000. MRI does show acute appendicitis. She is currently pregnant and was given a dose of cefoxitin and surgery consult was placed.      Jacqueline Wheeler DO  02/23/22 1420

## 2022-02-19 NOTE — PROGRESS NOTES
Patient admitted to unit form Ed, Pt assessed, c/o abd pain voiced, and N/V. No emesis noted. Oriented to room and call light , reviewed meds. Bed in lowest position , orders received, will monitor.

## 2022-02-20 LAB
ALBUMIN SERPL-MCNC: 2.9 G/DL (ref 3.5–5.2)
ALP BLD-CCNC: 42 U/L (ref 35–104)
ALT SERPL-CCNC: 9 U/L (ref 0–32)
ANION GAP SERPL CALCULATED.3IONS-SCNC: 10 MMOL/L (ref 7–16)
AST SERPL-CCNC: 11 U/L (ref 0–31)
BASOPHILS ABSOLUTE: 0.07 E9/L (ref 0–0.2)
BASOPHILS RELATIVE PERCENT: 0.8 % (ref 0–2)
BILIRUB SERPL-MCNC: 0.4 MG/DL (ref 0–1.2)
BUN BLDV-MCNC: 7 MG/DL (ref 6–20)
CALCIUM SERPL-MCNC: 6.6 MG/DL (ref 8.6–10.2)
CHLORIDE BLD-SCNC: 111 MMOL/L (ref 98–107)
CO2: 18 MMOL/L (ref 22–29)
CREAT SERPL-MCNC: 0.5 MG/DL (ref 0.5–1)
EOSINOPHILS ABSOLUTE: 0.31 E9/L (ref 0.05–0.5)
EOSINOPHILS RELATIVE PERCENT: 3.4 % (ref 0–6)
GFR AFRICAN AMERICAN: >60
GFR NON-AFRICAN AMERICAN: >60 ML/MIN/1.73
GLUCOSE BLD-MCNC: 66 MG/DL (ref 74–99)
HCT VFR BLD CALC: 34.7 % (ref 34–48)
HEMOGLOBIN: 11.9 G/DL (ref 11.5–15.5)
IMMATURE GRANULOCYTES #: 0.04 E9/L
IMMATURE GRANULOCYTES %: 0.4 % (ref 0–5)
LYMPHOCYTES ABSOLUTE: 3.8 E9/L (ref 1.5–4)
LYMPHOCYTES RELATIVE PERCENT: 42 % (ref 20–42)
MAGNESIUM: 1.7 MG/DL (ref 1.6–2.6)
MCH RBC QN AUTO: 31.6 PG (ref 26–35)
MCHC RBC AUTO-ENTMCNC: 34.3 % (ref 32–34.5)
MCV RBC AUTO: 92.3 FL (ref 80–99.9)
MONOCYTES ABSOLUTE: 0.61 E9/L (ref 0.1–0.95)
MONOCYTES RELATIVE PERCENT: 6.7 % (ref 2–12)
NEUTROPHILS ABSOLUTE: 4.21 E9/L (ref 1.8–7.3)
NEUTROPHILS RELATIVE PERCENT: 46.7 % (ref 43–80)
PDW BLD-RTO: 13 FL (ref 11.5–15)
PLATELET # BLD: 360 E9/L (ref 130–450)
PMV BLD AUTO: 8.9 FL (ref 7–12)
POTASSIUM REFLEX MAGNESIUM: 3 MMOL/L (ref 3.5–5)
RBC # BLD: 3.76 E12/L (ref 3.5–5.5)
SODIUM BLD-SCNC: 139 MMOL/L (ref 132–146)
TOTAL PROTEIN: 4.9 G/DL (ref 6.4–8.3)
WBC # BLD: 9 E9/L (ref 4.5–11.5)

## 2022-02-20 PROCEDURE — 2580000003 HC RX 258: Performed by: STUDENT IN AN ORGANIZED HEALTH CARE EDUCATION/TRAINING PROGRAM

## 2022-02-20 PROCEDURE — 6370000000 HC RX 637 (ALT 250 FOR IP): Performed by: STUDENT IN AN ORGANIZED HEALTH CARE EDUCATION/TRAINING PROGRAM

## 2022-02-20 PROCEDURE — 85025 COMPLETE CBC W/AUTO DIFF WBC: CPT

## 2022-02-20 PROCEDURE — 80053 COMPREHEN METABOLIC PANEL: CPT

## 2022-02-20 PROCEDURE — C9113 INJ PANTOPRAZOLE SODIUM, VIA: HCPCS | Performed by: STUDENT IN AN ORGANIZED HEALTH CARE EDUCATION/TRAINING PROGRAM

## 2022-02-20 PROCEDURE — 6360000002 HC RX W HCPCS: Performed by: STUDENT IN AN ORGANIZED HEALTH CARE EDUCATION/TRAINING PROGRAM

## 2022-02-20 PROCEDURE — 36415 COLL VENOUS BLD VENIPUNCTURE: CPT

## 2022-02-20 PROCEDURE — 1200000000 HC SEMI PRIVATE

## 2022-02-20 PROCEDURE — 83735 ASSAY OF MAGNESIUM: CPT

## 2022-02-20 RX ORDER — ACETAMINOPHEN 325 MG/1
650 TABLET ORAL EVERY 6 HOURS PRN
Status: DISCONTINUED | OUTPATIENT
Start: 2022-02-20 | End: 2022-02-23 | Stop reason: HOSPADM

## 2022-02-20 RX ORDER — POTASSIUM CHLORIDE 20 MEQ/1
20 TABLET, EXTENDED RELEASE ORAL ONCE
Status: COMPLETED | OUTPATIENT
Start: 2022-02-20 | End: 2022-02-20

## 2022-02-20 RX ADMIN — ONDANSETRON 4 MG: 2 INJECTION INTRAMUSCULAR; INTRAVENOUS at 21:05

## 2022-02-20 RX ADMIN — SODIUM CHLORIDE, PRESERVATIVE FREE 10 ML: 5 INJECTION INTRAVENOUS at 09:14

## 2022-02-20 RX ADMIN — POTASSIUM CHLORIDE 20 MEQ: 20 TABLET, EXTENDED RELEASE ORAL at 09:14

## 2022-02-20 RX ADMIN — ONDANSETRON 4 MG: 2 INJECTION INTRAMUSCULAR; INTRAVENOUS at 04:08

## 2022-02-20 RX ADMIN — PANTOPRAZOLE SODIUM 40 MG: 40 INJECTION, POWDER, FOR SOLUTION INTRAVENOUS at 09:14

## 2022-02-20 RX ADMIN — HYDROMORPHONE HYDROCHLORIDE 0.5 MG: 1 INJECTION, SOLUTION INTRAMUSCULAR; INTRAVENOUS; SUBCUTANEOUS at 04:08

## 2022-02-20 RX ADMIN — SODIUM CHLORIDE, PRESERVATIVE FREE 10 ML: 5 INJECTION INTRAVENOUS at 21:04

## 2022-02-20 RX ADMIN — ONDANSETRON 4 MG: 2 INJECTION INTRAMUSCULAR; INTRAVENOUS at 13:43

## 2022-02-20 RX ADMIN — HYDROMORPHONE HYDROCHLORIDE 0.25 MG: 1 INJECTION, SOLUTION INTRAMUSCULAR; INTRAVENOUS; SUBCUTANEOUS at 17:51

## 2022-02-20 RX ADMIN — HYDROMORPHONE HYDROCHLORIDE 0.25 MG: 1 INJECTION, SOLUTION INTRAMUSCULAR; INTRAVENOUS; SUBCUTANEOUS at 13:08

## 2022-02-20 RX ADMIN — HYDROMORPHONE HYDROCHLORIDE 0.25 MG: 1 INJECTION, SOLUTION INTRAMUSCULAR; INTRAVENOUS; SUBCUTANEOUS at 09:14

## 2022-02-20 RX ADMIN — HYDROMORPHONE HYDROCHLORIDE 0.25 MG: 1 INJECTION, SOLUTION INTRAMUSCULAR; INTRAVENOUS; SUBCUTANEOUS at 21:05

## 2022-02-20 ASSESSMENT — PAIN DESCRIPTION - DESCRIPTORS: DESCRIPTORS: ACHING;CONSTANT;DISCOMFORT

## 2022-02-20 ASSESSMENT — PAIN SCALES - GENERAL
PAINLEVEL_OUTOF10: 10
PAINLEVEL_OUTOF10: 9
PAINLEVEL_OUTOF10: 8

## 2022-02-20 ASSESSMENT — PAIN DESCRIPTION - FREQUENCY: FREQUENCY: CONTINUOUS

## 2022-02-20 ASSESSMENT — PAIN DESCRIPTION - PAIN TYPE: TYPE: ACUTE PAIN

## 2022-02-20 ASSESSMENT — PAIN DESCRIPTION - ORIENTATION: ORIENTATION: RIGHT;LEFT;LOWER;MID

## 2022-02-20 ASSESSMENT — PAIN - FUNCTIONAL ASSESSMENT: PAIN_FUNCTIONAL_ASSESSMENT: PREVENTS OR INTERFERES SOME ACTIVE ACTIVITIES AND ADLS

## 2022-02-20 ASSESSMENT — PAIN DESCRIPTION - LOCATION: LOCATION: ABDOMEN

## 2022-02-20 ASSESSMENT — PAIN DESCRIPTION - PROGRESSION: CLINICAL_PROGRESSION: GRADUALLY WORSENING

## 2022-02-20 ASSESSMENT — PAIN DESCRIPTION - ONSET: ONSET: ON-GOING

## 2022-02-20 NOTE — PLAN OF CARE
Problem: Pain:  Goal: Pain level will decrease  Description: Pain level will decrease  2/20/2022 0140 by Karey Rodriguez RN  Outcome: Met This Shift  2/19/2022 1628 by Hortencia Lafleur RN  Outcome: Met This Shift  Goal: Control of acute pain  Description: Control of acute pain  2/20/2022 0140 by Karey Rodriguez RN  Outcome: Met This Shift  2/19/2022 1628 by Hortencia Lafleur RN  Outcome: Met This Shift  Goal: Control of chronic pain  Description: Control of chronic pain  2/20/2022 0140 by Karey Rodriguez RN  Outcome: Met This Shift  2/19/2022 1628 by Hortencia Lafleur RN  Outcome: Met This Shift

## 2022-02-20 NOTE — PROGRESS NOTES
GENERAL SURGERY  DAILY PROGRESS NOTE  2/20/2022    Chief Complaint   Patient presents with    Abdominal Pain     right sided, sharp/shooting to the ribs and back. seen yesterday for same thing, didn't get scanned because they found out she is pregnant. SUBJECTIVE:  Pain is improving today, has some right sided pain still. Tolerating clears without much nausea    OBJECTIVE:  BP (!) 128/93   Pulse 82   Temp 98.2 °F (36.8 °C) (Temporal)   Resp 16   Ht 5' 4\" (1.626 m)   Wt 127 lb (57.6 kg)   LMP 01/24/2022 Comment: states she found out yesterday here, that she was pregnant   SpO2 98%   BMI 21.80 kg/m²     General appearance: alert, cooperative and in no acute distress. Eyes: grossly normal  Lungs: Nonlabored breathing on room air   Heart: regular rate and rhythm  Abdomen: soft, minimal right sided tenderness, non distended,   Skin: No skin abnormalities  Neurologic: Alert and oriented x 3. Grossly normal  Musculoskeletal: No clubbing cyanosis     ASSESSMENT/PLAN:  29 y.o. female with PMH Crohn's, hyperemesis, cholecystectomy who presented to the ED with abdominal and flank pain, +hCG. EGD yesterday unremarkable, biopsies sent to r/o H pylori    - decrease dilaudid  - zofran as needed  - hypokalemia, replace  - advance diet, continue IVF for now  - continue protonix, follow up biopsies    Electronically signed by Renata Ortiz MD on 2/20/2022 at 8:32 AM     Attending Note:    Patient seen/examined 2/20/2022. I discussed case with the resident and reviewed all relevant labs/imaging. Agree with resident's assessment and plan with the following corrections/additions which summarizes my clinical findings and independent assessment: Abdominal exam benign, WBC WNL. No further surgical intervention needed.

## 2022-02-21 LAB
ALBUMIN SERPL-MCNC: 4 G/DL (ref 3.5–5.2)
ALP BLD-CCNC: 48 U/L (ref 35–104)
ALT SERPL-CCNC: 12 U/L (ref 0–32)
ANION GAP SERPL CALCULATED.3IONS-SCNC: 13 MMOL/L (ref 7–16)
AST SERPL-CCNC: 16 U/L (ref 0–31)
BASOPHILS ABSOLUTE: 0.08 E9/L (ref 0–0.2)
BASOPHILS RELATIVE PERCENT: 0.7 % (ref 0–2)
BILIRUB SERPL-MCNC: 0.5 MG/DL (ref 0–1.2)
BUN BLDV-MCNC: 8 MG/DL (ref 6–20)
CALCIUM SERPL-MCNC: 8.9 MG/DL (ref 8.6–10.2)
CHLORIDE BLD-SCNC: 100 MMOL/L (ref 98–107)
CO2: 21 MMOL/L (ref 22–29)
CREAT SERPL-MCNC: 0.6 MG/DL (ref 0.5–1)
EOSINOPHILS ABSOLUTE: 0.44 E9/L (ref 0.05–0.5)
EOSINOPHILS RELATIVE PERCENT: 3.6 % (ref 0–6)
GFR AFRICAN AMERICAN: >60
GFR NON-AFRICAN AMERICAN: >60 ML/MIN/1.73
GLUCOSE BLD-MCNC: 89 MG/DL (ref 74–99)
HCT VFR BLD CALC: 43.6 % (ref 34–48)
HEMOGLOBIN: 14.9 G/DL (ref 11.5–15.5)
IMMATURE GRANULOCYTES #: 0.06 E9/L
IMMATURE GRANULOCYTES %: 0.5 % (ref 0–5)
LYMPHOCYTES ABSOLUTE: 5.02 E9/L (ref 1.5–4)
LYMPHOCYTES RELATIVE PERCENT: 41 % (ref 20–42)
MCH RBC QN AUTO: 31.6 PG (ref 26–35)
MCHC RBC AUTO-ENTMCNC: 34.2 % (ref 32–34.5)
MCV RBC AUTO: 92.4 FL (ref 80–99.9)
MONOCYTES ABSOLUTE: 0.73 E9/L (ref 0.1–0.95)
MONOCYTES RELATIVE PERCENT: 6 % (ref 2–12)
NEUTROPHILS ABSOLUTE: 5.91 E9/L (ref 1.8–7.3)
NEUTROPHILS RELATIVE PERCENT: 48.2 % (ref 43–80)
PDW BLD-RTO: 13.2 FL (ref 11.5–15)
PLATELET # BLD: 446 E9/L (ref 130–450)
PMV BLD AUTO: 9.2 FL (ref 7–12)
POTASSIUM REFLEX MAGNESIUM: 3.8 MMOL/L (ref 3.5–5)
RBC # BLD: 4.72 E12/L (ref 3.5–5.5)
SODIUM BLD-SCNC: 134 MMOL/L (ref 132–146)
TOTAL PROTEIN: 6.5 G/DL (ref 6.4–8.3)
WBC # BLD: 12.2 E9/L (ref 4.5–11.5)

## 2022-02-21 PROCEDURE — 6360000002 HC RX W HCPCS: Performed by: STUDENT IN AN ORGANIZED HEALTH CARE EDUCATION/TRAINING PROGRAM

## 2022-02-21 PROCEDURE — C9113 INJ PANTOPRAZOLE SODIUM, VIA: HCPCS | Performed by: STUDENT IN AN ORGANIZED HEALTH CARE EDUCATION/TRAINING PROGRAM

## 2022-02-21 PROCEDURE — 2580000003 HC RX 258: Performed by: STUDENT IN AN ORGANIZED HEALTH CARE EDUCATION/TRAINING PROGRAM

## 2022-02-21 PROCEDURE — 85025 COMPLETE CBC W/AUTO DIFF WBC: CPT

## 2022-02-21 PROCEDURE — 80053 COMPREHEN METABOLIC PANEL: CPT

## 2022-02-21 PROCEDURE — A4216 STERILE WATER/SALINE, 10 ML: HCPCS | Performed by: STUDENT IN AN ORGANIZED HEALTH CARE EDUCATION/TRAINING PROGRAM

## 2022-02-21 PROCEDURE — 1200000000 HC SEMI PRIVATE

## 2022-02-21 PROCEDURE — 36415 COLL VENOUS BLD VENIPUNCTURE: CPT

## 2022-02-21 RX ADMIN — ONDANSETRON 4 MG: 2 INJECTION INTRAMUSCULAR; INTRAVENOUS at 20:31

## 2022-02-21 RX ADMIN — HYDROMORPHONE HYDROCHLORIDE 0.25 MG: 1 INJECTION, SOLUTION INTRAMUSCULAR; INTRAVENOUS; SUBCUTANEOUS at 11:38

## 2022-02-21 RX ADMIN — SODIUM CHLORIDE, PRESERVATIVE FREE 10 ML: 5 INJECTION INTRAVENOUS at 20:34

## 2022-02-21 RX ADMIN — HYDROMORPHONE HYDROCHLORIDE 0.25 MG: 1 INJECTION, SOLUTION INTRAMUSCULAR; INTRAVENOUS; SUBCUTANEOUS at 22:12

## 2022-02-21 RX ADMIN — HYDROMORPHONE HYDROCHLORIDE 0.25 MG: 1 INJECTION, SOLUTION INTRAMUSCULAR; INTRAVENOUS; SUBCUTANEOUS at 08:14

## 2022-02-21 RX ADMIN — SODIUM CHLORIDE, PRESERVATIVE FREE 10 ML: 5 INJECTION INTRAVENOUS at 08:14

## 2022-02-21 RX ADMIN — ONDANSETRON 4 MG: 2 INJECTION INTRAMUSCULAR; INTRAVENOUS at 11:38

## 2022-02-21 RX ADMIN — HYDROMORPHONE HYDROCHLORIDE 0.25 MG: 1 INJECTION, SOLUTION INTRAMUSCULAR; INTRAVENOUS; SUBCUTANEOUS at 00:34

## 2022-02-21 RX ADMIN — HYDROMORPHONE HYDROCHLORIDE 0.25 MG: 1 INJECTION, SOLUTION INTRAMUSCULAR; INTRAVENOUS; SUBCUTANEOUS at 04:19

## 2022-02-21 RX ADMIN — PANTOPRAZOLE SODIUM 40 MG: 40 INJECTION, POWDER, FOR SOLUTION INTRAVENOUS at 08:14

## 2022-02-21 RX ADMIN — ONDANSETRON 4 MG: 2 INJECTION INTRAMUSCULAR; INTRAVENOUS at 04:19

## 2022-02-21 RX ADMIN — HYDROMORPHONE HYDROCHLORIDE 0.25 MG: 1 INJECTION, SOLUTION INTRAMUSCULAR; INTRAVENOUS; SUBCUTANEOUS at 19:07

## 2022-02-21 RX ADMIN — HYDROMORPHONE HYDROCHLORIDE 0.25 MG: 1 INJECTION, SOLUTION INTRAMUSCULAR; INTRAVENOUS; SUBCUTANEOUS at 15:51

## 2022-02-21 ASSESSMENT — PAIN DESCRIPTION - LOCATION: LOCATION: ABDOMEN

## 2022-02-21 ASSESSMENT — PAIN DESCRIPTION - PAIN TYPE: TYPE: ACUTE PAIN

## 2022-02-21 ASSESSMENT — PAIN SCALES - GENERAL
PAINLEVEL_OUTOF10: 7
PAINLEVEL_OUTOF10: 7
PAINLEVEL_OUTOF10: 8
PAINLEVEL_OUTOF10: 9
PAINLEVEL_OUTOF10: 0
PAINLEVEL_OUTOF10: 8
PAINLEVEL_OUTOF10: 8

## 2022-02-21 ASSESSMENT — PAIN DESCRIPTION - PROGRESSION
CLINICAL_PROGRESSION: GRADUALLY WORSENING
CLINICAL_PROGRESSION: GRADUALLY WORSENING

## 2022-02-21 ASSESSMENT — PAIN DESCRIPTION - FREQUENCY: FREQUENCY: CONTINUOUS

## 2022-02-21 ASSESSMENT — PAIN DESCRIPTION - DESCRIPTORS: DESCRIPTORS: ACHING;CONSTANT;DISCOMFORT

## 2022-02-21 ASSESSMENT — PAIN - FUNCTIONAL ASSESSMENT: PAIN_FUNCTIONAL_ASSESSMENT: PREVENTS OR INTERFERES SOME ACTIVE ACTIVITIES AND ADLS

## 2022-02-21 ASSESSMENT — PAIN DESCRIPTION - ONSET: ONSET: ON-GOING

## 2022-02-21 ASSESSMENT — PAIN DESCRIPTION - ORIENTATION: ORIENTATION: RIGHT;LEFT;LOWER;MID;UPPER

## 2022-02-21 NOTE — PLAN OF CARE
Problem: Pain:  Goal: Pain level will decrease  Description: Pain level will decrease  2/21/2022 0001 by Ruddy Peguero RN  Outcome: Met This Shift  2/20/2022 1724 by Marlena Peña RN  Outcome: Met This Shift  Goal: Control of acute pain  Description: Control of acute pain  2/21/2022 0001 by Ruddy Peguero RN  Outcome: Met This Shift  2/20/2022 1724 by Marlena Peña RN  Outcome: Met This Shift  Goal: Control of chronic pain  Description: Control of chronic pain  2/21/2022 0001 by Ruddy Peguero RN  Outcome: Met This Shift  2/20/2022 1724 by Marlena Peña RN  Outcome: Met This Shift

## 2022-02-21 NOTE — CARE COORDINATION
Patient with a history of Crohn's Disease, cholecystectomy, cannabinoid hyperemesis is admitted with  abdominal and flank pain. Patient is 2 weeks pregnant. Per general surgery note today, Pain is improving today, has some right sided pain still. Tolerating clears without much nausea. Cm/Sw will follow for any discharge needs.   Serina Martinez RN CM

## 2022-02-22 LAB
ALBUMIN SERPL-MCNC: 4 G/DL (ref 3.5–5.2)
ALP BLD-CCNC: 54 U/L (ref 35–104)
ALT SERPL-CCNC: 11 U/L (ref 0–32)
ANION GAP SERPL CALCULATED.3IONS-SCNC: 16 MMOL/L (ref 7–16)
AST SERPL-CCNC: 13 U/L (ref 0–31)
BASOPHILS ABSOLUTE: 0.08 E9/L (ref 0–0.2)
BASOPHILS RELATIVE PERCENT: 0.5 % (ref 0–2)
BILIRUB SERPL-MCNC: 0.3 MG/DL (ref 0–1.2)
BUN BLDV-MCNC: 10 MG/DL (ref 6–20)
CALCIUM SERPL-MCNC: 8.8 MG/DL (ref 8.6–10.2)
CHLORIDE BLD-SCNC: 103 MMOL/L (ref 98–107)
CO2: 19 MMOL/L (ref 22–29)
CREAT SERPL-MCNC: 0.7 MG/DL (ref 0.5–1)
EOSINOPHILS ABSOLUTE: 0.44 E9/L (ref 0.05–0.5)
EOSINOPHILS RELATIVE PERCENT: 3 % (ref 0–6)
GFR AFRICAN AMERICAN: >60
GFR NON-AFRICAN AMERICAN: >60 ML/MIN/1.73
GLUCOSE BLD-MCNC: 80 MG/DL (ref 74–99)
HCT VFR BLD CALC: 45.5 % (ref 34–48)
HEMOGLOBIN: 15.7 G/DL (ref 11.5–15.5)
IMMATURE GRANULOCYTES #: 0.06 E9/L
IMMATURE GRANULOCYTES %: 0.4 % (ref 0–5)
LYMPHOCYTES ABSOLUTE: 5.96 E9/L (ref 1.5–4)
LYMPHOCYTES RELATIVE PERCENT: 40.5 % (ref 20–42)
MCH RBC QN AUTO: 31.7 PG (ref 26–35)
MCHC RBC AUTO-ENTMCNC: 34.5 % (ref 32–34.5)
MCV RBC AUTO: 91.9 FL (ref 80–99.9)
MONOCYTES ABSOLUTE: 0.98 E9/L (ref 0.1–0.95)
MONOCYTES RELATIVE PERCENT: 6.7 % (ref 2–12)
NEUTROPHILS ABSOLUTE: 7.18 E9/L (ref 1.8–7.3)
NEUTROPHILS RELATIVE PERCENT: 48.9 % (ref 43–80)
PDW BLD-RTO: 13.2 FL (ref 11.5–15)
PLATELET # BLD: 449 E9/L (ref 130–450)
PMV BLD AUTO: 8.9 FL (ref 7–12)
POTASSIUM REFLEX MAGNESIUM: 3.7 MMOL/L (ref 3.5–5)
RBC # BLD: 4.95 E12/L (ref 3.5–5.5)
SODIUM BLD-SCNC: 138 MMOL/L (ref 132–146)
TOTAL PROTEIN: 6.7 G/DL (ref 6.4–8.3)
WBC # BLD: 14.7 E9/L (ref 4.5–11.5)

## 2022-02-22 PROCEDURE — 85025 COMPLETE CBC W/AUTO DIFF WBC: CPT

## 2022-02-22 PROCEDURE — 2580000003 HC RX 258: Performed by: STUDENT IN AN ORGANIZED HEALTH CARE EDUCATION/TRAINING PROGRAM

## 2022-02-22 PROCEDURE — 1200000000 HC SEMI PRIVATE

## 2022-02-22 PROCEDURE — 36415 COLL VENOUS BLD VENIPUNCTURE: CPT

## 2022-02-22 PROCEDURE — C9113 INJ PANTOPRAZOLE SODIUM, VIA: HCPCS | Performed by: STUDENT IN AN ORGANIZED HEALTH CARE EDUCATION/TRAINING PROGRAM

## 2022-02-22 PROCEDURE — 6360000002 HC RX W HCPCS: Performed by: STUDENT IN AN ORGANIZED HEALTH CARE EDUCATION/TRAINING PROGRAM

## 2022-02-22 PROCEDURE — 80053 COMPREHEN METABOLIC PANEL: CPT

## 2022-02-22 RX ADMIN — SODIUM CHLORIDE, PRESERVATIVE FREE 10 ML: 5 INJECTION INTRAVENOUS at 20:30

## 2022-02-22 RX ADMIN — ONDANSETRON 4 MG: 2 INJECTION INTRAMUSCULAR; INTRAVENOUS at 08:36

## 2022-02-22 RX ADMIN — HYDROMORPHONE HYDROCHLORIDE 0.25 MG: 1 INJECTION, SOLUTION INTRAMUSCULAR; INTRAVENOUS; SUBCUTANEOUS at 20:30

## 2022-02-22 RX ADMIN — ONDANSETRON 4 MG: 2 INJECTION INTRAMUSCULAR; INTRAVENOUS at 16:23

## 2022-02-22 RX ADMIN — SODIUM CHLORIDE, PRESERVATIVE FREE 10 ML: 5 INJECTION INTRAVENOUS at 08:37

## 2022-02-22 RX ADMIN — HYDROMORPHONE HYDROCHLORIDE 0.25 MG: 1 INJECTION, SOLUTION INTRAMUSCULAR; INTRAVENOUS; SUBCUTANEOUS at 08:37

## 2022-02-22 RX ADMIN — HYDROMORPHONE HYDROCHLORIDE 0.25 MG: 1 INJECTION, SOLUTION INTRAMUSCULAR; INTRAVENOUS; SUBCUTANEOUS at 13:03

## 2022-02-22 RX ADMIN — SODIUM CHLORIDE, PRESERVATIVE FREE 10 ML: 5 INJECTION INTRAVENOUS at 20:31

## 2022-02-22 RX ADMIN — HYDROMORPHONE HYDROCHLORIDE 0.25 MG: 1 INJECTION, SOLUTION INTRAMUSCULAR; INTRAVENOUS; SUBCUTANEOUS at 16:22

## 2022-02-22 RX ADMIN — HYDROMORPHONE HYDROCHLORIDE 0.25 MG: 1 INJECTION, SOLUTION INTRAMUSCULAR; INTRAVENOUS; SUBCUTANEOUS at 02:16

## 2022-02-22 RX ADMIN — PANTOPRAZOLE SODIUM 40 MG: 40 INJECTION, POWDER, FOR SOLUTION INTRAVENOUS at 08:36

## 2022-02-22 ASSESSMENT — PAIN SCALES - GENERAL
PAINLEVEL_OUTOF10: 7
PAINLEVEL_OUTOF10: 9
PAINLEVEL_OUTOF10: 0
PAINLEVEL_OUTOF10: 6
PAINLEVEL_OUTOF10: 0
PAINLEVEL_OUTOF10: 7
PAINLEVEL_OUTOF10: 0
PAINLEVEL_OUTOF10: 8
PAINLEVEL_OUTOF10: 6
PAINLEVEL_OUTOF10: 8

## 2022-02-22 ASSESSMENT — PAIN DESCRIPTION - LOCATION: LOCATION: ABDOMEN

## 2022-02-22 ASSESSMENT — PAIN DESCRIPTION - DESCRIPTORS: DESCRIPTORS: ACHING;DULL;DISCOMFORT

## 2022-02-22 ASSESSMENT — PAIN DESCRIPTION - PROGRESSION: CLINICAL_PROGRESSION: GRADUALLY WORSENING

## 2022-02-22 ASSESSMENT — PAIN DESCRIPTION - ONSET: ONSET: ON-GOING

## 2022-02-22 ASSESSMENT — PAIN - FUNCTIONAL ASSESSMENT: PAIN_FUNCTIONAL_ASSESSMENT: ACTIVITIES ARE NOT PREVENTED

## 2022-02-22 ASSESSMENT — PAIN DESCRIPTION - PAIN TYPE: TYPE: ACUTE PAIN

## 2022-02-22 ASSESSMENT — PAIN DESCRIPTION - FREQUENCY: FREQUENCY: CONTINUOUS

## 2022-02-22 ASSESSMENT — PAIN DESCRIPTION - ORIENTATION: ORIENTATION: RIGHT;LEFT

## 2022-02-23 VITALS
WEIGHT: 127 LBS | HEIGHT: 64 IN | BODY MASS INDEX: 21.68 KG/M2 | DIASTOLIC BLOOD PRESSURE: 70 MMHG | TEMPERATURE: 98 F | SYSTOLIC BLOOD PRESSURE: 120 MMHG | OXYGEN SATURATION: 99 % | RESPIRATION RATE: 16 BRPM | HEART RATE: 76 BPM

## 2022-02-23 LAB
ALBUMIN SERPL-MCNC: 4 G/DL (ref 3.5–5.2)
ALP BLD-CCNC: 61 U/L (ref 35–104)
ALT SERPL-CCNC: 11 U/L (ref 0–32)
ANION GAP SERPL CALCULATED.3IONS-SCNC: 10 MMOL/L (ref 7–16)
AST SERPL-CCNC: 13 U/L (ref 0–31)
BASOPHILS ABSOLUTE: 0.1 E9/L (ref 0–0.2)
BASOPHILS RELATIVE PERCENT: 0.7 % (ref 0–2)
BILIRUB SERPL-MCNC: <0.2 MG/DL (ref 0–1.2)
BUN BLDV-MCNC: 11 MG/DL (ref 6–20)
CALCIUM SERPL-MCNC: 8.9 MG/DL (ref 8.6–10.2)
CHLORIDE BLD-SCNC: 102 MMOL/L (ref 98–107)
CO2: 24 MMOL/L (ref 22–29)
CREAT SERPL-MCNC: 0.6 MG/DL (ref 0.5–1)
EOSINOPHILS ABSOLUTE: 0.5 E9/L (ref 0.05–0.5)
EOSINOPHILS RELATIVE PERCENT: 3.6 % (ref 0–6)
GFR AFRICAN AMERICAN: >60
GFR NON-AFRICAN AMERICAN: >60 ML/MIN/1.73
GLUCOSE BLD-MCNC: 74 MG/DL (ref 74–99)
HCT VFR BLD CALC: 42.8 % (ref 34–48)
HEMOGLOBIN: 14.5 G/DL (ref 11.5–15.5)
IMMATURE GRANULOCYTES #: 0.06 E9/L
IMMATURE GRANULOCYTES %: 0.4 % (ref 0–5)
LYMPHOCYTES ABSOLUTE: 5.96 E9/L (ref 1.5–4)
LYMPHOCYTES RELATIVE PERCENT: 42.4 % (ref 20–42)
MCH RBC QN AUTO: 31 PG (ref 26–35)
MCHC RBC AUTO-ENTMCNC: 33.9 % (ref 32–34.5)
MCV RBC AUTO: 91.5 FL (ref 80–99.9)
MONOCYTES ABSOLUTE: 0.98 E9/L (ref 0.1–0.95)
MONOCYTES RELATIVE PERCENT: 7 % (ref 2–12)
NEUTROPHILS ABSOLUTE: 6.46 E9/L (ref 1.8–7.3)
NEUTROPHILS RELATIVE PERCENT: 45.9 % (ref 43–80)
PDW BLD-RTO: 13.4 FL (ref 11.5–15)
PLATELET # BLD: 447 E9/L (ref 130–450)
PMV BLD AUTO: 9.3 FL (ref 7–12)
POTASSIUM REFLEX MAGNESIUM: 3.8 MMOL/L (ref 3.5–5)
RBC # BLD: 4.68 E12/L (ref 3.5–5.5)
SODIUM BLD-SCNC: 136 MMOL/L (ref 132–146)
TOTAL PROTEIN: 6.3 G/DL (ref 6.4–8.3)
WBC # BLD: 14.1 E9/L (ref 4.5–11.5)

## 2022-02-23 PROCEDURE — 6360000002 HC RX W HCPCS: Performed by: STUDENT IN AN ORGANIZED HEALTH CARE EDUCATION/TRAINING PROGRAM

## 2022-02-23 PROCEDURE — 2580000003 HC RX 258: Performed by: STUDENT IN AN ORGANIZED HEALTH CARE EDUCATION/TRAINING PROGRAM

## 2022-02-23 PROCEDURE — A4216 STERILE WATER/SALINE, 10 ML: HCPCS | Performed by: STUDENT IN AN ORGANIZED HEALTH CARE EDUCATION/TRAINING PROGRAM

## 2022-02-23 PROCEDURE — C9113 INJ PANTOPRAZOLE SODIUM, VIA: HCPCS | Performed by: STUDENT IN AN ORGANIZED HEALTH CARE EDUCATION/TRAINING PROGRAM

## 2022-02-23 PROCEDURE — 80053 COMPREHEN METABOLIC PANEL: CPT

## 2022-02-23 PROCEDURE — 36415 COLL VENOUS BLD VENIPUNCTURE: CPT

## 2022-02-23 PROCEDURE — 85025 COMPLETE CBC W/AUTO DIFF WBC: CPT

## 2022-02-23 RX ORDER — HYDROCODONE BITARTRATE AND ACETAMINOPHEN 5; 325 MG/1; MG/1
1 TABLET ORAL EVERY 6 HOURS PRN
Qty: 20 TABLET | Refills: 0 | Status: SHIPPED | OUTPATIENT
Start: 2022-02-23 | End: 2022-02-28

## 2022-02-23 RX ADMIN — ONDANSETRON 4 MG: 2 INJECTION INTRAMUSCULAR; INTRAVENOUS at 06:07

## 2022-02-23 RX ADMIN — SODIUM CHLORIDE, PRESERVATIVE FREE 10 ML: 5 INJECTION INTRAVENOUS at 07:57

## 2022-02-23 RX ADMIN — SODIUM CHLORIDE, PRESERVATIVE FREE 10 ML: 5 INJECTION INTRAVENOUS at 07:58

## 2022-02-23 RX ADMIN — PANTOPRAZOLE SODIUM 40 MG: 40 INJECTION, POWDER, FOR SOLUTION INTRAVENOUS at 07:57

## 2022-02-23 RX ADMIN — ONDANSETRON 4 MG: 2 INJECTION INTRAMUSCULAR; INTRAVENOUS at 00:34

## 2022-02-23 RX ADMIN — HYDROMORPHONE HYDROCHLORIDE 0.25 MG: 1 INJECTION, SOLUTION INTRAMUSCULAR; INTRAVENOUS; SUBCUTANEOUS at 09:47

## 2022-02-23 RX ADMIN — ONDANSETRON 4 MG: 2 INJECTION INTRAMUSCULAR; INTRAVENOUS at 13:55

## 2022-02-23 RX ADMIN — HYDROMORPHONE HYDROCHLORIDE 0.25 MG: 1 INJECTION, SOLUTION INTRAMUSCULAR; INTRAVENOUS; SUBCUTANEOUS at 00:31

## 2022-02-23 RX ADMIN — HYDROMORPHONE HYDROCHLORIDE 0.25 MG: 1 INJECTION, SOLUTION INTRAMUSCULAR; INTRAVENOUS; SUBCUTANEOUS at 05:44

## 2022-02-23 RX ADMIN — HYDROMORPHONE HYDROCHLORIDE 0.25 MG: 1 INJECTION, SOLUTION INTRAMUSCULAR; INTRAVENOUS; SUBCUTANEOUS at 13:51

## 2022-02-23 RX ADMIN — HYDROMORPHONE HYDROCHLORIDE 0.25 MG: 1 INJECTION, SOLUTION INTRAMUSCULAR; INTRAVENOUS; SUBCUTANEOUS at 02:50

## 2022-02-23 ASSESSMENT — PAIN DESCRIPTION - DESCRIPTORS
DESCRIPTORS: SHARP
DESCRIPTORS: TENDER

## 2022-02-23 ASSESSMENT — PAIN SCALES - GENERAL
PAINLEVEL_OUTOF10: 9
PAINLEVEL_OUTOF10: 0
PAINLEVEL_OUTOF10: 8
PAINLEVEL_OUTOF10: 8
PAINLEVEL_OUTOF10: 0
PAINLEVEL_OUTOF10: 4
PAINLEVEL_OUTOF10: 9
PAINLEVEL_OUTOF10: 0
PAINLEVEL_OUTOF10: 5
PAINLEVEL_OUTOF10: 8
PAINLEVEL_OUTOF10: 8

## 2022-02-23 ASSESSMENT — PAIN DESCRIPTION - LOCATION
LOCATION: ABDOMEN
LOCATION: ABDOMEN

## 2022-02-23 ASSESSMENT — PAIN DESCRIPTION - ORIENTATION
ORIENTATION: UPPER
ORIENTATION: LEFT;RIGHT

## 2022-02-23 ASSESSMENT — PAIN DESCRIPTION - FREQUENCY
FREQUENCY: CONTINUOUS
FREQUENCY: CONTINUOUS

## 2022-02-23 ASSESSMENT — PAIN DESCRIPTION - ONSET: ONSET: ON-GOING

## 2022-02-23 ASSESSMENT — PAIN DESCRIPTION - PROGRESSION: CLINICAL_PROGRESSION: GRADUALLY WORSENING

## 2022-02-23 NOTE — PROGRESS NOTES
Reached out to [facility] to get an updated plan. They have no current inpatient plans for patient and therefore should be able to D/C, also made them aware that it seems they are the Admitting provider so if they are ready to D/C, then orders will need placed by them. Awaiting new orders.

## 2022-02-23 NOTE — CARE COORDINATION
Discharge order noted. I met with patient in room to explain role and discuss transition of care. She said she has no needs for discharge and her baby's father will be transporting her home today.   Dory Evans RN CM

## 2022-02-24 NOTE — DISCHARGE SUMMARY
Physician Discharge Summary     Patient ID:  Jessica Carr  [id number]  29 y.o.  1985    Admit date: 2/18/2022    Discharge date and time: 2/23/2022  3:35 PM     Admitting Physician: Christy Price MD     Admission Diagnoses: Emesis, persistent [R11.15]  Acute appendicitis with generalized peritonitis, unspecified whether abscess present, unspecified whether gangrene present, unspecified whether perforation present [K35.20]    Discharge Diagnoses: Principal Problem:    Emesis, persistent  Resolved Problems:    * No resolved hospital problems. *      Admission Condition: fair    Discharged Condition: stable    Indication for Admission: Nausea/Vomiting    Hospital Course/Procedures/Operation/treatments:   318: 29year old female admitted for nausea, vomiting, and upper abdominal pain x 5d. Couple episodes of hematemesis. Questionably 2 weeks pregnant. Had a Crohn's flare up last week. Called for possible appendecitis. Admitted to med/surg, pain control PRN, PPI/GI cocktaile. 2/19: Still with pain and nausea. EGD was normal.  2/20: Doing well. No pain or nausea. Advanced diet. 2/21: No acute issues  2/22: No acute issues  2/23: No acute issues. DC home today. Consults:   IP CONSULT TO GENERAL SURGERY    Significant Diagnostic Studies:   MRI ABDOMEN WO CONTRAST    Result Date: 2/18/2022  EXAMINATION: MRI OF THE ABDOMEN WITHOUT CONTRAST, 2/18/2022 3:45 pm TECHNIQUE: Multiplanar multisequence MRI of the abdomen was performed without the administration of intravenous contrast. COMPARISON: None.  HISTORY: ORDERING SYSTEM PROVIDED HISTORY: RLQ pain, concern for apply, 2 weeks pregnant TECHNOLOGIST PROVIDED HISTORY: Reason for exam:->RLQ pain, concern for apply, 2 weeks pregnant What is the sedation requirement?->None Decision Support Exception - unselect if not a suspected or confirmed emergency medical condition->Emergency Medical Condition (MA) What reading provider will be dictating this exam?->CRC FINDINGS: Lung Bases: Lung bases are unremarkable. The heart is normal in size. Liver: The liver is normal in contour. No focal suspicious liver lesion. Biliary and Gallbladder: No biliary ductal dilation. Spleen: The spleen is unremarkable. Pancreas: The pancreas is unremarkable. Adrenal Glands: The adrenal glands are normal in appearance. Kidneys: Normal renal contour. No suspicious renal lesion. No hydronephrosis. Bladder: Tiny anterior bladder diverticulum. Abdominal Vasculature: The abdominal aorta is normal in diameter. Gastrointestinal Tract: No evidence of bowel obstruction. A structure likely representing the appendix in the right lower quadrant demonstrates a similar course to the definitive appendix seen on prior CT. The structure is mildly dilated measuring 1.0 cm and demonstrates wall thickening. There is small volume free fluid within the pelvis. Peritoneum/Mesentery/Retroperitoneum: Small volume pelvic free fluid. Lymph Nodes: No retroperitoneal lymphadenopathy. Pelvic Organs: The endometrium is thickened, consistent with history of early gestation. Musculoskeletal: No suspicious osseous findings. There is grade 2 anterolisthesis of L5 on S1, slightly increased from prior     1. A structure likely representing the appendix in the right lower quadrant demonstrates a similar course to the definitive appendix seen on prior CT. This structure is dilated measuring 1.0 cm and demonstrates wall thickening, concerning for acute appendicitis. Small volume free fluid is seen within the pelvis, nonspecific. 2.  Thickened endometrium without definitive gestational sac seen. Discussed with Miguel Angel Doan by Dr. Corrinne Celeste at 8:33 pm on 2/18/2022       Discharge Exam:  General appearance: alert, cooperative and in no acute distress.   Eyes: grossly normal  Lungs: Nonlabored breathing on room air   Heart: regular rate and rhythm  Abdomen: soft, minimal right sided tenderness, non distended,   Skin: No skin abnormalities  Neurologic: Alert and oriented x 3. Grossly normal  Musculoskeletal: No clubbing cyanosis    Disposition: home    In process/preliminary results:  Outstanding Order Results     No orders found from 1/20/2022 to 2/19/2022. Patient Instructions:   Discharge Medication List as of 2/23/2022 12:16 PM           Details   HYDROcodone-acetaminophen (NORCO) 5-325 MG per tablet Take 1 tablet by mouth every 6 hours as needed for Pain for up to 5 days. Intended supply: 3 days.  Take lowest dose possible to manage pain, Disp-20 tablet, R-0Normal              Details   ondansetron (ZOFRAN) 4 MG tablet Take 4 mg by mouth every 8 hours as needed for Nausea or VomitingHistorical Med      Prenatal Vit-Fe Fumarate-FA (PRENATAL VITAMIN) 27-0.8 MG TABS Take 1 tablet by mouth daily, Disp-30 tablet, R-0Print              Follow up:   Elizabeth Hastings, [address]  [phone]    Schedule an appointment as soon as possible for a visit  As needed       Signed:  Peyton Hardin MD  2/24/2022  6:46 AM

## 2022-03-01 NOTE — OP NOTE
Operative Note      Patient: Salena Tidwell  YOB: 1985  MRN: [id number]    Date of Procedure: 2/19/2022    Pre-Op Diagnosis: Epigastric pain    Post-Op Diagnosis: Minimal gastritis       Procedure(s):  EGD BIOPSY    Surgeon(s):  Jessica Hutchins MD    Assistant:   * No surgical staff found *    Anesthesia: Monitor Anesthesia Care    Estimated Blood Loss (mL): 1    Complications: none    Specimens:   ID Type Source Tests Collected by Time Destination   A : egd stomach antrum biopsy r/o h pylori Tissue Stomach SURGICAL PATHOLOGY Jessica Hutchins MD 2/19/2022 1317        Implants:  * No implants in log *      Drains: * No LDAs found *        BRIEF HISTORY:  This is a 29 y.o. female who presents with the complaint of epigastric pain. It was recommended the patient undergo an esophagogastrduodenoscopy for further evaluation. The risks/benefits/alternatives/expected outcomes were explained to the patient which include but are not limited to, bleeding, perforation, miscarriage and aspiration. The patient understands and agrees to proceed. PROCEDURE:  The patient was brought into the endoscopy suite and placed in the left lateral decubitus position. A bite block was placed in the patients mouth. After the initiation of LMAC anesthesia, an endoscope was inserted into the patient's mouth and passed into the esophagus and into the stomach. The scope was advanced through the pylorus into the first and second portion of the duodenum making the note of grossly normal mucosa. The scope was then withdrawn back into the stomach where it was retroflexed. There was no hiatal hernia noted. The scope was then straightened and minimal gastritis was visualized. Cold forceps were used for random biopsies in the antrum to rule out H. Pylori. The stomach was desufflated.  The scope was then withdrawn the entire length of the esophagus, making the note of a normal esophagus without masses, ulcerations, or lesions noted. The scope was withdrawn entirely. The patient tolerated the procedure well and there were no complications. The patient was taken to PACU in stable condition.     Elliott Ferguson MD  2/19/2022

## 2022-03-28 ENCOUNTER — APPOINTMENT (OUTPATIENT)
Dept: ULTRASOUND IMAGING | Age: 37
DRG: 566 | End: 2022-03-28
Payer: COMMERCIAL

## 2022-03-28 ENCOUNTER — HOSPITAL ENCOUNTER (INPATIENT)
Age: 37
LOS: 5 days | Discharge: HOME | DRG: 566 | End: 2022-04-02
Attending: STUDENT IN AN ORGANIZED HEALTH CARE EDUCATION/TRAINING PROGRAM | Admitting: INTERNAL MEDICINE
Payer: COMMERCIAL

## 2022-03-28 ENCOUNTER — APPOINTMENT (OUTPATIENT)
Dept: MRI IMAGING | Age: 37
DRG: 566 | End: 2022-03-28
Payer: COMMERCIAL

## 2022-03-28 DIAGNOSIS — R11.2 INTRACTABLE VOMITING WITH NAUSEA, UNSPECIFIED VOMITING TYPE: Primary | ICD-10-CM

## 2022-03-28 DIAGNOSIS — R10.31 ABDOMINAL PAIN, RIGHT LOWER QUADRANT: ICD-10-CM

## 2022-03-28 DIAGNOSIS — R19.7 DIARRHEA OF PRESUMED INFECTIOUS ORIGIN: ICD-10-CM

## 2022-03-28 PROBLEM — R10.9 ABDOMINAL PAIN: Status: ACTIVE | Noted: 2022-03-28

## 2022-03-28 LAB
ALBUMIN SERPL-MCNC: 4.8 G/DL (ref 3.5–5.2)
ALP BLD-CCNC: 58 U/L (ref 35–104)
ALT SERPL-CCNC: 17 U/L (ref 0–32)
ANION GAP SERPL CALCULATED.3IONS-SCNC: 12 MMOL/L (ref 7–16)
AST SERPL-CCNC: 19 U/L (ref 0–31)
BACTERIA: ABNORMAL /HPF
BASOPHILS ABSOLUTE: 0.08 E9/L (ref 0–0.2)
BASOPHILS RELATIVE PERCENT: 0.4 % (ref 0–2)
BILIRUB SERPL-MCNC: 0.5 MG/DL (ref 0–1.2)
BILIRUBIN URINE: NEGATIVE
BLOOD, URINE: NEGATIVE
BUN BLDV-MCNC: 8 MG/DL (ref 6–20)
CALCIUM SERPL-MCNC: 9.2 MG/DL (ref 8.6–10.2)
CHLORIDE BLD-SCNC: 100 MMOL/L (ref 98–107)
CLARITY: CLEAR
CO2: 23 MMOL/L (ref 22–29)
COLOR: YELLOW
CREAT SERPL-MCNC: 0.5 MG/DL (ref 0.5–1)
EOSINOPHILS ABSOLUTE: 0.06 E9/L (ref 0.05–0.5)
EOSINOPHILS RELATIVE PERCENT: 0.3 % (ref 0–6)
GFR AFRICAN AMERICAN: >60
GFR NON-AFRICAN AMERICAN: >60 ML/MIN/1.73
GLUCOSE BLD-MCNC: 128 MG/DL (ref 74–99)
GLUCOSE URINE: NEGATIVE MG/DL
GONADOTROPIN, CHORIONIC (HCG) QUANT: ABNORMAL MIU/ML
HCT VFR BLD CALC: 41 % (ref 34–48)
HEMOGLOBIN: 14.1 G/DL (ref 11.5–15.5)
IMMATURE GRANULOCYTES #: 0.13 E9/L
IMMATURE GRANULOCYTES %: 0.7 % (ref 0–5)
KETONES, URINE: 15 MG/DL
LACTIC ACID: 2.5 MMOL/L (ref 0.5–2.2)
LEUKOCYTE ESTERASE, URINE: NEGATIVE
LIPASE: 20 U/L (ref 13–60)
LYMPHOCYTES ABSOLUTE: 1.86 E9/L (ref 1.5–4)
LYMPHOCYTES RELATIVE PERCENT: 9.7 % (ref 20–42)
MCH RBC QN AUTO: 32.3 PG (ref 26–35)
MCHC RBC AUTO-ENTMCNC: 34.4 % (ref 32–34.5)
MCV RBC AUTO: 93.8 FL (ref 80–99.9)
MONOCYTES ABSOLUTE: 0.51 E9/L (ref 0.1–0.95)
MONOCYTES RELATIVE PERCENT: 2.7 % (ref 2–12)
MUCUS: PRESENT /LPF
NEUTROPHILS ABSOLUTE: 16.56 E9/L (ref 1.8–7.3)
NEUTROPHILS RELATIVE PERCENT: 86.2 % (ref 43–80)
NITRITE, URINE: NEGATIVE
PDW BLD-RTO: 14.9 FL (ref 11.5–15)
PH UA: 7 (ref 5–9)
PLATELET # BLD: 403 E9/L (ref 130–450)
PMV BLD AUTO: 8.9 FL (ref 7–12)
POTASSIUM SERPL-SCNC: 3.8 MMOL/L (ref 3.5–5)
PROTEIN UA: ABNORMAL MG/DL
RBC # BLD: 4.37 E12/L (ref 3.5–5.5)
RBC UA: ABNORMAL /HPF (ref 0–2)
SODIUM BLD-SCNC: 135 MMOL/L (ref 132–146)
SPECIFIC GRAVITY UA: 1.02 (ref 1–1.03)
TOTAL PROTEIN: 7.6 G/DL (ref 6.4–8.3)
UROBILINOGEN, URINE: 0.2 E.U./DL
WBC # BLD: 19.2 E9/L (ref 4.5–11.5)
WBC UA: ABNORMAL /HPF (ref 0–5)

## 2022-03-28 PROCEDURE — 85025 COMPLETE CBC W/AUTO DIFF WBC: CPT

## 2022-03-28 PROCEDURE — 76705 ECHO EXAM OF ABDOMEN: CPT

## 2022-03-28 PROCEDURE — 83605 ASSAY OF LACTIC ACID: CPT

## 2022-03-28 PROCEDURE — 99285 EMERGENCY DEPT VISIT HI MDM: CPT

## 2022-03-28 PROCEDURE — 1200000000 HC SEMI PRIVATE

## 2022-03-28 PROCEDURE — 96376 TX/PRO/DX INJ SAME DRUG ADON: CPT

## 2022-03-28 PROCEDURE — 36415 COLL VENOUS BLD VENIPUNCTURE: CPT

## 2022-03-28 PROCEDURE — 96375 TX/PRO/DX INJ NEW DRUG ADDON: CPT

## 2022-03-28 PROCEDURE — 6360000002 HC RX W HCPCS

## 2022-03-28 PROCEDURE — 99222 1ST HOSP IP/OBS MODERATE 55: CPT | Performed by: INTERNAL MEDICINE

## 2022-03-28 PROCEDURE — 84702 CHORIONIC GONADOTROPIN TEST: CPT

## 2022-03-28 PROCEDURE — 2580000003 HC RX 258: Performed by: INTERNAL MEDICINE

## 2022-03-28 PROCEDURE — 81001 URINALYSIS AUTO W/SCOPE: CPT

## 2022-03-28 PROCEDURE — 96361 HYDRATE IV INFUSION ADD-ON: CPT

## 2022-03-28 PROCEDURE — 83690 ASSAY OF LIPASE: CPT

## 2022-03-28 PROCEDURE — 87088 URINE BACTERIA CULTURE: CPT

## 2022-03-28 PROCEDURE — 2580000003 HC RX 258

## 2022-03-28 PROCEDURE — 96374 THER/PROPH/DIAG INJ IV PUSH: CPT

## 2022-03-28 PROCEDURE — 6360000002 HC RX W HCPCS: Performed by: INTERNAL MEDICINE

## 2022-03-28 PROCEDURE — 87040 BLOOD CULTURE FOR BACTERIA: CPT

## 2022-03-28 PROCEDURE — 80053 COMPREHEN METABOLIC PANEL: CPT

## 2022-03-28 PROCEDURE — 74181 MRI ABDOMEN W/O CONTRAST: CPT

## 2022-03-28 RX ORDER — ONDANSETRON 2 MG/ML
4 INJECTION INTRAMUSCULAR; INTRAVENOUS EVERY 6 HOURS PRN
Status: DISCONTINUED | OUTPATIENT
Start: 2022-03-28 | End: 2022-04-02 | Stop reason: HOSPADM

## 2022-03-28 RX ORDER — SODIUM CHLORIDE 9 MG/ML
INJECTION, SOLUTION INTRAVENOUS PRN
Status: DISCONTINUED | OUTPATIENT
Start: 2022-03-28 | End: 2022-04-02 | Stop reason: HOSPADM

## 2022-03-28 RX ORDER — SODIUM CHLORIDE 0.9 % (FLUSH) 0.9 %
5-40 SYRINGE (ML) INJECTION EVERY 12 HOURS SCHEDULED
Status: DISCONTINUED | OUTPATIENT
Start: 2022-03-28 | End: 2022-04-02 | Stop reason: HOSPADM

## 2022-03-28 RX ORDER — 0.9 % SODIUM CHLORIDE 0.9 %
1000 INTRAVENOUS SOLUTION INTRAVENOUS ONCE
Status: COMPLETED | OUTPATIENT
Start: 2022-03-28 | End: 2022-03-28

## 2022-03-28 RX ORDER — POLYETHYLENE GLYCOL 3350 17 G/17G
17 POWDER, FOR SOLUTION ORAL DAILY PRN
Status: DISCONTINUED | OUTPATIENT
Start: 2022-03-28 | End: 2022-04-02 | Stop reason: HOSPADM

## 2022-03-28 RX ORDER — ACETAMINOPHEN 650 MG/1
650 SUPPOSITORY RECTAL EVERY 6 HOURS PRN
Status: DISCONTINUED | OUTPATIENT
Start: 2022-03-28 | End: 2022-04-02 | Stop reason: HOSPADM

## 2022-03-28 RX ORDER — ONDANSETRON 2 MG/ML
4 INJECTION INTRAMUSCULAR; INTRAVENOUS ONCE
Status: COMPLETED | OUTPATIENT
Start: 2022-03-28 | End: 2022-03-28

## 2022-03-28 RX ORDER — METOCLOPRAMIDE HYDROCHLORIDE 5 MG/ML
10 INJECTION INTRAMUSCULAR; INTRAVENOUS ONCE
Status: COMPLETED | OUTPATIENT
Start: 2022-03-28 | End: 2022-03-28

## 2022-03-28 RX ORDER — ACETAMINOPHEN 325 MG/1
650 TABLET ORAL EVERY 6 HOURS PRN
Status: DISCONTINUED | OUTPATIENT
Start: 2022-03-28 | End: 2022-04-02 | Stop reason: HOSPADM

## 2022-03-28 RX ORDER — PROMETHAZINE HYDROCHLORIDE 25 MG/ML
25 INJECTION, SOLUTION INTRAMUSCULAR; INTRAVENOUS ONCE
Status: COMPLETED | OUTPATIENT
Start: 2022-03-28 | End: 2022-03-28

## 2022-03-28 RX ORDER — SODIUM CHLORIDE 0.9 % (FLUSH) 0.9 %
5-40 SYRINGE (ML) INJECTION PRN
Status: DISCONTINUED | OUTPATIENT
Start: 2022-03-28 | End: 2022-04-02 | Stop reason: HOSPADM

## 2022-03-28 RX ADMIN — HYDROMORPHONE HYDROCHLORIDE 0.5 MG: 1 INJECTION, SOLUTION INTRAMUSCULAR; INTRAVENOUS; SUBCUTANEOUS at 12:03

## 2022-03-28 RX ADMIN — SODIUM CHLORIDE 1000 ML: 9 INJECTION, SOLUTION INTRAVENOUS at 12:04

## 2022-03-28 RX ADMIN — ENOXAPARIN SODIUM 40 MG: 100 INJECTION SUBCUTANEOUS at 20:36

## 2022-03-28 RX ADMIN — SODIUM CHLORIDE, PRESERVATIVE FREE 1000 MG: 5 INJECTION INTRAVENOUS at 20:37

## 2022-03-28 RX ADMIN — SODIUM CHLORIDE, PRESERVATIVE FREE 10 ML: 5 INJECTION INTRAVENOUS at 20:38

## 2022-03-28 RX ADMIN — PROMETHAZINE HYDROCHLORIDE 25 MG: 25 INJECTION INTRAMUSCULAR; INTRAVENOUS at 18:30

## 2022-03-28 RX ADMIN — ONDANSETRON 4 MG: 2 INJECTION INTRAMUSCULAR; INTRAVENOUS at 13:58

## 2022-03-28 RX ADMIN — ONDANSETRON 4 MG: 2 INJECTION INTRAMUSCULAR; INTRAVENOUS at 22:30

## 2022-03-28 RX ADMIN — HYDROMORPHONE HYDROCHLORIDE 1 MG: 1 INJECTION, SOLUTION INTRAMUSCULAR; INTRAVENOUS; SUBCUTANEOUS at 13:58

## 2022-03-28 RX ADMIN — HYDROMORPHONE HYDROCHLORIDE 0.5 MG: 1 INJECTION, SOLUTION INTRAMUSCULAR; INTRAVENOUS; SUBCUTANEOUS at 18:59

## 2022-03-28 RX ADMIN — METOCLOPRAMIDE 10 MG: 5 INJECTION, SOLUTION INTRAMUSCULAR; INTRAVENOUS at 12:03

## 2022-03-28 ASSESSMENT — ENCOUNTER SYMPTOMS
ABDOMINAL PAIN: 1
CONSTIPATION: 0
DIARRHEA: 1
CHEST TIGHTNESS: 0
WHEEZING: 0
NAUSEA: 1
BLOOD IN STOOL: 0
VOMITING: 1
COUGH: 0
BACK PAIN: 0
SHORTNESS OF BREATH: 0

## 2022-03-28 ASSESSMENT — PAIN DESCRIPTION - LOCATION: LOCATION: ABDOMEN

## 2022-03-28 ASSESSMENT — PAIN DESCRIPTION - ORIENTATION: ORIENTATION: RIGHT;LOWER;MID

## 2022-03-28 ASSESSMENT — PAIN DESCRIPTION - DESCRIPTORS: DESCRIPTORS: SHARP;STABBING

## 2022-03-28 ASSESSMENT — PAIN DESCRIPTION - PAIN TYPE
TYPE: ACUTE PAIN
TYPE: ACUTE PAIN

## 2022-03-28 ASSESSMENT — PAIN DESCRIPTION - FREQUENCY: FREQUENCY: CONTINUOUS

## 2022-03-28 ASSESSMENT — PAIN SCALES - GENERAL
PAINLEVEL_OUTOF10: 6
PAINLEVEL_OUTOF10: 9
PAINLEVEL_OUTOF10: 3
PAINLEVEL_OUTOF10: 9
PAINLEVEL_OUTOF10: 9

## 2022-03-28 ASSESSMENT — PAIN - FUNCTIONAL ASSESSMENT: PAIN_FUNCTIONAL_ASSESSMENT: PREVENTS OR INTERFERES SOME ACTIVE ACTIVITIES AND ADLS

## 2022-03-28 ASSESSMENT — PAIN DESCRIPTION - PROGRESSION: CLINICAL_PROGRESSION: NOT CHANGED

## 2022-03-28 ASSESSMENT — PAIN DESCRIPTION - ONSET: ONSET: ON-GOING

## 2022-03-28 NOTE — H&P
[facility] Hospitalist Group   History and Physical      CHIEF COMPLAINT: Lower abdominal pain and nausea/vomiting intermittently over last few weeks worse over last 3 days prior to arrival.    History of Present Illness:    She is a 50-year-old 5 wks  pregnant  female with past medical history of asthma, Crohn's disease, seizures,marijuanause , tobacco use , ongoing intermittent right lower abdominal pain over the last few weeks worse over last 3 days prior to arrival.  As per patient she was admitted at [facility] and [facility] recently, was treated conservatively. As per discharge note on 2/23/2022 she was admitted with nausea vomiting and upper abdominal pain, couple of episodes of hematemesis  & pregnancy. General surgery was called for possible appendicitis. She had a EGD which was normal. As per records she was treated conservatively at [facility]. She has ongoing abdominal pain /n/v/over last few wks with recurrent visits / admission. She returns with rt lower intermittent  abdominal pain/n/v 8-9/10 with no relation to food. No exacerbating / relieving factors. Nothing at home has helped her. She denies f/c/r/cough/sob /dyuria/urinary problems. Trearted with dilaudid / Toan [employer] in er with partial relief. She is admitted for further symptomatic rx & surg/ob evaluation. REVIEW OF SYSTEMS:  no fevers, chills, cp, sob, ha, vision/hearing changes, wt changes, hot/cold flashes, other open skin lesions, diarrhea, constipation, dysuria/hematuria unless noted in HPI. Complete ROS performed with the patient and is otherwise negative.       PMH:  Past Medical History:   Diagnosis Date    Anxiety attack since age 16    diagnosed with seizure due to convulsions from this    Asthma     seasonal; no inhaler     Crohn's colitis ([address]     Marijuana abuse     occas    Multiple lacerations 2015    stabbing; resolved    Seizure ([address]     loses focus, disoriented unpon arousing; etiology unknown per pt; none since 2015    Tobacco abuse        Surgical History:  Past Surgical History:   Procedure Laterality Date    CHOLECYSTECTOMY  01/22/2018    COLONOSCOPY      DILATION AND CURETTAGE OF UTERUS  2011    ENDOSCOPY, COLON, DIAGNOSTIC      HERNIA REPAIR      HERNIA REPAIR      UPPER GASTROINTESTINAL ENDOSCOPY N/A 2/19/2022    EGD BIOPSY performed by Anne Mcknight MD at [address] [place] [address]       Medications Prior to Admission:    Prior to Admission medications    Medication Sig Start Date End Date Taking? Authorizing Provider   ondansetron (ZOFRAN) 4 MG tablet Take 4 mg by mouth every 8 hours as needed for Nausea or Vomiting    Historical Provider, MD   Prenatal Vit-Fe Fumarate-FA (PRENATAL VITAMIN) 27-0.8 MG TABS Take 1 tablet by mouth daily 2/17/22   Carri Madden MD       Allergies:    Patient has no known allergies. Social History:    reports that she has been smoking cigarettes. She has a 10.00 pack-year smoking history. She has never used smokeless tobacco. She reports current alcohol use. She reports previous drug use. Frequency: 2.00 times per week. Drug: Marijuana Lu Awkward).     Family History:       Problem Relation Age of Onset    Depression Mother     Cancer Mother     Heart Disease Father     Depression Father        PHYSICAL EXAM:  Vitals:  BP (!) 182/92   Pulse 63   Temp 97.5 °F (36.4 °C) (Tympanic)   Resp 18   Ht 5' 4\" (1.626 m)   Wt 136 lb (61.7 kg)   LMP 02/17/2022   SpO2 97%   BMI 23.34 kg/m²   General Appearance: alert and oriented to person, place and time, uncomfortable secondary to pain, curled up in bed, in no acute distress  Skin: warm and dry  Head: normocephalic and atraumatic  Eyes:  extraocular eye movements intact, conjunctivae normal  Neck: supple and non-tender without mass  Pulmonary/Chest: clear to auscultation bilaterally  Cardiovascular: normal rate, regular rhythm, normal S1 and S2  Abdomen: soft, right lower quadrant tenderness, positive McBurney's, non-distended, normal bowel sounds, no masses or organomegaly  Extremities: no cyanosis, clubbing   Neurologic:no cranial nerve deficit,  speech normal      LABS:  Recent Labs     03/28/22  1153      K 3.8      CO2 23   BUN 8   CREATININE 0.5   GLUCOSE 128*   CALCIUM 9.2       Recent Labs     03/28/22  1153   WBC 19.2*   RBC 4.37   HGB 14.1   HCT 41.0   MCV 93.8   MCH 32.3   MCHC 34.4   RDW 14.9      MPV 8.9       No results for input(s): POCGLU in the last 72 hours. Labs and images reviewed    Radiology: US ABDOMEN LIMITED    Result Date: 3/28/2022  EXAMINATION: RIGHT UPPER QUADRANT ULTRASOUND 3/28/2022 11:38 am COMPARISON: Correlation with MRI dated 02/18/2022. HISTORY: ORDERING SYSTEM PROVIDED HISTORY: rlq pain, r/o appy TECHNOLOGIST PROVIDED HISTORY: Reason for exam:->rlq pain, r/o appy What reading provider will be dictating this exam?->CRC FINDINGS: Of note, per the previous MRI report, there was concern for acute appendicitis on that examination. The current ultrasound does not definitively visualize the appendix. There is no obvious free fluid or focal fluid collection identified. Partial visualization of an intrauterine pregnancy although this was not performed as an obstetric ultrasound and the fetus was not evaluated on this examination. Appendix not definitively identified. Therefore, appendicitis cannot be entirely excluded. If there is persistent clinical concern, correlation with repeat MRI could be considered.  RECOMMENDATIONS: Unavailable     MRI ABDOMEN WO CONTRAST    Result Date: 3/28/2022  EXAMINATION: MRI OF THE ABDOMEN WITHOUT CONTRAST 3/28/2022 2:33 pm TECHNIQUE: Multiplanar multisequence MRI of the abdomen was performed without the administration of intravenous contrast. COMPARISON: Limited abdominal sonogram 03/28/2022, abdomen MRI 02/18/2022, abdomen and pelvis CT 07/14/2021 HISTORY: ORDERING SYSTEM PROVIDED HISTORY: evaluation for appendicitis, has had before but never underwent appendectomy TECHNOLOGIST PROVIDED HISTORY: Reason for exam:->evaluation for appendicitis, has had before but never underwent appendectomy What is the sedation requirement?->None Decision Support Exception - unselect if not a suspected or confirmed emergency medical condition->Emergency Medical Condition (MA) FINDINGS: Lack of intravenous contrast administration, partially limiting evaluation of the soft tissues and vasculature. LOWER CHEST:  Minimal inclusion with clear appearance of the lungs and apparent normal heart size. ORGANS:  Surgically absent gallbladder. No intrahepatic nor extrahepatic biliary dilation. Normal appearance of the liver, spleen, adrenal glands, and kidneys. No hydroureteronephrosis. GI/BOWEL:  Normal course and caliber of the stomach, small bowel, colon, and rectum without obstruction. Suspected normal appendix along the right pelvic sidewall. PELVIS:  Intrauterine gestational sac containing a single fetus with no evident subchronic hemorrhage. Normal appearance of the ovaries with physiologic follicles bilaterally. Normal appearance of the urinary bladder. PERITONEUM/RETROPERITONEUM:  No evident abdominal nor pelvic lymphadenopathy. Persistent though decreased trace free intraperitoneal fluid in the pelvis, likely physiologic. Unchanged 1.2 cm cystic lesion anterior to the uterus and urinary bladder, potentially a lymphatic malformation or duplication cyst. SOFT TISSUES/BONES:  Normal marrow signal with no suspicious lesions. Severe degenerative disc disease at L5/S1. 1. No evident acute findings in the abdomen or pelvis. Specifically, no findings of acute appendicitis. 2. Incomplete evaluation of a single intrauterine fetus. ASSESSMENT:      Principal Problem:    Intractable nausea and vomiting  Active Problems:    Abdominal pain  Resolved Problems:    * No resolved hospital problems.  *      PLAN:    Abdominal pain RLQ and nausea/vomiting with hx crohn's / in 9 wks pregnant patient - pain has been an  ongoing issue since she got pregnant. Off & on over last few weeks. Admitted at [facility] ([facility]) as well as [facility] . Treated conservatively / IV abx were given as per patient. Has elevated WBCs , had elevated WBCs during last admission too. UA clear without UTI. MRI no evidence of acute findings in the  abdomen and pelvis. Specifically no findings suggestive of acute appendicitis. Incomplete evaluation of single intrauterine fetus. Continue current supportive treatment. Could be hyperemesis secondary to pregnancy. Surgery OB/GYN consulted. With elevated WBC will treat with empiric IV Ceftriax x1 . Will defer to surg /ob regarding further continuation of abx. Blood & Urine cxs already  send from er - will follow on this. Pregnancy & possible hyperemesis -OB GYN consulted. Continue supportive care       Code Status: Full  DVT prophylaxis: Lovenox      Electronically signed by Fabiana Pablo MD on 3/28/2022 at 5:31 PM      NOTE: This report was transcribed using voice recognition software. Every effort was made to ensure accuracy; however, inadvertent computerized transcription errors may be present.

## 2022-03-28 NOTE — ED NOTES
RN faxed SBAR to floor. Confirmation received and spoke with Michel Gray. Pt ready for transport to room.        Jane Dacosta RN  03/28/22 5915

## 2022-03-28 NOTE — PROGRESS NOTES
Spoke with brigitte Luna to give pt 1gm Rocephin and 40mg Lovenox.  Electronically signed by Sarwat Eubanks RN on 3/28/2022 at 7:00 PM

## 2022-03-28 NOTE — ED PROVIDER NOTES
[facility]W MED SURG  EMERGENCY DEPARTMENT ENCOUNTER      Pt Name: Bj Maier  MRN: [id number]  [place] 1985  Date of evaluation: 3/28/2022      CHIEF COMPLAINT       Chief Complaint   Patient presents with    Abdominal Pain     lower 9 wks preg. HPI  Bj Maier is a 29 y.o. female   @9 wks presents with abdominal pain, nausea, vomiting, diarrhea. Patient states that her abdominal pain has been on and off for the past couple weeks, she was recently admitted in the hospital for appendicitis and received antibiotics for a week and discharged home with pain medications. States that for the past couple days her pain has been intermittent however since this morning her pain has become constant, states that the pain is located in the right lower quadrant and flank region, sharp, constant, rated 9 out of 10. Associated with nausea and vomiting started this morning, states that she had too many episodes of vomiting but it is nonbilious nonbloody. Endorses diarrhea that also started this morning bloody. Describes symptoms moderate in severity with no alleviating or exacerbating factors. Denies any fever, chills, , headache, dizziness, vision changes, neck tenderness or stiffness, weakness, cp, palpitations, leg swelling/tenderness, sob, cough, dysuria, hematuria, constipation. She states that she was at her OBs office- Dr. Gerardo Martinez this morning her first ultrasound appointment they did a vaginal exam that was normal and sent her to the ED for further work-up. Denies any vaginal bleeding, vaginal discharge, gush of fluids. Except as noted above the remainder of the review of systems was reviewed and negative. Review of Systems   Constitutional: Negative for activity change, appetite change, chills, fatigue and fever. HENT: Negative for congestion, nosebleeds and tinnitus. Eyes: Negative for visual disturbance.    Respiratory: Negative for cough, chest tightness, shortness of breath and wheezing. Cardiovascular: Negative for chest pain, palpitations and leg swelling. Gastrointestinal: Positive for abdominal pain, diarrhea, nausea and vomiting. Negative for blood in stool and constipation. Endocrine: Negative for polydipsia and polyphagia. Genitourinary: Negative for dysuria, flank pain, hematuria, vaginal bleeding, vaginal discharge and vaginal pain. Musculoskeletal: Negative for back pain, gait problem, joint swelling and myalgias. Skin: Negative for rash. Allergic/Immunologic: Negative for immunocompromised state. Neurological: Negative for dizziness, syncope, weakness, numbness and headaches. Hematological: Negative for adenopathy. Psychiatric/Behavioral: Negative for behavioral problems, confusion and hallucinations. Physical Exam  Constitutional:       General: She is not in acute distress. Appearance: Normal appearance. She is normal weight. She is ill-appearing. She is not toxic-appearing or diaphoretic. HENT:      Head: Normocephalic and atraumatic. Right Ear: External ear normal.      Left Ear: External ear normal.      Nose: Nose normal. No congestion or rhinorrhea. Mouth/Throat:      Mouth: Mucous membranes are moist.      Pharynx: Oropharynx is clear. No oropharyngeal exudate or posterior oropharyngeal erythema. Eyes:      Conjunctiva/sclera: Conjunctivae normal.      Pupils: Pupils are equal, round, and reactive to light. Cardiovascular:      Rate and Rhythm: Normal rate and regular rhythm. Pulses: Normal pulses. Heart sounds: Normal heart sounds. Pulmonary:      Effort: Pulmonary effort is normal.      Breath sounds: Normal breath sounds. Abdominal:      General: Abdomen is flat. Bowel sounds are normal. There is no distension. Palpations: Abdomen is soft. There is no mass. Tenderness: There is abdominal tenderness in the right lower quadrant.  There is no right CVA tenderness, left CVA tenderness, guarding or rebound. Positive signs include McBurney's sign. Negative signs include Peoples's sign, Rovsing's sign and psoas sign. Hernia: No hernia is present. Musculoskeletal:      Cervical back: Normal range of motion. Skin:     General: Skin is warm and dry. Capillary Refill: Capillary refill takes less than 2 seconds. Neurological:      General: No focal deficit present. Mental Status: She is alert and oriented to person, place, and time. Mental status is at baseline. Psychiatric:         Mood and Affect: Mood normal.         Behavior: Behavior normal.         Thought Content: Thought content normal.          Procedures     MDM      29 y.o. female   @9 wks presents with abdominal pain, nausea, vomiting, diarrhea. While in the ED patient was hypertensive but otherwise hemodynamically stable, afebrile, nontoxic-appearing, in no respiratory distress. Physical exam remarkable for abdominal tenderness generalized, but abdomen is soft, nondistended, with + BS. Patient is actively vomiting and in distress. Labs remarkable for ketones and bacteria in urine, elevated lactic acid and leukocytosis. Cultures sent. US and MRI abdomen unremarkable for appendicitis with unchanged 1.2 cm cystic lesion anterior to the uterus and urinary bladder  Patient received reglan, zofran and phenergan, fluids and dilaudid with minimal and temporary symptomatic relief. Spoke to Dr. Julia Mcneill who will follow patient during admission. Patient admitted to the Medicine team for intractable nausea. Patient in agreement with plan of admission. ED Course as of 22 0017   Mon Mar 28, 2022   1648 Spoke to Dr. Julia Mcneill, he will follow.   [TC]      ED Course User Index  [TC] Neha Luna MD       --------------------------------------------- PAST HISTORY ---------------------------------------------  Past Medical History:  has a past medical history of Anxiety attack, Asthma, Crohn's colitis ([facility] [address] Marijuana abuse, Multiple lacerations, Seizure ([facility] [address] and Tobacco abuse. Past Surgical History:  has a past surgical history that includes Dilation and curettage of uterus (2011); Colonoscopy; Endoscopy, colon, diagnostic; Cholecystectomy (01/22/2018); hernia repair; hernia repair; and Upper gastrointestinal endoscopy (N/A, 2/19/2022). Social History:  reports that she has been smoking cigarettes. She has a 10.00 pack-year smoking history. She has never used smokeless tobacco. She reports current alcohol use. She reports previous drug use. Frequency: 2.00 times per week. Drug: Marijuana Margcezar Gary). Family History: family history includes Cancer in her mother; Depression in her father and mother; Heart Disease in her father. The patients home medications have been reviewed. Allergies: Patient has no known allergies.     -------------------------------------------------- RESULTS -------------------------------------------------    LABS:  Results for orders placed or performed during the hospital encounter of 03/28/22   Comprehensive Metabolic Panel   Result Value Ref Range    Sodium 135 132 - 146 mmol/L    Potassium 3.8 3.5 - 5.0 mmol/L    Chloride 100 98 - 107 mmol/L    CO2 23 22 - 29 mmol/L    Anion Gap 12 7 - 16 mmol/L    Glucose 128 (H) 74 - 99 mg/dL    BUN 8 6 - 20 mg/dL    CREATININE 0.5 0.5 - 1.0 mg/dL    GFR Non-African American >60 >=60 mL/min/1.73    GFR African American >60     Calcium 9.2 8.6 - 10.2 mg/dL    Total Protein 7.6 6.4 - 8.3 g/dL    Albumin 4.8 3.5 - 5.2 g/dL    Total Bilirubin 0.5 0.0 - 1.2 mg/dL    Alkaline Phosphatase 58 35 - 104 U/L    ALT 17 0 - 32 U/L    AST 19 0 - 31 U/L   CBC with Auto Differential   Result Value Ref Range    WBC 19.2 (H) 4.5 - 11.5 E9/L    RBC 4.37 3.50 - 5.50 E12/L    Hemoglobin 14.1 11.5 - 15.5 g/dL    Hematocrit 41.0 34.0 - 48.0 %    MCV 93.8 80.0 - 99.9 fL    MCH 32.3 26.0 - 35.0 pg    MCHC 34.4 32.0 - 34.5 %    RDW 14.9 11.5 - 15.0 fL    Platelets 535 730 - 106 E9/L    MPV 8.9 7.0 - 12.0 fL    Neutrophils % 86.2 (H) 43.0 - 80.0 %    Immature Granulocytes % 0.7 0.0 - 5.0 %    Lymphocytes % 9.7 (L) 20.0 - 42.0 %    Monocytes % 2.7 2.0 - 12.0 %    Eosinophils % 0.3 0.0 - 6.0 %    Basophils % 0.4 0.0 - 2.0 %    Neutrophils Absolute 16.56 (H) 1.80 - 7.30 E9/L    Immature Granulocytes # 0.13 E9/L    Lymphocytes Absolute 1.86 1.50 - 4.00 E9/L    Monocytes Absolute 0.51 0.10 - 0.95 E9/L    Eosinophils Absolute 0.06 0.05 - 0.50 E9/L    Basophils Absolute 0.08 0.00 - 0.20 E9/L   Lipase   Result Value Ref Range    Lipase 20 13 - 60 U/L   Urinalysis with Microscopic   Result Value Ref Range    Color, UA Yellow Straw/Yellow    Clarity, UA Clear Clear    Glucose, Ur Negative Negative mg/dL    Bilirubin Urine Negative Negative    Ketones, Urine 15 (A) Negative mg/dL    Specific Gravity, UA 1.025 1.005 - 1.030    Blood, Urine Negative Negative    pH, UA 7.0 5.0 - 9.0    Protein, UA TRACE Negative mg/dL    Urobilinogen, Urine 0.2 <2.0 E.U./dL    Nitrite, Urine Negative Negative    Leukocyte Esterase, Urine Negative Negative    Mucus, UA Present (A) None Seen /LPF    WBC, UA 0-1 0 - 5 /HPF    RBC, UA 1-3 0 - 2 /HPF    Bacteria, UA RARE (A) None Seen /HPF   Lactic Acid   Result Value Ref Range    Lactic Acid 2.5 (H) 0.5 - 2.2 mmol/L   HCG, Quantitative, Pregnancy   Result Value Ref Range    hCG Quant 71767.0 (H) <10 mIU/mL       RADIOLOGY:  MRI ABDOMEN WO CONTRAST   Final Result   1. No evident acute findings in the abdomen or pelvis. Specifically, no   findings of acute appendicitis. 2. Incomplete evaluation of a single intrauterine fetus. US ABDOMEN LIMITED   Final Result   Appendix not definitively identified. Therefore, appendicitis cannot be   entirely excluded. If there is persistent clinical concern, correlation with   repeat MRI could be considered.       RECOMMENDATIONS:   Unavailable               ------------------------- NURSING NOTES AND VITALS REVIEWED ---------------------------  Date / Time Roomed:  3/28/2022 11:17 AM  ED Bed Assignment:  [id number]    The nursing notes within the ED encounter and vital signs as below have been reviewed. Patient Vitals for the past 24 hrs:   BP Temp Temp src Pulse Resp SpO2 Height Weight   03/28/22 1943 (!) 162/79 98.7 °F (37.1 °C) Oral 85 18 99 % -- --   03/28/22 1810 (!) 153/70 97.7 °F (36.5 °C) Oral 70 18 99 % -- --   03/28/22 1739 (!) 165/87 98 °F (36.7 °C) Oral 81 18 98 % -- --   03/28/22 1601 (!) 182/92 -- -- 63 18 97 % -- --   03/28/22 1206 -- -- -- -- -- -- -- 136 lb (61.7 kg)   03/28/22 1101 (!) 152/93 97.5 °F (36.4 °C) Tympanic 70 20 100 % 5' 4\" (1.626 m) 136 lb (61.7 kg)       Oxygen Saturation Interpretation: Normal    ------------------------------------------ PROGRESS NOTES ------------------------------------------  Re-evaluation(s):  Time: 1120  Patients symptoms show no change  Repeat physical examination is not changed    Counseling:  I have spoken with the patient and discussed todays results, in addition to providing specific details for the plan of care and counseling regarding the diagnosis and prognosis. Their questions are answered at this time and they are agreeable with the plan of admission.    --------------------------------- ADDITIONAL PROVIDER NOTES ---------------------------------  Consultations:  Time: [phone] with Dr. Dov Joel. Discussed case. They will admit the patient. This patient's ED course included: a personal history and physicial examination, re-evaluation prior to disposition, multiple bedside re-evaluations, IV medications, cardiac monitoring and continuous pulse oximetry    This patient has remained hemodynamically stable during their ED course. Diagnosis:  1. Intractable vomiting with nausea, unspecified vomiting type    2. Abdominal pain, right lower quadrant    3.  Diarrhea of presumed infectious origin        Disposition:  Patient's disposition: Admit to med/surg floor  Patient's condition is stable.                Keith Eddy MD  Resident  03/29/22 2352

## 2022-03-29 LAB
ALBUMIN SERPL-MCNC: 4.3 G/DL (ref 3.5–5.2)
ALP BLD-CCNC: 53 U/L (ref 35–104)
ALT SERPL-CCNC: 15 U/L (ref 0–32)
AMORPHOUS: NORMAL
AMPHETAMINE SCREEN, URINE: NOT DETECTED
ANION GAP SERPL CALCULATED.3IONS-SCNC: 14 MMOL/L (ref 7–16)
AST SERPL-CCNC: 17 U/L (ref 0–31)
BACTERIA: NORMAL /HPF
BARBITURATE SCREEN URINE: NOT DETECTED
BASOPHILS ABSOLUTE: 0.04 E9/L (ref 0–0.2)
BASOPHILS RELATIVE PERCENT: 0.2 % (ref 0–2)
BENZODIAZEPINE SCREEN, URINE: NOT DETECTED
BILIRUB SERPL-MCNC: 0.6 MG/DL (ref 0–1.2)
BILIRUBIN URINE: NEGATIVE
BLOOD, URINE: NEGATIVE
BUN BLDV-MCNC: 8 MG/DL (ref 6–20)
CALCIUM SERPL-MCNC: 8.9 MG/DL (ref 8.6–10.2)
CANNABINOID SCREEN URINE: POSITIVE
CHLORIDE BLD-SCNC: 101 MMOL/L (ref 98–107)
CLARITY: CLEAR
CO2: 21 MMOL/L (ref 22–29)
COCAINE METABOLITE SCREEN URINE: NOT DETECTED
COLOR: YELLOW
CREAT SERPL-MCNC: 0.4 MG/DL (ref 0.5–1)
EOSINOPHILS ABSOLUTE: 0.02 E9/L (ref 0.05–0.5)
EOSINOPHILS RELATIVE PERCENT: 0.1 % (ref 0–6)
EPITHELIAL CELLS, UA: NORMAL /HPF
FENTANYL SCREEN, URINE: NOT DETECTED
GFR AFRICAN AMERICAN: >60
GFR NON-AFRICAN AMERICAN: >60 ML/MIN/1.73
GLUCOSE BLD-MCNC: 103 MG/DL (ref 74–99)
GLUCOSE URINE: NEGATIVE MG/DL
HCT VFR BLD CALC: 34.6 % (ref 34–48)
HEMOGLOBIN: 11.8 G/DL (ref 11.5–15.5)
IMMATURE GRANULOCYTES #: 0.14 E9/L
IMMATURE GRANULOCYTES %: 0.7 % (ref 0–5)
KETONES, URINE: NEGATIVE MG/DL
LEUKOCYTE ESTERASE, URINE: NEGATIVE
LYMPHOCYTES ABSOLUTE: 3.02 E9/L (ref 1.5–4)
LYMPHOCYTES RELATIVE PERCENT: 14.4 % (ref 20–42)
Lab: ABNORMAL
MAGNESIUM: 2.1 MG/DL (ref 1.6–2.6)
MCH RBC QN AUTO: 31.4 PG (ref 26–35)
MCHC RBC AUTO-ENTMCNC: 34.1 % (ref 32–34.5)
MCV RBC AUTO: 92 FL (ref 80–99.9)
METHADONE SCREEN, URINE: NOT DETECTED
MONOCYTES ABSOLUTE: 1.13 E9/L (ref 0.1–0.95)
MONOCYTES RELATIVE PERCENT: 5.4 % (ref 2–12)
NEUTROPHILS ABSOLUTE: 16.56 E9/L (ref 1.8–7.3)
NEUTROPHILS RELATIVE PERCENT: 79.2 % (ref 43–80)
NITRITE, URINE: NEGATIVE
OPIATE SCREEN URINE: NOT DETECTED
OXYCODONE URINE: NOT DETECTED
PDW BLD-RTO: 15.1 FL (ref 11.5–15)
PH UA: 7.5 (ref 5–9)
PHENCYCLIDINE SCREEN URINE: NOT DETECTED
PLATELET # BLD: 374 E9/L (ref 130–450)
PMV BLD AUTO: 9.4 FL (ref 7–12)
POTASSIUM REFLEX MAGNESIUM: 3.2 MMOL/L (ref 3.5–5)
PROTEIN UA: NEGATIVE MG/DL
RBC # BLD: 3.76 E12/L (ref 3.5–5.5)
RBC UA: NORMAL /HPF (ref 0–2)
SODIUM BLD-SCNC: 136 MMOL/L (ref 132–146)
SPECIFIC GRAVITY UA: 1.01 (ref 1–1.03)
TOTAL PROTEIN: 6.7 G/DL (ref 6.4–8.3)
UROBILINOGEN, URINE: 0.2 E.U./DL
WBC # BLD: 20.9 E9/L (ref 4.5–11.5)
WBC UA: NORMAL /HPF (ref 0–5)

## 2022-03-29 PROCEDURE — 80307 DRUG TEST PRSMV CHEM ANLYZR: CPT

## 2022-03-29 PROCEDURE — 36415 COLL VENOUS BLD VENIPUNCTURE: CPT

## 2022-03-29 PROCEDURE — 6370000000 HC RX 637 (ALT 250 FOR IP): Performed by: INTERNAL MEDICINE

## 2022-03-29 PROCEDURE — 80053 COMPREHEN METABOLIC PANEL: CPT

## 2022-03-29 PROCEDURE — 1200000000 HC SEMI PRIVATE

## 2022-03-29 PROCEDURE — 2580000003 HC RX 258: Performed by: OBSTETRICS & GYNECOLOGY

## 2022-03-29 PROCEDURE — 6360000002 HC RX W HCPCS: Performed by: INTERNAL MEDICINE

## 2022-03-29 PROCEDURE — 85025 COMPLETE CBC W/AUTO DIFF WBC: CPT

## 2022-03-29 PROCEDURE — 83735 ASSAY OF MAGNESIUM: CPT

## 2022-03-29 PROCEDURE — 2580000003 HC RX 258: Performed by: INTERNAL MEDICINE

## 2022-03-29 PROCEDURE — 81001 URINALYSIS AUTO W/SCOPE: CPT

## 2022-03-29 PROCEDURE — 99233 SBSQ HOSP IP/OBS HIGH 50: CPT | Performed by: INTERNAL MEDICINE

## 2022-03-29 RX ORDER — POTASSIUM CHLORIDE 20 MEQ/1
40 TABLET, EXTENDED RELEASE ORAL ONCE
Status: COMPLETED | OUTPATIENT
Start: 2022-03-29 | End: 2022-03-29

## 2022-03-29 RX ORDER — DEXTROSE, SODIUM CHLORIDE, SODIUM LACTATE, POTASSIUM CHLORIDE, AND CALCIUM CHLORIDE 5; .6; .31; .03; .02 G/100ML; G/100ML; G/100ML; G/100ML; G/100ML
INJECTION, SOLUTION INTRAVENOUS ONCE
Status: COMPLETED | OUTPATIENT
Start: 2022-03-29 | End: 2022-03-29

## 2022-03-29 RX ORDER — METRONIDAZOLE 500 MG/1
500 TABLET ORAL EVERY 12 HOURS SCHEDULED
Status: DISCONTINUED | OUTPATIENT
Start: 2022-03-29 | End: 2022-03-29

## 2022-03-29 RX ORDER — DEXTROSE, SODIUM CHLORIDE, SODIUM LACTATE, POTASSIUM CHLORIDE, AND CALCIUM CHLORIDE 5; .6; .31; .03; .02 G/100ML; G/100ML; G/100ML; G/100ML; G/100ML
INJECTION, SOLUTION INTRAVENOUS CONTINUOUS
Status: DISCONTINUED | OUTPATIENT
Start: 2022-03-29 | End: 2022-04-02 | Stop reason: HOSPADM

## 2022-03-29 RX ADMIN — POTASSIUM CHLORIDE 40 MEQ: 20 TABLET, EXTENDED RELEASE ORAL at 12:35

## 2022-03-29 RX ADMIN — SODIUM CHLORIDE, PRESERVATIVE FREE 10 ML: 5 INJECTION INTRAVENOUS at 10:43

## 2022-03-29 RX ADMIN — ENOXAPARIN SODIUM 40 MG: 100 INJECTION SUBCUTANEOUS at 09:33

## 2022-03-29 RX ADMIN — HYDROMORPHONE HYDROCHLORIDE 0.5 MG: 1 INJECTION, SOLUTION INTRAMUSCULAR; INTRAVENOUS; SUBCUTANEOUS at 17:48

## 2022-03-29 RX ADMIN — HYDROMORPHONE HYDROCHLORIDE 0.5 MG: 1 INJECTION, SOLUTION INTRAMUSCULAR; INTRAVENOUS; SUBCUTANEOUS at 09:33

## 2022-03-29 RX ADMIN — HYDROMORPHONE HYDROCHLORIDE 0.5 MG: 1 INJECTION, SOLUTION INTRAMUSCULAR; INTRAVENOUS; SUBCUTANEOUS at 22:13

## 2022-03-29 RX ADMIN — ONDANSETRON 4 MG: 2 INJECTION INTRAMUSCULAR; INTRAVENOUS at 04:36

## 2022-03-29 RX ADMIN — HYDROMORPHONE HYDROCHLORIDE 0.5 MG: 1 INJECTION, SOLUTION INTRAMUSCULAR; INTRAVENOUS; SUBCUTANEOUS at 13:38

## 2022-03-29 RX ADMIN — ONDANSETRON 4 MG: 2 INJECTION INTRAMUSCULAR; INTRAVENOUS at 17:48

## 2022-03-29 RX ADMIN — HYDROMORPHONE HYDROCHLORIDE 0.5 MG: 1 INJECTION, SOLUTION INTRAMUSCULAR; INTRAVENOUS; SUBCUTANEOUS at 00:05

## 2022-03-29 RX ADMIN — SODIUM CHLORIDE, PRESERVATIVE FREE 10 ML: 5 INJECTION INTRAVENOUS at 09:33

## 2022-03-29 RX ADMIN — SODIUM CHLORIDE, SODIUM LACTATE, POTASSIUM CHLORIDE, CALCIUM CHLORIDE AND DEXTROSE MONOHYDRATE: 5; 600; 310; 30; 20 INJECTION, SOLUTION INTRAVENOUS at 13:23

## 2022-03-29 RX ADMIN — ONDANSETRON 4 MG: 2 INJECTION INTRAMUSCULAR; INTRAVENOUS at 10:43

## 2022-03-29 RX ADMIN — SODIUM CHLORIDE, PRESERVATIVE FREE 10 ML: 5 INJECTION INTRAVENOUS at 20:58

## 2022-03-29 RX ADMIN — SODIUM CHLORIDE, PRESERVATIVE FREE 1000 MG: 5 INJECTION INTRAVENOUS at 20:58

## 2022-03-29 RX ADMIN — SODIUM CHLORIDE, SODIUM LACTATE, POTASSIUM CHLORIDE, CALCIUM CHLORIDE AND DEXTROSE MONOHYDRATE: 5; 600; 310; 30; 20 INJECTION, SOLUTION INTRAVENOUS at 22:14

## 2022-03-29 RX ADMIN — SODIUM CHLORIDE, SODIUM LACTATE, POTASSIUM CHLORIDE, CALCIUM CHLORIDE AND DEXTROSE MONOHYDRATE: 5; 600; 310; 30; 20 INJECTION, SOLUTION INTRAVENOUS at 15:50

## 2022-03-29 RX ADMIN — HYDROMORPHONE HYDROCHLORIDE 0.5 MG: 1 INJECTION, SOLUTION INTRAMUSCULAR; INTRAVENOUS; SUBCUTANEOUS at 04:09

## 2022-03-29 ASSESSMENT — PAIN SCALES - GENERAL
PAINLEVEL_OUTOF10: 9
PAINLEVEL_OUTOF10: 8
PAINLEVEL_OUTOF10: 9
PAINLEVEL_OUTOF10: 7
PAINLEVEL_OUTOF10: 7
PAINLEVEL_OUTOF10: 8

## 2022-03-29 NOTE — PATIENT CARE CONFERENCE
P Quality Flow/Interdisciplinary Rounds Progress Note        Quality Flow Rounds held on March 29, 2022    Disciplines Attending:  Bedside Nurse, ,  and Nursing Unit 321 ALBERT Garay was admitted on 3/28/2022 11:17 AM    Anticipated Discharge Date:  Expected Discharge Date: 04/16/22    Disposition:    King Score:  King Scale Score: 21    Readmission Risk              Risk of Unplanned Readmission:  13           Discussed patient goal for the day, patient clinical progression, and barriers to discharge.   The following Goal(s) of the Day/Commitment(s) have been identified:  IV Fluids        Emanuel Omalley RN  March 29, 2022
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
## 2022-03-29 NOTE — PROGRESS NOTES
Spoke to lab, confirmed that if trichomonas was seen on urinalysis it would be alerted as present. If nothing see won't be added. Therefore patient's last urinalysis trichomonas was negative. Dr. Yarely Jones wants daily urinalysis to monitor ketones.   Electronically signed by Roderick Robledo RN on 3/29/2022 at 3:53 PM

## 2022-03-29 NOTE — PROGRESS NOTES
[facility] Hospitalist   Progress Note    Admitting Date and Time: 3/28/2022 11:17 AM  Admit Dx: Intractable nausea and vomiting [R11.2]  Abdominal pain [R10.9]    Subjective: Admitted on 28th, came with lower abdominal pain, nausea/vomiting, pregnancy 9 weeks, known asthma, Crohn's disease, seizures, marijuana use, tobacco use, ongoing but worsened right lower quadrant pain, recent hospitalization and conservative treatment at [facility], normal EGD-except mild gastritis, MRI negative for acute appendicitis, single intrauterine pregnancy, evaluated by surgery, patient is s/p cholecystectomy. Impression of hyperemesis related to cannabis. As per social work discussion today with patient she is trying to quit marijuana. Patient was admitted with Intractable nausea and vomiting [R11.2]  Abdominal pain [R10.9]. Patient is awake, alert, comfortable, resting in bed, does communicate well, still in denial that her vomiting is not improved with marijuana use, did have 5 pregnancies, which included 2 miscarriages, oldest child of 25years old. Youngest child is 5years old. 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 Per RN: Patient gets Dilaudid and Zofran, does keep taking persistent shower for relief. ROS: denies fever, chills, cp, sob, n/v, HA unless stated above.      sodium chloride flush  5-40 mL IntraVENous 2 times per day    enoxaparin  40 mg SubCUTAneous Daily    cefTRIAXone (ROCEPHIN) IV  1,000 mg IntraVENous Q24H     HYDROmorphone, 0.5 mg, Q4H PRN  ondansetron, 4 mg, Q6H PRN  sodium chloride flush, 5-40 mL, PRN  sodium chloride, , PRN  polyethylene glycol, 17 g, Daily PRN  acetaminophen, 650 mg, Q6H PRN   Or  acetaminophen, 650 mg, Q6H PRN         Objective:    /70   Pulse 100   Temp 98.9 °F (37.2 °C) (Oral)   Resp 18   Ht 5' 4\" (1.626 m)   Wt 136 lb (61.7 kg)   LMP 02/17/2022   SpO2 97%   BMI 23.34 kg/m²   General Appearance: alert and oriented to person, place and time, well-developed and well-nourished, in no acute distress  Skin: warm and dry, no rash or erythema  Head: normocephalic and atraumatic  Eyes: pupils equal, round, and reactive to light, extraocular eye movements intact, conjunctivae normal  ENT: tympanic membrane, external ear and ear canal normal bilaterally, oropharynx clear and moist with normal mucous membranes  Neck: neck supple and non tender without mass, no thyromegaly or thyroid nodules, no cervical lymphadenopathy   Pulmonary/Chest: clear to auscultation bilaterally- no wheezes, rales or rhonchi, normal air movement, no respiratory distress  Cardiovascular: normal rate, normal S1 and S2, no gallops, intact distal pulses and no carotid bruits  Abdomen: soft, non-tender, non-distended, normal bowel sounds, no masses or organomegaly      Recent Labs     03/28/22  1153 03/29/22  0557    136   K 3.8 3.2*    101   CO2 23 21*   BUN 8 8   CREATININE 0.5 0.4*   GLUCOSE 128* 103*   CALCIUM 9.2 8.9       Recent Labs     03/28/22  1153 03/29/22  0557   WBC 19.2* 20.9*   RBC 4.37 3.76   HGB 14.1 11.8   HCT 41.0 34.6   MCV 93.8 92.0   MCH 32.3 31.4   MCHC 34.4 34.1   RDW 14.9 15.1*    374   MPV 8.9 9.4     K 3.2  WBC 20.9  hCG 97,082 on 20  Micro data pending    Radiology:   MRI ABDOMEN WO CONTRAST   Final Result   1. No evident acute findings in the abdomen or pelvis. Specifically, no   findings of acute appendicitis. 2. Incomplete evaluation of a single intrauterine fetus. US ABDOMEN LIMITED   Final Result   Appendix not definitively identified. Therefore, appendicitis cannot be   entirely excluded. If there is persistent clinical concern, correlation with   repeat MRI could be considered. RECOMMENDATIONS:   Unavailable             Assessment:    Principal Problem:    Intractable nausea and vomiting  Active Problems:    Abdominal pain  Resolved Problems:    * No resolved hospital problems. *      Plan:  1.  Hypokalemia, supplemented with 40 M EQ of KCl.  2. Persistent nausea and vomiting, will continue with supportive care. 3. Pregnancy 9 weeks, input from OB pending. 4. Leukocytosis, not new, continue observing.   5. DC planning, when improvement in symptomatic complaints        Electronically signed by Hugh Tim MD on 3/29/2022 at 10:15 AM

## 2022-03-29 NOTE — CARE COORDINATION
Social Work discharge 1 \[facility] [place] [facility]\"" met with pt, who advised she lives with her children (ages 6-24) and her parents live next door to them. Pt said her parents are caring for her other children while she is here. Pt said 'I am trying to quit marijuana\", and that is why she is here trying to get nausea under control. Pt receptive to baby supply resources, in which SW advised her she can get a pack n play and car seat for attending classes at [employer]. Pt denied other needs at this time. Social Work to follow for support and discharge planning with CM.   Electronically signed by Juan Duke on 3/29/2022 at 10:18 AM

## 2022-03-29 NOTE — PROGRESS NOTES
Progress Note    SUBJECTIVE:  Pt somewhat improved    OBJECTIVE:    VITALS:  /70   Pulse 100   Temp 98.9 °F (37.2 °C) (Oral)   Resp 18   Ht 5' 4\" (1.626 m)   Wt 136 lb (61.7 kg)   LMP 02/17/2022   SpO2 97%   BMI 23.34 kg/m²   Physical Exam      DATA:  CBC:   Lab Results   Component Value Date    WBC 20.9 03/29/2022    RBC 3.76 03/29/2022    HGB 11.8 03/29/2022    HCT 34.6 03/29/2022    MCV 92.0 03/29/2022    MCH 31.4 03/29/2022    MCHC 34.1 03/29/2022    RDW 15.1 03/29/2022     03/29/2022    MPV 9.4 03/29/2022     CMP:    Lab Results   Component Value Date     03/29/2022    K 3.2 03/29/2022     03/29/2022    CO2 21 03/29/2022    BUN 8 03/29/2022    CREATININE 0.4 03/29/2022    GFRAA >60 03/29/2022    LABGLOM >60 03/29/2022    GLUCOSE 103 03/29/2022    GLUCOSE 92 05/08/2011    PROT 6.7 03/29/2022    LABALBU 4.3 03/29/2022    CALCIUM 8.9 03/29/2022    BILITOT 0.6 03/29/2022    ALKPHOS 53 03/29/2022    AST 17 03/29/2022    ALT 15 03/29/2022     U/A:    Lab Results   Component Value Date    COLORU Yellow 03/28/2022    PROTEINU TRACE 03/28/2022    PHUR 7.0 03/28/2022    LABCAST RARE  07/30/2012    WBCUA 0-1 03/28/2022    WBCUA NONE 02/09/2012    RBCUA 1-3 03/28/2022    RBCUA NONE 12/31/2013    MUCUS Present 03/28/2022    TRICHOMONAS MODERATE 12/15/2014    YEAST FEW 08/01/2012    BACTERIA RARE 03/28/2022    CLARITYU Clear 03/28/2022    SPECGRAV 1.025 03/28/2022    LEUKOCYTESUR Negative 03/28/2022    UROBILINOGEN 0.2 03/28/2022    BILIRUBINUR Negative 03/28/2022    BILIRUBINUR NEGATIVE 02/09/2012    BLOODU Negative 03/28/2022    GLUCOSEU Negative 03/28/2022    GLUCOSEU NEGATIVE 02/09/2012    AMORPHOUS MANY 12/06/2017       ASSESSMENT AND PLAN  Hyperemesis - also uses 3 bowls marijuana daily = recommend decrease   IV hydration   zofran  Trich - flagyl   [place] PARTNER  APPENDICITIS = mri NEG        Lion Gary MD 3/29/2022 12:51 PM

## 2022-03-29 NOTE — PROGRESS NOTES
Comprehensive Nutrition Assessment    Type and Reason for Visit:  Initial,Positive Nutrition Screen    Nutrition Recommendations/Plan: Continue Current Diet, ADAT and Start Ensure Clear TID    Nutrition Assessment:  9 wks gestation pt w/ intractable N/V, hyperemesis r/t cannabis. Hx Crohns/seizure. Currently on a clear liquid diet, will add ONS TID & monitor. Malnutrition Assessment:  Malnutrition Status: At risk for malnutrition (Comment)    Context:  Acute Illness     Findings of the 6 clinical characteristics of malnutrition:  Energy Intake:  Mild decrease in energy intake (Comment)  Weight Loss:  Unable to assess (d/t lack of actual wt hx per EMR X1 year)     Body Fat Loss:  No significant body fat loss     Muscle Mass Loss:  No significant muscle mass loss    Fluid Accumulation:  No significant fluid accumulation     Strength:  Not Performed    Estimated Daily Nutrient Needs:  Energy (kcal):  ; Weight Used for Energy Requirements:  Current     Protein (g):  65-75 (1-1.2); Weight Used for Protein Requirements:  Current        Fluid (ml/day):  ; Method Used for Fluid Requirements:  1 ml/kcal      Nutrition Related Findings:  I&Os WDL, no edema, abd soft, BS hypoactive, N/V, K+ 3.2      Wounds:  None       Current Nutrition Therapies:    ADULT DIET; Clear Liquid    Anthropometric Measures:  · Height: 5' 4\" (162.6 cm)  · Current Body Weight: 136 lb (61.7 kg) (3/29 bed)   · Admission Body Weight: 136 lb (61.7 kg) (3/29 bed)    · Usual Body Weight: 137 lb (62.1 kg) (1/2021 actual per EMR)     · Ideal Body Weight: 120 lbs; % Ideal Body Weight 113.3 %   · BMI: 23.3  · BMI Categories: Normal Weight (BMI 18.5-24. 9)       Nutrition Diagnosis:   · Inadequate oral intake related to altered GI function as evidenced by nausea,vomiting,NPO or clear liquid status due to medical condition    Nutrition Interventions:   Nutrition Education/Counseling:  Education not indicated   Coordination of Nutrition Care:  Continue to monitor while inpatient    Goals:  nutrition progression       Nutrition Monitoring and Evaluation:   Food/Nutrient Intake Outcomes:  Diet Advancement/Tolerance,Food and Nutrient Intake,Supplement Intake  Physical Signs/Symptoms Outcomes:  Biochemical Data,GI Status,Nausea or Vomiting,Fluid Status or Edema,Nutrition Focused Physical Findings,Skin,Weight     Discharge Planning:     Too soon to determine     Electronically signed by Omayra Watson RD, LD on 3/29/22 at 1:09 PM EDT  Contact: [phone]

## 2022-03-29 NOTE — PROGRESS NOTES
Notified Dr. Ori Kim of K 3.2.  Electronically signed by Emanuel Omalley RN on 3/29/2022 at 12:53 PM

## 2022-03-29 NOTE — ED PROVIDER NOTES
ATTENDING PROVIDER ATTESTATION:     Simran Eddy presented to the emergency department for evaluation of Abdominal Pain (lower 9 wks preg.)   and was initially evaluated by the Medical Resident. See Original ED Note for H&P and ED course above. I have reviewed and discussed the case, including pertinent history (medical, surgical, family and social) and exam findings with the Medical Resident assigned to Simran Eddy. I have personally performed and/or participated in the history, exam, medical decision making, and procedures and agree with all pertinent clinical information and any additional changes or corrections are noted below in my assessment and plan. I have discussed this patient in detail with the resident, and provided the instruction and education,    I have reviewed my findings and recommendations with the assigned Medical Resident, Simran Eddy and members of family present at the time of disposition. I have performed a history and physical examination of this patient and directly supervised the resident caring for this patient. History of Present Illness: This patient presents to the ED for Nausea, vomiting and right lower quadrant abdominal pain. There is severe right lower quadrant pain. This is happened in the past.  She has a prior episode of having a diagnosis. Appendicitis with MRI which improved with antibiotics. Unsure whether or not patient was truly offered appendectomy or not as she alludes to that she was not offered this but medical records seem to indicate otherwise. She had had a EGD in the past which had revealed gastritis. Reports no prior episodes of cannabis hyperemesis. Denies fever chills or myalgias. Nothing is her symptoms better or worse she is taking Zofran at home.     Review of Systems:   A complete review of systems was performed and pertinent positives and negatives are stated within HPI, all other systems reviewed and are negative.    --------------------------------------------- PAST HISTORY ---------------------------------------------  Past Medical History:  has a past medical history of Anxiety attack, Asthma, Crohn's colitis ([facility] [address] Marijuana abuse, Multiple lacerations, Seizure ([facility] [address] and Tobacco abuse. Past Surgical History:  has a past surgical history that includes Dilation and curettage of uterus (2011); Colonoscopy; Endoscopy, colon, diagnostic; Cholecystectomy (01/22/2018); hernia repair; hernia repair; and Upper gastrointestinal endoscopy (N/A, 2/19/2022). Social History:  reports that she has been smoking cigarettes. She has a 10.00 pack-year smoking history. She has never used smokeless tobacco. She reports current alcohol use. She reports previous drug use. Frequency: 2.00 times per week. Drug: Marijuana Jenny Norton). Family History: family history includes Cancer in her mother; Depression in her father and mother; Heart Disease in her father. Unless otherwise noted, family history is non contributory    The patients home medications have been reviewed. Allergies: Patient has no known allergies. Physical Exam:  Constitutional: [Appears in no distress]  Head: [Normocephalic, atraumatic]   Eyes: [Non-icteric slcera, no conjunctival injection]  ENT: [Moist mucous membranes,]  Neck: [Trachea midline, no JVD]  Respiratory: [Nonlabored respirations. Lungs clear to auscultation bilaterally, no wheezes, rales, or rhonchi.]   Cardiovascular:  [Regular rate. Regular rhythm. No murmurs, no gallops, no rubs.]   Gastrointestinal:  [Abdomen Soft, Non tender, Non distended.  No rebound tenderness, guarding, or rigidity.]  Extremities: [No lower extremity edema]  Genitourinary: [No CVA tenderness, no suprapubic tenderness]  Musculoskeletal: [Moves all extremities, no deformity]  Skin: [Pink, warm, dry without rash.]  Neurologic: [Alert, symmetric facies, no aphasia]    My Medical Decision Making:   Patient presents emergency software.  Every effort was made to ensure accuracy; however, inadvertent computerized transcription errors may be present       Arlene Zaragoza DO  03/29/22 1227

## 2022-03-30 LAB
BACTERIA: NORMAL /HPF
BILIRUBIN URINE: NEGATIVE
BLOOD, URINE: NEGATIVE
CLARITY: CLEAR
COLOR: YELLOW
EPITHELIAL CELLS, UA: NORMAL /HPF
GLUCOSE URINE: NEGATIVE MG/DL
KETONES, URINE: NEGATIVE MG/DL
LEUKOCYTE ESTERASE, URINE: NEGATIVE
NITRITE, URINE: NEGATIVE
PH UA: 8 (ref 5–9)
PROTEIN UA: NEGATIVE MG/DL
RBC UA: NORMAL /HPF (ref 0–2)
SPECIFIC GRAVITY UA: 1.01 (ref 1–1.03)
UROBILINOGEN, URINE: 0.2 E.U./DL
WBC UA: NORMAL /HPF (ref 0–5)

## 2022-03-30 PROCEDURE — 6360000002 HC RX W HCPCS: Performed by: INTERNAL MEDICINE

## 2022-03-30 PROCEDURE — 81001 URINALYSIS AUTO W/SCOPE: CPT

## 2022-03-30 PROCEDURE — 99221 1ST HOSP IP/OBS SF/LOW 40: CPT | Performed by: PSYCHIATRY & NEUROLOGY

## 2022-03-30 PROCEDURE — 2580000003 HC RX 258: Performed by: OBSTETRICS & GYNECOLOGY

## 2022-03-30 PROCEDURE — 6370000000 HC RX 637 (ALT 250 FOR IP): Performed by: PSYCHIATRY & NEUROLOGY

## 2022-03-30 PROCEDURE — 1200000000 HC SEMI PRIVATE

## 2022-03-30 PROCEDURE — 99232 SBSQ HOSP IP/OBS MODERATE 35: CPT | Performed by: INTERNAL MEDICINE

## 2022-03-30 PROCEDURE — 2580000003 HC RX 258: Performed by: INTERNAL MEDICINE

## 2022-03-30 RX ORDER — SERTRALINE HYDROCHLORIDE 25 MG/1
25 TABLET, FILM COATED ORAL NIGHTLY
Qty: 30 TABLET | Refills: 0 | Status: SHIPPED | OUTPATIENT
Start: 2022-03-30 | End: 2022-10-10

## 2022-03-30 RX ADMIN — SODIUM CHLORIDE, SODIUM LACTATE, POTASSIUM CHLORIDE, CALCIUM CHLORIDE AND DEXTROSE MONOHYDRATE: 5; 600; 310; 30; 20 INJECTION, SOLUTION INTRAVENOUS at 06:36

## 2022-03-30 RX ADMIN — HYDROMORPHONE HYDROCHLORIDE 0.5 MG: 1 INJECTION, SOLUTION INTRAMUSCULAR; INTRAVENOUS; SUBCUTANEOUS at 19:54

## 2022-03-30 RX ADMIN — ENOXAPARIN SODIUM 40 MG: 100 INJECTION SUBCUTANEOUS at 09:36

## 2022-03-30 RX ADMIN — ONDANSETRON 4 MG: 2 INJECTION INTRAMUSCULAR; INTRAVENOUS at 16:57

## 2022-03-30 RX ADMIN — SODIUM CHLORIDE, SODIUM LACTATE, POTASSIUM CHLORIDE, CALCIUM CHLORIDE AND DEXTROSE MONOHYDRATE: 5; 600; 310; 30; 20 INJECTION, SOLUTION INTRAVENOUS at 19:46

## 2022-03-30 RX ADMIN — HYDROMORPHONE HYDROCHLORIDE 0.5 MG: 1 INJECTION, SOLUTION INTRAMUSCULAR; INTRAVENOUS; SUBCUTANEOUS at 15:24

## 2022-03-30 RX ADMIN — SODIUM CHLORIDE, SODIUM LACTATE, POTASSIUM CHLORIDE, CALCIUM CHLORIDE AND DEXTROSE MONOHYDRATE: 5; 600; 310; 30; 20 INJECTION, SOLUTION INTRAVENOUS at 13:16

## 2022-03-30 RX ADMIN — HYDROMORPHONE HYDROCHLORIDE 0.5 MG: 1 INJECTION, SOLUTION INTRAMUSCULAR; INTRAVENOUS; SUBCUTANEOUS at 02:17

## 2022-03-30 RX ADMIN — SODIUM CHLORIDE, PRESERVATIVE FREE 10 ML: 5 INJECTION INTRAVENOUS at 20:10

## 2022-03-30 RX ADMIN — HYDROMORPHONE HYDROCHLORIDE 0.5 MG: 1 INJECTION, SOLUTION INTRAMUSCULAR; INTRAVENOUS; SUBCUTANEOUS at 10:56

## 2022-03-30 RX ADMIN — ONDANSETRON 4 MG: 2 INJECTION INTRAMUSCULAR; INTRAVENOUS at 09:37

## 2022-03-30 RX ADMIN — SERTRALINE HYDROCHLORIDE 25 MG: 50 TABLET ORAL at 20:05

## 2022-03-30 RX ADMIN — HYDROMORPHONE HYDROCHLORIDE 0.5 MG: 1 INJECTION, SOLUTION INTRAMUSCULAR; INTRAVENOUS; SUBCUTANEOUS at 06:36

## 2022-03-30 RX ADMIN — SODIUM CHLORIDE, PRESERVATIVE FREE 1000 MG: 5 INJECTION INTRAVENOUS at 20:04

## 2022-03-30 RX ADMIN — ONDANSETRON 4 MG: 2 INJECTION INTRAMUSCULAR; INTRAVENOUS at 00:45

## 2022-03-30 ASSESSMENT — PAIN SCALES - GENERAL
PAINLEVEL_OUTOF10: 6
PAINLEVEL_OUTOF10: 7
PAINLEVEL_OUTOF10: 8
PAINLEVEL_OUTOF10: 7
PAINLEVEL_OUTOF10: 5
PAINLEVEL_OUTOF10: 8

## 2022-03-30 ASSESSMENT — PAIN DESCRIPTION - PROGRESSION: CLINICAL_PROGRESSION: NOT CHANGED

## 2022-03-30 ASSESSMENT — PAIN DESCRIPTION - PAIN TYPE: TYPE: ACUTE PAIN

## 2022-03-30 ASSESSMENT — PAIN DESCRIPTION - LOCATION: LOCATION: ABDOMEN

## 2022-03-30 NOTE — PATIENT CARE CONFERENCE
P Quality Flow/Interdisciplinary Rounds Progress Note        Quality Flow Rounds held on March 30, 2022    Disciplines Attending:  Bedside Nurse, ,  and Nursing Unit 321 ALBERT Garay was admitted on 3/28/2022 11:17 AM    Anticipated Discharge Date:  Expected Discharge Date: 04/16/22    Disposition:    King Score:  King Scale Score: 21    Readmission Risk              Risk of Unplanned Readmission:  13           Discussed patient goal for the day, patient clinical progression, and barriers to discharge.   The following Goal(s) of the Day/Commitment(s) have been identified:  Diagnostics - Report Results      Abel Tinajero RN  March 30, 2022

## 2022-03-30 NOTE — CONSULTS
PSYCHIATRY ATTENDING CONSULT    REASON FOR CONSULT:  Depression    REQUESTING PHYSICIAN:  Dr. Suma Gomez: \"I've been a little down. \"    HISTORY OF PRESENT ILLNESS:  Satinder Acosta is a 29 y.o. female who was admitted on 3/28/22 due to abdominal pain in first trimester. Chart reviewed. No home psychotropics. Currently patient is alert and oriented. [facility] reports she started feeling depressed in November after a miscarriage and Dr. Nu Talavera started her on Paxil at the time. She responded well to this but had to stop in early February after she found out she was pregnant again. Since stopping the Paxil patient acknowledges gradual return of depressive symptoms and endorses depressed mood, anhedonia, diminished energy and motivation, decreased appetite, irritability and tearfulness. She denies suicidal ideations, intentions or plans. Patient is dealing with stressors related to health of her other children and is currently  from father of baby after they started having problems following the earlier miscarriage. In further review of symptoms she acknowledges anxiety in the form of excessive worry, inability to relax, trouble concentration, feeling on edge, and frequent somatic symptoms. Patient is not suicidal, homicidal, manic or psychotic. PAST PSYCHIATRIC HISTORY:  No admissions or suicide attempts. No current psychiatrist or therapist. No medication trials prior to Paxil.     PAST MEDICAL HISTORY:       Diagnosis Date    Anxiety attack since age 16    diagnosed with seizure due to convulsions from this    Asthma     seasonal; no inhaler     Crohn's colitis ([address]     Marijuana abuse     occas    Multiple lacerations 2015    stabbing; resolved    Seizure ([address]     loses focus, disoriented unpon arousing; etiology unknown per pt; none since 2015    Tobacco abuse      PAST SURGICAL HISTORY:       Procedure Laterality Date    CHOLECYSTECTOMY  01/22/2018    COLONOSCOPY  DILATION AND CURETTAGE OF UTERUS  2011    ENDOSCOPY, COLON, DIAGNOSTIC      HERNIA REPAIR      HERNIA REPAIR      UPPER GASTROINTESTINAL ENDOSCOPY N/A 2/19/2022    EGD BIOPSY performed by Vida Malhotra MD at [facility]: Current Facility-Administered Medications: dextrose 5 % in lactated ringers infusion, , IntraVENous, Continuous  HYDROmorphone (DILAUDID) injection 0.5 mg, 0.5 mg, IntraVENous, Q4H PRN  ondansetron (ZOFRAN) injection 4 mg, 4 mg, IntraVENous, Q6H PRN  sodium chloride flush 0.9 % injection 5-40 mL, 5-40 mL, IntraVENous, 2 times per day  sodium chloride flush 0.9 % injection 5-40 mL, 5-40 mL, IntraVENous, PRN  0.9 % sodium chloride infusion, , IntraVENous, PRN  enoxaparin (LOVENOX) injection 40 mg, 40 mg, SubCUTAneous, Daily  polyethylene glycol (GLYCOLAX) packet 17 g, 17 g, Oral, Daily PRN  acetaminophen (TYLENOL) tablet 650 mg, 650 mg, Oral, Q6H PRN **OR** acetaminophen (TYLENOL) suppository 650 mg, 650 mg, Rectal, Q6H PRN  cefTRIAXone (ROCEPHIN) 1,000 mg in sodium chloride flush 10 mL IV syringe, 1,000 mg, IntraVENous, Q24H    ALLERGIES:  Patient has no known allergies. FAMILY PSYCHIATRIC HISTORY: Mother has attempted suicide by overdose and father has been admitted due to schizophrenia. SOCIAL HISTORY: Patient was born and raised locally. She has one sister who is estranged from family. Patient has an 25year-old from previous relationship and has two children, 9 and 7, with ex . Patient is currently  from father of baby. Patient lives alone in a house with her children and her parents live next door. 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 SUBSTANCE ABUSE HISTORY: Reports no alcohol in close to a year. Reports stopped tobacco on 2/15/22.  Reports occasional marijuana in past.     VITALS:   Vitals:    03/30/22 0930   BP: 127/83   Pulse: 82   Resp: 18   Temp: 98 °F (36.7 °C)   SpO2: 98%     LABS:   Admission on 03/28/2022   Component Date Value Ref Range Status    Sodium 03/28/2022 135  132 - 146 mmol/L Final    Potassium 03/28/2022 3.8  3.5 - 5.0 mmol/L Final    Chloride 03/28/2022 100  98 - 107 mmol/L Final    CO2 03/28/2022 23  22 - 29 mmol/L Final    Anion Gap 03/28/2022 12  7 - 16 mmol/L Final    Glucose 03/28/2022 128* 74 - 99 mg/dL Final    BUN 03/28/2022 8  6 - 20 mg/dL Final    CREATININE 03/28/2022 0.5  0.5 - 1.0 mg/dL Final    GFR Non- 03/28/2022 >60  >=60 mL/min/1.73 Final    Comment: Chronic Kidney Disease: less than 60 ml/min/1.73 sq.m. Kidney Failure: less than 15 ml/min/1.73 sq.m. Results valid for patients 18 years and older.       GFR  03/28/2022 >60   Final    Calcium 03/28/2022 9.2  8.6 - 10.2 mg/dL Final    Total Protein 03/28/2022 7.6  6.4 - 8.3 g/dL Final    Albumin 03/28/2022 4.8  3.5 - 5.2 g/dL Final    Total Bilirubin 03/28/2022 0.5  0.0 - 1.2 mg/dL Final    Alkaline Phosphatase 03/28/2022 58  35 - 104 U/L Final    ALT 03/28/2022 17  0 - 32 U/L Final    AST 03/28/2022 19  0 - 31 U/L Final    WBC 03/28/2022 19.2* 4.5 - 11.5 E9/L Final    RBC 03/28/2022 4.37  3.50 - 5.50 E12/L Final    Hemoglobin 03/28/2022 14.1  11.5 - 15.5 g/dL Final    Hematocrit 03/28/2022 41.0  34.0 - 48.0 % Final    MCV 03/28/2022 93.8  80.0 - 99.9 fL Final    MCH 03/28/2022 32.3  26.0 - 35.0 pg Final    MCHC 03/28/2022 34.4  32.0 - 34.5 % Final    RDW 03/28/2022 14.9  11.5 - 15.0 fL Final    Platelets 18/85/2420 403  130 - 450 E9/L Final    MPV 03/28/2022 8.9  7.0 - 12.0 fL Final    Neutrophils % 03/28/2022 86.2* 43.0 - 80.0 % Final    Immature Granulocytes % 03/28/2022 0.7  0.0 - 5.0 % Final    Lymphocytes % 03/28/2022 9.7* 20.0 - 42.0 % Final    Monocytes % 03/28/2022 2.7  2.0 - 12.0 % Final    Eosinophils % 03/28/2022 0.3  0.0 - 6.0 % Final    Basophils % 03/28/2022 0.4  0.0 - 2.0 % Final    Neutrophils Absolute 03/28/2022 16.56* 1.80 - 7.30 E9/L Final    Immature Granulocytes # 03/28/2022 0.13  E9/L Final    Lymphocytes Absolute 03/28/2022 1.86  1.50 - 4.00 E9/L Final    Monocytes Absolute 03/28/2022 0.51  0.10 - 0.95 E9/L Final    Eosinophils Absolute 03/28/2022 0.06  0.05 - 0.50 E9/L Final    Basophils Absolute 03/28/2022 0.08  0.00 - 0.20 E9/L Final    Lipase 03/28/2022 20  13 - 60 U/L Final    Color, UA 03/28/2022 Yellow  Straw/Yellow Final    Clarity, UA 03/28/2022 Clear  Clear Final    Glucose, Ur 03/28/2022 Negative  Negative mg/dL Final    Bilirubin Urine 03/28/2022 Negative  Negative Final    Ketones, Urine 03/28/2022 15* Negative mg/dL Final    Specific Gravity, UA 03/28/2022 1.025  1.005 - 1.030 Final    Blood, Urine 03/28/2022 Negative  Negative Final    pH, UA 03/28/2022 7.0  5.0 - 9.0 Final    Protein, UA 03/28/2022 TRACE  Negative mg/dL Final    Urobilinogen, Urine 03/28/2022 0.2  <2.0 E.U./dL Final    Nitrite, Urine 03/28/2022 Negative  Negative Final    Leukocyte Esterase, Urine 03/28/2022 Negative  Negative Final    Mucus, UA 03/28/2022 Present* None Seen /LPF Final    WBC, UA 03/28/2022 0-1  0 - 5 /HPF Final    RBC, UA 03/28/2022 1-3  0 - 2 /HPF Final    Bacteria, UA 03/28/2022 RARE* None Seen /HPF Final    Lactic Acid 03/28/2022 2.5* 0.5 - 2.2 mmol/L Final    hCG Quant 03/28/2022 12711.0* <10 mIU/mL Final    Comment: The TOTAL HCG QUANT test is not intended for any  use other than assessment of pregnancy status.       Blood Culture, Routine 03/28/2022 24 Hours no growth   Preliminary    Culture, Blood 2 03/28/2022 24 Hours no growth   Preliminary    Sodium 03/29/2022 136  132 - 146 mmol/L Final    Potassium reflex Magnesium 03/29/2022 3.2* 3.5 - 5.0 mmol/L Final    Chloride 03/29/2022 101  98 - 107 mmol/L Final    CO2 03/29/2022 21* 22 - 29 mmol/L Final    Anion Gap 03/29/2022 14  7 - 16 mmol/L Final    Glucose 03/29/2022 103* 74 - 99 mg/dL Final    BUN 03/29/2022 8  6 - 20 mg/dL Final    CREATININE 03/29/2022 0.4* 0.5 - 1.0 mg/dL Final    GFR Non- 03/29/2022 >60  >=60 mL/min/1.73 Final    Comment: Chronic Kidney Disease: less than 60 ml/min/1.73 sq.m. Kidney Failure: less than 15 ml/min/1.73 sq.m. Results valid for patients 18 years and older.       GFR  03/29/2022 >60   Final    Calcium 03/29/2022 8.9  8.6 - 10.2 mg/dL Final    Total Protein 03/29/2022 6.7  6.4 - 8.3 g/dL Final    Albumin 03/29/2022 4.3  3.5 - 5.2 g/dL Final    Total Bilirubin 03/29/2022 0.6  0.0 - 1.2 mg/dL Final    Alkaline Phosphatase 03/29/2022 53  35 - 104 U/L Final    ALT 03/29/2022 15  0 - 32 U/L Final    AST 03/29/2022 17  0 - 31 U/L Final    WBC 03/29/2022 20.9* 4.5 - 11.5 E9/L Final    RBC 03/29/2022 3.76  3.50 - 5.50 E12/L Final    Hemoglobin 03/29/2022 11.8  11.5 - 15.5 g/dL Final    Hematocrit 03/29/2022 34.6  34.0 - 48.0 % Final    MCV 03/29/2022 92.0  80.0 - 99.9 fL Final    MCH 03/29/2022 31.4  26.0 - 35.0 pg Final    MCHC 03/29/2022 34.1  32.0 - 34.5 % Final    RDW 03/29/2022 15.1* 11.5 - 15.0 fL Final    Platelets 79/76/8706 374  130 - 450 E9/L Final    MPV 03/29/2022 9.4  7.0 - 12.0 fL Final    Neutrophils % 03/29/2022 79.2  43.0 - 80.0 % Final    Immature Granulocytes % 03/29/2022 0.7  0.0 - 5.0 % Final    Lymphocytes % 03/29/2022 14.4* 20.0 - 42.0 % Final    Monocytes % 03/29/2022 5.4  2.0 - 12.0 % Final    Eosinophils % 03/29/2022 0.1  0.0 - 6.0 % Final    Basophils % 03/29/2022 0.2  0.0 - 2.0 % Final    Neutrophils Absolute 03/29/2022 16.56* 1.80 - 7.30 E9/L Final    Immature Granulocytes # 03/29/2022 0.14  E9/L Final    Lymphocytes Absolute 03/29/2022 3.02  1.50 - 4.00 E9/L Final    Monocytes Absolute 03/29/2022 1.13* 0.10 - 0.95 E9/L Final    Eosinophils Absolute 03/29/2022 0.02* 0.05 - 0.50 E9/L Final    Basophils Absolute 03/29/2022 0.04  0.00 - 0.20 E9/L Final    Magnesium 03/29/2022 2.1  1.6 - 2.6 mg/dL Final    Color, UA 03/29/2022 Yellow Straw/Yellow Final    Clarity, UA 03/29/2022 Clear  Clear Final    Glucose, Ur 03/29/2022 Negative  Negative mg/dL Final    Bilirubin Urine 03/29/2022 Negative  Negative Final    Ketones, Urine 03/29/2022 Negative  Negative mg/dL Final    Specific Gravity, UA 03/29/2022 1.010  1.005 - 1.030 Final    Blood, Urine 03/29/2022 Negative  Negative Final    pH, UA 03/29/2022 7.5  5.0 - 9.0 Final    Protein, UA 03/29/2022 Negative  Negative mg/dL Final    Urobilinogen, Urine 03/29/2022 0.2  <2.0 E.U./dL Final    Nitrite, Urine 03/29/2022 Negative  Negative Final    Leukocyte Esterase, Urine 03/29/2022 Negative  Negative Final    WBC, UA 03/29/2022 1-3  0 - 5 /HPF Final    RBC, UA 03/29/2022 0-1  0 - 2 /HPF Final    Epithelial Cells, UA 03/29/2022 RARE  /HPF Final    Bacteria, UA 03/29/2022 NONE SEEN  None Seen /HPF Final    Amorphous, UA 03/29/2022 FEW   Final    Amphetamine Screen, Urine 03/29/2022 NOT DETECTED  Negative <1000 ng/mL Final    Barbiturate Screen, Ur 03/29/2022 NOT DETECTED  Negative < 200 ng/mL Final    Benzodiazepine Screen, Urine 03/29/2022 NOT DETECTED  Negative < 200 ng/mL Final    Cannabinoid Scrn, Ur 03/29/2022 POSITIVE* Negative < 50ng/mL Final    Cocaine Metabolite Screen, Urine 03/29/2022 NOT DETECTED  Negative < 300 ng/mL Final    Opiate Scrn, Ur 03/29/2022 NOT DETECTED  Negative < 300ng/mL Final    Note:  The Opiate Screen is not intended to detect Oxycodone.  PCP Screen, Urine 03/29/2022 NOT DETECTED  Negative < 25 ng/mL Final    Methadone Screen, Urine 03/29/2022 NOT DETECTED  Negative <300 ng/mL Final    Oxycodone Urine 03/29/2022 NOT DETECTED  Negative <100 ng/mL Final    FENTANYL SCREEN, URINE 03/29/2022 NOT DETECTED  Negative <1 ng/mL Final    Drug Screen Comment: 03/29/2022 see below   Final    Comment: These drug screen results are for medical purposes only and  should not be considered definitive or confirmed.  The drug  methodology concentration value must be greater than or equal  to the cutoff to be reported as positive. Confirmatory testing  orders and/or interpretive screening questions can be directed  to toxicology at [phone]. The absence of expected drug(s) and/or metabolite(s) may be due  to inappropriate timing of specimen collection relative to drug  administration, poor drug absorption, diluted/adulterated urine,  or limitations of screening testing methodology.  Color, UA 03/30/2022 Yellow  Straw/Yellow Final    Clarity, UA 03/30/2022 Clear  Clear Final    Glucose, Ur 03/30/2022 Negative  Negative mg/dL Final    Bilirubin Urine 03/30/2022 Negative  Negative Final    Ketones, Urine 03/30/2022 Negative  Negative mg/dL Final    Specific Cassandra, UA 03/30/2022 1.015  1.005 - 1.030 Final    Blood, Urine 03/30/2022 Negative  Negative Final    pH, UA 03/30/2022 8.0  5.0 - 9.0 Final    Protein, UA 03/30/2022 Negative  Negative mg/dL Final    Urobilinogen, Urine 03/30/2022 0.2  <2.0 E.U./dL Final    Nitrite, Urine 03/30/2022 Negative  Negative Final    Leukocyte Esterase, Urine 03/30/2022 Negative  Negative Final    WBC, UA 03/30/2022 NONE  0 - 5 /HPF Final    RBC, UA 03/30/2022 0-1  0 - 2 /HPF Final    Epithelial Cells, UA 03/30/2022 RARE  /HPF Final    Bacteria, UA 03/30/2022 NONE SEEN  None Seen /HPF Final         MENTAL STATUS EXAMINATION  Female appears age. Pleasant, cooperative, forthcoming. Normal psychomotor activity, strength, tone, eye contact. Gait not assessed. Mood dysphoric. Affect tearful at times but flexible. Speech clear. Thought process organized without loosening. Content future-oriented. Themes of depression and generalized anxiety. No suicidal or homicidal ideations. No paranoia, delusions or hallucinations. Orientation, concentration, recent and remote memory are grossly intact. Fund of knowledge fair. Language use fair. Insight and judgment fair.     ASSESSMENT:  MDD single episode moderate       RUDY    PLAN & RECOMMENDATIONS: Start Zoloft for anxiety and depression. Risks, benefits and alternatives were discussed and patient verbalized informed consent. Discussed with patient starting 25 mg and this can be increased in a week or two if she is tolerating from GI standpoint. Prescription was sent electronically to [facility] Also discussed outpatient agencies for counseling which she is interested in. No indication for psychiatric admission. OK to discharge from my standpoint. Patient to follow-up with obstetrics and psychiatry prn.     Fiona Hay MD 3/30/2022 12:10 PM

## 2022-03-30 NOTE — PROGRESS NOTES
[facility] CARE AT [facility]   Progress Note    Admitting Date and Time: 3/28/2022 11:17 AM  Admit Dx: Intractable nausea and vomiting [R11.2]  Abdominal pain [R10.9]    Subjective: Admitted on 28th, came with lower abdominal pain, nausea/vomiting, pregnancy 9 weeks, known asthma, Crohn's disease, seizures, marijuana use, tobacco use, ongoing but worsened right lower quadrant pain, recent hospitalization and conservative treatment at [facility], normal EGD-except mild gastritis, MRI negative for acute appendicitis, single intrauterine pregnancy, evaluated by surgery, patient is s/p cholecystectomy. Impression of hyperemesis related to cannabis. As per social work discussion today with patient she is trying to quit marijuana. Seen by GYN, in addition to hyperemesis also evidence of trichomoniasis, treated with Flagyl, do recommend treatment of partner. Not noted Flagyl in orders. As per patient there was a mistake in reviewing chart from GYN side, she insisted on getting retested which is negative and GYN side has been updated. Patient was admitted with Intractable nausea and vomiting [R11.2]  Abdominal pain [R10.9]. Patient is awake, alert, comfortable, ambulating well, does feel better, has only thrown up once today, also only once showered today. Per RN: Psych input was requested from GYN side. ROS: denies fever, chills, cp, sob, n/v, HA unless stated above.      sodium chloride flush  5-40 mL IntraVENous 2 times per day    enoxaparin  40 mg SubCUTAneous Daily    cefTRIAXone (ROCEPHIN) IV  1,000 mg IntraVENous Q24H     HYDROmorphone, 0.5 mg, Q4H PRN  ondansetron, 4 mg, Q6H PRN  sodium chloride flush, 5-40 mL, PRN  sodium chloride, , PRN  polyethylene glycol, 17 g, Daily PRN  acetaminophen, 650 mg, Q6H PRN   Or  acetaminophen, 650 mg, Q6H PRN         Objective:    BP (!) 158/90   Pulse 87   Temp 97.5 °F (36.4 °C) (Oral)   Resp 16   Ht 5' 4\" (1.626 m)   Wt 136 lb (61.7 kg)   LMP 02/17/2022   SpO2 99%   BMI 23.34 kg/m²   General Appearance: alert and oriented to person, place and time, well-developed and well-nourished, in no acute distress  Skin: warm and dry, no rash or erythema  Head: normocephalic and atraumatic  Eyes: pupils equal, round, and reactive to light, extraocular eye movements intact, conjunctivae normal  ENT: tympanic membrane, external ear and ear canal normal bilaterally, oropharynx clear and moist with normal mucous membranes  Neck: neck supple and non tender without mass, no thyromegaly or thyroid nodules, no cervical lymphadenopathy   Pulmonary/Chest: clear to auscultation bilaterally- no wheezes, rales or rhonchi, normal air movement, no respiratory distress  Cardiovascular: normal rate, normal S1 and S2, no gallops, intact distal pulses and no carotid bruits  Abdomen: soft, non-tender, non-distended, normal bowel sounds, no masses or organomegaly      Recent Labs     03/28/22  1153 03/29/22  0557    136   K 3.8 3.2*    101   CO2 23 21*   BUN 8 8   CREATININE 0.5 0.4*   GLUCOSE 128* 103*   CALCIUM 9.2 8.9       Recent Labs     03/28/22  1153 03/29/22  0557   WBC 19.2* 20.9*   RBC 4.37 3.76   HGB 14.1 11.8   HCT 41.0 34.6   MCV 93.8 92.0   MCH 32.3 31.4   MCHC 34.4 34.1   RDW 14.9 15.1*    374   MPV 8.9 9.4     K 3.2  WBC 20.9  hCG 97,082 on 20  Micro data pending  Tox positive for [facility]    Radiology:   MRI ABDOMEN WO CONTRAST   Final Result   1. No evident acute findings in the abdomen or pelvis. Specifically, no   findings of acute appendicitis. 2. Incomplete evaluation of a single intrauterine fetus. US ABDOMEN LIMITED   Final Result   Appendix not definitively identified. Therefore, appendicitis cannot be   entirely excluded. If there is persistent clinical concern, correlation with   repeat MRI could be considered.       RECOMMENDATIONS:   Unavailable             Assessment:    Principal Problem:    Intractable nausea and vomiting  Active Problems:    Abdominal pain  Resolved Problems:    * No resolved hospital problems. *      Plan:  1. Hypokalemia, supplemented, will repeat electrolytes tomorrow. 2. Persistent nausea and vomiting, will continue with supportive care. Improving at this time. 3. Pregnancy 9 weeks, evaluated from OB side, defer to OB. 4. Leukocytosis, not new, recheck in a.m.  5. Possible trichomonas infection, seems to be error in evaluating data, will defer to GYN side. 6. Psych input pending  7. DC planning, when improvement in symptomatic complaints, hopefully by tomorrow.         Electronically signed by Cierra Arnold MD on 3/30/2022 at 9:17 AM

## 2022-03-31 ENCOUNTER — ANCILLARY PROCEDURE (OUTPATIENT)
Dept: OBGYN CLINIC | Age: 37
DRG: 566 | End: 2022-03-31
Payer: COMMERCIAL

## 2022-03-31 LAB
BACTERIA: NORMAL /HPF
BILIRUBIN URINE: NEGATIVE
BLOOD, URINE: NEGATIVE
CLARITY: CLEAR
COLOR: YELLOW
EPITHELIAL CELLS, UA: NORMAL /HPF
GLUCOSE URINE: NEGATIVE MG/DL
KETONES, URINE: NEGATIVE MG/DL
LEUKOCYTE ESTERASE, URINE: NEGATIVE
METER GLUCOSE: 119 MG/DL (ref 74–99)
NITRITE, URINE: NEGATIVE
PH UA: 7 (ref 5–9)
PROTEIN UA: NEGATIVE MG/DL
RBC UA: NORMAL /HPF (ref 0–2)
SPECIFIC GRAVITY UA: <=1.005 (ref 1–1.03)
URINE CULTURE, ROUTINE: NORMAL
UROBILINOGEN, URINE: 0.2 E.U./DL
WBC UA: NORMAL /HPF (ref 0–5)

## 2022-03-31 PROCEDURE — 82962 GLUCOSE BLOOD TEST: CPT

## 2022-03-31 PROCEDURE — 81001 URINALYSIS AUTO W/SCOPE: CPT

## 2022-03-31 PROCEDURE — 6360000002 HC RX W HCPCS: Performed by: INTERNAL MEDICINE

## 2022-03-31 PROCEDURE — 2580000003 HC RX 258: Performed by: OBSTETRICS & GYNECOLOGY

## 2022-03-31 PROCEDURE — 99222 1ST HOSP IP/OBS MODERATE 55: CPT | Performed by: OBSTETRICS & GYNECOLOGY

## 2022-03-31 PROCEDURE — 1200000000 HC SEMI PRIVATE

## 2022-03-31 PROCEDURE — 99232 SBSQ HOSP IP/OBS MODERATE 35: CPT | Performed by: INTERNAL MEDICINE

## 2022-03-31 PROCEDURE — 6370000000 HC RX 637 (ALT 250 FOR IP): Performed by: OBSTETRICS & GYNECOLOGY

## 2022-03-31 PROCEDURE — 76801 OB US < 14 WKS SINGLE FETUS: CPT | Performed by: OBSTETRICS & GYNECOLOGY

## 2022-03-31 PROCEDURE — 2580000003 HC RX 258: Performed by: INTERNAL MEDICINE

## 2022-03-31 PROCEDURE — 6370000000 HC RX 637 (ALT 250 FOR IP): Performed by: PSYCHIATRY & NEUROLOGY

## 2022-03-31 RX ORDER — LANOLIN ALCOHOL/MO/W.PET/CERES
25 CREAM (GRAM) TOPICAL 2 TIMES DAILY
Status: DISCONTINUED | OUTPATIENT
Start: 2022-03-31 | End: 2022-04-02 | Stop reason: HOSPADM

## 2022-03-31 RX ADMIN — PYRIDOXINE HCL TAB 50 MG 25 MG: 50 TAB at 11:01

## 2022-03-31 RX ADMIN — HYDROMORPHONE HYDROCHLORIDE 0.5 MG: 1 INJECTION, SOLUTION INTRAMUSCULAR; INTRAVENOUS; SUBCUTANEOUS at 16:47

## 2022-03-31 RX ADMIN — HYDROMORPHONE HYDROCHLORIDE 0.5 MG: 1 INJECTION, SOLUTION INTRAMUSCULAR; INTRAVENOUS; SUBCUTANEOUS at 11:00

## 2022-03-31 RX ADMIN — SODIUM CHLORIDE, SODIUM LACTATE, POTASSIUM CHLORIDE, CALCIUM CHLORIDE AND DEXTROSE MONOHYDRATE: 5; 600; 310; 30; 20 INJECTION, SOLUTION INTRAVENOUS at 16:46

## 2022-03-31 RX ADMIN — SODIUM CHLORIDE, SODIUM LACTATE, POTASSIUM CHLORIDE, CALCIUM CHLORIDE AND DEXTROSE MONOHYDRATE: 5; 600; 310; 30; 20 INJECTION, SOLUTION INTRAVENOUS at 23:38

## 2022-03-31 RX ADMIN — PYRIDOXINE HCL TAB 50 MG 25 MG: 50 TAB at 21:18

## 2022-03-31 RX ADMIN — DOXYLAMINE SUCCINATE 25 MG: 25 TABLET ORAL at 13:07

## 2022-03-31 RX ADMIN — SODIUM CHLORIDE, SODIUM LACTATE, POTASSIUM CHLORIDE, CALCIUM CHLORIDE AND DEXTROSE MONOHYDRATE: 5; 600; 310; 30; 20 INJECTION, SOLUTION INTRAVENOUS at 02:35

## 2022-03-31 RX ADMIN — ONDANSETRON 4 MG: 2 INJECTION INTRAMUSCULAR; INTRAVENOUS at 12:12

## 2022-03-31 RX ADMIN — SODIUM CHLORIDE, PRESERVATIVE FREE 10 ML: 5 INJECTION INTRAVENOUS at 21:18

## 2022-03-31 RX ADMIN — DOXYLAMINE SUCCINATE 25 MG: 25 TABLET ORAL at 21:18

## 2022-03-31 RX ADMIN — HYDROMORPHONE HYDROCHLORIDE 0.5 MG: 1 INJECTION, SOLUTION INTRAMUSCULAR; INTRAVENOUS; SUBCUTANEOUS at 00:09

## 2022-03-31 RX ADMIN — SERTRALINE HYDROCHLORIDE 25 MG: 50 TABLET ORAL at 21:18

## 2022-03-31 RX ADMIN — HYDROMORPHONE HYDROCHLORIDE 0.5 MG: 1 INJECTION, SOLUTION INTRAMUSCULAR; INTRAVENOUS; SUBCUTANEOUS at 06:14

## 2022-03-31 RX ADMIN — ONDANSETRON 4 MG: 2 INJECTION INTRAMUSCULAR; INTRAVENOUS at 18:16

## 2022-03-31 RX ADMIN — ONDANSETRON 4 MG: 2 INJECTION INTRAMUSCULAR; INTRAVENOUS at 06:14

## 2022-03-31 RX ADMIN — SODIUM CHLORIDE, SODIUM LACTATE, POTASSIUM CHLORIDE, CALCIUM CHLORIDE AND DEXTROSE MONOHYDRATE: 5; 600; 310; 30; 20 INJECTION, SOLUTION INTRAVENOUS at 09:33

## 2022-03-31 RX ADMIN — HYDROMORPHONE HYDROCHLORIDE 0.5 MG: 1 INJECTION, SOLUTION INTRAMUSCULAR; INTRAVENOUS; SUBCUTANEOUS at 21:18

## 2022-03-31 RX ADMIN — ONDANSETRON 4 MG: 2 INJECTION INTRAMUSCULAR; INTRAVENOUS at 00:09

## 2022-03-31 RX ADMIN — ENOXAPARIN SODIUM 40 MG: 100 INJECTION SUBCUTANEOUS at 09:39

## 2022-03-31 ASSESSMENT — PAIN DESCRIPTION - PROGRESSION: CLINICAL_PROGRESSION: NOT CHANGED

## 2022-03-31 ASSESSMENT — PAIN SCALES - GENERAL
PAINLEVEL_OUTOF10: 8
PAINLEVEL_OUTOF10: 7
PAINLEVEL_OUTOF10: 7
PAINLEVEL_OUTOF10: 8
PAINLEVEL_OUTOF10: 7
PAINLEVEL_OUTOF10: 6

## 2022-03-31 ASSESSMENT — PAIN DESCRIPTION - DESCRIPTORS
DESCRIPTORS: SHARP;STABBING
DESCRIPTORS: SHARP

## 2022-03-31 ASSESSMENT — PAIN DESCRIPTION - LOCATION
LOCATION: ABDOMEN
LOCATION: ABDOMEN

## 2022-03-31 ASSESSMENT — PAIN DESCRIPTION - PAIN TYPE
TYPE: ACUTE PAIN
TYPE: ACUTE PAIN

## 2022-03-31 ASSESSMENT — PAIN DESCRIPTION - ONSET
ONSET: ON-GOING
ONSET: ON-GOING

## 2022-03-31 ASSESSMENT — PAIN - FUNCTIONAL ASSESSMENT: PAIN_FUNCTIONAL_ASSESSMENT: PREVENTS OR INTERFERES SOME ACTIVE ACTIVITIES AND ADLS

## 2022-03-31 ASSESSMENT — PAIN DESCRIPTION - ORIENTATION: ORIENTATION: RIGHT;LOWER

## 2022-03-31 ASSESSMENT — PAIN DESCRIPTION - FREQUENCY: FREQUENCY: CONTINUOUS

## 2022-03-31 NOTE — PATIENT CARE CONFERENCE
[facility] Quality Flow/Interdisciplinary Rounds Progress Note        Quality Flow Rounds held on March 31, 2022    Disciplines Attending:  Bedside Nurse, ,  and Nursing Unit [address] ALBERT Garay was admitted on 3/28/2022 11:17 AM    Anticipated Discharge Date:  Expected Discharge Date: 04/16/22    Disposition:    King Score:  King Scale Score: 21    Readmission Risk              Risk of Unplanned Readmission:  14           Discussed patient goal for the day, patient clinical progression, and barriers to discharge.   The following Goal(s) of the Day/Commitment(s) have been identified:  Diagnostics - Report Results and Labs - Report Results      Rosalinda Lara RN  March 31, 2022

## 2022-03-31 NOTE — PROGRESS NOTES
Progress Note    SUBJECTIVE: Patient continues nausea vomiting and right flank pain    OBJECTIVE:    VITALS:  /72   Pulse 84   Temp 98.8 °F (37.1 °C) (Oral)   Resp 18   Ht 5' 4\" (1.626 m)   Wt 143 lb (64.9 kg)   LMP 02/17/2022   SpO2 99%   BMI 24.55 kg/m²   Physical Exam  ABDOMEN: Pain on right abdomen/flank    DATA:  CBC:   Lab Results   Component Value Date    WBC 20.9 03/29/2022    RBC 3.76 03/29/2022    HGB 11.8 03/29/2022    HCT 34.6 03/29/2022    MCV 92.0 03/29/2022    MCH 31.4 03/29/2022    MCHC 34.1 03/29/2022    RDW 15.1 03/29/2022     03/29/2022    MPV 9.4 03/29/2022     U/A:    Lab Results   Component Value Date    COLORU Yellow 03/31/2022    PROTEINU Negative 03/31/2022    PHUR 7.0 03/31/2022    LABCAST RARE  07/30/2012    WBCUA 0-1 03/31/2022    WBCUA NONE 02/09/2012    RBCUA NONE 03/31/2022    RBCUA NONE 12/31/2013    MUCUS Present 03/28/2022    TRICHOMONAS MODERATE 12/15/2014    YEAST FEW 08/01/2012    BACTERIA NONE SEEN 03/31/2022    CLARITYU Clear 03/31/2022    SPECGRAV <=1.005 03/31/2022    LEUKOCYTESUR Negative 03/31/2022    UROBILINOGEN 0.2 03/31/2022    BILIRUBINUR Negative 03/31/2022    BILIRUBINUR NEGATIVE 02/09/2012    BLOODU Negative 03/31/2022    GLUCOSEU Negative 03/31/2022    GLUCOSEU NEGATIVE 02/09/2012    AMORPHOUS FEW 03/29/2022       ASSESSMENT AND PLAN  Hyperemesis continues   MFM consult today   Consider secondary to cannabinoid use   Continue IV fluids  Abdominal pain reliant on Dilaudid   Recommend trying to wean to something oral  Depression/anxiety   Psychiatry consult appreciated Zoloft michel Becerra MD 3/31/2022 7:01 AM

## 2022-03-31 NOTE — CARE COORDINATION
Social Work discharge planning   SW follow up from initial meeting 3/29/22. Gave pt counseling resources, including free maternity counseling through [facility] [employer] program ph [phone].    Electronically signed by Stone De Los Santos on 3/31/2022 at 1:58 PM

## 2022-03-31 NOTE — PROGRESS NOTES
Progress Note    SUBJECTIVE: Patient in the shower tolerating liquids intermittently per nursing    OBJECTIVE:    VITALS:  /72   Pulse 84   Temp 98.8 °F (37.1 °C) (Oral)   Resp 18   Ht 5' 4\" (1.626 m)   Wt 136 lb (61.7 kg)   LMP 02/17/2022   SpO2 99%   BMI 23.34 kg/m²   Physical Exam  Deferred    DATA:  CBC:   Lab Results   Component Value Date    WBC 20.9 03/29/2022    RBC 3.76 03/29/2022    HGB 11.8 03/29/2022    HCT 34.6 03/29/2022    MCV 92.0 03/29/2022    MCH 31.4 03/29/2022    MCHC 34.1 03/29/2022    RDW 15.1 03/29/2022     03/29/2022    MPV 9.4 03/29/2022       ASSESSMENT AND PLAN  IUP at 9-week  Cannabinoid hyperemesis   Consult [facility] for further evaluation   Monitor ketones and urinalysis daily   Patient states she grows her own marijuana and smokes 3 bowls daily  Abdominal pain with history of Crohn's   Requiring Dilaudid at this point would recommend tapering switching to   oral medication  Depression   Zoloft per psych  Trichomonas   Old lab not current      Froilan Ochoa MD 3/30/2022 11:05 PM

## 2022-03-31 NOTE — PROGRESS NOTES
[facility] Hospitalist   Progress Note    Admitting Date and Time: 3/28/2022 11:17 AM  Admit Dx: Intractable nausea and vomiting [R11.2]  Abdominal pain [R10.9]    Subjective: Patient was admitted with Intractable nausea and vomiting [R11.2]  Abdominal pain [R10.9]. Patient seems to be resting in bed comfortably. Says she has a cyst that is very painful in her lower abdomen. Per patient she has had 2 liquid bowel movements yesterday. She vomited twice yesterday and has been nauseated but has not vomited today. ROS: denies fever, chills, cp, sob, n/v, HA unless stated above.  vitamin B-6  25 mg Oral BID    doxyLAMINE succinate  25 mg Oral BID    sertraline  25 mg Oral Nightly    sodium chloride flush  5-40 mL IntraVENous 2 times per day    enoxaparin  40 mg SubCUTAneous Daily    cefTRIAXone (ROCEPHIN) IV  1,000 mg IntraVENous Q24H     HYDROmorphone, 0.5 mg, Q4H PRN  ondansetron, 4 mg, Q6H PRN  sodium chloride flush, 5-40 mL, PRN  sodium chloride, , PRN  polyethylene glycol, 17 g, Daily PRN  acetaminophen, 650 mg, Q6H PRN   Or  acetaminophen, 650 mg, Q6H PRN         Objective:    BP (!) 163/98   Pulse 72   Temp 97.8 °F (36.6 °C) (Oral)   Resp 20   Ht 5' 4\" (1.626 m)   Wt 143 lb (64.9 kg)   LMP 02/17/2022   SpO2 99%   BMI 24.55 kg/m²   General Appearance: alert and oriented to person, place and time, well-developed and well-nourished, in no acute distress  Skin: warm and dry, no rash or erythema  Neck: neck supple and non tender   Pulmonary/Chest: clear to auscultation bilaterally  Cardiovascular: Regular, no murmur  Abdomen: soft, patient seems to be distractibility soft with tenderness bilateral lower quadrants but no guarding.   When not distracted patient guards  Ext: No peripheral edema    Recent Labs     03/29/22  0557      K 3.2*      CO2 21*   BUN 8   CREATININE 0.4*   GLUCOSE 103*   CALCIUM 8.9       Recent Labs     03/29/22  0557   WBC 20.9*   RBC 3.76 HGB 11.8   HCT 34.6   MCV 92.0   MCH 31.4   MCHC 34.1   RDW 15.1*      MPV 9.4     K 3.2  WBC 20.9  hCG 97,082 on 20  Micro data pending  Tox positive for [facility]    Radiology:   MRI ABDOMEN WO CONTRAST   Final Result   1. No evident acute findings in the abdomen or pelvis. Specifically, no   findings of acute appendicitis. 2. Incomplete evaluation of a single intrauterine fetus. US ABDOMEN LIMITED   Final Result   Appendix not definitively identified. Therefore, appendicitis cannot be   entirely excluded. If there is persistent clinical concern, correlation with   repeat MRI could be considered. RECOMMENDATIONS:   Unavailable         US OB LESS THAN 14 WEEKS SINGLE OR FIRST GESTATION    (Results Pending)       Assessment:    Principal Problem:    Intractable nausea and vomiting  Active Problems:    Abdominal pain    Intractable vomiting    Abdominal pain, right lower quadrant    Diarrhea of presumed infectious origin    Marijuana abuse    AMA (advanced maternal age) multigravida 33+, first trimester  Resolved Problems:    * No resolved hospital problems. *      Plan:    1. Persistent nausea and vomiting  Possibly hyperemesis gravidarum. Appreciate OB and now high risk OB advice. Continue Zofran for today and encourage some p.o. intake. Patient on D5 LR. Consider adding Reglan if vomiting does not allow patient to eat. 2.  Pregnancy 9 weeks  Plan is ultrasound to confirm dates. 3.  Depression  Seen by psychiatry and started on Zoloft. It has been confirmed that patient does not have an active trichomonas infection. All antibiotics stopped.     Electronically signed by Lake Machado MD on 3/31/2022 at 11:58 AM

## 2022-03-31 NOTE — CONSULTS
MATERNAL FETAL MEDICINE CONSULT    NAME: Judy Torres  MRN: [id number]            SERVICE DATE: March 31, 2022  SERVICE TIME: 11:45 AM  REQUESTING PROVIDER: Joan Lawler MD         I was asked by Jie Nichols MD to provide an inpatient consult for Judy Torres. As I am sure you will recall, Judy Torres is a 29 y.o. female, [id number] at Unknown with an EMELIA of Not found. dating method. Patient is here with the following problems:  Principal Problem:    Intractable nausea and vomiting  Active Problems:    Abdominal pain  Resolved Problems:    * No resolved hospital problems.  *      SUBJECTIVE:  HISTORY OF THE PRESENT ILLNESS:  HISTORY REVIEW  Past Medical History:   Diagnosis Date    Anxiety attack since age 16    diagnosed with seizure due to convulsions from this    Asthma     seasonal; no inhaler     Crohn's colitis ([address]     Marijuana abuse     occas    Multiple lacerations 2015    stabbing; resolved    Seizure ([address]     loses focus, disoriented unpon arousing; etiology unknown per pt; none since 2015    Tobacco abuse      Past Surgical History:   Procedure Laterality Date    CHOLECYSTECTOMY  01/22/2018    COLONOSCOPY      DILATION AND CURETTAGE OF UTERUS  2011    ENDOSCOPY, COLON, DIAGNOSTIC      HERNIA REPAIR      HERNIA REPAIR      UPPER GASTROINTESTINAL ENDOSCOPY N/A 2/19/2022    EGD BIOPSY performed by Devan Alfaro MD at [facility] ENDOSCOPY     Family History   Problem Relation Age of Onset    Depression Mother     Cancer Mother     Heart Disease Father     Depression Father      Social History     Socioeconomic History    Marital status: Legally      Spouse name: Not on file    Number of children: Not on file    Years of education: Not on file    Highest education level: Not on file   Occupational History    Not on file   Tobacco Use    Smoking status: Current Every Day Smoker     Packs/day: 1.00     Years: 10.00     Pack years: 10.00     Types: Cigarettes    Smokeless tobacco: Never Used   Vaping Use    Vaping Use: Never used   Substance and Sexual Activity    Alcohol use: Yes     Comment: socially    Drug use: Not Currently     Frequency: 2.0 times per week     Types: Marijuana Lovena Mems)    Sexual activity: Yes     Partners: Male   Other Topics Concern    Not on file   Social History Narrative    Not on file     Social Determinants of Health     Financial Resource Strain:     Difficulty of Paying Living Expenses: Not on file   Food Insecurity:     Worried About Running Out of Food in the Last Year: Not on file    Filemon of Food in the Last Year: Not on file   Transportation Needs:     Lack of Transportation (Medical): Not on file    Lack of Transportation (Non-Medical):  Not on file   Physical Activity:     Days of Exercise per Week: Not on file    Minutes of Exercise per Session: Not on file   Stress:     Feeling of Stress : Not on file   Social Connections:     Frequency of Communication with Friends and Family: Not on file    Frequency of Social Gatherings with Friends and Family: Not on file    Attends Hinduism Services: Not on file    Active Member of [address] [place] [address] or Organizations: Not on file    Attends Club or Organization Meetings: Not on file    Marital Status: Not on file   Intimate Partner Violence:     Fear of Current or Ex-Partner: Not on file    Emotionally Abused: Not on file    Physically Abused: Not on file    Sexually Abused: Not on file   Housing Stability:     Unable to Pay for Housing in the Last Year: Not on file    Number of [place] in the Last Year: Not on file    Unstable Housing in the Last Year: Not on file     OB History    Para Term  AB Living   5 2 1 1 1 2   SAB IAB Ectopic Molar Multiple Live Births   1 0 0 0 0 2      # Outcome Date GA Lbr Marco/2nd Weight Sex Delivery Anes PTL Lv   5 Current            4 Term / 37w0d  5 lb 1 oz (2.296 kg) F Vag-Spont EPI Y NARENDRA      Apgar1: 8 Apgar5: 9   3  12 35w4d  4 lb 8 oz (2.041 kg) M Vag-Spont         Name: Arnie Galan: 9  Apgar5: 9   2 2011 11w0d          1 Term 04 42w0d  7 lb 8 oz (3.402 kg) F Vag-Spont EPI  NARENDRA         ALLERGIES: Patient has no known allergies. CURRENT MEDS:   vitamin B-6  25 mg Oral BID    doxyLAMINE succinate  25 mg Oral BID    sertraline  25 mg Oral Nightly    sodium chloride flush  5-40 mL IntraVENous 2 times per day    enoxaparin  40 mg SubCUTAneous Daily    cefTRIAXone (ROCEPHIN) IV  1,000 mg IntraVENous Q24H      dextrose 5% in lactated ringers 150 mL/hr at 22 0933    sodium chloride       HYDROmorphone, ondansetron, sodium chloride flush, sodium chloride, polyethylene glycol, acetaminophen **OR** acetaminophen    PRIOR TO ADMISSION MEDICATIONS:  Prior to Admission medications    Medication Sig Start Date End Date Taking?  Authorizing Provider   sertraline (ZOLOFT) 25 MG tablet Take 1 tablet by mouth at bedtime 3/30/22  Yes Mahamed Hook MD   ondansetron (ZOFRAN) 4 MG tablet Take 4 mg by mouth every 8 hours as needed for Nausea or Vomiting    Historical Provider, MD   Prenatal Vit-Fe Fumarate-FA (PRENATAL VITAMIN) 27-0.8 MG TABS Take 1 tablet by mouth daily 22   Anne Marie Araujo MD       OBJECTIVE:    REVIEW OF SYSTEMS:  Fetal Movement: None yet not expected  Vaginal Bleeding: Denies  ROM/Loss of Fluid: Denies  Contractions: Denies  S/Sx Pre-eclampsia: Denies      PHYSICAL EXAM:  Well developed, Well nourished white female in no apparent distress  Respiratory exam: Regular, unlabored  Cardiovascular exam: Regular rhythm rate  Abdominal Exam : Gravid, soft, some right lower quadrant tenderness, no rebound no guarding  Extremities: Full range of motion in all    LAST VITALS:  BP (!) 163/98   Pulse 72   Temp 97.8 °F (36.6 °C) (Oral)   Resp 20   Ht 5' 4\" (1.626 m)   Wt 143 lb (64.9 kg)   LMP 2022   SpO2 99%   BMI 24.55 kg/m²          PRENATAL LABS  Diagnostic tests reviewed for today's visit:   Recent Results (from the past 24 hour(s))   Urinalysis with Microscopic    Collection Time: 03/31/22  2:50 AM   Result Value Ref Range    Color, UA Yellow Straw/Yellow    Clarity, UA Clear Clear    Glucose, Ur Negative Negative mg/dL    Bilirubin Urine Negative Negative    Ketones, Urine Negative Negative mg/dL    Specific Gravity, UA <=1.005 1.005 - 1.030    Blood, Urine Negative Negative    pH, UA 7.0 5.0 - 9.0    Protein, UA Negative Negative mg/dL    Urobilinogen, Urine 0.2 <2.0 E.U./dL    Nitrite, Urine Negative Negative    Leukocyte Esterase, Urine Negative Negative    WBC, UA 0-1 0 - 5 /HPF    RBC, UA NONE 0 - 2 /HPF    Epithelial Cells, UA RARE /HPF    Bacteria, UA NONE SEEN None Seen /HPF   POCT Glucose    Collection Time: 03/31/22 11:17 AM   Result Value Ref Range    Meter Glucose 119 (H) 74 - 99 mg/dL         IMP:  1. Abdulkadir Nova is a 29 y.o. weight female [id number] at Unknown with nausea and vomiting of pregnancy. She has had less than a 10% weight loss. 2. This is an unplanned unexpected pregnancy. 3. She is currently on Zofran. She has tried Diclegis with the equivalent with good results. 4. She is AMA. PLAN:  1. We will obtain a formal ultrasound today to confirm dates and confirm viability and confirm the number of babies. 2. We will start likely just of the equivalent which is doxylamine and vitamin B6.  3. Continue IV fluids. If there is no dextrose in the IV fluids would add dextrose to the IV fluids. 4. Would consider intermittent IV hydration as an outpatient. 5. Management of right lower quadrant pain per general surgery. 6. Follow-up would be otherwise as clinically indicated. I spent 15 minutes of direct contact time with the patient of which greater than 50% of the time was used to  the patient, discuss complications and problems related to her pregnancy, or coordinating her care.  I answered all of her questions to her satisfaction.     SIGNATURE: Román Hardy MD  DATE: March 31, 2022  TIME: 11:45 AM

## 2022-04-01 LAB
ALBUMIN SERPL-MCNC: 3.9 G/DL (ref 3.5–5.2)
ALP BLD-CCNC: 44 U/L (ref 35–104)
ALT SERPL-CCNC: 16 U/L (ref 0–32)
ANION GAP SERPL CALCULATED.3IONS-SCNC: 9 MMOL/L (ref 7–16)
AST SERPL-CCNC: 16 U/L (ref 0–31)
BACTERIA: ABNORMAL /HPF
BASOPHILS ABSOLUTE: 0.07 E9/L (ref 0–0.2)
BASOPHILS RELATIVE PERCENT: 0.6 % (ref 0–2)
BILIRUB SERPL-MCNC: 0.6 MG/DL (ref 0–1.2)
BILIRUBIN URINE: NEGATIVE
BLOOD, URINE: NEGATIVE
BUN BLDV-MCNC: 4 MG/DL (ref 6–20)
CALCIUM SERPL-MCNC: 8.9 MG/DL (ref 8.6–10.2)
CHLORIDE BLD-SCNC: 102 MMOL/L (ref 98–107)
CLARITY: CLEAR
CO2: 25 MMOL/L (ref 22–29)
COLOR: YELLOW
CREAT SERPL-MCNC: 0.5 MG/DL (ref 0.5–1)
EOSINOPHILS ABSOLUTE: 0.28 E9/L (ref 0.05–0.5)
EOSINOPHILS RELATIVE PERCENT: 2.4 % (ref 0–6)
GFR AFRICAN AMERICAN: >60
GFR NON-AFRICAN AMERICAN: >60 ML/MIN/1.73
GLUCOSE BLD-MCNC: 95 MG/DL (ref 74–99)
GLUCOSE URINE: NEGATIVE MG/DL
HCT VFR BLD CALC: 37 % (ref 34–48)
HEMOGLOBIN: 12.7 G/DL (ref 11.5–15.5)
IMMATURE GRANULOCYTES #: 0.07 E9/L
IMMATURE GRANULOCYTES %: 0.6 % (ref 0–5)
KETONES, URINE: NEGATIVE MG/DL
LEUKOCYTE ESTERASE, URINE: NEGATIVE
LYMPHOCYTES ABSOLUTE: 3.9 E9/L (ref 1.5–4)
LYMPHOCYTES RELATIVE PERCENT: 34.1 % (ref 20–42)
MAGNESIUM: 1.9 MG/DL (ref 1.6–2.6)
MCH RBC QN AUTO: 32.4 PG (ref 26–35)
MCHC RBC AUTO-ENTMCNC: 34.3 % (ref 32–34.5)
MCV RBC AUTO: 94.4 FL (ref 80–99.9)
MONOCYTES ABSOLUTE: 0.83 E9/L (ref 0.1–0.95)
MONOCYTES RELATIVE PERCENT: 7.3 % (ref 2–12)
MUCUS: PRESENT /LPF
NEUTROPHILS ABSOLUTE: 6.28 E9/L (ref 1.8–7.3)
NEUTROPHILS RELATIVE PERCENT: 55 % (ref 43–80)
NITRITE, URINE: NEGATIVE
PDW BLD-RTO: 14.1 FL (ref 11.5–15)
PH UA: 8 (ref 5–9)
PLATELET # BLD: 363 E9/L (ref 130–450)
PMV BLD AUTO: 8.8 FL (ref 7–12)
POTASSIUM REFLEX MAGNESIUM: 3.5 MMOL/L (ref 3.5–5)
PROTEIN UA: NEGATIVE MG/DL
RBC # BLD: 3.92 E12/L (ref 3.5–5.5)
RBC UA: ABNORMAL /HPF (ref 0–2)
SODIUM BLD-SCNC: 136 MMOL/L (ref 132–146)
SPECIFIC GRAVITY UA: 1.01 (ref 1–1.03)
TOTAL PROTEIN: 6.1 G/DL (ref 6.4–8.3)
UROBILINOGEN, URINE: 0.2 E.U./DL
WBC # BLD: 11.4 E9/L (ref 4.5–11.5)
WBC UA: ABNORMAL /HPF (ref 0–5)

## 2022-04-01 PROCEDURE — 2580000003 HC RX 258: Performed by: INTERNAL MEDICINE

## 2022-04-01 PROCEDURE — 6360000002 HC RX W HCPCS: Performed by: INTERNAL MEDICINE

## 2022-04-01 PROCEDURE — 6370000000 HC RX 637 (ALT 250 FOR IP): Performed by: INTERNAL MEDICINE

## 2022-04-01 PROCEDURE — 6370000000 HC RX 637 (ALT 250 FOR IP): Performed by: PSYCHIATRY & NEUROLOGY

## 2022-04-01 PROCEDURE — 36415 COLL VENOUS BLD VENIPUNCTURE: CPT

## 2022-04-01 PROCEDURE — 6370000000 HC RX 637 (ALT 250 FOR IP): Performed by: OBSTETRICS & GYNECOLOGY

## 2022-04-01 PROCEDURE — 80053 COMPREHEN METABOLIC PANEL: CPT

## 2022-04-01 PROCEDURE — 81001 URINALYSIS AUTO W/SCOPE: CPT

## 2022-04-01 PROCEDURE — 85025 COMPLETE CBC W/AUTO DIFF WBC: CPT

## 2022-04-01 PROCEDURE — 83735 ASSAY OF MAGNESIUM: CPT

## 2022-04-01 PROCEDURE — 99232 SBSQ HOSP IP/OBS MODERATE 35: CPT | Performed by: INTERNAL MEDICINE

## 2022-04-01 PROCEDURE — 1200000000 HC SEMI PRIVATE

## 2022-04-01 PROCEDURE — 2580000003 HC RX 258: Performed by: OBSTETRICS & GYNECOLOGY

## 2022-04-01 RX ORDER — HYDROCODONE BITARTRATE AND ACETAMINOPHEN 5; 325 MG/1; MG/1
1 TABLET ORAL EVERY 6 HOURS PRN
Status: DISCONTINUED | OUTPATIENT
Start: 2022-04-01 | End: 2022-04-02 | Stop reason: HOSPADM

## 2022-04-01 RX ORDER — METOCLOPRAMIDE HYDROCHLORIDE 5 MG/ML
10 INJECTION INTRAMUSCULAR; INTRAVENOUS
Status: DISCONTINUED | OUTPATIENT
Start: 2022-04-01 | End: 2022-04-02 | Stop reason: HOSPADM

## 2022-04-01 RX ADMIN — SODIUM CHLORIDE, PRESERVATIVE FREE 10 ML: 5 INJECTION INTRAVENOUS at 11:24

## 2022-04-01 RX ADMIN — ENOXAPARIN SODIUM 40 MG: 100 INJECTION SUBCUTANEOUS at 08:56

## 2022-04-01 RX ADMIN — METOCLOPRAMIDE 10 MG: 5 INJECTION, SOLUTION INTRAMUSCULAR; INTRAVENOUS at 11:22

## 2022-04-01 RX ADMIN — HYDROCODONE BITARTRATE AND ACETAMINOPHEN 1 TABLET: 5; 325 TABLET ORAL at 18:01

## 2022-04-01 RX ADMIN — HYDROCODONE BITARTRATE AND ACETAMINOPHEN 1 TABLET: 5; 325 TABLET ORAL at 11:16

## 2022-04-01 RX ADMIN — METOCLOPRAMIDE 10 MG: 5 INJECTION, SOLUTION INTRAMUSCULAR; INTRAVENOUS at 16:39

## 2022-04-01 RX ADMIN — SODIUM CHLORIDE, SODIUM LACTATE, POTASSIUM CHLORIDE, CALCIUM CHLORIDE AND DEXTROSE MONOHYDRATE: 5; 600; 310; 30; 20 INJECTION, SOLUTION INTRAVENOUS at 14:45

## 2022-04-01 RX ADMIN — DOXYLAMINE SUCCINATE 25 MG: 25 TABLET ORAL at 08:59

## 2022-04-01 RX ADMIN — SODIUM CHLORIDE, PRESERVATIVE FREE 10 ML: 5 INJECTION INTRAVENOUS at 08:57

## 2022-04-01 RX ADMIN — ONDANSETRON 4 MG: 2 INJECTION INTRAMUSCULAR; INTRAVENOUS at 08:56

## 2022-04-01 RX ADMIN — HYDROMORPHONE HYDROCHLORIDE 0.5 MG: 1 INJECTION, SOLUTION INTRAMUSCULAR; INTRAVENOUS; SUBCUTANEOUS at 06:14

## 2022-04-01 RX ADMIN — SODIUM CHLORIDE, SODIUM LACTATE, POTASSIUM CHLORIDE, CALCIUM CHLORIDE AND DEXTROSE MONOHYDRATE: 5; 600; 310; 30; 20 INJECTION, SOLUTION INTRAVENOUS at 21:44

## 2022-04-01 RX ADMIN — ONDANSETRON 4 MG: 2 INJECTION INTRAMUSCULAR; INTRAVENOUS at 19:54

## 2022-04-01 RX ADMIN — SODIUM CHLORIDE, SODIUM LACTATE, POTASSIUM CHLORIDE, CALCIUM CHLORIDE AND DEXTROSE MONOHYDRATE: 5; 600; 310; 30; 20 INJECTION, SOLUTION INTRAVENOUS at 06:16

## 2022-04-01 RX ADMIN — PYRIDOXINE HCL TAB 50 MG 25 MG: 50 TAB at 08:56

## 2022-04-01 RX ADMIN — SERTRALINE HYDROCHLORIDE 25 MG: 50 TABLET ORAL at 19:54

## 2022-04-01 RX ADMIN — SODIUM CHLORIDE, PRESERVATIVE FREE 10 ML: 5 INJECTION INTRAVENOUS at 16:39

## 2022-04-01 RX ADMIN — HYDROMORPHONE HYDROCHLORIDE 0.5 MG: 1 INJECTION, SOLUTION INTRAMUSCULAR; INTRAVENOUS; SUBCUTANEOUS at 01:44

## 2022-04-01 RX ADMIN — SODIUM CHLORIDE, PRESERVATIVE FREE 10 ML: 5 INJECTION INTRAVENOUS at 19:55

## 2022-04-01 RX ADMIN — ONDANSETRON 4 MG: 2 INJECTION INTRAMUSCULAR; INTRAVENOUS at 01:44

## 2022-04-01 RX ADMIN — PYRIDOXINE HCL TAB 50 MG 25 MG: 50 TAB at 19:54

## 2022-04-01 RX ADMIN — DOXYLAMINE SUCCINATE 25 MG: 25 TABLET ORAL at 19:54

## 2022-04-01 ASSESSMENT — PAIN DESCRIPTION - PAIN TYPE
TYPE: ACUTE PAIN
TYPE: ACUTE PAIN

## 2022-04-01 ASSESSMENT — PAIN SCALES - GENERAL
PAINLEVEL_OUTOF10: 8
PAINLEVEL_OUTOF10: 2
PAINLEVEL_OUTOF10: 8

## 2022-04-01 ASSESSMENT — PAIN DESCRIPTION - DESCRIPTORS: DESCRIPTORS: SHARP

## 2022-04-01 ASSESSMENT — PAIN DESCRIPTION - FREQUENCY: FREQUENCY: CONTINUOUS

## 2022-04-01 ASSESSMENT — PAIN DESCRIPTION - ORIENTATION
ORIENTATION: LOWER;RIGHT
ORIENTATION: LOWER;RIGHT

## 2022-04-01 ASSESSMENT — PAIN DESCRIPTION - LOCATION
LOCATION: ABDOMEN
LOCATION: ABDOMEN

## 2022-04-01 ASSESSMENT — PAIN DESCRIPTION - ONSET: ONSET: ON-GOING

## 2022-04-01 ASSESSMENT — PAIN - FUNCTIONAL ASSESSMENT: PAIN_FUNCTIONAL_ASSESSMENT: PREVENTS OR INTERFERES SOME ACTIVE ACTIVITIES AND ADLS

## 2022-04-01 ASSESSMENT — PAIN DESCRIPTION - PROGRESSION: CLINICAL_PROGRESSION: NOT CHANGED

## 2022-04-01 NOTE — PATIENT CARE CONFERENCE
[facility] Quality Flow/Interdisciplinary Rounds Progress Note        Quality Flow Rounds held on April 1, 2022    Disciplines Attending:  Bedside Nurse, ,  and Nursing Unit [address] Jonn Garsia was admitted on 3/28/2022 11:17 AM    Anticipated Discharge Date:  Expected Discharge Date: 04/16/22    Disposition:    King Score:  King Scale Score: 21    Readmission Risk              Risk of Unplanned Readmission:  14           Discussed patient goal for the day, patient clinical progression, and barriers to discharge.   The following Goal(s) of the Day/Commitment(s) have been identified:  Labs - Report Results      Sadaf Morton RN  April 1, 2022

## 2022-04-01 NOTE — PROGRESS NOTES
OB Progress Note    SUBJECTIVE: Patient reports feeling much better, pain controlled vomiting and nausea improved    OBJECTIVE:    VITALS:  BP (!) 150/86   Pulse 83   Temp 98.2 °F (36.8 °C) (Oral)   Resp 16   Ht 5' 4\" (1.626 m)   Wt 144 lb (65.3 kg)   LMP 02/17/2022   SpO2 96%   BMI 24.72 kg/m²   Physical Exam      DATA:  CBC:   Lab Results   Component Value Date    WBC 11.4 04/01/2022    RBC 3.92 04/01/2022    HGB 12.7 04/01/2022    HCT 37.0 04/01/2022    MCV 94.4 04/01/2022    MCH 32.4 04/01/2022    MCHC 34.3 04/01/2022    RDW 14.1 04/01/2022     04/01/2022    MPV 8.8 04/01/2022     CMP:    Lab Results   Component Value Date     04/01/2022    K 3.5 04/01/2022     04/01/2022    CO2 25 04/01/2022    BUN 4 04/01/2022    CREATININE 0.5 04/01/2022    GFRAA >60 04/01/2022    LABGLOM >60 04/01/2022    GLUCOSE 95 04/01/2022    GLUCOSE 92 05/08/2011    PROT 6.1 04/01/2022    LABALBU 3.9 04/01/2022    CALCIUM 8.9 04/01/2022    BILITOT 0.6 04/01/2022    ALKPHOS 44 04/01/2022    AST 16 04/01/2022    ALT 16 04/01/2022       ASSESSMENT AND PLAN  Intrauterine pregnancy   MFM consult appreciated  Hyperemesis multifactorial improving   DC home per medicine  Pain improved in the abdomen   Disposition per surgery      Raya Kaplan MD 4/1/2022 1:57 PM

## 2022-04-02 VITALS
HEART RATE: 91 BPM | TEMPERATURE: 98.5 F | SYSTOLIC BLOOD PRESSURE: 114 MMHG | HEIGHT: 64 IN | BODY MASS INDEX: 24.86 KG/M2 | DIASTOLIC BLOOD PRESSURE: 78 MMHG | OXYGEN SATURATION: 98 % | RESPIRATION RATE: 18 BRPM | WEIGHT: 145.6 LBS

## 2022-04-02 LAB
BACTERIA: NORMAL /HPF
BILIRUBIN URINE: NEGATIVE
BLOOD CULTURE, ROUTINE: NORMAL
BLOOD, URINE: NEGATIVE
CLARITY: CLEAR
COLOR: YELLOW
CULTURE, BLOOD 2: NORMAL
GLUCOSE URINE: NEGATIVE MG/DL
KETONES, URINE: NEGATIVE MG/DL
LEUKOCYTE ESTERASE, URINE: NEGATIVE
NITRITE, URINE: NEGATIVE
PH UA: 7.5 (ref 5–9)
PROTEIN UA: NEGATIVE MG/DL
RBC UA: NORMAL /HPF (ref 0–2)
SPECIFIC GRAVITY UA: 1.01 (ref 1–1.03)
UROBILINOGEN, URINE: 0.2 E.U./DL
WBC UA: NORMAL /HPF (ref 0–5)

## 2022-04-02 PROCEDURE — 81001 URINALYSIS AUTO W/SCOPE: CPT

## 2022-04-02 PROCEDURE — 99239 HOSP IP/OBS DSCHRG MGMT >30: CPT | Performed by: INTERNAL MEDICINE

## 2022-04-02 PROCEDURE — 2580000003 HC RX 258: Performed by: OBSTETRICS & GYNECOLOGY

## 2022-04-02 PROCEDURE — 6370000000 HC RX 637 (ALT 250 FOR IP): Performed by: OBSTETRICS & GYNECOLOGY

## 2022-04-02 PROCEDURE — 6360000002 HC RX W HCPCS: Performed by: INTERNAL MEDICINE

## 2022-04-02 PROCEDURE — 6370000000 HC RX 637 (ALT 250 FOR IP): Performed by: INTERNAL MEDICINE

## 2022-04-02 RX ORDER — METOCLOPRAMIDE 10 MG/1
10 TABLET ORAL 3 TIMES DAILY PRN
Qty: 60 TABLET | Refills: 3 | Status: SHIPPED | OUTPATIENT
Start: 2022-04-02 | End: 2022-07-25 | Stop reason: SDUPTHER

## 2022-04-02 RX ADMIN — PYRIDOXINE HCL TAB 50 MG 25 MG: 50 TAB at 08:46

## 2022-04-02 RX ADMIN — DOXYLAMINE SUCCINATE 25 MG: 25 TABLET ORAL at 08:46

## 2022-04-02 RX ADMIN — HYDROCODONE BITARTRATE AND ACETAMINOPHEN 1 TABLET: 5; 325 TABLET ORAL at 00:13

## 2022-04-02 RX ADMIN — METOCLOPRAMIDE 10 MG: 5 INJECTION, SOLUTION INTRAMUSCULAR; INTRAVENOUS at 06:30

## 2022-04-02 RX ADMIN — METOCLOPRAMIDE 10 MG: 5 INJECTION, SOLUTION INTRAMUSCULAR; INTRAVENOUS at 10:41

## 2022-04-02 RX ADMIN — ENOXAPARIN SODIUM 40 MG: 100 INJECTION SUBCUTANEOUS at 08:46

## 2022-04-02 RX ADMIN — SODIUM CHLORIDE, SODIUM LACTATE, POTASSIUM CHLORIDE, CALCIUM CHLORIDE AND DEXTROSE MONOHYDRATE: 5; 600; 310; 30; 20 INJECTION, SOLUTION INTRAVENOUS at 04:31

## 2022-04-02 RX ADMIN — ONDANSETRON 4 MG: 2 INJECTION INTRAMUSCULAR; INTRAVENOUS at 07:02

## 2022-04-02 RX ADMIN — HYDROCODONE BITARTRATE AND ACETAMINOPHEN 1 TABLET: 5; 325 TABLET ORAL at 06:30

## 2022-04-02 ASSESSMENT — PAIN SCALES - GENERAL
PAINLEVEL_OUTOF10: 8
PAINLEVEL_OUTOF10: 8
PAINLEVEL_OUTOF10: 0

## 2022-04-02 NOTE — DISCHARGE SUMMARY
[facility]       Hospitalist Physician Discharge Summary       Baby on [facility] [address]  PHONE [phone]  Call  Including counseling for pre- and post- depression, anxiety, and mood disorder. Adjustments difficulties. Miscarriage and loss. [address] [facility] [address] [phone]    Schedule an appointment as soon as possible for a visit  follow up    DO Bill Dumont  [facility] [address]  [facility] [address] [phone]    Schedule an appointment as soon as possible for a visit  As needed, follow up      Activity level: As tolerated    Diet: ADULT ORAL NUTRITION SUPPLEMENT; Breakfast, Lunch, Dinner; Clear Liquid Oral Supplement  ADULT DIET; Regular    Dispo: Home    Condition at discharge: Stable    Patient ID:  Jimbo Mast  [id number]  29 y.o.  1985    Admit date: 3/28/2022    Discharge date and time:  2022  12:40 PM    Admission Diagnoses: Principal Problem:    Intractable nausea and vomiting  Active Problems:    Abdominal pain    Intractable vomiting    Abdominal pain, right lower quadrant    Diarrhea of presumed infectious origin    Marijuana abuse    AMA (advanced maternal age) multigravida 35+, first trimester  Resolved Problems:    * No resolved hospital problems. *      Discharge Diagnoses: Principal Problem:    Intractable nausea and vomiting  Active Problems:    Abdominal pain    Intractable vomiting    Abdominal pain, right lower quadrant    Diarrhea of presumed infectious origin    Marijuana abuse    AMA (advanced maternal age) multigravida 35+, first trimester  Resolved Problems:    * No resolved hospital problems.  *      Consults:  IP CONSULT TO OB GYN  IP CONSULT TO GENERAL SURGERY  IP CONSULT TO PSYCHIATRY  IP CONSULT TO PERINATOLOGY  IP CONSULT TO SOCIAL WORK    Procedures: None    Hospital Course: Patient was admitted with Intractable nausea and vomiting [R11.2]  Abdominal pain [R10.9]. 46-year-old female with history of Crohn's disease although not on any treatment currently, asthma, seizure disorder, who is 9 weeks pregnant. Patient also uses marijuana every single day at least 3 times a day. Patient has had nausea, vomiting, and abdominal pain. Recently discharged from [facility] after conservative treatment. Patient seen by OB, and then by high risk OB. Vomiting felt to be multifactorial with possibly some contribution of Crohn's disease as all of her stools continue to be loose but they are only 2-3 a day without blood. Also contribution from hyperemesis gravidarum, and a possible contribution by hyperemesis due to marijuana abuse. Patient placed on IV fluids, used IV narcotics initially for abdominal pain for which surgery was also consulted as pain was localizing to right lower quadrant. After MRI they felt that there was no appendicitis or any other surgical intra-abdominal pathology causing her pain. With conservative treatment patient felt somewhat better, she was started on Reglan before meals and this seemed to improve her tolerance for food and decrease nausea greatly. Patient was weaned from IV narcotics to p.o. narcotics then off narcotics at the time of discharge    A great deal of time was spent counseling patient about marijuana use and she agreed to stop smoking marijuana at this time. She has a plan to go to the [facility] to see gastroenterology about her Crohn's disease and this was encouraged.     Discharge Exam:  Vitals:    04/01/22 1013 04/01/22 1945 04/02/22 0630 04/02/22 0830   BP: (!) 150/86 131/83  114/78   Pulse: 83 83  91   Resp: 16 16  18   Temp: 98.2 °F (36.8 °C) 99.3 °F (37.4 °C)  98.5 °F (36.9 °C)   TempSrc: Oral Oral  Oral   SpO2: 96% 98%  98%   Weight:   145 lb 9.6 oz (66 kg)    Height:         General Appearance: alert and oriented to person, place and time, well-developed and well-nourished, in no acute distress  Skin: warm and dry, no rash or erythema  Neck: neck supple and non tender   Pulmonary/Chest: clear to auscultation bilaterally  Cardiovascular: Regular, no murmur  Abdomen: soft, less tender, no guarding today  Ext: No peripheral edema    LABS:  Recent Labs     04/01/22  0255      K 3.5      CO2 25   BUN 4*   CREATININE 0.5   GLUCOSE 95   CALCIUM 8.9       Recent Labs     04/01/22  0255   WBC 11.4   RBC 3.92   HGB 12.7   HCT 37.0   MCV 94.4   MCH 32.4   MCHC 34.3   RDW 14.1      MPV 8.8       Imaging:   US OB LESS THAN 14 WEEKS SINGLE OR FIRST GESTATION   Final Result      MRI ABDOMEN WO CONTRAST   Final Result   1. No evident acute findings in the abdomen or pelvis. Specifically, no   findings of acute appendicitis. 2. Incomplete evaluation of a single intrauterine fetus. US ABDOMEN LIMITED   Final Result   Appendix not definitively identified. Therefore, appendicitis cannot be   entirely excluded. If there is persistent clinical concern, correlation with   repeat MRI could be considered.       RECOMMENDATIONS:   Unavailable             Patient Instructions:      Medication List      START taking these medications    metoclopramide 10 MG tablet  Commonly known as: REGLAN  Take 1 tablet by mouth 3 times daily as needed (nausea, vomiting)     sertraline 25 MG tablet  Commonly known as: ZOLOFT  Take 1 tablet by mouth at bedtime        CONTINUE taking these medications    ondansetron 4 MG tablet  Commonly known as: ZOFRAN     Prenatal Vitamin 27-0.8 MG Tabs  Take 1 tablet by mouth daily           Where to Get Your Medications      These medications were sent to [facility] [address]  [address] [facility], [address] [place] [address]    Phone: [phone]   · sertraline 25 MG tablet     You can get these medications from any pharmacy    Bring a paper prescription for each of these medications  · metoclopramide 10 MG tablet           Note that more than 30 minutes was spent in preparing discharge papers, discussing discharge with patient, medication review, etc.    Signed:  Electronically signed by Arlene Michael MD on 4/2/2022 at 12:40 PM    NOTE: This report was transcribed using voice recognition software. Every effort was made to ensure accuracy; however, inadvertent computerized transcription errors may be present.

## 2022-04-13 ENCOUNTER — HOSPITAL ENCOUNTER (INPATIENT)
Age: 37
LOS: 5 days | Discharge: HOME | DRG: 566 | End: 2022-04-18
Attending: EMERGENCY MEDICINE | Admitting: OBSTETRICS & GYNECOLOGY
Payer: COMMERCIAL

## 2022-04-13 DIAGNOSIS — R11.2 INTRACTABLE NAUSEA AND VOMITING: Primary | ICD-10-CM

## 2022-04-13 LAB
ANION GAP SERPL CALCULATED.3IONS-SCNC: 15 MMOL/L (ref 7–16)
BASOPHILS ABSOLUTE: 0.05 E9/L (ref 0–0.2)
BASOPHILS RELATIVE PERCENT: 0.2 % (ref 0–2)
BUN BLDV-MCNC: 14 MG/DL (ref 6–20)
BURR CELLS: ABNORMAL
CALCIUM SERPL-MCNC: 8.9 MG/DL (ref 8.6–10.2)
CHLORIDE BLD-SCNC: 102 MMOL/L (ref 98–107)
CO2: 20 MMOL/L (ref 22–29)
CREAT SERPL-MCNC: 0.4 MG/DL (ref 0.5–1)
EOSINOPHILS ABSOLUTE: 0.14 E9/L (ref 0.05–0.5)
EOSINOPHILS RELATIVE PERCENT: 0.7 % (ref 0–6)
GFR AFRICAN AMERICAN: >60
GFR NON-AFRICAN AMERICAN: >60 ML/MIN/1.73
GLUCOSE BLD-MCNC: 128 MG/DL (ref 74–99)
HCT VFR BLD CALC: 40.8 % (ref 34–48)
HEMOGLOBIN: 14 G/DL (ref 11.5–15.5)
IMMATURE GRANULOCYTES #: 0.13 E9/L
IMMATURE GRANULOCYTES %: 0.6 % (ref 0–5)
LYMPHOCYTES ABSOLUTE: 0.59 E9/L (ref 1.5–4)
LYMPHOCYTES RELATIVE PERCENT: 2.9 % (ref 20–42)
MCH RBC QN AUTO: 32.4 PG (ref 26–35)
MCHC RBC AUTO-ENTMCNC: 34.3 % (ref 32–34.5)
MCV RBC AUTO: 94.4 FL (ref 80–99.9)
METER GLUCOSE: 148 MG/DL (ref 74–99)
MONOCYTES ABSOLUTE: 0.65 E9/L (ref 0.1–0.95)
MONOCYTES RELATIVE PERCENT: 3.1 % (ref 2–12)
NEUTROPHILS ABSOLUTE: 19.08 E9/L (ref 1.8–7.3)
NEUTROPHILS RELATIVE PERCENT: 92.5 % (ref 43–80)
PDW BLD-RTO: 14.7 FL (ref 11.5–15)
PLATELET # BLD: 429 E9/L (ref 130–450)
PMV BLD AUTO: 9.7 FL (ref 7–12)
POIKILOCYTES: ABNORMAL
POTASSIUM REFLEX MAGNESIUM: 3.6 MMOL/L (ref 3.5–5)
RBC # BLD: 4.32 E12/L (ref 3.5–5.5)
SODIUM BLD-SCNC: 137 MMOL/L (ref 132–146)
VACUOLATED NEUTROPHILS: ABNORMAL
WBC # BLD: 20.6 E9/L (ref 4.5–11.5)

## 2022-04-13 PROCEDURE — 6360000002 HC RX W HCPCS: Performed by: STUDENT IN AN ORGANIZED HEALTH CARE EDUCATION/TRAINING PROGRAM

## 2022-04-13 PROCEDURE — 2580000003 HC RX 258: Performed by: EMERGENCY MEDICINE

## 2022-04-13 PROCEDURE — 82962 GLUCOSE BLOOD TEST: CPT

## 2022-04-13 PROCEDURE — 99285 EMERGENCY DEPT VISIT HI MDM: CPT

## 2022-04-13 PROCEDURE — 1200000000 HC SEMI PRIVATE

## 2022-04-13 PROCEDURE — 96375 TX/PRO/DX INJ NEW DRUG ADDON: CPT

## 2022-04-13 PROCEDURE — 80048 BASIC METABOLIC PNL TOTAL CA: CPT

## 2022-04-13 PROCEDURE — 85025 COMPLETE CBC W/AUTO DIFF WBC: CPT

## 2022-04-13 PROCEDURE — 96374 THER/PROPH/DIAG INJ IV PUSH: CPT

## 2022-04-13 PROCEDURE — 2580000003 HC RX 258: Performed by: STUDENT IN AN ORGANIZED HEALTH CARE EDUCATION/TRAINING PROGRAM

## 2022-04-13 PROCEDURE — 96372 THER/PROPH/DIAG INJ SC/IM: CPT

## 2022-04-13 RX ORDER — ACETAMINOPHEN 325 MG/1
650 TABLET ORAL ONCE
Status: DISCONTINUED | OUTPATIENT
Start: 2022-04-13 | End: 2022-04-18 | Stop reason: HOSPADM

## 2022-04-13 RX ORDER — ONDANSETRON 4 MG/1
4 TABLET, ORALLY DISINTEGRATING ORAL EVERY 8 HOURS PRN
Status: DISCONTINUED | OUTPATIENT
Start: 2022-04-13 | End: 2022-04-18 | Stop reason: HOSPADM

## 2022-04-13 RX ORDER — METOCLOPRAMIDE HYDROCHLORIDE 5 MG/ML
10 INJECTION INTRAMUSCULAR; INTRAVENOUS ONCE
Status: COMPLETED | OUTPATIENT
Start: 2022-04-13 | End: 2022-04-13

## 2022-04-13 RX ORDER — SODIUM CHLORIDE 9 MG/ML
INJECTION, SOLUTION INTRAVENOUS PRN
Status: DISCONTINUED | OUTPATIENT
Start: 2022-04-13 | End: 2022-04-18 | Stop reason: HOSPADM

## 2022-04-13 RX ORDER — PROMETHAZINE HYDROCHLORIDE 25 MG/ML
25 INJECTION, SOLUTION INTRAMUSCULAR; INTRAVENOUS ONCE
Status: COMPLETED | OUTPATIENT
Start: 2022-04-13 | End: 2022-04-13

## 2022-04-13 RX ORDER — SODIUM CHLORIDE 0.9 % (FLUSH) 0.9 %
5-40 SYRINGE (ML) INJECTION PRN
Status: DISCONTINUED | OUTPATIENT
Start: 2022-04-13 | End: 2022-04-18 | Stop reason: HOSPADM

## 2022-04-13 RX ORDER — 0.9 % SODIUM CHLORIDE 0.9 %
1000 INTRAVENOUS SOLUTION INTRAVENOUS ONCE
Status: COMPLETED | OUTPATIENT
Start: 2022-04-13 | End: 2022-04-13

## 2022-04-13 RX ORDER — SODIUM CHLORIDE 0.9 % (FLUSH) 0.9 %
5-40 SYRINGE (ML) INJECTION EVERY 12 HOURS SCHEDULED
Status: DISCONTINUED | OUTPATIENT
Start: 2022-04-13 | End: 2022-04-18 | Stop reason: HOSPADM

## 2022-04-13 RX ORDER — ONDANSETRON 2 MG/ML
8 INJECTION INTRAMUSCULAR; INTRAVENOUS ONCE
Status: COMPLETED | OUTPATIENT
Start: 2022-04-13 | End: 2022-04-13

## 2022-04-13 RX ORDER — ACETAMINOPHEN 325 MG/1
650 TABLET ORAL EVERY 4 HOURS PRN
Status: DISCONTINUED | OUTPATIENT
Start: 2022-04-13 | End: 2022-04-18 | Stop reason: HOSPADM

## 2022-04-13 RX ORDER — ONDANSETRON 2 MG/ML
4 INJECTION INTRAMUSCULAR; INTRAVENOUS EVERY 6 HOURS PRN
Status: DISCONTINUED | OUTPATIENT
Start: 2022-04-13 | End: 2022-04-18 | Stop reason: HOSPADM

## 2022-04-13 RX ADMIN — PROMETHAZINE HYDROCHLORIDE 25 MG: 25 INJECTION INTRAMUSCULAR; INTRAVENOUS at 12:55

## 2022-04-13 RX ADMIN — SODIUM CHLORIDE 1000 ML: 9 INJECTION, SOLUTION INTRAVENOUS at 15:43

## 2022-04-13 RX ADMIN — ONDANSETRON 8 MG: 2 INJECTION INTRAMUSCULAR; INTRAVENOUS at 20:04

## 2022-04-13 RX ADMIN — SODIUM CHLORIDE 1000 ML: 9 INJECTION, SOLUTION INTRAVENOUS at 13:07

## 2022-04-13 RX ADMIN — HYDROMORPHONE HYDROCHLORIDE 1 MG: 1 INJECTION, SOLUTION INTRAMUSCULAR; INTRAVENOUS; SUBCUTANEOUS at 22:21

## 2022-04-13 RX ADMIN — METOCLOPRAMIDE 10 MG: 5 INJECTION, SOLUTION INTRAMUSCULAR; INTRAVENOUS at 16:39

## 2022-04-13 ASSESSMENT — PAIN SCALES - GENERAL
PAINLEVEL_OUTOF10: 10
PAINLEVEL_OUTOF10: 9
PAINLEVEL_OUTOF10: 6

## 2022-04-13 ASSESSMENT — ENCOUNTER SYMPTOMS
ABDOMINAL PAIN: 0
TROUBLE SWALLOWING: 0
COUGH: 0
SHORTNESS OF BREATH: 0
NAUSEA: 1
PHOTOPHOBIA: 0
DIARRHEA: 0
VOICE CHANGE: 0
VOMITING: 1

## 2022-04-13 NOTE — ED NOTES
Attempted to assess fetal heart tones. Patient can not lay still in the bed. Unable to find fetal heart tones at this time. Dr. Tamar Sotelo was informed.      Pat Elizalde RN  04/13/22 1821

## 2022-04-13 NOTE — ED PROVIDER NOTES
ATTENDING PROVIDER ATTESTATION:     Kiana Lama presented to the emergency department for evaluation of Nausea & Vomiting (12 weeks pregnant)   and was initially evaluated by the Medical Resident. See Original ED Note for H&P and ED course above. I have reviewed and discussed the case, including pertinent history (medical, surgical, family and social) and exam findings with the Medical Resident assigned to Kiana Lama. I have personally performed and/or participated in the history, exam, medical decision making, and procedures and agree with all pertinent clinical information. I, Dr. Allyssa Rocha MD, am the primary provider of this record. I have reviewed my findings and recommendations with the assigned Medical Resident, Kiana Lama and members of family present at the time of disposition. My findings/plan: The encounter diagnosis was Intractable nausea and vomiting. Current Discharge Medication List        Allyssa Rocha MD    HPI   Patient is a 29-year-old 12-week pregnant female presenting emergency department for intractable nausea and vomiting.  . She notes that she has been vomiting since 3:30 AM.  Patient has been trying Zofran, Reglan and B6 at home with no relief in her symptoms. Symptoms have been severe in severity, intermittent. She is denying any other associated symptoms. She has a history of hyperemesis gravidarum. She is a patient of Dr. Milena Huffman, has had ultrasound for this pregnancy. Patient reports no complication with the pregnancy thus far. She does admit to history of tobacco use and heavy marijuana use however patient states she has quit smoking since becoming pregnant. Of note, patient recently hospitalized for intractable nausea, vomiting abdominal pain. Patient worked up and seen by OB on that admission. Had MRI done that was unremarkable.   Suspected multifactorial cause for the patient's nausea and vomiting including hyperemesis gravidarum, cannabis hyperemesis and symptoms associated with underlying Crohn's. Today, patient noting just nausea and vomiting with no abdominal pain. She also has no chest pain, shortness of breath, fever, chills, lightheadedness, syncope, urinary symptoms, constipation or diarrhea. Review of Systems   Constitutional: Negative for chills and fever. HENT: Negative for trouble swallowing and voice change. Eyes: Negative for photophobia and visual disturbance. Respiratory: Negative for cough and shortness of breath. Cardiovascular: Negative for chest pain and palpitations. Gastrointestinal: Positive for nausea and vomiting. Negative for abdominal pain and diarrhea. Genitourinary: Negative for dysuria. Musculoskeletal: Negative for arthralgias. Skin: Negative for rash and wound. Neurological: Negative for dizziness and headaches. Psychiatric/Behavioral: Negative for behavioral problems and confusion. Physical Exam  Constitutional:       General: She is in acute distress. Appearance: Normal appearance. She is not ill-appearing or toxic-appearing. Comments: Patient is in mild to moderate acute distress secondary to her symptoms. Tearful on exam but nontoxic-appearing. HENT:      Head: Normocephalic and atraumatic. Right Ear: External ear normal.      Left Ear: External ear normal.      Nose: Nose normal.      Mouth/Throat:      Mouth: Mucous membranes are moist.      Pharynx: Oropharynx is clear. Eyes:      Conjunctiva/sclera: Conjunctivae normal.      Pupils: Pupils are equal, round, and reactive to light. Cardiovascular:      Rate and Rhythm: Normal rate and regular rhythm. Pulses: Normal pulses. Heart sounds: Normal heart sounds. Pulmonary:      Effort: Pulmonary effort is normal. No respiratory distress. Breath sounds: Normal breath sounds. No wheezing or rales. Abdominal:      General: Abdomen is flat.       Palpations: Abdomen is soft. Tenderness: There is no abdominal tenderness. There is no guarding or rebound. Musculoskeletal:      Cervical back: Normal range of motion and neck supple. Right lower leg: No edema. Left lower leg: No edema. Skin:     General: Skin is warm and dry. Capillary Refill: Capillary refill takes less than 2 seconds. Neurological:      General: No focal deficit present. Mental Status: She is alert and oriented to person, place, and time. Cranial Nerves: No cranial nerve deficit. Coordination: Coordination normal.   Psychiatric:         Mood and Affect: Mood normal.         Behavior: Behavior normal.          MDM   Patient is a 29-year-old 12-week pregnant female presenting emergency department for intractable nausea and vomiting.  . Patient does have extensive history of polysubstance abuse and Crohn's. She apparently uses large amounts of marijuana normally. She is claiming that she has quit for the pregnancy however this is questionable. On initial evaluation, patient was in mild to moderate acute distress secondary to her presenting symptoms. Patient was ill-appearing but nontoxic. Remaining vitals within appropriate limits. Work-up in emergency department generally unremarkable. Patient does have elevated white count of 20.6 although there is no evidence of acute infection. She was given Phenergan, Reglan and Zofran in the ED with no relief in her symptoms. Patient was also given IV fluid. She was demanding narcotic pain medication however given risk with pregnancy, no Dilaudid was given until consulted OB/GYN agreed this is okay. Plan to admit the patient for intractable nausea and vomiting. Still with Dr. Juan Manuel Mazariegos who agreed to accept the patient under Dr. Ritu Dunne' service. Patient agreeable with this plan.     ED Course as of 22 0427      1232 Patient is a 29-year-old female at approximately 12 weeks of pregnancy presenting with intractable nausea and emesis. She denies any black or bloody emesis, bloody stools, or vaginal discharge. I reviewed the patient's chart. She was recently admitted for hyperemesis. She follows with [facility]. She states she has been taking Zofran, Reglan, and vitamin B6 at home with minimal relief. She said no fevers. She has had an ultrasound this pregnancy. She also had an MRI of her abdomen for similar symptoms recently during admission which showed no acute findings on examination, the patient appears uncomfortable but is nontoxic. Her abdomen is benign. We will treat symptomatically. Labs are pending. [JA]   65 Patient's boyfriend called and requesting narcotics for the patient because she is complaining of pain however I discussed the risk of giving narcotics in the setting of pregnancy. [PP]   1284 Called to the bedside as patient's boyfriend came out of her room stating that she had passed out. When I arrived to the room patient was in the bed, patient boyfriend states that she had just been to the restroom and he was assisting her back to her bed when she started to syncopize; he placed her back in the bed, she did not have any falls or injuries. This occurred just seconds prior to my examination. On exam patient is shivering, she does not have any seizure-like activity; she responds to examination, she is fully oriented, PERRLA, she moves all extremities. She was placed immediately on the monitor and vitals were taken; vitals show systolic blood pressure 256, heart rate 80, pulse ox 100% on room air, with normal respirations. Patient is oriented to person, place, time, and purpose. Point-of-care blood glucose is being obtained. [VG]   6201 POC glucose is 146. [VG]   [id number] Spoke with Dr. Francis Brewster concerning the patient. She recommends trying IV Zofran to help with her symptoms and reevaluate.   Discussed giving narcotics and recommend we hold off and try to control her nausea and vomiting first. Dr. Luis E Condon is somewhat familiar with the patient and had concerns about possible drug seeking behavior. [PP]   3097 Spoke with Dr. Luis E Condon again concerning the patient she is agreeable to admission for further work-up and evaluation. She is also okay with dilaudid for the patient for pain control [PP]      ED Course User Index  [JA] Bassem Man MD  [PP] Chelle Duran DO  [VG] Dwight Ojeda DO        --------------------------------------------- PAST HISTORY ---------------------------------------------  Past Medical History:  has a past medical history of Anxiety attack, Asthma, Crohn's colitis ([facility] [address] Marijuana abuse, Multiple lacerations, Seizure ([facility] [address] and Tobacco abuse. Past Surgical History:  has a past surgical history that includes Dilation and curettage of uterus (2011); Colonoscopy; Endoscopy, colon, diagnostic; Cholecystectomy (01/22/2018); hernia repair; hernia repair; and Upper gastrointestinal endoscopy (N/A, 2/19/2022). Social History:  reports that she has been smoking cigarettes. She has a 10.00 pack-year smoking history. She has never used smokeless tobacco. She reports current alcohol use. She reports previous drug use. Frequency: 2.00 times per week. Drug: Marijuana Cindy Rodrigo). Family History: family history includes Cancer in her mother; Depression in her father and mother; Heart Disease in her father. The patients home medications have been reviewed. Allergies: Patient has no known allergies.     -------------------------------------------------- RESULTS -------------------------------------------------    LABS:  Results for orders placed or performed during the hospital encounter of 72/47/04   Basic Metabolic Panel w/ Reflex to MG   Result Value Ref Range    Sodium 137 132 - 146 mmol/L    Potassium reflex Magnesium 3.6 3.5 - 5.0 mmol/L    Chloride 102 98 - 107 mmol/L    CO2 20 (L) 22 - 29 mmol/L    Anion Gap 15 7 - 16 mmol/L    Glucose 128 (H) 74 - 99 mg/dL BUN 14 6 - 20 mg/dL    CREATININE 0.4 (L) 0.5 - 1.0 mg/dL    GFR Non-African American >60 >=60 mL/min/1.73    GFR African American >60     Calcium 8.9 8.6 - 10.2 mg/dL   CBC with Auto Differential   Result Value Ref Range    WBC 20.6 (H) 4.5 - 11.5 E9/L    RBC 4.32 3.50 - 5.50 E12/L    Hemoglobin 14.0 11.5 - 15.5 g/dL    Hematocrit 40.8 34.0 - 48.0 %    MCV 94.4 80.0 - 99.9 fL    MCH 32.4 26.0 - 35.0 pg    MCHC 34.3 32.0 - 34.5 %    RDW 14.7 11.5 - 15.0 fL    Platelets 397 150 - 200 E9/L    MPV 9.7 7.0 - 12.0 fL    Neutrophils % 92.5 (H) 43.0 - 80.0 %    Immature Granulocytes % 0.6 0.0 - 5.0 %    Lymphocytes % 2.9 (L) 20.0 - 42.0 %    Monocytes % 3.1 2.0 - 12.0 %    Eosinophils % 0.7 0.0 - 6.0 %    Basophils % 0.2 0.0 - 2.0 %    Neutrophils Absolute 19.08 (H) 1.80 - 7.30 E9/L    Immature Granulocytes # 0.13 E9/L    Lymphocytes Absolute 0.59 (L) 1.50 - 4.00 E9/L    Monocytes Absolute 0.65 0.10 - 0.95 E9/L    Eosinophils Absolute 0.14 0.05 - 0.50 E9/L    Basophils Absolute 0.05 0.00 - 0.20 E9/L    Vacuolated Neutrophils 1+     Poikilocytes 1+     Beverly Cells 1+    CBC with Auto Differential   Result Value Ref Range    WBC 12.3 (H) 4.5 - 11.5 E9/L    RBC 3.37 (L) 3.50 - 5.50 E12/L    Hemoglobin 10.8 (L) 11.5 - 15.5 g/dL    Hematocrit 31.9 (L) 34.0 - 48.0 %    MCV 94.7 80.0 - 99.9 fL    MCH 32.0 26.0 - 35.0 pg    MCHC 33.9 32.0 - 34.5 %    RDW 14.6 11.5 - 15.0 fL    Platelets 961 259 - 022 E9/L    MPV 9.1 7.0 - 12.0 fL    Neutrophils % 88.3 (H) 43.0 - 80.0 %    Immature Granulocytes % 0.6 0.0 - 5.0 %    Lymphocytes % 7.5 (L) 20.0 - 42.0 %    Monocytes % 3.4 2.0 - 12.0 %    Eosinophils % 0.1 0.0 - 6.0 %    Basophils % 0.1 0.0 - 2.0 %    Neutrophils Absolute 10.89 (H) 1.80 - 7.30 E9/L    Immature Granulocytes # 0.08 E9/L    Lymphocytes Absolute 0.93 (L) 1.50 - 4.00 E9/L    Monocytes Absolute 0.42 0.10 - 0.95 E9/L    Eosinophils Absolute 0.01 (L) 0.05 - 0.50 E9/L    Basophils Absolute 0.01 0.00 - 0.20 E9/L   POCT Glucose Result Value Ref Range    Meter Glucose 148 (H) 74 - 99 mg/dL       RADIOLOGY:  No orders to display       ------------------------- NURSING NOTES AND VITALS REVIEWED ---------------------------  Date / Time Roomed:  4/13/2022 12:14 PM  ED Bed Assignment:  [id number]    The nursing notes within the ED encounter and vital signs as below have been reviewed. Patient Vitals for the past 24 hrs:   BP Temp Temp src Pulse Resp SpO2 Height Weight   04/14/22 0030 -- -- -- -- -- -- 5' 4\" (1.626 m) 149 lb (67.6 kg)   04/14/22 0015 118/60 98 °F (36.7 °C) Oral 80 16 96 % -- --   04/13/22 2228 135/76 -- -- -- -- -- -- --   04/13/22 2011 134/76 -- -- 76 17 98 % -- --   04/13/22 2010 (!) 143/71 -- -- 92 17 95 % -- --   04/13/22 1641 (!) 140/82 -- -- 92 18 98 % -- --   04/13/22 1408 -- -- -- 102 -- -- -- --   04/13/22 1253 -- 97.2 °F (36.2 °C) -- 102 18 98 % 5' 4\" (1.626 m) 145 lb (65.8 kg)   04/13/22 1249 (!) 154/90 -- -- -- -- -- -- --       Oxygen Saturation Interpretation: Normal    ------------------------------------------ PROGRESS NOTES ------------------------------------------    Counseling:  I have spoken with the patient and discussed todays results, in addition to providing specific details for the plan of care and counseling regarding the diagnosis and prognosis. Their questions are answered at this time and they are agreeable with the plan of admission.    --------------------------------- ADDITIONAL PROVIDER NOTES ---------------------------------  Consultations:  Time: 2218. Spoke with Dr. Luis E Condon. Discussed case. They will admit the patient. This patient's ED course included: a personal history and physicial examination, re-evaluation prior to disposition, multiple bedside re-evaluations, IV medications and cardiac monitoring    This patient has remained hemodynamically stable during their ED course. Diagnosis:  1.  Intractable nausea and vomiting        Disposition:  Patient's disposition: Admit to med/surg floor  Patient's condition is stable.            Chelle Duran DO  Resident  04/14/22 5846       Bassem Man MD  04/14/22 3878

## 2022-04-14 LAB
ALBUMIN SERPL-MCNC: 3.6 G/DL (ref 3.5–5.2)
ALP BLD-CCNC: 45 U/L (ref 35–104)
ALT SERPL-CCNC: 60 U/L (ref 0–32)
ANION GAP SERPL CALCULATED.3IONS-SCNC: 11 MMOL/L (ref 7–16)
AST SERPL-CCNC: 56 U/L (ref 0–31)
BACTERIA: ABNORMAL /HPF
BASOPHILS ABSOLUTE: 0.01 E9/L (ref 0–0.2)
BASOPHILS RELATIVE PERCENT: 0.1 % (ref 0–2)
BILIRUB SERPL-MCNC: 0.4 MG/DL (ref 0–1.2)
BILIRUBIN URINE: NEGATIVE
BLOOD, URINE: NEGATIVE
BUN BLDV-MCNC: 6 MG/DL (ref 6–20)
CALCIUM SERPL-MCNC: 8.3 MG/DL (ref 8.6–10.2)
CHLORIDE BLD-SCNC: 102 MMOL/L (ref 98–107)
CLARITY: CLEAR
CO2: 20 MMOL/L (ref 22–29)
COLOR: YELLOW
CREAT SERPL-MCNC: 0.3 MG/DL (ref 0.5–1)
EOSINOPHILS ABSOLUTE: 0.01 E9/L (ref 0.05–0.5)
EOSINOPHILS RELATIVE PERCENT: 0.1 % (ref 0–6)
GFR AFRICAN AMERICAN: >60
GFR NON-AFRICAN AMERICAN: >60 ML/MIN/1.73
GLUCOSE BLD-MCNC: 110 MG/DL (ref 74–99)
GLUCOSE URINE: NEGATIVE MG/DL
HCT VFR BLD CALC: 31.9 % (ref 34–48)
HEMOGLOBIN: 10.8 G/DL (ref 11.5–15.5)
IMMATURE GRANULOCYTES #: 0.08 E9/L
IMMATURE GRANULOCYTES %: 0.6 % (ref 0–5)
KETONES, URINE: >=80 MG/DL
LEUKOCYTE ESTERASE, URINE: NEGATIVE
LYMPHOCYTES ABSOLUTE: 0.93 E9/L (ref 1.5–4)
LYMPHOCYTES RELATIVE PERCENT: 7.5 % (ref 20–42)
MCH RBC QN AUTO: 32 PG (ref 26–35)
MCHC RBC AUTO-ENTMCNC: 33.9 % (ref 32–34.5)
MCV RBC AUTO: 94.7 FL (ref 80–99.9)
MONOCYTES ABSOLUTE: 0.42 E9/L (ref 0.1–0.95)
MONOCYTES RELATIVE PERCENT: 3.4 % (ref 2–12)
NEUTROPHILS ABSOLUTE: 10.89 E9/L (ref 1.8–7.3)
NEUTROPHILS RELATIVE PERCENT: 88.3 % (ref 43–80)
NITRITE, URINE: NEGATIVE
PDW BLD-RTO: 14.6 FL (ref 11.5–15)
PH UA: 6 (ref 5–9)
PLATELET # BLD: 361 E9/L (ref 130–450)
PMV BLD AUTO: 9.1 FL (ref 7–12)
POTASSIUM SERPL-SCNC: 2.9 MMOL/L (ref 3.5–5)
PROTEIN UA: NEGATIVE MG/DL
RBC # BLD: 3.37 E12/L (ref 3.5–5.5)
RBC UA: ABNORMAL /HPF (ref 0–2)
SODIUM BLD-SCNC: 133 MMOL/L (ref 132–146)
SPECIFIC GRAVITY UA: 1.01 (ref 1–1.03)
TOTAL PROTEIN: 6 G/DL (ref 6.4–8.3)
UROBILINOGEN, URINE: 0.2 E.U./DL
WBC # BLD: 12.3 E9/L (ref 4.5–11.5)
WBC UA: ABNORMAL /HPF (ref 0–5)

## 2022-04-14 PROCEDURE — 87045 FECES CULTURE AEROBIC BACT: CPT

## 2022-04-14 PROCEDURE — 80053 COMPREHEN METABOLIC PANEL: CPT

## 2022-04-14 PROCEDURE — A4216 STERILE WATER/SALINE, 10 ML: HCPCS | Performed by: OBSTETRICS & GYNECOLOGY

## 2022-04-14 PROCEDURE — 89055 LEUKOCYTE ASSESSMENT FECAL: CPT

## 2022-04-14 PROCEDURE — 87493 C DIFF AMPLIFIED PROBE: CPT

## 2022-04-14 PROCEDURE — 2580000003 HC RX 258: Performed by: OBSTETRICS & GYNECOLOGY

## 2022-04-14 PROCEDURE — 81001 URINALYSIS AUTO W/SCOPE: CPT

## 2022-04-14 PROCEDURE — 87324 CLOSTRIDIUM AG IA: CPT

## 2022-04-14 PROCEDURE — 83735 ASSAY OF MAGNESIUM: CPT

## 2022-04-14 PROCEDURE — 87449 NOS EACH ORGANISM AG IA: CPT

## 2022-04-14 PROCEDURE — 87328 CRYPTOSPORIDIUM AG IA: CPT

## 2022-04-14 PROCEDURE — 99254 IP/OBS CNSLTJ NEW/EST MOD 60: CPT | Performed by: FAMILY MEDICINE

## 2022-04-14 PROCEDURE — 6360000002 HC RX W HCPCS: Performed by: OBSTETRICS & GYNECOLOGY

## 2022-04-14 PROCEDURE — 2500000003 HC RX 250 WO HCPCS: Performed by: OBSTETRICS & GYNECOLOGY

## 2022-04-14 PROCEDURE — 85025 COMPLETE CBC W/AUTO DIFF WBC: CPT

## 2022-04-14 PROCEDURE — 6370000000 HC RX 637 (ALT 250 FOR IP): Performed by: OBSTETRICS & GYNECOLOGY

## 2022-04-14 PROCEDURE — 87425 ROTAVIRUS AG IA: CPT

## 2022-04-14 PROCEDURE — 82272 OCCULT BLD FECES 1-3 TESTS: CPT

## 2022-04-14 PROCEDURE — 83520 IMMUNOASSAY QUANT NOS NONAB: CPT

## 2022-04-14 PROCEDURE — 87329 GIARDIA AG IA: CPT

## 2022-04-14 PROCEDURE — 1200000000 HC SEMI PRIVATE

## 2022-04-14 PROCEDURE — 36415 COLL VENOUS BLD VENIPUNCTURE: CPT

## 2022-04-14 PROCEDURE — 87088 URINE BACTERIA CULTURE: CPT

## 2022-04-14 RX ORDER — PRENATAL WITH FERROUS FUM AND FOLIC ACID 3080; 920; 120; 400; 22; 1.84; 3; 20; 10; 1; 12; 200; 27; 25; 2 [IU]/1; [IU]/1; MG/1; [IU]/1; MG/1; MG/1; MG/1; MG/1; MG/1; MG/1; UG/1; MG/1; MG/1; MG/1; MG/1
1 TABLET ORAL DAILY
Status: DISCONTINUED | OUTPATIENT
Start: 2022-04-14 | End: 2022-04-18 | Stop reason: HOSPADM

## 2022-04-14 RX ORDER — FAMOTIDINE 20 MG/1
20 TABLET, FILM COATED ORAL 2 TIMES DAILY
COMMUNITY
End: 2022-06-07 | Stop reason: ALTCHOICE

## 2022-04-14 RX ORDER — SODIUM CHLORIDE, SODIUM LACTATE, POTASSIUM CHLORIDE, CALCIUM CHLORIDE 600; 310; 30; 20 MG/100ML; MG/100ML; MG/100ML; MG/100ML
INJECTION, SOLUTION INTRAVENOUS CONTINUOUS
Status: DISCONTINUED | OUTPATIENT
Start: 2022-04-14 | End: 2022-04-18 | Stop reason: HOSPADM

## 2022-04-14 RX ORDER — LANOLIN ALCOHOL/MO/W.PET/CERES
50 CREAM (GRAM) TOPICAL 3 TIMES DAILY
Status: DISCONTINUED | OUTPATIENT
Start: 2022-04-14 | End: 2022-04-18 | Stop reason: HOSPADM

## 2022-04-14 RX ORDER — LANOLIN ALCOHOL/MO/W.PET/CERES
50 CREAM (GRAM) TOPICAL DAILY
COMMUNITY
End: 2022-05-24 | Stop reason: SDUPTHER

## 2022-04-14 RX ORDER — METOCLOPRAMIDE 10 MG/1
10 TABLET ORAL 3 TIMES DAILY PRN
Status: DISCONTINUED | OUTPATIENT
Start: 2022-04-14 | End: 2022-04-18 | Stop reason: HOSPADM

## 2022-04-14 RX ORDER — ONDANSETRON 4 MG/1
4 TABLET, FILM COATED ORAL EVERY 6 HOURS PRN
Status: DISCONTINUED | OUTPATIENT
Start: 2022-04-14 | End: 2022-04-14

## 2022-04-14 RX ADMIN — HYDROMORPHONE HYDROCHLORIDE 1 MG: 1 INJECTION, SOLUTION INTRAMUSCULAR; INTRAVENOUS; SUBCUTANEOUS at 08:15

## 2022-04-14 RX ADMIN — SODIUM CHLORIDE, POTASSIUM CHLORIDE, SODIUM LACTATE AND CALCIUM CHLORIDE: 600; 310; 30; 20 INJECTION, SOLUTION INTRAVENOUS at 23:12

## 2022-04-14 RX ADMIN — SODIUM CHLORIDE, PRESERVATIVE FREE 20 MG: 5 INJECTION INTRAVENOUS at 20:39

## 2022-04-14 RX ADMIN — ONDANSETRON 4 MG: 2 INJECTION INTRAMUSCULAR; INTRAVENOUS at 02:52

## 2022-04-14 RX ADMIN — ONDANSETRON 4 MG: 2 INJECTION INTRAMUSCULAR; INTRAVENOUS at 09:14

## 2022-04-14 RX ADMIN — HYDROMORPHONE HYDROCHLORIDE 1 MG: 1 INJECTION, SOLUTION INTRAMUSCULAR; INTRAVENOUS; SUBCUTANEOUS at 12:36

## 2022-04-14 RX ADMIN — METOCLOPRAMIDE 10 MG: 10 TABLET ORAL at 16:46

## 2022-04-14 RX ADMIN — METFORMIN HYDROCHLORIDE 1 TABLET: 500 TABLET, EXTENDED RELEASE ORAL at 08:16

## 2022-04-14 RX ADMIN — METOCLOPRAMIDE 10 MG: 10 TABLET ORAL at 11:20

## 2022-04-14 RX ADMIN — POTASSIUM CHLORIDE 125 ML/HR: 2 INJECTION, SOLUTION, CONCENTRATE INTRAVENOUS at 15:04

## 2022-04-14 RX ADMIN — METOCLOPRAMIDE 10 MG: 10 TABLET ORAL at 20:38

## 2022-04-14 RX ADMIN — SODIUM CHLORIDE, POTASSIUM CHLORIDE, SODIUM LACTATE AND CALCIUM CHLORIDE: 600; 310; 30; 20 INJECTION, SOLUTION INTRAVENOUS at 09:16

## 2022-04-14 RX ADMIN — SODIUM CHLORIDE, POTASSIUM CHLORIDE, SODIUM LACTATE AND CALCIUM CHLORIDE: 600; 310; 30; 20 INJECTION, SOLUTION INTRAVENOUS at 01:21

## 2022-04-14 RX ADMIN — SERTRALINE HYDROCHLORIDE 25 MG: 50 TABLET ORAL at 20:39

## 2022-04-14 RX ADMIN — ONDANSETRON 4 MG: 2 INJECTION INTRAMUSCULAR; INTRAVENOUS at 23:14

## 2022-04-14 RX ADMIN — PYRIDOXINE HCL TAB 50 MG 50 MG: 50 TAB at 13:37

## 2022-04-14 RX ADMIN — ONDANSETRON 4 MG: 2 INJECTION INTRAMUSCULAR; INTRAVENOUS at 16:34

## 2022-04-14 RX ADMIN — HYDROMORPHONE HYDROCHLORIDE 1 MG: 1 INJECTION, SOLUTION INTRAMUSCULAR; INTRAVENOUS; SUBCUTANEOUS at 02:52

## 2022-04-14 RX ADMIN — HYDROMORPHONE HYDROCHLORIDE 1 MG: 1 INJECTION, SOLUTION INTRAMUSCULAR; INTRAVENOUS; SUBCUTANEOUS at 16:34

## 2022-04-14 RX ADMIN — HYDROMORPHONE HYDROCHLORIDE 1 MG: 1 INJECTION, SOLUTION INTRAMUSCULAR; INTRAVENOUS; SUBCUTANEOUS at 20:40

## 2022-04-14 RX ADMIN — SODIUM CHLORIDE, PRESERVATIVE FREE 20 MG: 5 INJECTION INTRAVENOUS at 08:16

## 2022-04-14 RX ADMIN — PYRIDOXINE HCL TAB 50 MG 50 MG: 50 TAB at 08:16

## 2022-04-14 RX ADMIN — PYRIDOXINE HCL TAB 50 MG 50 MG: 50 TAB at 20:39

## 2022-04-14 ASSESSMENT — PAIN DESCRIPTION - LOCATION
LOCATION: FLANK

## 2022-04-14 ASSESSMENT — PAIN DESCRIPTION - ORIENTATION
ORIENTATION: RIGHT

## 2022-04-14 ASSESSMENT — PAIN SCALES - GENERAL
PAINLEVEL_OUTOF10: 8
PAINLEVEL_OUTOF10: 0
PAINLEVEL_OUTOF10: 0
PAINLEVEL_OUTOF10: 8
PAINLEVEL_OUTOF10: 8
PAINLEVEL_OUTOF10: 9
PAINLEVEL_OUTOF10: 7
PAINLEVEL_OUTOF10: 0
PAINLEVEL_OUTOF10: 0
PAINLEVEL_OUTOF10: 4

## 2022-04-14 ASSESSMENT — PAIN DESCRIPTION - ONSET: ONSET: ON-GOING

## 2022-04-14 ASSESSMENT — PAIN DESCRIPTION - PAIN TYPE
TYPE: ACUTE PAIN

## 2022-04-14 ASSESSMENT — PAIN DESCRIPTION - FREQUENCY: FREQUENCY: CONTINUOUS

## 2022-04-14 ASSESSMENT — PAIN DESCRIPTION - PROGRESSION: CLINICAL_PROGRESSION: NOT CHANGED

## 2022-04-14 ASSESSMENT — PAIN DESCRIPTION - DESCRIPTORS: DESCRIPTORS: ACHING;CONSTANT;DISCOMFORT

## 2022-04-14 ASSESSMENT — PAIN - FUNCTIONAL ASSESSMENT: PAIN_FUNCTIONAL_ASSESSMENT: ACTIVITIES ARE NOT PREVENTED

## 2022-04-14 NOTE — CONSULTS
[address] for Medical Management      Reason for Consult: Medical management    History of Present Illness: Patient is a 34-year old female diagnosed with Crohn's disease at age 30 who presented back to the hospital for intractable nausea and vomiting/hyperemesis gravidarum. She is 12 weeks pregnant. She was last admitted on 3/28/2022 and discharged on 4/2/2022. She was given Reglan with her meals which did help with the nausea until today. She reports frequent loose stools averaging less than 3 a day. She was found to have hypokalemia and admission was advised. Her admitting physician is Dr. Fara Torrez. Hospitalist service has been consulted for medical management. Patient reports sweats, chills, dizziness, GERD, vomiting (last occurrence last evening), near syncope in the emergency room (caught by family/friend before hitting anything). She denies fever, dysuria, visual changes, edema, rashes. She reports having quit tobacco use and marijuana since her pregnancy. She lives with her 3 kids 26 yo [de-identified], 6 yo B and 10 yo B. There are no pets at home. Her medications include the Zoloft 25 mg daily, Reglan 10 mg with meals and a prenatal vitamin daily. Patient has no known drug allergies. Family history Mom has stomach problem, Cancer and Depression. Dad has depression and heart disease. Patient received her flu vaccine in the fall 2021. She has received 2 COVID-19 vaccinations.     Informant(s) for H&P: Patient    REVIEW OF SYSTEMS:  A comprehensive review of systems was negative except for: what is in the HPI    PMH:  Past Medical History:   Diagnosis Date    Anxiety attack since age 16    diagnosed with seizure due to convulsions from this    Asthma     seasonal; no inhaler     Crohn's colitis ([address]     Marijuana abuse     occas    Multiple lacerations 2015    stabbing; resolved    Seizure ([address]     loses focus, disoriented unpon arousing; etiology unknown per pt; none since 2015    Tobacco abuse      Surgical History:  Past Surgical History:   Procedure Laterality Date    CHOLECYSTECTOMY  01/22/2018    COLONOSCOPY      DILATION AND CURETTAGE OF UTERUS  2011    ENDOSCOPY, COLON, DIAGNOSTIC      HERNIA REPAIR      HERNIA REPAIR      UPPER GASTROINTESTINAL ENDOSCOPY N/A 2/19/2022    EGD BIOPSY performed by Vida Malhotra MD at [facility]     Medications Prior to Admission:    Prior to Admission medications    Medication Sig Start Date End Date Taking? Authorizing Provider   vitamin D (CHOLECALCIFEROL) 25 MCG (1000 UT) TABS tablet Take 1,000 Units by mouth daily   Yes Historical Provider, MD   vitamin B-6 (PYRIDOXINE) 50 MG tablet Take 50 mg by mouth daily   Yes Historical Provider, MD   Doxylamine Succinate, Sleep, (UNISOM PO) Take by mouth Patient unsure of dose   Yes Historical Provider, MD   famotidine (PEPCID) 20 MG tablet Take 20 mg by mouth 2 times daily   Yes Historical Provider, MD   metoclopramide (REGLAN) 10 MG tablet Take 1 tablet by mouth 3 times daily as needed (nausea, vomiting) 4/2/22   Alli Santiago MD   sertraline (ZOLOFT) 25 MG tablet Take 1 tablet by mouth at bedtime 3/30/22   Yaakov Trotter MD   ondansetron (ZOFRAN) 4 MG tablet Take 4 mg by mouth every 8 hours as needed for Nausea or Vomiting    Historical Provider, MD   Prenatal Vit-Fe Fumarate-FA (PRENATAL VITAMIN) 27-0.8 MG TABS Take 1 tablet by mouth daily 2/17/22   Jamie Swenson MD     Allergies:    Patient has no known allergies. Social History:    reports that she has been smoking cigarettes. She has a 10.00 pack-year smoking history. She has never used smokeless tobacco. She reports current alcohol use. She reports previous drug use. Frequency: 2.00 times per week. Drug: Marijuana Manny Pare). Family History:   family history includes Cancer in her mother; Depression in her father and mother; Heart Disease in her father.     PHYSICAL EXAM:  Vitals:  /69 Pulse 83   Temp 97.9 °F (36.6 °C) (Oral)   Resp 16   Ht 5' 4\" (1.626 m)   Wt 149 lb (67.6 kg)   LMP 02/17/2022   SpO2 100%   BMI 25.58 kg/m²     General Appearance: alert and oriented to person, place and time and in no acute distress. Skin: warm and dry  Head: normocephalic and atraumatic  Eyes: pupils equal, round, and reactive to light, extraocular eye movements intact, conjunctivae normal  Neck: neck supple and non tender without mass   Pulmonary/Chest: clear to auscultation bilaterally- no wheezes, rales or rhonchi, normal air movement, no respiratory distress  Cardiovascular: normal rate, normal S1 and S2 and no carotid bruits  Abdomen: soft, non-tender, non-distended, normal bowel sounds, no masses or organomegaly  Extremities: no cyanosis, no clubbing and no edema  Neurologic: no cranial nerve deficit and speech normal    LABS:  Recent Labs     04/13/22  1309 04/14/22  0306    133   K 3.6 2.9*    102   CO2 20* 20*   BUN 14 6   CREATININE 0.4* 0.3*   GLUCOSE 128* 110*   CALCIUM 8.9 8.3*     Recent Labs     04/13/22  1309 04/14/22  0306   WBC 20.6* 12.3*   RBC 4.32 3.37*   HGB 14.0 10.8*   HCT 40.8 31.9*   MCV 94.4 94.7   MCH 32.4 32.0   MCHC 34.3 33.9   RDW 14.7 14.6    361   MPV 9.7 9.1     Radiology:     No orders to display     EKG:   N/A    ASSESSMENT:      Principal Problem:    Intractable nausea and vomiting  Resolved Problems:    * No resolved hospital problems. *    PLAN:    1. Hyperemesis gravidarum -continue IV fluids, motility agents and antinausea agents. Trend electrolytes and treat accordingly. 2.  Hypokalemia -continue IV hydration and potassium repletion. Trend labs and treat accordingly. Check magnesium level and replete if less than 2.  3.  Crohn's disease -has seen Dr. Elayne Holloway. Consult with GI. Stools being checked for C. Difficile. 4.  Mildly elevated LFTs -most likely due to intractable vomiting. Platelets are normal.  Trend labs.   Treat accordingly. 5.  Near syncope -improved since IV hydration. Check orthostatics. Adjust accordingly. 6.  Anxiety -trolled on Zoloft. Continue same. Thank you very much for this interesting consult and we will continue to follow along. Feel free to call with any questions at [phone]. NOTE: This report was transcribed using voice recognition software. Every effort was made to ensure accuracy; however, inadvertent computerized transcription errors may be present.     Electronically signed by Himanshu Lilly MD on 4/14/2022 at 3:13 PM
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
## 2022-04-14 NOTE — PATIENT CARE CONFERENCE
P Quality Flow/Interdisciplinary Rounds Progress Note        Quality Flow Rounds held on April 14, 2022    Disciplines Attending:  Bedside Nurse, ,  and Nursing Unit [address] ALBERT Garay was admitted on 4/13/2022 12:14 PM    Anticipated Discharge Date:  Expected Discharge Date: 04/16/22    Disposition:    King Score:  King Scale Score: 22    Readmission Risk              Risk of Unplanned Readmission:  20           Discussed patient goal for the day, patient clinical progression, and barriers to discharge.   The following Goal(s) of the Day/Commitment(s) have been identified:  Labs - Report Results      Remedios Rodriguez RN  April 14, 2022

## 2022-04-14 NOTE — CARE COORDINATION
Social Work / Discharge Planning : Patient is pregnant and admitted with intractable vomiting. SW saw patient two weeks ago on last admit and provided resources for her in regards to her cannabis use. Patient from HOME with her children . Parents resides next door to patient. Patient independent. Follows with OB. Will contact [facility]. AWait plan. SW to follow.  Electronically signed by Lorita Halsted, LSW on 4/14/22 at 7:51 AM EDT

## 2022-04-14 NOTE — ED NOTES
ALICE faxed to [facility], confirmation received. Called and confirmed with Rusty Perez was received.      Judge Ladd RN  04/13/22 8299

## 2022-04-15 LAB
ALBUMIN SERPL-MCNC: 4.1 G/DL (ref 3.5–5.2)
ALP BLD-CCNC: 44 U/L (ref 35–104)
ALT SERPL-CCNC: 65 U/L (ref 0–32)
ANION GAP SERPL CALCULATED.3IONS-SCNC: 11 MMOL/L (ref 7–16)
AST SERPL-CCNC: 45 U/L (ref 0–31)
BILIRUB SERPL-MCNC: 0.3 MG/DL (ref 0–1.2)
BILIRUBIN DIRECT: <0.2 MG/DL (ref 0–0.3)
BILIRUBIN, INDIRECT: ABNORMAL MG/DL (ref 0–1)
BUN BLDV-MCNC: 3 MG/DL (ref 6–20)
C DIFF TOXIN/ANTIGEN: NORMAL
C. DIFFICILE TOXIN MOLECULAR: NORMAL
CALCIUM SERPL-MCNC: 8.7 MG/DL (ref 8.6–10.2)
CHLORIDE BLD-SCNC: 100 MMOL/L (ref 98–107)
CO2: 24 MMOL/L (ref 22–29)
CREAT SERPL-MCNC: 0.4 MG/DL (ref 0.5–1)
CRYPTOSPORIDIUM ANTIGEN STOOL: NORMAL
GFR AFRICAN AMERICAN: >60
GFR NON-AFRICAN AMERICAN: >60 ML/MIN/1.73
GIARDIA ANTIGEN STOOL: NORMAL
GLUCOSE BLD-MCNC: 90 MG/DL (ref 74–99)
MAGNESIUM: 1.9 MG/DL (ref 1.6–2.6)
MAGNESIUM: 1.9 MG/DL (ref 1.6–2.6)
OCCULT BLOOD DIAGNOSTIC: NORMAL
POTASSIUM SERPL-SCNC: 3.4 MMOL/L (ref 3.5–5)
ROTAVIRUS ANTIGEN: NORMAL
SODIUM BLD-SCNC: 135 MMOL/L (ref 132–146)
TOTAL PROTEIN: 6.2 G/DL (ref 6.4–8.3)

## 2022-04-15 PROCEDURE — 80076 HEPATIC FUNCTION PANEL: CPT

## 2022-04-15 PROCEDURE — 80048 BASIC METABOLIC PNL TOTAL CA: CPT

## 2022-04-15 PROCEDURE — 36415 COLL VENOUS BLD VENIPUNCTURE: CPT

## 2022-04-15 PROCEDURE — 6360000002 HC RX W HCPCS: Performed by: OBSTETRICS & GYNECOLOGY

## 2022-04-15 PROCEDURE — 83735 ASSAY OF MAGNESIUM: CPT

## 2022-04-15 PROCEDURE — A4216 STERILE WATER/SALINE, 10 ML: HCPCS | Performed by: OBSTETRICS & GYNECOLOGY

## 2022-04-15 PROCEDURE — 6370000000 HC RX 637 (ALT 250 FOR IP): Performed by: OBSTETRICS & GYNECOLOGY

## 2022-04-15 PROCEDURE — 2580000003 HC RX 258: Performed by: OBSTETRICS & GYNECOLOGY

## 2022-04-15 PROCEDURE — 99233 SBSQ HOSP IP/OBS HIGH 50: CPT | Performed by: FAMILY MEDICINE

## 2022-04-15 PROCEDURE — 2500000003 HC RX 250 WO HCPCS: Performed by: OBSTETRICS & GYNECOLOGY

## 2022-04-15 PROCEDURE — 1200000000 HC SEMI PRIVATE

## 2022-04-15 RX ADMIN — HYDROMORPHONE HYDROCHLORIDE 1 MG: 1 INJECTION, SOLUTION INTRAMUSCULAR; INTRAVENOUS; SUBCUTANEOUS at 09:51

## 2022-04-15 RX ADMIN — METOCLOPRAMIDE 10 MG: 10 TABLET ORAL at 21:48

## 2022-04-15 RX ADMIN — HYDROMORPHONE HYDROCHLORIDE 1 MG: 1 INJECTION, SOLUTION INTRAMUSCULAR; INTRAVENOUS; SUBCUTANEOUS at 18:52

## 2022-04-15 RX ADMIN — HYDROMORPHONE HYDROCHLORIDE 1 MG: 1 INJECTION, SOLUTION INTRAMUSCULAR; INTRAVENOUS; SUBCUTANEOUS at 00:58

## 2022-04-15 RX ADMIN — ONDANSETRON 4 MG: 2 INJECTION INTRAMUSCULAR; INTRAVENOUS at 05:46

## 2022-04-15 RX ADMIN — SODIUM CHLORIDE, PRESERVATIVE FREE 20 MG: 5 INJECTION INTRAVENOUS at 08:55

## 2022-04-15 RX ADMIN — SODIUM CHLORIDE, PRESERVATIVE FREE 10 ML: 5 INJECTION INTRAVENOUS at 21:50

## 2022-04-15 RX ADMIN — METFORMIN HYDROCHLORIDE 1 TABLET: 500 TABLET, EXTENDED RELEASE ORAL at 08:55

## 2022-04-15 RX ADMIN — PYRIDOXINE HCL TAB 50 MG 50 MG: 50 TAB at 21:48

## 2022-04-15 RX ADMIN — PYRIDOXINE HCL TAB 50 MG 50 MG: 50 TAB at 08:55

## 2022-04-15 RX ADMIN — HYDROMORPHONE HYDROCHLORIDE 1 MG: 1 INJECTION, SOLUTION INTRAMUSCULAR; INTRAVENOUS; SUBCUTANEOUS at 13:52

## 2022-04-15 RX ADMIN — HYDROMORPHONE HYDROCHLORIDE 1 MG: 1 INJECTION, SOLUTION INTRAMUSCULAR; INTRAVENOUS; SUBCUTANEOUS at 23:21

## 2022-04-15 RX ADMIN — SODIUM CHLORIDE, PRESERVATIVE FREE 20 MG: 5 INJECTION INTRAVENOUS at 21:48

## 2022-04-15 RX ADMIN — SODIUM CHLORIDE, POTASSIUM CHLORIDE, SODIUM LACTATE AND CALCIUM CHLORIDE: 600; 310; 30; 20 INJECTION, SOLUTION INTRAVENOUS at 13:55

## 2022-04-15 RX ADMIN — SODIUM CHLORIDE, POTASSIUM CHLORIDE, SODIUM LACTATE AND CALCIUM CHLORIDE: 600; 310; 30; 20 INJECTION, SOLUTION INTRAVENOUS at 21:56

## 2022-04-15 RX ADMIN — SERTRALINE HYDROCHLORIDE 25 MG: 50 TABLET ORAL at 21:50

## 2022-04-15 RX ADMIN — ONDANSETRON 4 MG: 2 INJECTION INTRAMUSCULAR; INTRAVENOUS at 21:48

## 2022-04-15 RX ADMIN — ONDANSETRON 4 MG: 2 INJECTION INTRAMUSCULAR; INTRAVENOUS at 13:52

## 2022-04-15 RX ADMIN — METOCLOPRAMIDE 10 MG: 10 TABLET ORAL at 08:55

## 2022-04-15 RX ADMIN — SODIUM CHLORIDE, POTASSIUM CHLORIDE, SODIUM LACTATE AND CALCIUM CHLORIDE: 600; 310; 30; 20 INJECTION, SOLUTION INTRAVENOUS at 05:50

## 2022-04-15 RX ADMIN — HYDROMORPHONE HYDROCHLORIDE 1 MG: 1 INJECTION, SOLUTION INTRAMUSCULAR; INTRAVENOUS; SUBCUTANEOUS at 05:46

## 2022-04-15 RX ADMIN — PYRIDOXINE HCL TAB 50 MG 50 MG: 50 TAB at 13:53

## 2022-04-15 ASSESSMENT — PAIN DESCRIPTION - ORIENTATION
ORIENTATION: RIGHT
ORIENTATION: RIGHT

## 2022-04-15 ASSESSMENT — PAIN DESCRIPTION - LOCATION
LOCATION: FLANK
LOCATION: FLANK

## 2022-04-15 ASSESSMENT — PAIN SCALES - GENERAL
PAINLEVEL_OUTOF10: 0
PAINLEVEL_OUTOF10: 8
PAINLEVEL_OUTOF10: 0
PAINLEVEL_OUTOF10: 9
PAINLEVEL_OUTOF10: 8
PAINLEVEL_OUTOF10: 9
PAINLEVEL_OUTOF10: 4
PAINLEVEL_OUTOF10: 7

## 2022-04-15 ASSESSMENT — PAIN DESCRIPTION - FREQUENCY
FREQUENCY: INTERMITTENT
FREQUENCY: CONTINUOUS

## 2022-04-15 ASSESSMENT — PAIN DESCRIPTION - ONSET
ONSET: ON-GOING
ONSET: ON-GOING

## 2022-04-15 ASSESSMENT — PAIN DESCRIPTION - PAIN TYPE
TYPE: ACUTE PAIN
TYPE: ACUTE PAIN

## 2022-04-15 ASSESSMENT — PAIN DESCRIPTION - DESCRIPTORS
DESCRIPTORS: ACHING;DISCOMFORT
DESCRIPTORS: ACHING;DISCOMFORT

## 2022-04-15 ASSESSMENT — PAIN DESCRIPTION - PROGRESSION
CLINICAL_PROGRESSION: NOT CHANGED
CLINICAL_PROGRESSION: NOT CHANGED

## 2022-04-15 ASSESSMENT — PAIN - FUNCTIONAL ASSESSMENT
PAIN_FUNCTIONAL_ASSESSMENT: ACTIVITIES ARE NOT PREVENTED
PAIN_FUNCTIONAL_ASSESSMENT: ACTIVITIES ARE NOT PREVENTED

## 2022-04-15 NOTE — PROGRESS NOTES
Progress Note    SUBJECTIVE:   Pt ucomfortable; +void; +N/V    OBJECTIVE:    VITALS:  /75   Pulse 85   Temp 97.9 °F (36.6 °C) (Oral)   Resp 16   Ht 5' 4\" (1.626 m)   Wt 149 lb (67.6 kg)   LMP 02/17/2022   SpO2 95%   BMI 25.58 kg/m²   Physical Exam  ABDOMEN: soft, gravid  Ext nt  DATA:  Hemoglobin/Hematocrit:    Lab Results   Component Value Date    HGB 10.8 04/14/2022    HCT 31.9 04/14/2022       ASSESSMENT AND PLAN:  12 week gestation  Crohns  Hyperemesis  Hypokalemia  Principal Problem:    Intractable nausea and vomiting  Resolved Problems:    * No resolved hospital problems. *  Medicine and GI consults obtained.   Replace potassium  Plan discharge home when stable    Electronically signed by Kirsten Bolden DO on 4/15/2022 at 12:57 PM

## 2022-04-15 NOTE — PATIENT CARE CONFERENCE
[facility] Quality Flow/Interdisciplinary Rounds Progress Note        Quality Flow Rounds held on April 15, 2022    Disciplines Attending:  Bedside Nurse, ,  and Nursing Unit 321 ALBERT Garay was admitted on 4/13/2022 12:14 PM    Anticipated Discharge Date:  Expected Discharge Date: 04/16/22    Disposition:    King Score:  King Scale Score: 20    Readmission Risk              Risk of Unplanned Readmission:  20           Discussed patient goal for the day, patient clinical progression, and barriers to discharge.   The following Goal(s) of the Day/Commitment(s) have been identified:  Diagnostics - Report Results and Labs - Report Results      Марина Caballero RN  April 15, 2022

## 2022-04-15 NOTE — PROGRESS NOTES
PROGRESS NOTE  By Michael Issa M.D. [facility]  Dr. Prashant Lutz M.D.,  Dr. Robert Rogers M.D.,   Dr. Andrew Pickens D.O.,  Dr. Bam Godinez M.D.,  Dr. Suma Salinas D.O.,  Nelson Dewey D.O.         [address]  29 y.o.  female    SUBJECTIVE:  Persistent nausea    OBJECTIVE:    /75   Pulse 85   Temp 97.9 °F (36.6 °C) (Oral)   Resp 16   Ht 5' 4\" (1.626 m)   Wt 149 lb (67.6 kg)   LMP 02/17/2022   SpO2 95%   BMI 25.58 kg/m²     General: NAD/ female  HEENT: Anicteric sclera/moist oral mucosa  Neck: Supple/trachea appears midline  Chest: Symmetric excursion/nonlabored respirations  Abd.: Soft/obese  Extr.:  No peripheral edema  Skin: Warm and dry       DATA:         Lab Results   Component Value Date    WBC 12.3 04/14/2022    RBC 3.37 04/14/2022    HGB 10.8 04/14/2022    HCT 31.9 04/14/2022    MCV 94.7 04/14/2022    MCH 32.0 04/14/2022    MCHC 33.9 04/14/2022    RDW 14.6 04/14/2022     04/14/2022    MPV 9.1 04/14/2022     Lab Results   Component Value Date     04/15/2022    K 3.4 04/15/2022    K 3.6 04/13/2022     04/15/2022    CO2 24 04/15/2022    BUN 3 04/15/2022    CREATININE 0.4 04/15/2022    CALCIUM 8.7 04/15/2022    PROT 6.2 04/15/2022    LABALBU 4.1 04/15/2022    BILITOT 0.3 04/15/2022    ALKPHOS 44 04/15/2022    AST 45 04/15/2022    ALT 65 04/15/2022     Lab Results   Component Value Date    LIPASE 20 03/28/2022     Lab Results   Component Value Date    AMYLASE 61 06/10/2014         ASSESSMENT/PLAN:  Patient Active Problem List   Diagnosis    Cannabis dependence ([address]    Asthma    Tobacco abuse    Symptomatic cholelithiasis    [facility]    [facility] colitis, without complications ([facility]    Intractable vomiting with nausea    Emesis, persistent    Intractable nausea and vomiting    Abdominal pain    Intractable vomiting    Abdominal pain, right lower quadrant    Diarrhea of presumed infectious origin    Marijuana abuse    AMA (advanced maternal age) multigravida 33+, first trimester     1. Nausea/vomiting/diarrhea  -Possible hyperemesis of pregnancy  -Previously positive for cannabis -cannot exclude hyperemesis secondary to cannabis   -Diarrhea could be related to Crohn's flare  -Stool studies thus far negative  -Fecal occult blood test negative  -Plan for upper endoscopy/colonoscopy after resolution of pregnancy    No immediate plans for intervention from GI POV  Will see PRN in the hospital -please, call with questions/concerns  Thank you for the opportunity to participate in the care of  Rita Domingo MD  4/15/2022  3:58 PM    NOTE:  This report was transcribed using voice recognition software. Every effort was made to ensure accuracy; however, inadvertent computerized transcription errors may be present.

## 2022-04-15 NOTE — PROGRESS NOTES
[facility] Progress Note    Admitting Date and Time: 4/13/2022 12:14 PM  Admit Dx: Intractable nausea and vomiting [R11.2]    Subjective:  Patient is being followed for Intractable nausea and vomiting [R11.2]   Pt feels worse today. Looks uncomfortable. Per RN: Labs just drawn. Await for results. ROS: denies fever, chills, cp, sob, n/v, HA unless stated above.       sertraline  25 mg Oral Nightly    prenatal vitamin  1 tablet Oral Daily    vitamin B-6  50 mg Oral TID    famotidine (PEPCID) injection  20 mg IntraVENous BID    acetaminophen  650 mg Oral Once    sodium chloride flush  5-40 mL IntraVENous 2 times per day     metoclopramide, 10 mg, TID PRN  sodium chloride flush, 5-40 mL, PRN  sodium chloride, , PRN  acetaminophen, 650 mg, Q4H PRN  ondansetron, 4 mg, Q8H PRN   Or  ondansetron, 4 mg, Q6H PRN  HYDROmorphone, 1 mg, Q4H PRN    Objective:    /75   Pulse 85   Temp 97.9 °F (36.6 °C) (Oral)   Resp 16   Ht 5' 4\" (1.626 m)   Wt 149 lb (67.6 kg)   LMP 02/17/2022   SpO2 95%   BMI 25.58 kg/m²     General Appearance: alert and oriented to person, place and time and in mild pain distress  Skin: warm and dry  Head: normocephalic and atraumatic  Eyes: pupils equal, round, and reactive to light, extraocular eye movements intact, conjunctivae normal  Neck: neck supple and non tender without mass   Pulmonary/Chest: clear to auscultation bilaterally- no wheezes, rales or rhonchi, normal air movement, no respiratory distress  Cardiovascular: normal rate, normal S1 and S2 and no carotid bruits  Abdomen: soft, non-tender, non-distended, normal bowel sounds, no masses or organomegaly  Extremities: no cyanosis, no clubbing and no edema  Neurologic: no cranial nerve deficit and speech normal    Recent Labs     04/13/22  1309 04/14/22  0306    133   K 3.6 2.9*    102   CO2 20* 20*   BUN 14 6   CREATININE 0.4* 0.3*   GLUCOSE 128* 110*   CALCIUM 8.9 8.3*       Recent Labs 04/13/22  1309 04/14/22  0306   WBC 20.6* 12.3*   RBC 4.32 3.37*   HGB 14.0 10.8*   HCT 40.8 31.9*   MCV 94.4 94.7   MCH 32.4 32.0   MCHC 34.3 33.9   RDW 14.7 14.6    361   MPV 9.7 9.1     Radiology:   No results found. Assessment:    Principal Problem:    Intractable nausea and vomiting  Resolved Problems:    * No resolved hospital problems. *    Plan:  1. Hyperemesis gravidarum -continue IV fluids, motility agents and antinausea agents. Trend electrolytes and treat accordingly. 2.  Hypokalemia -continue IV hydration and potassium repletion. Trend labs and treat accordingly. Pending morning labs. 3.  Crohn's disease -has seen Dr. Corey Hendrickson. Consult with GI. Stools being checked for C. Difficile. Deferring work-up until after pregnancy  4. Mildly elevated LFTs -most likely due to intractable vomiting. Platelets are normal.  Trend labs. Treat accordingly. 5.  Near syncope -improved since IV hydration. Check orthostatics. Adjust accordingly. 6.  Anxiety -trolled on Zoloft. Continue same.     NOTE: This report was transcribed using voice recognition software. Every effort was made to ensure accuracy; however, inadvertent computerized transcription errors may be present.     Electronically signed by Jassi Fitzgerald MD on 4/15/2022 at 10:57 AM

## 2022-04-16 LAB
ANION GAP SERPL CALCULATED.3IONS-SCNC: 10 MMOL/L (ref 7–16)
BUN BLDV-MCNC: 3 MG/DL (ref 6–20)
CALCIUM SERPL-MCNC: 8.7 MG/DL (ref 8.6–10.2)
CHLORIDE BLD-SCNC: 100 MMOL/L (ref 98–107)
CO2: 23 MMOL/L (ref 22–29)
CREAT SERPL-MCNC: 0.4 MG/DL (ref 0.5–1)
GFR AFRICAN AMERICAN: >60
GFR NON-AFRICAN AMERICAN: >60 ML/MIN/1.73
GLUCOSE BLD-MCNC: 84 MG/DL (ref 74–99)
MAGNESIUM: 1.9 MG/DL (ref 1.6–2.6)
POTASSIUM REFLEX MAGNESIUM: 3.5 MMOL/L (ref 3.5–5)
SODIUM BLD-SCNC: 133 MMOL/L (ref 132–146)
URINE CULTURE, ROUTINE: NORMAL
WHITE BLOOD CELLS (WBC), STOOL: NORMAL

## 2022-04-16 PROCEDURE — 80048 BASIC METABOLIC PNL TOTAL CA: CPT

## 2022-04-16 PROCEDURE — A4216 STERILE WATER/SALINE, 10 ML: HCPCS | Performed by: OBSTETRICS & GYNECOLOGY

## 2022-04-16 PROCEDURE — 6360000002 HC RX W HCPCS: Performed by: OBSTETRICS & GYNECOLOGY

## 2022-04-16 PROCEDURE — 2580000003 HC RX 258: Performed by: OBSTETRICS & GYNECOLOGY

## 2022-04-16 PROCEDURE — 99233 SBSQ HOSP IP/OBS HIGH 50: CPT | Performed by: FAMILY MEDICINE

## 2022-04-16 PROCEDURE — 6370000000 HC RX 637 (ALT 250 FOR IP): Performed by: OBSTETRICS & GYNECOLOGY

## 2022-04-16 PROCEDURE — 1200000000 HC SEMI PRIVATE

## 2022-04-16 PROCEDURE — 83735 ASSAY OF MAGNESIUM: CPT

## 2022-04-16 PROCEDURE — 2500000003 HC RX 250 WO HCPCS: Performed by: OBSTETRICS & GYNECOLOGY

## 2022-04-16 PROCEDURE — 36415 COLL VENOUS BLD VENIPUNCTURE: CPT

## 2022-04-16 RX ADMIN — PYRIDOXINE HCL TAB 50 MG 50 MG: 50 TAB at 21:37

## 2022-04-16 RX ADMIN — SODIUM CHLORIDE, PRESERVATIVE FREE 20 MG: 5 INJECTION INTRAVENOUS at 09:29

## 2022-04-16 RX ADMIN — HYDROMORPHONE HYDROCHLORIDE 1 MG: 1 INJECTION, SOLUTION INTRAMUSCULAR; INTRAVENOUS; SUBCUTANEOUS at 17:58

## 2022-04-16 RX ADMIN — SODIUM CHLORIDE, PRESERVATIVE FREE 10 ML: 5 INJECTION INTRAVENOUS at 21:35

## 2022-04-16 RX ADMIN — SODIUM CHLORIDE, POTASSIUM CHLORIDE, SODIUM LACTATE AND CALCIUM CHLORIDE: 600; 310; 30; 20 INJECTION, SOLUTION INTRAVENOUS at 07:33

## 2022-04-16 RX ADMIN — ONDANSETRON 4 MG: 2 INJECTION INTRAMUSCULAR; INTRAVENOUS at 17:33

## 2022-04-16 RX ADMIN — PYRIDOXINE HCL TAB 50 MG 50 MG: 50 TAB at 09:29

## 2022-04-16 RX ADMIN — HYDROMORPHONE HYDROCHLORIDE 1 MG: 1 INJECTION, SOLUTION INTRAMUSCULAR; INTRAVENOUS; SUBCUTANEOUS at 21:34

## 2022-04-16 RX ADMIN — METOCLOPRAMIDE 10 MG: 10 TABLET ORAL at 12:11

## 2022-04-16 RX ADMIN — ACETAMINOPHEN 650 MG: 325 TABLET ORAL at 11:01

## 2022-04-16 RX ADMIN — ONDANSETRON 4 MG: 2 INJECTION INTRAMUSCULAR; INTRAVENOUS at 10:58

## 2022-04-16 RX ADMIN — HYDROMORPHONE HYDROCHLORIDE 1 MG: 1 INJECTION, SOLUTION INTRAMUSCULAR; INTRAVENOUS; SUBCUTANEOUS at 13:41

## 2022-04-16 RX ADMIN — HYDROMORPHONE HYDROCHLORIDE 1 MG: 1 INJECTION, SOLUTION INTRAMUSCULAR; INTRAVENOUS; SUBCUTANEOUS at 09:29

## 2022-04-16 RX ADMIN — SERTRALINE HYDROCHLORIDE 25 MG: 50 TABLET ORAL at 21:34

## 2022-04-16 RX ADMIN — PYRIDOXINE HCL TAB 50 MG 50 MG: 50 TAB at 13:41

## 2022-04-16 RX ADMIN — METFORMIN HYDROCHLORIDE 1 TABLET: 500 TABLET, EXTENDED RELEASE ORAL at 09:29

## 2022-04-16 RX ADMIN — METOCLOPRAMIDE 10 MG: 10 TABLET ORAL at 16:40

## 2022-04-16 RX ADMIN — ONDANSETRON 4 MG: 2 INJECTION INTRAMUSCULAR; INTRAVENOUS at 04:54

## 2022-04-16 RX ADMIN — ONDANSETRON 4 MG: 2 INJECTION INTRAMUSCULAR; INTRAVENOUS at 23:59

## 2022-04-16 RX ADMIN — SODIUM CHLORIDE, POTASSIUM CHLORIDE, SODIUM LACTATE AND CALCIUM CHLORIDE: 600; 310; 30; 20 INJECTION, SOLUTION INTRAVENOUS at 16:36

## 2022-04-16 RX ADMIN — HYDROMORPHONE HYDROCHLORIDE 1 MG: 1 INJECTION, SOLUTION INTRAMUSCULAR; INTRAVENOUS; SUBCUTANEOUS at 04:54

## 2022-04-16 RX ADMIN — SODIUM CHLORIDE, PRESERVATIVE FREE 20 MG: 5 INJECTION INTRAVENOUS at 21:34

## 2022-04-16 ASSESSMENT — PAIN DESCRIPTION - DESCRIPTORS
DESCRIPTORS: ACHING;DISCOMFORT
DESCRIPTORS: ACHING;DISCOMFORT
DESCRIPTORS: HEADACHE

## 2022-04-16 ASSESSMENT — PAIN DESCRIPTION - PROGRESSION
CLINICAL_PROGRESSION: NOT CHANGED
CLINICAL_PROGRESSION: GRADUALLY IMPROVING

## 2022-04-16 ASSESSMENT — PAIN DESCRIPTION - DIRECTION
RADIATING_TOWARDS: FLANK
RADIATING_TOWARDS: FLANK

## 2022-04-16 ASSESSMENT — PAIN SCALES - GENERAL
PAINLEVEL_OUTOF10: 9
PAINLEVEL_OUTOF10: 5
PAINLEVEL_OUTOF10: 8
PAINLEVEL_OUTOF10: 9
PAINLEVEL_OUTOF10: 7
PAINLEVEL_OUTOF10: 3
PAINLEVEL_OUTOF10: 8
PAINLEVEL_OUTOF10: 8

## 2022-04-16 ASSESSMENT — PAIN DESCRIPTION - FREQUENCY
FREQUENCY: INTERMITTENT
FREQUENCY: CONTINUOUS

## 2022-04-16 ASSESSMENT — PAIN DESCRIPTION - ONSET
ONSET: ON-GOING

## 2022-04-16 ASSESSMENT — PAIN DESCRIPTION - LOCATION
LOCATION: ABDOMEN

## 2022-04-16 ASSESSMENT — PAIN DESCRIPTION - ORIENTATION
ORIENTATION: RIGHT

## 2022-04-16 ASSESSMENT — PAIN - FUNCTIONAL ASSESSMENT
PAIN_FUNCTIONAL_ASSESSMENT: ACTIVITIES ARE NOT PREVENTED

## 2022-04-16 ASSESSMENT — PAIN DESCRIPTION - PAIN TYPE
TYPE: ACUTE PAIN

## 2022-04-16 NOTE — PROGRESS NOTES
[facility] Progress Note    Admitting Date and Time: 4/13/2022 12:14 PM  Admit Dx: Intractable nausea and vomiting [R11.2]    Subjective:  Patient is being followed for Intractable nausea and vomiting [R11.2]   Pt feels nauseous. Diet increased to soft. Per RN: aware of need for nausea    ROS: denies fever, chills, cp, sob, n/v, HA unless stated above.  sertraline  25 mg Oral Nightly    prenatal vitamin  1 tablet Oral Daily    vitamin B-6  50 mg Oral TID    famotidine (PEPCID) injection  20 mg IntraVENous BID    acetaminophen  650 mg Oral Once    sodium chloride flush  5-40 mL IntraVENous 2 times per day     metoclopramide, 10 mg, TID PRN  sodium chloride flush, 5-40 mL, PRN  sodium chloride, , PRN  acetaminophen, 650 mg, Q4H PRN  ondansetron, 4 mg, Q8H PRN   Or  ondansetron, 4 mg, Q6H PRN  HYDROmorphone, 1 mg, Q4H PRN         Objective:    BP (!) 152/77   Pulse 80   Temp 98.1 °F (36.7 °C) (Oral)   Resp 16   Ht 5' 4\" (1.626 m)   Wt 149 lb (67.6 kg)   LMP 02/17/2022   SpO2 98%   BMI 25.58 kg/m²     General Appearance: alert and oriented to person, place and time and in no acute distress. Looks fatigued.   Skin: warm and dry  Head: normocephalic and atraumatic  Eyes: pupils equal, round, and reactive to light, extraocular eye movements intact, conjunctivae normal  Neck: neck supple and non tender without mass   Pulmonary/Chest: clear to auscultation bilaterally- no wheezes, rales or rhonchi, normal air movement, no respiratory distress  Cardiovascular: normal rate, normal S1 and S2 and no carotid bruits  Abdomen: soft, non-tender, non-distended, normal bowel sounds, no masses or organomegaly  Extremities: no cyanosis, no clubbing and no edema  Neurologic: no cranial nerve deficit and speech normal    Recent Labs     04/13/22  1309 04/14/22  0306 04/15/22  1042    133 135   K 3.6 2.9* 3.4*    102 100   CO2 20* 20* 24   BUN 14 6 3*   CREATININE 0.4* 0.3* 0.4* GLUCOSE 128* 110* 90   CALCIUM 8.9 8.3* 8.7       Recent Labs     04/13/22  1309 04/14/22  0306   WBC 20.6* 12.3*   RBC 4.32 3.37*   HGB 14.0 10.8*   HCT 40.8 31.9*   MCV 94.4 94.7   MCH 32.4 32.0   MCHC 34.3 33.9   RDW 14.7 14.6    361   MPV 9.7 9.1     Radiology:   No results found. Assessment:    Principal Problem:    Intractable nausea and vomiting  Resolved Problems:    * No resolved hospital problems. *    Plan:  1. Hyperemesis gravidarum -continue IV fluids, motility agents and antinausea agents. Trend electrolytes and treat accordingly. 2.  Hypokalemia -continue IV hydration and potassium repletion. Trend labs and treat accordingly. 3.  Crohn's disease -has seen Dr. Nathaniel Francisco. Consult with GI. Stools being checked for C. Difficile. Negative stool tests  4. Mildly elevated LFTs -most likely due to intractable vomiting. Platelets are normal.  Trend labs. Treat accordingly. 5.  Near syncope -improved since IV hydration. Check orthostatics. Adjust accordingly. 6.  Anxiety -trolled on Zoloft. Continue same. NOTE: This report was transcribed using voice recognition software. Every effort was made to ensure accuracy; however, inadvertent computerized transcription errors may be present.     Electronically signed by Reddy Fernandez MD on 4/16/2022 at 10:20 AM

## 2022-04-17 LAB
ANION GAP SERPL CALCULATED.3IONS-SCNC: 10 MMOL/L (ref 7–16)
BUN BLDV-MCNC: 4 MG/DL (ref 6–20)
CALCIUM SERPL-MCNC: 8.9 MG/DL (ref 8.6–10.2)
CHLORIDE BLD-SCNC: 99 MMOL/L (ref 98–107)
CO2: 23 MMOL/L (ref 22–29)
CREAT SERPL-MCNC: 0.4 MG/DL (ref 0.5–1)
CULTURE, STOOL: NORMAL
GFR AFRICAN AMERICAN: >60
GFR NON-AFRICAN AMERICAN: >60 ML/MIN/1.73
GLUCOSE BLD-MCNC: 94 MG/DL (ref 74–99)
POTASSIUM REFLEX MAGNESIUM: 3.6 MMOL/L (ref 3.5–5)
SODIUM BLD-SCNC: 132 MMOL/L (ref 132–146)

## 2022-04-17 PROCEDURE — 6370000000 HC RX 637 (ALT 250 FOR IP): Performed by: FAMILY MEDICINE

## 2022-04-17 PROCEDURE — 2580000003 HC RX 258: Performed by: OBSTETRICS & GYNECOLOGY

## 2022-04-17 PROCEDURE — 6360000002 HC RX W HCPCS: Performed by: OBSTETRICS & GYNECOLOGY

## 2022-04-17 PROCEDURE — 36415 COLL VENOUS BLD VENIPUNCTURE: CPT

## 2022-04-17 PROCEDURE — 99233 SBSQ HOSP IP/OBS HIGH 50: CPT | Performed by: FAMILY MEDICINE

## 2022-04-17 PROCEDURE — 2500000003 HC RX 250 WO HCPCS: Performed by: OBSTETRICS & GYNECOLOGY

## 2022-04-17 PROCEDURE — 80048 BASIC METABOLIC PNL TOTAL CA: CPT

## 2022-04-17 PROCEDURE — A4216 STERILE WATER/SALINE, 10 ML: HCPCS | Performed by: OBSTETRICS & GYNECOLOGY

## 2022-04-17 PROCEDURE — 6370000000 HC RX 637 (ALT 250 FOR IP): Performed by: OBSTETRICS & GYNECOLOGY

## 2022-04-17 PROCEDURE — 1200000000 HC SEMI PRIVATE

## 2022-04-17 RX ORDER — LACTOBACILLUS RHAMNOSUS GG 10B CELL
1 CAPSULE ORAL DAILY
Status: DISCONTINUED | OUTPATIENT
Start: 2022-04-17 | End: 2022-04-18 | Stop reason: HOSPADM

## 2022-04-17 RX ADMIN — SODIUM CHLORIDE, PRESERVATIVE FREE 20 MG: 5 INJECTION INTRAVENOUS at 07:28

## 2022-04-17 RX ADMIN — SODIUM CHLORIDE, PRESERVATIVE FREE 10 ML: 5 INJECTION INTRAVENOUS at 10:26

## 2022-04-17 RX ADMIN — ONDANSETRON 4 MG: 2 INJECTION INTRAMUSCULAR; INTRAVENOUS at 06:05

## 2022-04-17 RX ADMIN — HYDROMORPHONE HYDROCHLORIDE 1 MG: 1 INJECTION, SOLUTION INTRAMUSCULAR; INTRAVENOUS; SUBCUTANEOUS at 20:37

## 2022-04-17 RX ADMIN — PYRIDOXINE HCL TAB 50 MG 50 MG: 50 TAB at 14:22

## 2022-04-17 RX ADMIN — ONDANSETRON 4 MG: 2 INJECTION INTRAMUSCULAR; INTRAVENOUS at 20:37

## 2022-04-17 RX ADMIN — HYDROMORPHONE HYDROCHLORIDE 1 MG: 1 INJECTION, SOLUTION INTRAMUSCULAR; INTRAVENOUS; SUBCUTANEOUS at 16:02

## 2022-04-17 RX ADMIN — SODIUM CHLORIDE, POTASSIUM CHLORIDE, SODIUM LACTATE AND CALCIUM CHLORIDE: 600; 310; 30; 20 INJECTION, SOLUTION INTRAVENOUS at 07:31

## 2022-04-17 RX ADMIN — PYRIDOXINE HCL TAB 50 MG 50 MG: 50 TAB at 07:28

## 2022-04-17 RX ADMIN — PYRIDOXINE HCL TAB 50 MG 50 MG: 50 TAB at 22:12

## 2022-04-17 RX ADMIN — HYDROMORPHONE HYDROCHLORIDE 1 MG: 1 INJECTION, SOLUTION INTRAMUSCULAR; INTRAVENOUS; SUBCUTANEOUS at 23:58

## 2022-04-17 RX ADMIN — METOCLOPRAMIDE 10 MG: 10 TABLET ORAL at 07:28

## 2022-04-17 RX ADMIN — HYDROMORPHONE HYDROCHLORIDE 1 MG: 1 INJECTION, SOLUTION INTRAMUSCULAR; INTRAVENOUS; SUBCUTANEOUS at 06:03

## 2022-04-17 RX ADMIN — METFORMIN HYDROCHLORIDE 1 TABLET: 500 TABLET, EXTENDED RELEASE ORAL at 07:28

## 2022-04-17 RX ADMIN — HYDROMORPHONE HYDROCHLORIDE 1 MG: 1 INJECTION, SOLUTION INTRAMUSCULAR; INTRAVENOUS; SUBCUTANEOUS at 10:26

## 2022-04-17 RX ADMIN — SODIUM CHLORIDE, POTASSIUM CHLORIDE, SODIUM LACTATE AND CALCIUM CHLORIDE: 600; 310; 30; 20 INJECTION, SOLUTION INTRAVENOUS at 15:58

## 2022-04-17 RX ADMIN — HYDROMORPHONE HYDROCHLORIDE 1 MG: 1 INJECTION, SOLUTION INTRAMUSCULAR; INTRAVENOUS; SUBCUTANEOUS at 01:30

## 2022-04-17 RX ADMIN — SODIUM CHLORIDE, PRESERVATIVE FREE 10 ML: 5 INJECTION INTRAVENOUS at 12:29

## 2022-04-17 RX ADMIN — Medication 1 CAPSULE: at 14:21

## 2022-04-17 RX ADMIN — ONDANSETRON 4 MG: 2 INJECTION INTRAMUSCULAR; INTRAVENOUS at 12:29

## 2022-04-17 RX ADMIN — SODIUM CHLORIDE, PRESERVATIVE FREE 20 MG: 5 INJECTION INTRAVENOUS at 20:37

## 2022-04-17 RX ADMIN — SERTRALINE HYDROCHLORIDE 25 MG: 50 TABLET ORAL at 20:43

## 2022-04-17 ASSESSMENT — PAIN DESCRIPTION - ORIENTATION
ORIENTATION: RIGHT;LEFT

## 2022-04-17 ASSESSMENT — PAIN - FUNCTIONAL ASSESSMENT
PAIN_FUNCTIONAL_ASSESSMENT: ACTIVITIES ARE NOT PREVENTED

## 2022-04-17 ASSESSMENT — PAIN DESCRIPTION - PAIN TYPE
TYPE: ACUTE PAIN

## 2022-04-17 ASSESSMENT — PAIN DESCRIPTION - ONSET
ONSET: ON-GOING

## 2022-04-17 ASSESSMENT — PAIN SCALES - GENERAL
PAINLEVEL_OUTOF10: 9
PAINLEVEL_OUTOF10: 8
PAINLEVEL_OUTOF10: 7
PAINLEVEL_OUTOF10: 8
PAINLEVEL_OUTOF10: 8
PAINLEVEL_OUTOF10: 6
PAINLEVEL_OUTOF10: 8

## 2022-04-17 ASSESSMENT — PAIN DESCRIPTION - LOCATION
LOCATION: ABDOMEN

## 2022-04-17 ASSESSMENT — PAIN DESCRIPTION - DESCRIPTORS
DESCRIPTORS: ACHING;DISCOMFORT

## 2022-04-17 ASSESSMENT — PAIN DESCRIPTION - PROGRESSION
CLINICAL_PROGRESSION: GRADUALLY WORSENING
CLINICAL_PROGRESSION: GRADUALLY WORSENING
CLINICAL_PROGRESSION: GRADUALLY IMPROVING

## 2022-04-17 ASSESSMENT — PAIN DESCRIPTION - FREQUENCY
FREQUENCY: CONTINUOUS

## 2022-04-17 NOTE — PROGRESS NOTES
[facility] Progress Note    Admitting Date and Time: 4/13/2022 12:14 PM  Admit Dx: Intractable nausea and vomiting [R11.2]    Subjective:  Patient is being followed for Intractable nausea and vomiting [R11.2]   Pt feels slightly better. No further episodes of nausea and vomiting. Complains of a lot of cramping. Says she has a cyst near her appendix. Was able to find the ultrasound and MRI report from [facility] a cyst is recorded on MRI is being 0.9 cm which is equivalent to 0.3 inches. Advice patient that around the clock Dilaudid is not the best thing especially with the developing fetus. Will add on lactobacillus. Per RN: As above. ROS: denies fever, chills, cp, sob, n/v, HA unless stated above.  sertraline  25 mg Oral Nightly    prenatal vitamin  1 tablet Oral Daily    vitamin B-6  50 mg Oral TID    famotidine (PEPCID) injection  20 mg IntraVENous BID    acetaminophen  650 mg Oral Once    sodium chloride flush  5-40 mL IntraVENous 2 times per day     metoclopramide, 10 mg, TID PRN  sodium chloride flush, 5-40 mL, PRN  sodium chloride, , PRN  acetaminophen, 650 mg, Q4H PRN  ondansetron, 4 mg, Q8H PRN   Or  ondansetron, 4 mg, Q6H PRN  HYDROmorphone, 1 mg, Q4H PRN    Objective:    BP (!) 150/88   Pulse 75   Temp 97.7 °F (36.5 °C) (Oral)   Resp 16   Ht 5' 4\" (1.626 m)   Wt 145 lb 8 oz (66 kg)   LMP 02/17/2022   SpO2 100%   BMI 24.98 kg/m²     General Appearance: alert and oriented to person, place and time and in no acute distress.   Looks tired  Skin: warm and dry  Head: normocephalic and atraumatic  Eyes: pupils equal, round, and reactive to light, extraocular eye movements intact, conjunctivae normal  Neck: neck supple and non tender without mass   Pulmonary/Chest: clear to auscultation bilaterally- no wheezes, rales or rhonchi, normal air movement, no respiratory distress  Cardiovascular: normal rate, normal S1 and S2 and no carotid bruits  Abdomen: soft, non-tender, non-distended, normal bowel sounds, no masses or organomegaly  Extremities: no cyanosis, no clubbing and no edema  Neurologic: no cranial nerve deficit and speech normal    Recent Labs     04/15/22  1042 04/16/22  1135 04/17/22  0303    133 132   K 3.4* 3.5 3.6    100 99   CO2 24 23 23   BUN 3* 3* 4*   CREATININE 0.4* 0.4* 0.4*   GLUCOSE 90 84 94   CALCIUM 8.7 8.7 8.9     Radiology:   No results found. Assessment:    Principal Problem:    Intractable nausea and vomiting  Resolved Problems:    * No resolved hospital problems. *    Plan:  1. Hyperemesis gravidarum -continue IV fluids, motility agents and antinausea agents.  Encouraged patient not to use the Dilaudid around-the-clock. It too can give rise to nausea and vomiting. 2.  Hypokalemia -continue IV hydration and potassium repletion.  Trend labs and treat accordingly.    3.  Crohn's disease -has seen Dr. Willem Hamlin.  Consult with GI.  Stools being checked for C. Difficile. Negative stool tests  4.  Mildly elevated LFTs -most likely due to intractable vomiting.  Platelets are normal.  Trend labs.  Treat accordingly. 5.  Near syncope -improved since IV hydration.  Check orthostatics.  Adjust accordingly. 6.  Abdominal pain -Cyst that she describes and the MRI imaging pictures from [facility] reported a 0.9 cm cyst near the cecum; this is very unlikely to be the etiology of her discomfort. Advised not to use the Dilaudid around-the-clock. Has had 2 loose stools. Cultures have been negative for numerous pathogens. Will add lactobacillus. 6.  Anxiety -trolled on Zoloft.  Continue same. NOTE: This report was transcribed using voice recognition software. Every effort was made to ensure accuracy; however, inadvertent computerized transcription errors may be present.     Electronically signed by Rachel Fregoso MD on 4/17/2022 at 1:05 PM

## 2022-04-17 NOTE — PROGRESS NOTES
Pt seen yesterday  4/16/22. No obstetric complaints. Denies uterine cramping, vaginal bleeding. Admits RLQ pain, N/V which was somewhat relieved w Zofran. Will follow w IM.

## 2022-04-18 VITALS
BODY MASS INDEX: 24.84 KG/M2 | SYSTOLIC BLOOD PRESSURE: 112 MMHG | RESPIRATION RATE: 16 BRPM | TEMPERATURE: 98.4 F | HEIGHT: 64 IN | HEART RATE: 108 BPM | OXYGEN SATURATION: 98 % | DIASTOLIC BLOOD PRESSURE: 95 MMHG | WEIGHT: 145.5 LBS

## 2022-04-18 LAB
ANION GAP SERPL CALCULATED.3IONS-SCNC: 12 MMOL/L (ref 7–16)
BUN BLDV-MCNC: 7 MG/DL (ref 6–20)
CALCIUM SERPL-MCNC: 9.2 MG/DL (ref 8.6–10.2)
CHLORIDE BLD-SCNC: 100 MMOL/L (ref 98–107)
CO2: 23 MMOL/L (ref 22–29)
CREAT SERPL-MCNC: 0.4 MG/DL (ref 0.5–1)
GFR AFRICAN AMERICAN: >60
GFR NON-AFRICAN AMERICAN: >60 ML/MIN/1.73
GLUCOSE BLD-MCNC: 96 MG/DL (ref 74–99)
POTASSIUM REFLEX MAGNESIUM: 3.9 MMOL/L (ref 3.5–5)
SODIUM BLD-SCNC: 135 MMOL/L (ref 132–146)

## 2022-04-18 PROCEDURE — 6370000000 HC RX 637 (ALT 250 FOR IP): Performed by: FAMILY MEDICINE

## 2022-04-18 PROCEDURE — 2500000003 HC RX 250 WO HCPCS: Performed by: OBSTETRICS & GYNECOLOGY

## 2022-04-18 PROCEDURE — 6370000000 HC RX 637 (ALT 250 FOR IP): Performed by: OBSTETRICS & GYNECOLOGY

## 2022-04-18 PROCEDURE — 99233 SBSQ HOSP IP/OBS HIGH 50: CPT | Performed by: FAMILY MEDICINE

## 2022-04-18 PROCEDURE — APPSS30 APP SPLIT SHARED TIME 16-30 MINUTES: Performed by: PHYSICIAN ASSISTANT

## 2022-04-18 PROCEDURE — 36415 COLL VENOUS BLD VENIPUNCTURE: CPT

## 2022-04-18 PROCEDURE — A4216 STERILE WATER/SALINE, 10 ML: HCPCS | Performed by: OBSTETRICS & GYNECOLOGY

## 2022-04-18 PROCEDURE — 2580000003 HC RX 258: Performed by: OBSTETRICS & GYNECOLOGY

## 2022-04-18 PROCEDURE — 6360000002 HC RX W HCPCS: Performed by: OBSTETRICS & GYNECOLOGY

## 2022-04-18 PROCEDURE — 80048 BASIC METABOLIC PNL TOTAL CA: CPT

## 2022-04-18 RX ADMIN — Medication 1 CAPSULE: at 07:55

## 2022-04-18 RX ADMIN — ACETAMINOPHEN 650 MG: 325 TABLET ORAL at 09:43

## 2022-04-18 RX ADMIN — ONDANSETRON 4 MG: 2 INJECTION INTRAMUSCULAR; INTRAVENOUS at 04:19

## 2022-04-18 RX ADMIN — METOCLOPRAMIDE 10 MG: 10 TABLET ORAL at 07:59

## 2022-04-18 RX ADMIN — METFORMIN HYDROCHLORIDE 1 TABLET: 500 TABLET, EXTENDED RELEASE ORAL at 07:55

## 2022-04-18 RX ADMIN — SODIUM CHLORIDE, PRESERVATIVE FREE 20 MG: 5 INJECTION INTRAVENOUS at 07:55

## 2022-04-18 RX ADMIN — SODIUM CHLORIDE, POTASSIUM CHLORIDE, SODIUM LACTATE AND CALCIUM CHLORIDE: 600; 310; 30; 20 INJECTION, SOLUTION INTRAVENOUS at 07:54

## 2022-04-18 RX ADMIN — HYDROMORPHONE HYDROCHLORIDE 1 MG: 1 INJECTION, SOLUTION INTRAMUSCULAR; INTRAVENOUS; SUBCUTANEOUS at 04:19

## 2022-04-18 RX ADMIN — PYRIDOXINE HCL TAB 50 MG 50 MG: 50 TAB at 07:55

## 2022-04-18 ASSESSMENT — PAIN DESCRIPTION - ONSET
ONSET: ON-GOING

## 2022-04-18 ASSESSMENT — PAIN DESCRIPTION - LOCATION
LOCATION: ABDOMEN

## 2022-04-18 ASSESSMENT — PAIN DESCRIPTION - DESCRIPTORS
DESCRIPTORS: ACHING;DISCOMFORT
DESCRIPTORS: ACHING;CRAMPING
DESCRIPTORS: ACHING;CRAMPING

## 2022-04-18 ASSESSMENT — PAIN DESCRIPTION - ORIENTATION
ORIENTATION: RIGHT;LEFT

## 2022-04-18 ASSESSMENT — PAIN DESCRIPTION - FREQUENCY
FREQUENCY: CONTINUOUS

## 2022-04-18 ASSESSMENT — PAIN - FUNCTIONAL ASSESSMENT
PAIN_FUNCTIONAL_ASSESSMENT: ACTIVITIES ARE NOT PREVENTED

## 2022-04-18 ASSESSMENT — PAIN SCALES - GENERAL
PAINLEVEL_OUTOF10: 7
PAINLEVEL_OUTOF10: 5
PAINLEVEL_OUTOF10: 5
PAINLEVEL_OUTOF10: 7

## 2022-04-18 ASSESSMENT — PAIN DESCRIPTION - PROGRESSION
CLINICAL_PROGRESSION: GRADUALLY IMPROVING
CLINICAL_PROGRESSION: GRADUALLY IMPROVING
CLINICAL_PROGRESSION: GRADUALLY WORSENING

## 2022-04-18 ASSESSMENT — PAIN DESCRIPTION - PAIN TYPE
TYPE: ACUTE PAIN

## 2022-04-18 NOTE — PROGRESS NOTES
[facility] Progress Note    Admitting Date and Time: 4/13/2022 12:14 PM  Admit Dx: Intractable nausea and vomiting [R11.2]    Subjective:  Patient is being followed for Intractable nausea and vomiting [R11.2]     Patient was lying in bed in mild acute distress. She continues to report crampy abdominal pain. States she has had some nausea that resolved with Reglan and Zofran. No further vomiting. ROS: denies fever, chills, cp, sob, n/v, HA unless stated above.       lactobacillus  1 capsule Oral Daily    sertraline  25 mg Oral Nightly    prenatal vitamin  1 tablet Oral Daily    vitamin B-6  50 mg Oral TID    famotidine (PEPCID) injection  20 mg IntraVENous BID    acetaminophen  650 mg Oral Once    sodium chloride flush  5-40 mL IntraVENous 2 times per day     metoclopramide, 10 mg, TID PRN  sodium chloride flush, 5-40 mL, PRN  sodium chloride, , PRN  acetaminophen, 650 mg, Q4H PRN  ondansetron, 4 mg, Q8H PRN   Or  ondansetron, 4 mg, Q6H PRN  HYDROmorphone, 1 mg, Q4H PRN         Objective:    BP (!) 112/95   Pulse 108   Temp 97.9 °F (36.6 °C) (Oral)   Resp 16   Ht 5' 4\" (1.626 m)   Wt 145 lb 8 oz (66 kg)   LMP 02/17/2022   SpO2 98%   BMI 24.98 kg/m²   General Appearance: alert and oriented to person, place and time and in no acute distress, 9 weeks pregnant   Skin: warm and dry  Head: normocephalic and atraumatic  Eyes: pupils equal, round, and reactive to light, extraocular eye movements intact, conjunctivae normal  Neck: neck supple and non tender without mass   Pulmonary/Chest: clear to auscultation bilaterally- no wheezes, rales or rhonchi, normal air movement, no respiratory distress  Cardiovascular: normal rate, normal S1 and S2 and no carotid bruits  Abdomen: soft, non-tender, non-distended, normal bowel sounds, no masses or organomegaly  Extremities: no cyanosis, no clubbing and no edema  Neurologic: no cranial nerve deficit and speech normal        Recent Labs 04/16/22  1135 04/17/22  0303 04/18/22  0430    132 135   K 3.5 3.6 3.9    99 100   CO2 23 23 23   BUN 3* 4* 7   CREATININE 0.4* 0.4* 0.4*   GLUCOSE 84 94 96   CALCIUM 8.7 8.9 9.2       No results for input(s): WBC, RBC, HGB, HCT, MCV, MCH, MCHC, RDW, PLT, MPV in the last 72 hours. Radiology: None    Assessment:    Principal Problem:    Intractable nausea and vomiting  Resolved Problems:    * No resolved hospital problems. *      Plan:    1. Hyperemesis gravidarum: Continue IVF. Continue motility agents and antiemetics. OBGYN following. 2. Hypokalemia: Resolved. Monitor and replace prn.     3. Crohn's disease: Diarrhea could be related to flare. GI consulted. Nathalia Lopez studies negative. Fecal occult blood negative. Plans for endoscopy and colonoscopy after pregnancy resolution. No immediate plans for intervention. 4. Mildly elevated LFTs: Likely due to vomiting. Plts normal. Trend labs and treat accordingly. 5. Near syncope: Improved with IV hydration. Continue IVF. Orthostatics negative. 7. Abdominal pain: MRI from [facility] showing 0.9 cm cyst near the cecum, not likely source of discomfort. Had 2 loose stools. Stool studies negative. Continue lactobacillus. 8. Anxiety: Psych consulted. Continue Zoloft. NOTE: This report was transcribed using voice recognition software. Every effort was made to ensure accuracy; however, inadvertent computerized transcription errors may be present.   Electronically signed by Sia More PA-C on 4/18/2022 at 9:28 AM

## 2022-04-18 NOTE — PROGRESS NOTES
Discharge instructions reviewed with pt,verbalzied understanding. All questions answered at this time. Pt declined WC to main lobby.

## 2022-04-18 NOTE — PROGRESS NOTES
Department of Obstetrics and Gynecology  Labor and Delivery   Attending Progress Note      SUBJECTIVE:  {gen pregnancy complaints obgyn:308850::\"has no unusual complaints\"}    OBJECTIVE:  Patient Vitals for the past 24 hrs:   BP Temp Temp src Pulse Resp SpO2   04/18/22 0754 (!) 112/95   108  98 %   04/17/22 2009 (!) 155/109 97.9 °F (36.6 °C) Oral 87 16 100 %       Fetal heart rate:       Baseline Heart Rate:  ***        Accelerations:  {FETAL ACCELERATIONS:375094983}       Long Term Variability:  {FETAL VARIABILITY:017313720}       Decelerations:  {FETAL DECELERATIONS:881024797}         Contraction frequency: {CONTRACTION FREQUENCY:629042991}    Fetal Presentation:  {FETAL PRESENTATION:802392329}    Fetal Position:  {FETAL POSITION:738113859}    Membranes:  {MEMBRANES:056810166}    Cervix:         Dilation:  {CERVIX DILATION 1-9CM:159059961}         Effacement:  {CERVIX EFFACEMENT:240268663}         Station:  {CERVIX STATION:544497340}    Abdomen:  {EXAM; ABDOMINAL TENDERNESS:35857}    Extremities:  {Exam; dvt:18417}      Labs:  {594215433}    ASSESSMENT:  {Diagnoses; obstetrics:94097}    PLAN: ***        Efren Peterson MD

## 2022-04-20 LAB — PANCREATIC ELASTASE, FECAL: 358 UG/G

## 2022-04-26 ENCOUNTER — ANCILLARY PROCEDURE (OUTPATIENT)
Dept: OBGYN CLINIC | Age: 37
End: 2022-04-26
Payer: COMMERCIAL

## 2022-04-26 ENCOUNTER — INITIAL PRENATAL (OUTPATIENT)
Dept: OBGYN CLINIC | Age: 37
End: 2022-04-26
Payer: COMMERCIAL

## 2022-04-26 VITALS
SYSTOLIC BLOOD PRESSURE: 119 MMHG | HEART RATE: 94 BPM | BODY MASS INDEX: 25.43 KG/M2 | WEIGHT: 148.13 LBS | DIASTOLIC BLOOD PRESSURE: 80 MMHG

## 2022-04-26 DIAGNOSIS — O21.0 HYPEREMESIS GRAVIDARUM: ICD-10-CM

## 2022-04-26 DIAGNOSIS — O09.291 HISTORY OF IUGR (INTRAUTERINE GROWTH RETARDATION) AND STILLBIRTH, CURRENTLY PREGNANT, FIRST TRIMESTER: ICD-10-CM

## 2022-04-26 DIAGNOSIS — K80.20 SYMPTOMATIC CHOLELITHIASIS: ICD-10-CM

## 2022-04-26 DIAGNOSIS — Z3A.13 13 WEEKS GESTATION OF PREGNANCY: Primary | ICD-10-CM

## 2022-04-26 DIAGNOSIS — F32.A DEPRESSION, UNSPECIFIED DEPRESSION TYPE: ICD-10-CM

## 2022-04-26 DIAGNOSIS — F12.10 MARIJUANA ABUSE: ICD-10-CM

## 2022-04-26 DIAGNOSIS — Z57.9 OCCUPATIONAL EXPOSURE IN WORKPLACE: ICD-10-CM

## 2022-04-26 DIAGNOSIS — K50.10 CROHN'S COLITIS, WITHOUT COMPLICATIONS (HCC): ICD-10-CM

## 2022-04-26 LAB
GLUCOSE URINE, POC: NORMAL
PROTEIN UA: NEGATIVE

## 2022-04-26 PROCEDURE — 99215 OFFICE O/P EST HI 40 MIN: CPT | Performed by: OBSTETRICS & GYNECOLOGY

## 2022-04-26 PROCEDURE — 81002 URINALYSIS NONAUTO W/O SCOPE: CPT | Performed by: OBSTETRICS & GYNECOLOGY

## 2022-04-26 PROCEDURE — 76813 OB US NUCHAL MEAS 1 GEST: CPT | Performed by: OBSTETRICS & GYNECOLOGY

## 2022-04-26 PROCEDURE — 99203 OFFICE O/P NEW LOW 30 MIN: CPT | Performed by: OBSTETRICS & GYNECOLOGY

## 2022-04-26 PROCEDURE — 1036F TOBACCO NON-USER: CPT | Performed by: OBSTETRICS & GYNECOLOGY

## 2022-04-26 PROCEDURE — 1111F DSCHRG MED/CURRENT MED MERGE: CPT | Performed by: OBSTETRICS & GYNECOLOGY

## 2022-04-26 PROCEDURE — 76801 OB US < 14 WKS SINGLE FETUS: CPT | Performed by: OBSTETRICS & GYNECOLOGY

## 2022-04-26 PROCEDURE — G8427 DOCREV CUR MEDS BY ELIG CLIN: HCPCS | Performed by: OBSTETRICS & GYNECOLOGY

## 2022-04-26 PROCEDURE — G8419 CALC BMI OUT NRM PARAM NOF/U: HCPCS | Performed by: OBSTETRICS & GYNECOLOGY

## 2022-04-26 PROCEDURE — 99417 PROLNG OP E/M EACH 15 MIN: CPT | Performed by: OBSTETRICS & GYNECOLOGY

## 2022-04-26 RX ORDER — FAMOTIDINE 20 MG/1
20 TABLET, FILM COATED ORAL 2 TIMES DAILY
Qty: 60 TABLET | Refills: 3 | Status: SHIPPED
Start: 2022-04-26 | End: 2022-10-10

## 2022-04-26 SDOH — HEALTH STABILITY - PHYSICAL HEALTH: OCCUPATIONAL EXPOSURE TO UNSPECIFIED RISK FACTOR: Z57.9

## 2022-04-26 NOTE — PATIENT INSTRUCTIONS
Patient Education        Weeks 10 to 14 of Your Pregnancy: Care Instructions  Overview     By weeks 10 to 15 of your pregnancy, the placenta has formed inside your uterus. The placenta's main job is to give your baby oxygen and nutrientsthrough the umbilical cord. It's possible to hear your baby's heartbeat with a special ultrasound device. Your baby's organs are developing. The arms and legs can bend. This is a good time to think about testing for birth defects. There are two types of tests: screening and diagnostic. Screening tests show the chance that a baby has a certain birth defect. They can't tell you for sure that your babyhas a problem. Diagnostic tests show if a baby has a certain birth defect. It's your choice whether to have these tests. You and your partner can talk toyour doctor or midwife about tests for birth defects. Follow-up care is a key part of your treatment and safety. Be sure to make and go to all appointments, and call your doctor if you are having problems. It's also a good idea to know your test results and keep alist of the medicines you take. How can you care for yourself at home? Decide about tests   You can have screening tests and diagnostic tests to check for birth defects. The decision to have a test for birth defects is personal. Think about your age, your chance of passing on a family disease, your need to know about any problems, and what you might do after you have the test results. ? Quadruple (quad) blood test. This screening test can be done between 15 and 22 weeks of pregnancy. It checks the amount of four substances in your blood. The doctor looks at these test results, along with your age and other factors, to find out the chance that your baby may have certain problems. ? Amniocentesis. This diagnostic test is used to look for chromosomal problems in the baby's cells.  It can be done between 15 and 20 weeks of pregnancy, usually around week 16.  ? Nuchal translucency test. This test uses ultrasound to measure the thickness of the area at the back of the baby's neck. An increase in the thickness can be an early sign of Down syndrome. ? Chorionic villus sampling (CVS). This is a test that looks for certain genetic problems with your baby. The same genes that are in your baby are in the placenta. A small piece of the placenta is taken out and tested. This test is done when you are 10 to 13 weeks pregnant. Ease discomfort   Slow down and take naps when you feel tired.  If your emotions swing, talk to someone.  If your gums bleed, try a softer toothbrush. If your gums are puffy and bleed a lot, see your dentist.  Eulogio Ramires If you feel dizzy:  ? Get up slowly after sitting or lying down. ? Drink plenty of fluids. ? Eat small snacks to keep your blood sugar stable. ? Put your head between your legs as though you were tying your shoelaces. ? Lie down with your legs higher than your head. Use pillows to prop up your feet.  If you have a headache:  ? Lie down. ? Ask your partner or a good friend for a neck massage. ? Try cool cloths over your forehead or across the back of your neck. ? Use acetaminophen (Tylenol) for pain relief. Do not use nonsteroidal anti-inflammatory drugs (NSAIDs), such as ibuprofen (Advil, Motrin) or naproxen (Aleve), unless your doctor says it is okay.  If you have a nosebleed, pinch your nose gently, and hold it for a short while. To prevent nosebleeds, try massaging a small dab of petroleum jelly, such as Vaseline, in your nostrils.  If your nose is stuffed up, try saline (saltwater) nose sprays. Do not use decongestant sprays. Care for your breasts   Wear a bra that gives you good support.  Know that changes in your breasts are normal.  ? Your breasts may get larger and more tender. Tenderness usually gets better by 12 weeks. ? Your nipples may get darker and larger, and small bumps around your nipples may show more. ?  The veins in your chest and breasts may show more. Where can you learn more? Go to https://chpepiceweb.healthRadiation Monitoring Devicespartners. org and sign in to your [employer] account. Enter J213 in the [facility] box to learn more about \"Weeks 10 to 14 of Your Pregnancy: Care Instructions. \"     If you do not have an account, please click on the \"Sign Up Now\" link. Current as of: June 16, 2021               Content Version: 13.2  © 3004-1236 Healthwise, [employer]. Care instructions adapted under license by [facility] ([facility]). If you have questions about a medical condition or this instruction, always ask your healthcare professional. [place] [address] any warranty or liability for your use of this information. Patient Education        Learning About When to Call Your Doctor During Pregnancy (Up to 20 Weeks)  Overview     It's common to have concerns about what might be a problem during your pregnancy. Most pregnancies don't have any serious problems. But it's stillimportant to know when to call your doctor if you have certain symptoms. These are general suggestions. Your doctor may give you some more informationabout when to call. When to call your doctor (up to 20 weeks)  Call 911 anytime you think you may need emergency care. For example, call if:   You passed out (lost consciousness). Call your doctor now or seek immediate medical care if:   You have a fever.  You have vaginal bleeding.  You are dizzy or lightheaded, or you feel like you may faint.  You have symptoms of a urinary tract infection. These may include:  ? Pain or burning when you urinate. ? A frequent need to urinate without being able to pass much urine. ? Pain in the flank, which is just below the rib cage and above the waist on either side of the back. ? Blood in your urine.  You have belly pain.  You think you are having contractions.  You have a sudden release of fluid from your vagina.   Watch closely for changes in your health, and be sure to contact your doctor if:   You have vaginal discharge that smells bad.  You have other concerns about your pregnancy. Follow-up care is a key part of your treatment and safety. Be sure to make and go to all appointments, and call your doctor if you are having problems. It's also a good idea to know your test results and keep alist of the medicines you take. Where can you learn more? Go to https://chpeirvinewmatthew.[employer] net. org and sign in to your [employer] account. Enter W221 in the [employer] box to learn more about \"Learning About When to Call Your Doctor During Pregnancy (Up to 20 Weeks). \"     If you do not have an account, please click on the \"Sign Up Now\" link. Current as of: June 16, 2021               Content Version: 13.2  © 7426-1165 Healthwise, [employer]orporated. Care instructions adapted under license by [facility] ([facility]). If you have questions about a medical condition or this instruction, always ask your healthcare professional. [address] any warranty or liability for your use of this information. Please arrive for your scheduled appointment at least 15 minutes early with your actual insurance card+ a photo ID. Also if you need any refills ordered or have questions, it may take up 48 hours to reply. Please allow ample time for your refills. Call me when you use last refill. Thank you for your cooperation. Call your primary obstetrician with bleeding, leaking of fluid, abdominal tenderness, headache, blurry vision, epigastric pain and increased urinary frequency. If you are experiencing an emergency and need immediate help, call 911 or go to go emergency room or labor and delivery. if you are sick, not feeling well or have an infectious process going on please reschedule your appointment by calling [phone]. Also if any family members are not feeling well, please do not bring them to your appointment. We appreciate your cooperation.  We are doing this in order to protect our pregnant mothers+ their babies. if you are sick, not feeling well or have an infectious process going on please reschedule your appointment by calling [phone]. Also if any family members are not feeling well, please do not bring them to your appointment. We appreciate your cooperation. We are doing this in order to protect our pregnant mothers+ their babies.

## 2022-04-26 NOTE — PROGRESS NOTES
Pt here for initial pregnancy ultrasound  Pt c/o mild cramping yesterday but no bleeding or lof  Pt has been in hospital 4 times since February for bouts of Chron's/hyperemesis

## 2022-04-26 NOTE — LETTER
[facility] Maternal Fetal Medicine  [address] [facility] [address]  Phone: [phone]  Fax: [phone]           Elaine Rg MD      2022     Patient: Rich Michaud   MR Number: [id number]   YOB: 1985   Date of Visit: 2022       Dear Dr. Kirsten Candelaria: Thank you for referring Diane Oquendo to me for evaluation/treatment. Below are the relevant portions of my assessment and plan of care. If you have questions, please do not hesitate to call me. I look forward to following [employer] along with you. Sincerely,        Elaine Rg MD    CC providers:  Celina Snider MD  [address] [address]  [facility]       2022      Celina Snider MD  [id number]      RE:  Chato Huggins  : 1985   AGE: 29 y.o. This report has been created using voice recognition software. It may contain errors which are inherent in voice recognition technology. Dear Dr. Kirsten Candelaria:    I had the pleasure of meeting with Ms. Ayala for a consultation. As you know, Ms. Katiuska Schmitz is a 29 y.o. [id number] at 13w1d (LMP = [address]) who was referred to our office for counseling secondary to Crohn's disease in pregnancy. The patient's medical records and laboratory workup were reviewed. The patient's history was reviewed and outlined below. Today, Ms. Ayala reports that she feels well. She denies any symptoms of leaking of fluid, vaginal bleeding, and/or contractions. She had a fetal ultrasound that was notable for the following. There is a single intrauterine gestation in a variable presentation with a heart rate of 156 beats per minute. The placenta is anterior. The crown rump length is consistent with 13w3d. The nuchal translucency is normal at 1.3 mm. The nasal bone was visualized.       PAST OBSTETRICAL HISTORY:  2004 -- 42 week  of a 7lb 8oz Negative for congenital abnormalities, autism, genetic disease and mental retardation, not listed above. Cancer Mother -- breast, cervical, skin, leukemia  CAD Father  CVA Father, Mother TIA  Congenital anomalies None  DM Daughter, T1DM, maternal side  DVT None  Bleeding disorders None  Autoimmune disorders None    Review of Systems :   CONSTITUTIONAL : No fever, no chills   HEENT : No headache, no visual changes, no rhinorrhea, no sore throat   CARDIOVASCULAR : No pain, no palpitations, no edema   RESPIRATORY : No pain, no shortness of breath   GASTROINTESTINAL : No N/V, no D/C, no abdominal pain   GENITOURINARY : No dysuria, hematuria and no incontinence   MUSCULOSKELETAL : No myalgia, No back pain  NEUROLOGICAL : No numbness, no tingling, no tremors. No history of seizures  ALL OTHER SYSTEMS WERE REPORTED AS NEGATIVE. PERTINENT PHYSICAL EXAMINATION:   /80   Pulse 94   Wt 148 lb 2 oz (67.2 kg)   LMP 01/24/2022   BMI 25.43 kg/m²     Urine dipstick:   Negative for Glucose    Negative for Albumin      GENERAL:   The patient is a well developed, female who is alert cooperative and oriented times three in no acute distress. HEENT:  Normo cephalic and atraumatic. No facial edema. ABDOMEN:   Her uterus is gravid. She had no complaint of abdominal pain or tenderness. EXTREMITIES:  No peripheral edema is noted. A fetal ultrasound assessment was performed today. A report is enclosed for your review. A long conversation was had with the patient regarding her pregnancy and management. The following counseling was provided to the patient:      Assessment & Plan:  29 y.o. [id number] at 13w1d (LMP = 9 wk US) with:    1. Crohn's disease --  The patient reports a history of Crohn's disease. She reports that she was diagnosed in 2015 or 2016. She follows with Dr. Brenda Pollard. She now follows with Dr. Ayan Thrasher. She is not currently medicated for her Crohn's disease.  With flares, she describes having abdominal pain and diarrhea. She has been having symptoms every 3 weeks. She reports that her weight has been stable. She denies gaining or losing a significant amount of weight. She weighed 148 pounds. Her BMI is 25. The patient was counseled that overall women with inflammatory bowel disease can do well in pregnancy. Generally, if the disease is quiescent at the time of conception, then the risk for relapse during pregnancy is only 30%. However, if the disease is active at the time of conception then the risk for relapse during pregnancy is 70%. Women with inflammatory bowel disease may be at increased risk for obstetric complications such as antepartum bleeding, poor fetal growth, nutritional deficiencies, anemia, and  delivery. Generally, breastfeeding is considered safe and does not appear to affect the disease course. However, women with inflammatory bowel disease often chose not to breastfeed because they were advised not to, because of medication use, and/or personal choice. The patient was counseled that there is also an increased risk for inflammatory bowel disease on her child(jorge). First-degree relatives of patients with IBD are approximately 3 to 20 times more likely to develop IBD as compared with the general population. Thus, this information should be relayed to the pediatrician caring for the child after delivery. The patient should continue to follow with her gastroenterologist during pregnancy. She should have a baseline nutrition panel (CBC, ferritin, folate, vitamin B12, vitamin D 25OH, calcium). Testing was ordered. Given the association with antiphospholipid antibodies and inflammatory bowel disease, screening for antiphospholipid antibodies was recommended. Testing was ordered. A screening ANNEL also recommended. This should be ordered with her next set of labs.     Fetal growth should be followed serially, every 3-4 weeks beginning at 24-26 weeks' gestation. Fetal kick counts should be monitored daily starting at 28 weeks' gestation. Additional surveillance may be indicated should any additional complications arise. 2.   Nausea and vomiting of pregnancy  -- The patients reports having nausea and vomiting the entire pregnancy. She reports having emesis 2-3 times per day. The patient reports daily symptoms of nausea and decreased appetite. She denies any signs of symptoms of dehydration. Precautions were reviewed. She reports that her weight has remained stable. She denies gaining or losing any significant amount of weight. She weighed 148 pounds today. Her BMI was 25. Her prenatal records were reviewed. On March 14, she weighed 138 pounds. On April 8 she also weighed 138 pounds. The patient has been using Zofran, Reglan, vitamin B6, and Unisom for symptoms. Secondary to the patient's continued nausea and vomiting, Pepcid was also recommended. A prescription was provided today. The patient was encouraged to eat small amounts of food every 2-3 hours during the day. She was also encouraged to stay well-hydrated. A baseline nutrition panel (CBC, CMP, magnesium, ferritin, folate, vitamin B12, vitamin D 25 [place]) was recommended. This was ordered today. 3.  Asthma -- The patient reports a history of asthma. She denies having any recent issues. She denies any history of hospitalizations, steroid treatments, and or intubations related to her asthma. She reports that her symptoms are stable. She rarely uses her rescue inhaler. The patient was counseled regarding the implications of asthma in pregnancy. The majority of patients have symptoms that remain stable or improve. In 1/3 of patients, symptoms worsen. Precautions were reviewed and she will need to step up medical therapy if she is using her rescue inhaler >2 times per week.   Poorly controlled asthma is associated with an increased risk for poor fetal growth, hypertensive disorders,  birth, and fetal loss. Fetal growth should be monitored serially, every 3 to 4 weeks beginning at 24 to 26 weeks gestation. A flu vaccination is recommended for all pregnant women regardless of gestational age. This vaccination is especially important in women with underlying asthma. 4.  Right lower quadrant/flank pain, stable/improving -- The patient was hospitalized from  through  secondary to nausea, vomiting, and concern for appendicitis. She was provided with supportive care and her nausea and vomiting symptoms improved. She was also treated with antibiotics, cefoxitin. An MRI was completed on 2022. Per that report, a structure likely representing the appendix was seen in the right lower quadrant. The stricture was dilated and measured 1 cm and demonstrates wall thickening concerning for acute appendicitis. A small amount of free fluid was seen within the pelvis. The patient was approximately 2 weeks pregnant at that time. The patient returned to the hospital on 3/28/2022 with complaints of worsening right lower quadrant pain. She also has complaints of nausea and vomiting. The patient had a repeat MRI on 3/28/2022. Per the impression, no evidence of acute findings were noted in the abdomen or pelvis. Specifically, there was no evidence of acute appendicitis. She had a consultation with surgery. No surgical intervention was recommended given the appendix now appeared normal.    Her hospitalization, she had a consultation with psychiatry and was started on Zoloft for depression. She was treated with supportive care and was discharged home in stable condition on 2022. The patient's symptoms were attributed to her Crohn's disease. Today, the patient reports that her pain symptoms are stable/improving.   The patient was counseled to monitor her symptoms and return with persistent/worsening pain or the development of any associated fevers, chills, or worsening nausea and vomiting. 5.  Anxiety/depression -- The patient reports a history of anxiety and depression for which she takes Zoloft, 25 mg daily. She has an appointment with baby on board this week. She is starting counseling. During the patient's hospitalization in March, she had complaints of worsening depression symptoms. She had a consultation with psychiatry and Zoloft was started. She reports that her mood has improved. She denies any suicidal or homicidal ideations. The patient was counseled regarding the risks of depression and anxiety in pregnancy including worsening symptoms during pregnancy and postpartum, poor fetal growth and  birth. The risks and benefits of treatment were also reviewed. I recommend continuing to monitor the patient's mood throughout pregnancy and postpartum. With recurrent or worsening symptoms, a referral should be made for counseling. Additionally, medication dosing may need to be adjusted. Because of the association of thyroid disease and depression, a baseline thyroid screening is recommended with a TSH, free T4, and thyroid peroxidase antibody. Additionally, depression can be associated with nutritional deficiencies, thus, a baseline nutrition panel is also recommended (CBC, CMP, magnesium, ferritin, folate, vitamin B12, vitamin D 25OH). Testing was ordered. Because of the increased risk for poor fetal growth, fetal growth to be monitored serially, every 3 to 4 weeks starting at 24 to 26 weeks gestation. The patient should monitor fetal kick counts daily starting at 28 weeks gestation. Counseling was provided regarding the risks and benefits of Zoloft use in pregnancy and breast-feeding. Specific to Zoloft, it dose cross the placenta and there is fetal exposure. First trimester exposure is not thought to cause an increased risk for major birth defects.  There may be an association with congenital heart defects. Exposure in the late third trimester is associated with  respiratory distress, cyanosis, apnea, seizures, temperature instability, feeding difficult, emesis, hypoglycemia, hypo/hypertonia, hyperreflexia, irritability, constant crying, and/or tremor. Symptoms may be due to withdrawal.  Persistent pulmonary hypertension of the  ([facility]) has also been reported with SSRI exposure. The long-term effects of in utero SSRI exposure on infant development and behavior are not known. As with any medication, the risk and benefit of use must be weighed. Again, there are risks associated with untreated depression including poor fetal growth and  delivery. The patient indicated that at this time, she did not feel that she could safely taper off of the medication. Thus, the benefit of treatment would outweigh risk at this time. Dose adjustment should be based on patient symptoms. Pregnant women exposed to antidepressants during pregnancy are encouraged to enroll in the [employer]olidated [place] Pregnancy Registry for Antidepressants (NPRAD). Women 25to 39years of age or their health care providers may contact the registry by calling [phone]. Enrollment should be done as early in pregnancy as possible. She was also counseled regarding the risks and benefits of these medications during breast-feeding. Sertraline and its active metabolite are present in breastmilk. The relative infant dose (RID) is estimated to be 0.5-3%. Typically, breast-feeding is considered acceptable when the RID is <10%. When the RID is >25%, breast-feeding should be avoided. Some studies have reported adverse events in breast-feeding infants following maternal use of sertraline. Neonates of mothers taking psychotropic medication should be monitored daily for changes in sleep, feeding patterns, and/or behavior. Infant growth and development should be monitored per protocol.  SSRI use during pregnancy may cause delayed lactation. Sertraline is a first-line agent for postpartum depression in women who have breast-feeding. Women whose symptoms are well controlled with sertraline during pregnancy can continue use while breast-feeding if there are no other contraindications. Per the , the decision to breast-feed during therapy should take into account risks and benefits and be individualized to the patient. 6.  Family history of diabetes -- The patient's daughter has type 1 diabetes. Generally, if a child has type 1 diabetes, then the risk of type 1 diabetes in a sibling is estimated to be 5%. If the sibling is an identical twin, the risk may be as high as 50%. This family history should be relayed to the pediatrician who cares for the . 7.  History of elevated blood pressure -- The patient's blood pressure was noted to be elevated during her hospitalization. The patient denies a history of chronic hypertension. She stated that the blood pressure elevation was likely secondary to her pain. The patient's blood pressure was normal at 119/80. Her urine dip was negative for protein. I recommend continued blood pressure monitoring. 8.  Tobacco use in pregnancy -- The patient reports she quit smoking when she found out she was pregnant. She was congratulated and encouraged to remain tobacco free. She was smoking < 1 pack per day. She was counseled regarding the increased risks of smoking in pregnancy including poor fetal growth, placental abruption, PPROM/ birth, and fetal loss. Additional  risks were reviewed including asthma, allergies, infection, and an association with SIDS. She was urged to remain tobacco free through the pregnancy and postpartum. 9.  THC Use --  The patient reports that she quit smoking marijuana 2 weeks ago. Patient reports that she was using marijuana for her mood and nausea. Counseling was provided to the patient.     The patient was counseled regarding risk of neurodevelopmental issues in children exposed to [facility] during pregnancy. Additionally, marijuana use may pose risks similar to that of cigarette use including increased risk for poor fetal growth, placental bleeding, PPROM/ delivery, and stillbirth. She was counseled not to use THC while pregnant. Random urine drug screens should be monitored during pregnancy. 10.  History of IUGR/poor fetal growth -- The patient's pregnancy is in  and 6333 were complicated by intrauterine growth restriction. The patient was counseled that based on her history, she is at increased risk, ~23%, for having another pregnancy complicated by poor fetal growth. There also appears to be an increased risk for other complications including  delivery, preeclampsia, placental abruption, and fetal loss in subsequent pregnancies. Fetal growth should be monitored serially, every 3 to 4 weeks starting at 24 to 26 weeks gestation. A baseline maternal evaluation is recommended including a baseline nutrition panel, antiphospholipid antibody screening and thyroid function studies. She was counseled regarding the potential utility of LDASA in reducing her risk for poor fetal growth. The risks and benefits were reviewed. She should take a daily low-dose aspirin for the remainder of pregnancy and 6 to 8 weeks postpartum. 6.   Advanced maternal age  -- The patient was counseled regarding the implications of advanced maternal age in pregnancy, specifically that of aneuploidy. At age 29, the risk of having a child with trisomy 24 is ~1/240 and the risk for any chromosomal abnormality may be as high as 1/180. Today's ultrasound was reviewed with the patient. The crown-rump length was appropriate for the gestational age, the nuchal translucency was normal, and the nasal bone was seen. No markers for aneuploidy were noted today.      Her options for genetic screening with cfDNA and/or diagnostic testing with amniocentesis were discussed. The risks and benefits were reviewed. The patient stated that she previously completed screening with NIPT. Her results were normal for review. The patient reports that the results were low risk and she is having a boy. Since the patient had early screening with NIPT, a maternal serum AFP is recommended between 15 and 22 weeks to screen for open neural tube defects. Recent studies have reported that there may be an increased risk for fetal loss in the setting of advanced maternal age. Thus, increased surveillance is recommended. I recommend monitoring fetal growth serially, every 4 weeks beginning at 24-26 weeks' gestation. She should monitor fetal kick counts daily starting at 28 weeks' gestation. At 36 weeks' gestation, she should be scheduled for a weekly non stress test or biophysical profile. I recommend delivery at 39-40 week's gestation. Given there is an increased risk for hypertensive disorders of pregnancy in the setting of advanced maternal age, the possible utility of a low dose aspirin in reducing her risk for hypertensive disorders of pregnancy was reviewed. The risks and benefits of low dose aspirin were discussed. She was counseled that she could take 81 mg of aspirin, daily. 12.  History of oligohydramnios -- The patient reports that her pregnancy in 1548 was complicated by intrauterine growth restriction and oligohydramnios at 35 weeks 4 days. She was induced secondary to these complications. She denies any associated PROM. The patient was counseled that given this history, she would be increased risk for recurrent oligohydramnios. Given this history, increased surveillance is recommended. Fetal growth should be monitored serially, every 3-4 weeks beginning a 24-26 weeks' gestation. The amniotic fluid can be assessed during the studies.  The patient could also start taking a low-dose aspirin in reducing the risk for placental insufficiency. She can take 81 mg daily. 13.   Genital herpes -- The patient reports a history of genital herpes. She denies having any recent outbreaks. The patient was counseled that because she has genital herpes, suppression is recommended at 36 weeks gestation to minimize the risk for viral shedding or an outbreak at the time of delivery. If any lesions are present, then a  would be recommended for delivery. Additionally, any outbreaks should be treated during pregnancy. Precautions were reviewed with the patient. 14.  History of a blood transfusion -- The patient reports a history of a blood transfusion as an infant. Per the patient, she was admitted to the NICU after birth and contracted some type of infection. She required a blood transfusion. Given this history, baseline screening for hepatitis c is recommended. Orders were placed today. 13.  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 She was counseled that she may be at increased risk for viral exposures such as CMV and parvovirus. She was given orders to have baseline screening for these viruses. Precautions were reviewed. 12.  Family history of cancer -- The patient's family history was reviewed and notable in that her mother had breast cancer. Given this family history, genetic counseling should be considered. She was counseled that if interested, your office could refer her for counseling to determine if genetic screening/testing is indicated. The patient expressed verbal understanding of this counseling. 16.  Vaccination in pregnancy -- The patient was counseled regarding the recommendations for vaccination in pregnancy. The patient was counseled regarding the recommendations for the Tdap vaccination in pregnancy. Risks and benefits were discussed.  The vaccination is typically administered between 27 and 36 weeks gestation and recommended to confer protection against whooping cough to the . The patient plans to discuss this with her primary provider at her next visit. The patient was also counseled that her partner in any individuals who will be in close contact with the  should also be vaccinated for whooping cough. The patient was counseled regarding recommendation for the flu vaccination in pregnancy for both maternal and infant safety. Risks and benefits were reviewed. She was encouraged to have this vaccination as an outpatient. Counseling was provided regarding the recommendation for the Covid vaccination in pregnancy. I advised the patient that the [facility] of Obstetrics and Gynecology and The Society for Maternal Fetal Medicine recommend that all pregnant women be vaccinated for COVID-19. \"Data have shown that COVID-19 infection puts pregnant people at increased risk of severe complications and even death; yet only about 22% of pregnant individuals have received 1 more doses of COVID-19 vaccine according to the [employer] for Disease Control and Prevention. \"    \" Recent data have shown that more than 95% of those were hospitalized and/or dying from COVID-19 are those who have remained unvaccinated. Pregnant individuals who have decided to wait until after delivery to be vaccinated may be inadvertently exposing themselves to an increased risk of severe illness or death. \"     \" COVID-19 vaccination is the best testing to reduce maternal and fetal complications of YWDRT-53 infection among pregnant people,\" said Anthony Niño MD, president of the Society for Maternal Fetal Medicine, sub-specialists. The patient was counseled that in the event she opts not to have the Covid vaccination, she should alert her provider immediately if she test positive for Covid. Pregnant women are on the priority list for treatment with monoclonal antibody.   This intervention has been shown to decrease the risk for hospitalization and complications related to Covid in pregnancy. The patient expressed verbal understanding of this counseling. --The patient was advised to call if she has any increased vaginal discharge, vaginal bleeding, contractions, abdominal pain, back pain or any new significant symptomatology prior to her next visit. I advised her that these are signs and symptoms of cervical change and require follow-up assessment when they occur. Preeclampsia precautions were also reviewed with the patient. --I requested the patient return for a follow-up assessment in 3-4 weeks unless there is a clinical reason for her to return prior to that time. She is to call if she has any problems or questions prior to her next visit. Further evaluation and management will be dependent on her clinical presentation and the results of her testing. --The patient is to continue to follow with you in your office for ongoing obstetric care. --The total time spent on today's visit was 80 minutes. This included preparation for the visit (i.e. reviewing prior external notes and test results), performance of a medically appropriate history and examination, counseling, orders for medications, tests or other procedures, and coordination of care. Greater than 50% of the time was spent face-to-face with the patient. This time is exclusive of procedures performed. --At the conclusion of the visit, the patient appeared to have a good understanding of the issues discussed. I answered all of her questions to her satisfaction. I asked her to call if she had any additional questions prior to her next visit. --Thank you for allowing me to participate in the care of this pleasant patient. Please don't hesitate to call me if you have any questions.       Sincerely,        Samantha Romberg, MD, [address] [place] [address]  [phone]    *All or parts of this note may have been generated using a voice recognition program. There may be typo, grammar, or Word substitution errors that have escaped my review of this note.

## 2022-04-26 NOTE — PROGRESS NOTES
2022      Mireille Salinas MD  [address] [facility]     RE:  Michelle Marks  : 1985   AGE: 29 y.o. This report has been created using voice recognition software. It may contain errors which are inherent in voice recognition technology. Dear Dr. Lucien Muñoz:    I had the pleasure of meeting with Ms. Ayala for a consultation. As you know, Ms. Zandra Goltz is a 29 y.o. [id number] at 13w1d (LMP = [address]) who was referred to our office for counseling secondary to Crohn's disease in pregnancy. The patient's medical records and laboratory workup were reviewed. The patient's history was reviewed and outlined below. Today, Ms. Ayala reports that she feels well. She denies any symptoms of leaking of fluid, vaginal bleeding, and/or contractions. She had a fetal ultrasound that was notable for the following. There is a single intrauterine gestation in a variable presentation with a heart rate of 156 beats per minute. The placenta is anterior. The crown rump length is consistent with 13w3d. The nuchal translucency is normal at 1.3 mm. The nasal bone was visualized. PAST OBSTETRICAL HISTORY:  2004 -- 42 week  of a 7lb 8oz female (Soo). She denies having any other complications with the pregnancy, delivery, and/or postpartum recovery. She reports that her daughter is living and well. Her daughter has type 1 diabetes. Partner #1     -- 11 wk MAB, D&C, partner #2    2012 -- 35w4d IOL for oligohydramnios and IUGR. She delivered a 4lb 8oz male (Karin Swift). She had hyperemesis. She denies having any other complications with the pregnancy, delivery, and/or postpartum recovery. She reports that her son is living and well. He had a reaction to his 3 yo shots, he had Guillain barre. Partner #2    2014 -- 37 week IOL for IUGR. She delivered a 5lb 1oz male Claudette Lab). She had hyperemesis.   She denies having any other complications with the pregnancy, delivery, and/or postpartum recovery. She reports that her son is living and well. Partner #3    11/2021 -- Early SAB, no D&C, partner #4    Current pregnancy, partner #4      PAST GYNECOLOGICAL  HISTORY:  Negative for abnormal pap smears. Positive for sexually transmitted diseases. Trichomonas in 2014; history of genital HSV  Negative for cervical LEEP / conization /cryosurgery. Negative for uterine surgery. Negative for ovarian or tubal surgery. PAST MEDICAL HISTORY:    MEDICATIONS:  Prenatal vitamin  Zoloft  Zofran   Reglan  B6  Unisom  Vitamin D  Flonase    ILLNESSES:  Crohn's Disease  Anxiety/Depression  Asthma    BLOOD TRANSFUSIONS:  As an infant, blood infection    IMMUNIZATIONS:  Up-to-date, no flu vaccine, s/p COVID vaccine    SURGERIES:  Hernia repair (right groin)  She denies any issues with anesthesia    HOSPITALIZATIONS:  Child birth, surgery, as an infant for infection    ALLERGIES:  NKDA, she denies any latex or shellfish allergies    SOCIAL HISTORY:  She quit smoking on February 15th. She stopped smoking THC 2 weeks ago. She denies any alcohol use. 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 FAMILY MEDICAL HISTORY:   Negative for congenital abnormalities, autism, genetic disease and mental retardation, not listed above.      Cancer Mother -- breast, cervical, skin, leukemia  CAD Father  CVA Father, Mother TIA  Congenital anomalies None  DM Daughter, T1DM, maternal side  DVT None  Bleeding disorders None  Autoimmune disorders None    Review of Systems :   CONSTITUTIONAL : No fever, no chills   HEENT : No headache, no visual changes, no rhinorrhea, no sore throat   CARDIOVASCULAR : No pain, no palpitations, no edema   RESPIRATORY : No pain, no shortness of breath   GASTROINTESTINAL : No N/V, no D/C, no abdominal pain   GENITOURINARY : No dysuria, hematuria and no incontinence   MUSCULOSKELETAL : No myalgia, No back pain  NEUROLOGICAL : No numbness, no tingling, no tremors. No history of seizures  ALL OTHER SYSTEMS WERE REPORTED AS NEGATIVE. PERTINENT PHYSICAL EXAMINATION:   /80   Pulse 94   Wt 148 lb 2 oz (67.2 kg)   LMP 01/24/2022   BMI 25.43 kg/m²     Urine dipstick:   Negative for Glucose    Negative for Albumin      GENERAL:   The patient is a well developed, female who is alert cooperative and oriented times three in no acute distress. HEENT:  Normo cephalic and atraumatic. No facial edema. ABDOMEN:   Her uterus is gravid. She had no complaint of abdominal pain or tenderness. EXTREMITIES:  No peripheral edema is noted. A fetal ultrasound assessment was performed today. A report is enclosed for your review. A long conversation was had with the patient regarding her pregnancy and management. The following counseling was provided to the patient:      Assessment & Plan:  29 y.o. [id number] at 13w1d (LMP = 9 wk US) with:    1. Crohn's disease --  The patient reports a history of Crohn's disease. She reports that she was diagnosed in 2015 or 2016. She follows with Dr. Ajay Manuel. She now follows with Dr. Roxane Rocha. She is not currently medicated for her Crohn's disease. With flares, she describes having abdominal pain and diarrhea. She has been having symptoms every 3 weeks. She reports that her weight has been stable. She denies gaining or losing a significant amount of weight. She weighed 148 pounds. Her BMI is 25. The patient was counseled that overall women with inflammatory bowel disease can do well in pregnancy. Generally, if the disease is quiescent at the time of conception, then the risk for relapse during pregnancy is only 30%. However, if the disease is active at the time of conception then the risk for relapse during pregnancy is 70%.   Women with inflammatory bowel disease may be at increased risk for obstetric complications such as antepartum bleeding, poor fetal growth, nutritional deficiencies, anemia, and  delivery. Generally, breastfeeding is considered safe and does not appear to affect the disease course. However, women with inflammatory bowel disease often chose not to breastfeed because they were advised not to, because of medication use, and/or personal choice. The patient was counseled that there is also an increased risk for inflammatory bowel disease on her child(jorge). First-degree relatives of patients with IBD are approximately 3 to 20 times more likely to develop IBD as compared with the general population. Thus, this information should be relayed to the pediatrician caring for the child after delivery. The patient should continue to follow with her gastroenterologist during pregnancy. She should have a baseline nutrition panel (CBC, ferritin, folate, vitamin B12, vitamin D 25OH, calcium). Testing was ordered. Given the association with antiphospholipid antibodies and inflammatory bowel disease, screening for antiphospholipid antibodies was recommended. Testing was ordered. A screening ANNEL also recommended. This should be ordered with her next set of labs. Fetal growth should be followed serially, every 3-4 weeks beginning at 24-26 weeks' gestation. Fetal kick counts should be monitored daily starting at 28 weeks' gestation. Additional surveillance may be indicated should any additional complications arise. 2.   Nausea and vomiting of pregnancy  -- The patients reports having nausea and vomiting the entire pregnancy. She reports having emesis 2-3 times per day. The patient reports daily symptoms of nausea and decreased appetite. She denies any signs of symptoms of dehydration. Precautions were reviewed. She reports that her weight has remained stable. She denies gaining or losing any significant amount of weight. She weighed 148 pounds today. Her BMI was 25. Her prenatal records were reviewed. On , she weighed 138 pounds.   On  she also weighed 138 pounds. The patient has been using Zofran, Reglan, vitamin B6, and Unisom for symptoms. Secondary to the patient's continued nausea and vomiting, Pepcid was also recommended. A prescription was provided today. The patient was encouraged to eat small amounts of food every 2-3 hours during the day. She was also encouraged to stay well-hydrated. A baseline nutrition panel (CBC, CMP, magnesium, ferritin, folate, vitamin B12, vitamin D 25 [place]) was recommended. This was ordered today. 3.  Asthma -- The patient reports a history of asthma. She denies having any recent issues. She denies any history of hospitalizations, steroid treatments, and or intubations related to her asthma. She reports that her symptoms are stable. She rarely uses her rescue inhaler. The patient was counseled regarding the implications of asthma in pregnancy. The majority of patients have symptoms that remain stable or improve. In 1/3 of patients, symptoms worsen. Precautions were reviewed and she will need to step up medical therapy if she is using her rescue inhaler >2 times per week. Poorly controlled asthma is associated with an increased risk for poor fetal growth, hypertensive disorders,  birth, and fetal loss. Fetal growth should be monitored serially, every 3 to 4 weeks beginning at 24 to 26 weeks gestation. A flu vaccination is recommended for all pregnant women regardless of gestational age. This vaccination is especially important in women with underlying asthma. 4.  Right lower quadrant/flank pain, stable/improving -- The patient was hospitalized from  through  secondary to nausea, vomiting, and concern for appendicitis. She was provided with supportive care and her nausea and vomiting symptoms improved. She was also treated with antibiotics, cefoxitin. An MRI was completed on 2022.   Per that report, a structure likely representing the appendix was seen in the right lower quadrant. The stricture was dilated and measured 1 cm and demonstrates wall thickening concerning for acute appendicitis. A small amount of free fluid was seen within the pelvis. The patient was approximately 2 weeks pregnant at that time. The patient returned to the hospital on 3/28/2022 with complaints of worsening right lower quadrant pain. She also has complaints of nausea and vomiting. The patient had a repeat MRI on 3/28/2022. Per the impression, no evidence of acute findings were noted in the abdomen or pelvis. Specifically, there was no evidence of acute appendicitis. She had a consultation with surgery. No surgical intervention was recommended given the appendix now appeared normal.    Her hospitalization, she had a consultation with psychiatry and was started on Zoloft for depression. She was treated with supportive care and was discharged home in stable condition on 2022. The patient's symptoms were attributed to her Crohn's disease. Today, the patient reports that her pain symptoms are stable/improving. The patient was counseled to monitor her symptoms and return with persistent/worsening pain or the development of any associated fevers, chills, or worsening nausea and vomiting. 5.  Anxiety/depression -- The patient reports a history of anxiety and depression for which she takes Zoloft, 25 mg daily. She has an appointment with baby on board this week. She is starting counseling. During the patient's hospitalization in March, she had complaints of worsening depression symptoms. She had a consultation with psychiatry and Zoloft was started. She reports that her mood has improved. She denies any suicidal or homicidal ideations. The patient was counseled regarding the risks of depression and anxiety in pregnancy including worsening symptoms during pregnancy and postpartum, poor fetal growth and  birth.   The risks and benefits of treatment were also reviewed. I recommend continuing to monitor the patient's mood throughout pregnancy and postpartum. With recurrent or worsening symptoms, a referral should be made for counseling. Additionally, medication dosing may need to be adjusted. Because of the association of thyroid disease and depression, a baseline thyroid screening is recommended with a TSH, free T4, and thyroid peroxidase antibody. Additionally, depression can be associated with nutritional deficiencies, thus, a baseline nutrition panel is also recommended (CBC, CMP, magnesium, ferritin, folate, vitamin B12, vitamin D 25OH). Testing was ordered. Because of the increased risk for poor fetal growth, fetal growth to be monitored serially, every 3 to 4 weeks starting at 24 to 26 weeks gestation. The patient should monitor fetal kick counts daily starting at 28 weeks gestation. Counseling was provided regarding the risks and benefits of Zoloft use in pregnancy and breast-feeding. Specific to Zoloft, it dose cross the placenta and there is fetal exposure. First trimester exposure is not thought to cause an increased risk for major birth defects. There may be an association with congenital heart defects. Exposure in the late third trimester is associated with  respiratory distress, cyanosis, apnea, seizures, temperature instability, feeding difficult, emesis, hypoglycemia, hypo/hypertonia, hyperreflexia, irritability, constant crying, and/or tremor. Symptoms may be due to withdrawal.  Persistent pulmonary hypertension of the  ([facility]) has also been reported with SSRI exposure. The long-term effects of in utero SSRI exposure on infant development and behavior are not known. As with any medication, the risk and benefit of use must be weighed. Again, there are risks associated with untreated depression including poor fetal growth and  delivery.   The patient indicated that at this time, she did not feel that she could safely taper off of the medication. Thus, the benefit of treatment would outweigh risk at this time. Dose adjustment should be based on patient symptoms. Pregnant women exposed to antidepressants during pregnancy are encouraged to enroll in the [employer] [facility] [employer] Pregnancy Registry for Antidepressants (NPRAD). Women 25to 39years of age or their health care providers may contact the registry by calling [phone]. Enrollment should be done as early in pregnancy as possible. She was also counseled regarding the risks and benefits of these medications during breast-feeding. Sertraline and its active metabolite are present in breastmilk. The relative infant dose (RID) is estimated to be 0.5-3%. Typically, breast-feeding is considered acceptable when the RID is <10%. When the RID is >25%, breast-feeding should be avoided. Some studies have reported adverse events in breast-feeding infants following maternal use of sertraline. Neonates of mothers taking psychotropic medication should be monitored daily for changes in sleep, feeding patterns, and/or behavior. Infant growth and development should be monitored per protocol. SSRI use during pregnancy may cause delayed lactation. Sertraline is a first-line agent for postpartum depression in women who have breast-feeding. Women whose symptoms are well controlled with sertraline during pregnancy can continue use while breast-feeding if there are no other contraindications. Per the , the decision to breast-feed during therapy should take into account risks and benefits and be individualized to the patient. 6.  Family history of diabetes -- The patient's daughter has type 1 diabetes. Generally, if a child has type 1 diabetes, then the risk of type 1 diabetes in a sibling is estimated to be 5%. If the sibling is an identical twin, the risk may be as high as 50%.   This family history should be relayed to the pediatrician who cares for the . 7.  History of elevated blood pressure -- The patient's blood pressure was noted to be elevated during her hospitalization. The patient denies a history of chronic hypertension. She stated that the blood pressure elevation was likely secondary to her pain. The patient's blood pressure was normal at 119/80. Her urine dip was negative for protein. I recommend continued blood pressure monitoring. 8.  Tobacco use in pregnancy -- The patient reports she quit smoking when she found out she was pregnant. She was congratulated and encouraged to remain tobacco free. She was smoking < 1 pack per day. She was counseled regarding the increased risks of smoking in pregnancy including poor fetal growth, placental abruption, PPROM/ birth, and fetal loss. Additional  risks were reviewed including asthma, allergies, infection, and an association with SIDS. She was urged to remain tobacco free through the pregnancy and postpartum. 9.  THC Use --  The patient reports that she quit smoking marijuana 2 weeks ago. Patient reports that she was using marijuana for her mood and nausea. Counseling was provided to the patient. The patient was counseled regarding risk of neurodevelopmental issues in children exposed to [facility] during pregnancy. Additionally, marijuana use may pose risks similar to that of cigarette use including increased risk for poor fetal growth, placental bleeding, PPROM/ delivery, and stillbirth. She was counseled not to use THC while pregnant. Random urine drug screens should be monitored during pregnancy. 10.  History of IUGR/poor fetal growth -- The patient's pregnancy is in  and 1919 were complicated by intrauterine growth restriction. The patient was counseled that based on her history, she is at increased risk, ~23%, for having another pregnancy complicated by poor fetal growth.   There also appears to be an increased risk for other complications including  delivery, preeclampsia, placental abruption, and fetal loss in subsequent pregnancies. Fetal growth should be monitored serially, every 3 to 4 weeks starting at 24 to 26 weeks gestation. A baseline maternal evaluation is recommended including a baseline nutrition panel, antiphospholipid antibody screening and thyroid function studies. She was counseled regarding the potential utility of LDASA in reducing her risk for poor fetal growth. The risks and benefits were reviewed. She should take a daily low-dose aspirin for the remainder of pregnancy and 6 to 8 weeks postpartum. 6.   Advanced maternal age  -- The patient was counseled regarding the implications of advanced maternal age in pregnancy, specifically that of aneuploidy. At age 29, the risk of having a child with trisomy 24 is ~1/240 and the risk for any chromosomal abnormality may be as high as 1/180. Today's ultrasound was reviewed with the patient. The crown-rump length was appropriate for the gestational age, the nuchal translucency was normal, and the nasal bone was seen. No markers for aneuploidy were noted today. Her options for genetic screening with cfDNA and/or diagnostic testing with amniocentesis were discussed. The risks and benefits were reviewed. The patient stated that she previously completed screening with NIPT. Her results were normal for review. The patient reports that the results were low risk and she is having a boy. Since the patient had early screening with NIPT, a maternal serum AFP is recommended between 15 and 22 weeks to screen for open neural tube defects. Recent studies have reported that there may be an increased risk for fetal loss in the setting of advanced maternal age. Thus, increased surveillance is recommended. I recommend monitoring fetal growth serially, every 4 weeks beginning at 24-26 weeks' gestation.   She should monitor fetal kick counts daily starting at 28 weeks' gestation. At 36 weeks' gestation, she should be scheduled for a weekly non stress test or biophysical profile. I recommend delivery at 39-40 week's gestation. Given there is an increased risk for hypertensive disorders of pregnancy in the setting of advanced maternal age, the possible utility of a low dose aspirin in reducing her risk for hypertensive disorders of pregnancy was reviewed. The risks and benefits of low dose aspirin were discussed. She was counseled that she could take 81 mg of aspirin, daily. 12.  History of oligohydramnios -- The patient reports that her pregnancy in  was complicated by intrauterine growth restriction and oligohydramnios at 35 weeks 4 days. She was induced secondary to these complications. She denies any associated PROM. The patient was counseled that given this history, she would be increased risk for recurrent oligohydramnios. Given this history, increased surveillance is recommended. Fetal growth should be monitored serially, every 3-4 weeks beginning a 24-26 weeks' gestation. The amniotic fluid can be assessed during the studies. The patient could also start taking a low-dose aspirin in reducing the risk for placental insufficiency. She can take 81 mg daily. 13.   Genital herpes -- The patient reports a history of genital herpes. She denies having any recent outbreaks. The patient was counseled that because she has genital herpes, suppression is recommended at 36 weeks gestation to minimize the risk for viral shedding or an outbreak at the time of delivery. If any lesions are present, then a  would be recommended for delivery. Additionally, any outbreaks should be treated during pregnancy. Precautions were reviewed with the patient. 14.  History of a blood transfusion -- The patient reports a history of a blood transfusion as an infant.   Per the patient, she was admitted to the NICU after birth and contracted some type of infection. She required a blood transfusion. Given this history, baseline screening for hepatitis c is recommended. Orders were placed today. 13.  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 She was counseled that she may be at increased risk for viral exposures such as CMV and parvovirus. She was given orders to have baseline screening for these viruses. Precautions were reviewed. 12.  Family history of cancer -- The patient's family history was reviewed and notable in that her mother had breast cancer. Given this family history, genetic counseling should be considered. She was counseled that if interested, your office could refer her for counseling to determine if genetic screening/testing is indicated. The patient expressed verbal understanding of this counseling. 16.  Vaccination in pregnancy -- The patient was counseled regarding the recommendations for vaccination in pregnancy. The patient was counseled regarding the recommendations for the Tdap vaccination in pregnancy. Risks and benefits were discussed. The vaccination is typically administered between 27 and 36 weeks gestation and recommended to confer protection against whooping cough to the . The patient plans to discuss this with her primary provider at her next visit. The patient was also counseled that her partner in any individuals who will be in close contact with the  should also be vaccinated for whooping cough. The patient was counseled regarding recommendation for the flu vaccination in pregnancy for both maternal and infant safety. Risks and benefits were reviewed. She was encouraged to have this vaccination as an outpatient. Counseling was provided regarding the recommendation for the Covid vaccination in pregnancy.   I advised the patient that the [employer] of Obstetrics and Gynecology and The Society for Maternal Fetal Medicine recommend that all pregnant women be vaccinated for COVID-19. \"Data have shown that COVID-19 infection puts pregnant people at increased risk of severe complications and even death; yet only about 22% of pregnant individuals have received 1 more doses of COVID-19 vaccine according to the [employer] for Disease Control and Prevention. \"    \" Recent data have shown that more than 95% of those were hospitalized and/or dying from COVID-19 are those who have remained unvaccinated. Pregnant individuals who have decided to wait until after delivery to be vaccinated may be inadvertently exposing themselves to an increased risk of severe illness or death. \"     \" COVID-19 vaccination is the best testing to reduce maternal and fetal complications of [id number] infection among pregnant people,\" said Sohail Zambrano MD, president of the Society for Maternal Fetal Medicine, sub-specialists. The patient was counseled that in the event she opts not to have the Covid vaccination, she should alert her provider immediately if she test positive for Covid. Pregnant women are on the priority list for treatment with monoclonal antibody. This intervention has been shown to decrease the risk for hospitalization and complications related to Covid in pregnancy. The patient expressed verbal understanding of this counseling. --The patient was advised to call if she has any increased vaginal discharge, vaginal bleeding, contractions, abdominal pain, back pain or any new significant symptomatology prior to her next visit. I advised her that these are signs and symptoms of cervical change and require follow-up assessment when they occur. Preeclampsia precautions were also reviewed with the patient. --I requested the patient return for a follow-up assessment in 3-4 weeks unless there is a clinical reason for her to return prior to that time. She is to call if she has any problems or questions prior to her next visit.  Further evaluation and management will be dependent on her clinical presentation and the results of her testing. --The patient is to continue to follow with you in your office for ongoing obstetric care. --The total time spent on today's visit was 80 minutes. This included preparation for the visit (i.e. reviewing prior external notes and test results), performance of a medically appropriate history and examination, counseling, orders for medications, tests or other procedures, and coordination of care. Greater than 50% of the time was spent face-to-face with the patient. This time is exclusive of procedures performed. --At the conclusion of the visit, the patient appeared to have a good understanding of the issues discussed. I answered all of her questions to her satisfaction. I asked her to call if she had any additional questions prior to her next visit. --Thank you for allowing me to participate in the care of this pleasant patient. Please don't hesitate to call me if you have any questions. Sincerely,        Nadeen Neal MD, [address] [place] [address]  [phone]    *All or parts of this note may have been generated using a voice recognition program. There may be typo, grammar, or Word substitution errors that have escaped my review of this note.

## 2022-04-27 ENCOUNTER — HOSPITAL ENCOUNTER (OUTPATIENT)
Age: 37
Discharge: HOME | End: 2022-04-27
Payer: COMMERCIAL

## 2022-04-27 DIAGNOSIS — F32.A DEPRESSION, UNSPECIFIED DEPRESSION TYPE: ICD-10-CM

## 2022-04-27 DIAGNOSIS — K50.10 CROHN'S COLITIS, WITHOUT COMPLICATIONS (HCC): ICD-10-CM

## 2022-04-27 DIAGNOSIS — Z3A.13 13 WEEKS GESTATION OF PREGNANCY: ICD-10-CM

## 2022-04-27 DIAGNOSIS — O09.291 HISTORY OF IUGR (INTRAUTERINE GROWTH RETARDATION) AND STILLBIRTH, CURRENTLY PREGNANT, FIRST TRIMESTER: ICD-10-CM

## 2022-04-27 DIAGNOSIS — Z57.9 OCCUPATIONAL EXPOSURE IN WORKPLACE: ICD-10-CM

## 2022-04-27 DIAGNOSIS — O21.0 HYPEREMESIS GRAVIDARUM: ICD-10-CM

## 2022-04-27 LAB
ALBUMIN SERPL-MCNC: 4 G/DL (ref 3.5–5.2)
ALP BLD-CCNC: 58 U/L (ref 35–104)
ALT SERPL-CCNC: 74 U/L (ref 0–32)
ANION GAP SERPL CALCULATED.3IONS-SCNC: 11 MMOL/L (ref 7–16)
AST SERPL-CCNC: 27 U/L (ref 0–31)
BACTERIA: ABNORMAL /HPF
BASOPHILS ABSOLUTE: 0.06 E9/L (ref 0–0.2)
BASOPHILS RELATIVE PERCENT: 0.5 % (ref 0–2)
BILIRUB SERPL-MCNC: 0.3 MG/DL (ref 0–1.2)
BILIRUBIN URINE: NEGATIVE
BLOOD, URINE: NEGATIVE
BUN BLDV-MCNC: 11 MG/DL (ref 6–20)
CALCIUM SERPL-MCNC: 9.3 MG/DL (ref 8.6–10.2)
CHLORIDE BLD-SCNC: 103 MMOL/L (ref 98–107)
CLARITY: CLEAR
CO2: 21 MMOL/L (ref 22–29)
COLOR: YELLOW
CREAT SERPL-MCNC: 0.4 MG/DL (ref 0.5–1)
CREATININE URINE: 151 MG/DL (ref 29–226)
EOSINOPHILS ABSOLUTE: 0.25 E9/L (ref 0.05–0.5)
EOSINOPHILS RELATIVE PERCENT: 1.9 % (ref 0–6)
FERRITIN: 61 NG/ML
FOLATE: >20 NG/ML (ref 4.8–24.2)
GFR AFRICAN AMERICAN: >60
GFR NON-AFRICAN AMERICAN: >60 ML/MIN/1.73
GLUCOSE BLD-MCNC: 74 MG/DL (ref 74–99)
GLUCOSE URINE: NEGATIVE MG/DL
HBA1C MFR BLD: 4.9 % (ref 4–5.6)
HCT VFR BLD CALC: 36 % (ref 34–48)
HEMOGLOBIN: 12.1 G/DL (ref 11.5–15.5)
IMMATURE GRANULOCYTES #: 0.18 E9/L
IMMATURE GRANULOCYTES %: 1.4 % (ref 0–5)
KETONES, URINE: ABNORMAL MG/DL
LACTATE DEHYDROGENASE: 133 U/L (ref 135–214)
LEUKOCYTE ESTERASE, URINE: NEGATIVE
LYMPHOCYTES ABSOLUTE: 2.74 E9/L (ref 1.5–4)
LYMPHOCYTES RELATIVE PERCENT: 20.8 % (ref 20–42)
MAGNESIUM: 1.9 MG/DL (ref 1.6–2.6)
MCH RBC QN AUTO: 32.4 PG (ref 26–35)
MCHC RBC AUTO-ENTMCNC: 33.6 % (ref 32–34.5)
MCV RBC AUTO: 96.5 FL (ref 80–99.9)
MONOCYTES ABSOLUTE: 0.82 E9/L (ref 0.1–0.95)
MONOCYTES RELATIVE PERCENT: 6.2 % (ref 2–12)
MUCUS: PRESENT /LPF
NEUTROPHILS ABSOLUTE: 9.11 E9/L (ref 1.8–7.3)
NEUTROPHILS RELATIVE PERCENT: 69.2 % (ref 43–80)
NITRITE, URINE: NEGATIVE
PDW BLD-RTO: 14.7 FL (ref 11.5–15)
PH UA: 6 (ref 5–9)
PLATELET # BLD: 409 E9/L (ref 130–450)
PMV BLD AUTO: 9 FL (ref 7–12)
POTASSIUM SERPL-SCNC: 4 MMOL/L (ref 3.5–5)
PROTEIN PROTEIN: 13 MG/DL (ref 0–12)
PROTEIN UA: NEGATIVE MG/DL
PROTEIN/CREAT RATIO: 0.1
PROTEIN/CREAT RATIO: 0.1 (ref 0–0.2)
RBC # BLD: 3.73 E12/L (ref 3.5–5.5)
RBC UA: ABNORMAL /HPF (ref 0–2)
SODIUM BLD-SCNC: 135 MMOL/L (ref 132–146)
SPECIFIC GRAVITY UA: >=1.03 (ref 1–1.03)
T3 FREE: 3.2 PG/ML (ref 2–4.4)
T4 FREE: 1.05 NG/DL (ref 0.93–1.7)
TOTAL PROTEIN: 6.9 G/DL (ref 6.4–8.3)
TSH SERPL DL<=0.05 MIU/L-ACNC: 0.71 UIU/ML (ref 0.27–4.2)
URIC ACID, SERUM: 3.6 MG/DL (ref 2.4–5.7)
UROBILINOGEN, URINE: 0.2 E.U./DL
VITAMIN B-12: 583 PG/ML (ref 211–946)
VITAMIN D 25-HYDROXY: 35 NG/ML (ref 30–100)
WBC # BLD: 13.2 E9/L (ref 4.5–11.5)
WBC UA: ABNORMAL /HPF (ref 0–5)

## 2022-04-27 PROCEDURE — 84481 FREE ASSAY (FT-3): CPT

## 2022-04-27 PROCEDURE — 82306 VITAMIN D 25 HYDROXY: CPT

## 2022-04-27 PROCEDURE — 80053 COMPREHEN METABOLIC PANEL: CPT

## 2022-04-27 PROCEDURE — 84425 ASSAY OF VITAMIN B-1: CPT

## 2022-04-27 PROCEDURE — 86146 BETA-2 GLYCOPROTEIN ANTIBODY: CPT

## 2022-04-27 PROCEDURE — 84156 ASSAY OF PROTEIN URINE: CPT

## 2022-04-27 PROCEDURE — 83615 LACTATE (LD) (LDH) ENZYME: CPT

## 2022-04-27 PROCEDURE — 82728 ASSAY OF FERRITIN: CPT

## 2022-04-27 PROCEDURE — 84439 ASSAY OF FREE THYROXINE: CPT

## 2022-04-27 PROCEDURE — 81001 URINALYSIS AUTO W/SCOPE: CPT

## 2022-04-27 PROCEDURE — 82570 ASSAY OF URINE CREATININE: CPT

## 2022-04-27 PROCEDURE — 86645 CMV ANTIBODY IGM: CPT

## 2022-04-27 PROCEDURE — 85610 PROTHROMBIN TIME: CPT

## 2022-04-27 PROCEDURE — 86747 PARVOVIRUS ANTIBODY: CPT

## 2022-04-27 PROCEDURE — 87088 URINE BACTERIA CULTURE: CPT

## 2022-04-27 PROCEDURE — 86376 MICROSOMAL ANTIBODY EACH: CPT

## 2022-04-27 PROCEDURE — 85730 THROMBOPLASTIN TIME PARTIAL: CPT

## 2022-04-27 PROCEDURE — 36415 COLL VENOUS BLD VENIPUNCTURE: CPT

## 2022-04-27 PROCEDURE — 86800 THYROGLOBULIN ANTIBODY: CPT

## 2022-04-27 PROCEDURE — 82607 VITAMIN B-12: CPT

## 2022-04-27 PROCEDURE — 84443 ASSAY THYROID STIM HORMONE: CPT

## 2022-04-27 PROCEDURE — 85025 COMPLETE CBC W/AUTO DIFF WBC: CPT

## 2022-04-27 PROCEDURE — 86644 CMV ANTIBODY: CPT

## 2022-04-27 PROCEDURE — 85613 RUSSELL VIPER VENOM DILUTED: CPT

## 2022-04-27 PROCEDURE — 84550 ASSAY OF BLOOD/URIC ACID: CPT

## 2022-04-27 PROCEDURE — 86803 HEPATITIS C AB TEST: CPT

## 2022-04-27 PROCEDURE — 86147 CARDIOLIPIN ANTIBODY EA IG: CPT

## 2022-04-27 PROCEDURE — 82746 ASSAY OF FOLIC ACID SERUM: CPT

## 2022-04-27 PROCEDURE — 83036 HEMOGLOBIN GLYCOSYLATED A1C: CPT

## 2022-04-27 PROCEDURE — 83735 ASSAY OF MAGNESIUM: CPT

## 2022-04-27 SDOH — HEALTH STABILITY - PHYSICAL HEALTH: OCCUPATIONAL EXPOSURE TO UNSPECIFIED RISK FACTOR: Z57.9

## 2022-04-28 ENCOUNTER — TELEPHONE (OUTPATIENT)
Dept: OBGYN CLINIC | Age: 37
End: 2022-04-28

## 2022-04-28 LAB — HEPATITIS C ANTIBODY INTERPRETATION: NORMAL

## 2022-04-28 NOTE — TELEPHONE ENCOUNTER
Left message for pt to inform that labs were normal except ALT which Dr felt was to pt not feeling well. Asked to call with any questions.

## 2022-04-29 LAB — URINE CULTURE, ROUTINE: NORMAL

## 2022-04-30 LAB
ANTICARDIOLIPIN IGA ANTIBODY: <10 APL
ANTICARDIOLIPIN IGG ANTIBODY: <10 GPL
CARDIOLIPIN AB IGM: <10 MPL
PARVOVIRUS B19 IGG ANTIBODY: 0.29 IV
PARVOVIRUS B19 IGM ANTIBODY: 0.29 IV
THYROGLOBULIN ANTIBODY: 15 IU/ML (ref 0–40)
THYROID PEROXIDASE (TPO) ABS: <4 IU/ML (ref 0–25)

## 2022-05-01 LAB
BETA-2 GLYCOPROTEIN 1 IGG ANTIBODY: <10 SGU
BETA-2 GLYCOPROTEIN 1 IGM ANTIBODY: <10 SMU

## 2022-05-03 LAB — VITAMIN B1 WHOLE BLOOD: 215 NMOL/L (ref 70–180)

## 2022-05-04 LAB
CYTOMEGALOVIRUS IGG ANTIBODY: NORMAL
CYTOMEGALOVIRUS IGM ANTIBODY: NORMAL

## 2022-05-08 LAB
Lab: NORMAL
REPORT: NORMAL
THIS TEST SENT TO: NORMAL

## 2022-05-12 NOTE — DISCHARGE SUMMARY
Patient admitted to the emergency room April 13 with intractable nausea and vomiting in pregnancy actually 12 weeks pregnant. Significant history for Crohn's disease. Patient admitted in the past with intractable nausea vomiting. Patient also admitted marijuana use describing her usage as 3 bowls of daily to improve her nausea and vomiting. Hospital day #1 she had hypokalemia that was replaced. GI was consulted due to history of Crohn's disease and planned an EGD and colonoscopy after delivery. Stool was cultured for C. difficile due to diarrhea. And patient responded to Zofran.   Upon discharge on April 18 hypokalemia was resolved stool tests were negative LFTs were elevated most likely due to element intractable nausea and vomiting platelets are normal she was discharged with precautions instructions to follow-up with all of her consulting physicians

## 2022-05-24 ENCOUNTER — ANCILLARY PROCEDURE (OUTPATIENT)
Dept: OBGYN CLINIC | Age: 37
End: 2022-05-24
Payer: COMMERCIAL

## 2022-05-24 ENCOUNTER — ROUTINE PRENATAL (OUTPATIENT)
Dept: OBGYN CLINIC | Age: 37
End: 2022-05-24
Payer: COMMERCIAL

## 2022-05-24 VITALS
BODY MASS INDEX: 26 KG/M2 | HEART RATE: 81 BPM | DIASTOLIC BLOOD PRESSURE: 73 MMHG | SYSTOLIC BLOOD PRESSURE: 104 MMHG | WEIGHT: 151.5 LBS

## 2022-05-24 DIAGNOSIS — Z3A.17 17 WEEKS GESTATION OF PREGNANCY: Primary | ICD-10-CM

## 2022-05-24 DIAGNOSIS — F32.A DEPRESSION, UNSPECIFIED DEPRESSION TYPE: ICD-10-CM

## 2022-05-24 DIAGNOSIS — K50.10 CROHN'S COLITIS, WITHOUT COMPLICATIONS (HCC): ICD-10-CM

## 2022-05-24 DIAGNOSIS — O09.291 HISTORY OF IUGR (INTRAUTERINE GROWTH RETARDATION) AND STILLBIRTH, CURRENTLY PREGNANT, FIRST TRIMESTER: ICD-10-CM

## 2022-05-24 DIAGNOSIS — O21.0 HYPEREMESIS GRAVIDARUM: ICD-10-CM

## 2022-05-24 LAB
GLUCOSE URINE, POC: NEGATIVE
PROTEIN UA: NEGATIVE

## 2022-05-24 PROCEDURE — G8419 CALC BMI OUT NRM PARAM NOF/U: HCPCS | Performed by: OBSTETRICS & GYNECOLOGY

## 2022-05-24 PROCEDURE — 99214 OFFICE O/P EST MOD 30 MIN: CPT | Performed by: OBSTETRICS & GYNECOLOGY

## 2022-05-24 PROCEDURE — 76805 OB US >/= 14 WKS SNGL FETUS: CPT | Performed by: OBSTETRICS & GYNECOLOGY

## 2022-05-24 PROCEDURE — 1036F TOBACCO NON-USER: CPT | Performed by: OBSTETRICS & GYNECOLOGY

## 2022-05-24 PROCEDURE — 76817 TRANSVAGINAL US OBSTETRIC: CPT | Performed by: OBSTETRICS & GYNECOLOGY

## 2022-05-24 PROCEDURE — G8427 DOCREV CUR MEDS BY ELIG CLIN: HCPCS | Performed by: OBSTETRICS & GYNECOLOGY

## 2022-05-24 PROCEDURE — 99213 OFFICE O/P EST LOW 20 MIN: CPT | Performed by: OBSTETRICS & GYNECOLOGY

## 2022-05-24 PROCEDURE — 99213 OFFICE O/P EST LOW 20 MIN: CPT

## 2022-05-24 RX ORDER — DIPHENHYDRAMINE HYDROCHLORIDE 25 MG/1
25 CAPSULE ORAL 3 TIMES DAILY
Qty: 90 TABLET | Refills: 2 | Status: SHIPPED
Start: 2022-05-24 | End: 2022-10-10

## 2022-05-24 NOTE — LETTER
[address] [facility]aternal [facility]  [address] [address]  Phone: [phone]  Fax: [phone]           Bhavesh Early MD      May 24, 2022     Patient: Yann Medina   MR Number: [id number]   YOB: 1985   Date of Visit: 2022       Dear Dr. Ricardo Murrieta: Thank you for referring Shaye Kelly to me for evaluation/treatment. Below are the relevant portions of my assessment and plan of care. If you have questions, please do not hesitate to call me. I look forward to following [facility] along with you. Sincerely,        Bhavesh Early MD    CC providers:  Caterina Camacho MD  [address] [address]  Via In Basket       May 24, 2022      Caterina Camacho MD  [address] [facility]     RE:  Diane Saliva  : 1985   AGE: 29 y.o. This report has been created using voice recognition software. It may contain errors which are inherent in voice recognition technology. Dear Dr. Ricardo Murrieta:      I had the pleasure of meeting with Ms. Ayala for a return consultation. As you know, Ms. Rosa Brice  is a 29 y.o. [id number] at 17w1d (LMP = [address]) who is being followed by our office for multiple medical issues. Today, Ms. Ayala reports that she feels well. She notes good fetal movement and denies any symptoms of leaking of fluid, vaginal bleeding, and/or contractions. She had a fetal ultrasound that was notable for the following. There is a single intrauterine gestation in a cephalic presentation with a heart rate of 138 beats per minute. The placenta is anterior. The amniotic fluid index is normal.  The composite gestational age is 17w5d. Transvaginal cervical length 4.4 cm.       PERTINENT PHYSICAL EXAMINATION:   /73   Pulse 81   Wt 151 lb 8 oz (68.7 kg)   LMP 2022   BMI 26.00 kg/m²     Urine dipstick:   Negative for Glucose    Negative for Albumin      A fetal ultrasound assessment was performed today. A report is enclosed for your review. Assessment & Plan:  29 y.o. [id number] at 17w1d (LMP = 9 [address]) with:    1. Crohn's disease --  The patient reported a history of Crohn's disease. She reports that she was diagnosed in  or . She follows with Dr. Rai Glynn. She now follows with Dr. Valentín Freire. She is not currently medicated for her Crohn's disease. With flares, she describes having abdominal pain and diarrhea. She reports that she continues to have symptoms every 3 weeks.     She reports that her weight has been stable. She denies gaining or losing a significant amount of weight. She weighed 151 pounds today. She has gained 3 pounds since her last visit. Tonie Muller Her BMI is 26. Counseling was previously provided to the patient. Counseling was reviewed. The patient did not have any additional questions or concerns.     The patient was counseled that overall women with inflammatory bowel disease can do well in pregnancy. Generally, if the disease is quiescent at the time of conception, then the risk for relapse during pregnancy is only 30%. However, if the disease is active at the time of conception then the risk for relapse during pregnancy is 70%. Women with inflammatory bowel disease may be at increased risk for obstetric complications such as antepartum bleeding, poor fetal growth, nutritional deficiencies, anemia, and  delivery. Generally, breastfeeding is considered safe and does not appear to affect the disease course. However, women with inflammatory bowel disease often chose not to breastfeed because they were advised not to, because of medication use, and/or personal choice.      The patient was counseled that there is also an increased risk for inflammatory bowel disease on her child(jorge).  First-degree relatives of patients with IBD are approximately 3 to 20 times more likely to develop IBD as compared with the general population. Thus, this information should be relayed to the pediatrician caring for the child after delivery.      The patient should continue to follow with her gastroenterologist during pregnancy.     She should have a baseline nutrition panel (CBC, ferritin, folate, vitamin B12, vitamin D 25OH, calcium). Testing was completed on 4/27/2022, her results included: H/H 12.1/36, MCV 96.5, platelet count 293,114, potassium 4, creatinine 0.4, calcium 9.3, ALT 74 (65), AST 27 (45), ferritin 61, folate >20, vitamin B12 583, vitamin D 35, magnesium 1.9, , uric acid 3.6, hepatitis C nonreactive, TSH 0.707, free T4 1.05, free T3 3.2, TPO negative, antithyroglobulin antibody negative, hemoglobin A1c 4.9%, thiamine 215, CMV IgG/IgM negative, parvovirus IgG/IgM negative, lupus anticoagulant negative, anticardiolipin antibody negative, beta-2 glycoprotein antibody negative, urine protein creatinine ratio 0.1, urine culture mixed, urinalysis negative. --Additional recommendations are below.     Given the association with antiphospholipid antibodies and inflammatory bowel disease, screening for antiphospholipid antibodies was recommended. Testing was negative on 4/27/2022. A screening ANNEL also recommended. This should be ordered with her next set of labs.     Fetal growth should be followed serially, every 3-4 weeks beginning at 24-26 weeks' gestation. Fetal kick counts should be monitored daily starting at 28 weeks' gestation. Additional surveillance may be indicated should any additional complications arise.       2. Nausea and vomiting of pregnancy, stable -- The patients reported having nausea and vomiting the entire pregnancy. She reports having emesis 2-3 times per day. The patient reports daily symptoms of nausea and decreased appetite. She denies any signs of symptoms of dehydration. Precautions were reviewed.       She reports that her weight has remained stable. She weighed 151 pounds today.   She has gained 3 pounds since her last visit. Her BMI was 26. Her prenatal records were reviewed. On , she weighed 138 pounds. On  she also weighed 138 pounds. Since the patient's last visit, she has started using a Zofran pump. She also receives IV fluids intermittently at home. She reports that this regimen has helped significantly.     The patient has been using Zofran at (pump), Reglan, vitamin B6, and Unisom for symptoms.     Secondary to the patient's continued nausea and vomiting, Pepcid was also recommended. A prescription was provided previously.     The patient was again encouraged to eat small amounts of food every 2-3 hours during the day. She was also encouraged to stay well-hydrated.     A baseline nutrition panel (CBC, CMP, magnesium, ferritin, folate, vitamin B12, vitamin D 25 OH) was recommended. Baseline testing was completed on 2022, the results are summarized above, additional recommendations below. Of note, the patient's AST and ALT were mildly elevated, but trending down. This is likely related to her chronic nausea and vomiting. The patient's liver function test should be repeated with her next set of labs.     3. Asthma -- The patient reported a history of asthma. She denies having any recent issues. She denies any history of hospitalizations, steroid treatments, and or intubations related to her asthma. She again reports that her symptoms are stable. She rarely uses her rescue inhaler.       The patient was again counseled regarding the implications of asthma in pregnancy. The majority of patients have symptoms that remain stable or improve. In 1/3 of patients, symptoms worsen. Precautions were reviewed and she will need to step up medical therapy if she is using her rescue inhaler >2 times per week.   Poorly controlled asthma is associated with an increased risk for poor fetal growth, hypertensive disorders,  birth, and fetal loss.      Fetal growth should be monitored serially, every 3 to 4 weeks beginning at 24 to 26 weeks gestation.     A flu vaccination is recommended for all pregnant women regardless of gestational age. This vaccination is especially important in women with underlying asthma.        4.  Right lower quadrant/flank pain, stable/improving -- The patient was hospitalized from February 18 through February 23 secondary to nausea, vomiting, and concern for appendicitis. She was provided with supportive care and her nausea and vomiting symptoms improved. She was also treated with antibiotics, cefoxitin.     An MRI was completed on 2/18/2022. Per that report, a structure likely representing the appendix was seen in the right lower quadrant. The stricture was dilated and measured 1 cm and demonstrates wall thickening concerning for acute appendicitis. A small amount of free fluid was seen within the pelvis. The patient was approximately 2 weeks pregnant at that time.     The patient returned to the hospital on 3/28/2022 with complaints of worsening right lower quadrant pain. She also has complaints of nausea and vomiting.     The patient had a repeat MRI on 3/28/2022. Per the impression, no evidence of acute findings were noted in the abdomen or pelvis. Specifically, there was no evidence of acute appendicitis.     She had a consultation with surgery. No surgical intervention was recommended given the appendix now appeared normal.     During her hospitalization, she had a consultation with psychiatry and was started on Zoloft for depression. She was treated with supportive care and was discharged home in stable condition on 4/2/2022. The patient's symptoms were attributed to her Crohn's disease.     Today, the patient again reports that her pain symptoms are stable/improving.   The patient was counseled to monitor her symptoms and return with persistent/worsening pain or the development of any associated fevers, chills, or worsening nausea and vomiting.     5. Anxiety/depression -- The patient reported a history of anxiety and depression for which she takes Zoloft, 25 mg daily. She has an appointment with baby on board this week. She is starting counseling.       During the patient's hospitalization in March, she had complaints of worsening depression symptoms. She had a consultation with psychiatry and Zoloft was started.     Today, the patient reports that her mood is stable. She denies any suicidal or homicidal ideations.        The patient was again counseled regarding the risks of depression and anxiety in pregnancy including worsening symptoms during pregnancy and postpartum, poor fetal growth and  birth. The risks and benefits of treatment were also reviewed. I recommend continuing to monitor the patient's mood throughout pregnancy and postpartum. With recurrent or worsening symptoms, a referral should be made for counseling. Additionally, medication dosing may need to be adjusted.        Because of the association of thyroid disease and depression, a baseline thyroid screening is recommended with a TSH, free T4, and thyroid peroxidase antibody. Additionally, depression can be associated with nutritional deficiencies, thus, a baseline nutrition panel is also recommended (CBC, CMP, magnesium, ferritin, folate, vitamin B12, vitamin D 25OH). Testing was completed on 2022, the results are summarized above.     Because of the increased risk for poor fetal growth, fetal growth to be monitored serially, every 3 to 4 weeks starting at 24 to 26 weeks gestation. The patient should monitor fetal kick counts daily starting at 28 weeks gestation.     Counseling was again provided regarding the risks and benefits of Zoloft use in pregnancy and breast-feeding.     Specific to Zoloft, it dose cross the placenta and there is fetal exposure.   First trimester exposure is not thought to cause an increased risk for major birth defects. There may be an association with congenital heart defects. Exposure in the late third trimester is associated with  respiratory distress, cyanosis, apnea, seizures, temperature instability, feeding difficult, emesis, hypoglycemia, hypo/hypertonia, hyperreflexia, irritability, constant crying, and/or tremor. Symptoms may be due to withdrawal.  Persistent pulmonary hypertension of the  (PPHN) has also been reported with SSRI exposure. The long-term effects of in utero SSRI exposure on infant development and behavior are not known.     As with any medication, the risk and benefit of use must be weighed. Again, there are risks associated with untreated depression including poor fetal growth and  delivery. The patient indicated that at this time, she did not feel that she could safely taper off of the medication. Thus, the benefit of treatment would outweigh risk at this time. Dose adjustment should be based on patient symptoms. Pregnant women exposed to antidepressants during pregnancy are encouraged to enroll in the [facility] Pregnancy Registry for Antidepressants (NPRAD). Women 25to 39years of age or their health care providers may contact the registry by calling [phone]. Enrollment should be done as early in pregnancy as possible.     She was also counseled regarding the risks and benefits of these medications during breast-feeding. Sertraline and its active metabolite are present in breastmilk. The relative infant dose (RID) is estimated to be 0.5-3%. Typically, breast-feeding is considered acceptable when the RID is <10%. When the RID is >25%, breast-feeding should be avoided. Some studies have reported adverse events in breast-feeding infants following maternal use of sertraline. Neonates of mothers taking psychotropic medication should be monitored daily for changes in sleep, feeding patterns, and/or behavior.   Infant growth and development should be monitored per protocol. SSRI use during pregnancy may cause delayed lactation. Sertraline is a first-line agent for postpartum depression in women who have breast-feeding. Women whose symptoms are well controlled with sertraline during pregnancy can continue use while breast-feeding if there are no other contraindications. Per the , the decision to breast-feed during therapy should take into account risks and benefits and be individualized to the patient.        6. Family history of diabetes -- The patient's daughter has type 1 diabetes. Generally, if a child has type 1 diabetes, then the risk of type 1 diabetes in a sibling is estimated to be 5%. If the sibling is an identical twin, the risk may be as high as 50%. Counseling was previously provided to the patient. Counseling was reviewed. Again, this family history should be relayed to the pediatrician who cares for the .     7.  History of elevated blood pressure -- The patient's blood pressure was noted to be elevated during her hospitalization. The patient denies a history of chronic hypertension. She stated that the blood pressure elevation was likely secondary to her pain.     The patient's blood pressure was normal at 104/73. Her urine dip was negative for protein. I recommend continued blood pressure monitoring.     8. Tobacco use in pregnancy -- The patient reported she quit smoking when she found out she was pregnant. She reports that she has remained tobacco free. She was again congratulated and encouraged to remain tobacco free. She was smoking < 1 pack per day. She was again counseled regarding the increased risks of smoking in pregnancy including poor fetal growth, placental abruption, PPROM/ birth, and fetal loss. Additional  risks were again reviewed including asthma, allergies, infection, and an association with SIDS.   She was again urged to remain tobacco free through the pregnancy and postpartum.     9. THC Use --  The patient reported that she had quit smoking marijuana. Today, she reports that she is cutting back. The Patient reports that she was using marijuana for her mood and nausea. Counseling was again provided to the patient.     The patient was again counseled regarding risk of neurodevelopmental issues in children exposed to [facility] during pregnancy. Additionally, marijuana use may pose risks similar to that of cigarette use including increased risk for poor fetal growth, placental bleeding, PPROM/ delivery, and stillbirth. She was again counseled not to use THC while pregnant. Random urine drug screens should be monitored during pregnancy.     10. History of IUGR/poor fetal growth -- The patient's pregnancy is in  and 8809 were complicated by intrauterine growth restriction. The patient was previously counseled that based on her history, she is at increased risk, ~23%, for having another pregnancy complicated by poor fetal growth.  There also appears to be an increased risk for other complications including  delivery, preeclampsia, placental abruption, and fetal loss in subsequent pregnancies.       Fetal growth should be monitored serially, every 3 to 4 weeks starting at 24 to 26 weeks gestation.       A baseline maternal evaluation is recommended including a baseline nutrition panel, antiphospholipid antibody screening and thyroid function studies. Testing was completed on 2022. The results are summarized above.     She was counseled regarding the potential utility of LDASA in reducing her risk for poor fetal growth.  The risks and benefits were reviewed. Channing Wagoner should take a daily low-dose aspirin for the remainder of pregnancy and 6 to 8 weeks postpartum.     11. Advanced maternal age  -- The patient was again counseled regarding the implications of advanced maternal age in pregnancy, specifically that of aneuploidy.     The patient did not have any additional questions or concerns.     The patient stated that she previously completed screening with NIPT. The results were reviewed. The fetal fraction was 5.6% and results low risk for aneuploidy. The reported fetal sex was male.     Since the patient had early screening with NIPT, a maternal serum AFP is recommended between 15 and 22 weeks to screen for open neural tube defects. The patient reported that she completed screening locally. She reports that her results were normal.     Again, recent studies have reported that there may be an increased risk for fetal loss in the setting of advanced maternal age. Thus, increased surveillance is recommended. I recommend monitoring fetal growth serially, every 4 weeks beginning at 24-26 weeks' gestation. She should monitor fetal kick counts daily starting at 28 weeks' gestation. At 36 weeks' gestation, she should be scheduled for a weekly non stress test or biophysical profile. I recommend delivery at 39-40 week's gestation.     Given there is an increased risk for hypertensive disorders of pregnancy in the setting of advanced maternal age, the possible utility of a low dose aspirin in reducing her risk for hypertensive disorders of pregnancy was reviewed. The risks and benefits of low dose aspirin were discussed. She was counseled to continue taking 81 mg of aspirin, daily.       12. History of oligohydramnios -- The patient  reported that her pregnancy in 8170 was complicated by intrauterine growth restriction and oligohydramnios at 35 weeks 4 days. She was induced secondary to these complications. She denies any associated PROM. The patient was counseled that given this history, she would be increased risk for recurrent oligohydramnios. Given this history, increased surveillance is recommended. Fetal growth should be monitored serially, every 3-4 weeks beginning a 24-26 weeks' gestation. The amniotic fluid can be assessed during the studies.  The patient could also start taking a low-dose aspirin in reducing the risk for placental insufficiency. She can take 81 mg daily.     13. Genital herpes -- The patient reported  a history of genital herpes.  She denies having any recent outbreaks. The patient was counseled that because she has genital herpes, suppression is recommended at 36 weeks gestation to minimize the risk for viral shedding or an outbreak at the time of delivery. If any lesions are present, then a  would be recommended for delivery. Additionally, any outbreaks should be treated during pregnancy. Precautions were reviewed with the patient.     14. History of a blood transfusion -- The patient reports a history of a blood transfusion as an infant. Per the patient, she was admitted to the NICU after birth and contracted some type of infection. She required a blood transfusion. Given this history, baseline screening for hepatitis c is recommended. Screening was completed on 2022. Her hepatitis C was nonreactive.     15. 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 She was counseled that she may be at increased risk for viral exposures such as CMV and parvovirus. She was given orders to have baseline screening for these viruses. Precautions were reviewed. Baseline serology was completed on 2022. CMV IgG/IgM negative and parvovirus IgG/IgM negative. The results indicate that she has not been exposed to CMV or parvovirus. Precautions were reviewed.     16. Family history of cancer -- The patient's family history was previously reviewed and notable in that her mother had breast cancer. Given this family history, genetic counseling should be considered. She was counseled that if interested, your office could refer her for counseling to determine if genetic screening/testing is indicated. The patient expressed verbal understanding of this counseling.     17.   Vaccination in pregnancy -- The patient was  previously counseled regarding the recommendations for vaccination in pregnancy. Counseling was reviewed. The patient did not have any additional questions or concerns.     The patient was counseled regarding the recommendations for the Tdap vaccination in pregnancy. Risks and benefits were discussed. The vaccination is typically administered between 27 and 36 weeks gestation and recommended to confer protection against whooping cough to the . The patient plans to discuss this with her primary provider at her next visit. The patient was also counseled that her partner in any individuals who will be in close contact with the  should also be vaccinated for whooping cough.     The patient was counseled regarding recommendation for the flu vaccination in pregnancy for both maternal and infant safety. Risks and benefits were reviewed. She was encouraged to have this vaccination as an outpatient.     Counseling was provided regarding the recommendation for the Covid vaccination in pregnancy. I advised the patient that the [facility] of Obstetrics and Gynecology and The Society for Maternal Fetal Medicine recommend that all pregnant women be vaccinated for COVID-19. \"Data have shown that COVID-19 infection puts pregnant people at increased risk of severe complications and even death; yet only about 22% of pregnant individuals have received 1 more doses of COVID-19 vaccine according to the [employer] for Disease Control and Prevention. \"     \" Recent data have shown that more than 95% of those were hospitalized and/or dying from COVID-19 are those who have remained unvaccinated. Pregnant individuals who have decided to wait until after delivery to be vaccinated may be inadvertently exposing themselves to an increased risk of severe illness or death. \"      \" COVID-19 vaccination is the best testing to reduce maternal and fetal complications of ECDLV-83 infection among pregnant people,\" said Rivka Dawson MD, president of the Society for Maternal Fetal Medicine, sub-specialists.     The patient was counseled that in the event she opts not to have the Covid vaccination, she should alert her provider immediately if she test positive for Covid. Pregnant women are on the priority list for treatment with monoclonal antibody. This intervention has been shown to decrease the risk for hospitalization and complications related to Covid in pregnancy.     The patient expressed verbal understanding of this counseling. --I requested the patient return for a follow-up assessment in 3 weeks unless there is a clinical reason for her to return prior to that time. She is to call if she has any problems or questions prior to her next visit. Further evaluation and management will be dependent on her clinical presentation and the results of her testing. --The patient was advised to call if she has any increased vaginal discharge, vaginal bleeding, contractions, abdominal pain, back pain or any new significant symptomatology prior to her next visit. I advised her that these are signs and symptoms of cervical change and require follow-up assessment when they occur. Preeclampsia precautions were also reviewed with the patient. --The patient was also counseled to call and/or return with any concerns for decreased fetal activity. --The patient is to continue to follow with you in your office for ongoing obstetric care. --The total time spent on today's visit was 30 minutes. This included preparation for the visit (i.e. reviewing prior external notes and test results), performance of a medically appropriate history and examination, counseling, orders for medications, tests or other procedures, and coordination of care. Greater than 50% of the time was spent face-to-face with the patient. This time is exclusive of procedures performed.    I answered all of  the patient's questions to her satisfaction. I asked her to call if she had any additional questions prior to her next visit. --At the conclusion of the visit, the patient appeared to have a good understanding of the issues discussed. I answered all of her questions to her satisfaction. I asked her to call if she had any additional questions prior to her next visit. --Thank you for allowing me to participate in the care of this pleasant patient. Please don't hesitate to call me if you have any questions. Sincerely,      Favian Luna MD, [facility]  [phone]      *All or parts of this note may have been generated using a voice recognition program. There may be typo, grammar, or Word substitution errors that have escaped my review of this note.

## 2022-05-24 NOTE — PATIENT INSTRUCTIONS
instruction, always ask your healthcare professional. [place] [address] any warranty or liability for your use of this information. Patient Education        Learning About When to Call Your Doctor During Pregnancy (Up to 20 Weeks)  Overview     It's common to have concerns about what might be a problem during your pregnancy. Most pregnancies don't have any serious problems. But it's stillimportant to know when to call your doctor if you have certain symptoms. These are general suggestions. Your doctor may give you some more informationabout when to call. When to call your doctor (up to 20 weeks)  Call 911 anytime you think you may need emergency care. For example, call if:   You passed out (lost consciousness). Call your doctor now or seek immediate medical care if:   You have a fever.  You have vaginal bleeding.  You are dizzy or lightheaded, or you feel like you may faint.  You have symptoms of a urinary tract infection. These may include:  ? Pain or burning when you urinate. ? A frequent need to urinate without being able to pass much urine. ? Pain in the flank, which is just below the rib cage and above the waist on either side of the back. ? Blood in your urine.  You have belly pain.  You think you are having contractions.  You have a sudden release of fluid from your vagina. Watch closely for changes in your health, and be sure to contact your doctor if:   You have vaginal discharge that smells bad.  You have other concerns about your pregnancy. Follow-up care is a key part of your treatment and safety. Be sure to make and go to all appointments, and call your doctor if you are having problems. It's also a good idea to know your test results and keep alist of the medicines you take. Where can you learn more? Go to [email or web address]://[email or web address][email or web address] org and sign in to your [employer] account.  Enter E252 in the [employer] box to learn more about \"Learning About When to Call Your Doctor During Pregnancy (Up to 20 Weeks). \"     If you do not have an account, please click on the \"Sign Up Now\" link. Current as of: June 16, 2021               Content Version: 13.2  © 2006-2022 [employer]. Care instructions adapted under license by [facility] [place] ([facility]). If you have questions about a medical condition or this instruction, always ask your healthcare professional. [address] any warranty or liability for your use of this information. Please arrive for your scheduled appointment at least 15 minutes early with your actual insurance card+ a photo ID. Also if you need any refills ordered or have questions, it may take up 48 hours to reply. Please allow ample time for your refills. Call me when you use last refill. Thank you for your cooperation. Call your primary obstetrician with bleeding, leaking of fluid, abdominal tenderness, headache, blurry vision, epigastric pain and increased urinary frequency. If you are experiencing an emergency and need immediate help, call 911 or go to go emergency room or labor and delivery. if you are sick, not feeling well or have an infectious process going on please reschedule your appointment by calling [phone]. Also if any family members are not feeling well, please do not bring them to your appointment. We appreciate your cooperation. We are doing this in order to protect our pregnant mothers+ their babies. if you are sick, not feeling well or have an infectious process going on please reschedule your appointment by calling [phone]. Also if any family members are not feeling well, please do not bring them to your appointment. We appreciate your cooperation. We are doing this in order to protect our pregnant mothers+ their babies.

## 2022-05-25 ENCOUNTER — HOSPITAL ENCOUNTER (OUTPATIENT)
Age: 37
Discharge: HOME | End: 2022-05-25
Payer: COMMERCIAL

## 2022-05-25 DIAGNOSIS — O09.291 HISTORY OF IUGR (INTRAUTERINE GROWTH RETARDATION) AND STILLBIRTH, CURRENTLY PREGNANT, FIRST TRIMESTER: ICD-10-CM

## 2022-05-25 DIAGNOSIS — K50.10 CROHN'S COLITIS, WITHOUT COMPLICATIONS (HCC): ICD-10-CM

## 2022-05-25 DIAGNOSIS — O21.0 HYPEREMESIS GRAVIDARUM: ICD-10-CM

## 2022-05-25 DIAGNOSIS — F32.A DEPRESSION, UNSPECIFIED DEPRESSION TYPE: ICD-10-CM

## 2022-05-25 DIAGNOSIS — Z3A.17 17 WEEKS GESTATION OF PREGNANCY: ICD-10-CM

## 2022-05-25 LAB
ALBUMIN SERPL-MCNC: 4 G/DL (ref 3.5–5.2)
ALP BLD-CCNC: 67 U/L (ref 35–104)
ALT SERPL-CCNC: 11 U/L (ref 0–32)
ANION GAP SERPL CALCULATED.3IONS-SCNC: 12 MMOL/L (ref 7–16)
AST SERPL-CCNC: 16 U/L (ref 0–31)
BACTERIA: NORMAL /HPF
BASOPHILS ABSOLUTE: 0.05 E9/L (ref 0–0.2)
BASOPHILS RELATIVE PERCENT: 0.4 % (ref 0–2)
BILIRUB SERPL-MCNC: <0.2 MG/DL (ref 0–1.2)
BILIRUBIN URINE: NEGATIVE
BLOOD, URINE: NEGATIVE
BUN BLDV-MCNC: 6 MG/DL (ref 6–20)
CALCIUM SERPL-MCNC: 9.2 MG/DL (ref 8.6–10.2)
CHLORIDE BLD-SCNC: 103 MMOL/L (ref 98–107)
CLARITY: CLEAR
CO2: 21 MMOL/L (ref 22–29)
COLOR: YELLOW
CREAT SERPL-MCNC: 0.5 MG/DL (ref 0.5–1)
CREATININE URINE: 80 MG/DL (ref 29–226)
EOSINOPHILS ABSOLUTE: 0.18 E9/L (ref 0.05–0.5)
EOSINOPHILS RELATIVE PERCENT: 1.4 % (ref 0–6)
FERRITIN: 44 NG/ML
FOLATE: >20 NG/ML (ref 4.8–24.2)
GFR AFRICAN AMERICAN: >60
GFR NON-AFRICAN AMERICAN: >60 ML/MIN/1.73
GLUCOSE BLD-MCNC: 114 MG/DL (ref 74–99)
GLUCOSE URINE: NEGATIVE MG/DL
HBA1C MFR BLD: 4.5 % (ref 4–5.6)
HCT VFR BLD CALC: 36.9 % (ref 34–48)
HEMOGLOBIN: 12.4 G/DL (ref 11.5–15.5)
IMMATURE GRANULOCYTES #: 0.07 E9/L
IMMATURE GRANULOCYTES %: 0.6 % (ref 0–5)
KETONES, URINE: NEGATIVE MG/DL
LACTATE DEHYDROGENASE: 186 U/L (ref 135–214)
LEUKOCYTE ESTERASE, URINE: NEGATIVE
LYMPHOCYTES ABSOLUTE: 2.05 E9/L (ref 1.5–4)
LYMPHOCYTES RELATIVE PERCENT: 16.3 % (ref 20–42)
MAGNESIUM: 1.8 MG/DL (ref 1.6–2.6)
MCH RBC QN AUTO: 32.5 PG (ref 26–35)
MCHC RBC AUTO-ENTMCNC: 33.6 % (ref 32–34.5)
MCV RBC AUTO: 96.6 FL (ref 80–99.9)
MONOCYTES ABSOLUTE: 0.32 E9/L (ref 0.1–0.95)
MONOCYTES RELATIVE PERCENT: 2.5 % (ref 2–12)
NEUTROPHILS ABSOLUTE: 9.88 E9/L (ref 1.8–7.3)
NEUTROPHILS RELATIVE PERCENT: 78.8 % (ref 43–80)
NITRITE, URINE: NEGATIVE
PDW BLD-RTO: 12.5 FL (ref 11.5–15)
PH UA: 6.5 (ref 5–9)
PLATELET # BLD: 398 E9/L (ref 130–450)
PMV BLD AUTO: 9.2 FL (ref 7–12)
POTASSIUM SERPL-SCNC: 4 MMOL/L (ref 3.5–5)
PROTEIN PROTEIN: 11 MG/DL (ref 0–12)
PROTEIN UA: NEGATIVE MG/DL
PROTEIN/CREAT RATIO: 0.1
PROTEIN/CREAT RATIO: 0.1 (ref 0–0.2)
RBC # BLD: 3.82 E12/L (ref 3.5–5.5)
RBC UA: NORMAL /HPF (ref 0–2)
SODIUM BLD-SCNC: 136 MMOL/L (ref 132–146)
SPECIFIC GRAVITY UA: 1.01 (ref 1–1.03)
T3 FREE: 3 PG/ML (ref 2–4.4)
T4 FREE: 1.01 NG/DL (ref 0.93–1.7)
TOTAL PROTEIN: 7 G/DL (ref 6.4–8.3)
TSH SERPL DL<=0.05 MIU/L-ACNC: 0.45 UIU/ML (ref 0.27–4.2)
URIC ACID, SERUM: 3.6 MG/DL (ref 2.4–5.7)
UROBILINOGEN, URINE: 0.2 E.U./DL
VITAMIN B-12: 486 PG/ML (ref 211–946)
VITAMIN D 25-HYDROXY: 32 NG/ML (ref 30–100)
WBC # BLD: 12.6 E9/L (ref 4.5–11.5)
WBC UA: NORMAL /HPF (ref 0–5)

## 2022-05-25 PROCEDURE — 85025 COMPLETE CBC W/AUTO DIFF WBC: CPT

## 2022-05-25 PROCEDURE — 82728 ASSAY OF FERRITIN: CPT

## 2022-05-25 PROCEDURE — 36415 COLL VENOUS BLD VENIPUNCTURE: CPT

## 2022-05-25 PROCEDURE — 84443 ASSAY THYROID STIM HORMONE: CPT

## 2022-05-25 PROCEDURE — 80053 COMPREHEN METABOLIC PANEL: CPT

## 2022-05-25 PROCEDURE — 82607 VITAMIN B-12: CPT

## 2022-05-25 PROCEDURE — 81001 URINALYSIS AUTO W/SCOPE: CPT

## 2022-05-25 PROCEDURE — 83036 HEMOGLOBIN GLYCOSYLATED A1C: CPT

## 2022-05-25 PROCEDURE — 82746 ASSAY OF FOLIC ACID SERUM: CPT

## 2022-05-25 PROCEDURE — 82306 VITAMIN D 25 HYDROXY: CPT

## 2022-05-25 PROCEDURE — 84439 ASSAY OF FREE THYROXINE: CPT

## 2022-05-25 PROCEDURE — 83735 ASSAY OF MAGNESIUM: CPT

## 2022-05-25 PROCEDURE — 83615 LACTATE (LD) (LDH) ENZYME: CPT

## 2022-05-25 PROCEDURE — 84550 ASSAY OF BLOOD/URIC ACID: CPT

## 2022-05-25 PROCEDURE — 84425 ASSAY OF VITAMIN B-1: CPT

## 2022-05-25 PROCEDURE — 87088 URINE BACTERIA CULTURE: CPT

## 2022-05-25 PROCEDURE — 84481 FREE ASSAY (FT-3): CPT

## 2022-05-25 PROCEDURE — 86376 MICROSOMAL ANTIBODY EACH: CPT

## 2022-05-25 PROCEDURE — 82570 ASSAY OF URINE CREATININE: CPT

## 2022-05-25 PROCEDURE — 84156 ASSAY OF PROTEIN URINE: CPT

## 2022-05-27 LAB — URINE CULTURE, ROUTINE: NORMAL

## 2022-05-30 LAB — VITAMIN B1 WHOLE BLOOD: 169 NMOL/L (ref 70–180)

## 2022-05-31 PROBLEM — F32.A DEPRESSION: Status: ACTIVE | Noted: 2022-05-31

## 2022-05-31 PROBLEM — O09.291 HISTORY OF IUGR (INTRAUTERINE GROWTH RETARDATION) AND STILLBIRTH, CURRENTLY PREGNANT, FIRST TRIMESTER: Status: ACTIVE | Noted: 2022-05-31

## 2022-05-31 LAB — THYROID PEROXIDASE (TPO) ABS: <4 IU/ML (ref 0–25)

## 2022-05-31 NOTE — PROGRESS NOTES
May 24, 2022      Nevaeh Jacques MD  [address] [facility]     RE:  David Knott  : 1985   AGE: 29 y.o. This report has been created using voice recognition software. It may contain errors which are inherent in voice recognition technology. Dear Dr. Abigail Manzano:      I had the pleasure of meeting with Ms. Ayala for a return consultation. As you know, Ms. Marbin Perera  is a 29 y.o. [id number] at 17w1d (LMP = [address]) who is being followed by our office for multiple medical issues. Today, Ms. Ayala reports that she feels well. She notes good fetal movement and denies any symptoms of leaking of fluid, vaginal bleeding, and/or contractions. She had a fetal ultrasound that was notable for the following. There is a single intrauterine gestation in a cephalic presentation with a heart rate of 138 beats per minute. The placenta is anterior. The amniotic fluid index is normal.  The composite gestational age is 17w5d. Transvaginal cervical length 4.4 cm. PERTINENT PHYSICAL EXAMINATION:   /73   Pulse 81   Wt 151 lb 8 oz (68.7 kg)   LMP 2022   BMI 26.00 kg/m²     Urine dipstick:   Negative for Glucose    Negative for Albumin      A fetal ultrasound assessment was performed today. A report is enclosed for your review. Assessment & Plan:  29 y.o. [id number] at 17w1d (LMP = 9 [address]) with:    1. Crohn's disease --  The patient reported a history of Crohn's disease. She reports that she was diagnosed in 2015 or . She follows with Dr. Ivan Andrews. She now follows with Dr. Owen Palma. She is not currently medicated for her Crohn's disease. With flares, she describes having abdominal pain and diarrhea. She reports that she continues to have symptoms every 3 weeks.     She reports that her weight has been stable. She denies gaining or losing a significant amount of weight. She weighed 151 pounds today. She has gained 3 pounds since her last visit. Rachel Mane Her BMI is 26. Counseling was previously provided to the patient. Counseling was reviewed. The patient did not have any additional questions or concerns.     The patient was counseled that overall women with inflammatory bowel disease can do well in pregnancy. Generally, if the disease is quiescent at the time of conception, then the risk for relapse during pregnancy is only 30%. However, if the disease is active at the time of conception then the risk for relapse during pregnancy is 70%. Women with inflammatory bowel disease may be at increased risk for obstetric complications such as antepartum bleeding, poor fetal growth, nutritional deficiencies, anemia, and  delivery. Generally, breastfeeding is considered safe and does not appear to affect the disease course. However, women with inflammatory bowel disease often chose not to breastfeed because they were advised not to, because of medication use, and/or personal choice.      The patient was counseled that there is also an increased risk for inflammatory bowel disease on her child(jorge). First-degree relatives of patients with IBD are approximately 3 to 20 times more likely to develop IBD as compared with the general population. Thus, this information should be relayed to the pediatrician caring for the child after delivery.      The patient should continue to follow with her gastroenterologist during pregnancy.     She should have a baseline nutrition panel (CBC, ferritin, folate, vitamin B12, vitamin D 25OH, calcium).   Testing was completed on 2022, her results included: H/H 12.1/36, MCV 96.5, platelet count 486,768, potassium 4, creatinine 0.4, calcium 9.3, ALT 74 (65), AST 27 (45), ferritin 61, folate >20, vitamin B12 583, vitamin D 35, magnesium 1.9, , uric acid 3.6, hepatitis C nonreactive, TSH 0.707, free T4 1.05, free T3 3.2, TPO negative, antithyroglobulin antibody negative, hemoglobin A1c 4.9%, thiamine 215, CMV IgG/IgM negative, parvovirus IgG/IgM negative, lupus anticoagulant negative, anticardiolipin antibody negative, beta-2 glycoprotein antibody negative, urine protein creatinine ratio 0.1, urine culture mixed, urinalysis negative. --Additional recommendations are below.     Given the association with antiphospholipid antibodies and inflammatory bowel disease, screening for antiphospholipid antibodies was recommended. Testing was negative on 4/27/2022. A screening ANNEL also recommended. This should be ordered with her next set of labs.     Fetal growth should be followed serially, every 3-4 weeks beginning at 24-26 weeks' gestation. Fetal kick counts should be monitored daily starting at 28 weeks' gestation. Additional surveillance may be indicated should any additional complications arise.       2. Nausea and vomiting of pregnancy, stable -- The patients reported having nausea and vomiting the entire pregnancy. She reports having emesis 2-3 times per day. The patient reports daily symptoms of nausea and decreased appetite. She denies any signs of symptoms of dehydration. Precautions were reviewed.       She reports that her weight has remained stable. She weighed 151 pounds today. She has gained 3 pounds since her last visit. Her BMI was 26. Her prenatal records were reviewed. On March 14, she weighed 138 pounds. On April 8 she also weighed 138 pounds. Since the patient's last visit, she has started using a Zofran pump. She also receives IV fluids intermittently at home. She reports that this regimen has helped significantly.     The patient has been using Zofran at (pump), Reglan, vitamin B6, and Unisom for symptoms.     Secondary to the patient's continued nausea and vomiting, Pepcid was also recommended. A prescription was provided previously.     The patient was again encouraged to eat small amounts of food every 2-3 hours during the day.   She was also encouraged to stay well-hydrated.     A baseline nutrition panel (CBC, CMP, magnesium, ferritin, folate, vitamin B12, vitamin D 25 [place]) was recommended. Baseline testing was completed on 2022, the results are summarized above, additional recommendations below. Of note, the patient's AST and ALT were mildly elevated, but trending down. This is likely related to her chronic nausea and vomiting. The patient's liver function test should be repeated with her next set of labs.     3. Asthma -- The patient reported a history of asthma. She denies having any recent issues. She denies any history of hospitalizations, steroid treatments, and or intubations related to her asthma. She again reports that her symptoms are stable. She rarely uses her rescue inhaler.       The patient was again counseled regarding the implications of asthma in pregnancy. The majority of patients have symptoms that remain stable or improve. In 1/3 of patients, symptoms worsen. Precautions were reviewed and she will need to step up medical therapy if she is using her rescue inhaler >2 times per week. Poorly controlled asthma is associated with an increased risk for poor fetal growth, hypertensive disorders,  birth, and fetal loss.      Fetal growth should be monitored serially, every 3 to 4 weeks beginning at 24 to 26 weeks gestation.     A flu vaccination is recommended for all pregnant women regardless of gestational age. This vaccination is especially important in women with underlying asthma.        4.  Right lower quadrant/flank pain, stable/improving -- The patient was hospitalized from  through  secondary to nausea, vomiting, and concern for appendicitis. She was provided with supportive care and her nausea and vomiting symptoms improved. She was also treated with antibiotics, cefoxitin.     An MRI was completed on 2022. Per that report, a structure likely representing the appendix was seen in the right lower quadrant.   The stricture was dilated and measured 1 cm and demonstrates wall thickening concerning for acute appendicitis. A small amount of free fluid was seen within the pelvis. The patient was approximately 2 weeks pregnant at that time.     The patient returned to the hospital on 3/28/2022 with complaints of worsening right lower quadrant pain. She also has complaints of nausea and vomiting.     The patient had a repeat MRI on 3/28/2022. Per the impression, no evidence of acute findings were noted in the abdomen or pelvis. Specifically, there was no evidence of acute appendicitis.     She had a consultation with surgery. No surgical intervention was recommended given the appendix now appeared normal.     During her hospitalization, she had a consultation with psychiatry and was started on Zoloft for depression. She was treated with supportive care and was discharged home in stable condition on 2022. The patient's symptoms were attributed to her Crohn's disease.     Today, the patient again reports that her pain symptoms are stable/improving. The patient was counseled to monitor her symptoms and return with persistent/worsening pain or the development of any associated fevers, chills, or worsening nausea and vomiting.     5. Anxiety/depression -- The patient reported a history of anxiety and depression for which she takes Zoloft, 25 mg daily. She has an appointment with baby on board this week. She is starting counseling.       During the patient's hospitalization in March, she had complaints of worsening depression symptoms. She had a consultation with psychiatry and Zoloft was started.     Today, the patient reports that her mood is stable. She denies any suicidal or homicidal ideations.        The patient was again counseled regarding the risks of depression and anxiety in pregnancy including worsening symptoms during pregnancy and postpartum, poor fetal growth and  birth.   The risks and benefits of treatment were also reviewed. I recommend continuing to monitor the patient's mood throughout pregnancy and postpartum. With recurrent or worsening symptoms, a referral should be made for counseling. Additionally, medication dosing may need to be adjusted.        Because of the association of thyroid disease and depression, a baseline thyroid screening is recommended with a TSH, free T4, and thyroid peroxidase antibody. Additionally, depression can be associated with nutritional deficiencies, thus, a baseline nutrition panel is also recommended (CBC, CMP, magnesium, ferritin, folate, vitamin B12, vitamin D 25OH). Testing was completed on 2022, the results are summarized above.     Because of the increased risk for poor fetal growth, fetal growth to be monitored serially, every 3 to 4 weeks starting at 24 to 26 weeks gestation. The patient should monitor fetal kick counts daily starting at 28 weeks gestation.     Counseling was again provided regarding the risks and benefits of Zoloft use in pregnancy and breast-feeding.     Specific to Zoloft, it dose cross the placenta and there is fetal exposure. First trimester exposure is not thought to cause an increased risk for major birth defects. There may be an association with congenital heart defects. Exposure in the late third trimester is associated with  respiratory distress, cyanosis, apnea, seizures, temperature instability, feeding difficult, emesis, hypoglycemia, hypo/hypertonia, hyperreflexia, irritability, constant crying, and/or tremor. Symptoms may be due to withdrawal.  Persistent pulmonary hypertension of the  ([facility]) has also been reported with SSRI exposure. The long-term effects of in utero SSRI exposure on infant development and behavior are not known.     As with any medication, the risk and benefit of use must be weighed. Again, there are risks associated with untreated depression including poor fetal growth and  delivery. The patient indicated that at this time, she did not feel that she could safely taper off of the medication. Thus, the benefit of treatment would outweigh risk at this time. Dose adjustment should be based on patient symptoms. Pregnant women exposed to antidepressants during pregnancy are encouraged to enroll in the [facility] Pregnancy Registry for Antidepressants (NPRAD). Women 25to 39years of age or their health care providers may contact the registry by calling [phone]. Enrollment should be done as early in pregnancy as possible.     She was also counseled regarding the risks and benefits of these medications during breast-feeding. Sertraline and its active metabolite are present in breastmilk. The relative infant dose (RID) is estimated to be 0.5-3%. Typically, breast-feeding is considered acceptable when the RID is <10%. When the RID is >25%, breast-feeding should be avoided. Some studies have reported adverse events in breast-feeding infants following maternal use of sertraline. Neonates of mothers taking psychotropic medication should be monitored daily for changes in sleep, feeding patterns, and/or behavior. Infant growth and development should be monitored per protocol. SSRI use during pregnancy may cause delayed lactation. Sertraline is a first-line agent for postpartum depression in women who have breast-feeding. Women whose symptoms are well controlled with sertraline during pregnancy can continue use while breast-feeding if there are no other contraindications. Per the , the decision to breast-feed during therapy should take into account risks and benefits and be individualized to the patient.        6. Family history of diabetes -- The patient's daughter has type 1 diabetes. Generally, if a child has type 1 diabetes, then the risk of type 1 diabetes in a sibling is estimated to be 5%. If the sibling is an identical twin, the risk may be as high as 50%.     Counseling was previously provided to the patient. Counseling was reviewed. Again, this family history should be relayed to the pediatrician who cares for the .     7.  History of elevated blood pressure -- The patient's blood pressure was noted to be elevated during her hospitalization. The patient denies a history of chronic hypertension. She stated that the blood pressure elevation was likely secondary to her pain.     The patient's blood pressure was normal at 104/73. Her urine dip was negative for protein. I recommend continued blood pressure monitoring.     8. Tobacco use in pregnancy -- The patient reported she quit smoking when she found out she was pregnant. She reports that she has remained tobacco free. She was again congratulated and encouraged to remain tobacco free. She was smoking < 1 pack per day. She was again counseled regarding the increased risks of smoking in pregnancy including poor fetal growth, placental abruption, PPROM/ birth, and fetal loss. Additional  risks were again reviewed including asthma, allergies, infection, and an association with SIDS. She was again urged to remain tobacco free through the pregnancy and postpartum.     9. THC Use --  The patient reported that she had quit smoking marijuana. Today, she reports that she is cutting back. The Patient reports that she was using marijuana for her mood and nausea. Counseling was again provided to the patient.     The patient was again counseled regarding risk of neurodevelopmental issues in children exposed to [facility] during pregnancy. Additionally, marijuana use may pose risks similar to that of cigarette use including increased risk for poor fetal growth, placental bleeding, PPROM/ delivery, and stillbirth. She was again counseled not to use THC while pregnant. Random urine drug screens should be monitored during pregnancy.     10.   History of IUGR/poor fetal growth -- The patient's pregnancy is in 2012 and 3481 were complicated by intrauterine growth restriction. The patient was previously counseled that based on her history, she is at increased risk, ~23%, for having another pregnancy complicated by poor fetal growth.  There also appears to be an increased risk for other complications including  delivery, preeclampsia, placental abruption, and fetal loss in subsequent pregnancies.       Fetal growth should be monitored serially, every 3 to 4 weeks starting at 24 to 26 weeks gestation.       A baseline maternal evaluation is recommended including a baseline nutrition panel, antiphospholipid antibody screening and thyroid function studies. Testing was completed on 2022. The results are summarized above.     She was counseled regarding the potential utility of LDASA in reducing her risk for poor fetal growth.  The risks and benefits were reviewed. Cindy Trimble should take a daily low-dose aspirin for the remainder of pregnancy and 6 to 8 weeks postpartum.     11. Advanced maternal age  -- The patient was again counseled regarding the implications of advanced maternal age in pregnancy, specifically that of aneuploidy. The patient did not have any additional questions or concerns.     The patient stated that she previously completed screening with NIPT. The results were reviewed. The fetal fraction was 5.6% and results low risk for aneuploidy. The reported fetal sex was male.     Since the patient had early screening with NIPT, a maternal serum AFP is recommended between 15 and 22 weeks to screen for open neural tube defects. The patient reported that she completed screening locally. She reports that her results were normal.     Again, recent studies have reported that there may be an increased risk for fetal loss in the setting of advanced maternal age. Thus, increased surveillance is recommended. I recommend monitoring fetal growth serially, every 4 weeks beginning at 24-26 weeks' gestation. She should monitor fetal kick counts daily starting at 28 weeks' gestation. At 36 weeks' gestation, she should be scheduled for a weekly non stress test or biophysical profile. I recommend delivery at 39-40 week's gestation.     Given there is an increased risk for hypertensive disorders of pregnancy in the setting of advanced maternal age, the possible utility of a low dose aspirin in reducing her risk for hypertensive disorders of pregnancy was reviewed. The risks and benefits of low dose aspirin were discussed. She was counseled to continue taking 81 mg of aspirin, daily.       12. History of oligohydramnios -- The patient  reported that her pregnancy in 610 was complicated by intrauterine growth restriction and oligohydramnios at 35 weeks 4 days. She was induced secondary to these complications. She denies any associated PROM. The patient was counseled that given this history, she would be increased risk for recurrent oligohydramnios. Given this history, increased surveillance is recommended. Fetal growth should be monitored serially, every 3-4 weeks beginning a 24-26 weeks' gestation. The amniotic fluid can be assessed during the studies. The patient could also start taking a low-dose aspirin in reducing the risk for placental insufficiency. She can take 81 mg daily.     13. Genital herpes -- The patient reported  a history of genital herpes.  She denies having any recent outbreaks. The patient was counseled that because she has genital herpes, suppression is recommended at 36 weeks gestation to minimize the risk for viral shedding or an outbreak at the time of delivery. If any lesions are present, then a  would be recommended for delivery. Additionally, any outbreaks should be treated during pregnancy. Precautions were reviewed with the patient.     14. History of a blood transfusion -- The patient reports a history of a blood transfusion as an infant.   Per the patient, she was admitted to the NICU after birth and contracted some type of infection. She required a blood transfusion. Given this history, baseline screening for hepatitis c is recommended. Screening was completed on 2022. Her hepatitis C was nonreactive.     15. 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 She was counseled that she may be at increased risk for viral exposures such as CMV and parvovirus. She was given orders to have baseline screening for these viruses. Precautions were reviewed. Baseline serology was completed on 2022. CMV IgG/IgM negative and parvovirus IgG/IgM negative. The results indicate that she has not been exposed to CMV or parvovirus. Precautions were reviewed.     16. Family history of cancer -- The patient's family history was previously reviewed and notable in that her mother had breast cancer. Given this family history, genetic counseling should be considered. She was counseled that if interested, your office could refer her for counseling to determine if genetic screening/testing is indicated. The patient expressed verbal understanding of this counseling.     17. Vaccination in pregnancy -- The patient was  previously counseled regarding the recommendations for vaccination in pregnancy. Counseling was reviewed. The patient did not have any additional questions or concerns.     The patient was counseled regarding the recommendations for the Tdap vaccination in pregnancy. Risks and benefits were discussed. The vaccination is typically administered between 27 and 36 weeks gestation and recommended to confer protection against whooping cough to the . The patient plans to discuss this with her primary provider at her next visit.   The patient was also counseled that her partner in any individuals who will be in close contact with the  should also be vaccinated for whooping cough.     The patient was counseled regarding recommendation for the flu vaccination in pregnancy for both maternal and infant safety. Risks and benefits were reviewed. She was encouraged to have this vaccination as an outpatient.     Counseling was provided regarding the recommendation for the Covid vaccination in pregnancy. I advised the patient that the [facility] Obstetrics and Gynecology and The Society for Maternal Fetal Medicine recommend that all pregnant women be vaccinated for COVID-19. \"Data have shown that COVID-19 infection puts pregnant people at increased risk of severe complications and even death; yet only about 22% of pregnant individuals have received 1 more doses of COVID-19 vaccine according to the [employer] for Disease Control and Prevention. \"     \" Recent data have shown that more than 95% of those were hospitalized and/or dying from COVID-19 are those who have remained unvaccinated. Pregnant individuals who have decided to wait until after delivery to be vaccinated may be inadvertently exposing themselves to an increased risk of severe illness or death. \"      \" COVID-19 vaccination is the best testing to reduce maternal and fetal complications of UOCNR-48 infection among pregnant people,\" said Nu Quesada MD, president of the Society for Maternal Fetal Medicine, sub-specialists.     The patient was counseled that in the event she opts not to have the Covid vaccination, she should alert her provider immediately if she test positive for Covid. Pregnant women are on the priority list for treatment with monoclonal antibody. This intervention has been shown to decrease the risk for hospitalization and complications related to Covid in pregnancy.     The patient expressed verbal understanding of this counseling. --I requested the patient return for a follow-up assessment in 3 weeks unless there is a clinical reason for her to return prior to that time.  She is to call if she has any problems or questions prior to her next visit. Further evaluation and management will be dependent on her clinical presentation and the results of her testing. --The patient was advised to call if she has any increased vaginal discharge, vaginal bleeding, contractions, abdominal pain, back pain or any new significant symptomatology prior to her next visit. I advised her that these are signs and symptoms of cervical change and require follow-up assessment when they occur. Preeclampsia precautions were also reviewed with the patient. --The patient was also counseled to call and/or return with any concerns for decreased fetal activity. --The patient is to continue to follow with you in your office for ongoing obstetric care. --The total time spent on today's visit was 30 minutes. This included preparation for the visit (i.e. reviewing prior external notes and test results), performance of a medically appropriate history and examination, counseling, orders for medications, tests or other procedures, and coordination of care. Greater than 50% of the time was spent face-to-face with the patient. This time is exclusive of procedures performed. I answered all of  the patient's questions to her satisfaction. I asked her to call if she had any additional questions prior to her next visit. --At the conclusion of the visit, the patient appeared to have a good understanding of the issues discussed. I answered all of her questions to her satisfaction. I asked her to call if she had any additional questions prior to her next visit. --Thank you for allowing me to participate in the care of this pleasant patient. Please don't hesitate to call me if you have any questions.       Sincerely,      Bhavesh Early MD, [address]  [phone]      *All or parts of this note may have been generated using a voice recognition program. There may be typo, grammar, or Word substitution errors that have escaped my review of this note.

## 2022-06-07 ENCOUNTER — APPOINTMENT (OUTPATIENT)
Dept: ULTRASOUND IMAGING | Age: 37
DRG: 566 | End: 2022-06-07
Payer: COMMERCIAL

## 2022-06-07 ENCOUNTER — HOSPITAL ENCOUNTER (INPATIENT)
Age: 37
LOS: 4 days | Discharge: HOME | DRG: 566 | End: 2022-06-11
Attending: EMERGENCY MEDICINE | Admitting: INTERNAL MEDICINE
Payer: COMMERCIAL

## 2022-06-07 DIAGNOSIS — Z3A.20 PREGNANCY WITH 20 COMPLETED WEEKS GESTATION: ICD-10-CM

## 2022-06-07 DIAGNOSIS — O21.1 HYPEREMESIS GRAVIDARUM WITH METABOLIC DISTURBANCE, ANTEPARTUM: Primary | ICD-10-CM

## 2022-06-07 DIAGNOSIS — I10 PRIMARY HYPERTENSION: ICD-10-CM

## 2022-06-07 DIAGNOSIS — R11.2 INTRACTABLE NAUSEA AND VOMITING: ICD-10-CM

## 2022-06-07 DIAGNOSIS — R11.10 HYPEREMESIS: ICD-10-CM

## 2022-06-07 DIAGNOSIS — E87.29 STARVATION KETOACIDOSIS: ICD-10-CM

## 2022-06-07 DIAGNOSIS — T73.0XXA STARVATION KETOACIDOSIS: ICD-10-CM

## 2022-06-07 DIAGNOSIS — F12.10 MARIJUANA ABUSE, CONTINUOUS: ICD-10-CM

## 2022-06-07 DIAGNOSIS — E87.6 HYPOKALEMIA: ICD-10-CM

## 2022-06-07 DIAGNOSIS — O21.0 HYPEREMESIS GRAVIDARUM: ICD-10-CM

## 2022-06-07 DIAGNOSIS — R11.15 EMESIS, PERSISTENT: ICD-10-CM

## 2022-06-07 LAB
ALBUMIN SERPL-MCNC: 4.2 G/DL (ref 3.5–5.2)
ALP BLD-CCNC: 71 U/L (ref 35–104)
ALT SERPL-CCNC: 18 U/L (ref 0–32)
AMPHETAMINE SCREEN, URINE: NOT DETECTED
ANION GAP SERPL CALCULATED.3IONS-SCNC: 15 MMOL/L (ref 7–16)
AST SERPL-CCNC: 57 U/L (ref 0–31)
BACTERIA: ABNORMAL /HPF
BARBITURATE SCREEN URINE: NOT DETECTED
BASOPHILS ABSOLUTE: 0.07 E9/L (ref 0–0.2)
BASOPHILS RELATIVE PERCENT: 0.4 % (ref 0–2)
BENZODIAZEPINE SCREEN, URINE: NOT DETECTED
BILIRUB SERPL-MCNC: 0.3 MG/DL (ref 0–1.2)
BILIRUBIN URINE: NEGATIVE
BLOOD, URINE: NEGATIVE
BUN BLDV-MCNC: 8 MG/DL (ref 6–20)
CALCIUM SERPL-MCNC: 9 MG/DL (ref 8.6–10.2)
CANNABINOID SCREEN URINE: POSITIVE
CHLORIDE BLD-SCNC: 100 MMOL/L (ref 98–107)
CLARITY: CLEAR
CO2: 19 MMOL/L (ref 22–29)
COCAINE METABOLITE SCREEN URINE: NOT DETECTED
COLOR: YELLOW
CREAT SERPL-MCNC: 0.5 MG/DL (ref 0.5–1)
EKG ATRIAL RATE: 72 BPM
EKG P AXIS: 59 DEGREES
EKG P-R INTERVAL: 146 MS
EKG Q-T INTERVAL: 416 MS
EKG QRS DURATION: 100 MS
EKG QTC CALCULATION (BAZETT): 455 MS
EKG R AXIS: 16 DEGREES
EKG T AXIS: 56 DEGREES
EKG VENTRICULAR RATE: 72 BPM
EOSINOPHILS ABSOLUTE: 0.13 E9/L (ref 0.05–0.5)
EOSINOPHILS RELATIVE PERCENT: 0.8 % (ref 0–6)
EPITHELIAL CELLS, UA: ABNORMAL /HPF
FENTANYL SCREEN, URINE: NOT DETECTED
GFR AFRICAN AMERICAN: >60
GFR NON-AFRICAN AMERICAN: >60 ML/MIN/1.73
GLUCOSE BLD-MCNC: 150 MG/DL (ref 74–99)
GLUCOSE URINE: NEGATIVE MG/DL
HCT VFR BLD CALC: 37.7 % (ref 34–48)
HEMOGLOBIN: 13 G/DL (ref 11.5–15.5)
IMMATURE GRANULOCYTES #: 0.11 E9/L
IMMATURE GRANULOCYTES %: 0.6 % (ref 0–5)
KETONES, URINE: >=80 MG/DL
LACTIC ACID: 1.9 MMOL/L (ref 0.5–2.2)
LEUKOCYTE ESTERASE, URINE: NEGATIVE
LYMPHOCYTES ABSOLUTE: 2.08 E9/L (ref 1.5–4)
LYMPHOCYTES RELATIVE PERCENT: 12.1 % (ref 20–42)
Lab: ABNORMAL
MAGNESIUM: 2 MG/DL (ref 1.6–2.6)
MCH RBC QN AUTO: 32.6 PG (ref 26–35)
MCHC RBC AUTO-ENTMCNC: 34.5 % (ref 32–34.5)
MCV RBC AUTO: 94.5 FL (ref 80–99.9)
METHADONE SCREEN, URINE: NOT DETECTED
MONOCYTES ABSOLUTE: 0.4 E9/L (ref 0.1–0.95)
MONOCYTES RELATIVE PERCENT: 2.3 % (ref 2–12)
MUCUS: PRESENT /LPF
NEUTROPHILS ABSOLUTE: 14.38 E9/L (ref 1.8–7.3)
NEUTROPHILS RELATIVE PERCENT: 83.8 % (ref 43–80)
NITRITE, URINE: NEGATIVE
OPIATE SCREEN URINE: NOT DETECTED
OXYCODONE URINE: NOT DETECTED
PDW BLD-RTO: 12.1 FL (ref 11.5–15)
PH UA: 6.5 (ref 5–9)
PHENCYCLIDINE SCREEN URINE: NOT DETECTED
PLATELET # BLD: 441 E9/L (ref 130–450)
PMV BLD AUTO: 9.4 FL (ref 7–12)
POTASSIUM REFLEX MAGNESIUM: 4.7 MMOL/L (ref 3.5–5)
PROTEIN UA: ABNORMAL MG/DL
RBC # BLD: 3.99 E12/L (ref 3.5–5.5)
RBC UA: ABNORMAL /HPF (ref 0–2)
SODIUM BLD-SCNC: 134 MMOL/L (ref 132–146)
SPECIFIC GRAVITY UA: 1.02 (ref 1–1.03)
TOTAL PROTEIN: 7.9 G/DL (ref 6.4–8.3)
UROBILINOGEN, URINE: 0.2 E.U./DL
WBC # BLD: 17.2 E9/L (ref 4.5–11.5)
WBC UA: ABNORMAL /HPF (ref 0–5)

## 2022-06-07 PROCEDURE — 96375 TX/PRO/DX INJ NEW DRUG ADDON: CPT

## 2022-06-07 PROCEDURE — 93005 ELECTROCARDIOGRAM TRACING: CPT | Performed by: EMERGENCY MEDICINE

## 2022-06-07 PROCEDURE — 6360000002 HC RX W HCPCS: Performed by: NURSE PRACTITIONER

## 2022-06-07 PROCEDURE — 83605 ASSAY OF LACTIC ACID: CPT

## 2022-06-07 PROCEDURE — 76815 OB US LIMITED FETUS(S): CPT

## 2022-06-07 PROCEDURE — 81001 URINALYSIS AUTO W/SCOPE: CPT

## 2022-06-07 PROCEDURE — 96361 HYDRATE IV INFUSION ADD-ON: CPT

## 2022-06-07 PROCEDURE — 2580000003 HC RX 258: Performed by: NURSE PRACTITIONER

## 2022-06-07 PROCEDURE — 83735 ASSAY OF MAGNESIUM: CPT

## 2022-06-07 PROCEDURE — 6360000002 HC RX W HCPCS: Performed by: EMERGENCY MEDICINE

## 2022-06-07 PROCEDURE — 99222 1ST HOSP IP/OBS MODERATE 55: CPT | Performed by: INTERNAL MEDICINE

## 2022-06-07 PROCEDURE — 80307 DRUG TEST PRSMV CHEM ANLYZR: CPT

## 2022-06-07 PROCEDURE — 1200000000 HC SEMI PRIVATE

## 2022-06-07 PROCEDURE — 87088 URINE BACTERIA CULTURE: CPT

## 2022-06-07 PROCEDURE — A4216 STERILE WATER/SALINE, 10 ML: HCPCS | Performed by: NURSE PRACTITIONER

## 2022-06-07 PROCEDURE — 96374 THER/PROPH/DIAG INJ IV PUSH: CPT

## 2022-06-07 PROCEDURE — APPSS45 APP SPLIT SHARED TIME 31-45 MINUTES: Performed by: NURSE PRACTITIONER

## 2022-06-07 PROCEDURE — 80053 COMPREHEN METABOLIC PANEL: CPT

## 2022-06-07 PROCEDURE — 2500000003 HC RX 250 WO HCPCS: Performed by: NURSE PRACTITIONER

## 2022-06-07 PROCEDURE — 96372 THER/PROPH/DIAG INJ SC/IM: CPT

## 2022-06-07 PROCEDURE — 99285 EMERGENCY DEPT VISIT HI MDM: CPT

## 2022-06-07 PROCEDURE — 85025 COMPLETE CBC W/AUTO DIFF WBC: CPT

## 2022-06-07 PROCEDURE — 2580000003 HC RX 258: Performed by: EMERGENCY MEDICINE

## 2022-06-07 RX ORDER — 0.9 % SODIUM CHLORIDE 0.9 %
1000 INTRAVENOUS SOLUTION INTRAVENOUS ONCE
Status: COMPLETED | OUTPATIENT
Start: 2022-06-07 | End: 2022-06-07

## 2022-06-07 RX ORDER — SODIUM CHLORIDE 9 MG/ML
INJECTION, SOLUTION INTRAVENOUS CONTINUOUS
Status: DISCONTINUED | OUTPATIENT
Start: 2022-06-07 | End: 2022-06-09

## 2022-06-07 RX ORDER — FLUTICASONE PROPIONATE 50 MCG
1 SPRAY, SUSPENSION (ML) NASAL DAILY
COMMUNITY
End: 2022-10-10

## 2022-06-07 RX ORDER — LANOLIN ALCOHOL/MO/W.PET/CERES
25 CREAM (GRAM) TOPICAL 3 TIMES DAILY
Status: DISCONTINUED | OUTPATIENT
Start: 2022-06-07 | End: 2022-06-11 | Stop reason: HOSPADM

## 2022-06-07 RX ORDER — SODIUM CHLORIDE 9 MG/ML
INJECTION, SOLUTION INTRAVENOUS PRN
Status: DISCONTINUED | OUTPATIENT
Start: 2022-06-07 | End: 2022-06-11 | Stop reason: HOSPADM

## 2022-06-07 RX ORDER — ONDANSETRON 2 MG/ML
4 INJECTION INTRAMUSCULAR; INTRAVENOUS EVERY 6 HOURS PRN
Status: DISCONTINUED | OUTPATIENT
Start: 2022-06-07 | End: 2022-06-11 | Stop reason: HOSPADM

## 2022-06-07 RX ORDER — SODIUM CHLORIDE 0.9 % (FLUSH) 0.9 %
5-40 SYRINGE (ML) INJECTION EVERY 12 HOURS SCHEDULED
Status: DISCONTINUED | OUTPATIENT
Start: 2022-06-07 | End: 2022-06-11 | Stop reason: HOSPADM

## 2022-06-07 RX ORDER — ONDANSETRON 4 MG/1
4 TABLET, FILM COATED ORAL EVERY 8 HOURS PRN
Status: DISCONTINUED | OUTPATIENT
Start: 2022-06-07 | End: 2022-06-07 | Stop reason: SDUPTHER

## 2022-06-07 RX ORDER — METOCLOPRAMIDE HYDROCHLORIDE 5 MG/ML
10 INJECTION INTRAMUSCULAR; INTRAVENOUS ONCE
Status: COMPLETED | OUTPATIENT
Start: 2022-06-07 | End: 2022-06-07

## 2022-06-07 RX ORDER — POLYETHYLENE GLYCOL 3350 17 G/17G
17 POWDER, FOR SOLUTION ORAL DAILY PRN
Status: DISCONTINUED | OUTPATIENT
Start: 2022-06-07 | End: 2022-06-11 | Stop reason: HOSPADM

## 2022-06-07 RX ORDER — ACETAMINOPHEN 325 MG/1
650 TABLET ORAL EVERY 6 HOURS PRN
Status: DISCONTINUED | OUTPATIENT
Start: 2022-06-07 | End: 2022-06-11 | Stop reason: HOSPADM

## 2022-06-07 RX ORDER — FAMOTIDINE 20 MG/1
20 TABLET, FILM COATED ORAL 2 TIMES DAILY
Status: DISCONTINUED | OUTPATIENT
Start: 2022-06-07 | End: 2022-06-07

## 2022-06-07 RX ORDER — PRENATAL VIT/IRON FUM/FOLIC AC 27MG-0.8MG
1 TABLET ORAL NIGHTLY
COMMUNITY
End: 2022-10-10

## 2022-06-07 RX ORDER — SODIUM CHLORIDE 0.9 % (FLUSH) 0.9 %
5-40 SYRINGE (ML) INJECTION PRN
Status: DISCONTINUED | OUTPATIENT
Start: 2022-06-07 | End: 2022-06-11 | Stop reason: HOSPADM

## 2022-06-07 RX ORDER — ONDANSETRON 2 MG/ML
4 INJECTION INTRAMUSCULAR; INTRAVENOUS ONCE
Status: COMPLETED | OUTPATIENT
Start: 2022-06-07 | End: 2022-06-07

## 2022-06-07 RX ORDER — PROMETHAZINE HYDROCHLORIDE 25 MG/ML
12.5 INJECTION, SOLUTION INTRAMUSCULAR; INTRAVENOUS ONCE
Status: COMPLETED | OUTPATIENT
Start: 2022-06-07 | End: 2022-06-07

## 2022-06-07 RX ORDER — SODIUM CHLORIDE 9 MG/ML
INJECTION, SOLUTION INTRAVENOUS ONCE
Status: COMPLETED | OUTPATIENT
Start: 2022-06-07 | End: 2022-06-07

## 2022-06-07 RX ORDER — ACETAMINOPHEN 650 MG/1
650 SUPPOSITORY RECTAL EVERY 6 HOURS PRN
Status: DISCONTINUED | OUTPATIENT
Start: 2022-06-07 | End: 2022-06-11 | Stop reason: HOSPADM

## 2022-06-07 RX ORDER — DIPHENHYDRAMINE HYDROCHLORIDE 50 MG/ML
25 INJECTION INTRAMUSCULAR; INTRAVENOUS ONCE
Status: COMPLETED | OUTPATIENT
Start: 2022-06-07 | End: 2022-06-07

## 2022-06-07 RX ORDER — PRENATAL WITH FERROUS FUM AND FOLIC ACID 3080; 920; 120; 400; 22; 1.84; 3; 20; 10; 1; 12; 200; 27; 25; 2 [IU]/1; [IU]/1; MG/1; [IU]/1; MG/1; MG/1; MG/1; MG/1; MG/1; MG/1; UG/1; MG/1; MG/1; MG/1; MG/1
1 TABLET ORAL DAILY
Status: DISCONTINUED | OUTPATIENT
Start: 2022-06-07 | End: 2022-06-11 | Stop reason: HOSPADM

## 2022-06-07 RX ORDER — PROMETHAZINE HYDROCHLORIDE 25 MG/ML
25 INJECTION, SOLUTION INTRAMUSCULAR; INTRAVENOUS ONCE
Status: COMPLETED | OUTPATIENT
Start: 2022-06-07 | End: 2022-06-07

## 2022-06-07 RX ORDER — METOCLOPRAMIDE HYDROCHLORIDE 5 MG/ML
10 INJECTION INTRAMUSCULAR; INTRAVENOUS 3 TIMES DAILY PRN
Status: DISCONTINUED | OUTPATIENT
Start: 2022-06-07 | End: 2022-06-11 | Stop reason: HOSPADM

## 2022-06-07 RX ORDER — METOCLOPRAMIDE 10 MG/1
10 TABLET ORAL 3 TIMES DAILY PRN
Status: DISCONTINUED | OUTPATIENT
Start: 2022-06-07 | End: 2022-06-07

## 2022-06-07 RX ORDER — ONDANSETRON 4 MG/1
4 TABLET, ORALLY DISINTEGRATING ORAL EVERY 8 HOURS PRN
Status: DISCONTINUED | OUTPATIENT
Start: 2022-06-07 | End: 2022-06-11 | Stop reason: HOSPADM

## 2022-06-07 RX ADMIN — ONDANSETRON 4 MG: 2 INJECTION INTRAMUSCULAR; INTRAVENOUS at 15:01

## 2022-06-07 RX ADMIN — DIPHENHYDRAMINE HYDROCHLORIDE 25 MG: 50 INJECTION, SOLUTION INTRAMUSCULAR; INTRAVENOUS at 11:17

## 2022-06-07 RX ADMIN — SODIUM CHLORIDE 1000 ML: 9 INJECTION, SOLUTION INTRAVENOUS at 10:47

## 2022-06-07 RX ADMIN — ONDANSETRON 4 MG: 2 INJECTION INTRAMUSCULAR; INTRAVENOUS at 13:26

## 2022-06-07 RX ADMIN — ONDANSETRON 4 MG: 2 INJECTION INTRAMUSCULAR; INTRAVENOUS at 23:44

## 2022-06-07 RX ADMIN — SODIUM CHLORIDE: 9 INJECTION, SOLUTION INTRAVENOUS at 15:01

## 2022-06-07 RX ADMIN — METOCLOPRAMIDE HYDROCHLORIDE 10 MG: 5 INJECTION INTRAMUSCULAR; INTRAVENOUS at 21:44

## 2022-06-07 RX ADMIN — SODIUM CHLORIDE 1000 ML: 9 INJECTION, SOLUTION INTRAVENOUS at 10:20

## 2022-06-07 RX ADMIN — SODIUM CHLORIDE, PRESERVATIVE FREE 10 ML: 5 INJECTION INTRAVENOUS at 21:49

## 2022-06-07 RX ADMIN — ONDANSETRON 4 MG: 2 INJECTION INTRAMUSCULAR; INTRAVENOUS at 17:41

## 2022-06-07 RX ADMIN — PROMETHAZINE HYDROCHLORIDE 25 MG: 25 INJECTION INTRAMUSCULAR; INTRAVENOUS at 10:39

## 2022-06-07 RX ADMIN — WATER 1000 MG: 1 INJECTION INTRAMUSCULAR; INTRAVENOUS; SUBCUTANEOUS at 17:18

## 2022-06-07 RX ADMIN — SODIUM CHLORIDE: 9 INJECTION, SOLUTION INTRAVENOUS at 17:06

## 2022-06-07 RX ADMIN — SODIUM CHLORIDE: 9 INJECTION, SOLUTION INTRAVENOUS at 21:43

## 2022-06-07 RX ADMIN — SODIUM CHLORIDE, PRESERVATIVE FREE 20 MG: 5 INJECTION INTRAVENOUS at 21:44

## 2022-06-07 RX ADMIN — METOCLOPRAMIDE 10 MG: 5 INJECTION, SOLUTION INTRAMUSCULAR; INTRAVENOUS at 11:17

## 2022-06-07 RX ADMIN — PROMETHAZINE HYDROCHLORIDE 12.5 MG: 25 INJECTION INTRAMUSCULAR; INTRAVENOUS at 16:27

## 2022-06-07 NOTE — H&P
[facility]   History and Physical      CHIEF COMPLAINT:   Nausea,vomiting    History of Present Illness:  29 y.o. female with PMH Crohn's disease, depression, herpes, HTN, marijuana use, seizure disorder who presents to the ER from home for ongoing nausea and vomiting. Patient states had zofran pump placed 4 weeks ago and uses marijuana recreationally for help with her nausea and Crohns flare up. Last admission, she reported smoking 3 bowls of marijuana daily. Today, she states she only smoked once and last smoked marijuana a week ago. She recently saw her  OB/GYN 22. States was still having nausea but not as bad as today. Reports flank pain. Denies dysuria. Denies subjective fever, chills. She is vomiting during my assessment. Patient was admitted  for same by her obstetrician. Consult was made to GI for history of Crohn's and planned to do outpatient EGD and colonoscopy after patient delivered. She was discharged to home on . Informant(s) for H&P: patient     REVIEW OF SYSTEMS:  Denies CP, SOB subjective fever/chills, cough, congestion,  ha, vision/hearing changes, wt changes, hot/cold flashes, other open skin lesions, constipation, dysuria/hematuria unless noted in HPI. Complete ROS performed with the patient and is otherwise negative.       PMH:  Past Medical History:   Diagnosis Date    Acute Crohn's disease ([address] 1    Anxiety attack since age 16    diagnosed with seizure due to convulsions from this    Asthma     seasonal; no inhaler     Crohn's colitis ([address]     Depression     medicated with zoloft     Herpes simplex virus (HSV) infection     History of blood transfusion     as an infant in [place] Hypertension     intermittent     Marijuana abuse     occas    Neurologic disorder     Postpartum depression      delivery      labor     Rh sensitized     Seizure ([address]     loses focus, disoriented unpon arousing; etiology unknown per pt; none since 2015    STD (sexually transmitted disease)     Tobacco abuse        Surgical History:  Past Surgical History:   Procedure Laterality Date    CHOLECYSTECTOMY  01/22/2018    COLONOSCOPY      DILATION AND CURETTAGE      DILATION AND CURETTAGE OF UTERUS  2011    ENDOSCOPY, COLON, DIAGNOSTIC      HERNIA REPAIR      HERNIA REPAIR      LAPAROSCOPY      UPPER GASTROINTESTINAL ENDOSCOPY N/A 2/19/2022    EGD BIOPSY performed by Sharyn Murphy MD at [address] [place] [address]       Medications Prior to Admission:    Prior to Admission medications    Medication Sig Start Date End Date Taking? Authorizing Provider   vitamin D (CHOLECALCIFEROL) 25 MCG (1000 UT) TABS tablet Take 1,000 Units by mouth nightly   Yes Historical Provider, MD   Prenatal Vit-Fe Fumarate-FA (PRENATAL VITAMIN) 27-0.8 MG TABS Take 1 tablet by mouth nightly   Yes Historical Provider, MD   doxyLAMINE succinate (GNP SLEEP AID) 25 MG tablet Take 12.5-25 mg by mouth nightly as needed (SLEEP/NAUSEA)   Yes Historical Provider, MD   fluticasone (FLONASE) 50 MCG/ACT nasal spray 1 spray by Each Nostril route daily   Yes Historical Provider, MD   vitamin B-6 (PYRIDOXINE) 25 MG tablet Take 1 tablet by mouth in the morning, at noon, and at bedtime 5/24/22   Armond Dobson MD   famotidine (PEPCID) 20 MG tablet Take 1 tablet by mouth 2 times daily 4/26/22   Armond Dobson MD   metoclopramide (REGLAN) 10 MG tablet Take 1 tablet by mouth 3 times daily as needed (nausea, vomiting) 4/2/22   Inés Lindo MD   sertraline (ZOLOFT) 25 MG tablet Take 1 tablet by mouth at bedtime 3/30/22   Galilea Erickson MD   ondansetron (ZOFRAN) 4 MG tablet Take 4 mg by mouth every 8 hours as needed for Nausea or Vomiting    Historical Provider, MD       Allergies:    Patient has no known allergies. Social History:    reports that she quit smoking about 2 years ago. Her smoking use included cigarettes. She has a 10.00 pack-year smoking history.  She has never used smokeless tobacco. She reports previous alcohol use. She reports previous drug use. Frequency: 2.00 times per week. Drug: Marijuana Nicole Russell). Family History:   family history includes Breast Cancer in her mother; Cervical Cancer in her mother; Depression in her father and mother; Heart Disease in her father; Hypertension in her father; Leukemia in her mother; Schizophrenia in her father. PHYSICAL EXAM:  Vitals:  /66   Pulse 84   Temp 98 °F (36.7 °C)   Resp 16   Ht 5' 4\" (1.626 m)   Wt 151 lb (68.5 kg)   LMP 01/24/2022   SpO2 98%   BMI 25.92 kg/m²     General Appearance: ill appearing, alert and oriented to person, place and time and in no acute distress  Skin: warm and dry  Head: normocephalic and atraumatic  Eyes: pupils equal, round, and reactive to light, extraocular eye movements intact, conjunctivae normal  Neck: neck supple and non tender without mass   Pulmonary/Chest: clear to auscultation bilaterally- no wheezes, rales or rhonchi, normal air movement, no respiratory distress  Cardiovascular: normal rate, normal S1 and S2 and no carotid bruits  Abdomen: soft, non-tender, non-distended, normal bowel sounds, no masses or organomegaly  Extremities: no cyanosis, no clubbing and no edema  Neurologic: no cranial nerve deficit and speech normal    LABS:  Recent Labs     06/07/22  1002      K 4.7      CO2 19*   BUN 8   CREATININE 0.5   GLUCOSE 150*   CALCIUM 9.0       Recent Labs     06/07/22  1002   WBC 17.2*   RBC 3.99   HGB 13.0   HCT 37.7   MCV 94.5   MCH 32.6   MCHC 34.5   RDW 12.1      MPV 9.4       No results for input(s): POCGLU in the last 72 hours.         I reviewed all labs and diagnostic images        Radiology: US OB 1 OR MORE FETUS LIMITED    Result Date: 6/7/2022  EXAMINATION: 6/7/2022 Second TRIMESTER OBSTETRIC ULTRASOUND TECHNIQUE: TRANSABDOMINAL PELVIC ULTRASOUND WITH COLOR DOPPLER FLOW HISTORY: ORDERING SYSTEM PROVIDED HISTORY: abdominal pain TECHNOLOGIST PROVIDED HISTORY: Reason for exam:->abdominal pain What reading provider will be dictating this exam?->CRC FINDINGS: A single live intrauterine pregnancy is present. Fetal heart rate measures 139 beats per minute. There is normal fetal body and limb movement. The fetus is in transverse position. The placenta is located fundal. The amniotic fluid volume is subjectively normal Limited evaluation of the fetal anatomy as per ER protocol. BPD measures 4.6 cm. Head circumference measures 16.9 cm. Abdominal circumference measures 14.8 cm. Femur length measures 3.23 cm. Estimated fetal weight is 329.78+/-49.5 grams which correlates to 91.7 percentile based upon last menstrual period. Estimated gestational age by current ultrasound is 20 weeks 0 days +/-1 week 3 days. Single live intrauterine pregnancy with gestational age of 25 weeks 0 days by current sonographic biometry. The estimated due date is 10/25/2022 from the current ultrasound. Estimated due date of 10/29/2022 or 19 weeks 3 days GA from the ultrasound of 04/26/2022. EKG:         ASSESSMENT:      Principal Problem:    Hyperemesis  Active Problems:    Intractable nausea and vomiting    Cannabis dependence (HCC)    Crohn disease ([facility]    Primary hypertension  Resolved Problems:    * No resolved hospital problems. *      PLAN:    1. Intractable nausea/vomiting in pregnancy; recurrent  -suspect underlying component of overuse of marijuana  -defer antiemetics to Obstetrics as patient is not responding to ondansetron and reglan. -IV hydration, clear liquid diet  -pelvic US shwoing single live intrauterine pregnancy 20 weeks gestation    2. Marijuana use  - UDS + cannabinoids  -counseled on cessation    3. Crohn's disease:  -was seen by GI last admission 04/15. Plans for outpatient endoscopy after delivery. 4. HTN:  -off meds. Being monitored by PCP    5. Seizure disorder  -off meds  -reports last seizure as a teenager    6.  UTI:  -UA w mucus, bacteria.  Urine Cx pending  -start ceftriaxone (ok to give in pregnancy per pharmacy)    Code Status: full  DVT prophylaxis:  SCDs         Electronically signed by LEIDY Singer CNP on 6/7/2022 at 4:36 PM

## 2022-06-07 NOTE — Clinical Note
Discharge Plan[de-identified] Other/[facility])   Telemetry/Cardiac Monitoring Required?: No

## 2022-06-07 NOTE — ED PROVIDER NOTES
Jerson Zamarripa is a 29 y.o. female presenting to the ED for nausea and vomiting, beginning pta ago. The complaint has been intermittent, moderate in severity, and worsened by nothing. 30 yo f who is [id number] at 19 wks w history of crohns dz, hyperemesis gravidarum, cannabis and tobacco abuse f/w MFM dr Aneudy Graves and regular obgyn dr Anne Marie Billingsley. On chronic zofran infusion presents for intractable nausea and vomiting. Pt denies any lof, vaginal or vaginal bleeding, denies any contractions. She is c/o abdominal pain. Pt is actively retching     Review of Systems:   Review of Systems   Unable to perform ROS: Acuity of condition                 --------------------------------------------- PAST HISTORY ---------------------------------------------  Past Medical History:  has a past medical history of Acute Crohn's disease ([facility] Anxiety attack, Asthma, Crohn's colitis ([facility] [facility] Depression, Herpes simplex virus (HSV) infection, History of blood transfusion, Hypertension, Marijuana abuse, Neurologic disorder, Postpartum depression,  delivery,  labor, Rh sensitized, Seizure ([facility] [address] [facility] STD (sexually transmitted disease), and Tobacco abuse. Past Surgical History:  has a past surgical history that includes Dilation and curettage of uterus (); Colonoscopy; Endoscopy, colon, diagnostic; Cholecystectomy (2018); hernia repair; hernia repair; Upper gastrointestinal endoscopy (N/A, 2022); Dilation & curettage; and laparoscopy. Social History:  reports that she quit smoking about 2 years ago. Her smoking use included cigarettes. She has a 10.00 pack-year smoking history. She has never used smokeless tobacco. She reports previous alcohol use. She reports previous drug use. Frequency: 2.00 times per week. Drug: Marijuana Manny Pare).     Family History: family history includes Breast Cancer in her mother; Cervical Cancer in her mother; Depression in her father and mother; Heart Disease in her father; Hypertension in her father; Leukemia in her mother; Schizophrenia in her father. The patients home medications have been reviewed. Allergies: Patient has no known allergies.     -------------------------------------------------- RESULTS -------------------------------------------------  All laboratory and radiology results have been personally reviewed by myself   LABS:  Results for orders placed or performed during the hospital encounter of 06/07/22   CBC with Auto Differential   Result Value Ref Range    WBC 17.2 (H) 4.5 - 11.5 E9/L    RBC 3.99 3.50 - 5.50 E12/L    Hemoglobin 13.0 11.5 - 15.5 g/dL    Hematocrit 37.7 34.0 - 48.0 %    MCV 94.5 80.0 - 99.9 fL    MCH 32.6 26.0 - 35.0 pg    MCHC 34.5 32.0 - 34.5 %    RDW 12.1 11.5 - 15.0 fL    Platelets 424 857 - 590 E9/L    MPV 9.4 7.0 - 12.0 fL    Neutrophils % 83.8 (H) 43.0 - 80.0 %    Immature Granulocytes % 0.6 0.0 - 5.0 %    Lymphocytes % 12.1 (L) 20.0 - 42.0 %    Monocytes % 2.3 2.0 - 12.0 %    Eosinophils % 0.8 0.0 - 6.0 %    Basophils % 0.4 0.0 - 2.0 %    Neutrophils Absolute 14.38 (H) 1.80 - 7.30 E9/L    Immature Granulocytes # 0.11 E9/L    Lymphocytes Absolute 2.08 1.50 - 4.00 E9/L    Monocytes Absolute 0.40 0.10 - 0.95 E9/L    Eosinophils Absolute 0.13 0.05 - 0.50 E9/L    Basophils Absolute 0.07 0.00 - 0.20 E9/L   Comprehensive Metabolic Panel w/ Reflex to MG   Result Value Ref Range    Sodium 134 132 - 146 mmol/L    Potassium reflex Magnesium 4.7 3.5 - 5.0 mmol/L    Chloride 100 98 - 107 mmol/L    CO2 19 (L) 22 - 29 mmol/L    Anion Gap 15 7 - 16 mmol/L    Glucose 150 (H) 74 - 99 mg/dL    BUN 8 6 - 20 mg/dL    CREATININE 0.5 0.5 - 1.0 mg/dL    GFR Non-African American >60 >=60 mL/min/1.73    GFR African American >60     Calcium 9.0 8.6 - 10.2 mg/dL    Total Protein 7.9 6.4 - 8.3 g/dL    Albumin 4.2 3.5 - 5.2 g/dL    Total Bilirubin 0.3 0.0 - 1.2 mg/dL    Alkaline Phosphatase 71 35 - 104 U/L    ALT 18 0 - 32 U/L    AST 57 (H) 0 - 31 U/L   Lactic Acid Result Value Ref Range    Lactic Acid 1.9 0.5 - 2.2 mmol/L   Urinalysis with Microscopic   Result Value Ref Range    Color, UA Yellow Straw/Yellow    Clarity, UA Clear Clear    Glucose, Ur Negative Negative mg/dL    Bilirubin Urine Negative Negative    Ketones, Urine >=80 (A) Negative mg/dL    Specific Gravity, UA 1.025 1.005 - 1.030    Blood, Urine Negative Negative    pH, UA 6.5 5.0 - 9.0    Protein, UA TRACE Negative mg/dL    Urobilinogen, Urine 0.2 <2.0 E.U./dL    Nitrite, Urine Negative Negative    Leukocyte Esterase, Urine Negative Negative    Mucus, UA Present (A) None Seen /LPF    WBC, UA 1-3 0 - 5 /HPF    RBC, UA 0-1 0 - 2 /HPF    Epithelial Cells, UA MANY /HPF    Bacteria, UA FEW (A) None Seen /HPF   Magnesium   Result Value Ref Range    Magnesium 2.0 1.6 - 2.6 mg/dL   URINE DRUG SCREEN   Result Value Ref Range    Amphetamine Screen, Urine NOT DETECTED Negative <1000 ng/mL    Barbiturate Screen, Ur NOT DETECTED Negative < 200 ng/mL    Benzodiazepine Screen, Urine NOT DETECTED Negative < 200 ng/mL    Cannabinoid Scrn, Ur POSITIVE (A) Negative < 50ng/mL    Cocaine Metabolite Screen, Urine NOT DETECTED Negative < 300 ng/mL    Opiate Scrn, Ur NOT DETECTED Negative < 300ng/mL    PCP Screen, Urine NOT DETECTED Negative < 25 ng/mL    Methadone Screen, Urine NOT DETECTED Negative <300 ng/mL    Oxycodone Urine NOT DETECTED Negative <100 ng/mL    FENTANYL SCREEN, URINE NOT DETECTED Negative <1 ng/mL    Drug Screen Comment: see below    EKG 12 Lead   Result Value Ref Range    Ventricular Rate 72 BPM    Atrial Rate 72 BPM    P-R Interval 146 ms    QRS Duration 100 ms    Q-T Interval 416 ms    QTc Calculation (Bazett) 455 ms    P Axis 59 degrees    R Axis 16 degrees    T Axis 56 degrees       RADIOLOGY:  Interpreted by Radiologist.  US OB 1 OR MORE FETUS LIMITED   Final Result   Single live intrauterine pregnancy with gestational age of 20 weeks 0 days by   current sonographic biometry.   The estimated due date is 10/25/2022 from the   current ultrasound. Estimated due date of 10/29/2022 or 19 weeks 3 days GA from the ultrasound of   04/26/2022.             ------------------------- NURSING NOTES AND VITALS REVIEWED ---------------------------   The nursing notes within the ED encounter and vital signs as below have been reviewed. BP (!) 130/58   Pulse 80   Temp 98 °F (36.7 °C)   Resp 18   Ht 5' 4\" (1.626 m)   Wt 151 lb (68.5 kg)   LMP 01/24/2022   SpO2 98%   BMI 25.92 kg/m²   Oxygen Saturation Interpretation: Normal      ---------------------------------------------------PHYSICAL EXAM--------------------------------------    Physical Exam  Vitals reviewed. Constitutional:       General: She is not in acute distress. Appearance: She is ill-appearing and diaphoretic. She is not toxic-appearing. HENT:      Head: Normocephalic and atraumatic. Right Ear: External ear normal.      Left Ear: External ear normal.      Nose: Nose normal. No congestion. Mouth/Throat:      Mouth: Mucous membranes are moist.      Pharynx: Oropharynx is clear. No posterior oropharyngeal erythema. Eyes:      Extraocular Movements: Extraocular movements intact. Pupils: Pupils are equal, round, and reactive to light. Cardiovascular:      Rate and Rhythm: Normal rate and regular rhythm. Pulses: Normal pulses. Heart sounds: No murmur heard. Pulmonary:      Effort: Pulmonary effort is normal.      Breath sounds: No wheezing or rhonchi. Chest:      Chest wall: No tenderness. Abdominal:      General: Bowel sounds are normal.      Tenderness: There is abdominal tenderness. There is no right CVA tenderness, left CVA tenderness or guarding. Musculoskeletal:         General: No swelling or deformity. Cervical back: Normal range of motion and neck supple. No muscular tenderness. Skin:     General: Skin is warm. Capillary Refill: Capillary refill takes less than 2 seconds.    Neurological: General: No focal deficit present. Mental Status: She is alert and oriented to person, place, and time. Sensory: No sensory deficit. Motor: No weakness. Psychiatric:         Mood and Affect: Mood normal.                 ------------------------------ ED COURSE/MEDICAL DECISION MAKING----------------------  Medications   ondansetron (ZOFRAN) injection 4 mg (has no administration in time range)   0.9 % sodium chloride infusion (has no administration in time range)   0.9 % sodium chloride bolus (0 mLs IntraVENous Stopped 6/7/22 1248)   promethazine (PHENERGAN) injection 25 mg (25 mg IntraMUSCular Given 6/7/22 1039)   0.9 % sodium chloride bolus (0 mLs IntraVENous Stopped 6/7/22 1248)   metoclopramide (REGLAN) injection 10 mg (10 mg IntraVENous Given 6/7/22 1117)   diphenhydrAMINE (BENADRYL) injection 25 mg (25 mg IntraVENous Given 6/7/22 1117)   ondansetron (ZOFRAN) injection 4 mg (4 mg IntraVENous Given 6/7/22 1326)     EKG: This EKG is signed and interpreted by me. MCKO:[id number]  Rate: 72  Rhythm: Sinus  Interpretation: no acute changes  Comparison: stable as compared to patient's most recent EKG      ED COURSE:       Medical Decision Making:    Patient follows with OB/GYN and high risk. She has a history of Crohn's disease. She regularly uses marijuana. Antiemetic therapy at home has not been working. She is high risk pregnancy. Case reviewed with OB/GYN Dr. Marielos Haro who felt that there is more going on than just pregnancy associated hyperemesis. Symptoms likely from cannabis induced cyclic vomiting. Patient given multiple rounds of antiemetics in the ER she is given multiple boluses of IV fluids. She will be admitted for further evaluation. Counseling: The emergency provider has spoken with the patient and discussed todays results, in addition to providing specific details for the plan of care and counseling regarding the diagnosis and prognosis.   Questions are answered at this time and they are agreeable with the plan.      --------------------------------- IMPRESSION AND DISPOSITION ---------------------------------    IMPRESSION  1. Hyperemesis gravidarum with metabolic disturbance, antepartum    2. Starvation ketoacidosis    3. Marijuana abuse, continuous    4. Pregnancy with 20 completed weeks gestation        DISPOSITION  Disposition: Admit to med/surg floor  Patient condition is fair      NOTE: This report was transcribed using voice recognition software.  Every effort was made to ensure accuracy; however, inadvertent computerized transcription errors may be present       Michael Carrillo DO  06/07/22 2438

## 2022-06-07 NOTE — PROGRESS NOTES
Database initiated. Patient is A&O independent from home with children. She uses no assistive devices and is RA at baseline.  Pharmacy tech to verify home medications

## 2022-06-08 ENCOUNTER — APPOINTMENT (OUTPATIENT)
Dept: CT IMAGING | Age: 37
DRG: 566 | End: 2022-06-08
Payer: COMMERCIAL

## 2022-06-08 ENCOUNTER — ANCILLARY PROCEDURE (OUTPATIENT)
Dept: OBGYN CLINIC | Age: 37
DRG: 566 | End: 2022-06-08
Payer: COMMERCIAL

## 2022-06-08 LAB
ALBUMIN SERPL-MCNC: 3.6 G/DL (ref 3.5–5.2)
ALP BLD-CCNC: 61 U/L (ref 35–104)
ALT SERPL-CCNC: 13 U/L (ref 0–32)
ANION GAP SERPL CALCULATED.3IONS-SCNC: 16 MMOL/L (ref 7–16)
AST SERPL-CCNC: 16 U/L (ref 0–31)
BASOPHILS ABSOLUTE: 0.02 E9/L (ref 0–0.2)
BASOPHILS RELATIVE PERCENT: 0.1 % (ref 0–2)
BILIRUB SERPL-MCNC: 0.2 MG/DL (ref 0–1.2)
BUN BLDV-MCNC: 5 MG/DL (ref 6–20)
CALCIUM SERPL-MCNC: 8 MG/DL (ref 8.6–10.2)
CHLORIDE BLD-SCNC: 103 MMOL/L (ref 98–107)
CO2: 18 MMOL/L (ref 22–29)
CREAT SERPL-MCNC: 0.4 MG/DL (ref 0.5–1)
EOSINOPHILS ABSOLUTE: 0 E9/L (ref 0.05–0.5)
EOSINOPHILS RELATIVE PERCENT: 0 % (ref 0–6)
GFR AFRICAN AMERICAN: >60
GFR NON-AFRICAN AMERICAN: >60 ML/MIN/1.73
GLUCOSE BLD-MCNC: 129 MG/DL (ref 74–99)
HCT VFR BLD CALC: 30 % (ref 34–48)
HEMOGLOBIN: 10.4 G/DL (ref 11.5–15.5)
IMMATURE GRANULOCYTES #: 0.31 E9/L
IMMATURE GRANULOCYTES %: 1.8 % (ref 0–5)
LYMPHOCYTES ABSOLUTE: 2.15 E9/L (ref 1.5–4)
LYMPHOCYTES RELATIVE PERCENT: 12.2 % (ref 20–42)
MAGNESIUM: 1.7 MG/DL (ref 1.6–2.6)
MCH RBC QN AUTO: 32.2 PG (ref 26–35)
MCHC RBC AUTO-ENTMCNC: 34.7 % (ref 32–34.5)
MCV RBC AUTO: 92.9 FL (ref 80–99.9)
MONOCYTES ABSOLUTE: 0.64 E9/L (ref 0.1–0.95)
MONOCYTES RELATIVE PERCENT: 3.6 % (ref 2–12)
NEUTROPHILS ABSOLUTE: 14.46 E9/L (ref 1.8–7.3)
NEUTROPHILS RELATIVE PERCENT: 82.3 % (ref 43–80)
PDW BLD-RTO: 12.1 FL (ref 11.5–15)
PLATELET # BLD: 378 E9/L (ref 130–450)
PMV BLD AUTO: 9.3 FL (ref 7–12)
POTASSIUM REFLEX MAGNESIUM: 2.7 MMOL/L (ref 3.5–5)
POTASSIUM SERPL-SCNC: 3.2 MMOL/L (ref 3.5–5)
RBC # BLD: 3.23 E12/L (ref 3.5–5.5)
SODIUM BLD-SCNC: 137 MMOL/L (ref 132–146)
TOTAL PROTEIN: 6.2 G/DL (ref 6.4–8.3)
WBC # BLD: 17.6 E9/L (ref 4.5–11.5)

## 2022-06-08 PROCEDURE — 80053 COMPREHEN METABOLIC PANEL: CPT

## 2022-06-08 PROCEDURE — 6360000002 HC RX W HCPCS: Performed by: NURSE PRACTITIONER

## 2022-06-08 PROCEDURE — 2500000003 HC RX 250 WO HCPCS: Performed by: OBSTETRICS & GYNECOLOGY

## 2022-06-08 PROCEDURE — A4216 STERILE WATER/SALINE, 10 ML: HCPCS | Performed by: NURSE PRACTITIONER

## 2022-06-08 PROCEDURE — 74176 CT ABD & PELVIS W/O CONTRAST: CPT

## 2022-06-08 PROCEDURE — 36415 COLL VENOUS BLD VENIPUNCTURE: CPT

## 2022-06-08 PROCEDURE — 84132 ASSAY OF SERUM POTASSIUM: CPT

## 2022-06-08 PROCEDURE — 6360000002 HC RX W HCPCS: Performed by: OBSTETRICS & GYNECOLOGY

## 2022-06-08 PROCEDURE — 99253 IP/OBS CNSLTJ NEW/EST LOW 45: CPT | Performed by: OBSTETRICS & GYNECOLOGY

## 2022-06-08 PROCEDURE — 6370000000 HC RX 637 (ALT 250 FOR IP): Performed by: INTERNAL MEDICINE

## 2022-06-08 PROCEDURE — 2580000003 HC RX 258: Performed by: OBSTETRICS & GYNECOLOGY

## 2022-06-08 PROCEDURE — 99232 SBSQ HOSP IP/OBS MODERATE 35: CPT | Performed by: INTERNAL MEDICINE

## 2022-06-08 PROCEDURE — APPSS30 APP SPLIT SHARED TIME 16-30 MINUTES: Performed by: NURSE PRACTITIONER

## 2022-06-08 PROCEDURE — 76815 OB US LIMITED FETUS(S): CPT | Performed by: OBSTETRICS & GYNECOLOGY

## 2022-06-08 PROCEDURE — 83735 ASSAY OF MAGNESIUM: CPT

## 2022-06-08 PROCEDURE — 93976 VASCULAR STUDY: CPT | Performed by: OBSTETRICS & GYNECOLOGY

## 2022-06-08 PROCEDURE — 2580000003 HC RX 258: Performed by: NURSE PRACTITIONER

## 2022-06-08 PROCEDURE — 76817 TRANSVAGINAL US OBSTETRIC: CPT | Performed by: OBSTETRICS & GYNECOLOGY

## 2022-06-08 PROCEDURE — 1200000000 HC SEMI PRIVATE

## 2022-06-08 PROCEDURE — 2500000003 HC RX 250 WO HCPCS: Performed by: NURSE PRACTITIONER

## 2022-06-08 PROCEDURE — 6370000000 HC RX 637 (ALT 250 FOR IP): Performed by: NURSE PRACTITIONER

## 2022-06-08 PROCEDURE — 85025 COMPLETE CBC W/AUTO DIFF WBC: CPT

## 2022-06-08 RX ORDER — POTASSIUM CHLORIDE 20 MEQ/1
20 TABLET, EXTENDED RELEASE ORAL ONCE
Status: COMPLETED | OUTPATIENT
Start: 2022-06-08 | End: 2022-06-08

## 2022-06-08 RX ORDER — POTASSIUM CHLORIDE 20 MEQ/1
20 TABLET, EXTENDED RELEASE ORAL ONCE
Status: DISCONTINUED | OUTPATIENT
Start: 2022-06-08 | End: 2022-06-09

## 2022-06-08 RX ORDER — POTASSIUM CHLORIDE 7.45 MG/ML
10 INJECTION INTRAVENOUS
Status: DISPENSED | OUTPATIENT
Start: 2022-06-08 | End: 2022-06-08

## 2022-06-08 RX ORDER — MORPHINE SULFATE 2 MG/ML
2 INJECTION, SOLUTION INTRAMUSCULAR; INTRAVENOUS EVERY 4 HOURS PRN
Status: DISCONTINUED | OUTPATIENT
Start: 2022-06-08 | End: 2022-06-11 | Stop reason: HOSPADM

## 2022-06-08 RX ORDER — POTASSIUM CHLORIDE 7.45 MG/ML
10 INJECTION INTRAVENOUS ONCE
Status: COMPLETED | OUTPATIENT
Start: 2022-06-08 | End: 2022-06-08

## 2022-06-08 RX ADMIN — MORPHINE SULFATE 2 MG: 2 INJECTION, SOLUTION INTRAMUSCULAR; INTRAVENOUS at 13:32

## 2022-06-08 RX ADMIN — METFORMIN HYDROCHLORIDE 1 TABLET: 500 TABLET, EXTENDED RELEASE ORAL at 08:45

## 2022-06-08 RX ADMIN — MORPHINE SULFATE 2 MG: 2 INJECTION, SOLUTION INTRAMUSCULAR; INTRAVENOUS at 08:52

## 2022-06-08 RX ADMIN — WATER 1000 MG: 1 INJECTION INTRAMUSCULAR; INTRAVENOUS; SUBCUTANEOUS at 17:37

## 2022-06-08 RX ADMIN — POTASSIUM CHLORIDE 10 MEQ: 7.46 INJECTION, SOLUTION INTRAVENOUS at 06:09

## 2022-06-08 RX ADMIN — FOLIC ACID: 5 INJECTION, SOLUTION INTRAMUSCULAR; INTRAVENOUS; SUBCUTANEOUS at 15:53

## 2022-06-08 RX ADMIN — METOCLOPRAMIDE HYDROCHLORIDE 10 MG: 5 INJECTION INTRAMUSCULAR; INTRAVENOUS at 04:18

## 2022-06-08 RX ADMIN — SODIUM CHLORIDE: 9 INJECTION, SOLUTION INTRAVENOUS at 04:22

## 2022-06-08 RX ADMIN — PYRIDOXINE HCL TAB 50 MG 25 MG: 50 TAB at 15:57

## 2022-06-08 RX ADMIN — MORPHINE SULFATE 2 MG: 2 INJECTION, SOLUTION INTRAMUSCULAR; INTRAVENOUS at 17:43

## 2022-06-08 RX ADMIN — METOCLOPRAMIDE HYDROCHLORIDE 10 MG: 5 INJECTION INTRAMUSCULAR; INTRAVENOUS at 19:07

## 2022-06-08 RX ADMIN — POTASSIUM CHLORIDE 20 MEQ: 1500 TABLET, EXTENDED RELEASE ORAL at 18:22

## 2022-06-08 RX ADMIN — POTASSIUM CHLORIDE 10 MEQ: 7.46 INJECTION, SOLUTION INTRAVENOUS at 08:58

## 2022-06-08 RX ADMIN — POTASSIUM CHLORIDE 10 MEQ: 7.46 INJECTION, SOLUTION INTRAVENOUS at 10:17

## 2022-06-08 RX ADMIN — ONDANSETRON 4 MG: 2 INJECTION INTRAMUSCULAR; INTRAVENOUS at 17:35

## 2022-06-08 RX ADMIN — POTASSIUM CHLORIDE 10 MEQ: 7.46 INJECTION, SOLUTION INTRAVENOUS at 07:43

## 2022-06-08 RX ADMIN — ONDANSETRON 4 MG: 2 INJECTION INTRAMUSCULAR; INTRAVENOUS at 04:18

## 2022-06-08 RX ADMIN — MORPHINE SULFATE 2 MG: 2 INJECTION, SOLUTION INTRAMUSCULAR; INTRAVENOUS at 21:43

## 2022-06-08 RX ADMIN — SODIUM CHLORIDE, PRESERVATIVE FREE 20 MG: 5 INJECTION INTRAVENOUS at 08:46

## 2022-06-08 RX ADMIN — POTASSIUM CHLORIDE 10 MEQ: 7.46 INJECTION, SOLUTION INTRAVENOUS at 13:32

## 2022-06-08 RX ADMIN — POTASSIUM CHLORIDE 10 MEQ: 7.46 INJECTION, SOLUTION INTRAVENOUS at 11:48

## 2022-06-08 RX ADMIN — SODIUM CHLORIDE, PRESERVATIVE FREE 10 ML: 5 INJECTION INTRAVENOUS at 19:09

## 2022-06-08 RX ADMIN — SODIUM CHLORIDE, PRESERVATIVE FREE 20 MG: 5 INJECTION INTRAVENOUS at 20:18

## 2022-06-08 RX ADMIN — ONDANSETRON 4 MG: 2 INJECTION INTRAMUSCULAR; INTRAVENOUS at 11:33

## 2022-06-08 ASSESSMENT — PAIN DESCRIPTION - LOCATION: LOCATION: ABDOMEN

## 2022-06-08 ASSESSMENT — PAIN DESCRIPTION - ONSET: ONSET: ON-GOING

## 2022-06-08 ASSESSMENT — PAIN SCALES - GENERAL
PAINLEVEL_OUTOF10: 9
PAINLEVEL_OUTOF10: 9
PAINLEVEL_OUTOF10: 8
PAINLEVEL_OUTOF10: 9

## 2022-06-08 ASSESSMENT — PAIN - FUNCTIONAL ASSESSMENT: PAIN_FUNCTIONAL_ASSESSMENT: ACTIVITIES ARE NOT PREVENTED

## 2022-06-08 ASSESSMENT — PAIN DESCRIPTION - FREQUENCY: FREQUENCY: CONTINUOUS

## 2022-06-08 ASSESSMENT — PAIN DESCRIPTION - ORIENTATION: ORIENTATION: RIGHT

## 2022-06-08 ASSESSMENT — PAIN DESCRIPTION - PAIN TYPE: TYPE: ACUTE PAIN

## 2022-06-08 ASSESSMENT — PAIN DESCRIPTION - DESCRIPTORS: DESCRIPTORS: ACHING;DISCOMFORT

## 2022-06-08 NOTE — PROGRESS NOTES
Back to room from [facility]. Interdisciplinary team rounds were held 8/28/2019 with the following team members:Care Management, Nursing, Nurse Practitioner and Physical Therapy and the patient. Plan of care discussed. See clinical pathway and/or care plan for interventions and desired outcomes.

## 2022-06-08 NOTE — PROGRESS NOTES
Notified Dr. Rain Adams of K 3.2. See new order.         Electronically signed by Sravani Stern RN on 6/8/2022 at 5:41 PM

## 2022-06-08 NOTE — CONSULTS
Faby Neal M.D. [facility]  Dr. Kristen Regan M.D.,  Dr. Odin Lucero M.D.,  Dr. Bell Hodge D.O.,  Dr. Beena Roberts D.O. ,  Dr. Seng George M.D.,  Dr. Julian Bradley D.O. Simeon Ayala  29 y.o.  female      Re: Crohn's/pregnancy  Requesting physician: Dr. Liana Head  Date:3:52 PM 2022          HPI: 52-year-old female patient who is known to me from previous admission. She was most recently seen in the hospital in April of this year for similar issues. Patient was also seen in the office on . She has history of Crohn's disease and she is currently pregnant. She has used to follow with [facility]. Most recent upper endoscopy was in February of this year by general surgery Dr. Russel Tomlin which showed minimal gastritis. [facility] indicates that patient was seen at the clinic in March of this year with recommendation for further work-up after resolution of pregnancy. On this admission patient presents because of nausea vomiting. She underwent CT scan of the abdomen and pelvis as ordered per admitting service revealing right nephrolithiasis, gravid uterus with fetus in breech presentation but no acute intra-abdominal process.   Urine drug screen positive for cannabis    Information sources:   -Patient  -medical record  -health care team    PMHx:  Past Medical History:   Diagnosis Date    Acute Crohn's disease ([facility] [address] 1    Anxiety attack since age 16    diagnosed with seizure due to convulsions from this    Asthma     seasonal; no inhaler     Crohn's colitis ([facility] [address] [facility]     Depression     medicated with zoloft     Herpes simplex virus (HSV) infection     History of blood transfusion     as an infant in [place] Hypertension     intermittent     Marijuana abuse     occas    Neurologic disorder     Postpartum depression      delivery      labor     Rh sensitized     Seizure ([facility] [address]     loses focus, disoriented unpon arousing; etiology unknown per pt; none since 2015    STD (sexually transmitted disease)     Tobacco abuse        PSHx:  Past Surgical History:   Procedure Laterality Date    CHOLECYSTECTOMY  01/22/2018    COLONOSCOPY      DILATION AND CURETTAGE      DILATION AND CURETTAGE OF UTERUS  2011    ENDOSCOPY, COLON, DIAGNOSTIC      HERNIA REPAIR      HERNIA REPAIR      LAPAROSCOPY      UPPER GASTROINTESTINAL ENDOSCOPY N/A 2/19/2022    EGD BIOPSY performed by Santa Otto MD at [address]:  Current Facility-Administered Medications   Medication Dose Route Frequency Provider Last Rate Last Admin    morphine (PF) injection 2 mg  2 mg IntraVENous Q4H PRN LEIDY Wilkerson CNP   2 mg at 06/08/22 1332    prenatal vitamin 27-1 MG tablet 1 tablet  1 tablet Oral Daily LEIDY Wilkerson CNP   1 tablet at 06/08/22 0845    vitamin B-6 (PYRIDOXINE) tablet 25 mg  25 mg Oral TID LEIDY Wilkerson CNP   25 mg at 06/08/22 1557    0.9 % sodium chloride infusion   IntraVENous Continuous LEIDY Wilkerson CNP   Paused at 06/08/22 1557    sodium chloride flush 0.9 % injection 5-40 mL  5-40 mL IntraVENous 2 times per day LEIDY Wilkerson CNP   10 mL at 06/07/22 2149    sodium chloride flush 0.9 % injection 5-40 mL  5-40 mL IntraVENous PRN LEIDY Wilkerson CNP        0.9 % sodium chloride infusion   IntraVENous PRN LEIDY Wilkerson CNP        ondansetron (ZOFRAN-ODT) disintegrating tablet 4 mg  4 mg Oral Q8H PRN LEIDY Wilkerson CNP        Or    ondansetron [facility]) injection 4 mg  4 mg IntraVENous Q6H PRN LEIDY Wilkerson CNP   4 mg at 06/08/22 1133    polyethylene glycol (GLYCOLAX) packet 17 g  17 g Oral Daily PRN LEIDY Wilkerson CNP        acetaminophen (TYLENOL) tablet 650 mg  650 mg Oral Q6H PRN LEIDY Wilkerson CNP        Or    acetaminophen (TYLENOL) suppository 650 mg  650 mg Rectal Q6H PRN Pradeep Nieto APRN - CNP        cefTRIAXone (ROCEPHIN) 1,000 mg in sterile water 10 mL IV syringe  1,000 mg IntraVENous Q24H Pradeep Nieto, APRN - CNP   1,000 mg at 22 1718    famotidine (PEPCID) 20 mg in sodium chloride (PF) 10 mL injection  20 mg IntraVENous BID [facility], APRN - CNP   20 mg at 22 0846    metoclopramide (REGLAN) injection 10 mg  10 mg IntraVENous TID PRN [facility], APRN - CNP   10 mg at 22 0418       SocHx:  Social History     Socioeconomic History    Marital status: Legally      Spouse name: Not on file    Number of children: Not on file    Years of education: Not on file    Highest education level: Not on file   Occupational History    Not on file   Tobacco Use    Smoking status: Former Smoker     Packs/day: 1.00     Years: 10.00     Pack years: 10.00     Types: Cigarettes     Quit date: 2020     Years since quittin.3    Smokeless tobacco: Never Used   Vaping Use    Vaping Use: Never used   Substance and Sexual Activity    Alcohol use: Not Currently     Comment: socially    Drug use: Not Currently     Frequency: 2.0 times per week     Types: Marijuana (Weed)     Comment: stopped after pregnancy    Sexual activity: Yes     Partners: Male   Other Topics Concern    Not on file   Social History Narrative    Not on file     Social Determinants of Health     Financial Resource Strain:     Difficulty of Paying Living Expenses: Not on file   Food Insecurity:     Worried About [facility] in the Last Year: Not on file    [place] of Food in the Last Year: Not on file   Transportation Needs:     Lack of Transportation (Medical): Not on file    Lack of Transportation (Non-Medical):  Not on file   Physical Activity:     Days of Exercise per Week: Not on file    Minutes of Exercise per Session: Not on file   Stress:     Feeling of Stress : Not on file   Social Connections:     Frequency of Communication with Friends and Family: Not on file    Frequency of Social Gatherings with Friends and Family: Not on file    Attends Congregational Services: Not on file    Active Member of Clubs or Organizations: Not on file    Attends Club or Organization Meetings: Not on file    Marital Status: Not on file   Intimate Partner Violence:     Fear of Current or Ex-Partner: Not on file    Emotionally Abused: Not on file    Physically Abused: Not on file    Sexually Abused: Not on file   Housing Stability:     Unable to Pay for Housing in the Last Year: Not on file    Number of [place] in the Last Year: Not on file    Unstable Housing in the Last Year: Not on file       FamHx:  Family History   Problem Relation Age of Onset    Depression Mother     Leukemia Mother     Breast Cancer Mother     Cervical Cancer Mother     Heart Disease Father     Depression Father     Schizophrenia Father     Hypertension Father        Allergy:No Known Allergies      ROS: As described in the HPI and in addition is negative upon detailed review of systems or unobtainable unless otherwise stated in this dictation. PE:  /76   Pulse 90   Temp 98.4 °F (36.9 °C) (Oral)   Resp 18   Ht 5' 4\" (1.626 m)   Wt 155 lb (70.3 kg)   LMP 01/24/2022   SpO2 95%   BMI 26.61 kg/m²     Gen.: NAD/ female  Head: Atraumatic/normocephalic  Eyes: Anicteric sclera/no conjunctival erythema  ENT: Moist oral mucosa/no discharge nose ears  Neck: Supple/trachea midline  Chest: Symmetric excursion/nonlabored respirations  Cor: Regular  Abd.: Soft/gravid  Extr.:  No significant peripheral edema  Muscles:  Tone and bulk, consistent with age and condition  Skin: Warm and dry        DATA:     Lab Results   Component Value Date    WBC 17.6 06/08/2022    RBC 3.23 06/08/2022    HGB 10.4 06/08/2022    HCT 30.0 06/08/2022    MCV 92.9 06/08/2022    MCH 32.2 06/08/2022    MCHC 34.7 06/08/2022    RDW 12.1 06/08/2022     06/08/2022    MPV 9.3 06/08/2022     Lab Results   Component Value Date     06/08/2022    K 2.7 06/08/2022     06/08/2022    CO2 18 06/08/2022    BUN 5 06/08/2022    CREATININE 0.4 06/08/2022    CALCIUM 8.0 06/08/2022    PROT 6.2 06/08/2022    LABALBU 3.6 06/08/2022    BILITOT 0.2 06/08/2022    ALKPHOS 61 06/08/2022    AST 16 06/08/2022    ALT 13 06/08/2022     Lab Results   Component Value Date    LIPASE 20 03/28/2022     Lab Results   Component Value Date    AMYLASE 61 06/10/2014         ASSESSMENT/PLAN:  Patient Active Problem List   Diagnosis    Hyperemesis gravidarum    Cannabis dependence [facility] [address]    Asthma    Tobacco abuse    Symptomatic cholelithiasis    Crohn disease ([address]    Crohn's colitis, without complications ([facility] [address]    Intractable vomiting with nausea    Emesis, persistent    Intractable nausea and vomiting    Abdominal pain    Intractable vomiting    Abdominal pain, right lower quadrant    Diarrhea    Marijuana abuse    AMA (advanced maternal age) multigravida 35+, first trimester    Hypokalemia    Anxiety    History of IUGR (intrauterine growth retardation) and stillbirth, currently pregnant, first trimester    Depression    Hyperemesis    Primary hypertension    Hyperemesis gravidarum with metabolic disturbance, antepartum     1. Crohn's disease  -Continue current  -Consider biologic after resolution of pregnancy  -CT scan ordered per admitting service revealing no acute intra-abdominal process    2. Nausea vomiting  -Likely hyperemesis of pregnancy versus cannabis hyperemesis syndrome  -Recommend discontinuation of cannabis  -Supportive and symptomatic treatment  -EGD in February of this year revealing mild gastritis    3. Pregnancy  -Per admitting/OB/GYN    Above has been discussed with the patient and all questions answered to her satisfaction. She is agreeable with the plan as needed.     Thank you for the opportunity to see this patient in consultation    Mary Spence MD  6/8/2022  3:52 PM    NOTE: This report was transcribed using voice recognition software. Every effort was made to ensure accuracy; however, inadvertent computerized transcription errors may be present.

## 2022-06-08 NOTE — CARE COORDINATION
Met w/ patient. Explained role of  and plan of care. Pt is 20 weeks pregnant. Lives w/ her 3 children ages 18,9,& 7 in a 2 story house- 3 steps to entrance. Independent PTA. Has nebulizer No Hx HHC or [facility]. PCP is Dr. Joan Avery and pharmacy is [facility]. On iv abx-await final abx plan. Per pt, plan is to return home on discharge-family will provide transportation. Anticipating no needs.  updated.  Will follow Karmen Og RN case manager

## 2022-06-08 NOTE — CONSULTS
A consult was requested by Dr. Janet Logan     RE:  Jordyn Lynch  : 1985   AGE: 29 y.o. Dear Dr. Janet Logan:     I saw your patient Savi Carrillo today for the following indications: Nausea and vomiting in pregnancy    Patient Active Problem List   Diagnosis    Hyperemesis gravidarum    Cannabis dependence ([facility]    Asthma    Tobacco abuse    Symptomatic cholelithiasis    Crohn disease ([facility]    Crohn's colitis, without complications ([facility] [facility]    Intractable vomiting with nausea    Emesis, persistent    Intractable nausea and vomiting    Abdominal pain    Intractable vomiting    Abdominal pain, right lower quadrant    Diarrhea    Marijuana abuse    AMA (advanced maternal age) multigravida 35+, first trimester    Hypokalemia    Anxiety    History of IUGR (intrauterine growth retardation) and stillbirth, currently pregnant, first trimester    Depression    Hyperemesis    Primary hypertension    Hyperemesis gravidarum with metabolic disturbance, antepartum       As you know, Ms. James Sow is a 29 y.o. [id number] at 19w2d  (LMP = [address]) who is admitted with nausea and vomiting, diarrhea, and headache. The patient initially presented to the hospital on 2022 with complaints of nausea, vomiting, diarrhea, and headache. She was noted to be dehydrated. She was admitted by medicine and has been receiving IV fluid hydration as well as antiemetics. Today, the patient reports having continued symptoms of nausea and intermittent vomiting. She reports having associated abdominal pain. She has continued to have loose stools. She denies having any fevers, chills, chest pain, shortness of breath, cough, and/or congestion. She feels that her symptoms may be secondary to a Crohn's flare. The patient notes fetal movement. She denies having any leaking of fluid, vaginal bleeding, cramping or contractions.     A Maternal-Fetal Medicine consult was requested by Dr. Janet Logan to address these issue(s).      OB History    Para Term  AB Living   6 2 2 1 2 3   SAB IAB Ectopic Molar Multiple Live Births   2         3      # Outcome Date GA Lbr Marco/2nd Weight Sex Delivery Anes PTL Lv   6 Current            5 SAB 2021 6w0d          4 Term 14 37w0d  5 lb 1 oz (2.296 kg) M Vag-Spont EPI N NARENDRA   3  12 35w4d  4 lb 8 oz (2.041 kg) M Vag-Spont  Y NARENDRA      Complications: Oligohydramnios   2 SAB  11w0d          1 Term 04 42w0d  7 lb 8 oz (3.402 kg) F Vag-Spont EPI N NARENDRA         Past Medical History:   Diagnosis Date    Acute Crohn's disease ([facility] [facility] 1    Anxiety attack since age 16    diagnosed with seizure due to convulsions from this    Asthma     seasonal; no inhaler     Crohn's colitis ([facility] [facility]     Depression     medicated with zoloft     Herpes simplex virus (HSV) infection     History of blood transfusion     as an infant in [place] Hypertension     intermittent     Marijuana abuse     occas    Neurologic disorder     Postpartum depression      delivery      labor     Rh sensitized     Seizure ([facility] [address]     loses focus, disoriented unpon arousing; etiology unknown per pt; none since 1    STD (sexually transmitted disease)     Tobacco abuse        Past Surgical History:   Procedure Laterality Date    CHOLECYSTECTOMY  2018    COLONOSCOPY      DILATION AND CURETTAGE      DILATION AND CURETTAGE OF UTERUS      ENDOSCOPY, COLON, DIAGNOSTIC      HERNIA REPAIR      HERNIA REPAIR      LAPAROSCOPY      UPPER GASTROINTESTINAL ENDOSCOPY N/A 2022    EGD BIOPSY performed by Tiarra Stacy MD at [address] [place] [address]       No Known Allergies      Current Facility-Administered Medications:     morphine (PF) injection 2 mg, 2 mg, IntraVENous, Q4H PRN, LEIDY Edmondson - CNP, 2 mg at 22 1332    prenatal vitamin 27-1 MG tablet 1 tablet, 1 tablet, Oral, Daily, Linus Police, APRN - CNP, 1 tablet at 22 0845   vitamin B-6 (PYRIDOXINE) tablet 25 mg, 25 mg, Oral, TID, LEIDY Lee - CNP    0.9 % sodium chloride infusion, , IntraVENous, Continuous, LEIDY Lee CNP, Last Rate: 100 mL/hr at 22 0422, New Bag at 22 0422    sodium chloride flush 0.9 % injection 5-40 mL, 5-40 mL, IntraVENous, 2 times per day, LEIDY Lee CNP, 10 mL at 22 2149    sodium chloride flush 0.9 % injection 5-40 mL, 5-40 mL, IntraVENous, PRN, LEIDY Lee - CNP    0.9 % sodium chloride infusion, , IntraVENous, PRN, LEIDY Lee CNP    ondansetron (ZOFRAN-ODT) disintegrating tablet 4 mg, 4 mg, Oral, Q8H PRN **OR** ondansetron (ZOFRAN) injection 4 mg, 4 mg, IntraVENous, Q6H PRN, LEIDY Lee - CNP, 4 mg at 22 1133    polyethylene glycol (GLYCOLAX) packet 17 g, 17 g, Oral, Daily PRN, LEIDY Lee CNP    acetaminophen (TYLENOL) tablet 650 mg, 650 mg, Oral, Q6H PRN **OR** acetaminophen (TYLENOL) suppository 650 mg, 650 mg, Rectal, Q6H PRN, LEIDY Lee - CNP    cefTRIAXone (ROCEPHIN) 1,000 mg in sterile water 10 mL IV syringe, 1,000 mg, IntraVENous, Q24H, LEIDY Lee - CNP, 1,000 mg at 22 1718    famotidine (PEPCID) 20 mg in sodium chloride (PF) 10 mL injection, 20 mg, IntraVENous, BID, LEIDY Dooley - CNP, 20 mg at 22 0846    metoclopramide (REGLAN) injection 10 mg, 10 mg, IntraVENous, TID PRN, LEIDY Dooley - CNP, 10 mg at 22 8068    Social History     Tobacco Use    Smoking status: Former Smoker     Packs/day: 1.00     Years: 10.00     Pack years: 10.00     Types: Cigarettes     Quit date: 2020     Years since quittin.3    Smokeless tobacco: Never Used   Substance Use Topics    Alcohol use: Not Currently     Comment: socially       PERTINENT PHYSICAL EXAMINATION:   Patient Vitals for the past 24 hrs:   BP Temp Temp src Pulse Resp SpO2 Weight   22 0841 131/76 98.4 °F (36.9 °C) Oral 90 18 95 % --   06/08/22 0225 -- -- -- -- -- -- 155 lb (70.3 kg)   06/07/22 2230 118/84 98.1 °F (36.7 °C) Oral (!) 102 16 95 % --   06/07/22 1638 (!) 180/84 97.6 °F (36.4 °C) Oral 79 18 96 % --   06/07/22 1500 120/66 -- -- 84 16 98 % --       GENERAL: The patient is a well developed, female who is alert cooperative and oriented times three in no acute distress. CARDIOVASCULAR: RRR  LUNGS: CTA B  ABDOMEN: Her uterus is gravid. Right CVA TTP  EXTREMITIES:  No calf TTP BLE, no edema BLE, +1 patellar reflexes BLE, no clonus BLE    A fetal ultrasound assessment was performed today. A report is enclosed for your review. Ultrasound 19 weeks 2 days:  S IUP, cephalic, heart rate 921, anterior placenta, GRECIA normal, uterine artery PI normal bilaterally, transvaginal cervical length 3.9 cm without funneling. Review of Systems :   CONSTITUTIONAL : No fever, no chills   HEENT : No headache, no visual changes, no rhinorrhea, no sore throat   CARDIOVASCULAR : No pain, no palpitations, no edema   RESPIRATORY : No pain, no shortness of breath   GASTROINTESTINAL : Nausea/vomiting/diarrhea, abdominal pain   GENITOURINARY : No dysuria, hematuria and no incontinence   MUSCULOSKELETAL : No myalgia, No back pain  NEUROLOGICAL : No numbness, no tingling, no tremors.  No history of seizures    Admission on 06/07/2022   Component Date Value Ref Range Status    WBC 06/07/2022 17.2* 4.5 - 11.5 E9/L Final    RBC 06/07/2022 3.99  3.50 - 5.50 E12/L Final    Hemoglobin 06/07/2022 13.0  11.5 - 15.5 g/dL Final    Hematocrit 06/07/2022 37.7  34.0 - 48.0 % Final    MCV 06/07/2022 94.5  80.0 - 99.9 fL Final    MCH 06/07/2022 32.6  26.0 - 35.0 pg Final    MCHC 06/07/2022 34.5  32.0 - 34.5 % Final    RDW 06/07/2022 12.1  11.5 - 15.0 fL Final    Platelets 86/48/5144 441  130 - 450 E9/L Final    MPV 06/07/2022 9.4  7.0 - 12.0 fL Final    Neutrophils % 06/07/2022 83.8* 43.0 - 80.0 % Final    Immature Granulocytes % 06/07/2022 0.6 0.0 - 5.0 % Final    Lymphocytes % 06/07/2022 12.1* 20.0 - 42.0 % Final    Monocytes % 06/07/2022 2.3  2.0 - 12.0 % Final    Eosinophils % 06/07/2022 0.8  0.0 - 6.0 % Final    Basophils % 06/07/2022 0.4  0.0 - 2.0 % Final    Neutrophils Absolute 06/07/2022 14.38* 1.80 - 7.30 E9/L Final    Immature Granulocytes # 06/07/2022 0.11  E9/L Final    Lymphocytes Absolute 06/07/2022 2.08  1.50 - 4.00 E9/L Final    Monocytes Absolute 06/07/2022 0.40  0.10 - 0.95 E9/L Final    Eosinophils Absolute 06/07/2022 0.13  0.05 - 0.50 E9/L Final    Basophils Absolute 06/07/2022 0.07  0.00 - 0.20 E9/L Final    Sodium 06/07/2022 134  132 - 146 mmol/L Final    Potassium reflex Magnesium 06/07/2022 4.7  3.5 - 5.0 mmol/L Final    Chloride 06/07/2022 100  98 - 107 mmol/L Final    CO2 06/07/2022 19* 22 - 29 mmol/L Final    Anion Gap 06/07/2022 15  7 - 16 mmol/L Final    Glucose 06/07/2022 150* 74 - 99 mg/dL Final    BUN 06/07/2022 8  6 - 20 mg/dL Final    CREATININE 06/07/2022 0.5  0.5 - 1.0 mg/dL Final    GFR Non- 06/07/2022 >60  >=60 mL/min/1.73 Final    GFR  06/07/2022 >60   Final    Calcium 06/07/2022 9.0  8.6 - 10.2 mg/dL Final    Total Protein 06/07/2022 7.9  6.4 - 8.3 g/dL Final    Albumin 06/07/2022 4.2  3.5 - 5.2 g/dL Final    Total Bilirubin 06/07/2022 0.3  0.0 - 1.2 mg/dL Final    Alkaline Phosphatase 06/07/2022 71  35 - 104 U/L Final    ALT 06/07/2022 18  0 - 32 U/L Final    AST 06/07/2022 57* 0 - 31 U/L Final    Lactic Acid 06/07/2022 1.9  0.5 - 2.2 mmol/L Final    Color, UA 06/07/2022 Yellow  Straw/Yellow Final    Clarity, UA 06/07/2022 Clear  Clear Final    Glucose, Ur 06/07/2022 Negative  Negative mg/dL Final    Bilirubin Urine 06/07/2022 Negative  Negative Final    Ketones, Urine 06/07/2022 >=80* Negative mg/dL Final    Specific Gravity, UA 06/07/2022 1.025  1.005 - 1.030 Final    Blood, Urine 06/07/2022 Negative  Negative Final    pH, UA 06/07/2022 6.5  5.0 Potassium reflex Magnesium 06/08/2022 2.7* 3.5 - 5.0 mmol/L Final    Chloride 06/08/2022 103  98 - 107 mmol/L Final    CO2 06/08/2022 18* 22 - 29 mmol/L Final    Anion Gap 06/08/2022 16  7 - 16 mmol/L Final    Glucose 06/08/2022 129* 74 - 99 mg/dL Final    BUN 06/08/2022 5* 6 - 20 mg/dL Final    CREATININE 06/08/2022 0.4* 0.5 - 1.0 mg/dL Final    GFR Non- 06/08/2022 >60  >=60 mL/min/1.73 Final    GFR  06/08/2022 >60   Final    Calcium 06/08/2022 8.0* 8.6 - 10.2 mg/dL Final    Total Protein 06/08/2022 6.2* 6.4 - 8.3 g/dL Final    Albumin 06/08/2022 3.6  3.5 - 5.2 g/dL Final    Total Bilirubin 06/08/2022 0.2  0.0 - 1.2 mg/dL Final    Alkaline Phosphatase 06/08/2022 61  35 - 104 U/L Final    ALT 06/08/2022 13  0 - 32 U/L Final    AST 06/08/2022 16  0 - 31 U/L Final    WBC 06/08/2022 17.6* 4.5 - 11.5 E9/L Final    RBC 06/08/2022 3.23* 3.50 - 5.50 E12/L Final    Hemoglobin 06/08/2022 10.4* 11.5 - 15.5 g/dL Final    Hematocrit 06/08/2022 30.0* 34.0 - 48.0 % Final    MCV 06/08/2022 92.9  80.0 - 99.9 fL Final    MCH 06/08/2022 32.2  26.0 - 35.0 pg Final    MCHC 06/08/2022 34.7* 32.0 - 34.5 % Final    RDW 06/08/2022 12.1  11.5 - 15.0 fL Final    Platelets 86/53/5159 378  130 - 450 E9/L Final    MPV 06/08/2022 9.3  7.0 - 12.0 fL Final    Neutrophils % 06/08/2022 82.3* 43.0 - 80.0 % Final    Immature Granulocytes % 06/08/2022 1.8  0.0 - 5.0 % Final    Lymphocytes % 06/08/2022 12.2* 20.0 - 42.0 % Final    Monocytes % 06/08/2022 3.6  2.0 - 12.0 % Final    Eosinophils % 06/08/2022 0.0  0.0 - 6.0 % Final    Basophils % 06/08/2022 0.1  0.0 - 2.0 % Final    Neutrophils Absolute 06/08/2022 14.46* 1.80 - 7.30 E9/L Final    Immature Granulocytes # 06/08/2022 0.31  E9/L Final    Lymphocytes Absolute 06/08/2022 2.15  1.50 - 4.00 E9/L Final    Monocytes Absolute 06/08/2022 0.64  0.10 - 0.95 E9/L Final    Eosinophils Absolute 06/08/2022 0.00* 0.05 - 0.50 E9/L Final    Basophils Absolute 06/08/2022 0.02  0.00 - 0.20 E9/L Final    Magnesium 06/08/2022 1.7  1.6 - 2.6 mg/dL Final          ASSESSMENT/PLAN:  29 y.o. [id number] at 19w2d (LMP = 9 wk US) with:    1. Hyperemesis gravidarum -- The patient reports that she started to feel unwell on Saturday and Sunday. On Monday, she reports that she started to have a headache and then started vomiting every 10 minutes. She presented to the emergency department on 6/7/22 for evaluation. Since arrival to the hospital, she has been treated with IV fluid hydration, IV Pepcid, IV Zofran, IM phenergan, and ceftriaxone. The patient was noted to be dehydrated and have hypokalemia with a potassium level of 2.7. She had a normal EKG. She is receiving IV replacement by medicine. A CT scan was done, the results included:   1.  Right nephrolithiasis.  No ureteral calculi in the course of the right   ureter.  Right kidney is prominent in size.       2.  Gravid uterus with fetus in breech presentation.  Anterior fundal   placenta.       3.  No acute intra-process identified.       4.  Bilateral spondylolysis and grade 1 anterolisthesis of L5 on S1; severe   degenerative disc disease. Recommendations:  · Continue supportive care  · Continue IV fluid hydration, add daily banana bag  · Continue IV Pepcid BID  · Continue Zofran 4mg q8 hours prn, can increase to 8 mg if necessary  · Continue Reglan 10 mg TID prn  · PO challenge as tolerated, management per medicine  · Recommend stool studies if the patient continues to have diarrhea  · Would consider COVID testing given symptoms    2. Abnormal urinalysis -- The patient UA was positive for mucus and bacteria. She has c/o right flank pain that started on Monday. She is being empirically treated with IV ceftriaxone for cystitis. A urine culture is pending.     --Agree with continued empiric treatment while await urine culture results  --Nephrolithiasis noted on the CT scan, no definitive obstruction seen. Continue to monitor. 3.  Crohn's disease -- Counseling previously provided. She feels like she may be having a Crohn's flare given her associated diarrhea and abdominal pain. She follows with GI.   --Recommend GI consult. Please call with any questions regarding any medical treatment during pregnancy. 4.  Anxiety/depression -- The patient reports a history of anxiety and depression for which she takes Zoloft, 25 mg daily. The patient reports that her mood is stable. She denies having any suicidal or homicidal ideations. The patient was counseled regarding the risks of depression and anxiety in pregnancy including worsening symptoms during pregnancy and postpartum, poor fetal growth and  birth. The risks and benefits of treatment were also reviewed. I recommend continuing to monitor the patient's mood throughout pregnancy and postpartum. With recurrent or worsening symptoms, a referral should be made for counseling. Additionally, medication dosing may need to be adjusted. Because of the association of thyroid disease and depression, a baseline thyroid screening is recommended with a TSH, free T4, and thyroid peroxidase antibody. Additionally, depression can be associated with nutritional deficiencies, thus, a baseline nutrition panel is also recommended (CBC, CMP, magnesium, ferritin, folate, vitamin B12, vitamin D 25OH). -- Baseline screening completed 2022    Because of the increased risk for poor fetal growth, fetal growth to be monitored serially, every 3 to 4 weeks throughout the pregnancy. 5. Low vitamin D -- The patient's vitamin D was low at 32  --Recommend vitamin D3 2000 IU daily  --Monitor levels serially  --Monitor nutrition panel q4-6 weeks (CBC, CMP, magnesium, ferritin, folate, vitamin B12, vitamin D25OH), on/after 2022  --Follow up with PCP for long term monitoring and management    6.   THC Use --  The patient reports that he is self-medicating for appetite, abdominal pain, nausea, and vomiting with marijuana. Her drug screen was again positive for marijuana on 2022. She was again counseled regarding risk of neurodevelopmental issues in children exposed to [facility] during pregnancy. Additionally, marijuana use may pose risks similar to that of cigarette use including increased risk for poor fetal growth, placental bleeding, PPROM/ delivery, and stillbirth. She was counseled not to use THC while pregnant. Random urine drug screens should be monitored during pregnancy. 7.   History of elevated blood pressure -- The patient's blood pressure was noted to be elevated during her prior hospitalization.  The patient denies a history of chronic hypertension.  She stated that the blood pressure elevation was likely secondary to her pain.     The patient has had a severe blood pressure elevation since admission. She attributed this to nausea and vomiting. Baseline evaluation completed. Continue close monitoring. 8.  Hypokalemia -- The patient's potassium as admission was noted to be low at 2.7. An EKG was completed and normal.  -- IV replacement per medicine    9. History of seizure disorder -- The patient reports a history of a seizure disorder as a teenager. She reports having grand mal seizures. She has not had a seizure in several years. She has not required treatment in several years. She denies having any issues with seizures during her prior pregnancies. Continue close monitoring is recommended. 10.  Abnormal urinalysis -- The patient's urinalysis is concerning for infection. --A urine culture is pending.  -- Continue treatment with IV ceftriaxone per medicine while await urine culture results. 11.  Routine OB -- Daily PNV  --GBS unknown    12. Fetal well-being --  Continue fetal heart tones every shift  --Continue growth scans q 3 wks. The patient is scheduled for anatomy ultrasound on 2022.     13. DVT prophylaxis -- Recommend SCDs or FUNMILAYO hose      --The total time spent on today's visit was 60 minutes. This included preparation for the consultation (i.e. reviewing prior external notes and test results), performance of a medically appropriate history and examination, counseling, orders for medications, tests or other procedures, and coordination of care. Greater than 50% of the time was spent face-to-face with the patient. This time is exclusive of procedures performed. I answered all of  the patient's questions to her satisfaction. I requested the patient return for a follow-up assessment in 1 week unless there is a clinical reason for her to return prior to that time. She is to call if she has any problems or questions prior to her next visit. Further evaluation and management will be dependent on her clinical presentation and the results of her testing. --The patient was advised to call if she has any increased vaginal discharge, vaginal bleeding, contractions, abdominal pain, back pain or any new significant symptomatology prior to her next visit. I advised her that these are signs and symptoms of cervical change and require follow-up assessment when they occur. Preeclampsia precautions were also reviewed with the patient. --The patient was also counseled to call and/or return with any concerns for decreased fetal activity. The patient is to continue to follow with you in your office for ongoing obstetric care. If you have any questions regarding her management, please contact me at your convenience and thank you for allowing me to participate in her care. Sincerely,          Ramón Wheeler MD, [address] [facility]  [phone]      *All or parts of this note may have been generated using a voice recognition program. There may be typo, grammar, or Word substitution errors that have escaped my review of this note.

## 2022-06-08 NOTE — CONSULTS
2022 2:39 PM  Service: Urology  Group: [facility] (Robi/Naeem/Mally)    Phill Fall  [id number]     Chief Complaint: right renal stone    History of Present Illness:   The patient is a 29 y.o. female patient who presents with right flank pain  She has not seen a urologist in the past  She did present with some right flank pain  She is 20 weeks pregnant  She has not had a stone in the past  She has not have a fever  She is voiding ok  She has not seen any gross hematuria      Past Medical History:   Diagnosis Date    Acute Crohn's disease ([address] 1    Anxiety attack since age 16    diagnosed with seizure due to convulsions from this    Asthma     seasonal; no inhaler     [facility]'s colitis ([facility] [address]     Depression     medicated with zoloft     Herpes simplex virus (HSV) infection     History of blood transfusion     as an infant in [place] Hypertension     intermittent     Marijuana abuse     occas    Neurologic disorder     Postpartum depression      delivery      labor     Rh sensitized     Seizure ([facility] [address]     loses focus, disoriented unpon arousing; etiology unknown per pt; none since 1    STD (sexually transmitted disease)     Tobacco abuse        Past Surgical History:   Procedure Laterality Date    CHOLECYSTECTOMY  2018    COLONOSCOPY      DILATION AND CURETTAGE      DILATION AND CURETTAGE OF UTERUS      ENDOSCOPY, COLON, DIAGNOSTIC      HERNIA REPAIR      HERNIA REPAIR      LAPAROSCOPY      UPPER GASTROINTESTINAL ENDOSCOPY N/A 2022    EGD BIOPSY performed by Delilah Tucker MD at [address] [place] [address]       Medications Prior to Admission:    Medications Prior to Admission: vitamin D (CHOLECALCIFEROL) 25 MCG (1000 UT) TABS tablet, Take 1,000 Units by mouth nightly  Prenatal Vit-Fe Fumarate-FA (PRENATAL VITAMIN) 27-0.8 MG TABS, Take 1 tablet by mouth nightly  doxyLAMINE succinate (GNP SLEEP AID) 25 MG tablet, Take 12.5-25 mg by mouth nightly as needed (SLEEP/NAUSEA)  fluticasone (FLONASE) 50 MCG/ACT nasal spray, 1 spray by Each Nostril route daily  vitamin B-6 (PYRIDOXINE) 25 MG tablet, Take 1 tablet by mouth in the morning, at noon, and at bedtime  famotidine (PEPCID) 20 MG tablet, Take 1 tablet by mouth 2 times daily  metoclopramide (REGLAN) 10 MG tablet, Take 1 tablet by mouth 3 times daily as needed (nausea, vomiting)  sertraline (ZOLOFT) 25 MG tablet, Take 1 tablet by mouth at bedtime  ondansetron (ZOFRAN) 4 MG tablet, Take 4 mg by mouth every 8 hours as needed for Nausea or Vomiting    Allergies:    Patient has no known allergies. Social History:    reports that she quit smoking about 2 years ago. Her smoking use included cigarettes. She has a 10.00 pack-year smoking history. She has never used smokeless tobacco. She reports previous alcohol use. She reports previous drug use. Frequency: 2.00 times per week. Drug: Marijuana [place]). Family History:   Non-contributory to this urological problem  family history includes Breast Cancer in her mother; Cervical Cancer in her mother; Depression in her father and mother; Heart Disease in her father; Hypertension in her father; Leukemia in her mother; Schizophrenia in her father. Review of Systems:  Respiratory: negative for cough and hemoptysis  Cardiovascular: negative for chest pain and dyspnea  Gastrointestinal: negative for abdominal pain, diarrhea, nausea and vomiting  Derm: negative for rash and skin lesion(s)  Neurological: negative for seizures and tremors  Endocrine: negative for diabetic symptoms including polydipsia and polyuria  : As above in the HPI, otherwise negative  All other reviews are negative    Physical Exam:   Vitals: /76   Pulse 90   Temp 98.4 °F (36.9 °C) (Oral)   Resp 18   Ht 5' 4\" (1.626 m)   Wt 155 lb (70.3 kg)   LMP 01/24/2022   SpO2 95%   BMI 26.61 kg/m²   General:  Awake, alert, oriented X 3. Well developed, well nourished, well groomed.   No apparent distress. HEENT:  Normocephalic, atraumatic. Pupils equal, round. No scleral icterus. No conjunctival injection. Normal lips, teeth, and gums. No nasal discharge. Neck:  Supple, no masses. Heart:  RRR  Lungs:  No audible wheezing. Respirations symmetric and non-labored. Abdomen:  soft, nontender, no masses, no organomegaly, no peritoneal signs  Extremities:  No clubbing, cyanosis, or edema  Skin:  Warm and dry, no open lesions or rashes  Neuro:  Cranial nerves 2-12 intact, no focal deficits  Rectal: deferred  Genitalia:  Sánchez no    Labs:   Recent Labs     06/07/22  1002 06/08/22  0330   WBC 17.2* 17.6*   RBC 3.99 3.23*   HGB 13.0 10.4*   HCT 37.7 30.0*   MCV 94.5 92.9   MCH 32.6 32.2   MCHC 34.5 34.7*   RDW 12.1 12.1    378   MPV 9.4 9.3       Recent Labs     06/07/22  1002 06/08/22  0330   CREATININE 0.5 0.4*     1.  Right nephrolithiasis.  No ureteral calculi in the course of the right   ureter.  Right kidney is prominent in size.       2.  Gravid uterus with fetus in breech presentation.  Anterior fundal   placenta.       3.  No acute intra-process identified.       4.  Bilateral spondylolysis and grade 1 anterolisthesis of L5 on S1; severe   degenerative disc disease.        Images:                  Assessment: TannerSeligman WILLIAM Ayala 29 y.o. female     21 IUP  Right renal calc  Leukocytosis     Plan:    CT was reviewed  The mild right hydronephrosis is phsiologic  The stone is non-obstructing and not the cause of her pain  She does not have a fever  Stent was discussed and held   All options were discussed  Hold on  intervention  Call with questions     Robbie Garnica, DO [facility]

## 2022-06-08 NOTE — PROGRESS NOTES
[address] [place], [place] [address]   Progress Note    Admitting Date and Time: 6/7/2022  9:45 AM  Admit Dx: Hyperemesis gravidarum with metabolic disturbance, antepartum [O21.1]  Starvation ketoacidosis [E87.2]  Hyperemesis [R11.10]  Intractable nausea and vomiting [R11.2]  Marijuana abuse, continuous [F12.10]  Pregnancy with 20 completed weeks gestation [Z3A.20]    Subjective:    Patient seen and examined. Standing in bathroom hunched over her IV pole, crying in pain. Reports 10/10 right flank pain that is worse with palpation. Continues to report nausea and vomiting. Denies dysuria. Urine yellow. ROS:   Denies CP, SOB, subjective fever, chills, diarrhea, abdominal pain,  HA. All systems reviewed and negative unless otherwise documented.       potassium chloride  10 mEq IntraVENous Q1H    prenatal vitamin  1 tablet Oral Daily    vitamin B-6  25 mg Oral TID    sodium chloride flush  5-40 mL IntraVENous 2 times per day    cefTRIAXone (ROCEPHIN) IV  1,000 mg IntraVENous Q24H    famotidine (PEPCID) injection  20 mg IntraVENous BID     morphine, 2 mg, Q4H PRN  sodium chloride flush, 5-40 mL, PRN  sodium chloride, , PRN  ondansetron, 4 mg, Q8H PRN   Or  ondansetron, 4 mg, Q6H PRN  polyethylene glycol, 17 g, Daily PRN  acetaminophen, 650 mg, Q6H PRN   Or  acetaminophen, 650 mg, Q6H PRN  metoclopramide, 10 mg, TID PRN         Objective:    /76   Pulse 90   Temp 98.4 °F (36.9 °C) (Oral)   Resp 18   Ht 5' 4\" (1.626 m)   Wt 155 lb (70.3 kg)   LMP 01/24/2022   SpO2 95%   BMI 26.61 kg/m²      General Appearance: alert and oriented to person, place and time and in no acute distress  Skin: warm and dry  Head: normocephalic and atraumatic  Eyes: pupils equal, round, and reactive to light, extraocular eye movements intact, conjunctivae normal  Neck: neck supple and non tender without mass   Pulmonary/Chest: clear to auscultation bilaterally- no wheezes, rales or rhonchi, normal air movement, no respiratory distress  Cardiovascular: normal rate, normal S1 and S2 and no carotid bruits  Abdomen: soft, non-tender, gravid,  normal bowel sounds, no masses or organomegaly  Extremities: no cyanosis, no clubbing and no edema  Neurologic: no cranial nerve deficit and speech normal      Recent Labs     06/07/22  1002 06/08/22  0330    137   K 4.7 2.7*    103   CO2 19* 18*   BUN 8 5*   CREATININE 0.5 0.4*   GLUCOSE 150* 129*   CALCIUM 9.0 8.0*       Recent Labs     06/07/22  1002 06/08/22  0330   ALKPHOS 71 61   PROT 7.9 6.2*   LABALBU 4.2 3.6   BILITOT 0.3 0.2   AST 57* 16   ALT 18 13       Recent Labs     06/07/22 1002 06/08/22  0330   WBC 17.2* 17.6*   RBC 3.99 3.23*   HGB 13.0 10.4*   HCT 37.7 30.0*   MCV 94.5 92.9   MCH 32.6 32.2   MCHC 34.5 34.7*   RDW 12.1 12.1    378   MPV 9.4 9.3         Radiology:   US OB 1 OR MORE FETUS LIMITED   Final Result   Single live intrauterine pregnancy with gestational age of 25 weeks 0 days by   current sonographic biometry. The estimated due date is 10/25/2022 from the   current ultrasound. Estimated due date of 10/29/2022 or 19 weeks 3 days GA from the ultrasound of   04/26/2022. CT ABDOMEN PELVIS WO CONTRAST    (Results Pending)     US OB 1 OR MORE FETUS LIMITED    Result Date: 6/7/2022  EXAMINATION: 6/7/2022 Second TRIMESTER OBSTETRIC ULTRASOUND TECHNIQUE: TRANSABDOMINAL PELVIC ULTRASOUND WITH COLOR DOPPLER FLOW HISTORY: ORDERING SYSTEM PROVIDED HISTORY: abdominal pain TECHNOLOGIST PROVIDED HISTORY: Reason for exam:->abdominal pain What reading provider will be dictating this exam?->CRC FINDINGS: A single live intrauterine pregnancy is present. Fetal heart rate measures 139 beats per minute. There is normal fetal body and limb movement. The fetus is in transverse position. The placenta is located fundal. The amniotic fluid volume is subjectively normal Limited evaluation of the fetal anatomy as per ER protocol. BPD measures 4.6 cm.  Head circumference measures 16.9 cm. Abdominal circumference measures 14.8 cm. Femur length measures 3.23 cm. Estimated fetal weight is 329.78+/-49.5 grams which correlates to 91.7 percentile based upon last menstrual period. Estimated gestational age by current ultrasound is 20 weeks 0 days +/-1 week 3 days. Single live intrauterine pregnancy with gestational age of 25 weeks 0 days by current sonographic biometry. The estimated due date is 10/25/2022 from the current ultrasound. Estimated due date of 10/29/2022 or 19 weeks 3 days GA from the ultrasound of 04/26/2022. Assessment:  Principal Problem:    Hyperemesis  Active Problems:    Intractable nausea and vomiting    Cannabis dependence (HCC)    Crohn disease ([facility] [address]    Primary hypertension    Hyperemesis gravidarum with metabolic disturbance, antepartum  Resolved Problems:    * No resolved hospital problems. *      Plan:  1. Intractable nausea/vomiting in pregnancy; recurrent  -suspect underlying component of overuse of marijuana  -seen by OB/GYN who consulted perinatology and maternal fetal medicine. Await input   -continue IV hydration, clear liquid diet  -pelvic US shwoing single live intrauterine pregnancy 20 weeks gestation       2. hypokalemia  -replace and follow BMP  -Mg 1.7      2. Marijuana use  - UDS + cannabinoids  -counseled on cessation     3. Crohn's disease:  -was seen by GI last admission 04/15. Plans for outpatient endoscopy after delivery. -consult GI per obstetrics     4. HTN:  -off meds. Being monitored by PCP     5. Seizure disorder  -off meds  -reports last seizure as a teenager     6. UTI:  -UA w/ mucus, bacteria. Urine Cx pending  -continue ceftriaxone (ok to give in pregnancy per pharmacy) (day 2)    7.  Right flank pain  -CT abdomen/pelvis without  -add prn Morphine for pain control          Electronically signed by LEIDY Vogel - CNP on 6/8/2022 at 10:40 AM

## 2022-06-08 NOTE — PROGRESS NOTES
Patient asked for me to call the doctor for IV pain  medication. She said her kidney hurts an 8 out of 10. She mentioned that the last time she was here she was given IV pain medication and requesting for IV dillaudid. Spoke with Dr Ld Kulkarni and Randall Fisher NP said no iv pain medication.

## 2022-06-09 ENCOUNTER — ANCILLARY PROCEDURE (OUTPATIENT)
Dept: OBGYN CLINIC | Age: 37
DRG: 566 | End: 2022-06-09
Payer: COMMERCIAL

## 2022-06-09 LAB
ANION GAP SERPL CALCULATED.3IONS-SCNC: 14 MMOL/L (ref 7–16)
BUN BLDV-MCNC: 4 MG/DL (ref 6–20)
CALCIUM SERPL-MCNC: 8.6 MG/DL (ref 8.6–10.2)
CHLORIDE BLD-SCNC: 101 MMOL/L (ref 98–107)
CO2: 22 MMOL/L (ref 22–29)
CREAT SERPL-MCNC: 0.5 MG/DL (ref 0.5–1)
GFR AFRICAN AMERICAN: >60
GFR NON-AFRICAN AMERICAN: >60 ML/MIN/1.73
GLUCOSE BLD-MCNC: 108 MG/DL (ref 74–99)
MAGNESIUM: 2.2 MG/DL (ref 1.6–2.6)
POTASSIUM SERPL-SCNC: 3.1 MMOL/L (ref 3.5–5)
SODIUM BLD-SCNC: 137 MMOL/L (ref 132–146)
URINE CULTURE, ROUTINE: NORMAL

## 2022-06-09 PROCEDURE — 6360000002 HC RX W HCPCS: Performed by: NURSE PRACTITIONER

## 2022-06-09 PROCEDURE — A4216 STERILE WATER/SALINE, 10 ML: HCPCS | Performed by: NURSE PRACTITIONER

## 2022-06-09 PROCEDURE — 2580000003 HC RX 258: Performed by: INTERNAL MEDICINE

## 2022-06-09 PROCEDURE — 83735 ASSAY OF MAGNESIUM: CPT

## 2022-06-09 PROCEDURE — 1200000000 HC SEMI PRIVATE

## 2022-06-09 PROCEDURE — 80048 BASIC METABOLIC PNL TOTAL CA: CPT

## 2022-06-09 PROCEDURE — 2580000003 HC RX 258: Performed by: NURSE PRACTITIONER

## 2022-06-09 PROCEDURE — 36415 COLL VENOUS BLD VENIPUNCTURE: CPT

## 2022-06-09 PROCEDURE — 87088 URINE BACTERIA CULTURE: CPT

## 2022-06-09 PROCEDURE — 2580000003 HC RX 258: Performed by: OBSTETRICS & GYNECOLOGY

## 2022-06-09 PROCEDURE — 2500000003 HC RX 250 WO HCPCS: Performed by: NURSE PRACTITIONER

## 2022-06-09 PROCEDURE — 6370000000 HC RX 637 (ALT 250 FOR IP): Performed by: HOSPITALIST

## 2022-06-09 PROCEDURE — 76815 OB US LIMITED FETUS(S): CPT | Performed by: OBSTETRICS & GYNECOLOGY

## 2022-06-09 PROCEDURE — APPSS30 APP SPLIT SHARED TIME 16-30 MINUTES: Performed by: NURSE PRACTITIONER

## 2022-06-09 PROCEDURE — 2500000003 HC RX 250 WO HCPCS: Performed by: OBSTETRICS & GYNECOLOGY

## 2022-06-09 PROCEDURE — 6360000002 HC RX W HCPCS: Performed by: OBSTETRICS & GYNECOLOGY

## 2022-06-09 PROCEDURE — 99232 SBSQ HOSP IP/OBS MODERATE 35: CPT | Performed by: INTERNAL MEDICINE

## 2022-06-09 PROCEDURE — 6360000002 HC RX W HCPCS: Performed by: INTERNAL MEDICINE

## 2022-06-09 PROCEDURE — 76817 TRANSVAGINAL US OBSTETRIC: CPT | Performed by: OBSTETRICS & GYNECOLOGY

## 2022-06-09 PROCEDURE — 99232 SBSQ HOSP IP/OBS MODERATE 35: CPT | Performed by: OBSTETRICS & GYNECOLOGY

## 2022-06-09 RX ORDER — POTASSIUM CHLORIDE 7.45 MG/ML
10 INJECTION INTRAVENOUS
Status: COMPLETED | OUTPATIENT
Start: 2022-06-09 | End: 2022-06-09

## 2022-06-09 RX ORDER — SUCRALFATE 1 G/1
1 TABLET ORAL EVERY 6 HOURS SCHEDULED
Status: DISCONTINUED | OUTPATIENT
Start: 2022-06-09 | End: 2022-06-11 | Stop reason: HOSPADM

## 2022-06-09 RX ORDER — SODIUM CHLORIDE AND POTASSIUM CHLORIDE .9; .15 G/100ML; G/100ML
SOLUTION INTRAVENOUS CONTINUOUS
Status: DISCONTINUED | OUTPATIENT
Start: 2022-06-09 | End: 2022-06-10

## 2022-06-09 RX ADMIN — FOLIC ACID: 5 INJECTION, SOLUTION INTRAMUSCULAR; INTRAVENOUS; SUBCUTANEOUS at 19:02

## 2022-06-09 RX ADMIN — POTASSIUM CHLORIDE 10 MEQ: 7.46 INJECTION, SOLUTION INTRAVENOUS at 13:24

## 2022-06-09 RX ADMIN — SODIUM CHLORIDE, PRESERVATIVE FREE 20 MG: 5 INJECTION INTRAVENOUS at 08:35

## 2022-06-09 RX ADMIN — MORPHINE SULFATE 2 MG: 2 INJECTION, SOLUTION INTRAMUSCULAR; INTRAVENOUS at 23:01

## 2022-06-09 RX ADMIN — ONDANSETRON 4 MG: 2 INJECTION INTRAMUSCULAR; INTRAVENOUS at 06:13

## 2022-06-09 RX ADMIN — WATER 1000 MG: 1 INJECTION INTRAMUSCULAR; INTRAVENOUS; SUBCUTANEOUS at 16:49

## 2022-06-09 RX ADMIN — ONDANSETRON 4 MG: 2 INJECTION INTRAMUSCULAR; INTRAVENOUS at 14:57

## 2022-06-09 RX ADMIN — METOCLOPRAMIDE HYDROCHLORIDE 10 MG: 5 INJECTION INTRAMUSCULAR; INTRAVENOUS at 03:15

## 2022-06-09 RX ADMIN — ONDANSETRON 4 MG: 2 INJECTION INTRAMUSCULAR; INTRAVENOUS at 00:16

## 2022-06-09 RX ADMIN — SUCRALFATE 1 G: 1 TABLET ORAL at 19:01

## 2022-06-09 RX ADMIN — MORPHINE SULFATE 2 MG: 2 INJECTION, SOLUTION INTRAMUSCULAR; INTRAVENOUS at 10:53

## 2022-06-09 RX ADMIN — MORPHINE SULFATE 2 MG: 2 INJECTION, SOLUTION INTRAMUSCULAR; INTRAVENOUS at 02:33

## 2022-06-09 RX ADMIN — SUCRALFATE 1 G: 1 TABLET ORAL at 23:59

## 2022-06-09 RX ADMIN — POTASSIUM CHLORIDE 10 MEQ: 7.46 INJECTION, SOLUTION INTRAVENOUS at 09:58

## 2022-06-09 RX ADMIN — POTASSIUM CHLORIDE 10 MEQ: 7.46 INJECTION, SOLUTION INTRAVENOUS at 08:44

## 2022-06-09 RX ADMIN — METOCLOPRAMIDE HYDROCHLORIDE 10 MG: 5 INJECTION INTRAMUSCULAR; INTRAVENOUS at 10:53

## 2022-06-09 RX ADMIN — POTASSIUM CHLORIDE 10 MEQ: 7.46 INJECTION, SOLUTION INTRAVENOUS at 11:50

## 2022-06-09 RX ADMIN — MORPHINE SULFATE 2 MG: 2 INJECTION, SOLUTION INTRAMUSCULAR; INTRAVENOUS at 14:57

## 2022-06-09 RX ADMIN — SODIUM CHLORIDE, PRESERVATIVE FREE 10 ML: 5 INJECTION INTRAVENOUS at 21:00

## 2022-06-09 RX ADMIN — MORPHINE SULFATE 2 MG: 2 INJECTION, SOLUTION INTRAMUSCULAR; INTRAVENOUS at 18:59

## 2022-06-09 RX ADMIN — ONDANSETRON 4 MG: 2 INJECTION INTRAMUSCULAR; INTRAVENOUS at 23:01

## 2022-06-09 RX ADMIN — SODIUM CHLORIDE, PRESERVATIVE FREE 20 MG: 5 INJECTION INTRAVENOUS at 21:10

## 2022-06-09 RX ADMIN — SODIUM CHLORIDE AND POTASSIUM CHLORIDE: .9; .15 SOLUTION INTRAVENOUS at 15:05

## 2022-06-09 RX ADMIN — METOCLOPRAMIDE HYDROCHLORIDE 10 MG: 5 INJECTION INTRAMUSCULAR; INTRAVENOUS at 16:49

## 2022-06-09 RX ADMIN — MORPHINE SULFATE 2 MG: 2 INJECTION, SOLUTION INTRAMUSCULAR; INTRAVENOUS at 06:52

## 2022-06-09 ASSESSMENT — PAIN SCALES - GENERAL
PAINLEVEL_OUTOF10: 9
PAINLEVEL_OUTOF10: 8
PAINLEVEL_OUTOF10: 10
PAINLEVEL_OUTOF10: 9

## 2022-06-09 NOTE — PROGRESS NOTES
PROGRESS NOTE  By Bryce Peralta M.D. [facility]  Dr. Christina Newell M.D.,  Dr. Toney Concepcion M.D.,   Dr. Cecy Faulkner D.O.,  Dr. Nanda Rosenthal M.D.,  JAZMIN Castillo.OEdson,  Sadaf De Luna D.O. TannerSuccasunna WILLIAM Ayala  29 y.o.  female    SUBJECTIVE:  Persistent nausea vomiting overnight with epigastric abdominal pain and gastroesophageal reflux/heartburn    OBJECTIVE:    /65   Pulse 75   Temp 98.5 °F (36.9 °C) (Oral)   Resp 16   Ht 5' 4\" (1.626 m)   Wt 155 lb (70.3 kg)   LMP 01/24/2022   SpO2 97%   BMI 26.61 kg/m²     General: NAD/ female  HEENT: Anicteric sclera/moist oral mucosa  Neck: Supple with trachea being midline  Chest: Symmetric excursion/nonlabored respirations  Cor: Regular  Abd.: Soft and obese/gravid. Epigastric tenderness.   No guarding  Extr.:  No significant peripheral edema  Skin: Warm and dry/anicteric      DATA:       Lab Results   Component Value Date    WBC 17.6 06/08/2022    RBC 3.23 06/08/2022    HGB 10.4 06/08/2022    HCT 30.0 06/08/2022    MCV 92.9 06/08/2022    MCH 32.2 06/08/2022    MCHC 34.7 06/08/2022    RDW 12.1 06/08/2022     06/08/2022    MPV 9.3 06/08/2022     Lab Results   Component Value Date     06/09/2022    K 3.1 06/09/2022    K 2.7 06/08/2022     06/09/2022    CO2 22 06/09/2022    BUN 4 06/09/2022    CREATININE 0.5 06/09/2022    CALCIUM 8.6 06/09/2022    PROT 6.2 06/08/2022    LABALBU 3.6 06/08/2022    BILITOT 0.2 06/08/2022    ALKPHOS 61 06/08/2022    AST 16 06/08/2022    ALT 13 06/08/2022     Lab Results   Component Value Date    LIPASE 20 03/28/2022     Lab Results   Component Value Date    AMYLASE 61 06/10/2014         ASSESSMENT/PLAN:  Patient Active Problem List   Diagnosis    Hyperemesis gravidarum    Cannabis dependence ([facility] [address]    Asthma    Tobacco abuse    Symptomatic cholelithiasis    Crohn disease ([facility] [address]    Crohn's colitis, without complications ([facility] [address]    Intractable vomiting with nausea    Emesis, persistent    Intractable nausea and vomiting    Abdominal pain    Intractable vomiting    Abdominal pain, right lower quadrant    Diarrhea    Marijuana abuse    AMA (advanced maternal age) multigravida 35+, first trimester    Hypokalemia    Anxiety    History of IUGR (intrauterine growth retardation) and stillbirth, currently pregnant, first trimester    Depression    Hyperemesis    Primary hypertension    Hyperemesis gravidarum with metabolic disturbance, antepartum     1. Crohn's disease  -Continue current  -Consider biologic after resolution of pregnancy  -CT scan ordered per admitting service revealing no acute intra-abdominal process     2. Nausea vomiting  -Likely hyperemesis of pregnancy versus cannabis hyperemesis syndrome  -Recommend discontinuation of cannabis use  -Supportive and symptomatic treatment  -EGD in February of this year revealing mild gastritis     3. Pregnancy  -Per admitting/OB/GYN    Add Crys Lopez MD  6/9/2022  2:36 PM    NOTE:  This report was transcribed using voice recognition software. Every effort was made to ensure accuracy; however, inadvertent computerized transcription errors may be present.

## 2022-06-09 NOTE — PROGRESS NOTES
OB Progress Note    SUBJECTIVE: Patient reporting discomfort mainly on the right side. States it is consistent with her Crohn's    OBJECTIVE:    VITALS:  BP (!) 164/96 Comment: MANUAL  Pulse 67   Temp 98.5 °F (36.9 °C) (Oral)   Resp 16   Ht 5' 4\" (1.626 m)   Wt 155 lb (70.3 kg)   LMP 01/24/2022   SpO2 97%   BMI 26.61 kg/m²   Physical Exam  ABDOMEN:  No scars, normal bowel sounds, soft, non-distended, non-tender, no masses palpated, no hepatosplenomegally    DATA:  CBC:   Lab Results   Component Value Date    WBC 17.6 06/08/2022    RBC 3.23 06/08/2022    HGB 10.4 06/08/2022    HCT 30.0 06/08/2022    MCV 92.9 06/08/2022    MCH 32.2 06/08/2022    MCHC 34.7 06/08/2022    RDW 12.1 06/08/2022     06/08/2022    MPV 9.3 06/08/2022     CMP:    Lab Results   Component Value Date     06/09/2022    K 3.1 06/09/2022    K 2.7 06/08/2022     06/09/2022    CO2 22 06/09/2022    BUN 4 06/09/2022    CREATININE 0.5 06/09/2022    GFRAA >60 06/09/2022    LABGLOM >60 06/09/2022    GLUCOSE 108 06/09/2022    GLUCOSE 92 05/08/2011    PROT 6.2 06/08/2022    LABALBU 3.6 06/08/2022    CALCIUM 8.6 06/09/2022    BILITOT 0.2 06/08/2022    ALKPHOS 61 06/08/2022    AST 16 06/08/2022    ALT 13 06/08/2022       ASSESSMENT AND PLAN  Intrauterine pregnancy at 20 weeks   MFM consult obtained. Phone conversation with Dr. Mandy Ribeiro whose opinion is that this is not obstetrical.  We will obtain Hark fetal heart tones every shift. And monitor. Hyperemesis most likely due to cannabis    Crohn's disease status post GI consult noted. Continue to monitor    Urology consult with mild right hydronephrosis and nonobstructing stone. Input appreciated. We will continue to monitor.   Cultures pending    Pain control patient requiring IV pain control we will continue to monitor      Erinn Montes MD 6/9/2022 9:18 AM

## 2022-06-09 NOTE — PROGRESS NOTES
[address] [place], [place] [address]   Progress Note    Admitting Date and Time: 6/7/2022  9:45 AM  Admit Dx: Hyperemesis gravidarum with metabolic disturbance, antepartum [O21.1]  Starvation ketoacidosis [E87.2]  Hyperemesis [R11.10]  Intractable nausea and vomiting [R11.2]  Marijuana abuse, continuous [F12.10]  Pregnancy with 20 completed weeks gestation [Z3A.20]    Subjective:    Patient seen and examined. Lying in bed, tearful. Reports abdominal burning. States unable to hold food or liquid down. Still with nausea, vomiting. Unable to tolerate po KCl. States she is having a boy and she always gets very nauseated when she is carrying a boy. Reports feeling normal fetal movement. ROS:   Denies CP, SOB, subjective fever, chills, diarrhea, abdominal pain,  HA. All systems reviewed and negative unless otherwise documented.       potassium chloride  10 mEq IntraVENous Q2H    prenatal vitamin  1 tablet Oral Daily    vitamin B-6  25 mg Oral TID    sodium chloride flush  5-40 mL IntraVENous 2 times per day    cefTRIAXone (ROCEPHIN) IV  1,000 mg IntraVENous Q24H    famotidine (PEPCID) injection  20 mg IntraVENous BID     morphine, 2 mg, Q4H PRN  sodium chloride flush, 5-40 mL, PRN  sodium chloride, , PRN  ondansetron, 4 mg, Q8H PRN   Or  ondansetron, 4 mg, Q6H PRN  polyethylene glycol, 17 g, Daily PRN  acetaminophen, 650 mg, Q6H PRN   Or  acetaminophen, 650 mg, Q6H PRN  metoclopramide, 10 mg, TID PRN         Objective:    BP (!) 155/94   Pulse 88   Temp 99 °F (37.2 °C) (Oral)   Resp 16   Ht 5' 4\" (1.626 m)   Wt 155 lb (70.3 kg)   LMP 01/24/2022   SpO2 97%   BMI 26.61 kg/m²      General Appearance: alert and oriented to person, place and time and in no acute distress  Skin: warm and dry  Head: normocephalic and atraumatic  Eyes: pupils equal, round, and reactive to light, extraocular eye movements intact, conjunctivae normal  Neck: neck supple and non tender without mass   Pulmonary/Chest: clear to auscultation bilaterally- no wheezes, rales or rhonchi, normal air movement, no respiratory distress  Cardiovascular: normal rate, normal S1 and S2 and no carotid bruits  Abdomen: soft, non-tender, gravid,  normal bowel sounds, no masses or organomegaly  Extremities: no cyanosis, no clubbing and no edema  Neurologic: no cranial nerve deficit and speech normal      Recent Labs     06/07/22  1002 06/07/22  1002 06/08/22  0330 06/08/22  1557 06/09/22  0440     --  137  --  137   K 4.7   < > 2.7* 3.2* 3.1*     --  103  --  101   CO2 19*  --  18*  --  22   BUN 8  --  5*  --  4*   CREATININE 0.5  --  0.4*  --  0.5   GLUCOSE 150*  --  129*  --  108*   CALCIUM 9.0  --  8.0*  --  8.6    < > = values in this interval not displayed. Recent Labs     06/07/22 1002 06/08/22  0330   ALKPHOS 71 61   PROT 7.9 6.2*   LABALBU 4.2 3.6   BILITOT 0.3 0.2   AST 57* 16   ALT 18 13       Recent Labs     06/07/22 1002 06/08/22  0330   WBC 17.2* 17.6*   RBC 3.99 3.23*   HGB 13.0 10.4*   HCT 37.7 30.0*   MCV 94.5 92.9   MCH 32.6 32.2   MCHC 34.5 34.7*   RDW 12.1 12.1    378   MPV 9.4 9.3         Radiology:   CT ABDOMEN PELVIS WO CONTRAST   Final Result   1. Right nephrolithiasis. No ureteral calculi in the course of the right   ureter. Right kidney is prominent in size. 2.  Gravid uterus with fetus in breech presentation. Anterior fundal   placenta. 3.  No acute intra-process identified. 4.  Bilateral spondylolysis and grade 1 anterolisthesis of L5 on S1; severe   degenerative disc disease. US OB 1 OR MORE FETUS LIMITED   Final Result   Single live intrauterine pregnancy with gestational age of 25 weeks 0 days by   current sonographic biometry. The estimated due date is 10/25/2022 from the   current ultrasound. Estimated due date of 10/29/2022 or 19 weeks 3 days GA from the ultrasound of   04/26/2022.          US OB 1 OR MORE FETUS LIMITED    (Results Pending)   US OB TRANSVAGINAL (Results Pending)   US DUP ABD PEL RETRO SCROT LIMITED    (Results Pending)     US OB 1 OR MORE FETUS LIMITED    Result Date: 6/7/2022  EXAMINATION: 6/7/2022 Second TRIMESTER OBSTETRIC ULTRASOUND TECHNIQUE: TRANSABDOMINAL PELVIC ULTRASOUND WITH COLOR DOPPLER FLOW HISTORY: ORDERING SYSTEM PROVIDED HISTORY: abdominal pain TECHNOLOGIST PROVIDED HISTORY: Reason for exam:->abdominal pain What reading provider will be dictating this exam?->CRC FINDINGS: A single live intrauterine pregnancy is present. Fetal heart rate measures 139 beats per minute. There is normal fetal body and limb movement. The fetus is in transverse position. The placenta is located fundal. The amniotic fluid volume is subjectively normal Limited evaluation of the fetal anatomy as per ER protocol. BPD measures 4.6 cm. Head circumference measures 16.9 cm. Abdominal circumference measures 14.8 cm. Femur length measures 3.23 cm. Estimated fetal weight is 329.78+/-49.5 grams which correlates to 91.7 percentile based upon last menstrual period. Estimated gestational age by current ultrasound is 20 weeks 0 days +/-1 week 3 days. Single live intrauterine pregnancy with gestational age of 25 weeks 0 days by current sonographic biometry. The estimated due date is 10/25/2022 from the current ultrasound. Estimated due date of 10/29/2022 or 19 weeks 3 days GA from the ultrasound of 04/26/2022. Assessment:  Principal Problem:    Hyperemesis  Active Problems:    Intractable nausea and vomiting    Cannabis dependence (HCC)    Crohn disease ([facility] [address]    Primary hypertension    Hyperemesis gravidarum with metabolic disturbance, antepartum  Resolved Problems:    * No resolved hospital problems. *      Plan:  1. Intractable nausea/vomiting in pregnancy; recurrent  -suspect underlying component of overuse of marijuana vs hyperemesis  -seen by OB/GYN with consults to maternal fetal medicine. Input appreciated  -continue IV hydration, clear liquid diet, advance as tolerated  -pelvic US shwoing single live intrauterine pregnancy 20 weeks gestation       2. hypokalemia  -replace and follow BMP  -check Mg  -add KCl to maintenance IV  fluids      2. Marijuana use  - UDS + cannabinoids  -counseled on cessation     3. Crohn's disease:  -was seen by GI last admission 04/15. Plans for outpatient endoscopy after delivery. -seen by GI, consider biologic after resolution of pregnancy     4. HTN:  -off meds. Being monitored by PCP     5. Seizure disorder  -off meds  -reports last seizure as a teenager     6. UTI:  -UA w/ mucus, bacteria. Urine Cx with <10,000 mixed GP oranisms  -continue ceftriaxone (ok to give in pregnancy per pharmacy) (day 3)    7. Right nephrolithiasis, non-obstructing  -with right hydronephrosis as seen on CT abdomen/pelvis   -seen by urology, no intervention planned at this time.  -continue prn Morphine for pain control     8.  Intrauterine pregnancy  -20 weeks gestation as per US  - MFM and OB following  -continue prenatals          Electronically signed by LEIDY Harris - CNP on 6/9/2022 at 8:07 AM

## 2022-06-09 NOTE — PROGRESS NOTES
[facility] Follow-Up Consultation/Antepartum Note    S:  The patient reports still having intermittent symptoms of nausea, vomiting, and abdominal pain. She still reports having occasional loose stools. She notes fetal movement and denies having symptoms of leaking of fluid, vaginal bleeding, cramping, and/or contractions. O:   Patient Vitals for the past 24 hrs:   BP Temp Temp src Pulse Resp SpO2 Weight   06/09/22 1145 122/65 -- -- 75 -- -- --   06/09/22 0830 (!) 164/96 98.5 °F (36.9 °C) Oral 67 16 97 % --   06/09/22 0018 -- -- -- -- -- -- 155 lb (70.3 kg)   06/08/22 2010 (!) 155/94 99 °F (37.2 °C) Oral 88 16 97 % --   06/08/22 1813 -- -- -- -- 18 -- --        Gen NAD  CV RRR  Lungs CTA B  Abd Gravid/soft/NTTP/NABS, mild right CVA TTP  Ext no edema BLE, no calf TTP BLE, +1 patellar reflexes BLE, no clonus BLE      Ultrasound 19 weeks 2 days:  S IUP, cephalic, heart rate 810, anterior placenta, GRECIA normal, uterine artery PI normal bilaterally, transvaginal cervical length 3.9 cm without funneling.     Ultrasound 19 weeks 3 days:  S IUP, heart rate 143, vertex, anterior placenta, marginal umbilical cord insertion, GRECIA normal.  Transvaginal cervical length 3.4-3.5 cm without funneling        LABS:    Admission on 06/07/2022   Component Date Value Ref Range Status    WBC 06/07/2022 17.2* 4.5 - 11.5 E9/L Final    RBC 06/07/2022 3.99  3.50 - 5.50 E12/L Final    Hemoglobin 06/07/2022 13.0  11.5 - 15.5 g/dL Final    Hematocrit 06/07/2022 37.7  34.0 - 48.0 % Final    MCV 06/07/2022 94.5  80.0 - 99.9 fL Final    MCH 06/07/2022 32.6  26.0 - 35.0 pg Final    MCHC 06/07/2022 34.5  32.0 - 34.5 % Final    RDW 06/07/2022 12.1  11.5 - 15.0 fL Final    Platelets 87/61/6294 441  130 - 450 E9/L Final    MPV 06/07/2022 9.4  7.0 - 12.0 fL Final    Neutrophils % 06/07/2022 83.8* 43.0 - 80.0 % Final    Immature Granulocytes % 06/07/2022 0.6  0.0 - 5.0 % Final    Lymphocytes % 06/07/2022 12.1* 20.0 - 42.0 % Final    Monocytes % 06/07/2022 2.3  2.0 - 12.0 % Final    Eosinophils % 06/07/2022 0.8  0.0 - 6.0 % Final    Basophils % 06/07/2022 0.4  0.0 - 2.0 % Final    Neutrophils Absolute 06/07/2022 14.38* 1.80 - 7.30 E9/L Final    Immature Granulocytes # 06/07/2022 0.11  E9/L Final    Lymphocytes Absolute 06/07/2022 2.08  1.50 - 4.00 E9/L Final    Monocytes Absolute 06/07/2022 0.40  0.10 - 0.95 E9/L Final    Eosinophils Absolute 06/07/2022 0.13  0.05 - 0.50 E9/L Final    Basophils Absolute 06/07/2022 0.07  0.00 - 0.20 E9/L Final    Sodium 06/07/2022 134  132 - 146 mmol/L Final    Potassium reflex Magnesium 06/07/2022 4.7  3.5 - 5.0 mmol/L Final    Chloride 06/07/2022 100  98 - 107 mmol/L Final    CO2 06/07/2022 19* 22 - 29 mmol/L Final    Anion Gap 06/07/2022 15  7 - 16 mmol/L Final    Glucose 06/07/2022 150* 74 - 99 mg/dL Final    BUN 06/07/2022 8  6 - 20 mg/dL Final    CREATININE 06/07/2022 0.5  0.5 - 1.0 mg/dL Final    GFR Non- 06/07/2022 >60  >=60 mL/min/1.73 Final    GFR  06/07/2022 >60   Final    Calcium 06/07/2022 9.0  8.6 - 10.2 mg/dL Final    Total Protein 06/07/2022 7.9  6.4 - 8.3 g/dL Final    Albumin 06/07/2022 4.2  3.5 - 5.2 g/dL Final    Total Bilirubin 06/07/2022 0.3  0.0 - 1.2 mg/dL Final    Alkaline Phosphatase 06/07/2022 71  35 - 104 U/L Final    ALT 06/07/2022 18  0 - 32 U/L Final    AST 06/07/2022 57* 0 - 31 U/L Final    Lactic Acid 06/07/2022 1.9  0.5 - 2.2 mmol/L Final    Color, UA 06/07/2022 Yellow  Straw/Yellow Final    Clarity, UA 06/07/2022 Clear  Clear Final    Glucose, Ur 06/07/2022 Negative  Negative mg/dL Final    Bilirubin Urine 06/07/2022 Negative  Negative Final    Ketones, Urine 06/07/2022 >=80* Negative mg/dL Final    Specific Gravity, UA 06/07/2022 1.025  1.005 - 1.030 Final    Blood, Urine 06/07/2022 Negative  Negative Final    pH, UA 06/07/2022 6.5  5.0 - 9.0 Final    Protein, UA 06/07/2022 TRACE  Negative mg/dL Final    Urobilinogen, Urine 06/07/2022 0.2  <2.0 E.U./dL Final    Nitrite, Urine 06/07/2022 Negative  Negative Final    Leukocyte Esterase, Urine 06/07/2022 Negative  Negative Final    Mucus, UA 06/07/2022 Present* None Seen /LPF Final    WBC, UA 06/07/2022 1-3  0 - 5 /HPF Final    RBC, UA 06/07/2022 0-1  0 - 2 /HPF Final    Epithelial Cells, UA 06/07/2022 MANY  /HPF Final    Bacteria, UA 06/07/2022 FEW* None Seen /HPF Final    Urine Culture, Routine 06/07/2022    Final                    Value:<10,000 CFU/mL  Mixed gram positive organisms      Magnesium 06/07/2022 2.0  1.6 - 2.6 mg/dL Final    Amphetamine Screen, Urine 06/07/2022 NOT DETECTED  Negative <1000 ng/mL Final    Barbiturate Screen, Ur 06/07/2022 NOT DETECTED  Negative < 200 ng/mL Final    Benzodiazepine Screen, Urine 06/07/2022 NOT DETECTED  Negative < 200 ng/mL Final    Cannabinoid Scrn, Ur 06/07/2022 POSITIVE* Negative < 50ng/mL Final    Cocaine Metabolite Screen, Urine 06/07/2022 NOT DETECTED  Negative < 300 ng/mL Final    Opiate Scrn, Ur 06/07/2022 NOT DETECTED  Negative < 300ng/mL Final    PCP Screen, Urine 06/07/2022 NOT DETECTED  Negative < 25 ng/mL Final    Methadone Screen, Urine 06/07/2022 NOT DETECTED  Negative <300 ng/mL Final    Oxycodone Urine 06/07/2022 NOT DETECTED  Negative <100 ng/mL Final    FENTANYL SCREEN, URINE 06/07/2022 NOT DETECTED  Negative <1 ng/mL Final    Drug Screen Comment: 06/07/2022 see below   Final    Ventricular Rate 06/07/2022 72  BPM Final    Atrial Rate 06/07/2022 72  BPM Final    P-R Interval 06/07/2022 146  ms Final    QRS Duration 06/07/2022 100  ms Final    Q-T Interval 06/07/2022 416  ms Final    QTc Calculation (Bazett) 06/07/2022 455  ms Final    P Axis 06/07/2022 59  degrees Final    R Axis 06/07/2022 16  degrees Final    T Axis 06/07/2022 56  degrees Final    Sodium 06/08/2022 137  132 - 146 mmol/L Final    Potassium reflex Magnesium 06/08/2022 2.7* 3.5 - 5.0 mmol/L Final    Chloride 06/08/2022 103  98 - 107 mmol/L Final    CO2 06/08/2022 18* 22 - 29 mmol/L Final    Anion Gap 06/08/2022 16  7 - 16 mmol/L Final    Glucose 06/08/2022 129* 74 - 99 mg/dL Final    BUN 06/08/2022 5* 6 - 20 mg/dL Final    CREATININE 06/08/2022 0.4* 0.5 - 1.0 mg/dL Final    GFR Non- 06/08/2022 >60  >=60 mL/min/1.73 Final    GFR  06/08/2022 >60   Final    Calcium 06/08/2022 8.0* 8.6 - 10.2 mg/dL Final    Total Protein 06/08/2022 6.2* 6.4 - 8.3 g/dL Final    Albumin 06/08/2022 3.6  3.5 - 5.2 g/dL Final    Total Bilirubin 06/08/2022 0.2  0.0 - 1.2 mg/dL Final    Alkaline Phosphatase 06/08/2022 61  35 - 104 U/L Final    ALT 06/08/2022 13  0 - 32 U/L Final    AST 06/08/2022 16  0 - 31 U/L Final    WBC 06/08/2022 17.6* 4.5 - 11.5 E9/L Final    RBC 06/08/2022 3.23* 3.50 - 5.50 E12/L Final    Hemoglobin 06/08/2022 10.4* 11.5 - 15.5 g/dL Final    Hematocrit 06/08/2022 30.0* 34.0 - 48.0 % Final    MCV 06/08/2022 92.9  80.0 - 99.9 fL Final    MCH 06/08/2022 32.2  26.0 - 35.0 pg Final    MCHC 06/08/2022 34.7* 32.0 - 34.5 % Final    RDW 06/08/2022 12.1  11.5 - 15.0 fL Final    Platelets 05/00/1194 378  130 - 450 E9/L Final    MPV 06/08/2022 9.3  7.0 - 12.0 fL Final    Neutrophils % 06/08/2022 82.3* 43.0 - 80.0 % Final    Immature Granulocytes % 06/08/2022 1.8  0.0 - 5.0 % Final    Lymphocytes % 06/08/2022 12.2* 20.0 - 42.0 % Final    Monocytes % 06/08/2022 3.6  2.0 - 12.0 % Final    Eosinophils % 06/08/2022 0.0  0.0 - 6.0 % Final    Basophils % 06/08/2022 0.1  0.0 - 2.0 % Final    Neutrophils Absolute 06/08/2022 14.46* 1.80 - 7.30 E9/L Final    Immature Granulocytes # 06/08/2022 0.31  E9/L Final    Lymphocytes Absolute 06/08/2022 2.15  1.50 - 4.00 E9/L Final    Monocytes Absolute 06/08/2022 0.64  0.10 - 0.95 E9/L Final    Eosinophils Absolute 06/08/2022 0.00* 0.05 - 0.50 E9/L Final    Basophils Absolute 06/08/2022 0.02  0.00 - 0.20 E9/L Final    Magnesium 06/08/2022 1.7  1.6 care  · Continue IV fluid hydration, w/daily banana bag  · Continue IV Pepcid BID  · Continue Zofran 4mg q8 hours prn, can increase to 8 mg if necessary  · Continue Reglan 10 mg TID prn  · PO challenge as tolerated, management per medicine  · Recommend stool studies if the patient continues to have diarrhea  · Would consider COVID testing given symptoms     2. Abnormal urinalysis -- The patient UA was positive for mucus and bacteria. She has c/o right flank pain that started on Monday. She is being empirically treated with IV ceftriaxone for cystitis. A urine culture is pending. --Agree with continued empiric treatment while await urine culture results  --Nephrolithiasis noted on the CT scan, no definitive obstruction seen. Continue to monitor. --Per urology, no intervention recommended at this time.     3. Crohn's disease -- Counseling previously provided. She feels like she may be having a Crohn's flare given her associated diarrhea and abdominal pain. She follows with GI.   --Recommend GI consult. Please call with any questions regarding any medical treatment during pregnancy.     4. Anxiety/depression -- The patient reported a history of anxiety and depression for which she takes Zoloft, 25 mg daily.     The patient reports that her mood is stable. She denies having any suicidal or homicidal ideations.     The patient was again counseled regarding the risks of depression and anxiety in pregnancy including worsening symptoms during pregnancy and postpartum, poor fetal growth and  birth. The risks and benefits of treatment were also reviewed. I recommend continuing to monitor the patient's mood throughout pregnancy and postpartum. With recurrent or worsening symptoms, a referral should be made for counseling.   Additionally, medication dosing may need to be adjusted.        Because of the association of thyroid disease and depression, a baseline thyroid screening is recommended with a TSH, free T4, and thyroid peroxidase antibody. Additionally, depression can be associated with nutritional deficiencies, thus, a baseline nutrition panel is also recommended (CBC, CMP, magnesium, ferritin, folate, vitamin B12, vitamin D 25OH). -- Baseline screening completed 2022     Because of the increased risk for poor fetal growth, fetal growth to be monitored serially, every 3 to 4 weeks throughout the pregnancy starting at 24-26 weeks' gestation.       5. Low vitamin D -- The patient's vitamin D was low at 32  --Recommend vitamin D3 2000 IU daily  --Monitor levels serially  --Monitor nutrition panel q4-6 weeks (CBC, CMP, magnesium, ferritin, folate, vitamin B12, vitamin D25OH), on/after 2022  --Follow up with PCP for long term monitoring and management     6. THC Use --  The patient reported that she is self-medicating for appetite, abdominal pain, nausea, and vomiting with marijuana. Her drug screen was again positive for marijuana on 2022.     She was again counseled regarding risk of neurodevelopmental issues in children exposed to [facility] during pregnancy. Additionally, marijuana use may pose risks similar to that of cigarette use including increased risk for poor fetal growth, placental bleeding, PPROM/ delivery, and stillbirth. She was counseled not to use THC while pregnant. Random urine drug screens should be monitored during pregnancy.     7.   History of elevated blood pressure -- The patient's blood pressure was noted to be elevated during her prior hospitalization.  The patient denies a history of chronic hypertension.  She stated that the blood pressure elevation was likely secondary to her pain.     The patient has had intermittent severe blood pressure elevation since admission. She attributes this to nausea and vomiting.     Baseline evaluation completed. Continue close monitoring.     8. Hypokalemia -- The patient's potassium as admission was noted to be low at 2.7. An EKG was completed and normal.  --Repeat potassium 3.2 on 6/8  --Repeat potassium 3.1 on 6/9  -- IV replacement per medicine     9. History of seizure disorder -- The patient reports a history of a seizure disorder as a teenager. She reports having grand mal seizures. She has not had a seizure in several years. She has not required treatment in several years. She denies having any issues with seizures during her prior pregnancies. Continue close monitoring is recommended.     10. Abnormal urinalysis -- The patient's urinalysis is concerning for infection. --A urine culture is pending.  -- Continue treatment with IV ceftriaxone per medicine while await urine culture results.     11. Routine OB -- Daily PNV  --GBS unknown     12. Fetal well-being --  Continue fetal heart tones every shift  --Continue growth scans q 3 wks. The patient is scheduled for anatomy ultrasound on 6/14/2022.     13. DVT prophylaxis -- Recommend SCDs or FUNMILAYO hose      --The total time spent on today's visit was 25 minutes. This included preparation for the consultation (i.e. reviewing prior external notes and test results), performance of a medically appropriate history and examination, counseling, orders for medications, tests or other procedures, and coordination of care. Greater than 50% of the time was spent face-to-face with the patient and with counseling and coordination of care. This time is exclusive of procedures performed. I answered all of  the patient's questions to her satisfaction. --If you have any questions regarding her management, please contact me at your convenience and thank you for allowing me to participate in her care. Yvette Collier MD, [facility]  [phone]        *All or parts of this note may have been generated using a voice recognition program. There may be typo, grammar, or Word substitution errors that have escaped my review of this note.

## 2022-06-09 NOTE — PROGRESS NOTES
[facility] PROGRESS NOTE                                                                       2022        CHIEF UROLOGIC COMPLAINT: Severe illness of uncertain etiology, emesis, flank pain    HISTORY OF PRESENT ILLNESS:  Patient states that she is \"miserable \"and that she \"feels like she is going to die. \"Admits to chills. Admits to right flank pain. No fever greater than 99. Spoke with nurse and states that she has been this way. She also states that she has been admitted many times and has had similar presentation. In the past, however the nurse states that she did not have the vomiting associated with this.     REVIEW OF SYSTEMS:   CONSTITUTIONAL: Severe malaise  HEENT: negative  HEMATOLOGIC: negative  ENDOCRINE: negative  RESPIRATORY: negative  CV: negative  GI: Right flank pain, severe emesis  NEURO: negative  ORTHOPEDICS: negative  PSYCHIATRIC: Appears in distress  : as above    PAST FAMILY HISTORY:    Family History   Problem Relation Age of Onset    Depression Mother     Leukemia Mother     Breast Cancer Mother     Cervical Cancer Mother     Heart Disease Father     Depression Father     Schizophrenia Father     Hypertension Father      PAST SOCIAL HISTORY:    Social History     Socioeconomic History    Marital status: Legally      Spouse name: Not on file    Number of children: Not on file    Years of education: Not on file    Highest education level: Not on file   Occupational History    Not on file   Tobacco Use    Smoking status: Former Smoker     Packs/day: 1.00     Years: 10.00     Pack years: 10.00     Types: Cigarettes     Quit date: 2020     Years since quittin.3    Smokeless tobacco: Never Used   Vaping Use    Vaping Use: Never used   Substance and Sexual Activity    Alcohol use: Not Currently     Comment: socially    Drug use: Not Currently     Frequency: 2.0 times per week     Types: Marijuana Veronicajairharesh Wise)     Comment: stopped after pregnancy    Sexual activity: Yes     Partners: Male   Other Topics Concern    Not on file   Social History Narrative    Not on file     Social Determinants of Health     Financial Resource Strain:     Difficulty of Paying Living Expenses: Not on file   Food Insecurity:     Worried About Running Out of Food in the Last Year: Not on file    Filemon of Food in the Last Year: Not on file   Transportation Needs:     Lack of Transportation (Medical): Not on file    Lack of Transportation (Non-Medical):  Not on file   Physical Activity:     Days of Exercise per Week: Not on file    Minutes of Exercise per Session: Not on file   Stress:     Feeling of Stress : Not on file   Social Connections:     Frequency of Communication with Friends and Family: Not on file    Frequency of Social Gatherings with Friends and Family: Not on file    Attends [employer] Services: Not on file    Active Member of [address] [place] [address] or Organizations: Not on file    Attends Club or Organization Meetings: Not on file    Marital Status: Not on file   Intimate Partner Violence:     Fear of Current or Ex-Partner: Not on file    Emotionally Abused: Not on file    Physically Abused: Not on file    Sexually Abused: Not on file   Housing Stability:     Unable to Pay for Housing in the Last Year: Not on file    Number of [place] in the Last Year: Not on file    Unstable Housing in the Last Year: Not on file       Scheduled Meds:   potassium chloride  10 mEq IntraVENous Q2H    prenatal vitamin  1 tablet Oral Daily    vitamin B-6  25 mg Oral TID    sodium chloride flush  5-40 mL IntraVENous 2 times per day    cefTRIAXone (ROCEPHIN) IV  1,000 mg IntraVENous Q24H    famotidine (PEPCID) injection  20 mg IntraVENous BID     Continuous Infusions:   0.9% NaCl with KCl 20 mEq      sodium chloride       PRN Meds:.morphine, sodium chloride flush, sodium chloride, ondansetron **OR** ondansetron, polyethylene glycol, acetaminophen **OR** acetaminophen, metoclopramide    BP (!) 164/96 Comment: MANUAL  Pulse 67   Temp 98.5 °F (36.9 °C) (Oral)   Resp 16   Ht 5' 4\" (1.626 m)   Wt 155 lb (70.3 kg)   LMP 01/24/2022   SpO2 97%   BMI 26.61 kg/m²     Lab Results   Component Value Date    WBC 17.6 (H) 06/08/2022    HGB 10.4 (L) 06/08/2022    HCT 30.0 (L) 06/08/2022    MCV 92.9 06/08/2022     06/08/2022       Lab Results   Component Value Date    CREATININE 0.5 06/09/2022         Lab Results   Component Value Date    LABURIN <10,000 CFU/mL  Mixed gram positive organisms   06/07/2022    LABURIN  05/25/2022     10 to 100,000 CFU/mL  Mixed ene isolated. Further workup and sensitivity testing  is not routinely indicated and will not be performed. Mixed ene isolated includes:  Mixed gram positive organisms  Nonhemolytic Strep species      LABURIN <10,000 CFU/mL  Gram positive organism   04/27/2022       Lab Results   Component Value Date    BC 5 Days no growth 03/28/2022       Lab Results   Component Value Date    BLOODCULT2 5 Days no growth 03/28/2022       PHYSICAL EXAMINATION:  Patient appears extremely ill and in distress  Skin dry, without rashes  Respirations non-labored, intact  Abdomen gravid  Alert and distracted by feeling horribly     ASSESSMENT AND PLAN:  1. Severe malaise, chills, flank pain, elevated white blood cell count, vomiting, hypokalemia. 20 weeks gestation. CT scan was ordered and I reviewed this several times. There is a nonobstructing stone and no evidence of ureteral stone. There is mild hydronephrosis on the right side that appears confidently to be secondary to gravid uterus, very typical hydronephrosis of pregnancy. I called Dr. Yanira Jordan and relayed my evaluation of [facility] and that there appears to be something serious ongoing with her.   I also expressed to him that I do not feel that this is related to a ureteral stone. We will send repeat urine culture to ensure that there is no new signs of infection despite past negative cultures and will defer ultimately to his plan.     Maribel Boston MD, M.D.  6/9/2022  9:15 AM

## 2022-06-10 LAB
ALBUMIN SERPL-MCNC: 3.7 G/DL (ref 3.5–5.2)
ALP BLD-CCNC: 56 U/L (ref 35–104)
ALT SERPL-CCNC: 34 U/L (ref 0–32)
ANION GAP SERPL CALCULATED.3IONS-SCNC: 13 MMOL/L (ref 7–16)
AST SERPL-CCNC: 34 U/L (ref 0–31)
BASOPHILS ABSOLUTE: 0.04 E9/L (ref 0–0.2)
BASOPHILS RELATIVE PERCENT: 0.3 % (ref 0–2)
BILIRUB SERPL-MCNC: 0.3 MG/DL (ref 0–1.2)
BUN BLDV-MCNC: 3 MG/DL (ref 6–20)
CALCIUM SERPL-MCNC: 8.1 MG/DL (ref 8.6–10.2)
CHLORIDE BLD-SCNC: 101 MMOL/L (ref 98–107)
CO2: 21 MMOL/L (ref 22–29)
CREAT SERPL-MCNC: 0.5 MG/DL (ref 0.5–1)
EOSINOPHILS ABSOLUTE: 0.11 E9/L (ref 0.05–0.5)
EOSINOPHILS RELATIVE PERCENT: 0.8 % (ref 0–6)
GFR AFRICAN AMERICAN: >60
GFR NON-AFRICAN AMERICAN: >60 ML/MIN/1.73
GLUCOSE BLD-MCNC: 105 MG/DL (ref 74–99)
HCT VFR BLD CALC: 32.6 % (ref 34–48)
HEMOGLOBIN: 11.1 G/DL (ref 11.5–15.5)
IMMATURE GRANULOCYTES #: 0.18 E9/L
IMMATURE GRANULOCYTES %: 1.4 % (ref 0–5)
LYMPHOCYTES ABSOLUTE: 3.33 E9/L (ref 1.5–4)
LYMPHOCYTES RELATIVE PERCENT: 25 % (ref 20–42)
MAGNESIUM: 2.1 MG/DL (ref 1.6–2.6)
MCH RBC QN AUTO: 31.9 PG (ref 26–35)
MCHC RBC AUTO-ENTMCNC: 34 % (ref 32–34.5)
MCV RBC AUTO: 93.7 FL (ref 80–99.9)
MONOCYTES ABSOLUTE: 0.72 E9/L (ref 0.1–0.95)
MONOCYTES RELATIVE PERCENT: 5.4 % (ref 2–12)
NEUTROPHILS ABSOLUTE: 8.95 E9/L (ref 1.8–7.3)
NEUTROPHILS RELATIVE PERCENT: 67.1 % (ref 43–80)
PDW BLD-RTO: 12.2 FL (ref 11.5–15)
PHOSPHORUS: 1.8 MG/DL (ref 2.5–4.5)
PLATELET # BLD: 353 E9/L (ref 130–450)
PMV BLD AUTO: 9.2 FL (ref 7–12)
POTASSIUM SERPL-SCNC: 2.8 MMOL/L (ref 3.5–5)
POTASSIUM SERPL-SCNC: 3.1 MMOL/L (ref 3.5–5)
RBC # BLD: 3.48 E12/L (ref 3.5–5.5)
SODIUM BLD-SCNC: 135 MMOL/L (ref 132–146)
TOTAL PROTEIN: 6.4 G/DL (ref 6.4–8.3)
WBC # BLD: 13.3 E9/L (ref 4.5–11.5)

## 2022-06-10 PROCEDURE — APPSS30 APP SPLIT SHARED TIME 16-30 MINUTES: Performed by: NURSE PRACTITIONER

## 2022-06-10 PROCEDURE — 6370000000 HC RX 637 (ALT 250 FOR IP): Performed by: FAMILY MEDICINE

## 2022-06-10 PROCEDURE — 84100 ASSAY OF PHOSPHORUS: CPT

## 2022-06-10 PROCEDURE — 99233 SBSQ HOSP IP/OBS HIGH 50: CPT | Performed by: FAMILY MEDICINE

## 2022-06-10 PROCEDURE — 83735 ASSAY OF MAGNESIUM: CPT

## 2022-06-10 PROCEDURE — 2580000003 HC RX 258: Performed by: OBSTETRICS & GYNECOLOGY

## 2022-06-10 PROCEDURE — 87040 BLOOD CULTURE FOR BACTERIA: CPT

## 2022-06-10 PROCEDURE — 2580000003 HC RX 258: Performed by: NURSE PRACTITIONER

## 2022-06-10 PROCEDURE — 6360000002 HC RX W HCPCS: Performed by: NURSE PRACTITIONER

## 2022-06-10 PROCEDURE — A4216 STERILE WATER/SALINE, 10 ML: HCPCS | Performed by: NURSE PRACTITIONER

## 2022-06-10 PROCEDURE — 1200000000 HC SEMI PRIVATE

## 2022-06-10 PROCEDURE — 84132 ASSAY OF SERUM POTASSIUM: CPT

## 2022-06-10 PROCEDURE — 6370000000 HC RX 637 (ALT 250 FOR IP): Performed by: NURSE PRACTITIONER

## 2022-06-10 PROCEDURE — 6360000002 HC RX W HCPCS: Performed by: OBSTETRICS & GYNECOLOGY

## 2022-06-10 PROCEDURE — 6370000000 HC RX 637 (ALT 250 FOR IP): Performed by: HOSPITALIST

## 2022-06-10 PROCEDURE — 2500000003 HC RX 250 WO HCPCS: Performed by: NURSE PRACTITIONER

## 2022-06-10 PROCEDURE — 85025 COMPLETE CBC W/AUTO DIFF WBC: CPT

## 2022-06-10 PROCEDURE — 36415 COLL VENOUS BLD VENIPUNCTURE: CPT

## 2022-06-10 PROCEDURE — 2500000003 HC RX 250 WO HCPCS: Performed by: OBSTETRICS & GYNECOLOGY

## 2022-06-10 PROCEDURE — 80053 COMPREHEN METABOLIC PANEL: CPT

## 2022-06-10 RX ORDER — POTASSIUM CHLORIDE 7.45 MG/ML
10 INJECTION INTRAVENOUS PRN
Status: DISCONTINUED | OUTPATIENT
Start: 2022-06-10 | End: 2022-06-11 | Stop reason: HOSPADM

## 2022-06-10 RX ORDER — POTASSIUM CHLORIDE 7.45 MG/ML
10 INJECTION INTRAVENOUS
Status: DISCONTINUED | OUTPATIENT
Start: 2022-06-10 | End: 2022-06-10

## 2022-06-10 RX ORDER — POTASSIUM CHLORIDE 7.45 MG/ML
10 INJECTION INTRAVENOUS
Status: COMPLETED | OUTPATIENT
Start: 2022-06-10 | End: 2022-06-10

## 2022-06-10 RX ORDER — POTASSIUM CHLORIDE 20 MEQ/1
40 TABLET, EXTENDED RELEASE ORAL PRN
Status: DISCONTINUED | OUTPATIENT
Start: 2022-06-10 | End: 2022-06-11 | Stop reason: HOSPADM

## 2022-06-10 RX ORDER — POTASSIUM CHLORIDE AND SODIUM CHLORIDE 900; 300 MG/100ML; MG/100ML
INJECTION, SOLUTION INTRAVENOUS CONTINUOUS
Status: DISCONTINUED | OUTPATIENT
Start: 2022-06-10 | End: 2022-06-11 | Stop reason: HOSPADM

## 2022-06-10 RX ORDER — SCOLOPAMINE TRANSDERMAL SYSTEM 1 MG/1
1 PATCH, EXTENDED RELEASE TRANSDERMAL
Status: DISCONTINUED | OUTPATIENT
Start: 2022-06-10 | End: 2022-06-11 | Stop reason: HOSPADM

## 2022-06-10 RX ADMIN — SODIUM CHLORIDE AND POTASSIUM CHLORIDE: .9; .15 SOLUTION INTRAVENOUS at 07:23

## 2022-06-10 RX ADMIN — SODIUM CHLORIDE, PRESERVATIVE FREE 20 MG: 5 INJECTION INTRAVENOUS at 08:54

## 2022-06-10 RX ADMIN — MORPHINE SULFATE 2 MG: 2 INJECTION, SOLUTION INTRAMUSCULAR; INTRAVENOUS at 03:04

## 2022-06-10 RX ADMIN — METOCLOPRAMIDE HYDROCHLORIDE 10 MG: 5 INJECTION INTRAMUSCULAR; INTRAVENOUS at 20:01

## 2022-06-10 RX ADMIN — PYRIDOXINE HCL TAB 50 MG 25 MG: 50 TAB at 20:47

## 2022-06-10 RX ADMIN — SODIUM PHOSPHATE, MONOBASIC, MONOHYDRATE 15 MMOL: 276; 142 INJECTION, SOLUTION INTRAVENOUS at 13:08

## 2022-06-10 RX ADMIN — SODIUM CHLORIDE, PRESERVATIVE FREE 10 ML: 5 INJECTION INTRAVENOUS at 11:08

## 2022-06-10 RX ADMIN — FOLIC ACID: 5 INJECTION, SOLUTION INTRAMUSCULAR; INTRAVENOUS; SUBCUTANEOUS at 20:07

## 2022-06-10 RX ADMIN — ONDANSETRON 4 MG: 2 INJECTION INTRAMUSCULAR; INTRAVENOUS at 15:43

## 2022-06-10 RX ADMIN — SODIUM CHLORIDE, PRESERVATIVE FREE 20 MG: 5 INJECTION INTRAVENOUS at 20:47

## 2022-06-10 RX ADMIN — MORPHINE SULFATE 2 MG: 2 INJECTION, SOLUTION INTRAMUSCULAR; INTRAVENOUS at 15:40

## 2022-06-10 RX ADMIN — SUCRALFATE 1 G: 1 TABLET ORAL at 18:22

## 2022-06-10 RX ADMIN — METFORMIN HYDROCHLORIDE 1 TABLET: 500 TABLET, EXTENDED RELEASE ORAL at 08:53

## 2022-06-10 RX ADMIN — POTASSIUM CHLORIDE 40 MEQ: 1500 TABLET, EXTENDED RELEASE ORAL at 18:22

## 2022-06-10 RX ADMIN — PYRIDOXINE HCL TAB 50 MG 25 MG: 50 TAB at 08:53

## 2022-06-10 RX ADMIN — PYRIDOXINE HCL TAB 50 MG 25 MG: 50 TAB at 15:43

## 2022-06-10 RX ADMIN — MORPHINE SULFATE 2 MG: 2 INJECTION, SOLUTION INTRAMUSCULAR; INTRAVENOUS at 20:01

## 2022-06-10 RX ADMIN — DOXYLAMINE SUCCINATE 25 MG: 25 TABLET ORAL at 20:47

## 2022-06-10 RX ADMIN — SUCRALFATE 1 G: 1 TABLET ORAL at 11:44

## 2022-06-10 RX ADMIN — ONDANSETRON 4 MG: 2 INJECTION INTRAMUSCULAR; INTRAVENOUS at 22:06

## 2022-06-10 RX ADMIN — DOXYLAMINE SUCCINATE 25 MG: 25 TABLET ORAL at 08:52

## 2022-06-10 RX ADMIN — ONDANSETRON 4 MG: 2 INJECTION INTRAMUSCULAR; INTRAVENOUS at 07:22

## 2022-06-10 RX ADMIN — SODIUM CHLORIDE AND POTASSIUM CHLORIDE: .9; .15 SOLUTION INTRAVENOUS at 03:05

## 2022-06-10 RX ADMIN — MORPHINE SULFATE 2 MG: 2 INJECTION, SOLUTION INTRAMUSCULAR; INTRAVENOUS at 11:26

## 2022-06-10 RX ADMIN — POTASSIUM CHLORIDE 10 MEQ: 7.46 INJECTION, SOLUTION INTRAVENOUS at 09:45

## 2022-06-10 RX ADMIN — POTASSIUM CHLORIDE 10 MEQ: 7.46 INJECTION, SOLUTION INTRAVENOUS at 11:49

## 2022-06-10 RX ADMIN — MORPHINE SULFATE 2 MG: 2 INJECTION, SOLUTION INTRAMUSCULAR; INTRAVENOUS at 07:21

## 2022-06-10 RX ADMIN — POTASSIUM CHLORIDE AND SODIUM CHLORIDE: 900; 300 INJECTION, SOLUTION INTRAVENOUS at 13:04

## 2022-06-10 ASSESSMENT — PAIN SCALES - GENERAL
PAINLEVEL_OUTOF10: 2
PAINLEVEL_OUTOF10: 2
PAINLEVEL_OUTOF10: 9
PAINLEVEL_OUTOF10: 6
PAINLEVEL_OUTOF10: 4
PAINLEVEL_OUTOF10: 9
PAINLEVEL_OUTOF10: 7
PAINLEVEL_OUTOF10: 8
PAINLEVEL_OUTOF10: 6
PAINLEVEL_OUTOF10: 3

## 2022-06-10 ASSESSMENT — PAIN DESCRIPTION - FREQUENCY
FREQUENCY: CONTINUOUS
FREQUENCY: CONTINUOUS

## 2022-06-10 ASSESSMENT — PAIN DESCRIPTION - ONSET
ONSET: ON-GOING
ONSET: ON-GOING

## 2022-06-10 ASSESSMENT — PAIN DESCRIPTION - LOCATION
LOCATION: ABDOMEN

## 2022-06-10 ASSESSMENT — PAIN - FUNCTIONAL ASSESSMENT
PAIN_FUNCTIONAL_ASSESSMENT: ACTIVITIES ARE NOT PREVENTED
PAIN_FUNCTIONAL_ASSESSMENT: PREVENTS OR INTERFERES SOME ACTIVE ACTIVITIES AND ADLS
PAIN_FUNCTIONAL_ASSESSMENT: ACTIVITIES ARE NOT PREVENTED

## 2022-06-10 ASSESSMENT — PAIN DESCRIPTION - DESCRIPTORS
DESCRIPTORS: ACHING;DISCOMFORT;BURNING
DESCRIPTORS: SHARP
DESCRIPTORS: ACHING;DISCOMFORT
DESCRIPTORS: SHARP;ACHING;DISCOMFORT

## 2022-06-10 ASSESSMENT — PAIN DESCRIPTION - ORIENTATION
ORIENTATION: RIGHT;UPPER
ORIENTATION: RIGHT;UPPER
ORIENTATION: UPPER;RIGHT
ORIENTATION: RIGHT;UPPER

## 2022-06-10 ASSESSMENT — PAIN DESCRIPTION - PAIN TYPE: TYPE: ACUTE PAIN

## 2022-06-10 NOTE — CARE COORDINATION
ivf ; PPI. Still with some nausea; GI following; + cannabis use. paln at discharge is to return home with children, no needs. Will follow. Lorrie Gowers.

## 2022-06-10 NOTE — PROGRESS NOTES
PROGRESS NOTE  By Annamary Dance, M.D. [facility]  Dr. Nicole Lucero M.D.,  Dr. Renan Gonzáles M.D.,   Dr. Ingrid Salas D.O.,  Dr. Evangelina Wilkins M.D.,  Dr. Kelsea Casanova D.O.,  Raina Antoine D.O. Simeon Ayala  29 y.o.  female    SUBJECTIVE:  Slightly improved but persistent gastroesophageal reflux/heartburn.   Improved epigastric pain    OBJECTIVE:    /78   Pulse 71   Temp 98.1 °F (36.7 °C) (Oral)   Resp 18   Ht 5' 4\" (1.626 m)   Wt 167 lb (75.8 kg)   LMP 01/24/2022   SpO2 99%   BMI 28.67 kg/m²     General: NAD/ female  HEENT: Anicteric sclera/moist oral mucosa  Neck: Supple with trachea midline  Chest: Symmetric excursion/nonlabored respirations  Abd.: Soft and obese/gravid  Extr.:  Muscle tone and bulk, consistent with age and condition  Skin: Warm and dry/anicteric      DATA:       Lab Results   Component Value Date    WBC 13.3 06/10/2022    RBC 3.48 06/10/2022    HGB 11.1 06/10/2022    HCT 32.6 06/10/2022    MCV 93.7 06/10/2022    MCH 31.9 06/10/2022    MCHC 34.0 06/10/2022    RDW 12.2 06/10/2022     06/10/2022    MPV 9.2 06/10/2022     Lab Results   Component Value Date     06/10/2022    K 2.8 06/10/2022    K 2.7 06/08/2022     06/10/2022    CO2 21 06/10/2022    BUN 3 06/10/2022    CREATININE 0.5 06/10/2022    CALCIUM 8.1 06/10/2022    PROT 6.4 06/10/2022    LABALBU 3.7 06/10/2022    BILITOT 0.3 06/10/2022    ALKPHOS 56 06/10/2022    AST 34 06/10/2022    ALT 34 06/10/2022     Lab Results   Component Value Date    LIPASE 20 03/28/2022     Lab Results   Component Value Date    AMYLASE 61 06/10/2014         ASSESSMENT/PLAN:  Patient Active Problem List   Diagnosis    Hyperemesis gravidarum    Cannabis dependence [facility]    Asthma    Tobacco abuse    Symptomatic cholelithiasis    Crohn disease ([facility]    [facility]    Intractable vomiting with nausea    Emesis, persistent    Intractable nausea and vomiting    Abdominal pain    Intractable vomiting    Abdominal pain, right lower quadrant    Diarrhea    Marijuana abuse    AMA (advanced maternal age) multigravida 35+, first trimester    Hypokalemia    Anxiety    History of IUGR (intrauterine growth retardation) and stillbirth, currently pregnant, first trimester    Depression    Hyperemesis    Primary hypertension    Hyperemesis gravidarum with metabolic disturbance, antepartum     1.  Crohn's disease  -Continue current  -Consider biologic after resolution of pregnancy  -CT scan ordered per admitting service revealing no acute intra-abdominal process     2.  Nausea vomiting  -Likely hyperemesis of pregnancy versus cannabis hyperemesis syndrome  -Recommend discontinuation of cannabis use  -Supportive and symptomatic treatment  -EGD in February of this year revealing mild gastritis     3.  Pregnancy  -Per admitting/OB/GYN    Continue current. Add dietary supplements. Encourage upright during and after meals. Vanesa Lieberman MD  6/10/2022  3:08 PM    NOTE:  This report was transcribed using voice recognition software. Every effort was made to ensure accuracy; however, inadvertent computerized transcription errors may be present.

## 2022-06-10 NOTE — PROGRESS NOTES
6/10/2022 10:38 AM  Service: Urology  Group: [facility] (Robi/Nae[facility])    Phill Fall  [phone]    Subjective: Afebrile  She is at relative bedrest  No frequency urgency still with some intermittent right flank pain  Bowel movement for several days  Overall she said her pain is not as severe as it had been    Review of Systems  Respiratory: negative  Cardiovascular: negative  Gastrointestinal: negative  Hematologic/lymphatic: negative  Musculoskeletal:negative  Neurological: negative  Endocrine: negative    Scheduled Meds:   scopolamine  1 patch TransDERmal Q72H    doxyLAMINE succinate  25 mg Oral Nightly    potassium chloride  10 mEq IntraVENous Q2H    sucralfate  1 g Oral 4 times per day    folic acid, thiamine, multi-vitamin with vitamin K infusion   IntraVENous Daily    prenatal vitamin  1 tablet Oral Daily    vitamin B-6  25 mg Oral TID    sodium chloride flush  5-40 mL IntraVENous 2 times per day    famotidine (PEPCID) injection  20 mg IntraVENous BID       Objective:  Vitals:    06/10/22 0945   BP: 126/78   Pulse: 71   Resp:    Temp:    SpO2:          Allergies: Patient has no known allergies.     General Appearance: alert and oriented to person, place and time and in no acute distress  Skin: no rash or erythema  Head: normocephalic and atraumatic  Pulmonary/Chest: clear to auscultation bilaterally- no wheezes, rales or rhonchi, normal air movement, no respiratory distress and no chest wall tenderness  Abdomen: soft, non-tender, non-distended, normal bowel sounds, no masses or organomegaly and no inguinal adenopathy  Genitourinary: genitals normal without hernia or inguinal adenopathy  Extremities: no cyanosis, clubbing or edema and Kishan's sign negative bilaterally  Musculoskeletal: no swollen joints and no trigger point or muscular tenderness      Labs:     Recent Labs     06/10/22  0328      K 2.8*      CO2 21*   BUN 3*   CREATININE 0.5   GLUCOSE 105*   CALCIUM 8.1*       Lab Results   Component Value Date    HGB 11.1 06/10/2022    HCT 32.6 06/10/2022         Assessment:  Simeon Ayala 29 y.o. female     Principal Problem:    Hyperemesis  Active Problems:    Primary hypertension    Hyperemesis gravidarum with metabolic disturbance, antepartum    Cannabis dependence (HCC)    Crohn disease (HCC)    Intractable nausea and vomiting  Resolved Problems:    * No resolved hospital problems. *      Continue to feel that the pain is of nonneurologic origin    Plan:     We will continue to follow her if she develops severe flank pain fever chills or obvious hydronephrosis she may need a percutaneous nephrostomy but we will attempt to manage her without intervention if at all possible    Sudarshan Billings MD   [facility]rology

## 2022-06-10 NOTE — PROGRESS NOTES
[address] [place], [place] [address]ist   Progress Note    Admitting Date and Time: 6/7/2022  9:45 AM  Admit Dx: Hyperemesis gravidarum with metabolic disturbance, antepartum [O21.1]  Starvation ketoacidosis [E87.2]  Hyperemesis [R11.10]  Intractable nausea and vomiting [R11.2]  Marijuana abuse, continuous [F12.10]  Pregnancy with 20 completed weeks gestation [Z3A.20]    Subjective:    Patient seen and examined. With continued nausea,vomiting despite antiemetics. Repeat Urine culture sent per urology. Will get blood cultures as well. Somewhat tolerating liquid diet. Unable to correct potassium despite replacement. States her symptoms will improve once she delivers, she always has extreme nausea and vomiting when she is carrying a boy child. Spoke at length with jesus harkins regarding other antiemetic options. Will attempt scopolamine patch and doxylamine. Patient states has taken doxylamine before and it does help her. ROS:   Denies CP, SOB, subjective fever, chills, diarrhea, abdominal pain,  HA. All systems reviewed and negative unless otherwise documented.       sucralfate  1 g Oral 4 times per day    folic acid, thiamine, multi-vitamin with vitamin K infusion   IntraVENous Daily    prenatal vitamin  1 tablet Oral Daily    vitamin B-6  25 mg Oral TID    sodium chloride flush  5-40 mL IntraVENous 2 times per day    famotidine (PEPCID) injection  20 mg IntraVENous BID     morphine, 2 mg, Q4H PRN  sodium chloride flush, 5-40 mL, PRN  sodium chloride, , PRN  ondansetron, 4 mg, Q8H PRN   Or  ondansetron, 4 mg, Q6H PRN  polyethylene glycol, 17 g, Daily PRN  acetaminophen, 650 mg, Q6H PRN   Or  acetaminophen, 650 mg, Q6H PRN  metoclopramide, 10 mg, TID PRN         Objective:    BP (!) 167/78   Pulse 74   Temp 98.6 °F (37 °C) (Oral)   Resp 16   Ht 5' 4\" (1.626 m)   Wt 167 lb (75.8 kg)   LMP 01/24/2022   SpO2 100%   BMI 28.67 kg/m²      General Appearance: alert and oriented to person, place and time and in no acute distress  Skin: warm and dry  Head: normocephalic and atraumatic  Eyes: pupils equal, round, and reactive to light, extraocular eye movements intact, conjunctivae normal  Neck: neck supple and non tender without mass   Pulmonary/Chest: clear to auscultation bilaterally- no wheezes, rales or rhonchi, normal air movement, no respiratory distress  Cardiovascular: normal rate, normal S1 and S2 and no carotid bruits  Abdomen: soft, non-tender, gravid,  normal bowel sounds, no masses or organomegaly  Extremities: no cyanosis, no clubbing and no edema  Neurologic: no cranial nerve deficit and speech normal      Recent Labs     06/08/22  0330 06/08/22  1557 06/09/22  0440 06/10/22  0328     --  137 135   K 2.7* 3.2* 3.1* 2.8*     --  101 101   CO2 18*  --  22 21*   BUN 5*  --  4* 3*   CREATININE 0.4*  --  0.5 0.5   GLUCOSE 129*  --  108* 105*   CALCIUM 8.0*  --  8.6 8.1*       Recent Labs     06/07/22  1002 06/08/22  0330 06/10/22  0328   ALKPHOS 71 61 56   PROT 7.9 6.2* 6.4   LABALBU 4.2 3.6 3.7   BILITOT 0.3 0.2 0.3   AST 57* 16 34*   ALT 18 13 34*       Recent Labs     06/07/22  1002 06/08/22  0330 06/10/22  0328   WBC 17.2* 17.6* 13.3*   RBC 3.99 3.23* 3.48*   HGB 13.0 10.4* 11.1*   HCT 37.7 30.0* 32.6*   MCV 94.5 92.9 93.7   MCH 32.6 32.2 31.9   MCHC 34.5 34.7* 34.0   RDW 12.1 12.1 12.2    378 353   MPV 9.4 9.3 9.2         Radiology:   US OB 1 OR MORE FETUS LIMITED   Final Result      US OB TRANSVAGINAL   Final Result      US DUP ABD PEL RETRO SCROT LIMITED   Final Result      US OB TRANSVAGINAL   Final Result      US OB 1 OR MORE FETUS LIMITED   Final Result      CT ABDOMEN PELVIS WO CONTRAST   Final Result   1. Right nephrolithiasis. No ureteral calculi in the course of the right   ureter. Right kidney is prominent in size. 2.  Gravid uterus with fetus in breech presentation. Anterior fundal   placenta. 3.  No acute intra-process identified.       4.  Bilateral spondylolysis and grade 1 anterolisthesis of L5 on S1; severe   degenerative disc disease. US OB 1 OR MORE FETUS LIMITED   Final Result   Single live intrauterine pregnancy with gestational age of 25 weeks 0 days by   current sonographic biometry. The estimated due date is 10/25/2022 from the   current ultrasound. Estimated due date of 10/29/2022 or 19 weeks 3 days GA from the ultrasound of   04/26/2022. US OB 1 OR MORE FETUS LIMITED    Result Date: 6/7/2022  EXAMINATION: 6/7/2022 Second TRIMESTER OBSTETRIC ULTRASOUND TECHNIQUE: TRANSABDOMINAL PELVIC ULTRASOUND WITH COLOR DOPPLER FLOW HISTORY: ORDERING SYSTEM PROVIDED HISTORY: abdominal pain TECHNOLOGIST PROVIDED HISTORY: Reason for exam:->abdominal pain What reading provider will be dictating this exam?->CRC FINDINGS: A single live intrauterine pregnancy is present. Fetal heart rate measures 139 beats per minute. There is normal fetal body and limb movement. The fetus is in transverse position. The placenta is located fundal. The amniotic fluid volume is subjectively normal Limited evaluation of the fetal anatomy as per ER protocol. BPD measures 4.6 cm. Head circumference measures 16.9 cm. Abdominal circumference measures 14.8 cm. Femur length measures 3.23 cm. Estimated fetal weight is 329.78+/-49.5 grams which correlates to 91.7 percentile based upon last menstrual period. Estimated gestational age by current ultrasound is 20 weeks 0 days +/-1 week 3 days. Single live intrauterine pregnancy with gestational age of 25 weeks 0 days by current sonographic biometry. The estimated due date is 10/25/2022 from the current ultrasound. Estimated due date of 10/29/2022 or 19 weeks 3 days GA from the ultrasound of 04/26/2022.        Assessment:  Principal Problem:    Hyperemesis  Active Problems:    Intractable nausea and vomiting    Cannabis dependence (HCC)    Crohn disease ([facility] [address]    Primary hypertension    Hyperemesis gravidarum with metabolic disturbance, antepartum  Resolved Problems:    * No resolved hospital problems. *      Plan:  1. Intractable nausea/vomiting in pregnancy; recurrent  -suspect underlying component of overuse of marijuana vs hyperemesis  -seen by OB/GYN with consults to maternal fetal medicine. Input appreciated  -continue IV hydration, clear liquid diet, advance as tolerated  -pelvic US shwoing single live intrauterine pregnancy 20 weeks gestation  -carafate added per GI   -add scopolamine patch and doxylamine. Monitor BP closely while using scopolamine. 2. hypokalemia  -replace and follow BMP  -check Mg  -add KCl to maintenance IV  fluids      2. Marijuana use  - UDS + cannabinoids  -counseled on cessation     3. Crohn's disease:  -was seen by GI last admission 04/15. Plans for outpatient endoscopy after delivery. -seen by GI, consider biologic after resolution of pregnancy     4. HTN:  -off meds. Being monitored by PCP     5. Seizure disorder  -off meds  -reports last seizure as a teenager     6. UTI:  -UA w/ mucus, bacteria. Urine Cx with <10,000 mixed GP oranisms  -continue ceftriaxone (ok to give in pregnancy per pharmacy) (day 4)    7. Right nephrolithiasis, non-obstructing  -with right hydronephrosis as seen on CT abdomen/pelvis   -seen by urology, no intervention planned at this time.  -continue prn Morphine for pain control     8.  Intrauterine pregnancy  -20 weeks gestation as per US  - MFM and OB following  -continue prenatals          Electronically signed by LEIDY Romo - CNP on 6/10/2022 at 8:06 AM

## 2022-06-10 NOTE — PROGRESS NOTES
OB Progress Note    SUBJECTIVE: Patient resting but reports feeling much better    OBJECTIVE:    VITALS:  /78   Pulse 71   Temp 98.1 °F (36.7 °C) (Oral)   Resp 18   Ht 5' 4\" (1.626 m)   Wt 167 lb (75.8 kg)   LMP 01/24/2022   SpO2 99%   BMI 28.67 kg/m²   Physical Exam  ABDOMEN:  No scars, normal bowel sounds, soft, non-distended, non-tender, no masses palpated, no hepatosplenomegally    DATA:  CBC:   Lab Results   Component Value Date    WBC 13.3 06/10/2022    RBC 3.48 06/10/2022    HGB 11.1 06/10/2022    HCT 32.6 06/10/2022    MCV 93.7 06/10/2022    MCH 31.9 06/10/2022    MCHC 34.0 06/10/2022    RDW 12.2 06/10/2022     06/10/2022    MPV 9.2 06/10/2022       ASSESSMENT AND PLAN  Intrauterine pregnancy at 19 weeks 4 days  Hyperemesis cannabis patient states she has cut down significantly and only  used it recently to improve her appetite encouraged her to discontinue  use completely  Crohn's disease appreciate GI input and following  Urology appreciate input regarding nonobstructing stone and hydronephrosis  associated with pregnancy  History of hypertension stable off meds  Disorder stable off meds      Letty Rascon MD 6/10/2022 3:17 PM

## 2022-06-11 VITALS
WEIGHT: 166.7 LBS | RESPIRATION RATE: 16 BRPM | OXYGEN SATURATION: 100 % | TEMPERATURE: 98.1 F | DIASTOLIC BLOOD PRESSURE: 60 MMHG | SYSTOLIC BLOOD PRESSURE: 125 MMHG | BODY MASS INDEX: 28.46 KG/M2 | HEART RATE: 79 BPM | HEIGHT: 64 IN

## 2022-06-11 LAB
ANION GAP SERPL CALCULATED.3IONS-SCNC: 11 MMOL/L (ref 7–16)
BUN BLDV-MCNC: 3 MG/DL (ref 6–20)
CALCIUM SERPL-MCNC: 8.1 MG/DL (ref 8.6–10.2)
CHLORIDE BLD-SCNC: 104 MMOL/L (ref 98–107)
CO2: 21 MMOL/L (ref 22–29)
CREAT SERPL-MCNC: 0.5 MG/DL (ref 0.5–1)
GFR AFRICAN AMERICAN: >60
GFR NON-AFRICAN AMERICAN: >60 ML/MIN/1.73
GLUCOSE BLD-MCNC: 82 MG/DL (ref 74–99)
MAGNESIUM: 1.8 MG/DL (ref 1.6–2.6)
PHOSPHORUS: 2.6 MG/DL (ref 2.5–4.5)
POTASSIUM SERPL-SCNC: 3.2 MMOL/L (ref 3.5–5)
SODIUM BLD-SCNC: 136 MMOL/L (ref 132–146)

## 2022-06-11 PROCEDURE — 2580000003 HC RX 258: Performed by: NURSE PRACTITIONER

## 2022-06-11 PROCEDURE — 6370000000 HC RX 637 (ALT 250 FOR IP): Performed by: HOSPITALIST

## 2022-06-11 PROCEDURE — APPSS45 APP SPLIT SHARED TIME 31-45 MINUTES: Performed by: NURSE PRACTITIONER

## 2022-06-11 PROCEDURE — 36415 COLL VENOUS BLD VENIPUNCTURE: CPT

## 2022-06-11 PROCEDURE — 6360000002 HC RX W HCPCS: Performed by: NURSE PRACTITIONER

## 2022-06-11 PROCEDURE — 84100 ASSAY OF PHOSPHORUS: CPT

## 2022-06-11 PROCEDURE — 99239 HOSP IP/OBS DSCHRG MGMT >30: CPT | Performed by: STUDENT IN AN ORGANIZED HEALTH CARE EDUCATION/TRAINING PROGRAM

## 2022-06-11 PROCEDURE — 2500000003 HC RX 250 WO HCPCS: Performed by: NURSE PRACTITIONER

## 2022-06-11 PROCEDURE — 83735 ASSAY OF MAGNESIUM: CPT

## 2022-06-11 PROCEDURE — 6370000000 HC RX 637 (ALT 250 FOR IP): Performed by: NURSE PRACTITIONER

## 2022-06-11 PROCEDURE — 80048 BASIC METABOLIC PNL TOTAL CA: CPT

## 2022-06-11 RX ORDER — POTASSIUM CHLORIDE 20 MEQ/1
40 TABLET, EXTENDED RELEASE ORAL 2 TIMES DAILY
Status: DISCONTINUED | OUTPATIENT
Start: 2022-06-11 | End: 2022-06-11 | Stop reason: HOSPADM

## 2022-06-11 RX ORDER — SUCRALFATE 1 G/1
1 TABLET ORAL 4 TIMES DAILY
Qty: 120 TABLET | Refills: 0 | Status: SHIPPED | OUTPATIENT
Start: 2022-06-11 | End: 2022-10-10

## 2022-06-11 RX ORDER — POTASSIUM CHLORIDE 20 MEQ/1
40 TABLET, EXTENDED RELEASE ORAL 2 TIMES DAILY
Qty: 8 TABLET | Refills: 0 | Status: SHIPPED | OUTPATIENT
Start: 2022-06-11 | End: 2022-10-10

## 2022-06-11 RX ADMIN — METOCLOPRAMIDE HYDROCHLORIDE 10 MG: 5 INJECTION INTRAMUSCULAR; INTRAVENOUS at 13:11

## 2022-06-11 RX ADMIN — SODIUM CHLORIDE, PRESERVATIVE FREE 10 ML: 5 INJECTION INTRAVENOUS at 13:11

## 2022-06-11 RX ADMIN — MORPHINE SULFATE 2 MG: 2 INJECTION, SOLUTION INTRAMUSCULAR; INTRAVENOUS at 04:08

## 2022-06-11 RX ADMIN — METOCLOPRAMIDE HYDROCHLORIDE 10 MG: 5 INJECTION INTRAMUSCULAR; INTRAVENOUS at 08:52

## 2022-06-11 RX ADMIN — MORPHINE SULFATE 2 MG: 2 INJECTION, SOLUTION INTRAMUSCULAR; INTRAVENOUS at 00:13

## 2022-06-11 RX ADMIN — METFORMIN HYDROCHLORIDE 1 TABLET: 500 TABLET, EXTENDED RELEASE ORAL at 08:44

## 2022-06-11 RX ADMIN — SUCRALFATE 1 G: 1 TABLET ORAL at 05:49

## 2022-06-11 RX ADMIN — SUCRALFATE 1 G: 1 TABLET ORAL at 00:13

## 2022-06-11 RX ADMIN — SODIUM CHLORIDE, PRESERVATIVE FREE 20 MG: 5 INJECTION INTRAVENOUS at 08:44

## 2022-06-11 RX ADMIN — PYRIDOXINE HCL TAB 50 MG 25 MG: 50 TAB at 15:11

## 2022-06-11 RX ADMIN — MORPHINE SULFATE 2 MG: 2 INJECTION, SOLUTION INTRAMUSCULAR; INTRAVENOUS at 08:45

## 2022-06-11 RX ADMIN — POTASSIUM CHLORIDE 40 MEQ: 1500 TABLET, EXTENDED RELEASE ORAL at 09:43

## 2022-06-11 RX ADMIN — MORPHINE SULFATE 2 MG: 2 INJECTION, SOLUTION INTRAMUSCULAR; INTRAVENOUS at 13:11

## 2022-06-11 RX ADMIN — SUCRALFATE 1 G: 1 TABLET ORAL at 11:27

## 2022-06-11 RX ADMIN — POTASSIUM CHLORIDE AND SODIUM CHLORIDE: 900; 300 INJECTION, SOLUTION INTRAVENOUS at 04:11

## 2022-06-11 RX ADMIN — PYRIDOXINE HCL TAB 50 MG 25 MG: 50 TAB at 08:52

## 2022-06-11 ASSESSMENT — PAIN SCALES - GENERAL
PAINLEVEL_OUTOF10: 9
PAINLEVEL_OUTOF10: 6
PAINLEVEL_OUTOF10: 8
PAINLEVEL_OUTOF10: 8
PAINLEVEL_OUTOF10: 9

## 2022-06-11 ASSESSMENT — PAIN DESCRIPTION - FREQUENCY
FREQUENCY: CONTINUOUS
FREQUENCY: CONTINUOUS

## 2022-06-11 ASSESSMENT — PAIN DESCRIPTION - LOCATION
LOCATION: ABDOMEN
LOCATION: ABDOMEN

## 2022-06-11 ASSESSMENT — PAIN DESCRIPTION - ONSET
ONSET: ON-GOING
ONSET: ON-GOING

## 2022-06-11 ASSESSMENT — PAIN DESCRIPTION - PAIN TYPE
TYPE: ACUTE PAIN
TYPE: ACUTE PAIN

## 2022-06-11 ASSESSMENT — PAIN DESCRIPTION - ORIENTATION
ORIENTATION: RIGHT;UPPER
ORIENTATION: RIGHT;UPPER

## 2022-06-11 ASSESSMENT — PAIN DESCRIPTION - DESCRIPTORS
DESCRIPTORS: ACHING;DISCOMFORT
DESCRIPTORS: ACHING;DISCOMFORT

## 2022-06-11 NOTE — PROGRESS NOTES
OB Progress Note    SUBJECTIVE:  Pt improved significantly today = tolerating PO solids    OBJECTIVE:    VITALS:  /60   Pulse 79   Temp 98.1 °F (36.7 °C) (Oral)   Resp 16   Ht 5' 4\" (1.626 m)   Wt 166 lb 11.2 oz (75.6 kg)   LMP 01/24/2022   SpO2 100%   BMI 28.61 kg/m²   Physical Exam  Pt out of bed    DATA:  CBC:   Lab Results   Component Value Date    WBC 13.3 06/10/2022    RBC 3.48 06/10/2022    HGB 11.1 06/10/2022    HCT 32.6 06/10/2022    MCV 93.7 06/10/2022    MCH 31.9 06/10/2022    MCHC 34.0 06/10/2022    RDW 12.2 06/10/2022     06/10/2022    MPV 9.2 06/10/2022     CMP:    Lab Results   Component Value Date     06/11/2022    K 3.2 06/11/2022    K 2.7 06/08/2022     06/11/2022    CO2 21 06/11/2022    BUN 3 06/11/2022    CREATININE 0.5 06/11/2022    GFRAA >60 06/11/2022    LABGLOM >60 06/11/2022    GLUCOSE 82 06/11/2022    GLUCOSE 92 05/08/2011    PROT 6.4 06/10/2022    LABALBU 3.7 06/10/2022    CALCIUM 8.1 06/11/2022    BILITOT 0.3 06/10/2022    ALKPHOS 56 06/10/2022    AST 34 06/10/2022    ALT 34 06/10/2022       ASSESSMENT AND PLAN  IUP at 20 weeks  Hypermesis - improved [facility]  hypokalemia - per medical team  Crohns diseas - gastro involved  UTI - on ceftriaxone  Home when ok with medical        Raya Kaplan MD 6/11/2022 11:55 AM

## 2022-06-11 NOTE — PROGRESS NOTES
PROGRESS NOTE  By CRISTEL Beach    [facility]  Dr. Elayne Holloway M.D.,  Dr. Beverly Haro M.D.,   Dr. Heriberto Treviño D.O.,  Dr. Maximino Langley M.D.,  Dr. Jocy Martinez D.O.,  Elroy Stewart D.O. [address]  29 y.o.  female    SUBJECTIVE:  Patient sitting up in chair. Tolerated breakfast and lunch. 2 bowel movements today, mushy/loose. Denies blood or melena. Feeling much better.     OBJECTIVE:    /60   Pulse 79   Temp 98.1 °F (36.7 °C) (Oral)   Resp 16   Ht 5' 4\" (1.626 m)   Wt 166 lb 11.2 oz (75.6 kg)   LMP 01/24/2022   SpO2 100%   BMI 28.61 kg/m²     General: NAD/ female  HEENT: Anicteric sclera/moist oral mucosa  Neck: Supple with trachea midline  Chest: Symmetric excursion/nonlabored respirations  Abd.: Soft and obese/gravid  Extr.:  Muscle tone and bulk, consistent with age and condition  Skin: Warm and dry/anicteric      DATA:       Lab Results   Component Value Date    WBC 13.3 06/10/2022    RBC 3.48 06/10/2022    HGB 11.1 06/10/2022    HCT 32.6 06/10/2022    MCV 93.7 06/10/2022    MCH 31.9 06/10/2022    MCHC 34.0 06/10/2022    RDW 12.2 06/10/2022     06/10/2022    MPV 9.2 06/10/2022     Lab Results   Component Value Date     06/11/2022    K 3.2 06/11/2022    K 2.7 06/08/2022     06/11/2022    CO2 21 06/11/2022    BUN 3 06/11/2022    CREATININE 0.5 06/11/2022    CALCIUM 8.1 06/11/2022    PROT 6.4 06/10/2022    LABALBU 3.7 06/10/2022    BILITOT 0.3 06/10/2022    ALKPHOS 56 06/10/2022    AST 34 06/10/2022    ALT 34 06/10/2022     Lab Results   Component Value Date    LIPASE 20 03/28/2022     Lab Results   Component Value Date    AMYLASE 61 06/10/2014         ASSESSMENT/PLAN:  Patient Active Problem List   Diagnosis    Hyperemesis gravidarum    Cannabis dependence ([facility] [address]    Asthma    Tobacco abuse    Symptomatic cholelithiasis    Crohn disease ([facility] [address]    Crohn's colitis, without complications ([facility] [address]    Intractable vomiting with nausea    Emesis, persistent    Intractable nausea and vomiting    Abdominal pain    Intractable vomiting    Abdominal pain, right lower quadrant    Diarrhea    Marijuana abuse, continuous    AMA (advanced maternal age) multigravida 35+, first trimester    Hypokalemia    Anxiety    History of IUGR (intrauterine growth retardation) and stillbirth, currently pregnant, first trimester    Depression    Hyperemesis    Primary hypertension    Hyperemesis gravidarum with metabolic disturbance, antepartum    Pregnancy with 20 completed weeks gestation    Starvation ketoacidosis       Assessment / Plan:    1.  Crohn's disease  -Continue current  -Consider biologic after resolution of pregnancy  -CT scan ordered per admitting service revealing no acute intra-abdominal process     2.  Nausea vomiting  -Likely hyperemesis of pregnancy versus cannabis hyperemesis syndrome  -Recommend discontinuation of cannabis use  -Supportive and symptomatic treatment  -EGD in February of this year revealing mild gastritis     3.  Pregnancy  -Per admitting/OB/GYN        Tolerating diet. Moving bowels. If continuing to tolerate, should be ok for discharge from GI POV. High suspicion for cannabinoid hyperemesis syndrome. If marijuana is resumed, symptoms will return as well. Advised on complete sensation. Consider initiation of biologic therapy following delivery. Recommend continuing famotidine 1-2 times daily on discharge. Will defer to admitting/GYN. Patient should follow in our office in 3 to 4 weeks. Patient to call for appointment. [phone]. Thank you for the opportunity to participate in the care of Ms. Ayala. LEIDY Silvestre - CNP  6/11/2022  11:43 AM    NOTE:  This report was transcribed using voice recognition software. Every effort was made to ensure accuracy; however, inadvertent computerized transcription errors may be present.

## 2022-06-11 NOTE — PROGRESS NOTES
[address] [place], [place] [address]   Progress Note    Admitting Date and Time: 6/7/2022  9:45 AM  Admit Dx: Hyperemesis gravidarum with metabolic disturbance, antepartum [O21.1]  Starvation ketoacidosis [E87.2]  Hyperemesis [R11.10]  Intractable nausea and vomiting [R11.2]  Marijuana abuse, continuous [F12.10]  Pregnancy with 20 completed weeks gestation [Z3A.20]    Subjective:    Patient seen and examined. Sitting up in bed. Looks much better this am. Was able to tolerate soft diet this morning. Slept last night. Nausea, vomiting, right flank pain improved. ROS:   Denies CP, SOB, subjective fever, chills, diarrhea, abdominal pain,  HA. All systems reviewed and negative unless otherwise documented.       scopolamine  1 patch TransDERmal Q72H    doxyLAMINE succinate  25 mg Oral Nightly    sucralfate  1 g Oral 4 times per day    folic acid, thiamine, multi-vitamin with vitamin K infusion   IntraVENous Daily    prenatal vitamin  1 tablet Oral Daily    vitamin B-6  25 mg Oral TID    sodium chloride flush  5-40 mL IntraVENous 2 times per day    famotidine (PEPCID) injection  20 mg IntraVENous BID     sodium phosphate IVPB, 0.16 mmol/kg, PRN   Or  sodium phosphate IVPB, 0.32 mmol/kg, PRN  potassium chloride, 40 mEq, PRN   Or  potassium alternative oral replacement, 40 mEq, PRN   Or  potassium chloride, 10 mEq, PRN  morphine, 2 mg, Q4H PRN  sodium chloride flush, 5-40 mL, PRN  sodium chloride, , PRN  ondansetron, 4 mg, Q8H PRN   Or  ondansetron, 4 mg, Q6H PRN  polyethylene glycol, 17 g, Daily PRN  acetaminophen, 650 mg, Q6H PRN   Or  acetaminophen, 650 mg, Q6H PRN  metoclopramide, 10 mg, TID PRN         Objective:    /60   Pulse 79   Temp 98.1 °F (36.7 °C) (Oral)   Resp 16   Ht 5' 4\" (1.626 m)   Wt 166 lb 11.2 oz (75.6 kg)   LMP 01/24/2022   SpO2 100%   BMI 28.61 kg/m²      General Appearance: alert and oriented to person, place and time and in no acute distress  Skin: warm and dry  Head: normocephalic and atraumatic  Eyes: pupils equal, round, and reactive to light, extraocular eye movements intact, conjunctivae normal  Neck: neck supple and non tender without mass   Pulmonary/Chest: clear to auscultation bilaterally- no wheezes, rales or rhonchi, normal air movement, no respiratory distress  Cardiovascular: normal rate, normal S1 and S2 and no carotid bruits  Abdomen: soft, non-tender, gravid,  normal bowel sounds, no masses or organomegaly  Extremities: no cyanosis, no clubbing and no edema  Neurologic: no cranial nerve deficit and speech normal      Recent Labs     06/09/22  0440 06/10/22  0328 06/10/22  1624    135  --    K 3.1* 2.8* 3.1*    101  --    CO2 22 21*  --    BUN 4* 3*  --    CREATININE 0.5 0.5  --    GLUCOSE 108* 105*  --    CALCIUM 8.6 8.1*  --        Recent Labs     06/10/22  0328   ALKPHOS 56   PROT 6.4   LABALBU 3.7   BILITOT 0.3   AST 34*   ALT 34*       Recent Labs     06/10/22  0328   WBC 13.3*   RBC 3.48*   HGB 11.1*   HCT 32.6*   MCV 93.7   MCH 31.9   MCHC 34.0   RDW 12.2      MPV 9.2         Radiology:   US OB TRANSVAGINAL   Final Result      US OB 1 OR MORE FETUS LIMITED   Final Result      US OB 1 OR MORE FETUS LIMITED   Final Result      US OB TRANSVAGINAL   Final Result      US DUP ABD PEL RETRO SCROT LIMITED   Final Result      CT ABDOMEN PELVIS WO CONTRAST   Final Result   1. Right nephrolithiasis. No ureteral calculi in the course of the right   ureter. Right kidney is prominent in size. 2.  Gravid uterus with fetus in breech presentation. Anterior fundal   placenta. 3.  No acute intra-process identified. 4.  Bilateral spondylolysis and grade 1 anterolisthesis of L5 on S1; severe   degenerative disc disease. US OB 1 OR MORE FETUS LIMITED   Final Result   Single live intrauterine pregnancy with gestational age of 25 weeks 0 days by   current sonographic biometry.   The estimated due date is 10/25/2022 from the   current hyperemesis  -seen by OB/GYN with consults to maternal fetal medicine. Input appreciated  -continue IV hydration, clear liquid diet, advance as tolerated  -pelvic US shwoing single live intrauterine pregnancy 20 weeks gestation  -carafate added per GI   -add scopolamine patch and doxylamine. Monitor BP closely while using scopolamine. 2. hypokalemia  -replace and follow BMP  -check Mg  -add KCl to maintenance IV  fluids      2. Marijuana use  - UDS + cannabinoids  -counseled on cessation     3. Crohn's disease:  -was seen by GI last admission 04/15. Plans for outpatient endoscopy after delivery. -seen by GI, consider biologic after resolution of pregnancy     4. HTN:  -off meds. Being monitored by PCP     5. Seizure disorder  -off meds  -reports last seizure as a teenager     6. UTI:  -UA w/ mucus, bacteria. Urine Cx with <10,000 mixed GP oranisms  -continue ceftriaxone (ok to give in pregnancy per pharmacy) (day 4)    7. Right nephrolithiasis, non-obstructing  -with right hydronephrosis as seen on CT abdomen/pelvis   -seen by urology, no intervention planned at this time.  -continue prn Morphine for pain control     8. Intrauterine pregnancy  -20 weeks gestation as per US  - MFM and OB following  -continue prenatals    Disposition:  Plan to discharge home later today or tomorrow if she continues to tolerate diet.        Electronically signed by LEIDY Lanier CNP on 6/11/2022 at 8:17 AM

## 2022-06-11 NOTE — DISCHARGE SUMMARY
[facility] Physician Discharge Summary       Li Ledesma MD  [address] [address]  [phone]    In 2 weeks      DO Bill Andrews  [place] [address] [phone]    In 1 week      Charanjit Hathaway MD  [address] [facility], [address]  [address] [facility] [phone]    In 2 weeks      DO Emmy Vaughn [facility] [address] [facility]  [phone]    Schedule an appointment as soon as possible for a visit        Activity level: Ad isabel    Diet: ADULT DIET; Regular; Low Fiber  ADULT ORAL NUTRITION SUPPLEMENT; Breakfast, Lunch, Dinner; Standard High Calorie/High Protein Oral Supplement    Labs: BMP, Mg, phos  06/15  Condition at discharge:  stable    Dispo: home        Patient ID:  Simran Eddy  [id number]  29 y.o.  1985    Admit date: 2022    Discharge date and time:  2022  2:15 PM    Admission Diagnoses: Principal Problem:    Hyperemesis  Active Problems:    Intractable nausea and vomiting    Cannabis dependence ([facility] [address]    Crohn disease ([facility]    Primary hypertension    Hyperemesis gravidarum with metabolic disturbance, antepartum    Pregnancy with 20 completed weeks gestation    Starvation ketoacidosis    Marijuana abuse, continuous  Resolved Problems:    * No resolved hospital problems. *      Discharge Diagnoses: Principal Problem:    Hyperemesis  Active Problems:    Intractable nausea and vomiting    Cannabis dependence ([facility] [address]    Crohn disease ([facility]    Primary hypertension    Hyperemesis gravidarum with metabolic disturbance, antepartum    Pregnancy with 20 completed weeks gestation    Starvation ketoacidosis    Marijuana abuse, continuous  Resolved Problems:    * No resolved hospital problems.  *      Consults:  IP CONSULT TO OB GYN  IP CONSULT TO GI  IP CONSULT TO MATERNAL FETAL MEDICINE/  IP CONSULT TO UROLOGY  IP CONSULT TO IV TEAM    Procedures:  none    Hospital Course:     29year old 22 week pregnant female with PMH of Crohn's disease, asthma, depression, HTN, marijuana use, and seizure disorder who was admitted for hyperemesis secondary to  versus marijuana use after presenting to the ER for intractable nausea and vomiting. Patient had Zofran pump placed 4 weeks prior to admission but it was not alleviating her symptoms. It was thought her nausea and vomiting was due to marijuana overuse, however, patient reported significant decrease in use prior to symptom onset. Patient had reported severe nausea and vomiting when she carries male fetuses. She was given IN hydration and placed on clear liquid diet , which she was unable to tolerate despite antiemetic use. Consults were made to gynecology and maternal fetal medicine. Daily fetal heart tones were monitored and she reported normal fetal movement. Consult was also made to GI who plans outpatient endoscopy once patient delivers her baby and added Carafate to her treatment regimen. She had reported severe right flank pain and so CT abdomen and pelvis was obtained without contrast showing right hydronephrosis and right nephrolithiasis. Consult was made to urology with no plans for intervention. Patient continued to have nausea and vomiting, causing hypomagnesemia, hypokalemia, and hypophosphatemia. She was then trialed on scopolamine patch and doxylamine, after consulting with pharmacy to ensure safety in pregnancy. Blood pressure was monitored closely while using scopolamine patch. Her hypertension had actually improved, as did her nausea and vomiting, and she was able to tolerate solid foods. Patient felt well enough to return home with follow up as above,. Will stop scopolamine patch on discharge due to concerns of not being able to closely monitor for  elevated blood pressure, and possibly predisposing her to preeclampsia. Will have her repeat BMP, Mg, and phosphorus on Wednesday.      Discharge Exam:  Vitals: 06/11/22 0438 06/11/22 0730 06/11/22 0800 06/11/22 0915   BP:  125/60     Pulse:  79 79    Resp: 18 16  16   Temp:  98.1 °F (36.7 °C)     TempSrc:  Oral     SpO2:  100%     Weight:       Height:              General Appearance: alert and oriented to person, place and time and in no acute distress  Skin: warm and dry  Head: normocephalic and atraumatic  Eyes: pupils equal, round, and reactive to light, extraocular eye movements intact, conjunctivae normal  Neck: neck supple and non tender without mass   Pulmonary/Chest: clear to auscultation bilaterally- no wheezes, rales or rhonchi, normal air movement, no respiratory distress  Cardiovascular: normal rate, normal S1 and S2 and no carotid bruits  Abdomen: soft, non-tender, gravid,  normal bowel sounds, no masses or organomegaly  Extremities: no cyanosis, no clubbing and no edema  Neurologic: no cranial nerve deficit and speech normal      I/O last 3 completed shifts: In: 85310.3 [P.O.:540; I.V.:54688.1; IV Piggyback:149.2]  Out: 4300 [Urine:4300]  No intake/output data recorded. LABS:  Recent Labs     06/09/22  0440 06/09/22  0440 06/10/22  0328 06/10/22  1624 06/11/22  0600     --  135  --  136   K 3.1*   < > 2.8* 3.1* 3.2*     --  101  --  104   CO2 22  --  21*  --  21*   BUN 4*  --  3*  --  3*   CREATININE 0.5  --  0.5  --  0.5   GLUCOSE 108*  --  105*  --  82   CALCIUM 8.6  --  8.1*  --  8.1*    < > = values in this interval not displayed. Recent Labs     06/10/22  0328   WBC 13.3*   RBC 3.48*   HGB 11.1*   HCT 32.6*   MCV 93.7   MCH 31.9   MCHC 34.0   RDW 12.2      MPV 9.2       No results for input(s): POCGLU in the last 72 hours.         Imaging:  CT ABDOMEN PELVIS WO CONTRAST    Result Date: 6/8/2022  EXAMINATION: CT OF THE ABDOMEN AND PELVIS WITHOUT CONTRAST 6/8/2022 10:52 am TECHNIQUE: CT of the abdomen and pelvis was performed without the administration of intravenous contrast. Multiplanar reformatted images are provided for review. Automated exposure control, iterative reconstruction, and/or weight based adjustment of the mA/kV was utilized to reduce the radiation dose to as low as reasonably achievable. COMPARISON: 07/14/2021 HISTORY: ORDERING SYSTEM PROVIDED HISTORY: right flank pain, nausea, vomiting TECHNOLOGIST PROVIDED HISTORY: Reason for exam:->right flank pain, nausea, vomiting FINDINGS: Lower Chest:  Visualized portion of the lower chest demonstrates no acute abnormality. Organs: Liver is normal in size without focal lesion. Spleen appears normal. Stomach configuration is normal.  Gallbladder is surgically absent. The unenhanced pancreas is normal.  The adrenal glands are within normal limits. There is right nephrolithiasis and slight prominence of the intrarenal and extrarenal collecting system. Right ureter is prominent to the level of the iliac vessels. Ureter is difficult to evaluate in the mid pelvis due to a gravid uterus. Distal right ureter is normal.  No calculi are identified in the course of the right ureter. GI/Bowel: No evidence of bowel obstruction. Pelvis: There is a gravid uterus with a fetus in breech presentation. There is an anterior fundal placenta. No free pelvic fluid. Urinary bladder appears normal. Peritoneum/Retroperitoneum: No retroperitoneal mass or adenopathy detected. Bones/Soft Tissues: There is bilateral spondylolysis at L5-S1 with severe degenerative disc disease of L5 on S1 and grade 1 anterolisthesis. 1.  Right nephrolithiasis. No ureteral calculi in the course of the right ureter. Right kidney is prominent in size. 2.  Gravid uterus with fetus in breech presentation. Anterior fundal placenta. 3.  No acute intra-process identified. 4.  Bilateral spondylolysis and grade 1 anterolisthesis of L5 on S1; severe degenerative disc disease.      US OB 1 OR MORE FETUS LIMITED    Result Date: 6/7/2022  EXAMINATION: 6/7/2022 Second TRIMESTER OBSTETRIC ULTRASOUND TECHNIQUE: TRANSABDOMINAL PELVIC ULTRASOUND WITH COLOR DOPPLER FLOW HISTORY: ORDERING SYSTEM PROVIDED HISTORY: abdominal pain TECHNOLOGIST PROVIDED HISTORY: Reason for exam:->abdominal pain What reading provider will be dictating this exam?->CRC FINDINGS: A single live intrauterine pregnancy is present. Fetal heart rate measures 139 beats per minute. There is normal fetal body and limb movement. The fetus is in transverse position. The placenta is located fundal. The amniotic fluid volume is subjectively normal Limited evaluation of the fetal anatomy as per ER protocol. BPD measures 4.6 cm. Head circumference measures 16.9 cm. Abdominal circumference measures 14.8 cm. Femur length measures 3.23 cm. Estimated fetal weight is 329.78+/-49.5 grams which correlates to 91.7 percentile based upon last menstrual period. Estimated gestational age by current ultrasound is 20 weeks 0 days +/-1 week 3 days. Single live intrauterine pregnancy with gestational age of 25 weeks 0 days by current sonographic biometry. The estimated due date is 10/25/2022 from the current ultrasound. Estimated due date of 10/29/2022 or 19 weeks 3 days GA from the ultrasound of 04/26/2022. Patient Instructions:      Medication List      START taking these medications    potassium chloride 20 MEQ extended release tablet  Commonly known as: KLOR-CON M  Take 2 tablets by mouth 2 times daily for 2 days     sucralfate 1 GM tablet  Commonly known as: CARAFATE  Take 1 tablet by mouth 4 times daily        CHANGE how you take these medications    doxyLAMINE succinate 25 MG tablet  Commonly known as: [address]  What changed: Another medication with the same name was removed. Continue taking this medication, and follow the directions you see here. prenatal vitamin 27-0.8 MG Tabs  What changed: Another medication with the same name was removed. Continue taking this medication, and follow the directions you see here.      vitamin D 25 MCG (1000 UT) Tabs tablet  Commonly known as: CHOLECALCIFEROL  What changed: Another medication with the same name was removed. Continue taking this medication, and follow the directions you see here.         CONTINUE taking these medications    famotidine 20 MG tablet  Commonly known as: PEPCID  Take 1 tablet by mouth 2 times daily     fluticasone 50 MCG/ACT nasal spray  Commonly known as: FLONASE     metoclopramide 10 MG tablet  Commonly known as: REGLAN  Take 1 tablet by mouth 3 times daily as needed (nausea, vomiting)     ondansetron 4 MG tablet  Commonly known as: ZOFRAN     sertraline 25 MG tablet  Commonly known as: ZOLOFT  Take 1 tablet by mouth at bedtime     vitamin B-6 25 MG tablet  Commonly known as: PYRIDOXINE  Take 1 tablet by mouth in the morning, at noon, and at bedtime           Where to Get Your Medications      These medications were sent to [address]. Taiwo Haro P [phone] Ziyad Rahman [address] [place] [address] [place], [address] [place] [address]    Phone: [phone]   · potassium chloride 20 MEQ extended release tablet  · sucralfate 1 GM tablet          Note that more  than 30 minutes was spent in preparing discharge papers, discussing discharge with patient, medication review, etc.       Signed:  Electronically signed by LEIDY Gale CNP on 6/11/2022 at 2:15 PM

## 2022-06-12 LAB — URINE CULTURE, ROUTINE: NORMAL

## 2022-06-14 ENCOUNTER — ROUTINE PRENATAL (OUTPATIENT)
Dept: OBGYN CLINIC | Age: 37
End: 2022-06-14
Payer: COMMERCIAL

## 2022-06-14 ENCOUNTER — ANCILLARY PROCEDURE (OUTPATIENT)
Dept: OBGYN CLINIC | Age: 37
End: 2022-06-14
Payer: COMMERCIAL

## 2022-06-14 VITALS
DIASTOLIC BLOOD PRESSURE: 75 MMHG | BODY MASS INDEX: 26.02 KG/M2 | SYSTOLIC BLOOD PRESSURE: 129 MMHG | WEIGHT: 151.6 LBS | HEART RATE: 95 BPM

## 2022-06-14 DIAGNOSIS — Z3A.20 20 WEEKS GESTATION OF PREGNANCY: Primary | ICD-10-CM

## 2022-06-14 DIAGNOSIS — O09.291 HISTORY OF IUGR (INTRAUTERINE GROWTH RETARDATION) AND STILLBIRTH, CURRENTLY PREGNANT, FIRST TRIMESTER: ICD-10-CM

## 2022-06-14 DIAGNOSIS — O21.0 HYPEREMESIS GRAVIDARUM: ICD-10-CM

## 2022-06-14 DIAGNOSIS — E87.6 HYPOKALEMIA: ICD-10-CM

## 2022-06-14 DIAGNOSIS — R79.89 LOW VITAMIN D LEVEL: ICD-10-CM

## 2022-06-14 DIAGNOSIS — O16.2 ELEVATED BLOOD PRESSURE AFFECTING PREGNANCY IN SECOND TRIMESTER, ANTEPARTUM: ICD-10-CM

## 2022-06-14 DIAGNOSIS — F32.A DEPRESSION, UNSPECIFIED DEPRESSION TYPE: ICD-10-CM

## 2022-06-14 DIAGNOSIS — K50.10 CROHN'S COLITIS, WITHOUT COMPLICATIONS (HCC): ICD-10-CM

## 2022-06-14 LAB
GLUCOSE URINE, POC: NEGATIVE
PROTEIN UA: NEGATIVE

## 2022-06-14 PROCEDURE — 76817 TRANSVAGINAL US OBSTETRIC: CPT | Performed by: OBSTETRICS & GYNECOLOGY

## 2022-06-14 PROCEDURE — 1036F TOBACCO NON-USER: CPT | Performed by: OBSTETRICS & GYNECOLOGY

## 2022-06-14 PROCEDURE — 81002 URINALYSIS NONAUTO W/O SCOPE: CPT | Performed by: OBSTETRICS & GYNECOLOGY

## 2022-06-14 PROCEDURE — 1111F DSCHRG MED/CURRENT MED MERGE: CPT | Performed by: OBSTETRICS & GYNECOLOGY

## 2022-06-14 PROCEDURE — 99214 OFFICE O/P EST MOD 30 MIN: CPT | Performed by: OBSTETRICS & GYNECOLOGY

## 2022-06-14 PROCEDURE — G8419 CALC BMI OUT NRM PARAM NOF/U: HCPCS | Performed by: OBSTETRICS & GYNECOLOGY

## 2022-06-14 PROCEDURE — G8427 DOCREV CUR MEDS BY ELIG CLIN: HCPCS | Performed by: OBSTETRICS & GYNECOLOGY

## 2022-06-14 PROCEDURE — 99213 OFFICE O/P EST LOW 20 MIN: CPT | Performed by: OBSTETRICS & GYNECOLOGY

## 2022-06-14 PROCEDURE — 76811 OB US DETAILED SNGL FETUS: CPT | Performed by: OBSTETRICS & GYNECOLOGY

## 2022-06-14 RX ORDER — ASPIRIN 81 MG/1
81 TABLET, CHEWABLE ORAL DAILY
Qty: 30 TABLET | Refills: 6 | Status: SHIPPED
Start: 2022-06-14 | End: 2022-10-10

## 2022-06-14 NOTE — PROGRESS NOTES
2022      Ellie Doherty MD  [address] [facility]     RE:  Basia Proctor  : 1985   AGE: 29 y.o. This report has been created using voice recognition software. It may contain errors which are inherent in voice recognition technology. Dear Dr. Mike Joyce:      I had the pleasure of meeting with Ms. Ayala for a return consultation. As you know, Ms. Elizabeth Stack  is a 29 y.o. [id number] at 20w1d (LMP = [address]) who is being followed by our office for multiple medical issues. Today, Ms. Ayala reports that she feels well. She notes good fetal movement and denies any symptoms of leaking of fluid, vaginal bleeding, and/or contractions. She had a fetal ultrasound that was notable for the following. There is a single intrauterine gestation in a transverse presentation with a heart rate of 148 beats per minute. The placenta is anterior. The amniotic fluid index is normal.  Transvaginal cervical length 4.1 cm without funneling. PERTINENT PHYSICAL EXAMINATION:   /75   Pulse 95   Wt 151 lb 9.6 oz (68.8 kg)   LMP 2022   BMI 26.02 kg/m²     Urine dipstick:   Negative for Glucose    Negative for Albumin      A fetal ultrasound assessment was performed today. A report is enclosed for your review. Assessment & Plan:  29 y.o. [id number] at 20w1d (LMP = 9 [address]) with:    1.  Crohn's disease --  The patient reported a history of Crohn's disease. She reports that she was diagnosed in 2015 or 2016.  She follows with Dr. [facility]. Liya Hart now follows with Dr. Star Wrighting is not currently medicated for her Crohn's disease. With flares, she describes having abdominal pain and diarrhea. She reports that she continues to have symptoms every 3 weeks.     She reports that her weight has been stable.  She denies gaining or losing a significant amount of weight.  She weighed 151 pounds today.   She has not gained any weight since her last visit. Anabell Montoya BMI is 32.     Counseling was previously provided to the patient. Counseling was reviewed. The patient did not have any additional questions or concerns.     The patient was again counseled that overall women with inflammatory bowel disease can do well in pregnancy.  Generally, if the disease is quiescent at the time of conception, then the risk for relapse during pregnancy is only 30%.  However, if the disease is active at the time of conception then the risk for relapse during pregnancy is 70%.  Women with inflammatory bowel disease may be at increased risk for obstetric complications such as antepartum bleeding, poor fetal growth, nutritional deficiencies, anemia, and  delivery.  Generally, breastfeeding is considered safe and does not appear to affect the disease course.  However, women with inflammatory bowel disease often chose not to breastfeed because they were advised not to, because of medication use, and/or personal choice.      The patient was counseled that there is also an increased risk for inflammatory bowel disease on her child(jorge). First-degree relatives of patients with IBD are approximately 3 to 20 times more likely to develop IBD as compared with the general population.  Thus, this information should be relayed to the pediatrician caring for the child after delivery.      The patient should continue to follow with her gastroenterologist during pregnancy.     She should have a baseline nutrition panel (CBC, ferritin, folate, vitamin B12, vitamin D 25OH, calcium).   Testing was completed on 2022, her results included: H/H 12.1/36, MCV 96.5, platelet count 935,133, potassium 4, creatinine 0.4, calcium 9.3, ALT 74 (65), AST 27 (45), ferritin 61, folate >20, vitamin B12 583, vitamin D 35, magnesium 1.9, , uric acid 3.6, hepatitis C nonreactive, TSH 0.707, free T4 1.05, free T3 3.2, TPO negative, antithyroglobulin antibody negative, hemoglobin A1c 4.9%, thiamine 215, CMV IgG/IgM negative, parvovirus IgG/IgM negative, lupus anticoagulant negative, anticardiolipin antibody negative, beta-2 glycoprotein antibody negative, urine protein creatinine ratio 0.1, urine culture mixed, urinalysis negative. --Additional recommendations are below.     Given the association with antiphospholipid antibodies and inflammatory bowel disease, screening for antiphospholipid antibodies was recommended. Testing was negative on 4/27/2022.     A screening ANNEL also recommended.  This should be ordered with her next set of labs.     Fetal growth should be followed serially, every 3-4 weeks beginning at 24-26 weeks' gestation.  Fetal kick counts should be monitored daily starting at 28 weeks' gestation.  Additional surveillance may be indicated should any additional complications arise.       2.   Hyperemesis gravidarum, stable -- The patient reported having nausea and vomiting the entire pregnancy.  She reports having emesis 2-3 times per day. The patient reports daily symptoms of nausea and decreased appetite. Syeda Bhakta denies any signs of symptoms of dehydration.  Precautions were reviewed.       She reports that her weight has remained stable.  She weighed 151 pounds today. She has not gained any weight since her last visit. Her BMI was 26.      Her prenatal records were reviewed.    On March 14, she weighed 138 pounds.    On April 8 she also weighed 138 pounds. On April 26, she weighed 148 pounds  On May 24, she weighed 151 pounds  On June 14, she weighed 151 pounds      The patient was readmitted to the hospital on 6/7/2022 with worsening nausea, vomiting, and dehydration. She remained in the hospital and was provided with supportive care. She was discharged home on 6/11/2022. She was treated for hypokalemia and discharged on a supplement. The patient reports that she has done okay since discharge from the hospital.  She is using her Zofran pump. She was also prescribed Carafate.     The patient has been using Zofran at (pump), Reglan, vitamin B6, and Unisom for symptoms.     Secondary to the patient's continued nausea and vomiting, Pepcid was also recommended.  A prescription was provided previously.     The patient was again encouraged to eat small amounts of food every 2-3 hours during the day.  She was also encouraged to stay well-hydrated.     A baseline nutrition panel (CBC, CMP, magnesium, ferritin, folate, vitamin B12, vitamin D 25 [place]) was recommended. Baseline testing was completed on 2022, the results are summarized above, additional recommendations below.     Of note, the patient's AST and ALT were mildly elevated, but trending down. This is likely related to her chronic nausea and vomiting. The patient's AST and ALT were repeated on 2022 and normal with an ALT of 13 and AST of 16.     3.  Asthma -- The patient reported a history of asthma.  She denies having any recent issues.  She denies any history of hospitalizations, steroid treatments, and or intubations related to her asthma.  She again reports that her symptoms are stable.  She rarely uses her rescue inhaler.       The patient was again counseled regarding the implications of asthma in pregnancy.  The majority of patients have symptoms that remain stable or improve.  In 1/3 of patients, symptoms worsen.  Precautions were reviewed and she will need to step up medical therapy if she is using her rescue inhaler >2 times per week.  Poorly controlled asthma is associated with an increased risk for poor fetal growth, hypertensive disorders,  birth, and fetal loss.      Fetal growth should be monitored serially, every 3 to 4 weeks beginning at 24 to 26 weeks gestation.     A flu vaccination is recommended for all pregnant women regardless of gestational age.  This vaccination is especially important in women with underlying asthma.        4.  Right lower quadrant/flank pain, stable/improving -- The patient was hospitalized from February 18 through February 23 secondary to nausea, vomiting, and concern for appendicitis.  She was provided with supportive care and her nausea and vomiting symptoms improved.  She was also treated with antibiotics, cefoxitin.     An MRI was completed on 2/18/2022.  Per that report, a structure likely representing the appendix was seen in the right lower quadrant.  The stricture was dilated and measured 1 cm and demonstrates wall thickening concerning for acute appendicitis.  A small amount of free fluid was seen within the pelvis.  The patient was approximately 2 weeks pregnant at that time.     The patient returned to the hospital on 3/28/2022 with complaints of worsening right lower quadrant pain.  She also has complaints of nausea and vomiting.     The patient had a repeat MRI on 3/28/2022.  Per the impression, no evidence of acute findings were noted in the abdomen or pelvis.  Specifically, there was no evidence of acute appendicitis.     She had a consultation with surgery.  No surgical intervention was recommended given the appendix now appeared normal.     During her hospitalization, she had a consultation with psychiatry and was started on Zoloft for depression.  She was treated with supportive care and was discharged home in stable condition on 4/2/2022.  The patient's symptoms were attributed to her Crohn's disease.     Today, the patient again reports that her pain symptoms are stable/improving.  The patient was counseled to monitor her symptoms and return with persistent/worsening pain or the development of any associated fevers, chills, or worsening nausea and vomiting.     5.  Anxiety/depression -- The patient reported a history of anxiety and depression for which she takes Zoloft, 25 mg daily.  She has an appointment with baby on board this week. Lary Bobo is starting counseling.       During the patient's hospitalization in March, she had complaints of worsening depression symptoms.  She had a consultation with psychiatry and Zoloft was started. She is currently taking Zoloft, 25 mg daily     Today, the patient again reports that her mood is stable. She denies any suicidal or homicidal ideations.        The patient was again counseled regarding the risks of depression and anxiety in pregnancy including worsening symptoms during pregnancy and postpartum, poor fetal growth and  birth.  The risks and benefits of treatment were also reviewed.  I recommend continuing to monitor the patient's mood throughout pregnancy and postpartum.  With recurrent or worsening symptoms, a referral should be made for counseling.  Additionally, medication dosing may need to be adjusted.        Because of the association of thyroid disease and depression, a baseline thyroid screening is recommended with a TSH, free T4, and thyroid peroxidase antibody.  Additionally, depression can be associated with nutritional deficiencies, thus, a baseline nutrition panel is also recommended (CBC, CMP, magnesium, ferritin, folate, vitamin B12, vitamin D 25OH).  Testing was completed on 2022, the results are summarized above.     Because of the increased risk for poor fetal growth, fetal growth to be monitored serially, every 3 to 4 weeks starting at 24 to 26 weeks gestation.  The patient should monitor fetal kick counts daily starting at 28 weeks gestation.     Counseling was again provided regarding the risks and benefits of Zoloft use in pregnancy and breast-feeding. Counseling was reviewed. The patient did not have any additional questions or concerns.     Specific to Zoloft, it dose cross the placenta and there is fetal exposure.  First trimester exposure is not thought to cause an increased risk for major birth defects.  There may be an association with congenital heart defects.  Exposure in the late third trimester is associated with  respiratory distress, cyanosis, apnea, seizures, temperature instability, feeding difficult, emesis, hypoglycemia, hypo/hypertonia, hyperreflexia, irritability, constant crying, and/or tremor.  Symptoms may be due to withdrawal.  Persistent pulmonary hypertension of the  (PPHN) has also been reported with SSRI exposure. The long-term effects of in utero SSRI exposure on infant development and behavior are not known.     As with any medication, the risk and benefit of use must be weighed.  Again, there are risks associated with untreated depression including poor fetal growth and  delivery.  The patient indicated that at this time, she did not feel that she could safely taper off of the medication.   Thus, the benefit of treatment would outweigh risk at this time.  Dose adjustment should be based on patient symptoms.    Pregnant women exposed to antidepressants during pregnancy are encouraged to enroll in the [facility] Pregnancy Registry for Antidepressants (NPRAD). Women 25to 39years of age or their health care providers may contact the registry by calling [phone]. Enrollment should be done as early in pregnancy as possible.     She was also counseled regarding the risks and benefits of these medications during breast-feeding.  Sertraline and its active metabolite are present in breastmilk.  The relative infant dose (RID) is estimated to be 0.5-3%.  Typically, breast-feeding is considered acceptable when the RID is <10%.  When the RID is >25%, breast-feeding should be avoided.  Some studies have reported adverse events in breast-feeding infants following maternal use of sertraline.  Neonates of mothers taking psychotropic medication should be monitored daily for changes in sleep, feeding patterns, and/or behavior.  Infant growth and development should be monitored per protocol.  SSRI use during pregnancy may cause delayed lactation.  Sertraline is a first-line agent for postpartum depression in women who have breast-feeding.  Women whose symptoms are well controlled with sertraline during marijuana. Today, she again reports that she is cutting back. The patient reports that she was using marijuana for her mood and nausea.  Counseling was again provided to the patient.     The patient was again counseled regarding risk of neurodevelopmental issues in children exposed to [facility] during pregnancy.  Additionally, marijuana use may pose risks similar to that of cigarette use including increased risk for poor fetal growth, placental bleeding, PPROM/ delivery, and stillbirth. Cindy Trimble was again counseled not to use THC while pregnant.  Random urine drug screens should be monitored during pregnancy.     10.  History of IUGR/poor fetal growth -- The patient's pregnancy is in  and 5727 were complicated by intrauterine growth restriction.  The patient was previously counseled that based on her history, she is at increased risk, ~23%, for having another pregnancy complicated by poor fetal growth.  There also appears to be an increased risk for other complications including  delivery, preeclampsia, placental abruption, and fetal loss in subsequent pregnancies.        Fetal growth should be monitored serially, every 3 to 4 weeks starting at 24 to 26 weeks gestation.       A baseline maternal evaluation is recommended including a baseline nutrition panel, antiphospholipid antibody screening and thyroid function studies. Testing was completed on 2022. The results are summarized above.     She was counseled regarding the potential utility of LDASA in reducing her risk for poor fetal growth.  The risks and benefits were reviewed.  She should take a daily low-dose aspirin for the remainder of pregnancy and 6 to 8 weeks postpartum.     11.   Advanced maternal age  -- The patient was  previously counseled regarding the implications of advanced maternal age in pregnancy, specifically that of aneuploidy. Counseling was reviewed.   The patient did not have any additional questions or concerns.     The patient stated that she previously completed screening with NIPT.  The results were reviewed. The fetal fraction was 5.6% and results low risk for aneuploidy. The reported fetal sex was male.     Since the patient had early screening with NIPT, a maternal serum AFP is recommended between 15 and 22 weeks to screen for open neural tube defects. The patient reported that she completed screening locally. She reports that her results were normal.     Again, recent studies have reported that there may be an increased risk for fetal loss in the setting of advanced maternal age. Olivia Almanza, increased surveillance is recommended.  I recommend monitoring fetal growth serially, every 4 weeks beginning at 24-26 weeks' gestation. Amalia Salinas should monitor fetal kick counts daily starting at 28 weeks' gestation.  At 36 weeks' gestation, she should be scheduled for a weekly non stress test or biophysical profile. I recommend delivery at 44 week's gestation.     Given there is an increased risk for hypertensive disorders of pregnancy in the setting of advanced maternal age, the possible utility of a low dose aspirin in reducing her risk for hypertensive disorders of pregnancy was reviewed.  The risks and benefits of low dose aspirin were discussed. Amalia Salinas was counseled to continue taking 81 mg of aspirin, daily.  She should continue taking a daily low-dose aspirin for the remainder of pregnancy and 6 to 8 weeks postpartum.     12.  History of oligohydramnios -- The patient  reported that her pregnancy in [facility] was complicated by intrauterine growth restriction and oligohydramnios at 35 weeks 4 days. Amalia Salinas was induced secondary to these complications.  She denies any associated PROM.  The patient was again counseled that given this history, she would be increased risk for recurrent oligohydramnios. Given this history, increased surveillance is recommended.  Fetal growth should be monitored serially, every 3-4 weeks beginning a 24-26 weeks' gestation. The amniotic fluid can be assessed during the studies. The patient could also start taking a low-dose aspirin in reducing the risk for placental insufficiency.  She can take 81 mg daily.     13.   Genital herpes -- The patient reported  a history of genital herpes.  She denies having any recent outbreaks. The patient was counseled that because she has genital herpes, suppression is recommended at 36 weeks gestation to minimize the risk for viral shedding or an outbreak at the time of delivery.  If any lesions are present, then a  would be recommended for delivery. Additionally, any outbreaks should be treated during pregnancy.  Precautions were reviewed with the patient.     14.  History of a blood transfusion -- The patient reported a history of a blood transfusion as an infant.  Per the patient, she was admitted to the NICU after birth and contracted some type of infection.  She required a blood transfusion. Bee Streeter this history, baseline screening for hepatitis c is recommended.    Screening was completed on 2022. Testing for hepatitis C was nonreactive.     15.  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 Channing Wagoner was counseled that she may be at increased risk for viral exposures such as CMV and parvovirus. Channing Wagoner was given orders to have baseline screening for these viruses.  Precautions were reviewed.     Baseline serology was completed on 2022. CMV IgG/IgM negative and parvovirus IgG/IgM negative. The results indicate that she has not been exposed to CMV or parvovirus.   Precautions were reviewed.     16.  Family history of cancer -- The patient's family history was previously reviewed and notable in that her mother had breast cancer. Bee Streeter this family history, genetic counseling should be considered.  She was counseled that if interested, your office could refer her for counseling to determine if genetic screening/testing is indicated. Rani Chakraborty patient expressed verbal understanding of this counseling.     17.  Vaccination in pregnancy -- The patient was  previously counseled regarding the recommendations for vaccination in pregnancy. Counseling was reviewed. The patient did not have any additional questions or concerns.     The patient was counseled regarding the recommendations for the Tdap vaccination in pregnancy. Risks and benefits were discussed. The vaccination is typically administered between 27 and 36 weeks gestation and recommended to confer protection against whooping cough to the . The patient plans to discuss this with her primary provider at her next visit.  The patient was also counseled that her partner in any individuals who will be in close contact with the  should also be vaccinated for whooping cough.     The patient was counseled regarding recommendation for the flu vaccination in pregnancy for both maternal and infant safety.  Risks and benefits were reviewed. Rhiannon Og was encouraged to have this vaccination as an outpatient.     Counseling was provided regarding the recommendation for the Covid vaccination in pregnancy.  I advised the patient that the [employer] of Obstetrics and Gynecology and The Society for Maternal Fetal Medicine recommend that all pregnant women be vaccinated for COVID-19.  \"Data have shown that COVID-19 infection puts pregnant people at increased risk of severe complications and even death; yet only about 22% of pregnant individuals have received 1 more doses of COVID-19 vaccine according to the [employer] for Disease Control and Prevention. \"     \" Recent data have shown that more than 95% of those were hospitalized and/or dying from COVID-19 are those who have remained unvaccinated.  Pregnant individuals who have decided to wait until after delivery to be vaccinated may be inadvertently exposing themselves to an increased risk of severe illness or death. \"      \" [id number] vaccination is the best testing to reduce maternal and fetal complications of URKYA-21 infection among pregnant people,\" said Shawn Velásquez MD, president of the Society for Maternal Fetal Medicine, sub-specialists.     The patient was counseled that in the event she opts not to have the Covid vaccination, she should alert her provider immediately if she test positive for Covid.  Pregnant women are on the priority list for treatment with monoclonal antibody.  This intervention has been shown to decrease the risk for hospitalization and complications related to Covid in pregnancy.     The patient expressed verbal understanding of this counseling. 18.   Low vitamin D -- The patient's vitamin D was low at 32  --Recommend vitamin D3 2000 IU daily  --Monitor levels serially  --Monitor nutrition panel q4-6 weeks (CBC, CMP, magnesium, ferritin, folate, vitamin B12, vitamin D25OH), on/after 6/22/2022  --Repeat testing ordered  --Follow up with PCP for long term monitoring and management    19. Hypokalemia -- The patient's potassium as admission was noted to be low at 2.7.  An EKG was completed and normal.  --Repeat potassium 3.2 on 6/8  --Repeat potassium 3.1 on 6/9  -- IV replacement per medicine  -- Repeat labs tomorrow, per medicine    20. History of seizure disorder -- The patient reported a history of a seizure disorder as a teenager.  She reports having grand mal seizures.  She has not had a seizure in several years. Mar Manrique has not required treatment in several years. Mar Manrique denies having any issues with seizures during her prior pregnancies.  Continued close monitoring is recommended. --I requested the patient return for a follow-up assessment in 4 weeks unless there is a clinical reason for her to return prior to that time. She is to call if she has any problems or questions prior to her next visit. Further evaluation and management will be dependent on her clinical presentation and the results of her testing. --The patient was advised to call if she has any increased vaginal discharge, vaginal bleeding, contractions, abdominal pain, back pain or any new significant symptomatology prior to her next visit. I advised her that these are signs and symptoms of cervical change and require follow-up assessment when they occur. Preeclampsia precautions were also reviewed with the patient. --The patient was also counseled to call and/or return with any concerns for decreased fetal activity. --The patient is to continue to follow with you in your office for ongoing obstetric care. --The total time spent on today's visit was 30 minutes. This included preparation for the visit (i.e. reviewing prior external notes and test results), performance of a medically appropriate history and examination, counseling, orders for medications, tests or other procedures, and coordination of care. Greater than 50% of the time was spent face-to-face with the patient. This time is exclusive of procedures performed. I answered all of  the patient's questions to her satisfaction. I asked her to call if she had any additional questions prior to her next visit. --At the conclusion of the visit, the patient appeared to have a good understanding of the issues discussed. I answered all of her questions to her satisfaction. I asked her to call if she had any additional questions prior to her next visit. --Thank you for allowing me to participate in the care of this pleasant patient. Please don't hesitate to call me if you have any questions. Sincerely,      Merle Martines MD, [facility] [address]  [phone]      *All or parts of this note may have been generated using a voice recognition program. There may be typo, grammar, or Word substitution errors that have escaped my review of this note.

## 2022-06-14 NOTE — LETTER
[facility] Maternal Fetal Medicine  [address] [facility] [address]  Phone: [phone]  Fax: [phone]           Hermes Sarmiento MD      2022     Patient: Holli Steele   MR Number: [id number]   YOB: 1985   Date of Visit: 2022       Dear Dr. Kera Sepulveda: Thank you for referring Whitley Garcia to me for evaluation/treatment. Below are the relevant portions of my assessment and plan of care. If you have questions, please do not hesitate to call me. I look forward to following [facility] along with you. Sincerely,        Hermes Sarmiento MD    CC providers:  Geraldine Martínez MD  [address] [address]  [place]       2022      Geraldine Martíenz MD  [address] [facility]     RE:  Wayne Beckman  : 1985   AGE: 29 y.o. This report has been created using voice recognition software. It may contain errors which are inherent in voice recognition technology. Dear Dr. Kera Sepulveda:      I had the pleasure of meeting with Ms. Ayala for a return consultation. As you know, Ms. Kerri Durand  is a 29 y.o. [id number] at 20w1d (LMP = [address]) who is being followed by our office for multiple medical issues. Today, Ms. Ayala reports that she feels well. She notes good fetal movement and denies any symptoms of leaking of fluid, vaginal bleeding, and/or contractions. She had a fetal ultrasound that was notable for the following. There is a single intrauterine gestation in a transverse presentation with a heart rate of 148 beats per minute. The placenta is anterior. The amniotic fluid index is normal.  Transvaginal cervical length 4.1 cm without funneling.       PERTINENT PHYSICAL EXAMINATION:   /75   Pulse 95   Wt 151 lb 9.6 oz (68.8 kg)   LMP 2022   BMI 26.02 kg/m²     Urine dipstick:   Negative for Glucose    Negative for Albumin      A fetal ultrasound assessment was performed today. A report is enclosed for your review. Assessment & Plan:  29 y.o. [id number] at 20w1d (LMP = 9 [address]) with:    1.  Crohn's disease --  The patient reported a history of Crohn's disease. She reports that she was diagnosed in  or .  She follows with Dr. Kurt Gutierrez. Alina Nicole now follows with Dr. Hill Elba is not currently medicated for her Crohn's disease. With flares, she describes having abdominal pain and diarrhea. She reports that she continues to have symptoms every 3 weeks.     She reports that her weight has been stable.  She denies gaining or losing a significant amount of weight.  She weighed 151 pounds today. She has not gained any weight since her last visit. [facility] BMI is 26.     Counseling was previously provided to the patient. Counseling was reviewed. The patient did not have any additional questions or concerns.     The patient was again counseled that overall women with inflammatory bowel disease can do well in pregnancy.  Generally, if the disease is quiescent at the time of conception, then the risk for relapse during pregnancy is only 30%.  However, if the disease is active at the time of conception then the risk for relapse during pregnancy is 70%.  Women with inflammatory bowel disease may be at increased risk for obstetric complications such as antepartum bleeding, poor fetal growth, nutritional deficiencies, anemia, and  delivery.  Generally, breastfeeding is considered safe and does not appear to affect the disease course.  However, women with inflammatory bowel disease often chose not to breastfeed because they were advised not to, because of medication use, and/or personal choice.      The patient was counseled that there is also an increased risk for inflammatory bowel disease on her child(jorge). First-degree relatives of patients with IBD are approximately 3 to 20 times more likely to develop IBD as compared with the general population.  Thus, this information should be relayed to the pediatrician caring for the child after delivery.      The patient should continue to follow with her gastroenterologist during pregnancy.     She should have a baseline nutrition panel (CBC, ferritin, folate, vitamin B12, vitamin D 25OH, calcium).   Testing was completed on 4/27/2022, her results included: H/H 12.1/36, MCV 96.5, platelet count 172,658, potassium 4, creatinine 0.4, calcium 9.3, ALT 74 (65), AST 27 (45), ferritin 61, folate >20, vitamin B12 583, vitamin D 35, magnesium 1.9, , uric acid 3.6, hepatitis C nonreactive, TSH 0.707, free T4 1.05, free T3 3.2, TPO negative, antithyroglobulin antibody negative, hemoglobin A1c 4.9%, thiamine 215, CMV IgG/IgM negative, parvovirus IgG/IgM negative, lupus anticoagulant negative, anticardiolipin antibody negative, beta-2 glycoprotein antibody negative, urine protein creatinine ratio 0.1, urine culture mixed, urinalysis negative. --Additional recommendations are below.     Given the association with antiphospholipid antibodies and inflammatory bowel disease, screening for antiphospholipid antibodies was recommended. Testing was negative on 4/27/2022.     A screening ANNEL also recommended.  This should be ordered with her next set of labs.     Fetal growth should be followed serially, every 3-4 weeks beginning at 24-26 weeks' gestation.  Fetal kick counts should be monitored daily starting at 28 weeks' gestation.  Additional surveillance may be indicated should any additional complications arise.       2.   Hyperemesis gravidarum, stable -- The patient reported having nausea and vomiting the entire pregnancy.  She reports having emesis 2-3 times per day. The patient reports daily symptoms of nausea and decreased appetite. Ana Maria Becerra denies any signs of symptoms of dehydration.  Precautions were reviewed.       She reports that her weight has remained stable.  She weighed 151 pounds today.   She has not gained any weight since her last visit. Her BMI was 26.      Her prenatal records were reviewed.    On March 14, she weighed 138 pounds.    On April 8 she also weighed 138 pounds. On April 26, she weighed 148 pounds  On May 24, she weighed 151 pounds  On June 14, she weighed 151 pounds      The patient was readmitted to the hospital on 6/7/2022 with worsening nausea, vomiting, and dehydration. She remained in the hospital and was provided with supportive care. She was discharged home on 6/11/2022. She was treated for hypokalemia and discharged on a supplement. The patient reports that she has done okay since discharge from the hospital.  She is using her Zofran pump. She was also prescribed Carafate. The patient has been using Zofran at (pump), Reglan, vitamin B6, and Unisom for symptoms.     Secondary to the patient's continued nausea and vomiting, Pepcid was also recommended.  A prescription was provided previously.     The patient was again encouraged to eat small amounts of food every 2-3 hours during the day.  She was also encouraged to stay well-hydrated.     A baseline nutrition panel (CBC, CMP, magnesium, ferritin, folate, vitamin B12, vitamin D 25 [place]) was recommended. Baseline testing was completed on 4/27/2022, the results are summarized above, additional recommendations below.     Of note, the patient's AST and ALT were mildly elevated, but trending down. This is likely related to her chronic nausea and vomiting.   The patient's AST and ALT were repeated on 6/8/2022 and normal with an ALT of 13 and AST of 16.     3.  Asthma -- The patient reported a history of asthma.  She denies having any recent issues.  She denies any history of hospitalizations, steroid treatments, and or intubations related to her asthma.  She again reports that her symptoms are stable.  She rarely uses her rescue inhaler.       The patient was again counseled regarding the implications of asthma in pregnancy.  The majority of patients have symptoms that remain stable or improve.  In 1/3 of patients, symptoms worsen.  Precautions were reviewed and she will need to step up medical therapy if she is using her rescue inhaler >2 times per week.  Poorly controlled asthma is associated with an increased risk for poor fetal growth, hypertensive disorders,  birth, and fetal loss.      Fetal growth should be monitored serially, every 3 to 4 weeks beginning at 24 to 26 weeks gestation.     A flu vaccination is recommended for all pregnant women regardless of gestational age.  This vaccination is especially important in women with underlying asthma.        4.  Right lower quadrant/flank pain, stable/improving -- The patient was hospitalized from  through  secondary to nausea, vomiting, and concern for appendicitis.  She was provided with supportive care and her nausea and vomiting symptoms improved.  She was also treated with antibiotics, cefoxitin.     An MRI was completed on 2022.  Per that report, a structure likely representing the appendix was seen in the right lower quadrant.  The stricture was dilated and measured 1 cm and demonstrates wall thickening concerning for acute appendicitis.  A small amount of free fluid was seen within the pelvis.  The patient was approximately 2 weeks pregnant at that time.     The patient returned to the hospital on 3/28/2022 with complaints of worsening right lower quadrant pain.  She also has complaints of nausea and vomiting.     The patient had a repeat MRI on 3/28/2022.  Per the impression, no evidence of acute findings were noted in the abdomen or pelvis.  Specifically, there was no evidence of acute appendicitis.     She had a consultation with surgery.  No surgical intervention was recommended given the appendix now appeared normal.     During her hospitalization, she had a consultation with psychiatry and was started on Zoloft for depression.  She was treated with supportive care and was discharged home in stable condition on 2022.  The patient's symptoms were attributed to her Crohn's disease.     Today, the patient again reports that her pain symptoms are stable/improving.  The patient was counseled to monitor her symptoms and return with persistent/worsening pain or the development of any associated fevers, chills, or worsening nausea and vomiting.     5.  Anxiety/depression -- The patient reported a history of anxiety and depression for which she takes Zoloft, 25 mg daily. She has an appointment with baby on board this week. Asher Gamino is starting counseling.       During the patient's hospitalization in March, she had complaints of worsening depression symptoms.  She had a consultation with psychiatry and Zoloft was started. She is currently taking Zoloft, 25 mg daily     Today, the patient again reports that her mood is stable. She denies any suicidal or homicidal ideations.        The patient was again counseled regarding the risks of depression and anxiety in pregnancy including worsening symptoms during pregnancy and postpartum, poor fetal growth and  birth.  The risks and benefits of treatment were also reviewed.  I recommend continuing to monitor the patient's mood throughout pregnancy and postpartum.  With recurrent or worsening symptoms, a referral should be made for counseling.  Additionally, medication dosing may need to be adjusted.        Because of the association of thyroid disease and depression, a baseline thyroid screening is recommended with a TSH, free T4, and thyroid peroxidase antibody.  Additionally, depression can be associated with nutritional deficiencies, thus, a baseline nutrition panel is also recommended (CBC, CMP, magnesium, ferritin, folate, vitamin B12, vitamin D 25OH).   Testing was completed on 2022, the results are summarized above.     Because of the increased risk for poor fetal growth, fetal growth to be monitored serially, every 3 to 4 weeks starting at 24 to 26 weeks gestation.  The patient should monitor fetal kick counts daily starting at 28 weeks gestation.     Counseling was again provided regarding the risks and benefits of Zoloft use in pregnancy and breast-feeding. Counseling was reviewed. The patient did not have any additional questions or concerns.     Specific to Zoloft, it dose cross the placenta and there is fetal exposure.  First trimester exposure is not thought to cause an increased risk for major birth defects. There may be an association with congenital heart defects.  Exposure in the late third trimester is associated with  respiratory distress, cyanosis, apnea, seizures, temperature instability, feeding difficult, emesis, hypoglycemia, hypo/hypertonia, hyperreflexia, irritability, constant crying, and/or tremor.  Symptoms may be due to withdrawal.  Persistent pulmonary hypertension of the  (PPHN) has also been reported with SSRI exposure. The long-term effects of in utero SSRI exposure on infant development and behavior are not known.     As with any medication, the risk and benefit of use must be weighed.  Again, there are risks associated with untreated depression including poor fetal growth and  delivery.  The patient indicated that at this time, she did not feel that she could safely taper off of the medication.   Thus, the benefit of treatment would outweigh risk at this time.  Dose adjustment should be based on patient symptoms.    Pregnant women exposed to antidepressants during pregnancy are encouraged to enroll in the [facility] Pregnancy Registry for Antidepressants (NPRAD). Women 25to 39years of age or their health care providers may contact the registry by calling [phone].  Enrollment should be done as early in pregnancy as possible.     She was also counseled regarding the risks and benefits of these medications during breast-feeding.  Sertraline and its active metabolite are present in breastmilk.  The relative infant dose (RID) is estimated to be 0.5-3%.  Typically, breast-feeding is considered acceptable when the RID is <10%.  When the RID is >25%, breast-feeding should be avoided.  Some studies have reported adverse events in breast-feeding infants following maternal use of sertraline.  Neonates of mothers taking psychotropic medication should be monitored daily for changes in sleep, feeding patterns, and/or behavior.  Infant growth and development should be monitored per protocol. SSRI use during pregnancy may cause delayed lactation.  Sertraline is a first-line agent for postpartum depression in women who have breast-feeding.  Women whose symptoms are well controlled with sertraline during pregnancy can continue use while breast-feeding if there are no other contraindications.  Per the , the decision to breast-feed during therapy should take into account risks and benefits and be individualized to the patient.        6.  Family history of diabetes -- The patient's daughter has type 1 diabetes. Generally, if a child has type 1 diabetes, then the risk of type 1 diabetes in a sibling is estimated to be 5%.  If the sibling is an identical twin, the risk may be as high as 50%.      This counseling was previously provided to the patient. Counseling was reviewed. The patient did not have any additional questions or concerns. Again, this family history should be relayed to the pediatrician who cares for the .     7.  History of elevated blood pressure -- The patient's blood pressure was noted to be elevated again during her hospitalization.  The patient denies a history of chronic hypertension.  She stated that the blood pressure elevation was likely secondary to her pain and nausea and vomiting.     The patient's blood pressure was normal at  129/75.  Her urine dip was negative for protein.  I recommend continued blood pressure monitoring. Preeclampsia precautions were reviewed with patient.     8.  Tobacco use in pregnancy -- The patient reported she quit smoking when she found out she was pregnant.    She again reports that she has remained tobacco free. She was again congratulated and encouraged to remain tobacco free.  She was smoking < 1 pack per day. Gayathri Tolliver was again counseled regarding the increased risks of smoking in pregnancy including poor fetal growth, placental abruption, PPROM/ birth, and fetal loss.  Additional  risks were again reviewed including asthma, allergies, infection, and an association with SIDS.  She was again urged to remain tobacco free through the pregnancy and postpartum.     9.  THC Use --  The patient reported that she had quit smoking marijuana. Today, she again reports that she is cutting back. The patient reports that she was using marijuana for her mood and nausea.  Counseling was again provided to the patient.     The patient was again counseled regarding risk of neurodevelopmental issues in children exposed to [facility] during pregnancy.  Additionally, marijuana use may pose risks similar to that of cigarette use including increased risk for poor fetal growth, placental bleeding, PPROM/ delivery, and stillbirth. Gayathri Tolliver was again counseled not to use THC while pregnant.  Random urine drug screens should be monitored during pregnancy.     10.  History of IUGR/poor fetal growth -- The patient's pregnancy is in  and 8333 were complicated by intrauterine growth restriction.  The patient was previously counseled that based on her history, she is at increased risk, ~23%, for having another pregnancy complicated by poor fetal growth.  There also appears to be an increased risk for other complications including  delivery, preeclampsia, placental abruption, and fetal loss in subsequent pregnancies.        Fetal growth should be monitored serially, every 3 to 4 weeks starting at 24 to 26 weeks gestation.       A baseline maternal evaluation is recommended including a baseline nutrition panel, antiphospholipid antibody screening and thyroid function studies. Testing was completed on 4/27/2022. The results are summarized above.     She was counseled regarding the potential utility of LDASA in reducing her risk for poor fetal growth.  The risks and benefits were reviewed.  She should take a daily low-dose aspirin for the remainder of pregnancy and 6 to 8 weeks postpartum.     11.   Advanced maternal age  -- The patient was  previously counseled regarding the implications of advanced maternal age in pregnancy, specifically that of aneuploidy. Counseling was reviewed. The patient did not have any additional questions or concerns.     The patient stated that she previously completed screening with NIPT.  The results were reviewed. The fetal fraction was 5.6% and results low risk for aneuploidy. The reported fetal sex was male.     Since the patient had early screening with NIPT, a maternal serum AFP is recommended between 15 and 22 weeks to screen for open neural tube defects. The patient reported that she completed screening locally. She reports that her results were normal.     Again, recent studies have reported that there may be an increased risk for fetal loss in the setting of advanced maternal age. Hill Waters, increased surveillance is recommended.  I recommend monitoring fetal growth serially, every 4 weeks beginning at 24-26 weeks' gestation. Peewee Willett should monitor fetal kick counts daily starting at 28 weeks' gestation.  At 36 weeks' gestation, she should be scheduled for a weekly non stress test or biophysical profile.  I recommend delivery at 44 week's gestation.     Given there is an increased risk for hypertensive disorders of pregnancy in the setting of advanced maternal age, the possible utility of a low dose aspirin in reducing her risk for hypertensive disorders of pregnancy was reviewed. Alicia Urbina risks and benefits of low dose aspirin were discussed. Lan Menendez was counseled to continue taking 81 mg of aspirin, daily.  She should continue taking a daily low-dose aspirin for the remainder of pregnancy and 6 to 8 weeks postpartum.     12.  History of oligohydramnios -- The patient  reported that her pregnancy in  was complicated by intrauterine growth restriction and oligohydramnios at 35 weeks 4 days. Lan Menendez was induced secondary to these complications.  She denies any associated PROM.  The patient was again counseled that given this history, she would be increased risk for recurrent oligohydramnios. Given this history, increased surveillance is recommended. Fetal growth should be monitored serially, every 3-4 weeks beginning a 24-26 weeks' gestation. The amniotic fluid can be assessed during the studies. The patient could also start taking a low-dose aspirin in reducing the risk for placental insufficiency.  She can take 81 mg daily.     13.   Genital herpes -- The patient reported  a history of genital herpes.  She denies having any recent outbreaks. The patient was counseled that because she has genital herpes, suppression is recommended at 36 weeks gestation to minimize the risk for viral shedding or an outbreak at the time of delivery.  If any lesions are present, then a  would be recommended for delivery. Additionally, any outbreaks should be treated during pregnancy.  Precautions were reviewed with the patient.     14.  History of a blood transfusion -- The patient reported a history of a blood transfusion as an infant.  Per the patient, she was admitted to the NICU after birth and contracted some type of infection.  She required a blood transfusion. Almarie Junk this history, baseline screening for hepatitis c is recommended.    Screening was completed on 2022. Testing for hepatitis C was nonreactive.     15.  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  She was counseled that she may be at increased risk for viral exposures such as CMV and parvovirus. Zo Treviño was given orders to have baseline screening for these viruses.  Precautions were reviewed.     Baseline serology was completed on 2022. CMV IgG/IgM negative and parvovirus IgG/IgM negative. The results indicate that she has not been exposed to CMV or parvovirus. Precautions were reviewed.     16.  Family history of cancer -- The patient's family history was previously reviewed and notable in that her mother had breast cancer. Ova Minion this family history, genetic counseling should be considered.  She was counseled that if interested, your office could refer her for counseling to determine if genetic screening/testing is indicated.  The patient expressed verbal understanding of this counseling.     17.  Vaccination in pregnancy -- The patient was  previously counseled regarding the recommendations for vaccination in pregnancy. Counseling was reviewed. The patient did not have any additional questions or concerns.     The patient was counseled regarding the recommendations for the Tdap vaccination in pregnancy. Risks and benefits were discussed. The vaccination is typically administered between 27 and 36 weeks gestation and recommended to confer protection against whooping cough to the .  The patient plans to discuss this with her primary provider at her next visit.  The patient was also counseled that her partner in any individuals who will be in close contact with the  should also be vaccinated for whooping cough.     The patient was counseled regarding recommendation for the flu vaccination in pregnancy for both maternal and infant safety.  Risks and benefits were reviewed. Zo Treviño was encouraged to have this vaccination as an outpatient.     Counseling was provided regarding the recommendation for the Covid vaccination in pregnancy.  I advised the patient that the [employer] of Obstetrics and Gynecology and The Society for Maternal Fetal Medicine recommend that all pregnant women be vaccinated for COVID-19.  \"Data have shown that COVID-19 infection puts pregnant people at increased risk of severe complications and even death; yet only about 22% of pregnant individuals have received 1 more doses of COVID-19 vaccine according to the [place] for Disease Control and Prevention. \"     \" Recent data have shown that more than 95% of those were hospitalized and/or dying from COVID-19 are those who have remained unvaccinated.  Pregnant individuals who have decided to wait until after delivery to be vaccinated may be inadvertently exposing themselves to an increased risk of severe illness or death. \"      \" COVID-19 vaccination is the best testing to reduce maternal and fetal complications of QZWMB-[id number] infection among pregnant people,\" said Nu Quesada MD, president of the [employer], sub-specialists.     The patient was counseled that in the event she opts not to have the Covid vaccination, she should alert her provider immediately if she test positive for Covid.  Pregnant women are on the priority list for treatment with monoclonal antibody.  This intervention has been shown to decrease the risk for hospitalization and complications related to Covid in pregnancy.     The patient expressed verbal understanding of this counseling. 18.   Low vitamin D -- The patient's vitamin D was low at 32  --Recommend vitamin D3 2000 IU daily  --Monitor levels serially  --Monitor nutrition panel q4-6 weeks (CBC, CMP, magnesium, ferritin, folate, vitamin B12, vitamin D25OH), on/after 6/22/2022  --Repeat testing ordered  --Follow up with PCP for long term monitoring and management    19.  Hypokalemia -- The patient's potassium as admission was noted to be low at 2.7.  An EKG was completed and normal.  --Repeat potassium 3.2 on 6/8  --Repeat potassium 3.1 on 6/9  -- IV replacement per medicine  -- Repeat labs tomorrow, per medicine    20. History of seizure disorder -- The patient reported a history of a seizure disorder as a teenager.  She reports having grand mal seizures.  She has not had a seizure in several years. Zo Treviño has not required treatment in several years. Zo Treviño denies having any issues with seizures during her prior pregnancies.  Continued close monitoring is recommended. --I requested the patient return for a follow-up assessment in 4 weeks unless there is a clinical reason for her to return prior to that time. She is to call if she has any problems or questions prior to her next visit. Further evaluation and management will be dependent on her clinical presentation and the results of her testing. --The patient was advised to call if she has any increased vaginal discharge, vaginal bleeding, contractions, abdominal pain, back pain or any new significant symptomatology prior to her next visit. I advised her that these are signs and symptoms of cervical change and require follow-up assessment when they occur. Preeclampsia precautions were also reviewed with the patient. --The patient was also counseled to call and/or return with any concerns for decreased fetal activity. --The patient is to continue to follow with you in your office for ongoing obstetric care. --The total time spent on today's visit was 30 minutes. This included preparation for the visit (i.e. reviewing prior external notes and test results), performance of a medically appropriate history and examination, counseling, orders for medications, tests or other procedures, and coordination of care. Greater than 50% of the time was spent face-to-face with the patient. This time is exclusive of procedures performed. I answered all of  the patient's questions to her satisfaction. I asked her to call if she had any additional questions prior to her next visit.       --At the conclusion of the visit, the patient appeared to have a good understanding of the issues discussed. I answered all of her questions to her satisfaction. I asked her to call if she had any additional questions prior to her next visit. --Thank you for allowing me to participate in the care of this pleasant patient. Please don't hesitate to call me if you have any questions. Sincerely,      Opal Rai MD, [address]  [phone]      *All or parts of this note may have been generated using a voice recognition program. There may be typo, grammar, or Word substitution errors that have escaped my review of this note.

## 2022-06-15 ENCOUNTER — HOSPITAL ENCOUNTER (OUTPATIENT)
Age: 37
Discharge: HOME | End: 2022-06-15
Payer: COMMERCIAL

## 2022-06-15 DIAGNOSIS — Z3A.20 20 WEEKS GESTATION OF PREGNANCY: ICD-10-CM

## 2022-06-15 DIAGNOSIS — R11.15 EMESIS, PERSISTENT: ICD-10-CM

## 2022-06-15 DIAGNOSIS — R11.2 INTRACTABLE NAUSEA AND VOMITING: ICD-10-CM

## 2022-06-15 DIAGNOSIS — E87.6 HYPOKALEMIA: ICD-10-CM

## 2022-06-15 DIAGNOSIS — O21.0 HYPEREMESIS GRAVIDARUM: ICD-10-CM

## 2022-06-15 LAB
ANION GAP SERPL CALCULATED.3IONS-SCNC: 11 MMOL/L (ref 7–16)
BLOOD CULTURE, ROUTINE: NORMAL
BUN BLDV-MCNC: 8 MG/DL (ref 6–20)
CALCIUM SERPL-MCNC: 9.7 MG/DL (ref 8.6–10.2)
CHLORIDE BLD-SCNC: 99 MMOL/L (ref 98–107)
CO2: 24 MMOL/L (ref 22–29)
CREAT SERPL-MCNC: 0.6 MG/DL (ref 0.5–1)
CULTURE, BLOOD 2: NORMAL
GFR AFRICAN AMERICAN: >60
GFR NON-AFRICAN AMERICAN: >60 ML/MIN/1.73
GLUCOSE BLD-MCNC: 85 MG/DL (ref 74–99)
MAGNESIUM: 2.1 MG/DL (ref 1.6–2.6)
PHOSPHORUS: 3.5 MG/DL (ref 2.5–4.5)
POTASSIUM SERPL-SCNC: 4.9 MMOL/L (ref 3.5–5)
SODIUM BLD-SCNC: 134 MMOL/L (ref 132–146)

## 2022-06-15 PROCEDURE — 84100 ASSAY OF PHOSPHORUS: CPT

## 2022-06-15 PROCEDURE — 83735 ASSAY OF MAGNESIUM: CPT

## 2022-06-15 PROCEDURE — 80048 BASIC METABOLIC PNL TOTAL CA: CPT

## 2022-06-15 PROCEDURE — 36415 COLL VENOUS BLD VENIPUNCTURE: CPT

## 2022-06-21 PROBLEM — O16.2 ELEVATED BLOOD PRESSURE AFFECTING PREGNANCY IN SECOND TRIMESTER, ANTEPARTUM: Status: ACTIVE | Noted: 2022-06-21

## 2022-06-21 PROBLEM — R79.89 LOW VITAMIN D LEVEL: Status: ACTIVE | Noted: 2022-06-21

## 2022-07-07 ENCOUNTER — HOSPITAL ENCOUNTER (OUTPATIENT)
Age: 37
Discharge: HOME | End: 2022-07-07
Payer: COMMERCIAL

## 2022-07-07 DIAGNOSIS — E87.6 HYPOKALEMIA: ICD-10-CM

## 2022-07-07 DIAGNOSIS — O21.0 HYPEREMESIS GRAVIDARUM: ICD-10-CM

## 2022-07-07 DIAGNOSIS — Z3A.20 20 WEEKS GESTATION OF PREGNANCY: ICD-10-CM

## 2022-07-07 DIAGNOSIS — K50.10 CROHN'S COLITIS, WITHOUT COMPLICATIONS (HCC): ICD-10-CM

## 2022-07-07 DIAGNOSIS — R79.89 LOW VITAMIN D LEVEL: ICD-10-CM

## 2022-07-07 DIAGNOSIS — F32.A DEPRESSION, UNSPECIFIED DEPRESSION TYPE: ICD-10-CM

## 2022-07-07 DIAGNOSIS — O09.291 HISTORY OF IUGR (INTRAUTERINE GROWTH RETARDATION) AND STILLBIRTH, CURRENTLY PREGNANT, FIRST TRIMESTER: ICD-10-CM

## 2022-07-07 LAB
ALBUMIN SERPL-MCNC: 3.9 G/DL (ref 3.5–5.2)
ALP BLD-CCNC: 63 U/L (ref 35–104)
ALT SERPL-CCNC: 10 U/L (ref 0–32)
ANION GAP SERPL CALCULATED.3IONS-SCNC: 16 MMOL/L (ref 7–16)
AST SERPL-CCNC: 18 U/L (ref 0–31)
BACTERIA: ABNORMAL /HPF
BASOPHILS ABSOLUTE: 0.04 E9/L (ref 0–0.2)
BASOPHILS RELATIVE PERCENT: 0.3 % (ref 0–2)
BILIRUB SERPL-MCNC: 0.2 MG/DL (ref 0–1.2)
BILIRUBIN URINE: NEGATIVE
BLOOD, URINE: NEGATIVE
BUN BLDV-MCNC: 7 MG/DL (ref 6–20)
CALCIUM SERPL-MCNC: 8.9 MG/DL (ref 8.6–10.2)
CHLORIDE BLD-SCNC: 100 MMOL/L (ref 98–107)
CLARITY: CLEAR
CO2: 19 MMOL/L (ref 22–29)
COLOR: YELLOW
CREAT SERPL-MCNC: 0.4 MG/DL (ref 0.5–1)
CREATININE URINE: 277 MG/DL (ref 29–226)
CRYSTALS, UA: ABNORMAL /HPF
EOSINOPHILS ABSOLUTE: 0.21 E9/L (ref 0.05–0.5)
EOSINOPHILS RELATIVE PERCENT: 1.4 % (ref 0–6)
FERRITIN: 31 NG/ML
FOLATE: >20 NG/ML (ref 4.8–24.2)
GFR AFRICAN AMERICAN: >60
GFR NON-AFRICAN AMERICAN: >60 ML/MIN/1.73
GLUCOSE BLD-MCNC: 110 MG/DL (ref 74–99)
GLUCOSE URINE: NEGATIVE MG/DL
HBA1C MFR BLD: 4.8 % (ref 4–5.6)
HCT VFR BLD CALC: 34.7 % (ref 34–48)
HEMOGLOBIN: 11.8 G/DL (ref 11.5–15.5)
IMMATURE GRANULOCYTES #: 0.16 E9/L
IMMATURE GRANULOCYTES %: 1.1 % (ref 0–5)
KETONES, URINE: NEGATIVE MG/DL
LACTATE DEHYDROGENASE: 191 U/L (ref 135–214)
LEUKOCYTE ESTERASE, URINE: NEGATIVE
LYMPHOCYTES ABSOLUTE: 3.29 E9/L (ref 1.5–4)
LYMPHOCYTES RELATIVE PERCENT: 22.5 % (ref 20–42)
MAGNESIUM: 1.9 MG/DL (ref 1.6–2.6)
MCH RBC QN AUTO: 32.1 PG (ref 26–35)
MCHC RBC AUTO-ENTMCNC: 34 % (ref 32–34.5)
MCV RBC AUTO: 94.3 FL (ref 80–99.9)
MONOCYTES ABSOLUTE: 0.71 E9/L (ref 0.1–0.95)
MONOCYTES RELATIVE PERCENT: 4.8 % (ref 2–12)
MUCUS: PRESENT /LPF
NEUTROPHILS ABSOLUTE: 10.24 E9/L (ref 1.8–7.3)
NEUTROPHILS RELATIVE PERCENT: 69.9 % (ref 43–80)
NITRITE, URINE: NEGATIVE
PDW BLD-RTO: 12.9 FL (ref 11.5–15)
PH UA: 6.5 (ref 5–9)
PLATELET # BLD: 349 E9/L (ref 130–450)
PMV BLD AUTO: 9.5 FL (ref 7–12)
POTASSIUM SERPL-SCNC: 3.5 MMOL/L (ref 3.5–5)
PROTEIN PROTEIN: 37 MG/DL (ref 0–12)
PROTEIN UA: ABNORMAL MG/DL
PROTEIN/CREAT RATIO: 0.1
PROTEIN/CREAT RATIO: 0.1 (ref 0–0.2)
RBC # BLD: 3.68 E12/L (ref 3.5–5.5)
RBC UA: ABNORMAL /HPF (ref 0–2)
SODIUM BLD-SCNC: 135 MMOL/L (ref 132–146)
SPECIFIC GRAVITY UA: 1.02 (ref 1–1.03)
T3 FREE: 2.4 PG/ML (ref 2–4.4)
T4 FREE: 0.96 NG/DL (ref 0.93–1.7)
TOTAL PROTEIN: 7 G/DL (ref 6.4–8.3)
TSH SERPL DL<=0.05 MIU/L-ACNC: 0.76 UIU/ML (ref 0.27–4.2)
URIC ACID, SERUM: 3.1 MG/DL (ref 2.4–5.7)
UROBILINOGEN, URINE: 0.2 E.U./DL
VITAMIN B-12: 483 PG/ML (ref 211–946)
VITAMIN D 25-HYDROXY: 41 NG/ML (ref 30–100)
WBC # BLD: 14.7 E9/L (ref 4.5–11.5)
WBC UA: ABNORMAL /HPF (ref 0–5)

## 2022-07-07 PROCEDURE — 84156 ASSAY OF PROTEIN URINE: CPT

## 2022-07-07 PROCEDURE — 83735 ASSAY OF MAGNESIUM: CPT

## 2022-07-07 PROCEDURE — 84439 ASSAY OF FREE THYROXINE: CPT

## 2022-07-07 PROCEDURE — 36415 COLL VENOUS BLD VENIPUNCTURE: CPT

## 2022-07-07 PROCEDURE — 82570 ASSAY OF URINE CREATININE: CPT

## 2022-07-07 PROCEDURE — 81001 URINALYSIS AUTO W/SCOPE: CPT

## 2022-07-07 PROCEDURE — 87088 URINE BACTERIA CULTURE: CPT

## 2022-07-07 PROCEDURE — 86800 THYROGLOBULIN ANTIBODY: CPT

## 2022-07-07 PROCEDURE — 82306 VITAMIN D 25 HYDROXY: CPT

## 2022-07-07 PROCEDURE — 85613 RUSSELL VIPER VENOM DILUTED: CPT

## 2022-07-07 PROCEDURE — 86147 CARDIOLIPIN ANTIBODY EA IG: CPT

## 2022-07-07 PROCEDURE — 82728 ASSAY OF FERRITIN: CPT

## 2022-07-07 PROCEDURE — 86376 MICROSOMAL ANTIBODY EACH: CPT

## 2022-07-07 PROCEDURE — 85025 COMPLETE CBC W/AUTO DIFF WBC: CPT

## 2022-07-07 PROCEDURE — 84481 FREE ASSAY (FT-3): CPT

## 2022-07-07 PROCEDURE — 80053 COMPREHEN METABOLIC PANEL: CPT

## 2022-07-07 PROCEDURE — 82746 ASSAY OF FOLIC ACID SERUM: CPT

## 2022-07-07 PROCEDURE — 84550 ASSAY OF BLOOD/URIC ACID: CPT

## 2022-07-07 PROCEDURE — 83615 LACTATE (LD) (LDH) ENZYME: CPT

## 2022-07-07 PROCEDURE — 83036 HEMOGLOBIN GLYCOSYLATED A1C: CPT

## 2022-07-07 PROCEDURE — 84443 ASSAY THYROID STIM HORMONE: CPT

## 2022-07-07 PROCEDURE — 86146 BETA-2 GLYCOPROTEIN ANTIBODY: CPT

## 2022-07-07 PROCEDURE — 82607 VITAMIN B-12: CPT

## 2022-07-08 LAB — LUPUS ANTICOAG DVVT: NORMAL

## 2022-07-09 LAB — URINE CULTURE, ROUTINE: NORMAL

## 2022-07-10 LAB
ANTICARDIOLIPIN IGA ANTIBODY: <10 APL
ANTICARDIOLIPIN IGG ANTIBODY: <10 GPL
BETA-2 GLYCOPROTEIN 1 IGG ANTIBODY: <10 SGU
BETA-2 GLYCOPROTEIN 1 IGM ANTIBODY: 14 SMU
CARDIOLIPIN AB IGM: 10 MPL
THYROGLOBULIN ANTIBODY: <12 IU/ML (ref 0–40)
THYROID PEROXIDASE (TPO) ABS: <4 IU/ML (ref 0–25)

## 2022-07-12 ENCOUNTER — ANCILLARY PROCEDURE (OUTPATIENT)
Dept: OBGYN CLINIC | Age: 37
End: 2022-07-12
Payer: COMMERCIAL

## 2022-07-12 ENCOUNTER — ROUTINE PRENATAL (OUTPATIENT)
Dept: OBGYN CLINIC | Age: 37
End: 2022-07-12
Payer: COMMERCIAL

## 2022-07-12 VITALS
DIASTOLIC BLOOD PRESSURE: 58 MMHG | WEIGHT: 158.38 LBS | HEART RATE: 83 BPM | SYSTOLIC BLOOD PRESSURE: 106 MMHG | BODY MASS INDEX: 27.19 KG/M2

## 2022-07-12 DIAGNOSIS — E87.6 HYPOKALEMIA: ICD-10-CM

## 2022-07-12 DIAGNOSIS — R79.89 LOW VITAMIN D LEVEL: ICD-10-CM

## 2022-07-12 DIAGNOSIS — F41.9 ANXIETY: ICD-10-CM

## 2022-07-12 DIAGNOSIS — O09.521 AMA (ADVANCED MATERNAL AGE) MULTIGRAVIDA 35+, FIRST TRIMESTER: ICD-10-CM

## 2022-07-12 DIAGNOSIS — O21.0 HYPEREMESIS GRAVIDARUM: ICD-10-CM

## 2022-07-12 DIAGNOSIS — Z87.59 HISTORY OF PRIOR PREGNANCY WITH IUGR NEWBORN: ICD-10-CM

## 2022-07-12 DIAGNOSIS — Z3A.24 24 WEEKS GESTATION OF PREGNANCY: Primary | ICD-10-CM

## 2022-07-12 LAB
GLUCOSE URINE, POC: NEGATIVE
PROTEIN UA: NEGATIVE

## 2022-07-12 PROCEDURE — 81002 URINALYSIS NONAUTO W/O SCOPE: CPT | Performed by: OBSTETRICS & GYNECOLOGY

## 2022-07-12 PROCEDURE — 76805 OB US >/= 14 WKS SNGL FETUS: CPT | Performed by: OBSTETRICS & GYNECOLOGY

## 2022-07-12 PROCEDURE — G8427 DOCREV CUR MEDS BY ELIG CLIN: HCPCS | Performed by: OBSTETRICS & GYNECOLOGY

## 2022-07-12 PROCEDURE — G8419 CALC BMI OUT NRM PARAM NOF/U: HCPCS | Performed by: OBSTETRICS & GYNECOLOGY

## 2022-07-12 PROCEDURE — 1036F TOBACCO NON-USER: CPT | Performed by: OBSTETRICS & GYNECOLOGY

## 2022-07-12 PROCEDURE — 99214 OFFICE O/P EST MOD 30 MIN: CPT | Performed by: OBSTETRICS & GYNECOLOGY

## 2022-07-12 PROCEDURE — 76817 TRANSVAGINAL US OBSTETRIC: CPT | Performed by: OBSTETRICS & GYNECOLOGY

## 2022-07-12 PROCEDURE — 99213 OFFICE O/P EST LOW 20 MIN: CPT | Performed by: OBSTETRICS & GYNECOLOGY

## 2022-07-12 NOTE — LETTER
[address] [facility]  [address]  [place] [address]  Phone: [phone]  Fax: [phone]           Apolinar Trotter MD      July 25, 2022     Patient: Emani Jack   MR Number: [id number]   YOB: 1985   Date of Visit: 7/12/2022       Dear Dr. Jefferson Jordan: Thank you for referring Levi Dakins to me for evaluation/treatment. Below are the relevant portions of my assessment and plan of care. If you have questions, please do not hesitate to call me. I look forward to following [facility] along with you.     Sincerely,        Apolinar Trotter MD    CC providers:  Kailash Negrete MD  [address] [address]  [place] [place]

## 2022-07-12 NOTE — PROGRESS NOTES
Pt here for prenatal ultrasound  Pt denies any bleeding/cramping/lof  Pt states good fetal movement  Pt is off zofran pump and doing well - has gained 7 pounds since last visit 6/14

## 2022-07-12 NOTE — LETTER
[facility] Maternal Fetal Medicine  [address] [facility] [address]  Phone: [phone]  Fax: [phone]           Luis Daniel Romo MD      2022     Patient: Veda Sepulveda   MR Number: [id number]   YOB: 1985   Date of Visit: 2022       Dear Dr. Pina Vázquez: Thank you for referring Lele Gonzalez to me for evaluation/treatment. Below are the relevant portions of my assessment and plan of care. If you have questions, please do not hesitate to call me. I look forward to following [facility] along with you. Sincerely,        Luis Daniel Romo MD    CC providers:  Miryam Torres MD  [address] [address]  [facility]       2022      Miryam Torres, [address] [place] [address]     RE:  Pippa Love  : 1985   AGE: 40 y.o. This report has been created using voice recognition software. It may contain errors which are inherent in voice recognition technology. Dear Dr. Pina Vázquez:      I had the pleasure of meeting with Ms. Ayala for a return consultation. As you know, Ms. Cindy Ferrer  is a 40 y.o. [id number] at 24w1 (LMP = 9 wk US) who is being followed by our office for multiple medical issues. Today, Ms. Ayala reports that she feels well. She notes good fetal movement and denies any symptoms of leaking of fluid, vaginal bleeding, and/or contractions. She had a fetal ultrasound that was notable for the following. There is a single intrauterine gestation in a cephalic presentation with a heart rate of 138 beats per minute. The placenta is anterior. The amniotic fluid index is normal.  The composite gestational age is 18w2d. The estimated fetal weight is at the 56th percentile. Transvaginal cervical length 4.2 cm.       PERTINENT PHYSICAL EXAMINATION:   BP (!) 106/58   Pulse 83   Wt 158 lb 6 oz (71.8 kg)   LMP 2022   BMI 27.19 kg/m²     Urine more likely to develop IBD as compared with the general population. Thus, this information should be relayed to the pediatrician caring for the child after delivery. The patient should continue to follow with her gastroenterologist during pregnancy. She should have a baseline nutrition panel (CBC, ferritin, folate, vitamin B12, vitamin D 25OH, calcium). Testing was completed on 4/27/2022, her results included: H/H 12.1/36, MCV 96.5, platelet count 308,481, potassium 4, creatinine 0.4, calcium 9.3, ALT 74 (65), AST 27 (45), ferritin 61, folate >20, vitamin B12 583, vitamin D 35, magnesium 1.9, , uric acid 3.6, hepatitis C nonreactive, TSH 0.707, free T4 1.05, free T3 3.2, TPO negative, antithyroglobulin antibody negative, hemoglobin A1c 4.9%, thiamine 215, CMV IgG/IgM negative, parvovirus IgG/IgM negative, lupus anticoagulant negative, anticardiolipin antibody negative, beta-2 glycoprotein antibody negative, urine protein creatinine ratio 0.1, urine culture mixed, urinalysis negative. --Additional recommendations are below. Given the association with antiphospholipid antibodies and inflammatory bowel disease, screening for antiphospholipid antibodies was recommended. Testing was negative on 4/27/2022. A screening ANNEL also recommended. This should be ordered with her next set of labs. Fetal growth should be followed serially, every 3-4 weeks beginning at 24-26 weeks' gestation. Fetal growth was appropriate for the gestational age today at 23 weeks 1 day. Fetal kick counts should be monitored daily starting at 28 weeks' gestation. Additional surveillance may be indicated should any additional complications arise. 2.   Hyperemesis gravidarum, stable -- The patient reported having nausea and vomiting the entire pregnancy. She reports having emesis 2-3 times per day. The patient reports daily symptoms of nausea and decreased appetite.   She denies any signs of symptoms of dehydration. Precautions were reviewed. She reports that her weight has remained stable. She weighed 158 pounds today. She has gained 7 pounds since her last visit. Her BMI was 27.19. Her prenatal records were reviewed. On March 14, she weighed 138 pounds. On April 8 she also weighed 138 pounds. On April 26, she weighed 148 pounds  On May 24, she weighed 151 pounds  On June 14, she weighed 151 pounds  On July 12, she weighed 158 pounds        The patient was readmitted to the hospital on 6/7/2022 with worsening nausea, vomiting, and dehydration. She remained in the hospital and was provided with supportive care. She was discharged home on 6/11/2022. She was treated for hypokalemia and discharged on a supplement. The patient reports that she continues to do well. She is taking Zofran orally, 4 mg 3 times daily. She was also prescribed Carafate. She is also taking Reglan. The patient has discontinued her Zofran pump. She is using Zofran orally. Secondary to the patient's continued nausea and vomiting, Pepcid was also recommended. A prescription was provided previously. The patient was again encouraged to eat small amounts of food every 2-3 hours during the day. She was also encouraged to stay well-hydrated. A baseline nutrition panel (CBC, CMP, magnesium, ferritin, folate, vitamin B12, vitamin D 25 [place]) was recommended. Baseline testing was completed on 4/27/2022, the results are summarized above, additional recommendations below. Of note, the patient's AST and ALT were mildly elevated, but trending down. This is likely related to her chronic nausea and vomiting. The patient's AST and ALT were repeated on 6/8/2022 and normal with an ALT of 13 and AST of 16.     3.  Asthma -- The patient reported a history of asthma. She denies having any recent issues. She denies any history of hospitalizations, steroid treatments, and or intubations related to her asthma. She again reports that her symptoms are stable. She rarely uses her rescue inhaler. Counseling was previously provided to the patient. Counseling was reviewed. The patient was again counseled regarding the implications of asthma in pregnancy. The majority of patients have symptoms that remain stable or improve. In 1/3 of patients, symptoms worsen. Precautions were reviewed and she will need to step up medical therapy if she is using her rescue inhaler >2 times per week. Poorly controlled asthma is associated with an increased risk for poor fetal growth, hypertensive disorders,  birth, and fetal loss. Fetal growth should be monitored serially, every 3 to 4 weeks beginning at 24 to 26 weeks gestation. Fetal growth was appropriate for the gestational age at 23 weeks 1 day. A flu vaccination is recommended for all pregnant women regardless of gestational age. This vaccination is especially important in women with underlying asthma. 4.  Right lower quadrant/flank pain, stable/improving -- The patient was hospitalized from  through  secondary to nausea, vomiting, and concern for appendicitis. She was provided with supportive care and her nausea and vomiting symptoms improved. She was also treated with antibiotics, cefoxitin. An MRI was completed on 2022. Per that report, a structure likely representing the appendix was seen in the right lower quadrant. The stricture was dilated and measured 1 cm and demonstrates wall thickening concerning for acute appendicitis. A small amount of free fluid was seen within the pelvis. The patient was approximately 2 weeks pregnant at that time. The patient returned to the hospital on 3/28/2022 with complaints of worsening right lower quadrant pain. She also has complaints of nausea and vomiting. The patient had a repeat MRI on 3/28/2022.   Per the impression, no evidence of acute findings were noted in the abdomen or pelvis. Specifically, there was no evidence of acute appendicitis. She had a consultation with surgery. No surgical intervention was recommended given the appendix now appeared normal.     During her hospitalization, she had a consultation with psychiatry and was started on Zoloft for depression. She was treated with supportive care and was discharged home in stable condition on 2022. The patient's symptoms were attributed to her Crohn's disease. Today, the patient again reports that her pain symptoms are stable/improving. The patient was counseled to monitor her symptoms and return with persistent/worsening pain or the development of any associated fevers, chills, or worsening nausea and vomiting. 5.  Anxiety/depression -- The patient reported a history of anxiety and depression for which she takes Zoloft, 25 mg daily. She has an appointment with baby on board this week. She is starting counseling. During the patient's hospitalization in March, she had complaints of worsening depression symptoms. She had a consultation with psychiatry and Zoloft was started. She is currently taking Zoloft, 25 mg daily     Today, the patient again reports that her mood is stable. She denies any suicidal or homicidal ideations. The patient was again counseled regarding the risks of depression and anxiety in pregnancy including worsening symptoms during pregnancy and postpartum, poor fetal growth and  birth. The risks and benefits of treatment were also reviewed. I recommend continuing to monitor the patient's mood throughout pregnancy and postpartum. With recurrent or worsening symptoms, a referral should be made for counseling. Additionally, medication dosing may need to be adjusted. Because of the association of thyroid disease and depression, a baseline thyroid screening is recommended with a TSH, free T4, and thyroid peroxidase antibody.   Additionally, depression can be associated with nutritional deficiencies, thus, a baseline nutrition panel is also recommended (CBC, CMP, magnesium, ferritin, folate, vitamin B12, vitamin D 25OH). Testing was completed on 2022, the results are summarized above. Because of the increased risk for poor fetal growth, fetal growth to be monitored serially, every 3 to 4 weeks starting at 24 to 26 weeks gestation. The patient should monitor fetal kick counts daily starting at 28 weeks gestation. Counseling was again provided regarding the risks and benefits of Zoloft use in pregnancy and breast-feeding. Counseling was reviewed. The patient did not have any additional questions or concerns. Specific to Zoloft, it dose cross the placenta and there is fetal exposure. First trimester exposure is not thought to cause an increased risk for major birth defects. There may be an association with congenital heart defects. Exposure in the late third trimester is associated with  respiratory distress, cyanosis, apnea, seizures, temperature instability, feeding difficult, emesis, hypoglycemia, hypo/hypertonia, hyperreflexia, irritability, constant crying, and/or tremor. Symptoms may be due to withdrawal.  Persistent pulmonary hypertension of the  (PPHN) has also been reported with SSRI exposure. The long-term effects of in utero SSRI exposure on infant development and behavior are not known. As with any medication, the risk and benefit of use must be weighed. Again, there are risks associated with untreated depression including poor fetal growth and  delivery. The patient indicated that at this time, she did not feel that she could safely taper off of the medication. Thus, the benefit of treatment would outweigh risk at this time. Dose adjustment should be based on patient symptoms.     Pregnant women exposed to antidepressants during pregnancy are encouraged to enroll in the [facility] Pregnancy Registry for Antidepressants (NPRAD). Women 25to 39years of age or their health care providers may contact the registry by calling [phone]. Enrollment should be done as early in pregnancy as possible. She was also counseled regarding the risks and benefits of these medications during breast-feeding. Sertraline and its active metabolite are present in breastmilk. The relative infant dose (RID) is estimated to be 0.5-3%. Typically, breast-feeding is considered acceptable when the RID is <10%. When the RID is >25%, breast-feeding should be avoided. Some studies have reported adverse events in breast-feeding infants following maternal use of sertraline. Neonates of mothers taking psychotropic medication should be monitored daily for changes in sleep, feeding patterns, and/or behavior. Infant growth and development should be monitored per protocol. SSRI use during pregnancy may cause delayed lactation. Sertraline is a first-line agent for postpartum depression in women who have breast-feeding. Women whose symptoms are well controlled with sertraline during pregnancy can continue use while breast-feeding if there are no other contraindications. Per the , the decision to breast-feed during therapy should take into account risks and benefits and be individualized to the patient. 6.  Family history of diabetes -- The patient's daughter has type 1 diabetes. Generally, if a child has type 1 diabetes, then the risk of type 1 diabetes in a sibling is estimated to be 5%. If the sibling is an identical twin, the risk may be as high as 50%. This counseling was previously provided to the patient. Counseling was reviewed. The patient did not have any additional questions or concerns. Again, this family history should be relayed to the pediatrician who cares for the .      7.  History of elevated blood pressure -- The patient's blood pressure was noted to be elevated again during her hospitalization. The patient denies a history of chronic hypertension. She stated that the blood pressure elevation was likely secondary to her pain and nausea and vomiting. The patient's blood pressure was normal at 106/58. Her urine dip was negative for protein. I recommend continued blood pressure monitoring. Preeclampsia precautions were reviewed with patient. 8.  Tobacco use in pregnancy -- The patient reported she quit smoking when she found out she was pregnant. She again reports that she has remained tobacco free. She was again congratulated and encouraged to remain tobacco free. She was smoking < 1 pack per day. She was again counseled regarding the increased risks of smoking in pregnancy including poor fetal growth, placental abruption, PPROM/ birth, and fetal loss. Additional  risks were again reviewed including asthma, allergies, infection, and an association with SIDS. She was again urged to remain tobacco free through the pregnancy and postpartum. 9.  THC Use --  The patient reported that she had quit smoking marijuana. Today, she again reports that she is cutting back. The patient reports that she was using marijuana for her mood and nausea. Counseling was again provided to the patient. The patient was again counseled regarding risk of neurodevelopmental issues in children exposed to [facility] during pregnancy. Additionally, marijuana use may pose risks similar to that of cigarette use including increased risk for poor fetal growth, placental bleeding, PPROM/ delivery, and stillbirth. She was again counseled not to use THC while pregnant. Random urine drug screens should be monitored during pregnancy. 10.  History of IUGR -- The patient's pregnancy is in  and 4958 were complicated by intrauterine growth restriction.   The patient was previously counseled that based on her history, she is at increased risk, ~23%, for having another pregnancy complicated by poor fetal growth. There also appears to be an increased risk for other complications including  delivery, preeclampsia, placental abruption, and fetal loss in subsequent pregnancies. Fetal growth should be monitored serially, every 3 to 4 weeks starting at 24 to 26 weeks gestation. Fetal growth was appropriate for the gestational age today at 23 weeks 1 day. A baseline maternal evaluation is recommended including a baseline nutrition panel, antiphospholipid antibody screening and thyroid function studies. Testing was completed on 2022. The results are summarized above. She was counseled regarding the potential utility of LDASA in reducing her risk for poor fetal growth. The risks and benefits were reviewed. She should take a daily low-dose aspirin for the remainder of pregnancy and 6 to 8 weeks postpartum. 6.   Advanced maternal age  -- The patient was  previously counseled regarding the implications of advanced maternal age in pregnancy, specifically that of aneuploidy. Counseling was reviewed. The patient did not have any additional questions or concerns. The patient stated that she previously completed screening with NIPT. The results were reviewed. The fetal fraction was 5.6% and results low risk for aneuploidy. The reported fetal sex was male. Since the patient had early screening with NIPT, a maternal serum AFP is recommended between 15 and 22 weeks to screen for open neural tube defects. The patient reported that she completed screening locally. She reports that her results were normal.     Again, recent studies have reported that there may be an increased risk for fetal loss in the setting of advanced maternal age. Thus, increased surveillance is recommended. I recommend monitoring fetal growth serially, every 4 weeks beginning at 24-26 weeks' gestation.   She should monitor fetal kick counts daily starting at 28 weeks' gestation. At 36 weeks' gestation, she should be scheduled for a weekly non stress test or biophysical profile. I recommend delivery at 44 week's gestation. Given there is an increased risk for hypertensive disorders of pregnancy in the setting of advanced maternal age, the possible utility of a low dose aspirin in reducing her risk for hypertensive disorders of pregnancy was reviewed. The risks and benefits of low dose aspirin were discussed. She was counseled to continue taking 81 mg of aspirin, daily. She should continue taking a daily low-dose aspirin for the remainder of pregnancy and 6 to 8 weeks postpartum. 12.  History of oligohydramnios -- The patient  reported that her pregnancy in  was complicated by intrauterine growth restriction and oligohydramnios at 35 weeks 4 days. She was induced secondary to these complications. She denies any associated PROM. The patient was again counseled that given this history, she would be increased risk for recurrent oligohydramnios. Given this history, increased surveillance is recommended. Fetal growth should be monitored serially, every 3-4 weeks beginning a 24-26 weeks' gestation. The amniotic fluid can be assessed during the studies. The patient could also start taking a low-dose aspirin in reducing the risk for placental insufficiency. She can take 81 mg daily. 13.   Genital herpes -- The patient reported  a history of genital herpes. She again denies having any recent outbreaks. The patient was counseled that because she has genital herpes, suppression is recommended at 36 weeks gestation to minimize the risk for viral shedding or an outbreak at the time of delivery. If any lesions are present, then a  would be recommended for delivery. Additionally, any outbreaks should be treated during pregnancy. Precautions were reviewed with the patient.      14.  History of a blood transfusion -- The patient reported a history of a blood transfusion as an infant. Per the patient, she was admitted to the NICU after birth and contracted some type of infection. She required a blood transfusion. Given this history, baseline screening for hepatitis c is recommended. --Screening was completed on 2022. Testing for hepatitis C was nonreactive. 13.  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 She was counseled that she may be at increased risk for viral exposures such as CMV and parvovirus. She was given orders to have baseline screening for these viruses. Precautions were reviewed. Baseline serology was completed on 2022. CMV IgG/IgM negative and parvovirus IgG/IgM negative. The results indicate that she has not been exposed to CMV or parvovirus. Precautions were reviewed. 12.  Family history of cancer -- The patient's family history was previously reviewed and notable in that her mother had breast cancer. Given this family history, genetic counseling should be considered. She was counseled that if interested, your office could refer her for counseling to determine if genetic screening/testing is indicated. The patient expressed verbal understanding of this counseling. 16.  Vaccination in pregnancy -- The patient was  previously counseled regarding the recommendations for vaccination in pregnancy. Counseling was reviewed. The patient did not have any additional questions or concerns. The patient was counseled regarding the recommendations for the Tdap vaccination in pregnancy. Risks and benefits were discussed. The vaccination is typically administered between 27 and 36 weeks gestation and recommended to confer protection against whooping cough to the . The patient plans to discuss this with her primary provider at her next visit.   The patient was also counseled that her partner in any individuals who will be in close contact with the  should also be vaccinated for whooping cough. The patient was counseled regarding recommendation for the flu vaccination in pregnancy for both maternal and infant safety. Risks and benefits were reviewed. She was encouraged to have this vaccination as an outpatient. Counseling was provided regarding the recommendation for the Covid vaccination in pregnancy. I advised the patient that the [facility] of Obstetrics and Gynecology and The Society for Maternal Fetal Medicine recommend that all pregnant women be vaccinated for COVID-19. \"Data have shown that COVID-19 infection puts pregnant people at increased risk of severe complications and even death; yet only about 22% of pregnant individuals have received 1 more doses of COVID-19 vaccine according to the [employer] for Disease Control and Prevention. \"     \" Recent data have shown that more than 95% of those were hospitalized and/or dying from COVID-19 are those who have remained unvaccinated. Pregnant individuals who have decided to wait until after delivery to be vaccinated may be inadvertently exposing themselves to an increased risk of severe illness or death. \"     \" COVID-19 vaccination is the best testing to reduce maternal and fetal complications of WQMLT-10 infection among pregnant people,\" said Bear Harrison MD, president of the Society for Maternal Fetal Medicine, sub-specialists. The patient was counseled that in the event she opts not to have the Covid vaccination, she should alert her provider immediately if she test positive for Covid. Pregnant women are on the priority list for treatment with monoclonal antibody. This intervention has been shown to decrease the risk for hospitalization and complications related to Covid in pregnancy. The patient expressed verbal understanding of this counseling.       18.   Low vitamin D -- The patient's vitamin D was low at 32  --Recommend vitamin D3 2000 IU daily  --Monitor levels serially  --Monitor nutrition panel q4-6 weeks (CBC, CMP, magnesium, ferritin, folate, vitamin B12, vitamin D25OH), on/after 6/22/2022    --Repeat testing was completed on 7/7/2022, her results included: H/H11.8/34.7, MCV 94.3, platelet count 616,331, potassium 3.5, creatinine 0.4, calcium 8.9, ALT 10, AST 18, uric acid 3.1, , ferritin 31, folate >20, vitamin B12 43, vitamin D 41, TSH 0.762, free T4 0.96, free T3 2.4, TPO negative, antithyroglobulin antibody negative, hemoglobin A1c 4.8%, lupus anticoagulant negative, anticardiolipin antibody negative, beta-2 glycoprotein antibody negative, urine culture mixed, urine protein creatinine ratio 0.1.    -- The patient's vitamin D is improved. She was counseled to continue her vitamin D supplement. --Recommend repeat testing in 4 weeks, on/after 8/4/2022  --Follow up with PCP for long term monitoring and management     19. Hypokalemia -- The patient's potassium as admission was noted to be low at 2.7. An EKG was completed and normal.  --Repeat potassium 3.2 on 6/8  --Repeat potassium 3.1 on 6/9  -- Status post IV replacement per medicine during hospitalization  -- Repeat potassium level normal at 3.5 on 7/7/2022     20. History of seizure disorder -- The patient reported a history of a seizure disorder as a teenager. She reports having grand mal seizures. She has not had a seizure in several years. She has not required treatment in several years. She denies having any issues with seizures during her prior pregnancies. Continued close monitoring is recommended. 21.  Marginal umbilical cord insertion into the placenta --  Today's ultrasound results were reviewed with the patient. In some views, the umbilical cord insertion into the placenta appeared marginal.    Counseling was provided to the patient. A marginal cord insertion is diagnosed when the umbilical cord inserts into the placenta within 2 cm of the edge of the placenta.   A marginal cord insertion is seen in approximately 6% of pregnancies. There is typically a normal amount of Lexii's jelly in the cord. This is usually a benign finding, however, there can be an increased risk for poor fetal growth. Fetal growth should be monitored serially. Fetal growth was appropriate for the gestational age today. Fetal growth should be reevaluated in 3 to 4 weeks. --I requested the patient return for a follow-up assessment in 4 weeks unless there is a clinical reason for her to return prior to that time. She is to call if she has any problems or questions prior to her next visit. Further evaluation and management will be dependent on her clinical presentation and the results of her testing. --The patient was advised to call if she has any increased vaginal discharge, vaginal bleeding, contractions, abdominal pain, back pain or any new significant symptomatology prior to her next visit. I advised her that these are signs and symptoms of cervical change and require follow-up assessment when they occur. Preeclampsia precautions were also reviewed with the patient. --The patient was also counseled to call and/or return with any concerns for decreased fetal activity. --The patient is to continue to follow with you in your office for ongoing obstetric care. --The total time spent on today's visit was 30 minutes. This included preparation for the visit (i.e. reviewing prior external notes and test results), performance of a medically appropriate history and examination, counseling, orders for medications, tests or other procedures, and coordination of care. Greater than 50% of the time was spent face-to-face with the patient. This time is exclusive of procedures performed. I answered all of  the patient's questions to her satisfaction. I asked her to call if she had any additional questions prior to her next visit.       --At the conclusion of the visit, the patient appeared to have a good understanding of the issues discussed. I answered all of her questions to her satisfaction. I asked her to call if she had any additional questions prior to her next visit. --Thank you for allowing me to participate in the care of this pleasant patient. Please don't hesitate to call me if you have any questions. Sincerely,      Kalpana Carlton MD, [facility]  [phone]      *All or parts of this note may have been generated using a voice recognition program. There may be typo, grammar, or Word substitution errors that have escaped my review of this note.

## 2022-07-12 NOTE — PROGRESS NOTES
2022      Case Bautista MD  [address] [facility]     RE:  Patrica Green  : 1985   AGE: 40 y.o. This report has been created using voice recognition software. It may contain errors which are inherent in voice recognition technology. Dear Dr. Cathleen Walls:      I had the pleasure of meeting with Ms. Ayala for a return consultation. As you know, Ms. Molly Veliz  is a 40 y.o. [id number] at 24w1 (LMP = 9 wk US) who is being followed by our office for multiple medical issues. Today, Ms. Ayala reports that she feels well. She notes good fetal movement and denies any symptoms of leaking of fluid, vaginal bleeding, and/or contractions. She had a fetal ultrasound that was notable for the following. There is a single intrauterine gestation in a cephalic presentation with a heart rate of 138 beats per minute. The placenta is anterior. The amniotic fluid index is normal.  The composite gestational age is 18w2d. The estimated fetal weight is at the 56th percentile. Transvaginal cervical length 4.2 cm. PERTINENT PHYSICAL EXAMINATION:   BP (!) 106/58   Pulse 83   Wt 158 lb 6 oz (71.8 kg)   LMP 2022   BMI 27.19 kg/m²     Urine dipstick:   Negative for Glucose    Negative for Albumin      A fetal ultrasound assessment was performed today. A report is enclosed for your review. Assessment & Plan:  40 y.o. [id number] at 24w1d (LMP = 9 wk US) with:    1. Crohn's disease --  The patient reported a history of Crohn's disease. She reports that she was diagnosed in 2015 or . She follows with Dr. Bret Acevedo. She now follows with Dr. Misty Willingham. She is not currently medicated for her Crohn's disease. With flares, she describes having abdominal pain and diarrhea. She reports that she continues to have symptoms every 3 weeks. She reports that her weight has been stable. She denies gaining or losing a significant amount of weight.   She weighed 158 pounds today.  She has gained 7 pounds since her last visit. Her BMI is 27.19. Counseling was previously provided to the patient. Counseling was reviewed. The patient did not have any additional questions or concerns. The patient was again counseled that overall women with inflammatory bowel disease can do well in pregnancy. Generally, if the disease is quiescent at the time of conception, then the risk for relapse during pregnancy is only 30%. However, if the disease is active at the time of conception then the risk for relapse during pregnancy is 70%. Women with inflammatory bowel disease may be at increased risk for obstetric complications such as antepartum bleeding, poor fetal growth, nutritional deficiencies, anemia, and  delivery. Generally, breastfeeding is considered safe and does not appear to affect the disease course. However, women with inflammatory bowel disease often chose not to breastfeed because they were advised not to, because of medication use, and/or personal choice. The patient was counseled that there is also an increased risk for inflammatory bowel disease on her child(jorge). First-degree relatives of patients with IBD are approximately 3 to 20 times more likely to develop IBD as compared with the general population. Thus, this information should be relayed to the pediatrician caring for the child after delivery. The patient should continue to follow with her gastroenterologist during pregnancy. She should have a baseline nutrition panel (CBC, ferritin, folate, vitamin B12, vitamin D 25OH, calcium).   Testing was completed on 2022, her results included: H/H 12.1/36, MCV 96.5, platelet count 515,485, potassium 4, creatinine 0.4, calcium 9.3, ALT 74 (65), AST 27 (45), ferritin 61, folate >20, vitamin B12 583, vitamin D 35, magnesium 1.9, , uric acid 3.6, hepatitis C nonreactive, TSH 0.707, free T4 1.05, free T3 3.2, TPO negative, antithyroglobulin antibody negative, hemoglobin A1c 4.9%, thiamine 215, CMV IgG/IgM negative, parvovirus IgG/IgM negative, lupus anticoagulant negative, anticardiolipin antibody negative, beta-2 glycoprotein antibody negative, urine protein creatinine ratio 0.1, urine culture mixed, urinalysis negative. --Additional recommendations are below. Given the association with antiphospholipid antibodies and inflammatory bowel disease, screening for antiphospholipid antibodies was recommended. Testing was negative on 4/27/2022. A screening ANNEL also recommended. This should be ordered with her next set of labs. Fetal growth should be followed serially, every 3-4 weeks beginning at 24-26 weeks' gestation. Fetal growth was appropriate for the gestational age today at 23 weeks 1 day. Fetal kick counts should be monitored daily starting at 28 weeks' gestation. Additional surveillance may be indicated should any additional complications arise. 2.   Hyperemesis gravidarum, stable -- The patient reported having nausea and vomiting the entire pregnancy. She reports having emesis 2-3 times per day. The patient reports daily symptoms of nausea and decreased appetite. She denies any signs of symptoms of dehydration. Precautions were reviewed. She reports that her weight has remained stable. She weighed 158 pounds today. She has gained 7 pounds since her last visit. Her BMI was 27.19. Her prenatal records were reviewed. On March 14, she weighed 138 pounds. On April 8 she also weighed 138 pounds. On April 26, she weighed 148 pounds  On May 24, she weighed 151 pounds  On June 14, she weighed 151 pounds  On July 12, she weighed 158 pounds        The patient was readmitted to the hospital on 6/7/2022 with worsening nausea, vomiting, and dehydration. She remained in the hospital and was provided with supportive care. She was discharged home on 6/11/2022.   She was treated for hypokalemia and discharged on a supplement. The patient reports that she continues to do well. She is taking Zofran orally, 4 mg 3 times daily. She was also prescribed Carafate. She is also taking Reglan. The patient has discontinued her Zofran pump. She is using Zofran orally. Secondary to the patient's continued nausea and vomiting, Pepcid was also recommended. A prescription was provided previously. The patient was again encouraged to eat small amounts of food every 2-3 hours during the day. She was also encouraged to stay well-hydrated. A baseline nutrition panel (CBC, CMP, magnesium, ferritin, folate, vitamin B12, vitamin D 25 [place]) was recommended. Baseline testing was completed on 2022, the results are summarized above, additional recommendations below. Of note, the patient's AST and ALT were mildly elevated, but trending down. This is likely related to her chronic nausea and vomiting. The patient's AST and ALT were repeated on 2022 and normal with an ALT of 13 and AST of 16.     3.  Asthma -- The patient reported a history of asthma. She denies having any recent issues. She denies any history of hospitalizations, steroid treatments, and or intubations related to her asthma. She again reports that her symptoms are stable. She rarely uses her rescue inhaler. Counseling was previously provided to the patient. Counseling was reviewed. The patient was again counseled regarding the implications of asthma in pregnancy. The majority of patients have symptoms that remain stable or improve. In 1/3 of patients, symptoms worsen. Precautions were reviewed and she will need to step up medical therapy if she is using her rescue inhaler >2 times per week. Poorly controlled asthma is associated with an increased risk for poor fetal growth, hypertensive disorders,  birth, and fetal loss. Fetal growth should be monitored serially, every 3 to 4 weeks beginning at 24 to 26 weeks gestation. Fetal growth was appropriate for the gestational age at 23 weeks 1 day. A flu vaccination is recommended for all pregnant women regardless of gestational age. This vaccination is especially important in women with underlying asthma. 4.  Right lower quadrant/flank pain, stable/improving -- The patient was hospitalized from February 18 through February 23 secondary to nausea, vomiting, and concern for appendicitis. She was provided with supportive care and her nausea and vomiting symptoms improved. She was also treated with antibiotics, cefoxitin. An MRI was completed on 2/18/2022. Per that report, a structure likely representing the appendix was seen in the right lower quadrant. The stricture was dilated and measured 1 cm and demonstrates wall thickening concerning for acute appendicitis. A small amount of free fluid was seen within the pelvis. The patient was approximately 2 weeks pregnant at that time. The patient returned to the hospital on 3/28/2022 with complaints of worsening right lower quadrant pain. She also has complaints of nausea and vomiting. The patient had a repeat MRI on 3/28/2022. Per the impression, no evidence of acute findings were noted in the abdomen or pelvis. Specifically, there was no evidence of acute appendicitis. She had a consultation with surgery. No surgical intervention was recommended given the appendix now appeared normal.     During her hospitalization, she had a consultation with psychiatry and was started on Zoloft for depression. She was treated with supportive care and was discharged home in stable condition on 4/2/2022. The patient's symptoms were attributed to her Crohn's disease. Today, the patient again reports that her pain symptoms are stable/improving.   The patient was counseled to monitor her symptoms and return with persistent/worsening pain or the development of any associated fevers, chills, or worsening nausea and vomiting. 5.  Anxiety/depression -- The patient reported a history of anxiety and depression for which she takes Zoloft, 25 mg daily. She has an appointment with baby on board this week. She is starting counseling. During the patient's hospitalization in March, she had complaints of worsening depression symptoms. She had a consultation with psychiatry and Zoloft was started. She is currently taking Zoloft, 25 mg daily     Today, the patient again reports that her mood is stable. She denies any suicidal or homicidal ideations. The patient was again counseled regarding the risks of depression and anxiety in pregnancy including worsening symptoms during pregnancy and postpartum, poor fetal growth and  birth. The risks and benefits of treatment were also reviewed. I recommend continuing to monitor the patient's mood throughout pregnancy and postpartum. With recurrent or worsening symptoms, a referral should be made for counseling. Additionally, medication dosing may need to be adjusted. Because of the association of thyroid disease and depression, a baseline thyroid screening is recommended with a TSH, free T4, and thyroid peroxidase antibody. Additionally, depression can be associated with nutritional deficiencies, thus, a baseline nutrition panel is also recommended (CBC, CMP, magnesium, ferritin, folate, vitamin B12, vitamin D 25OH). Testing was completed on 2022, the results are summarized above. Because of the increased risk for poor fetal growth, fetal growth to be monitored serially, every 3 to 4 weeks starting at 24 to 26 weeks gestation. The patient should monitor fetal kick counts daily starting at 28 weeks gestation. Counseling was again provided regarding the risks and benefits of Zoloft use in pregnancy and breast-feeding. Counseling was reviewed. The patient did not have any additional questions or concerns.      Specific to Zoloft, it dose cross the placenta and there is fetal exposure. First trimester exposure is not thought to cause an increased risk for major birth defects. There may be an association with congenital heart defects. Exposure in the late third trimester is associated with  respiratory distress, cyanosis, apnea, seizures, temperature instability, feeding difficult, emesis, hypoglycemia, hypo/hypertonia, hyperreflexia, irritability, constant crying, and/or tremor. Symptoms may be due to withdrawal.  Persistent pulmonary hypertension of the  (PPHN) has also been reported with SSRI exposure. The long-term effects of in utero SSRI exposure on infant development and behavior are not known. As with any medication, the risk and benefit of use must be weighed. Again, there are risks associated with untreated depression including poor fetal growth and  delivery. The patient indicated that at this time, she did not feel that she could safely taper off of the medication. Thus, the benefit of treatment would outweigh risk at this time. Dose adjustment should be based on patient symptoms. Pregnant women exposed to antidepressants during pregnancy are encouraged to enroll in the [facility] Pregnancy Registry for Antidepressants (NPRAD). Women 25to 39years of age or their health care providers may contact the registry by calling [phone]. Enrollment should be done as early in pregnancy as possible. She was also counseled regarding the risks and benefits of these medications during breast-feeding. Sertraline and its active metabolite are present in breastmilk. The relative infant dose (RID) is estimated to be 0.5-3%. Typically, breast-feeding is considered acceptable when the RID is <10%. When the RID is >25%, breast-feeding should be avoided. Some studies have reported adverse events in breast-feeding infants following maternal use of sertraline.   Neonates of mothers taking psychotropic medication should be monitored daily for changes in sleep, feeding patterns, and/or behavior. Infant growth and development should be monitored per protocol. SSRI use during pregnancy may cause delayed lactation. Sertraline is a first-line agent for postpartum depression in women who have breast-feeding. Women whose symptoms are well controlled with sertraline during pregnancy can continue use while breast-feeding if there are no other contraindications. Per the , the decision to breast-feed during therapy should take into account risks and benefits and be individualized to the patient. 6.  Family history of diabetes -- The patient's daughter has type 1 diabetes. Generally, if a child has type 1 diabetes, then the risk of type 1 diabetes in a sibling is estimated to be 5%. If the sibling is an identical twin, the risk may be as high as 50%. This counseling was previously provided to the patient. Counseling was reviewed. The patient did not have any additional questions or concerns. Again, this family history should be relayed to the pediatrician who cares for the . 7.  History of elevated blood pressure -- The patient's blood pressure was noted to be elevated again during her hospitalization. The patient denies a history of chronic hypertension. She stated that the blood pressure elevation was likely secondary to her pain and nausea and vomiting. The patient's blood pressure was normal at 106/58. Her urine dip was negative for protein. I recommend continued blood pressure monitoring. Preeclampsia precautions were reviewed with patient. 8.  Tobacco use in pregnancy -- The patient reported she quit smoking when she found out she was pregnant. She again reports that she has remained tobacco free. She was again congratulated and encouraged to remain tobacco free. She was smoking < 1 pack per day.   She was again counseled regarding the increased risks of smoking in pregnancy including poor fetal growth, placental abruption, PPROM/ birth, and fetal loss. Additional  risks were again reviewed including asthma, allergies, infection, and an association with SIDS. She was again urged to remain tobacco free through the pregnancy and postpartum. 9.  THC Use --  The patient reported that she had quit smoking marijuana. Today, she again reports that she is cutting back. The patient reports that she was using marijuana for her mood and nausea. Counseling was again provided to the patient. The patient was again counseled regarding risk of neurodevelopmental issues in children exposed to [facility] during pregnancy. Additionally, marijuana use may pose risks similar to that of cigarette use including increased risk for poor fetal growth, placental bleeding, PPROM/ delivery, and stillbirth. She was again counseled not to use THC while pregnant. Random urine drug screens should be monitored during pregnancy. 10.  History of IUGR -- The patient's pregnancy is in  and 6554 were complicated by intrauterine growth restriction. The patient was previously counseled that based on her history, she is at increased risk, ~23%, for having another pregnancy complicated by poor fetal growth. There also appears to be an increased risk for other complications including  delivery, preeclampsia, placental abruption, and fetal loss in subsequent pregnancies. Fetal growth should be monitored serially, every 3 to 4 weeks starting at 24 to 26 weeks gestation. Fetal growth was appropriate for the gestational age today at 23 weeks 1 day. A baseline maternal evaluation is recommended including a baseline nutrition panel, antiphospholipid antibody screening and thyroid function studies. Testing was completed on 2022. The results are summarized above. She was counseled regarding the potential utility of LDASA in reducing her risk for poor fetal growth.   The risks and benefits were reviewed. She should take a daily low-dose aspirin for the remainder of pregnancy and 6 to 8 weeks postpartum. 6.   Advanced maternal age  -- The patient was  previously counseled regarding the implications of advanced maternal age in pregnancy, specifically that of aneuploidy. Counseling was reviewed. The patient did not have any additional questions or concerns. The patient stated that she previously completed screening with NIPT. The results were reviewed. The fetal fraction was 5.6% and results low risk for aneuploidy. The reported fetal sex was male. Since the patient had early screening with NIPT, a maternal serum AFP is recommended between 15 and 22 weeks to screen for open neural tube defects. The patient reported that she completed screening locally. She reports that her results were normal.     Again, recent studies have reported that there may be an increased risk for fetal loss in the setting of advanced maternal age. Thus, increased surveillance is recommended. I recommend monitoring fetal growth serially, every 4 weeks beginning at 24-26 weeks' gestation. She should monitor fetal kick counts daily starting at 28 weeks' gestation. At 36 weeks' gestation, she should be scheduled for a weekly non stress test or biophysical profile. I recommend delivery at 44 week's gestation. Given there is an increased risk for hypertensive disorders of pregnancy in the setting of advanced maternal age, the possible utility of a low dose aspirin in reducing her risk for hypertensive disorders of pregnancy was reviewed. The risks and benefits of low dose aspirin were discussed. She was counseled to continue taking 81 mg of aspirin, daily. She should continue taking a daily low-dose aspirin for the remainder of pregnancy and 6 to 8 weeks postpartum.      12.  History of oligohydramnios -- The patient  reported that her pregnancy in 4443 was complicated by intrauterine not been exposed to CMV or parvovirus. Precautions were reviewed. 12.  Family history of cancer -- The patient's family history was previously reviewed and notable in that her mother had breast cancer. Given this family history, genetic counseling should be considered. She was counseled that if interested, your office could refer her for counseling to determine if genetic screening/testing is indicated. The patient expressed verbal understanding of this counseling. 16.  Vaccination in pregnancy -- The patient was  previously counseled regarding the recommendations for vaccination in pregnancy. Counseling was reviewed. The patient did not have any additional questions or concerns. The patient was counseled regarding the recommendations for the Tdap vaccination in pregnancy. Risks and benefits were discussed. The vaccination is typically administered between 27 and 36 weeks gestation and recommended to confer protection against whooping cough to the . The patient plans to discuss this with her primary provider at her next visit. The patient was also counseled that her partner in any individuals who will be in close contact with the  should also be vaccinated for whooping cough. The patient was counseled regarding recommendation for the flu vaccination in pregnancy for both maternal and infant safety. Risks and benefits were reviewed. She was encouraged to have this vaccination as an outpatient. Counseling was provided regarding the recommendation for the Covid vaccination in pregnancy. I advised the patient that the [employer] of Obstetrics and Gynecology and The Society for Maternal Fetal Medicine recommend that all pregnant women be vaccinated for COVID-19.   \"Data have shown that COVID-19 infection puts pregnant people at increased risk of severe complications and even death; yet only about 22% of pregnant individuals have received 1 more doses of COVID-19 vaccine according to the [employer] for Disease Control and Prevention. \"     \" Recent data have shown that more than 95% of those were hospitalized and/or dying from COVID-19 are those who have remained unvaccinated. Pregnant individuals who have decided to wait until after delivery to be vaccinated may be inadvertently exposing themselves to an increased risk of severe illness or death. \"     \" COVID-19 vaccination is the best testing to reduce maternal and fetal complications of GAHTP-21 infection among pregnant people,\" said Alan Knight MD, president of the Society for Maternal Fetal Medicine, sub-specialists. The patient was counseled that in the event she opts not to have the Covid vaccination, she should alert her provider immediately if she test positive for Covid. Pregnant women are on the priority list for treatment with monoclonal antibody. This intervention has been shown to decrease the risk for hospitalization and complications related to Covid in pregnancy. The patient expressed verbal understanding of this counseling. 18.   Low vitamin D -- The patient's vitamin D was low at 32  --Recommend vitamin D3 2000 IU daily  --Monitor levels serially  --Monitor nutrition panel q4-6 weeks (CBC, CMP, magnesium, ferritin, folate, vitamin B12, vitamin D25OH), on/after 6/22/2022    --Repeat testing was completed on 7/7/2022, her results included: H/H11.8/34.7, MCV 94.3, platelet count 502,053, potassium 3.5, creatinine 0.4, calcium 8.9, ALT 10, AST 18, uric acid 3.1, , ferritin 31, folate >20, vitamin B12 43, vitamin D 41, TSH 0.762, free T4 0.96, free T3 2.4, TPO negative, antithyroglobulin antibody negative, hemoglobin A1c 4.8%, lupus anticoagulant negative, anticardiolipin antibody negative, beta-2 glycoprotein antibody negative, urine culture mixed, urine protein creatinine ratio 0.1.    -- The patient's vitamin D is improved.   She was counseled to continue her vitamin D supplement. --Recommend repeat testing in 4 weeks, on/after 8/4/2022  --Follow up with PCP for long term monitoring and management     19. Hypokalemia -- The patient's potassium as admission was noted to be low at 2.7. An EKG was completed and normal.  --Repeat potassium 3.2 on 6/8  --Repeat potassium 3.1 on 6/9  -- Status post IV replacement per medicine during hospitalization  -- Repeat potassium level normal at 3.5 on 7/7/2022     20. History of seizure disorder -- The patient reported a history of a seizure disorder as a teenager. She reports having grand mal seizures. She has not had a seizure in several years. She has not required treatment in several years. She denies having any issues with seizures during her prior pregnancies. Continued close monitoring is recommended. 21.  Marginal umbilical cord insertion into the placenta --  Today's ultrasound results were reviewed with the patient. In some views, the umbilical cord insertion into the placenta appeared marginal.    Counseling was provided to the patient. A marginal cord insertion is diagnosed when the umbilical cord inserts into the placenta within 2 cm of the edge of the placenta. A marginal cord insertion is seen in approximately 6% of pregnancies. There is typically a normal amount of Jefferson's jelly in the cord. This is usually a benign finding, however, there can be an increased risk for poor fetal growth. Fetal growth should be monitored serially. Fetal growth was appropriate for the gestational age today. Fetal growth should be reevaluated in 3 to 4 weeks. --I requested the patient return for a follow-up assessment in 4 weeks unless there is a clinical reason for her to return prior to that time. She is to call if she has any problems or questions prior to her next visit. Further evaluation and management will be dependent on her clinical presentation and the results of her testing.      --The patient was advised to call if she has any increased vaginal discharge, vaginal bleeding, contractions, abdominal pain, back pain or any new significant symptomatology prior to her next visit. I advised her that these are signs and symptoms of cervical change and require follow-up assessment when they occur. Preeclampsia precautions were also reviewed with the patient. --The patient was also counseled to call and/or return with any concerns for decreased fetal activity. --The patient is to continue to follow with you in your office for ongoing obstetric care. --The total time spent on today's visit was 30 minutes. This included preparation for the visit (i.e. reviewing prior external notes and test results), performance of a medically appropriate history and examination, counseling, orders for medications, tests or other procedures, and coordination of care. Greater than 50% of the time was spent face-to-face with the patient. This time is exclusive of procedures performed. I answered all of  the patient's questions to her satisfaction. I asked her to call if she had any additional questions prior to her next visit. --At the conclusion of the visit, the patient appeared to have a good understanding of the issues discussed. I answered all of her questions to her satisfaction. I asked her to call if she had any additional questions prior to her next visit. --Thank you for allowing me to participate in the care of this pleasant patient. Please don't hesitate to call me if you have any questions. Sincerely,      Sha Denney MD, [facility]  [phone]      *All or parts of this note may have been generated using a voice recognition program. There may be typo, grammar, or Word substitution errors that have escaped my review of this note.

## 2022-07-12 NOTE — PATIENT INSTRUCTIONS
Patient Education        Weeks 22 to 26 of Your Pregnancy: Care Instructions  Overview     As you enter your 7th month of pregnancy at week 26, your baby's lungs are growing stronger and getting ready to breathe. You may notice that your baby responds to the sound of your voice. You may also notice that your baby does less turning and twisting and more squirming, kicking, or jerking. Jerking often means that your baby has hiccups. Hiccups are normal and are onlytemporary. You may want to think about attending a childbirth preparation class. This is also a good time to start thinking about whether you want to have pain medicineduring labor. You may be tested for gestational diabetes between weeks 25 and 28. Gestational diabetes occurs when your blood sugar level gets too high when you're pregnant. The test is important, because you can have gestational diabetes and not knowit. But the condition can cause problems for your baby. Follow-up care is a key part of your treatment and safety. Be sure to make and go to all appointments, and call your doctor if you are having problems. It's also a good idea to know your test results and keep alist of the medicines you take. How can you care for yourself at home? Ease discomfort from your baby's kicking   Change your position. Sometimes this will cause your baby to change position too.  Take a deep breath while you raise your arm over your head. Then breathe out while you drop your arm. Do Kegel exercises to prevent urine from leaking   You can do Kegel exercises while you stand or sit. ? Squeeze the same muscles you would use to stop your urine. Your belly and thighs should not move. ? Hold the squeeze for 3 seconds, and then relax for 3 seconds. ? Start with 3 seconds. Then add 1 second each week until you are able to squeeze for 10 seconds. ? Repeat the exercise 10 to 15 times for each session. Do three or more sessions each day.   Ease or reduce swelling in your feet, ankles, hands, and fingers   If your fingers are puffy, take off your rings.  Do not eat high-salt foods, such as potato chips.  Prop up your feet on a stool or couch as much as possible. Sleep with pillows under your feet.  Do not stand for long periods of time or wear tight shoes.  Wear support stockings. Where can you learn more? Go to https://[email or web address].[email or web address] org and sign in to your [employer] account. Enter G264 in the [employer] box to learn more about \"Weeks 22 to 26 of Your Pregnancy: Care Instructions. \"     If you do not have an account, please click on the \"Sign Up Now\" link. Current as of: February 23, 2022               Content Version: 13.3  © 2006-2022 [employer]. Care instructions adapted under license by [facility] ([facility]). If you have questions about a medical condition or this instruction, always ask your healthcare professional. [place] [address] any warranty or liability for your use of this information. Patient Education        Learning About When to Call Your Doctor During Pregnancy (After 20 Weeks)  Overview  It's common to have concerns about what might be a problem when you're pregnant. Most pregnancies don't have any serious problems. But it's still important to know when to call your doctor if you have certain symptoms orsigns of labor. These are general suggestions. Your doctor may give you some more informationabout when to call. When to call your doctor (after 20 weeks)  Call 911 anytime you think you may need emergency care. For example, call if:   You have severe vaginal bleeding.  You have sudden, severe pain in your belly.  You passed out (lost consciousness).  You have a seizure.  You see or feel the umbilical cord.    You think you are about to deliver your baby and can't make it safely to the hospital.  Call your doctor now or seek immediate medical care if:   You have vaginal bleeding.  You have belly pain.  You have a fever.  You have symptoms of preeclampsia, such as:  ? Sudden swelling of your face, hands, or feet. ? New vision problems (such as dimness, blurring, or seeing spots). ? A severe headache.  You have a sudden release of fluid from your vagina. (You think your water broke.)   You think that you may be in labor. This means that you've had at least 6 contractions in an hour.  You notice that your baby has stopped moving or is moving much less than normal.   You have symptoms of a urinary tract infection. These may include:  ? Pain or burning when you urinate. ? A frequent need to urinate without being able to pass much urine. ? Pain in the flank, which is just below the rib cage and above the waist on either side of the back. ? Blood in your urine. Watch closely for changes in your health, and be sure to contact your doctor if:   You have vaginal discharge that smells bad.  You have skin changes, such as:  ? A rash. ? Itching. ? Yellow color to your skin.  You have other concerns about your pregnancy. If you have labor signs at 37 weeks or more  If you have signs of labor at 37 weeks or more, your doctor may tell you tocall when your labor becomes more active. Symptoms of active labor include:   Contractions that are regular.  Contractions that are less than 5 minutes apart.  Contractions that are hard to talk through. Follow-up care is a key part of your treatment and safety. Be sure to make and go to all appointments, and call your doctor if you are having problems. It's also a good idea to know your test results and keep alist of the medicines you take. Where can you learn more? Go to https://Supernus Pharmaceuticalsluda.[email or web address] org and sign in to your Action account. Enter  in the [facility] [facility] box to learn more about \"Learning About When to Call Your Doctor During Pregnancy (After 20 Weeks). \"     If you do not have an account,

## 2022-07-25 PROBLEM — Z87.59 HISTORY OF PRIOR PREGNANCY WITH IUGR NEWBORN: Status: ACTIVE | Noted: 2022-07-25

## 2022-07-25 PROBLEM — O09.291 HISTORY OF IUGR (INTRAUTERINE GROWTH RETARDATION) AND STILLBIRTH, CURRENTLY PREGNANT, FIRST TRIMESTER: Status: RESOLVED | Noted: 2022-05-31 | Resolved: 2022-07-25

## 2022-07-25 RX ORDER — METOCLOPRAMIDE 10 MG/1
10 TABLET ORAL 3 TIMES DAILY PRN
Qty: 60 TABLET | Refills: 3 | Status: SHIPPED
Start: 2022-07-25 | End: 2022-10-10

## 2022-07-27 ENCOUNTER — HOSPITAL ENCOUNTER (INPATIENT)
Age: 37
LOS: 9 days | Discharge: HOME | DRG: 566 | End: 2022-08-05
Attending: STUDENT IN AN ORGANIZED HEALTH CARE EDUCATION/TRAINING PROGRAM | Admitting: OBSTETRICS & GYNECOLOGY
Payer: COMMERCIAL

## 2022-07-27 ENCOUNTER — APPOINTMENT (OUTPATIENT)
Dept: ULTRASOUND IMAGING | Age: 37
DRG: 566 | End: 2022-07-27
Payer: COMMERCIAL

## 2022-07-27 DIAGNOSIS — O21.1 HYPEREMESIS GRAVIDARUM WITH METABOLIC DISTURBANCE, ANTEPARTUM: Primary | ICD-10-CM

## 2022-07-27 LAB
ALBUMIN SERPL-MCNC: 4 G/DL (ref 3.5–5.2)
ALP BLD-CCNC: 69 U/L (ref 35–104)
ALT SERPL-CCNC: 11 U/L (ref 0–32)
AMPHETAMINE SCREEN, URINE: NOT DETECTED
ANION GAP SERPL CALCULATED.3IONS-SCNC: 17 MMOL/L (ref 7–16)
AST SERPL-CCNC: 19 U/L (ref 0–31)
BARBITURATE SCREEN URINE: NOT DETECTED
BASOPHILS ABSOLUTE: 0.09 E9/L (ref 0–0.2)
BASOPHILS RELATIVE PERCENT: 0.4 % (ref 0–2)
BENZODIAZEPINE SCREEN, URINE: NOT DETECTED
BILIRUB SERPL-MCNC: 0.3 MG/DL (ref 0–1.2)
BILIRUBIN URINE: NEGATIVE
BLOOD, URINE: NEGATIVE
BUN BLDV-MCNC: 8 MG/DL (ref 6–20)
CALCIUM SERPL-MCNC: 9 MG/DL (ref 8.6–10.2)
CANNABINOID SCREEN URINE: POSITIVE
CHLORIDE BLD-SCNC: 102 MMOL/L (ref 98–107)
CLARITY: CLEAR
CO2: 18 MMOL/L (ref 22–29)
COCAINE METABOLITE SCREEN URINE: NOT DETECTED
COLOR: YELLOW
CREAT SERPL-MCNC: 0.4 MG/DL (ref 0.5–1)
EOSINOPHILS ABSOLUTE: 0.02 E9/L (ref 0.05–0.5)
EOSINOPHILS RELATIVE PERCENT: 0.1 % (ref 0–6)
FENTANYL SCREEN, URINE: NOT DETECTED
GFR AFRICAN AMERICAN: >60
GFR NON-AFRICAN AMERICAN: >60 ML/MIN/1.73
GLUCOSE BLD-MCNC: 129 MG/DL (ref 74–99)
GLUCOSE URINE: NEGATIVE MG/DL
HCT VFR BLD CALC: 36.2 % (ref 34–48)
HEMOGLOBIN: 12.7 G/DL (ref 11.5–15.5)
IMMATURE GRANULOCYTES #: 0.31 E9/L
IMMATURE GRANULOCYTES %: 1.2 % (ref 0–5)
KETONES, URINE: >=80 MG/DL
LEUKOCYTE ESTERASE, URINE: NEGATIVE
LYMPHOCYTES ABSOLUTE: 1.85 E9/L (ref 1.5–4)
LYMPHOCYTES RELATIVE PERCENT: 7.3 % (ref 20–42)
Lab: ABNORMAL
MCH RBC QN AUTO: 32.3 PG (ref 26–35)
MCHC RBC AUTO-ENTMCNC: 35.1 % (ref 32–34.5)
MCV RBC AUTO: 92.1 FL (ref 80–99.9)
METHADONE SCREEN, URINE: NOT DETECTED
MONOCYTES ABSOLUTE: 0.49 E9/L (ref 0.1–0.95)
MONOCYTES RELATIVE PERCENT: 1.9 % (ref 2–12)
NEUTROPHILS ABSOLUTE: 22.54 E9/L (ref 1.8–7.3)
NEUTROPHILS RELATIVE PERCENT: 89.1 % (ref 43–80)
NITRITE, URINE: NEGATIVE
OPIATE SCREEN URINE: NOT DETECTED
OXYCODONE URINE: NOT DETECTED
PDW BLD-RTO: 13.4 FL (ref 11.5–15)
PH UA: 6.5 (ref 5–9)
PHENCYCLIDINE SCREEN URINE: NOT DETECTED
PLATELET # BLD: 390 E9/L (ref 130–450)
PMV BLD AUTO: 9.4 FL (ref 7–12)
POTASSIUM SERPL-SCNC: 3.9 MMOL/L (ref 3.5–5)
PROTEIN UA: NEGATIVE MG/DL
RBC # BLD: 3.93 E12/L (ref 3.5–5.5)
RBC # BLD: NORMAL 10*6/UL
SODIUM BLD-SCNC: 137 MMOL/L (ref 132–146)
SPECIFIC GRAVITY UA: 1.02 (ref 1–1.03)
TOTAL PROTEIN: 7.4 G/DL (ref 6.4–8.3)
UROBILINOGEN, URINE: 0.2 E.U./DL
WBC # BLD: 25.3 E9/L (ref 4.5–11.5)

## 2022-07-27 PROCEDURE — 96374 THER/PROPH/DIAG INJ IV PUSH: CPT

## 2022-07-27 PROCEDURE — 2580000003 HC RX 258: Performed by: PHYSICIAN ASSISTANT

## 2022-07-27 PROCEDURE — 96375 TX/PRO/DX INJ NEW DRUG ADDON: CPT

## 2022-07-27 PROCEDURE — 87088 URINE BACTERIA CULTURE: CPT

## 2022-07-27 PROCEDURE — 87077 CULTURE AEROBIC IDENTIFY: CPT

## 2022-07-27 PROCEDURE — 87186 SC STD MICRODIL/AGAR DIL: CPT

## 2022-07-27 PROCEDURE — 80307 DRUG TEST PRSMV CHEM ANLYZR: CPT

## 2022-07-27 PROCEDURE — 6370000000 HC RX 637 (ALT 250 FOR IP): Performed by: STUDENT IN AN ORGANIZED HEALTH CARE EDUCATION/TRAINING PROGRAM

## 2022-07-27 PROCEDURE — 1220000000 HC SEMI PRIVATE OB R&B

## 2022-07-27 PROCEDURE — 6360000002 HC RX W HCPCS: Performed by: STUDENT IN AN ORGANIZED HEALTH CARE EDUCATION/TRAINING PROGRAM

## 2022-07-27 PROCEDURE — 76815 OB US LIMITED FETUS(S): CPT

## 2022-07-27 PROCEDURE — 99285 EMERGENCY DEPT VISIT HI MDM: CPT

## 2022-07-27 PROCEDURE — 80053 COMPREHEN METABOLIC PANEL: CPT

## 2022-07-27 PROCEDURE — 2580000003 HC RX 258: Performed by: STUDENT IN AN ORGANIZED HEALTH CARE EDUCATION/TRAINING PROGRAM

## 2022-07-27 PROCEDURE — 6360000002 HC RX W HCPCS: Performed by: PHYSICIAN ASSISTANT

## 2022-07-27 PROCEDURE — 85025 COMPLETE CBC W/AUTO DIFF WBC: CPT

## 2022-07-27 PROCEDURE — 81003 URINALYSIS AUTO W/O SCOPE: CPT

## 2022-07-27 RX ORDER — 0.9 % SODIUM CHLORIDE 0.9 %
1000 INTRAVENOUS SOLUTION INTRAVENOUS ONCE
Status: COMPLETED | OUTPATIENT
Start: 2022-07-27 | End: 2022-07-28

## 2022-07-27 RX ORDER — METOCLOPRAMIDE HYDROCHLORIDE 5 MG/ML
10 INJECTION INTRAMUSCULAR; INTRAVENOUS ONCE
Status: COMPLETED | OUTPATIENT
Start: 2022-07-27 | End: 2022-07-27

## 2022-07-27 RX ORDER — 0.9 % SODIUM CHLORIDE 0.9 %
2000 INTRAVENOUS SOLUTION INTRAVENOUS ONCE
Status: COMPLETED | OUTPATIENT
Start: 2022-07-27 | End: 2022-07-27

## 2022-07-27 RX ORDER — DIPHENHYDRAMINE HYDROCHLORIDE 50 MG/ML
12.5 INJECTION INTRAMUSCULAR; INTRAVENOUS ONCE
Status: COMPLETED | OUTPATIENT
Start: 2022-07-27 | End: 2022-07-27

## 2022-07-27 RX ORDER — ONDANSETRON 2 MG/ML
4 INJECTION INTRAMUSCULAR; INTRAVENOUS ONCE
Status: COMPLETED | OUTPATIENT
Start: 2022-07-27 | End: 2022-07-27

## 2022-07-27 RX ORDER — ACETAMINOPHEN 325 MG/1
650 TABLET ORAL ONCE
Status: COMPLETED | OUTPATIENT
Start: 2022-07-27 | End: 2022-07-27

## 2022-07-27 RX ADMIN — ONDANSETRON 4 MG: 2 INJECTION INTRAMUSCULAR; INTRAVENOUS at 19:24

## 2022-07-27 RX ADMIN — SODIUM CHLORIDE 1000 ML: 9 INJECTION, SOLUTION INTRAVENOUS at 19:23

## 2022-07-27 RX ADMIN — METOCLOPRAMIDE 10 MG: 5 INJECTION, SOLUTION INTRAMUSCULAR; INTRAVENOUS at 15:40

## 2022-07-27 RX ADMIN — DIPHENHYDRAMINE HYDROCHLORIDE 12.5 MG: 50 INJECTION, SOLUTION INTRAMUSCULAR; INTRAVENOUS at 15:41

## 2022-07-27 RX ADMIN — SODIUM CHLORIDE 2000 ML: 9 INJECTION, SOLUTION INTRAVENOUS at 15:41

## 2022-07-27 RX ADMIN — ACETAMINOPHEN 650 MG: 325 TABLET ORAL at 20:54

## 2022-07-27 ASSESSMENT — PAIN DESCRIPTION - DESCRIPTORS
DESCRIPTORS: ACHING
DESCRIPTORS: ACHING

## 2022-07-27 ASSESSMENT — ENCOUNTER SYMPTOMS
DIARRHEA: 1
EYE PAIN: 0
EYE DISCHARGE: 0
SINUS PRESSURE: 0
VOMITING: 1
BACK PAIN: 0
ABDOMINAL DISTENTION: 0
SHORTNESS OF BREATH: 0
ABDOMINAL PAIN: 1
COUGH: 0
SORE THROAT: 0
NAUSEA: 1
EYE REDNESS: 0
WHEEZING: 0

## 2022-07-27 ASSESSMENT — PAIN - FUNCTIONAL ASSESSMENT
PAIN_FUNCTIONAL_ASSESSMENT: 0-10
PAIN_FUNCTIONAL_ASSESSMENT: 0-10

## 2022-07-27 ASSESSMENT — PAIN DESCRIPTION - LOCATION
LOCATION: ABDOMEN
LOCATION: GROIN;ABDOMEN

## 2022-07-27 ASSESSMENT — PAIN DESCRIPTION - FREQUENCY: FREQUENCY: CONTINUOUS

## 2022-07-27 ASSESSMENT — PAIN SCALES - GENERAL
PAINLEVEL_OUTOF10: 8
PAINLEVEL_OUTOF10: 10
PAINLEVEL_OUTOF10: 7

## 2022-07-27 ASSESSMENT — PAIN DESCRIPTION - PAIN TYPE: TYPE: ACUTE PAIN

## 2022-07-27 ASSESSMENT — PAIN DESCRIPTION - ONSET: ONSET: SUDDEN

## 2022-07-27 ASSESSMENT — PAIN DESCRIPTION - ORIENTATION: ORIENTATION: UPPER;RIGHT;LEFT;MID

## 2022-07-27 NOTE — ED NOTES
Department of Emergency Medicine  FIRST PROVIDER TRIAGE NOTE             Independent MLP           7/27/22  1:52 PM EDT    Date of Encounter: 7/27/22   MRN: [id number]      HPI: Claudean Pester is a 40 y.o. female who presents to the ED for Hyperemesis Gravidarum       ROS: Negative for cp or sob. PE: Gen Appearance/Constitutional: alert  HEENT: NC/NT. PERRLA,  Airway patent. Neck: supple     Initial Plan of Care: All treatment areas with department are currently occupied. Plan to order/Initiate the following while awaiting opening in ED: labs, antiemetics, and IV fluids.   Initiate Treatment-Testing, Proceed toTreatment Area When Bed Available for ED Attending/MLP to Continue Care    Electronically signed by TIMMY Espinoza   DD: 7/27/22       TIMMY Gentile  07/27/22 2448

## 2022-07-27 NOTE — ED PROVIDER NOTES
Department of Emergency Medicine   ED  Provider Note  Admit Date/RoomTime: 7/27/2022  2:06 PM  ED Room: [place] [address]          History of Present Illness:  7/27/22, Time: 3:59 PM EDT  Chief Complaint   Patient presents with    Hyperemesis Gravidarum            Emani Jack is a 40 y.o. female presenting to the ED for nausea vomiting, beginning 2 days ago. The complaint has been constant, severe in severity, and worsened by p.o. intake. Patient states that she is approximately 26 weeks pregnant. She states that she has had this previously. He mentioned that she previously had a Zofran pump but she has been instructed to wean off it. Patient states her emesis is consisted of stomach contents. She does endorse some abdominal pain secondary to the emesis. She otherwise denies any fevers, chest pain, shortness of breath, or dysuria. She denies any drug use. Review of Systems   Constitutional:  Negative for chills and fever. HENT:  Negative for ear pain, sinus pressure and sore throat. Eyes:  Negative for pain, discharge and redness. Respiratory:  Negative for cough, shortness of breath and wheezing. Cardiovascular:  Negative for chest pain. Gastrointestinal:  Positive for abdominal pain, diarrhea, nausea and vomiting. Negative for abdominal distention. Genitourinary:  Negative for dysuria and frequency. Musculoskeletal:  Negative for arthralgias and back pain. Skin:  Negative for rash and wound. Neurological:  Negative for weakness and headaches. Hematological:  Negative for adenopathy.    All other systems reviewed and are negative.       --------------------------------------------- PAST HISTORY ---------------------------------------------  Past Medical History:  has a past medical history of [facility] Anxiety attack, Asthma, Crohn's colitis ([facility] Depression, Herpes simplex virus (HSV) infection, History of blood transfusion, Hypertension, Marijuana abuse, Neurologic disorder, Postpartum depression,  delivery,  labor, Rh sensitized, Seizure ([address] STD (sexually transmitted disease), and Tobacco abuse. Past Surgical History:  has a past surgical history that includes Dilation and curettage of uterus (); Colonoscopy; Endoscopy, colon, diagnostic; Cholecystectomy (2018); hernia repair; hernia repair; Upper gastrointestinal endoscopy (N/A, 2022); Dilation & curettage; and laparoscopy. Social History:  reports that she quit smoking about 2 years ago. Her smoking use included cigarettes. She has a 10.00 pack-year smoking history. She has never used smokeless tobacco. She reports that she does not currently use alcohol. She reports that she does not currently use drugs after having used the following drugs: Marijuana Adrian Jock). Frequency: 2.00 times per week. Family History: family history includes Breast Cancer in her mother; Cervical Cancer in her mother; Depression in her father and mother; Heart Disease in her father; Hypertension in her father; Leukemia in her mother; Schizophrenia in her father. . Unless otherwise noted, family history is non contributory    The patients home medications have been reviewed. Allergies: Patient has no known allergies. ---------------------------------------------------PHYSICAL EXAM--------------------------------------    Physical Exam  Vitals and nursing note reviewed. Constitutional:       General: She is not in acute distress. Appearance: She is well-developed. HENT:      Head: Normocephalic and atraumatic. Eyes:      Conjunctiva/sclera: Conjunctivae normal.   Cardiovascular:      Rate and Rhythm: Normal rate and regular rhythm. Heart sounds: Normal heart sounds. No murmur heard. Pulmonary:      Effort: Pulmonary effort is normal. No respiratory distress. Breath sounds: Normal breath sounds. No wheezing or rales.    Abdominal:      General: Bowel sounds are normal. GFR Non-African American >60 >=60 mL/min/1.73    GFR African American >60     Calcium 9.0 8.6 - 10.2 mg/dL    Total Protein 7.4 6.4 - 8.3 g/dL    Albumin 4.0 3.5 - 5.2 g/dL    Total Bilirubin 0.3 0.0 - 1.2 mg/dL    Alkaline Phosphatase 69 35 - 104 U/L    ALT 11 0 - 32 U/L    AST 19 0 - 31 U/L   Urinalysis   Result Value Ref Range    Color, UA Yellow Straw/Yellow    Clarity, UA Clear Clear    Glucose, Ur Negative Negative mg/dL    Bilirubin Urine Negative Negative    Ketones, Urine >=80 (A) Negative mg/dL    Specific Gravity, UA 1.025 1.005 - 1.030    Blood, Urine Negative Negative    pH, UA 6.5 5.0 - 9.0    Protein, UA Negative Negative mg/dL    Urobilinogen, Urine 0.2 <2.0 E.U./dL    Nitrite, Urine Negative Negative    Leukocyte Esterase, Urine Negative Negative   URINE DRUG SCREEN   Result Value Ref Range    Amphetamine Screen, Urine NOT DETECTED Negative <1000 ng/mL    Barbiturate Screen, Ur NOT DETECTED Negative < 200 ng/mL    Benzodiazepine Screen, Urine NOT DETECTED Negative < 200 ng/mL    Cannabinoid Scrn, Ur POSITIVE (A) Negative < 50ng/mL    Cocaine Metabolite Screen, Urine NOT DETECTED Negative < 300 ng/mL    Opiate Scrn, Ur NOT DETECTED Negative < 300ng/mL    PCP Screen, Urine NOT DETECTED Negative < 25 ng/mL    Methadone Screen, Urine NOT DETECTED Negative <300 ng/mL    Oxycodone Urine NOT DETECTED Negative <100 ng/mL    FENTANYL SCREEN, URINE NOT DETECTED Negative <1 ng/mL    Drug Screen Comment: see below    ,       RADIOLOGY:  Interpreted by Radiologist unless otherwise specified  US OB 1 OR MORE FETUS LIMITED   Final Result   1. Single live intrauterine pregnancy with cardiac activity detected at 136   beats per minute. 2. Gestational age utilizing ultrasound measurement is 25 weeks and 5 days.    Clinical correlation recommended                          ------------------------- NURSING NOTES AND VITALS REVIEWED ---------------------------   The nursing notes within the ED encounter and vital signs multiple bedside re-evaluations, IV medications, and cardiac monitoring    This patient has remained hemodynamically stable during their ED course. Consultations:  OB/GYN      Critical Care: None      Counseling: The emergency provider has spoken with the patient and discussed todays results, in addition to providing specific details for the plan of care and counseling regarding the diagnosis and prognosis. Questions are answered at this time and they are agreeable with the plan.       --------------------------------- IMPRESSION AND DISPOSITION ---------------------------------    IMPRESSION  1. Hyperemesis gravidarum with metabolic disturbance, antepartum        DISPOSITION  Disposition: Admit to antepartum  Patient condition is stable    Patient was seen and evaluated by both myself and Margot Hall DO. NOTE: This report was transcribed using voice recognition software.  Every effort was made to ensure accuracy; however, inadvertent computerized transcription errors may be present           Britton Jackson DO  Resident  07/27/22 0728

## 2022-07-28 ENCOUNTER — ANCILLARY PROCEDURE (OUTPATIENT)
Dept: OBGYN CLINIC | Age: 37
DRG: 566 | End: 2022-07-28
Payer: COMMERCIAL

## 2022-07-28 PROBLEM — F12.90 MARIJUANA USE: Status: ACTIVE | Noted: 2022-07-28

## 2022-07-28 PROBLEM — R82.5 POSITIVE URINE DRUG SCREEN: Status: ACTIVE | Noted: 2022-07-28

## 2022-07-28 LAB
AMPHETAMINE SCREEN, URINE: NOT DETECTED
BARBITURATE SCREEN URINE: NOT DETECTED
BENZODIAZEPINE SCREEN, URINE: NOT DETECTED
CANNABINOID SCREEN URINE: POSITIVE
COCAINE METABOLITE SCREEN URINE: NOT DETECTED
FENTANYL SCREEN, URINE: NOT DETECTED
INTEGRITY CHECK, CREATININE, URINE: 104.5
INTEGRITY CHECK, OXIDANT, URINE: <40
INTEGRITY CHECK, PH, URINE: 5.6 (ref 4.5–9)
INTEGRITY CHECK, SPECIFIC GRAVITY, URINE: 1.03 (ref 1–1.03)
INTEGRITY CHECK, SPECIMEN INTEGRITY, URINE: NORMAL
Lab: ABNORMAL
METHADONE SCREEN, URINE: NOT DETECTED
OPIATE SCREEN URINE: NOT DETECTED
OXYCODONE URINE: NOT DETECTED
PHENCYCLIDINE SCREEN URINE: NOT DETECTED

## 2022-07-28 PROCEDURE — 1220000000 HC SEMI PRIVATE OB R&B

## 2022-07-28 PROCEDURE — 76820 UMBILICAL ARTERY ECHO: CPT | Performed by: OBSTETRICS & GYNECOLOGY

## 2022-07-28 PROCEDURE — 76819 FETAL BIOPHYS PROFIL W/O NST: CPT | Performed by: OBSTETRICS & GYNECOLOGY

## 2022-07-28 PROCEDURE — 80307 DRUG TEST PRSMV CHEM ANLYZR: CPT

## 2022-07-28 PROCEDURE — 2580000003 HC RX 258: Performed by: OBSTETRICS & GYNECOLOGY

## 2022-07-28 PROCEDURE — 6370000000 HC RX 637 (ALT 250 FOR IP): Performed by: OBSTETRICS & GYNECOLOGY

## 2022-07-28 PROCEDURE — 76817 TRANSVAGINAL US OBSTETRIC: CPT | Performed by: OBSTETRICS & GYNECOLOGY

## 2022-07-28 PROCEDURE — G0480 DRUG TEST DEF 1-7 CLASSES: HCPCS

## 2022-07-28 PROCEDURE — 6360000002 HC RX W HCPCS: Performed by: OBSTETRICS & GYNECOLOGY

## 2022-07-28 PROCEDURE — 6360000002 HC RX W HCPCS: Performed by: EMERGENCY MEDICINE

## 2022-07-28 PROCEDURE — 99253 IP/OBS CNSLTJ NEW/EST LOW 45: CPT | Performed by: OBSTETRICS & GYNECOLOGY

## 2022-07-28 RX ORDER — ONDANSETRON 2 MG/ML
4 INJECTION INTRAMUSCULAR; INTRAVENOUS ONCE
Status: COMPLETED | OUTPATIENT
Start: 2022-07-28 | End: 2022-07-28

## 2022-07-28 RX ORDER — ACETAMINOPHEN 325 MG/1
650 TABLET ORAL EVERY 6 HOURS PRN
Status: DISCONTINUED | OUTPATIENT
Start: 2022-07-28 | End: 2022-08-05 | Stop reason: HOSPADM

## 2022-07-28 RX ORDER — DEXTROSE, SODIUM CHLORIDE, SODIUM LACTATE, POTASSIUM CHLORIDE, AND CALCIUM CHLORIDE 5; .6; .31; .03; .02 G/100ML; G/100ML; G/100ML; G/100ML; G/100ML
INJECTION, SOLUTION INTRAVENOUS CONTINUOUS
Status: DISCONTINUED | OUTPATIENT
Start: 2022-07-28 | End: 2022-08-04 | Stop reason: ALTCHOICE

## 2022-07-28 RX ORDER — ONDANSETRON 2 MG/ML
4 INJECTION INTRAMUSCULAR; INTRAVENOUS EVERY 6 HOURS PRN
Status: DISCONTINUED | OUTPATIENT
Start: 2022-07-28 | End: 2022-08-05 | Stop reason: HOSPADM

## 2022-07-28 RX ADMIN — BUTORPHANOL TARTRATE 2 MG: 2 INJECTION, SOLUTION INTRAMUSCULAR; INTRAVENOUS at 15:13

## 2022-07-28 RX ADMIN — ONDANSETRON 4 MG: 2 INJECTION INTRAMUSCULAR; INTRAVENOUS at 01:14

## 2022-07-28 RX ADMIN — ONDANSETRON 4 MG: 2 INJECTION INTRAMUSCULAR; INTRAVENOUS at 11:19

## 2022-07-28 RX ADMIN — BUTORPHANOL TARTRATE 2 MG: 2 INJECTION, SOLUTION INTRAMUSCULAR; INTRAVENOUS at 11:19

## 2022-07-28 RX ADMIN — BUTORPHANOL TARTRATE 2 MG: 2 INJECTION, SOLUTION INTRAMUSCULAR; INTRAVENOUS at 06:55

## 2022-07-28 RX ADMIN — SODIUM CHLORIDE, SODIUM LACTATE, POTASSIUM CHLORIDE, CALCIUM CHLORIDE AND DEXTROSE MONOHYDRATE: 5; 600; 310; 30; 20 INJECTION, SOLUTION INTRAVENOUS at 03:09

## 2022-07-28 RX ADMIN — ONDANSETRON 4 MG: 2 INJECTION INTRAMUSCULAR; INTRAVENOUS at 23:47

## 2022-07-28 RX ADMIN — ONDANSETRON 4 MG: 2 INJECTION INTRAMUSCULAR; INTRAVENOUS at 17:36

## 2022-07-28 RX ADMIN — BUTORPHANOL TARTRATE 2 MG: 2 INJECTION, SOLUTION INTRAMUSCULAR; INTRAVENOUS at 23:02

## 2022-07-28 RX ADMIN — ACETAMINOPHEN 650 MG: 325 TABLET ORAL at 03:41

## 2022-07-28 RX ADMIN — BUTORPHANOL TARTRATE 2 MG: 2 INJECTION, SOLUTION INTRAMUSCULAR; INTRAVENOUS at 18:48

## 2022-07-28 ASSESSMENT — PAIN DESCRIPTION - DESCRIPTORS
DESCRIPTORS: OTHER (COMMENT)
DESCRIPTORS: SHARP
DESCRIPTORS: DISCOMFORT;DULL
DESCRIPTORS: SHARP
DESCRIPTORS: DISCOMFORT

## 2022-07-28 ASSESSMENT — PAIN DESCRIPTION - ORIENTATION
ORIENTATION: MID
ORIENTATION: UPPER
ORIENTATION: MID

## 2022-07-28 ASSESSMENT — PAIN SCALES - GENERAL
PAINLEVEL_OUTOF10: 10
PAINLEVEL_OUTOF10: 9
PAINLEVEL_OUTOF10: 4
PAINLEVEL_OUTOF10: 5
PAINLEVEL_OUTOF10: 9
PAINLEVEL_OUTOF10: 9

## 2022-07-28 ASSESSMENT — PAIN DESCRIPTION - LOCATION
LOCATION: ABDOMEN
LOCATION: BACK
LOCATION: ABDOMEN;BACK

## 2022-07-28 NOTE — ED NOTES
Pt medicated per order. C/O abd pain. Pt is up to shower room.      Kelsie Ambrose RN  07/28/22 [id number]

## 2022-07-28 NOTE — CONSULTS
13.8 cm. The biophysical profile and cord Doppler studies were both reassuring. There was no evidence of absence, or reversal of end-diastolic flow. OB History    Para Term  AB Living   6 3 2 1 2 3   SAB IAB Ectopic Molar Multiple Live Births   2         3      # Outcome Date GA Lbr Marco/2nd Weight Sex Delivery Anes PTL Lv   6 Current            5 SAB 2021 6w0d          4 Term 14 37w0d  5 lb 1 oz (2.296 kg) M Vag-Spont EPI N NARENDRA   3  12 35w4d  4 lb 8 oz (2.041 kg) M Vag-Spont  Y NARENDRA      Complications: Oligohydramnios   2 SAB  11w0d          1 Term 04 42w0d  7 lb 8 oz (3.402 kg) F Vag-Spont EPI N NARENDRA     PAST GYNECOLOGICAL  HISTORY:  Negative for abnormal pap smears. Positive for sexually transmitted diseases. Herpes  Negative for cervical LEEP / conization /cryosurgery. Negative for uterine surgery. Negative for ovarian or tubal surgery.      Past Medical History:   Diagnosis Date    Acute Crohn's disease ([address]     Anxiety attack since age 16    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 inhaler     Crohn's colitis ([address]     Depression     medicated with zoloft     Herpes simplex virus (HSV) infection     History of blood transfusion     as an infant in [place]    Hypertension     intermittent     Marijuana abuse     occas    Neurologic disorder     Postpartum depression      delivery      labor     Rh sensitized     Seizure ([address]     loses focus, disoriented unpon arousing; etiology unknown per pt; none since     STD (sexually transmitted disease)     Tobacco abuse        Past Surgical History:   Procedure Laterality Date    CHOLECYSTECTOMY  2018    COLONOSCOPY      DILATION AND CURETTAGE      DILATION AND CURETTAGE OF UTERUS      ENDOSCOPY, COLON, DIAGNOSTIC      HERNIA REPAIR      HERNIA REPAIR      LAPAROSCOPY      UPPER GASTROINTESTINAL ENDOSCOPY N/A 2022    EGD BIOPSY performed by Gerhard Renteria MD at [address] [place] [address]       No Known Allergies      Current Facility-Administered Medications:     dextrose 5 % in lactated ringers infusion, , IntraVENous, Continuous, Edward Pena MD, Last Rate: 100 mL/hr at 22 0309, New Bag at 22 0309    ondansetron (ZOFRAN) injection 4 mg, 4 mg, IntraVENous, Q6H PRN, Edward Pena MD, 4 mg at 22 1119    acetaminophen (TYLENOL) tablet 650 mg, 650 mg, Oral, Q6H PRN, Edward Pena MD, 650 mg at 22 0341    butorphanol (STADOL) injection 2 mg, 2 mg, IntraVENous, Q3H PRN, Edward Pena MD, 2 mg at 22 1513    Social History     Tobacco Use    Smoking status: Former     Packs/day: 1.00     Years: 10.00     Pack years: 10.00     Types: Cigarettes     Quit date: 2020     Years since quittin.4    Smokeless tobacco: Never   Substance Use Topics    Alcohol use: Not Currently     Comment: socially     FAMILY MEDICAL HISTORY:   Negative for congenital abnormalities, autism, genetic disease and mental retardation, not listed above. Review of Systems :   CONSTITUTIONAL : No fever, no chills   HEENT : No headache, no visual changes, no rhinorrhea, no sore throat   CARDIOVASCULAR : No pain, no palpitations, no edema   RESPIRATORY : No pain, no shortness of breath   GASTROINTESTINAL : Nausea and vomiting  GENITOURINARY : No dysuria, hematuria and no incontinence   MUSCULOSKELETAL : No myalgia, No back pain  NEUROLOGICAL : No numbness, no tingling, no tremors. No history of seizures  ALL OTHER SYSTEMS WERE REPORTED AS NEGATIVE.     PERTINENT PHYSICAL EXAMINATION:   Patient Vitals for the past 24 hrs:   BP Temp Temp src Pulse Resp SpO2 Height Weight   22 0937 (!) 141/77 98.2 °F (36.8 °C) Oral 95 16 -- -- --   22 0300 127/79 -- -- (!) 108 -- -- -- --   22 2330 121/60 -- -- -- -- -- -- --   22 2329 121/60 -- -- 92 20 99 % 5' 4\" (1.626 m) 157 lb (71.2 kg)   22 (!) 162/87 07/27/2022 137  132 - 146 mmol/L Final    Potassium 07/27/2022 3.9  3.5 - 5.0 mmol/L Final    Chloride 07/27/2022 102  98 - 107 mmol/L Final    CO2 07/27/2022 18 (A) 22 - 29 mmol/L Final    Anion Gap 07/27/2022 17 (A) 7 - 16 mmol/L Final    Glucose 07/27/2022 129 (A) 74 - 99 mg/dL Final    BUN 07/27/2022 8  6 - 20 mg/dL Final    Creatinine 07/27/2022 0.4 (A) 0.5 - 1.0 mg/dL Final    GFR Non- 07/27/2022 >60  >=60 mL/min/1.73 Final    GFR  07/27/2022 >60   Final    Calcium 07/27/2022 9.0  8.6 - 10.2 mg/dL Final    Total Protein 07/27/2022 7.4  6.4 - 8.3 g/dL Final    Albumin 07/27/2022 4.0  3.5 - 5.2 g/dL Final    Total Bilirubin 07/27/2022 0.3  0.0 - 1.2 mg/dL Final    Alkaline Phosphatase 07/27/2022 69  35 - 104 U/L Final    ALT 07/27/2022 11  0 - 32 U/L Final    AST 07/27/2022 19  0 - 31 U/L Final    Color, UA 07/27/2022 Yellow  Straw/Yellow Final    Clarity, UA 07/27/2022 Clear  Clear Final    Glucose, Ur 07/27/2022 Negative  Negative mg/dL Final    Bilirubin Urine 07/27/2022 Negative  Negative Final    Ketones, Urine 07/27/2022 >=80 (A) Negative mg/dL Final    Specific Gravity, UA 07/27/2022 1.025  1.005 - 1.030 Final    Blood, Urine 07/27/2022 Negative  Negative Final    pH, UA 07/27/2022 6.5  5.0 - 9.0 Final    Protein, UA 07/27/2022 Negative  Negative mg/dL Final    Urobilinogen, Urine 07/27/2022 0.2  <2.0 E.U./dL Final    Nitrite, Urine 07/27/2022 Negative  Negative Final    Leukocyte Esterase, Urine 07/27/2022 Negative  Negative Final    Urine Culture, Routine 07/27/2022    Final                    Value:10 to 100,000 CFU/mL  Mixed ene isolated. Further workup and sensitivity testing  is not routinely indicated and will not be performed.   Mixed ene isolated includes:  Gram negative rods  Mixed gram positive organisms      Amphetamine Screen, Urine 07/27/2022 NOT DETECTED  Negative <1000 ng/mL Final    Barbiturate Screen, Ur 07/27/2022 NOT DETECTED  Negative < 200 ng/mL Final    Benzodiazepine Screen, Urine 07/27/2022 NOT DETECTED  Negative < 200 ng/mL Final    Cannabinoid Scrn, Ur 07/27/2022 POSITIVE (A) Negative < 50ng/mL Final    Cocaine Metabolite Screen, Urine 07/27/2022 NOT DETECTED  Negative < 300 ng/mL Final    Opiate Scrn, Ur 07/27/2022 NOT DETECTED  Negative < 300ng/mL Final    PCP Screen, Urine 07/27/2022 NOT DETECTED  Negative < 25 ng/mL Final    Methadone Screen, Urine 07/27/2022 NOT DETECTED  Negative <300 ng/mL Final    Oxycodone Urine 07/27/2022 NOT DETECTED  Negative <100 ng/mL Final    FENTANYL SCREEN, URINE 07/27/2022 NOT DETECTED  Negative <1 ng/mL Final    Drug Screen Comment: 07/27/2022 see below   Final    Amphetamine Screen, Urine 07/28/2022 NOT DETECTED  Negative <1000 ng/mL Final    Barbiturate Screen, Ur 07/28/2022 NOT DETECTED  Negative < 200 ng/mL Final    Benzodiazepine Screen, Urine 07/28/2022 NOT DETECTED  Negative < 200 ng/mL Final    Cannabinoid Scrn, Ur 07/28/2022 POSITIVE (A) Negative < 50ng/mL Final    Cocaine Metabolite Screen, Urine 07/28/2022 NOT DETECTED  Negative < 300 ng/mL Final    Opiate Scrn, Ur 07/28/2022 NOT DETECTED  Negative < 300ng/mL Final    PCP Screen, Urine 07/28/2022 NOT DETECTED  Negative < 25 ng/mL Final    Methadone Screen, Urine 07/28/2022 NOT DETECTED  Negative <300 ng/mL Final    Oxycodone Urine 07/28/2022 NOT DETECTED  Negative <100 ng/mL Final    FENTANYL SCREEN, URINE 07/28/2022 NOT DETECTED  Negative <1 ng/mL Final    Drug Screen Comment: 07/28/2022 see below   Final    Comment 07/28/2022 see below   Final    Integrity Check, Oxidant, Urine 07/28/2022 <40  < 200  mg/L Final    Integrity Check, pH, Urine 07/28/2022 5.6  4.5 - 9.0 Final    Integrity Check, Specific Gravity,* 07/28/2022 1.030  1.002 - 1.030 Final    Integrity Check, Creatinine, Urine 07/28/2022 104.5  22 - 250 mg/dL Final    Integrity Check, Specimen Integrit* 07/28/2022 see below   Final     IMPRESSION:  1.  IUP at 26 weeks 3 days gestation based on her Estimated Date of Delivery: 10/31/22  2. Crohn's disease  3. Seasonal asthma  4. Positive drug screen for marijuana  5. SP cholecystectomy   6. Estimated fetal weight was 702 grams on 7/12/2022  7. Reassuring biophysical profile and cord Doppler testing 7/28/2022  8. Normal cervical length 7/28/2022    PLAN:  The patient is currently admitted for evaluation and management secondary to persistent nausea and vomiting with a probable exacerbation of her Crohn's disease. Consultation with gastroenterology has been requested. DVT prophylaxis should be utilized throughout her admission. Further evaluation and management will be dependent on the results of patient's testing and her clinical presentation. If you have any questions regarding her management, please contact me at your convenience and thank you for allowing me to participate in her care.     Sincerely,        Marsha Carranza MD, MS, Harper Malhotra, CATHLEENCS, RDMS, RVT  Director [address] [place] [address]  [phone]

## 2022-07-28 NOTE — PROGRESS NOTES
Called Dr. Elizabeth Seo to update that pt is complaining of back pain and requesting pain medication. Order received tylenol 650mg q6h prn. Pt may also shower in the morning.

## 2022-07-28 NOTE — PROGRESS NOTES
Patient resting in bed at this time, sleeping. Does report discomfort and nausea, however, not requesting any medications at this time. Patient given hat for stool sample, understands one is needed for testing.  Patient knows to call out when  sample is provided

## 2022-07-28 NOTE — PROGRESS NOTES
Dr Susie Jordan. requested monitoring. /79   Pulse (!) 108   Temp 96.8 °F (36 °C) (Temporal)   Resp 20   Ht 5' 4\" (1.626 m)   Wt 157 lb (71.2 kg)   LMP 01/24/2022   SpO2 99%   BMI 26.95 kg/m²   Fetal heart rate:  Baseline Heart Rate 140, accelerations: present  decelerations: absent  Contraction frequency:  0 minutes  Membranes:  Intact    Dr. Sonia Lundy notified.     Plan:   Abdominal pain with history of Crohn's  Hyperemesis  History of marijuana use  We will consult MFM and gastro for further management recommendations  Stadol for pain control  She may shower    Malina Babin MD

## 2022-07-29 LAB
COMMENT: NORMAL
GIARDIA ANTIGEN STOOL: NORMAL
OCCULT BLOOD SCREENING: NORMAL
THC NORMALIZED, QUANTITIATIVE, URINE: NORMAL
THC-COOH, QUANTITATIVE, URINE: >1000

## 2022-07-29 PROCEDURE — 1220000000 HC SEMI PRIVATE OB R&B

## 2022-07-29 PROCEDURE — 6360000002 HC RX W HCPCS: Performed by: OBSTETRICS & GYNECOLOGY

## 2022-07-29 PROCEDURE — 87329 GIARDIA AG IA: CPT

## 2022-07-29 PROCEDURE — 87449 NOS EACH ORGANISM AG IA: CPT

## 2022-07-29 PROCEDURE — 99232 SBSQ HOSP IP/OBS MODERATE 35: CPT | Performed by: OBSTETRICS & GYNECOLOGY

## 2022-07-29 PROCEDURE — 87045 FECES CULTURE AEROBIC BACT: CPT

## 2022-07-29 PROCEDURE — 87493 C DIFF AMPLIFIED PROBE: CPT

## 2022-07-29 PROCEDURE — 87324 CLOSTRIDIUM AG IA: CPT

## 2022-07-29 PROCEDURE — 2580000003 HC RX 258: Performed by: OBSTETRICS & GYNECOLOGY

## 2022-07-29 PROCEDURE — 82270 OCCULT BLOOD FECES: CPT

## 2022-07-29 PROCEDURE — 83993 ASSAY FOR CALPROTECTIN FECAL: CPT

## 2022-07-29 PROCEDURE — 82705 FATS/LIPIDS FECES QUAL: CPT

## 2022-07-29 PROCEDURE — 2580000003 HC RX 258

## 2022-07-29 PROCEDURE — 87425 ROTAVIRUS AG IA: CPT

## 2022-07-29 RX ORDER — METOCLOPRAMIDE HYDROCHLORIDE 5 MG/ML
10 INJECTION INTRAMUSCULAR; INTRAVENOUS EVERY 6 HOURS PRN
Status: DISCONTINUED | OUTPATIENT
Start: 2022-07-29 | End: 2022-08-05 | Stop reason: HOSPADM

## 2022-07-29 RX ORDER — METOCLOPRAMIDE HYDROCHLORIDE 5 MG/ML
10 INJECTION INTRAMUSCULAR; INTRAVENOUS EVERY 6 HOURS
Status: DISCONTINUED | OUTPATIENT
Start: 2022-07-29 | End: 2022-07-29

## 2022-07-29 RX ORDER — SODIUM CHLORIDE 0.9 % (FLUSH) 0.9 %
SYRINGE (ML) INJECTION
Status: COMPLETED
Start: 2022-07-29 | End: 2022-07-29

## 2022-07-29 RX ADMIN — ONDANSETRON 4 MG: 2 INJECTION INTRAMUSCULAR; INTRAVENOUS at 18:27

## 2022-07-29 RX ADMIN — ONDANSETRON 4 MG: 2 INJECTION INTRAMUSCULAR; INTRAVENOUS at 05:57

## 2022-07-29 RX ADMIN — BUTORPHANOL TARTRATE 2 MG: 2 INJECTION, SOLUTION INTRAMUSCULAR; INTRAVENOUS at 18:27

## 2022-07-29 RX ADMIN — METOCLOPRAMIDE HYDROCHLORIDE 10 MG: 5 INJECTION INTRAMUSCULAR; INTRAVENOUS at 20:39

## 2022-07-29 RX ADMIN — SODIUM CHLORIDE, SODIUM LACTATE, POTASSIUM CHLORIDE, CALCIUM CHLORIDE AND DEXTROSE MONOHYDRATE: 5; 600; 310; 30; 20 INJECTION, SOLUTION INTRAVENOUS at 02:49

## 2022-07-29 RX ADMIN — ONDANSETRON 4 MG: 2 INJECTION INTRAMUSCULAR; INTRAVENOUS at 12:27

## 2022-07-29 RX ADMIN — BUTORPHANOL TARTRATE 2 MG: 2 INJECTION, SOLUTION INTRAMUSCULAR; INTRAVENOUS at 09:38

## 2022-07-29 RX ADMIN — SODIUM CHLORIDE, PRESERVATIVE FREE: 5 INJECTION INTRAVENOUS at 06:00

## 2022-07-29 RX ADMIN — BUTORPHANOL TARTRATE 2 MG: 2 INJECTION, SOLUTION INTRAMUSCULAR; INTRAVENOUS at 21:29

## 2022-07-29 RX ADMIN — SODIUM CHLORIDE, SODIUM LACTATE, POTASSIUM CHLORIDE, CALCIUM CHLORIDE AND DEXTROSE MONOHYDRATE: 5; 600; 310; 30; 20 INJECTION, SOLUTION INTRAVENOUS at 12:30

## 2022-07-29 RX ADMIN — BUTORPHANOL TARTRATE 2 MG: 2 INJECTION, SOLUTION INTRAMUSCULAR; INTRAVENOUS at 15:25

## 2022-07-29 RX ADMIN — BUTORPHANOL TARTRATE 2 MG: 2 INJECTION, SOLUTION INTRAMUSCULAR; INTRAVENOUS at 02:46

## 2022-07-29 RX ADMIN — BUTORPHANOL TARTRATE 2 MG: 2 INJECTION, SOLUTION INTRAMUSCULAR; INTRAVENOUS at 05:57

## 2022-07-29 RX ADMIN — BUTORPHANOL TARTRATE 2 MG: 2 INJECTION, SOLUTION INTRAMUSCULAR; INTRAVENOUS at 12:26

## 2022-07-29 ASSESSMENT — PAIN SCALES - GENERAL
PAINLEVEL_OUTOF10: 8
PAINLEVEL_OUTOF10: 10
PAINLEVEL_OUTOF10: 9
PAINLEVEL_OUTOF10: 9
PAINLEVEL_OUTOF10: 10
PAINLEVEL_OUTOF10: 8
PAINLEVEL_OUTOF10: 8

## 2022-07-29 ASSESSMENT — PAIN DESCRIPTION - LOCATION
LOCATION: ABDOMEN

## 2022-07-29 ASSESSMENT — PAIN DESCRIPTION - ORIENTATION
ORIENTATION: UPPER
ORIENTATION: UPPER
ORIENTATION: LOWER;MID;UPPER
ORIENTATION: MID;UPPER;LOWER
ORIENTATION: UPPER;LOWER;MID
ORIENTATION: RIGHT;UPPER;MID
ORIENTATION: LOWER;MID;UPPER

## 2022-07-29 ASSESSMENT — PAIN - FUNCTIONAL ASSESSMENT
PAIN_FUNCTIONAL_ASSESSMENT: ACTIVITIES ARE NOT PREVENTED
PAIN_FUNCTIONAL_ASSESSMENT: ACTIVITIES ARE NOT PREVENTED
PAIN_FUNCTIONAL_ASSESSMENT: PREVENTS OR INTERFERES SOME ACTIVE ACTIVITIES AND ADLS

## 2022-07-29 ASSESSMENT — PAIN DESCRIPTION - DESCRIPTORS
DESCRIPTORS: SHARP
DESCRIPTORS: OTHER (COMMENT)
DESCRIPTORS: ACHING;DISCOMFORT
DESCRIPTORS: ACHING;CRAMPING;DISCOMFORT
DESCRIPTORS: DISCOMFORT;ACHING
DESCRIPTORS: ACHING;DISCOMFORT
DESCRIPTORS: CRAMPING;ACHING;DISCOMFORT

## 2022-07-29 NOTE — CONSULTS
blood transfusion     as an infant in [place]    Hypertension     intermittent     Marijuana abuse     occas    Neurologic disorder     Postpartum depression      delivery      labor     Rh sensitized     Seizure ([address]     loses focus, disoriented unpon arousing; etiology unknown per pt; none since     STD (sexually transmitted disease)     Tobacco abuse      Past Surgical History:        Procedure Laterality Date    CHOLECYSTECTOMY  2018    COLONOSCOPY      DILATION AND CURETTAGE      DILATION AND CURETTAGE OF UTERUS      ENDOSCOPY, COLON, DIAGNOSTIC      HERNIA REPAIR      HERNIA REPAIR      LAPAROSCOPY      UPPER GASTROINTESTINAL ENDOSCOPY N/A 2022    EGD BIOPSY performed by Bob Rodrigues MD at [address] [place] [address]     Current Medications:   Scheduled Meds:  Continuous Infusions:   dextrose 5% in lactated ringers 125 mL/hr at 22 0249     PRN Meds:ondansetron, acetaminophen, butorphanol    Allergies:  Patient has no known allergies. Social History:   Social History     Socioeconomic History    Marital status:      Spouse name: None    Number of children: None    Years of education: None    Highest education level: None   Tobacco Use    Smoking status: Former     Packs/day: 1.00     Years: 10.00     Pack years: 10.00     Types: Cigarettes     Quit date: 2020     Years since quittin.4    Smokeless tobacco: Never   Vaping Use    Vaping Use: Never used   Substance and Sexual Activity    Alcohol use: Not Currently     Comment: socially    Drug use: Not Currently     Frequency: 2.0 times per week     Types: Marijuana (Weed)     Comment: states stopped after pregnancy    Sexual activity: Yes     Partners: Male       Family History:       Problem Relation Age of Onset    Depression Mother     Leukemia Mother     Breast Cancer Mother     Cervical Cancer Mother     Heart Disease Father     Depression Father     Schizophrenia Father     Hypertension Father    . Otherwise non-pertinent to the chief complaint. REVIEW OF SYSTEMS:    As mentioned in HPI, all other systems negative. PHYSICAL EXAM:    Vitals:    BP (!) 156/90   Pulse 90   Temp 98.3 °F (36.8 °C) (Oral)   Resp 18   Ht 5' 4\" (1.626 m)   Wt 157 lb (71.2 kg)   LMP 01/24/2022   SpO2 99%   BMI 26.95 kg/m²   Constitutional: The patient is awake, alert, and oriented. Skin: Warm and dry. No rashes were noted. No jaundice. HEENT: Eyes show round, and reactive pupils. Moist mucous membranes, no ulcerations, no thrush. Neck: Supple to movements. No lymphadenopathy. Chest: Clear to auscultation  Cardiovascular: S1 and S2 are rhythmic and regular. No murmurs appreciated. Abdomen: Gravid uterus, tenderness right and left upper quadrants. Extremities: No clubbing, no cyanosis, no edema.   Musculoskeletal: No gross focal abnormalities  Neurological:, Alert, oriented x3, no gross motor abnormalities  Lines: peripheral      CBC+dif:  Recent Labs     07/27/22  1450   WBC 25.3*   HGB 12.7   HCT 36.2   MCV 92.1      NEUTROABS 22.54*     Lab Results   Component Value Date    CRP 0.2 01/12/2021    CRP <0.1 01/07/2021    CRP 0.2 07/17/2018     No results found for: [facility]  Lab Results   Component Value Date    SEDRATE 2 01/12/2021    SEDRATE 0 01/07/2021    SEDRATE 3 07/17/2018     Lab Results   Component Value Date    ALT 11 07/27/2022    AST 19 07/27/2022    ALKPHOS 69 07/27/2022    BILITOT 0.3 07/27/2022     Lab Results   Component Value Date/Time     07/27/2022 02:50 PM    K 3.9 07/27/2022 02:50 PM    K 2.7 06/08/2022 03:30 AM     07/27/2022 02:50 PM    CO2 18 07/27/2022 02:50 PM    BUN 8 07/27/2022 02:50 PM    CREATININE 0.4 07/27/2022 02:50 PM    GFRAA >60 07/27/2022 02:50 PM    LABGLOM >60 07/27/2022 02:50 PM    GLUCOSE 129 07/27/2022 02:50 PM    GLUCOSE 92 05/08/2011 02:04 PM    PROT 7.4 07/27/2022 02:50 PM    LABALBU 4.0 07/27/2022 02:50 PM    CALCIUM 9.0 07/27/2022 02:50 PM    BILITOT 0.3 07/27/2022 02:50 PM    ALKPHOS 69 07/27/2022 02:50 PM    AST 19 07/27/2022 02:50 PM    ALT 11 07/27/2022 02:50 PM       Lab Results   Component Value Date/Time    PROTIME 10.5 09/25/2016 01:50 AM    INR 1.0 09/25/2016 01:50 AM       Lab Results   Component Value Date/Time    TSH 0.762 07/07/2022 02:40 PM       Lab Results   Component Value Date/Time    COLORU Yellow 07/27/2022 02:50 PM    PHUR 6.5 07/27/2022 02:50 PM    LABCAST RARE  07/30/2012 10:45 AM    WBCUA 0-1 07/07/2022 02:40 PM    WBCUA NONE 02/09/2012 08:05 AM    RBCUA 0-1 07/07/2022 02:40 PM    RBCUA NONE 12/31/2013 11:53 AM    MUCUS Present 07/07/2022 02:40 PM    TRICHOMONAS MODERATE 12/15/2014 05:55 PM    YEAST FEW 08/01/2012 02:55 PM    BACTERIA FEW 07/07/2022 02:40 PM    CLARITYU Clear 07/27/2022 02:50 PM    SPECGRAV 1.025 07/27/2022 02:50 PM    LEUKOCYTESUR Negative 07/27/2022 02:50 PM    UROBILINOGEN 0.2 07/27/2022 02:50 PM    BILIRUBINUR Negative 07/27/2022 02:50 PM    BILIRUBINUR NEGATIVE 02/09/2012 08:05 AM    BLOODU Negative 07/27/2022 02:50 PM    GLUCOSEU Negative 07/27/2022 02:50 PM    GLUCOSEU NEGATIVE 02/09/2012 08:05 AM    AMORPHOUS FEW 03/29/2022 03:59 PM       No results found for: EEN8FJJ, BEART, D6EXTWQK, PHART, THGBART, TTX2CGS, PO2ART, JRT9VEU  Radiology:  US OB 1 OR MORE FETUS LIMITED   Final Result   1. Single live intrauterine pregnancy with cardiac activity detected at 136   beats per minute. 2. Gestational age utilizing ultrasound measurement is 25 weeks and 5 days. Clinical correlation recommended         US FETAL BIOPHYSICAL PROFILE WO NON STRESS TESTING    (Results Pending)   US DOPPLER FETAL UMBILICAL ARTERY    (Results Pending)   US OB TRANSVAGINAL    (Results Pending)       Microbiology:  Stool culture/C. difficile EIA/Giardia antigen/rotavirus antigen:  In process    Assessment:  Nausea /vomiting: Differential diagnosis includes pregnancy itself, marijuana related, Crohn's flareup  Better since admission, had one episode of vomiting overnight. Leukocytosis:  Diarrhea: She had only 1 bowel movements for the past couple days. Less likely to be C. difficile    Plan:    Watch her off antibiotics  Blood cultures and CBC with differential  Will follow with you    Thank you for having us see this patient in consultation. I will be discussing this case with the treating physicians.       Electronically signed by Luis Carcamo MD on 7/29/2022 at 11:58 AM

## 2022-07-29 NOTE — PROGRESS NOTES
Pt requested taken off of monitor. States straps are hurting her stomach and back. Monitors turned off, breakfast tray at bedside.

## 2022-07-29 NOTE — PROGRESS NOTES
Spoke with Aj with Dr [facility], orders for add on test to stool, and AM labs for tomorrow, then they will see her tomorrow.

## 2022-07-29 NOTE — PROGRESS NOTES
Dr. Jarrod Perry requested moniroting. BP (!) 153/88   Pulse 75   Temp 97.9 °F (36.6 °C) (Oral)   Resp 18   Ht 5' 4\" (1.626 m)   Wt 157 lb (71.2 kg)   LMP 01/24/2022   SpO2 99%   BMI 26.95 kg/m²   Fetal heart rate:  Baseline Heart Rate 140, accelerations: present  decelerations: absent  Contraction frequency:  0 minutes  Membranes:  Intact    Dr. Elisha Quiñones notified.     Plan:   Monitor  Mfm involved  Gastro consult    Kervin Cardoza MD

## 2022-07-29 NOTE — PROGRESS NOTES
Dr. Alex Arshad requested monitoring. Pt resting    BP (!) 156/90   Pulse 90   Temp 98.3 °F (36.8 °C) (Oral)   Resp 18   Ht 5' 4\" (1.626 m)   Wt 157 lb (71.2 kg)   LMP 01/24/2022   SpO2 99%   BMI 26.95 kg/m²   Fetal heart rate:  Baseline Heart Rate 140, accelerations: present  decelerations: absent  Contraction frequency:  0 minutes  Membranes:  Intact    Dr. Elizabeth Seo   notified.     Plan:   Iup at 26 wks  Crohns awaiting gastro inoput  ID pemd lexii cx  Mfm following    Roseanna Montes MD

## 2022-07-29 NOTE — PROGRESS NOTES
Went in to adjust US, patient states she would like some time off of the monitor to have some relief from the straps, updated patient to put call light on when she is ready to be placed back on the monitor.

## 2022-07-29 NOTE — PROGRESS NOTES
MATERNAL FETAL MEDICINE PROGRESS NOTE    NAME: Christina Zafar  MRN: [id number]            SERVICE DATE: 2022  SERVICE TIME: 11:23 AM  REQUESTING PROVIDER: Aneesh Glaser, *          Christina Zafar is a 40 y.o. female, [id number] at 26w4d with an EMELIA of 10/31/2022, by Last Menstrual Period dating method. Patient is here with the following problems:  Principal Problem:    Hyperemesis gravidarum  Active Problems:    History of IUGR (intrauterine growth retardation) and stillbirth, currently pregnant, first trimester    Hyperemesis    Elevated blood pressure affecting pregnancy in second trimester, antepartum    Positive urine drug screen    Marijuana use  Resolved Problems:    * No resolved hospital problems.  *      SUBJECTIVE:  HISTORY OF THE PRESENT ILLNESS:  HISTORY REVIEW  Past Medical History:   Diagnosis Date    Acute Crohn's disease ([address]     Anxiety attack since age 16    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 inhaler     Crohn's colitis ([address]     Depression     medicated with zoloft     Herpes simplex virus (HSV) infection     History of blood transfusion     as an infant in [place]    Hypertension     intermittent     Marijuana abuse     occas    Neurologic disorder     Postpartum depression      delivery      labor     Rh sensitized     Seizure ([address]     loses focus, disoriented unpon arousing; etiology unknown per pt; none since     STD (sexually transmitted disease)     Tobacco abuse      Past Surgical History:   Procedure Laterality Date    CHOLECYSTECTOMY  2018    COLONOSCOPY      DILATION AND CURETTAGE      DILATION AND CURETTAGE OF UTERUS      ENDOSCOPY, COLON, DIAGNOSTIC      HERNIA REPAIR      HERNIA REPAIR      LAPAROSCOPY      UPPER GASTROINTESTINAL ENDOSCOPY N/A 2022    EGD BIOPSY performed by Constantin Houser MD at [facility] ENDOSCOPY     Family History   Problem Relation Age of Onset    Depression Mother     Leukemia Mother     Breast Cancer Mother     Cervical Cancer Mother     Heart Disease Father     Depression Father     Schizophrenia Father     Hypertension Father      Social History     Socioeconomic History    Marital status:      Spouse name: Not on file    Number of children: Not on file    Years of education: Not on file    Highest education level: Not on file   Occupational History    Not on file   Tobacco Use    Smoking status: Former     Packs/day: 1.00     Years: 10.00     Pack years: 10.00     Types: Cigarettes     Quit date: 2020     Years since quittin.4    Smokeless tobacco: Never   Vaping Use    Vaping Use: Never used   Substance and Sexual Activity    Alcohol use: Not Currently     Comment: socially    Drug use: Not Currently     Frequency: 2.0 times per week     Types: Marijuana (Weed)     Comment: states stopped after pregnancy    Sexual activity: Yes     Partners: Male   Other Topics Concern    Not on file   Social History Narrative    Not on file     Social Determinants of Health     Financial Resource Strain: Not on file   Food Insecurity: Not on file   Transportation Needs: Not on file   Physical Activity: Not on file   Stress: Not on file   Social Connections: Not on file   Intimate Partner Violence: Not on file   Housing Stability: Not on file     OB History    Para Term  AB Living   6 3 2 1 2 3   SAB IAB Ectopic Molar Multiple Live Births   2 0 0 0 0 3      # Outcome Date GA Lbr Marco/2nd Weight Sex Delivery Anes PTL Lv   6 Current            5 SAB 2021 6w0d          4 Term 14 37w0d  5 lb 1 oz (2.296 kg) M Vag-Spont EPI N NARENDRA      Apgar1: 8  Apgar5: 9   3  12 35w4d  4 lb 8 oz (2.041 kg) M Vag-Spont  Y NARENDRA      Complications: Oligohydramnios      Name: Katie Monroe: 9  Apgar5: 9   2 2011 11w0d          1 Term / 42w0d  7 lb 8 oz (3.402 kg) F Vag-Spont EPI N NARENDRA         ALLERGIES: Patient has no known allergies. CURRENT MEDS:     dextrose 5% in lactated ringers 125 mL/hr at 07/29/22 0249     ondansetron, acetaminophen, butorphanol    PRIOR TO ADMISSION MEDICATIONS:  Prior to Admission medications    Medication Sig Start Date End Date Taking?  Authorizing Provider   metoclopramide (REGLAN) 10 MG tablet Take 1 tablet by mouth 3 times daily as needed (nausea, vomiting) 7/25/22   Salvatore Lynch MD   aspirin (ASPIRIN 81) 81 MG chewable tablet Take 1 tablet by mouth daily 6/14/22   Salvatore Lynch MD   sucralfate (CARAFATE) 1 GM tablet Take 1 tablet by mouth 4 times daily 6/11/22 7/12/22  Minor Snellen, APRN - CNP   potassium chloride (KLOR-CON M) 20 MEQ extended release tablet Take 2 tablets by mouth 2 times daily for 2 days 6/11/22 6/13/22  Minor Snellen, APRN - CNP   vitamin D (CHOLECALCIFEROL) 25 MCG (1000 UT) TABS tablet Take 1,000 Units by mouth nightly    Historical Provider, MD   Prenatal Vit-Fe Fumarate-FA (PRENATAL VITAMIN) 27-0.8 MG TABS Take 1 tablet by mouth nightly    Historical Provider, MD   doxyLAMINE succinate (GNP SLEEP AID) 25 MG tablet Take 12.5-25 mg by mouth nightly as needed (SLEEP/NAUSEA)    Historical Provider, MD   fluticasone (FLONASE) 50 MCG/ACT nasal spray 1 spray by Each Nostril route daily    Historical Provider, MD   vitamin B-6 (PYRIDOXINE) 25 MG tablet Take 1 tablet by mouth in the morning, at noon, and at bedtime 5/24/22   Salvatore Lynch MD   famotidine (PEPCID) 20 MG tablet Take 1 tablet by mouth 2 times daily 4/26/22   Salvatore Lynch MD   sertraline (ZOLOFT) 25 MG tablet Take 1 tablet by mouth at bedtime 3/30/22   Robert Preston MD   ondansetron (ZOFRAN) 4 MG tablet Take 4 mg by mouth every 8 hours as needed for Nausea or Vomiting Patient is now taking PO form  Pt weaned off pump    Historical Provider, MD       OBJECTIVE:    REVIEW OF SYSTEMS:    Fetal Movement normal  Vaginal Bleeding denies  Contractions denies  LOF/ROM denies  S/Sx Pre-eclampsia denies    PHYSICAL EXAM    Well Developed well-nourished  female in moderate amount of distress from her abdominal pain  Lungs/Breathing regular unlabored  Heart regular rhythm and rate  Abdomen Gravid, soft, diffuse abdominal tenderness, no uterine tenderness, there is some guarding, there may be some rebound, no contractions palpated  Extremities full range of motion        LAST VITALS:  BP (!) 156/90   Pulse 90   Temp 98.3 °F (36.8 °C) (Oral)   Resp 18   Ht 5' 4\" (1.626 m)   Wt 157 lb (71.2 kg)   LMP 01/24/2022   SpO2 99%   BMI 26.95 kg/m²        PRENATAL LABS  Diagnostic tests reviewed for today's visit:   No results found for this or any previous visit (from the past 24 hour(s)). IMP:  Alisson Patterson is a 40 y.o. female [id number] at 26w4d with most likely a Crohn's flare. She has significant abdominal pain. She has not had any imaging to evaluate the etiology of this pain. CAT scans with and without contrast are safe at this point in time. Flatplate of the abdomen if indicated is safe at this time in pregnancy although there will be more radiation exposure from this and from the CT. MRI is safe in pregnancy just not with gadolinium. She does not have any current obstetrical complaints. Stadol by the fact that it is an agonist antagonist is effective only for the first dose. After that there is diminishing pain relief. To achieve the best pain relief I would recommend changing from Stadol to morphine. The baby is appropriately grown. GRECIA is normal.  I do not think she needs more than about 20 or 30 minutes of monitoring a day. I do not feel that this is hyperemesis gravidarum. This is a Crohn's flare. PLAN:  I defer management to GI. Steroids are safe in pregnancy if needed. Azulfidine is safe in pregnancy if needed. Again imaging is safe in pregnancy if needed.   Await ID recommendations although I think her elevated white count is from her Crohn's flare not from an

## 2022-07-30 LAB
BASOPHILS ABSOLUTE: 0.06 E9/L (ref 0–0.2)
BASOPHILS RELATIVE PERCENT: 0.4 % (ref 0–2)
C DIFF TOXIN/ANTIGEN: NORMAL
C-REACTIVE PROTEIN: 0.3 MG/DL (ref 0–0.4)
C. DIFFICILE TOXIN MOLECULAR: NORMAL
EOSINOPHILS ABSOLUTE: 0.17 E9/L (ref 0.05–0.5)
EOSINOPHILS RELATIVE PERCENT: 1 % (ref 0–6)
HCT VFR BLD CALC: 40.4 % (ref 34–48)
HEMOGLOBIN: 13.2 G/DL (ref 11.5–15.5)
IMMATURE GRANULOCYTES #: 0.31 E9/L
IMMATURE GRANULOCYTES %: 1.8 % (ref 0–5)
LYMPHOCYTES ABSOLUTE: 4.45 E9/L (ref 1.5–4)
LYMPHOCYTES RELATIVE PERCENT: 26.2 % (ref 20–42)
MCH RBC QN AUTO: 31.1 PG (ref 26–35)
MCHC RBC AUTO-ENTMCNC: 32.7 % (ref 32–34.5)
MCV RBC AUTO: 95.3 FL (ref 80–99.9)
MONOCYTES ABSOLUTE: 0.8 E9/L (ref 0.1–0.95)
MONOCYTES RELATIVE PERCENT: 4.7 % (ref 2–12)
NEUTROPHILS ABSOLUTE: 11.2 E9/L (ref 1.8–7.3)
NEUTROPHILS RELATIVE PERCENT: 65.9 % (ref 43–80)
PDW BLD-RTO: 13.8 FL (ref 11.5–15)
PLATELET # BLD: 420 E9/L (ref 130–450)
PMV BLD AUTO: 9.4 FL (ref 7–12)
RBC # BLD: 4.24 E12/L (ref 3.5–5.5)
ROTAVIRUS ANTIGEN: NORMAL
SEDIMENTATION RATE, ERYTHROCYTE: 37 MM/HR (ref 0–20)
WBC # BLD: 17 E9/L (ref 4.5–11.5)

## 2022-07-30 PROCEDURE — 6370000000 HC RX 637 (ALT 250 FOR IP): Performed by: OBSTETRICS & GYNECOLOGY

## 2022-07-30 PROCEDURE — 99233 SBSQ HOSP IP/OBS HIGH 50: CPT | Performed by: OBSTETRICS & GYNECOLOGY

## 2022-07-30 PROCEDURE — 87088 URINE BACTERIA CULTURE: CPT

## 2022-07-30 PROCEDURE — 87040 BLOOD CULTURE FOR BACTERIA: CPT

## 2022-07-30 PROCEDURE — 86140 C-REACTIVE PROTEIN: CPT

## 2022-07-30 PROCEDURE — 85025 COMPLETE CBC W/AUTO DIFF WBC: CPT

## 2022-07-30 PROCEDURE — 6360000002 HC RX W HCPCS: Performed by: OBSTETRICS & GYNECOLOGY

## 2022-07-30 PROCEDURE — 85651 RBC SED RATE NONAUTOMATED: CPT

## 2022-07-30 PROCEDURE — 36415 COLL VENOUS BLD VENIPUNCTURE: CPT

## 2022-07-30 PROCEDURE — 1220000000 HC SEMI PRIVATE OB R&B

## 2022-07-30 RX ORDER — LABETALOL 100 MG/1
100 TABLET, FILM COATED ORAL 2 TIMES DAILY
Status: DISCONTINUED | OUTPATIENT
Start: 2022-07-30 | End: 2022-07-31

## 2022-07-30 RX ADMIN — METOCLOPRAMIDE HYDROCHLORIDE 10 MG: 5 INJECTION INTRAMUSCULAR; INTRAVENOUS at 09:56

## 2022-07-30 RX ADMIN — ONDANSETRON 4 MG: 2 INJECTION INTRAMUSCULAR; INTRAVENOUS at 06:45

## 2022-07-30 RX ADMIN — LABETALOL HYDROCHLORIDE 100 MG: 100 TABLET, FILM COATED ORAL at 21:21

## 2022-07-30 RX ADMIN — METOCLOPRAMIDE HYDROCHLORIDE 10 MG: 5 INJECTION INTRAMUSCULAR; INTRAVENOUS at 16:34

## 2022-07-30 RX ADMIN — ONDANSETRON 4 MG: 2 INJECTION INTRAMUSCULAR; INTRAVENOUS at 00:45

## 2022-07-30 RX ADMIN — BUTORPHANOL TARTRATE 2 MG: 2 INJECTION, SOLUTION INTRAMUSCULAR; INTRAVENOUS at 23:27

## 2022-07-30 RX ADMIN — BUTORPHANOL TARTRATE 2 MG: 2 INJECTION, SOLUTION INTRAMUSCULAR; INTRAVENOUS at 09:56

## 2022-07-30 RX ADMIN — BUTORPHANOL TARTRATE 2 MG: 2 INJECTION, SOLUTION INTRAMUSCULAR; INTRAVENOUS at 03:46

## 2022-07-30 RX ADMIN — LABETALOL HYDROCHLORIDE 100 MG: 100 TABLET, FILM COATED ORAL at 12:59

## 2022-07-30 RX ADMIN — ONDANSETRON 4 MG: 2 INJECTION INTRAMUSCULAR; INTRAVENOUS at 19:41

## 2022-07-30 RX ADMIN — BUTORPHANOL TARTRATE 2 MG: 2 INJECTION, SOLUTION INTRAMUSCULAR; INTRAVENOUS at 00:44

## 2022-07-30 RX ADMIN — BUTORPHANOL TARTRATE 2 MG: 2 INJECTION, SOLUTION INTRAMUSCULAR; INTRAVENOUS at 19:41

## 2022-07-30 RX ADMIN — BUTORPHANOL TARTRATE 2 MG: 2 INJECTION, SOLUTION INTRAMUSCULAR; INTRAVENOUS at 06:46

## 2022-07-30 RX ADMIN — BUTORPHANOL TARTRATE 2 MG: 2 INJECTION, SOLUTION INTRAMUSCULAR; INTRAVENOUS at 12:54

## 2022-07-30 RX ADMIN — BUTORPHANOL TARTRATE 2 MG: 2 INJECTION, SOLUTION INTRAMUSCULAR; INTRAVENOUS at 16:34

## 2022-07-30 RX ADMIN — METOCLOPRAMIDE HYDROCHLORIDE 10 MG: 5 INJECTION INTRAMUSCULAR; INTRAVENOUS at 23:27

## 2022-07-30 RX ADMIN — METOCLOPRAMIDE HYDROCHLORIDE 10 MG: 5 INJECTION INTRAMUSCULAR; INTRAVENOUS at 03:47

## 2022-07-30 RX ADMIN — ONDANSETRON 4 MG: 2 INJECTION INTRAMUSCULAR; INTRAVENOUS at 12:54

## 2022-07-30 ASSESSMENT — PAIN - FUNCTIONAL ASSESSMENT
PAIN_FUNCTIONAL_ASSESSMENT: ACTIVITIES ARE NOT PREVENTED

## 2022-07-30 ASSESSMENT — PAIN DESCRIPTION - LOCATION
LOCATION: ABDOMEN
LOCATION: BACK;ABDOMEN
LOCATION: ABDOMEN
LOCATION: ABDOMEN;BACK
LOCATION: ABDOMEN
LOCATION: ABDOMEN;BACK

## 2022-07-30 ASSESSMENT — PAIN SCALES - GENERAL
PAINLEVEL_OUTOF10: 9
PAINLEVEL_OUTOF10: 8
PAINLEVEL_OUTOF10: 7
PAINLEVEL_OUTOF10: 9
PAINLEVEL_OUTOF10: 8
PAINLEVEL_OUTOF10: 8

## 2022-07-30 ASSESSMENT — PAIN DESCRIPTION - ORIENTATION
ORIENTATION: UPPER
ORIENTATION: RIGHT;UPPER
ORIENTATION: UPPER
ORIENTATION: RIGHT;POSTERIOR;UPPER
ORIENTATION: UPPER
ORIENTATION: RIGHT;UPPER

## 2022-07-30 ASSESSMENT — PAIN DESCRIPTION - DESCRIPTORS
DESCRIPTORS: ACHING
DESCRIPTORS: SHARP
DESCRIPTORS: SHARP
DESCRIPTORS: ACHING
DESCRIPTORS: SHARP

## 2022-07-30 NOTE — CONSULTS
Name:  Jovanni Oropeza  :  1985  MRN:  [id number]  Room:  [address] [place] [address]  DOS:  2022    [facility]  Dr. Merary Werner M.D. Dr. Radha Castillo M.D. DEL BatistaO. Dr. Tang Oviedo M.D. Dr. Gil Peguero D.O.     -NP Consult Note-    PCP:  Kelsi Cedeno DO  Admitting Physician:  Edson Little MD  Consultants:  GI, OB/GYN  Chief Complaint:    Chief Complaint   Patient presents with    Hyperemesis Gravidarum     Reason for Consult:  Nausea / vomiting    History of Present Illness  Jovanni Oropeza is a 40 y.o. female patient on consult for nausea and vomiting. Patient reports onset of nausea and vomiting starting this past Tuesday. Emesis consisting of undigested food and liquid. She denies hematemesis or emesis of coffee-ground material.  She also reports diarrhea, onset Tuesday night. Stools were initially black and watery. Stools have become more brown. She reports bowel movements 4 times so far today. She reports onset of symptoms following eating sushi with her family. She reports that nobody else became ill following. She complains of abdominal pain across the upper abdomen. She denies fever. Complains of chills and sweats. She states that she had been having issues with flushing and diarrhea prior to this episode as well. She denies dizziness. She complains of chest pain/anxiety. Denies shortness of breath. Reports initial diagnosis of Crohn's disease a few years ago. She states that she had been on prednisone. She had not used any other maintenance medications per her report. She states that she is scheduled to see gastroenterology at the [facility] clinic following resolution of her pregnancy. PMH: Crohn's disease, seizure disorder (no seizures since age 14), GERD, PUD, seasonal asthma. PSH: Cholecystectomy, hernia repair. EGD 2022 showing normal examined esophagus, gastritis, normal examined small bowel.     Family history: Mother with stomach issues not otherwise specified, heart disease, leukemia. Father with COPD, degenerative joint disease, heart disease, CVA. She is unaware of any other personal or family history of GI issues or malignancy. Social history: Patient reports she quit smoking 2/2022. She reports prior social alcohol use, none within the past several months. She reports occasional use of marijuana, stating most recent use was 7/10/2022. She otherwise denies current or past recreational or illicit drug use. On arrival, WBC 25.3, hemoglobin 12.7, hematocrit 36.2. Normocytic. Platelets 505. BUN 8, creatinine 0.4, sodium 137, potassium 3.9, chloride 102, CO2 18, calcium 9.0. Blood glucose 129. Liver enzymes unremarkable. Stool studies negative. Urinalysis showing negative leukocyte Estrace, negative nitrate, ketones 80+. Urine drug screen positive for marijuana. On prior admission, CT abdomen pelvis 6/8/2022 showed no evidence of active Crohn's disease, right nephrolithiasis without ureteral calculi, fetus in breech position, spondylolysis, degenerative disc disease. Fetal ultrasounds completed this admission, see chart.       TRIAGE VITALS  BP: (!) 143/86, Temp: 97.9 °F (36.6 °C), Heart Rate: 85, Resp: 16, SpO2: 99 %    Vitals:    07/29/22 0252 07/29/22 0803 07/30/22 0959 07/30/22 1259   BP: (!) 142/76 (!) 156/90 (!) 148/83 (!) 143/86   Pulse: 82 90 86 85   Resp: 18 18 16    Temp: 99 °F (37.2 °C) 98.3 °F (36.8 °C) 97.9 °F (36.6 °C)    TempSrc: Oral Oral Oral    SpO2:       Weight:       Height:             Histories  Past Medical History:   Diagnosis Date    Acute Cro[address]'s disease ([facility] 2015    Anxiety attack since age 16    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 inhaler     Crohn's colitis ([facility] [address]     Depression     medicated with zoloft     Herpes simplex virus (HSV) infection     History of blood transfusion     as an infant in [place]    Hypertension Provider, MD   fluticasone (FLONASE) 50 MCG/ACT nasal spray 1 spray by Each Nostril route daily    Historical Provider, MD   vitamin B-6 (PYRIDOXINE) 25 MG tablet Take 1 tablet by mouth in the morning, at noon, and at bedtime 22   Scott Rossi MD   famotidine (PEPCID) 20 MG tablet Take 1 tablet by mouth 2 times daily 22   Scott Rossi MD   sertraline (ZOLOFT) 25 MG tablet Take 1 tablet by mouth at bedtime 3/30/22   Angelo Frias MD   ondansetron (ZOFRAN) 4 MG tablet Take 4 mg by mouth every 8 hours as needed for Nausea or Vomiting Patient is now taking PO form  Pt weaned off pump    Historical Provider, MD       Allergies  Patient has no known allergies.     Social Hx  Social History     Socioeconomic History    Marital status:      Spouse name: Not on file    Number of children: Not on file    Years of education: Not on file    Highest education level: Not on file   Occupational History    Not on file   Tobacco Use    Smoking status: Former     Packs/day: 1.00     Years: 10.00     Pack years: 10.00     Types: Cigarettes     Quit date: 2020     Years since quittin.4    Smokeless tobacco: Never   Vaping Use    Vaping Use: Never used   Substance and Sexual Activity    Alcohol use: Not Currently     Comment: socially    Drug use: Not Currently     Frequency: 2.0 times per week     Types: Marijuana (Weed)     Comment: states stopped after pregnancy    Sexual activity: Yes     Partners: Male   Other Topics Concern    Not on file   Social History Narrative    Not on file     Social Determinants of Health     Financial Resource Strain: Not on file   Food Insecurity: Not on file   Transportation Needs: Not on file   Physical Activity: Not on file   Stress: Not on file   Social Connections: Not on file   Intimate Partner Violence: Not on file   Housing Stability: Not on file       Review of Systems  All bolded are positive; please see HPI  General:  Fever, chills, diaphoresis, fatigue, malaise, night sweats, weight loss  Psychological:  Anxiety, disorientation, hallucinations. ENT:  Epistaxis, headaches, vertigo, visual changes. Cardiovascular:  Chest pain, irregular heartbeats, palpitations, paroxysmal nocturnal dyspnea. Respiratory:  Shortness of breath, coughing, sputum production, hemoptysis, wheezing, orthopnea. Gastrointestinal:  Nausea, vomiting, diarrhea, heartburn, constipation, abdominal pain, hematemesis, hematochezia, melena, acholic stools  Genito-Urinary:  Dysuria, urgency, frequency, hematuria  Musculoskeletal:  Joint pain, joint stiffness, joint swelling, muscle pain  Neurology:  Headache, focal neurological deficits, weakness, numbness, paresthesia  Derm:  Rashes, ulcers, excoriations, bruising  Extremities:  Decreased ROM, peripheral edema, mottling    Physical Examination  Vitals:  BP (!) 143/86   Pulse 85   Temp 97.9 °F (36.6 °C) (Oral)   Resp 16   Ht 5' 4\" (1.626 m)   Wt 157 lb (71.2 kg)   LMP 01/24/2022   SpO2 99%   BMI 26.95 kg/m²   General Appearance:  awake, alert, and oriented to person, place, time, and purpose; appears stated age and cooperative; no apparent distress no labored breathing  HEENT:  NCAT; PERRL; conjunctiva pink, sclera clear  Neck:  no adenopathy, bruit, JVD, tenderness, masses, or nodules; supple, symmetrical, trachea midline, thyroid not enlarged  Lung:  clear to auscultation bilaterally; no use of accessory muscles; no rhonchi, rales, or crackles  Heart:  regular rate and regular rhythm without murmur, rub, or gallop  Abdomen:  soft, tender across upper abdomen, pregnant; normoactive bowel sounds; no organomegaly  Extremities:  extremities normal, atraumatic, no cyanosis or edema  Musculokeletal:  no joint swelling, no muscle tenderness. ROM normal in all joints of extremities.    Neurologic:  mental status A&Ox3, thought content appropriate; CN II-XII grossly intact; sensation intact, motor strength 5/5 globally; no slurred speech    Laboratory Data  Recent Results (from the past 24 hour(s))   CBC with Auto Differential    Collection Time: 07/30/22  6:20 AM   Result Value Ref Range    WBC 17.0 (H) 4.5 - 11.5 E9/L    RBC 4.24 3.50 - 5.50 E12/L    Hemoglobin 13.2 11.5 - 15.5 g/dL    Hematocrit 40.4 34.0 - 48.0 %    MCV 95.3 80.0 - 99.9 fL    MCH 31.1 26.0 - 35.0 pg    MCHC 32.7 32.0 - 34.5 %    RDW 13.8 11.5 - 15.0 fL    Platelets 911 998 - 370 E9/L    MPV 9.4 7.0 - 12.0 fL    Neutrophils % 65.9 43.0 - 80.0 %    Immature Granulocytes % 1.8 0.0 - 5.0 %    Lymphocytes % 26.2 20.0 - 42.0 %    Monocytes % 4.7 2.0 - 12.0 %    Eosinophils % 1.0 0.0 - 6.0 %    Basophils % 0.4 0.0 - 2.0 %    Neutrophils Absolute 11.20 (H) 1.80 - 7.30 E9/L    Immature Granulocytes # 0.31 E9/L    Lymphocytes Absolute 4.45 (H) 1.50 - 4.00 E9/L    Monocytes Absolute 0.80 0.10 - 0.95 E9/L    Eosinophils Absolute 0.17 0.05 - 0.50 E9/L    Basophils Absolute 0.06 0.00 - 0.20 E9/L   Sedimentation Rate    Collection Time: 07/30/22  6:20 AM   Result Value Ref Range    Sed Rate 37 (H) 0 - 20 mm/Hr   C-Reactive Protein    Collection Time: 07/30/22  6:20 AM   Result Value Ref Range    CRP 0.3 0.0 - 0.4 mg/dL       Imaging  US OB 1 OR MORE FETUS LIMITED    Result Date: 7/27/2022  EXAMINATION: Second TRIMESTER OBSTETRIC ULTRASOUND 7/27/2022 TECHNIQUE: Transabdominal ultrasound. COMPARISON: Correlation made with prior from July 12, 2022 HISTORY: ORDERING SYSTEM PROVIDED HISTORY: Abdominal pain, N/V TECHNOLOGIST PROVIDED HISTORY: Reason for exam:->Abdominal pain, N/V. What reading provider will be dictating this exam?->CRC FINDINGS: Single live intrauterine pregnancy with cardiac activity detected at 136 beats per minute. There is fetal body and limb motion. Fetal position is cephalic. Placental location is lateral and left. Amniotic fluid index is 18.9 cm. Biparietal diameter is 6.2 cm. Head circumference is 23.1 cm. Abdominal circumference is 21.9 cm.   Femur length is 4.8 cm.  Cervical length is 3.1 cm. Fetal anatomy is grossly unremarkable as visualized. 1. Single live intrauterine pregnancy with cardiac activity detected at 136 beats per minute. 2. Gestational age utilizing ultrasound measurement is 25 weeks and 5 days. Clinical correlation recommended         Assessment and Plan  Patient is a 40 y.o. female on consult for nausea and vomiting. 1.  Crohn's disease / diarrhea  -Consider biologic after resolution of pregnancy  -CT abdomen pelvis 6/8/2022 showing no evidence of active Crohn's disease  -Stool studies negative  -Reports flushing with diarrhea in the past - check CGA     2. GERD / nausea / vomiting  -Likely hyperemesis of pregnancy versus cannabis hyperemesis syndrome  -Recommend discontinuation of cannabis use - persistent positive UDS  -Supportive and symptomatic treatment  -EGD in February of this year revealing mild gastritis  -Cannabinoid hyperemesis should resolve within 72 hours of discontinuation of use  -Patient is responding to current antiemetics - speaks against cannabinoid hyperemesis  -Consider Pepcid 1-2x/day  -Antiemetics per admitting     3. Abdominal pain  -Prior CT abdomen pelvis 6/8/2022 showing no significant findings  -Prior cholecystectomy  -Consider gastric source of pain  -Prior EGD showing gastritis  -Consider Pepcid 1-2 times daily  -Pain management per admitting    4. Pregnancy  -Per admitting/OB/GYN      Suspicions for some type of foodborne illness. Stool studies negative. Consider probiotics. Consider Pepcid 1-2 times daily for treatment of GERD/nausea. Defer prescribing to GYN during pregnancy. Continue supportive care. Further orders will depend on patient course and results of testing. Thank you for the opportunity to participate in the care of Ms. Ayala.       LEIDY Diaz - CNP  6:30 PM  7/30/2022

## 2022-07-30 NOTE — PROGRESS NOTES
Dr Cherelle Champagne updated pt is requesting something additionally for nausea.  States pt may have Reglan between Zofran doses

## 2022-07-30 NOTE — PROGRESS NOTES
Pt in bed. c\o abdominal pain and nausea. Would like something additionally for nausea.  POC discussed with pt

## 2022-07-30 NOTE — PROGRESS NOTES
MATERNAL FETAL MEDICINE PROGRESS NOTE    NAME: Veda Sepulveda  MRN: [id number]            SERVICE DATE: 2022  SERVICE TIME: 12:04 PM  REQUESTING PROVIDER: Miryam Torres, *          Veda Sepulveda is a 40 y.o. female, [id number] at 26w5d with an EMELIA of 10/31/2022, by Last Menstrual Period dating method. Patient is here with the following problems:  Principal Problem:    Hyperemesis gravidarum  Active Problems:    History of IUGR (intrauterine growth retardation) and stillbirth, currently pregnant, first trimester    Hyperemesis    Elevated blood pressure affecting pregnancy in second trimester, antepartum    Positive urine drug screen    Marijuana use  Resolved Problems:    * No resolved hospital problems.  *  Hypertension    SUBJECTIVE:  HISTORY OF THE PRESENT ILLNESS:  HISTORY REVIEW  Past Medical History:   Diagnosis Date    Acute Crohn's disease ([address]     Anxiety attack since age 16    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 inhaler     Crohn's colitis ([address]     Depression     medicated with zoloft     Herpes simplex virus (HSV) infection     History of blood transfusion     as an infant in [place]    Hypertension     intermittent     Marijuana abuse     occas    Neurologic disorder     Postpartum depression      delivery      labor     Rh sensitized     Seizure ([address]     loses focus, disoriented unpon arousing; etiology unknown per pt; none since     STD (sexually transmitted disease)     Tobacco abuse      Past Surgical History:   Procedure Laterality Date    CHOLECYSTECTOMY  2018    COLONOSCOPY      DILATION AND CURETTAGE      DILATION AND CURETTAGE OF UTERUS      ENDOSCOPY, COLON, DIAGNOSTIC      HERNIA REPAIR      HERNIA REPAIR      LAPAROSCOPY      UPPER GASTROINTESTINAL ENDOSCOPY N/A 2022    EGD BIOPSY performed by Denise Alvarez MD at [facility] ENDOSCOPY     Family History   Problem Relation Age of Onset Depression Mother     Leukemia Mother     Breast Cancer Mother     Cervical Cancer Mother     Heart Disease Father     Depression Father     Schizophrenia Father     Hypertension Father      Social History     Socioeconomic History    Marital status:      Spouse name: Not on file    Number of children: Not on file    Years of education: Not on file    Highest education level: Not on file   Occupational History    Not on file   Tobacco Use    Smoking status: Former     Packs/day: 1.00     Years: 10.00     Pack years: 10.00     Types: Cigarettes     Quit date: 2020     Years since quittin.4    Smokeless tobacco: Never   Vaping Use    Vaping Use: Never used   Substance and Sexual Activity    Alcohol use: Not Currently     Comment: socially    Drug use: Not Currently     Frequency: 2.0 times per week     Types: Marijuana ([employer])     Comment: states stopped after pregnancy    Sexual activity: Yes     Partners: Male   Other Topics Concern    Not on file   Social History Narrative    Not on file     Social Determinants of Health     Financial Resource Strain: Not on file   Food Insecurity: Not on file   Transportation Needs: Not on file   Physical Activity: Not on file   Stress: Not on file   Social Connections: Not on file   Intimate Partner Violence: Not on file   Housing Stability: Not on file     OB History    Para Term  AB Living   6 3 2 1 2 3   SAB IAB Ectopic Molar Multiple Live Births   2 0 0 0 0 3      # Outcome Date GA Lbr Marco/2nd Weight Sex Delivery Anes PTL Lv   6 Current            5 SAB 2021 6w0d          4 Term 14 37w0d  5 lb 1 oz (2.296 kg) M Vag-Spont EPI N NARENDRA      Apgar1: 8  Apgar5: 9   3  / 35w4d  4 lb 8 oz (2.041 kg) M Vag-Spont  Y NARENDRA      Complications: Oligohydramnios      Name: Chiara Zamorano: 9  Apgar5: 9   2 2011 11w0d          1 Term / 42w0d  7 lb 8 oz (3.402 kg) F Vag-Spont EPI N NARENDRA         ALLERGIES: Patient has no known allergies. CURRENT MEDS:     dextrose 5% in lactated ringers 125 mL/hr at 07/29/22 1230     metoclopramide, ondansetron, acetaminophen, butorphanol    PRIOR TO ADMISSION MEDICATIONS:  Prior to Admission medications    Medication Sig Start Date End Date Taking?  Authorizing Provider   metoclopramide (REGLAN) 10 MG tablet Take 1 tablet by mouth 3 times daily as needed (nausea, vomiting) 7/25/22   Ely Caballero MD   aspirin (ASPIRIN 81) 81 MG chewable tablet Take 1 tablet by mouth daily 6/14/22   Ely Caballero MD   sucralfate (CARAFATE) 1 GM tablet Take 1 tablet by mouth 4 times daily 6/11/22 7/12/22  [facility], APRN - CNP   potassium chloride (KLOR-CON M) 20 MEQ extended release tablet Take 2 tablets by mouth 2 times daily for 2 days 6/11/22 6/13/22  [facility], APRN - CNP   vitamin D (CHOLECALCIFEROL) 25 MCG (1000 UT) TABS tablet Take 1,000 Units by mouth nightly    Historical Provider, MD   Prenatal Vit-Fe Fumarate-FA (PRENATAL VITAMIN) 27-0.8 MG TABS Take 1 tablet by mouth nightly    Historical Provider, MD   doxyLAMINE succinate (GNP SLEEP AID) 25 MG tablet Take 12.5-25 mg by mouth nightly as needed (SLEEP/NAUSEA)    Historical Provider, MD   fluticasone (FLONASE) 50 MCG/ACT nasal spray 1 spray by Each Nostril route daily    Historical Provider, MD   vitamin B-6 (PYRIDOXINE) 25 MG tablet Take 1 tablet by mouth in the morning, at noon, and at bedtime 5/24/22   Ely Caballero MD   famotidine (PEPCID) 20 MG tablet Take 1 tablet by mouth 2 times daily 4/26/22   Ely Caballero MD   sertraline (ZOLOFT) 25 MG tablet Take 1 tablet by mouth at bedtime 3/30/22   Gaurav Perera MD   ondansetron (ZOFRAN) 4 MG tablet Take 4 mg by mouth every 8 hours as needed for Nausea or Vomiting Patient is now taking PO form  Pt weaned off pump    Historical Provider, MD       OBJECTIVE:    REVIEW OF SYSTEMS:    Fetal Movement good  Vaginal Bleeding denies  Contractions denies  LOF/ROM denies  S/Sx Pre-eclampsia denies    PHYSICAL EXAM    Well Developed well-nourished  female in slight abdominal distress  Lungs/Breathing regular, unlabored  Heart regular rhythm and rate  Abdomen Gravid, soft, there is no uterine tenderness, there is some guarding, there is slight rebound, there are no absolute peritoneal signs, no contractions are identified, the abdomen does appear to be less tender today than yesterday  Extremities full range of motion        LAST VITALS:  BP (!) 148/83   Pulse 86   Temp 97.9 °F (36.6 °C) (Oral)   Resp 16   Ht 5' 4\" (1.626 m)   Wt 157 lb (71.2 kg)   LMP 01/24/2022   SpO2 99%   BMI 26.95 kg/m²        PRENATAL LABS  Diagnostic tests reviewed for today's visit:   Recent Results (from the past 24 hour(s))   CBC with Auto Differential    Collection Time: 07/30/22  6:20 AM   Result Value Ref Range    WBC 17.0 (H) 4.5 - 11.5 E9/L    RBC 4.24 3.50 - 5.50 E12/L    Hemoglobin 13.2 11.5 - 15.5 g/dL    Hematocrit 40.4 34.0 - 48.0 %    MCV 95.3 80.0 - 99.9 fL    MCH 31.1 26.0 - 35.0 pg    MCHC 32.7 32.0 - 34.5 %    RDW 13.8 11.5 - 15.0 fL    Platelets 579 145 - 271 E9/L    MPV 9.4 7.0 - 12.0 fL    Neutrophils % 65.9 43.0 - 80.0 %    Immature Granulocytes % 1.8 0.0 - 5.0 %    Lymphocytes % 26.2 20.0 - 42.0 %    Monocytes % 4.7 2.0 - 12.0 %    Eosinophils % 1.0 0.0 - 6.0 %    Basophils % 0.4 0.0 - 2.0 %    Neutrophils Absolute 11.20 (H) 1.80 - 7.30 E9/L    Immature Granulocytes # 0.31 E9/L    Lymphocytes Absolute 4.45 (H) 1.50 - 4.00 E9/L    Monocytes Absolute 0.80 0.10 - 0.95 E9/L    Eosinophils Absolute 0.17 0.05 - 0.50 E9/L    Basophils Absolute 0.06 0.00 - 0.20 E9/L   Sedimentation Rate    Collection Time: 07/30/22  6:20 AM   Result Value Ref Range    Sed Rate 37 (H) 0 - 20 mm/Hr   C-Reactive Protein    Collection Time: 07/30/22  6:20 AM   Result Value Ref Range    CRP 0.3 0.0 - 0.4 mg/dL         IMP:  Jovanni Oropeza is a 40 y.o. female [id number] at 34w7d with most likely a Crohn's flare who may be slightly improved  I am awaiting recommendations from GI who has been consulted 2 days ago. I do think that she is slightly improved compared to yesterday. Her blood pressures remain in the mild range. Because of the latest recommendations regarding management of blood pressure in pregnancy I am going to prescribe antihypertensive medications. She does not currently have an obstetrical complaint. PLAN:  Await GI recommendations. Again all imaging could be performed. If there are any questions regarding medications I am more than happy to talk to the GI consultant in terms of what is safe in pregnancy and what is not. I am going to start labetalol 100 mg twice a day. Continue current management. I spent 25 minutes of direct contact time with the patient of which greater than 50% of the time was used to  the patient, discuss complications and problems related to her pregnancy, or coordinating her care. I answered all of her questions to her satisfaction.     SIGNATURE: Karuna Rolle MD  DATE: July 30, 2022  TIME: 12:04 PM

## 2022-07-30 NOTE — PROGRESS NOTES
[address] [facility]  [facility]  Progress Note    SUBJECTIVE:  Chief Complaint   Patient presents with    Hyperemesis Gravidarum     The patient is still having diarrhea. She says that it feels like another Crohn flareup. No fever. No nausea or vomiting. Review of systems:  As stated above in the chief complaint, otherwise negative. Medications:  Scheduled Meds:  Continuous Infusions:   dextrose 5% in lactated ringers 125 mL/hr at 22 1230     PRN Meds:metoclopramide, ondansetron, acetaminophen, butorphanol    OBJECTIVE:  BP (!) 148/83   Pulse 86   Temp 97.9 °F (36.6 °C) (Oral)   Resp 16   Ht 5' 4\" (1.626 m)   Wt 157 lb (71.2 kg)   LMP 2022   SpO2 99%   BMI 26.95 kg/m²   Temp  Av.9 °F (36.6 °C)  Min: 97.9 °F (36.6 °C)  Max: 97.9 °F (36.6 °C)  Constitutional: The patient is awake, alert, and oriented. She is ambulating. Skin: Warm and dry. No rashes were noted. HEENT: Round and reactive pupils. Moist mucous membranes. No ulcerations or thrush. Neck: Supple to movements. Chest: No use of accessory muscles to breathe. Symmetrical expansion. No wheezing, crackles or rhonchi. Cardiovascular: S1 and S2 are rhythmic and regular. No murmurs appreciated. Abdomen: Positive bowel sounds to auscultation. Pregnant uterus. Extremities: No clubbing, no cyanosis, no edema.   Lines: peripheral    Laboratory and Tests Review:  Lab Results   Component Value Date    WBC 17.0 (H) 2022    WBC 25.3 (H) 2022    WBC 14.7 (H) 2022    HGB 13.2 2022    HCT 40.4 2022    MCV 95.3 2022     2022     Lab Results   Component Value Date    NEUTROABS 11.20 (H) 2022    NEUTROABS 22.54 (H) 2022    NEUTROABS 10.24 (H) 2022     No results found for: CRP  Lab Results   Component Value Date    ALT 11 2022    AST 19 2022    ALKPHOS 69 2022    BILITOT 0.3 2022     Lab Results   Component Value Date/Time     07/27/2022 02:50 PM    K 3.9 07/27/2022 02:50 PM    K 2.7 06/08/2022 03:30 AM     07/27/2022 02:50 PM    CO2 18 07/27/2022 02:50 PM    BUN 8 07/27/2022 02:50 PM    CREATININE 0.4 07/27/2022 02:50 PM    CREATININE 0.4 07/07/2022 02:40 PM    CREATININE 0.6 06/15/2022 12:59 PM    GFRAA >60 07/27/2022 02:50 PM    LABGLOM >60 07/27/2022 02:50 PM    GLUCOSE 129 07/27/2022 02:50 PM    GLUCOSE 92 05/08/2011 02:04 PM    PROT 7.4 07/27/2022 02:50 PM    LABALBU 4.0 07/27/2022 02:50 PM    CALCIUM 9.0 07/27/2022 02:50 PM    BILITOT 0.3 07/27/2022 02:50 PM    ALKPHOS 69 07/27/2022 02:50 PM    AST 19 07/27/2022 02:50 PM    ALT 11 07/27/2022 02:50 PM     Lab Results   Component Value Date    CRP 0.2 01/12/2021    CRP <0.1 01/07/2021    CRP 0.2 07/17/2018     Lab Results   Component Value Date    SEDRATE 37 (H) 07/30/2022    SEDRATE 2 01/12/2021    SEDRATE 0 01/07/2021     Radiology:      Microbiology:   Urine culture 7/27/2022:  K mixed GNR, GPO  Giardia antigen: Negative  C. difficile: Pending    ASSESSMENT:  29 weeks pregnant  Crohn disease  Possible asymptomatic bacteriuria of pregnancy.   The specimen was probably not appropriately collected    PLAN:  Repeat urine culture  Observe off antibiotic  Check final cultures    Spoke with nursing    Carlos Elise MD  10:21 AM  7/30/2022

## 2022-07-30 NOTE — PROGRESS NOTES
Progress Note    SUBJECTIVE:   Pt comfortable; +void; + upper abdominal pain; +ambulation    OBJECTIVE:    VITALS:  BP (!) 143/86   Pulse 85   Temp 97.9 °F (36.6 °C) (Oral)   Resp 16   Ht 5' 4\" (1.626 m)   Wt 157 lb (71.2 kg)   LMP 01/24/2022   SpO2 99%   BMI 26.95 kg/m²   Physical Exam  ABDOMEN:  gravid, soft  Ext nt    DATA:  Hemoglobin/Hematocrit:    Lab Results   Component Value Date/Time    HGB 13.2 07/30/2022 06:20 AM    HCT 40.4 07/30/2022 06:20 AM       ASSESSMENT AND PLAN:  Principal Problem:    Hyperemesis gravidarum  Active Problems:    History of IUGR (intrauterine growth retardation) and stillbirth, currently pregnant, first trimester    Hyperemesis    Elevated blood pressure affecting pregnancy in second trimester, antepartum    Positive urine drug screen    Marijuana use  Resolved Problems:    * No resolved hospital problems.  *    Continue to follow   GI consult    Electronically signed by Vane Russell DO on 7/30/2022 at 2:33 PM

## 2022-07-30 NOTE — PROGRESS NOTES
RN at bedside. Pt resting in bed complaining of pain and nausea. Stadol and Reglan administered. Pt denies LOF or VB at this time. Perceives positive fetal movement. VSS. Instructed to call to nurses' station when she is ready for fetal monitoring.

## 2022-07-30 NOTE — PROGRESS NOTES
Assumed care of pt. Pt medicated for pain 7/10 in ABD. Pt states +FM, denies LOF or VB. S/O at the bedside.

## 2022-07-31 LAB
ANION GAP SERPL CALCULATED.3IONS-SCNC: 12 MMOL/L (ref 7–16)
BASOPHILS ABSOLUTE: 0.04 E9/L (ref 0–0.2)
BASOPHILS RELATIVE PERCENT: 0.4 % (ref 0–2)
BUN BLDV-MCNC: 3 MG/DL (ref 6–20)
CALCIUM SERPL-MCNC: 8.3 MG/DL (ref 8.6–10.2)
CHLORIDE BLD-SCNC: 106 MMOL/L (ref 98–107)
CO2: 22 MMOL/L (ref 22–29)
CREAT SERPL-MCNC: 0.4 MG/DL (ref 0.5–1)
CULTURE, STOOL: NORMAL
EOSINOPHILS ABSOLUTE: 0.18 E9/L (ref 0.05–0.5)
EOSINOPHILS RELATIVE PERCENT: 1.7 % (ref 0–6)
GFR AFRICAN AMERICAN: >60
GFR NON-AFRICAN AMERICAN: >60 ML/MIN/1.73
GLUCOSE BLD-MCNC: 102 MG/DL (ref 74–99)
HCT VFR BLD CALC: 32.1 % (ref 34–48)
HEMOGLOBIN: 10.9 G/DL (ref 11.5–15.5)
IMMATURE GRANULOCYTES #: 0.14 E9/L
IMMATURE GRANULOCYTES %: 1.3 % (ref 0–5)
LYMPHOCYTES ABSOLUTE: 3.43 E9/L (ref 1.5–4)
LYMPHOCYTES RELATIVE PERCENT: 32.7 % (ref 20–42)
MCH RBC QN AUTO: 32.1 PG (ref 26–35)
MCHC RBC AUTO-ENTMCNC: 34 % (ref 32–34.5)
MCV RBC AUTO: 94.4 FL (ref 80–99.9)
MONOCYTES ABSOLUTE: 0.63 E9/L (ref 0.1–0.95)
MONOCYTES RELATIVE PERCENT: 6 % (ref 2–12)
NEUTROPHILS ABSOLUTE: 6.06 E9/L (ref 1.8–7.3)
NEUTROPHILS RELATIVE PERCENT: 57.9 % (ref 43–80)
PDW BLD-RTO: 13.6 FL (ref 11.5–15)
PLATELET # BLD: 318 E9/L (ref 130–450)
PMV BLD AUTO: 9.2 FL (ref 7–12)
POTASSIUM SERPL-SCNC: 2.9 MMOL/L (ref 3.5–5)
RBC # BLD: 3.4 E12/L (ref 3.5–5.5)
SODIUM BLD-SCNC: 140 MMOL/L (ref 132–146)
WBC # BLD: 10.5 E9/L (ref 4.5–11.5)

## 2022-07-31 PROCEDURE — 6370000000 HC RX 637 (ALT 250 FOR IP): Performed by: INTERNAL MEDICINE

## 2022-07-31 PROCEDURE — 6360000002 HC RX W HCPCS: Performed by: OBSTETRICS & GYNECOLOGY

## 2022-07-31 PROCEDURE — 6370000000 HC RX 637 (ALT 250 FOR IP): Performed by: OBSTETRICS & GYNECOLOGY

## 2022-07-31 PROCEDURE — 80048 BASIC METABOLIC PNL TOTAL CA: CPT

## 2022-07-31 PROCEDURE — 1220000000 HC SEMI PRIVATE OB R&B

## 2022-07-31 PROCEDURE — 2580000003 HC RX 258: Performed by: OBSTETRICS & GYNECOLOGY

## 2022-07-31 PROCEDURE — 6360000002 HC RX W HCPCS: Performed by: SPECIALIST

## 2022-07-31 PROCEDURE — 36415 COLL VENOUS BLD VENIPUNCTURE: CPT

## 2022-07-31 PROCEDURE — 2580000003 HC RX 258: Performed by: SPECIALIST

## 2022-07-31 PROCEDURE — 99233 SBSQ HOSP IP/OBS HIGH 50: CPT | Performed by: OBSTETRICS & GYNECOLOGY

## 2022-07-31 PROCEDURE — 86316 IMMUNOASSAY TUMOR OTHER: CPT

## 2022-07-31 PROCEDURE — 85025 COMPLETE CBC W/AUTO DIFF WBC: CPT

## 2022-07-31 RX ORDER — POTASSIUM CHLORIDE 750 MG/1
10 TABLET, EXTENDED RELEASE ORAL 2 TIMES DAILY
Status: DISCONTINUED | OUTPATIENT
Start: 2022-07-31 | End: 2022-08-05 | Stop reason: HOSPADM

## 2022-07-31 RX ORDER — LABETALOL 100 MG/1
100 TABLET, FILM COATED ORAL 3 TIMES DAILY
Status: DISCONTINUED | OUTPATIENT
Start: 2022-07-31 | End: 2022-08-02

## 2022-07-31 RX ADMIN — LABETALOL HYDROCHLORIDE 100 MG: 100 TABLET, FILM COATED ORAL at 09:43

## 2022-07-31 RX ADMIN — POTASSIUM CHLORIDE 10 MEQ: 750 TABLET, EXTENDED RELEASE ORAL at 20:33

## 2022-07-31 RX ADMIN — BUTORPHANOL TARTRATE 2 MG: 2 INJECTION, SOLUTION INTRAMUSCULAR; INTRAVENOUS at 03:09

## 2022-07-31 RX ADMIN — WATER 1000 MG: 1 INJECTION INTRAMUSCULAR; INTRAVENOUS; SUBCUTANEOUS at 12:56

## 2022-07-31 RX ADMIN — BUTORPHANOL TARTRATE 2 MG: 2 INJECTION, SOLUTION INTRAMUSCULAR; INTRAVENOUS at 06:40

## 2022-07-31 RX ADMIN — METOCLOPRAMIDE HYDROCHLORIDE 10 MG: 5 INJECTION INTRAMUSCULAR; INTRAVENOUS at 12:57

## 2022-07-31 RX ADMIN — METOCLOPRAMIDE HYDROCHLORIDE 10 MG: 5 INJECTION INTRAMUSCULAR; INTRAVENOUS at 06:40

## 2022-07-31 RX ADMIN — SODIUM CHLORIDE, SODIUM LACTATE, POTASSIUM CHLORIDE, CALCIUM CHLORIDE AND DEXTROSE MONOHYDRATE: 5; 600; 310; 30; 20 INJECTION, SOLUTION INTRAVENOUS at 09:46

## 2022-07-31 RX ADMIN — BUTORPHANOL TARTRATE 2 MG: 2 INJECTION, SOLUTION INTRAMUSCULAR; INTRAVENOUS at 09:43

## 2022-07-31 RX ADMIN — BUTORPHANOL TARTRATE 2 MG: 2 INJECTION, SOLUTION INTRAMUSCULAR; INTRAVENOUS at 17:25

## 2022-07-31 RX ADMIN — SODIUM CHLORIDE, SODIUM LACTATE, POTASSIUM CHLORIDE, CALCIUM CHLORIDE AND DEXTROSE MONOHYDRATE: 5; 600; 310; 30; 20 INJECTION, SOLUTION INTRAVENOUS at 18:06

## 2022-07-31 RX ADMIN — LABETALOL HYDROCHLORIDE 100 MG: 100 TABLET, FILM COATED ORAL at 14:50

## 2022-07-31 RX ADMIN — BUTORPHANOL TARTRATE 2 MG: 2 INJECTION, SOLUTION INTRAMUSCULAR; INTRAVENOUS at 12:58

## 2022-07-31 RX ADMIN — METOCLOPRAMIDE HYDROCHLORIDE 10 MG: 5 INJECTION INTRAMUSCULAR; INTRAVENOUS at 20:38

## 2022-07-31 RX ADMIN — LABETALOL HYDROCHLORIDE 100 MG: 100 TABLET, FILM COATED ORAL at 20:33

## 2022-07-31 RX ADMIN — POTASSIUM CHLORIDE 10 MEQ: 750 TABLET, EXTENDED RELEASE ORAL at 12:59

## 2022-07-31 RX ADMIN — ONDANSETRON 4 MG: 2 INJECTION INTRAMUSCULAR; INTRAVENOUS at 03:09

## 2022-07-31 RX ADMIN — ONDANSETRON 4 MG: 2 INJECTION INTRAMUSCULAR; INTRAVENOUS at 17:25

## 2022-07-31 RX ADMIN — BUTORPHANOL TARTRATE 2 MG: 2 INJECTION, SOLUTION INTRAMUSCULAR; INTRAVENOUS at 20:34

## 2022-07-31 RX ADMIN — ONDANSETRON 4 MG: 2 INJECTION INTRAMUSCULAR; INTRAVENOUS at 09:44

## 2022-07-31 ASSESSMENT — PAIN DESCRIPTION - LOCATION
LOCATION: ABDOMEN
LOCATION: ABDOMEN
LOCATION: ABDOMEN;BACK
LOCATION: BACK;ABDOMEN

## 2022-07-31 ASSESSMENT — PAIN SCALES - GENERAL
PAINLEVEL_OUTOF10: 8

## 2022-07-31 ASSESSMENT — PAIN DESCRIPTION - DESCRIPTORS
DESCRIPTORS: ACHING;CRAMPING
DESCRIPTORS: CRAMPING;ACHING;STABBING
DESCRIPTORS: ACHING;CRAMPING;SHARP

## 2022-07-31 NOTE — PROGRESS NOTES
69 07/27/2022    BILITOT 0.3 07/27/2022     Lab Results   Component Value Date/Time     07/31/2022 06:45 AM    K 2.9 07/31/2022 06:45 AM    K 2.7 06/08/2022 03:30 AM     07/31/2022 06:45 AM    CO2 22 07/31/2022 06:45 AM    BUN 3 07/31/2022 06:45 AM    CREATININE 0.4 07/31/2022 06:45 AM    CREATININE 0.4 07/27/2022 02:50 PM    CREATININE 0.4 07/07/2022 02:40 PM    GFRAA >60 07/31/2022 06:45 AM    LABGLOM >60 07/31/2022 06:45 AM    GLUCOSE 102 07/31/2022 06:45 AM    GLUCOSE 92 05/08/2011 02:04 PM    PROT 7.4 07/27/2022 02:50 PM    LABALBU 4.0 07/27/2022 02:50 PM    CALCIUM 8.3 07/31/2022 06:45 AM    BILITOT 0.3 07/27/2022 02:50 PM    ALKPHOS 69 07/27/2022 02:50 PM    AST 19 07/27/2022 02:50 PM    ALT 11 07/27/2022 02:50 PM     Lab Results   Component Value Date    CRP 0.3 07/30/2022    CRP 0.2 01/12/2021    CRP <0.1 01/07/2021     Lab Results   Component Value Date    SEDRATE 37 (H) 07/30/2022    SEDRATE 2 01/12/2021    SEDRATE 0 01/07/2021     Radiology:      Microbiology:   Urine culture 7/27/2022:  K mixed GNR, GPO  Giardia antigen: Negative  C. difficile: Pending    ASSESSMENT:  29 weeks pregnant  Crohn disease  Possible asymptomatic bacteriuria of pregnancy.   The specimen was probably not appropriately collected    PLAN:  Repeat urine culture  Start Ceftriaxone  Microbiology to work-up urine    Bg Anderson MD  11:08 AM  7/31/2022

## 2022-07-31 NOTE — PROGRESS NOTES
past 24 hour(s))   CBC with Auto Differential    Collection Time: 07/31/22  6:45 AM   Result Value Ref Range    WBC 10.5 4.5 - 11.5 E9/L    RBC 3.40 (L) 3.50 - 5.50 E12/L    Hemoglobin 10.9 (L) 11.5 - 15.5 g/dL    Hematocrit 32.1 (L) 34.0 - 48.0 %    MCV 94.4 80.0 - 99.9 fL    MCH 32.1 26.0 - 35.0 pg    MCHC 34.0 32.0 - 34.5 %    RDW 13.6 11.5 - 15.0 fL    Platelets 005 575 - 530 E9/L    MPV 9.2 7.0 - 12.0 fL    Neutrophils % 57.9 43.0 - 80.0 %    Immature Granulocytes % 1.3 0.0 - 5.0 %    Lymphocytes % 32.7 20.0 - 42.0 %    Monocytes % 6.0 2.0 - 12.0 %    Eosinophils % 1.7 0.0 - 6.0 %    Basophils % 0.4 0.0 - 2.0 %    Neutrophils Absolute 6.06 1.80 - 7.30 E9/L    Immature Granulocytes # 0.14 E9/L    Lymphocytes Absolute 3.43 1.50 - 4.00 E9/L    Monocytes Absolute 0.63 0.10 - 0.95 E9/L    Eosinophils Absolute 0.18 0.05 - 0.50 E9/L    Basophils Absolute 0.04 0.00 - 0.20 D8/G   Basic Metabolic Panel    Collection Time: 07/31/22  6:45 AM   Result Value Ref Range    Sodium 140 132 - 146 mmol/L    Potassium 2.9 (L) 3.5 - 5.0 mmol/L    Chloride 106 98 - 107 mmol/L    CO2 22 22 - 29 mmol/L    Anion Gap 12 7 - 16 mmol/L    Glucose 102 (H) 74 - 99 mg/dL    BUN 3 (L) 6 - 20 mg/dL    Creatinine 0.4 (L) 0.5 - 1.0 mg/dL    GFR Non-African American >60 >=60 mL/min/1.73    GFR African American >60     Calcium 8.3 (L) 8.6 - 10.2 mg/dL       Imaging  US OB 1 OR MORE FETUS LIMITED    Result Date: 7/27/2022  EXAMINATION: Second TRIMESTER OBSTETRIC ULTRASOUND 7/27/2022 TECHNIQUE: Transabdominal ultrasound. COMPARISON: Correlation made with prior from July 12, 2022 HISTORY: ORDERING SYSTEM PROVIDED HISTORY: Abdominal pain, N/V TECHNOLOGIST PROVIDED HISTORY: Reason for exam:->Abdominal pain, N/V. What reading provider will be dictating this exam?->CRC FINDINGS: Single live intrauterine pregnancy with cardiac activity detected at 136 beats per minute. There is fetal body and limb motion. Fetal position is cephalic.   Placental location is lateral and left. Amniotic fluid index is 18.9 cm. Biparietal diameter is 6.2 cm. Head circumference is 23.1 cm. Abdominal circumference is 21.9 cm. Femur length is 4.8 cm. Cervical length is 3.1 cm. Fetal anatomy is grossly unremarkable as visualized. 1. Single live intrauterine pregnancy with cardiac activity detected at 136 beats per minute. 2. Gestational age utilizing ultrasound measurement is 25 weeks and 5 days. Clinical correlation recommended           Assessment and Plan  Patient is a 40 y.o. female on consult for nausea and vomiting. 1.  Crohn's disease / diarrhea  -Consider biologic after resolution of pregnancy  -CT abdomen pelvis 6/8/2022 showing no evidence of active Crohn's disease  -Stool studies negative  -Reports flushing with diarrhea in the past - check CGA     2. GERD / nausea / vomiting  -Likely hyperemesis of pregnancy versus cannabis hyperemesis syndrome  -Recommend discontinuation of cannabis use - persistent positive UDS  -Supportive and symptomatic treatment  -EGD in February of this year revealing mild gastritis  -Cannabinoid hyperemesis should resolve within 72 hours of discontinuation of use  -Patient is responding to current antiemetics - speaks against cannabinoid hyperemesis  -Consider Pepcid 1-2x/day  -Antiemetics per admitting     3. Abdominal pain  -Prior CT abdomen pelvis 6/8/2022 showing no significant findings  -Prior cholecystectomy  -Consider gastric source of pain  -Prior EGD showing gastritis  -Consider Pepcid 1-2 times daily - defer to GYN  -Pain management per admitting     4. Pregnancy  -Per admitting/OB/GYN      Recommend Pepcid BID. Overall improved. Antibiotics per ID. Monitor labs. Will follow.       LEIDY Isbell - CNP  7:11 PM  7/31/2022

## 2022-07-31 NOTE — PROGRESS NOTES
Progress Note    SUBJECTIVE:   Pt comfortable; +void; +flatus; +ambulation    OBJECTIVE:    VITALS:  BP (!) 148/81   Pulse 88   Temp 98.2 °F (36.8 °C) (Oral)   Resp 16   Ht 5' 4\" (1.626 m)   Wt 157 lb (71.2 kg)   LMP 01/24/2022   SpO2 98%   BMI 26.95 kg/m²   Physical Exam  ABDOMEN: soft, gravid  Ext nt    DATA:  Hemoglobin/Hematocrit:    Lab Results   Component Value Date/Time    HGB 10.9 07/31/2022 06:45 AM    HCT 32.1 07/31/2022 06:45 AM       ASSESSMENT AND PLAN:    Principal Problem:    Hyperemesis gravidarum  Active Problems:    History of IUGR (intrauterine growth retardation) and stillbirth, currently pregnant, first trimester    Hyperemesis    Elevated blood pressure affecting pregnancy in second trimester, antepartum    Positive urine drug screen    Marijuana use  Resolved Problems:    * No resolved hospital problems.  *    GI and MFM following    Electronically signed by Robson Baptiste DO on 7/31/2022 at 2:58 PM

## 2022-07-31 NOTE — PROGRESS NOTES
MATERNAL FETAL MEDICINE PROGRESS NOTE    NAME: Zain Jiang  MRN: [id number]            SERVICE DATE: 2022  SERVICE TIME: 11:13 AM  REQUESTING PROVIDER: Cain Corral, *          Zain Jiang is a 40 y.o. female, [id number] at 26w6d with an EMELIA of 10/31/2022, by Last Menstrual Period dating method. Patient is here with the following problems:  Principal Problem:    Hyperemesis gravidarum  Active Problems:    History of IUGR (intrauterine growth retardation) and stillbirth, currently pregnant, first trimester    Hyperemesis    Elevated blood pressure affecting pregnancy in second trimester, antepartum    Positive urine drug screen    Marijuana use  Resolved Problems:    * No resolved hospital problems.  *      SUBJECTIVE:  HISTORY OF THE PRESENT ILLNESS:  HISTORY REVIEW  Past Medical History:   Diagnosis Date    Acute Crohn's disease ([address]     Anxiety attack since age 16    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 inhaler     Crohn's colitis ([address]     Depression     medicated with zoloft     Herpes simplex virus (HSV) infection     History of blood transfusion     as an infant in [place]    Hypertension     intermittent     Marijuana abuse     occas    Neurologic disorder     Postpartum depression      delivery      labor     Rh sensitized     Seizure ([address]     loses focus, disoriented unpon arousing; etiology unknown per pt; none since     STD (sexually transmitted disease)     Tobacco abuse      Past Surgical History:   Procedure Laterality Date    CHOLECYSTECTOMY  2018    COLONOSCOPY      DILATION AND CURETTAGE      DILATION AND CURETTAGE OF UTERUS      ENDOSCOPY, COLON, DIAGNOSTIC      HERNIA REPAIR      HERNIA REPAIR      LAPAROSCOPY      UPPER GASTROINTESTINAL ENDOSCOPY N/A 2022    EGD BIOPSY performed by Kymberly Hilario MD at [facility]     Family History   Problem Relation Age of Onset    Depression Mother     Leukemia Mother     Breast Cancer Mother     Cervical Cancer Mother     Heart Disease Father     Depression Father     Schizophrenia Father     Hypertension Father      Social History     Socioeconomic History    Marital status:      Spouse name: Not on file    Number of children: Not on file    Years of education: Not on file    Highest education level: Not on file   Occupational History    Not on file   Tobacco Use    Smoking status: Former     Packs/day: 1.00     Years: 10.00     Pack years: 10.00     Types: Cigarettes     Quit date: 2020     Years since quittin.4    Smokeless tobacco: Never   Vaping Use    Vaping Use: Never used   Substance and Sexual Activity    Alcohol use: Not Currently     Comment: socially    Drug use: Not Currently     Frequency: 2.0 times per week     Types: Marijuana (Weed)     Comment: states stopped after pregnancy    Sexual activity: Yes     Partners: Male   Other Topics Concern    Not on file   Social History Narrative    Not on file     Social Determinants of Health     Financial Resource Strain: Not on file   Food Insecurity: Not on file   Transportation Needs: Not on file   Physical Activity: Not on file   Stress: Not on file   Social Connections: Not on file   Intimate Partner Violence: Not on file   Housing Stability: Not on file     OB History    Para Term  AB Living   6 3 2 1 2 3   SAB IAB Ectopic Molar Multiple Live Births   2 0 0 0 0 3      # Outcome Date GA Lbr Marco/2nd Weight Sex Delivery Anes PTL Lv   6 Current            5 SAB 2021 6w0d          4 Term 14 37w0d  5 lb 1 oz (2.296 kg) M Vag-Spont EPI N NARENDRA      Apgar1: 8  Apgar5: 9   3  12 35w4d  4 lb 8 oz (2.041 kg) M Vag-Spont  Y NARENDRA      Complications: Oligohydramnios      Name: Leann Prom: 9  Apgar5: 9   2 2011 11w0d          1 Term / 42w0d  7 lb 8 oz (3.402 kg) F Vag-Spont EPI N NARENDRA         ALLERGIES: Patient has no known allergies. CURRENT MEDS:   cefTRIAXone (ROCEPHIN) IV  1,000 mg IntraVENous Q24H    potassium chloride  10 mEq Oral BID    labetalol  100 mg Oral BID      dextrose 5% in lactated ringers 125 mL/hr at 07/31/22 0946     metoclopramide, ondansetron, acetaminophen, butorphanol    PRIOR TO ADMISSION MEDICATIONS:  Prior to Admission medications    Medication Sig Start Date End Date Taking?  Authorizing Provider   metoclopramide (REGLAN) 10 MG tablet Take 1 tablet by mouth 3 times daily as needed (nausea, vomiting) 7/25/22   Zayda Bunch MD   aspirin (ASPIRIN 81) 81 MG chewable tablet Take 1 tablet by mouth daily 6/14/22   Zayda Bunch MD   sucralfate (CARAFATE) 1 GM tablet Take 1 tablet by mouth 4 times daily 6/11/22 7/12/22  LEIDY Bey CNP   potassium chloride (KLOR-CON M) 20 MEQ extended release tablet Take 2 tablets by mouth 2 times daily for 2 days 6/11/22 6/13/22  LEIDY Bey CNP   vitamin D (CHOLECALCIFEROL) 25 MCG (1000 UT) TABS tablet Take 1,000 Units by mouth nightly    Historical Provider, MD   Prenatal Vit-Fe Fumarate-FA (PRENATAL VITAMIN) 27-0.8 MG TABS Take 1 tablet by mouth nightly    Historical Provider, MD   doxyLAMINE succinate (GNP SLEEP AID) 25 MG tablet Take 12.5-25 mg by mouth nightly as needed (SLEEP/NAUSEA)    Historical Provider, MD   fluticasone (FLONASE) 50 MCG/ACT nasal spray 1 spray by Each Nostril route daily    Historical Provider, MD   vitamin B-6 (PYRIDOXINE) 25 MG tablet Take 1 tablet by mouth in the morning, at noon, and at bedtime 5/24/22   Zayda Bunch MD   famotidine (PEPCID) 20 MG tablet Take 1 tablet by mouth 2 times daily 4/26/22   Zayda Bunch MD   sertraline (ZOLOFT) 25 MG tablet Take 1 tablet by mouth at bedtime 3/30/22   Patricia Loo MD   ondansetron (ZOFRAN) 4 MG tablet Take 4 mg by mouth every 8 hours as needed for Nausea or Vomiting Patient is now taking PO form  Pt weaned off pump    Historical Provider, MD OBJECTIVE:    REVIEW OF SYSTEMS:    Fetal Movement normal  Vaginal Bleeding denies  Contractions denies  LOF/ROM denies  S/Sx Pre-eclampsia denies  [facility] states that she is passing flatus, and having significant diarrhea    PHYSICAL EXAM    Well Developed well nourished  female in some abdominal distress  Lungs/Breathing regular unlabored  Heart regular rhythm rate  Abdomen Gravid, soft, no uterine tenderness, no contractions palpated there is diffuse tenderness throughout the upper abdomen consistent with a GI source  Extremities full range of motion        LAST VITALS:  BP (!) 140/96   Pulse 95   Temp 98.2 °F (36.8 °C) (Oral)   Resp 16   Ht 5' 4\" (1.626 m)   Wt 157 lb (71.2 kg)   LMP 01/24/2022   SpO2 98%   BMI 26.95 kg/m²        PRENATAL LABS  Diagnostic tests reviewed for today's visit:   Recent Results (from the past 24 hour(s))   CBC with Auto Differential    Collection Time: 07/31/22  6:45 AM   Result Value Ref Range    WBC 10.5 4.5 - 11.5 E9/L    RBC 3.40 (L) 3.50 - 5.50 E12/L    Hemoglobin 10.9 (L) 11.5 - 15.5 g/dL    Hematocrit 32.1 (L) 34.0 - 48.0 %    MCV 94.4 80.0 - 99.9 fL    MCH 32.1 26.0 - 35.0 pg    MCHC 34.0 32.0 - 34.5 %    RDW 13.6 11.5 - 15.0 fL    Platelets 030 177 - 607 E9/L    MPV 9.2 7.0 - 12.0 fL    Neutrophils % 57.9 43.0 - 80.0 %    Immature Granulocytes % 1.3 0.0 - 5.0 %    Lymphocytes % 32.7 20.0 - 42.0 %    Monocytes % 6.0 2.0 - 12.0 %    Eosinophils % 1.7 0.0 - 6.0 %    Basophils % 0.4 0.0 - 2.0 %    Neutrophils Absolute 6.06 1.80 - 7.30 E9/L    Immature Granulocytes # 0.14 E9/L    Lymphocytes Absolute 3.43 1.50 - 4.00 E9/L    Monocytes Absolute 0.63 0.10 - 0.95 E9/L    Eosinophils Absolute 0.18 0.05 - 0.50 E9/L    Basophils Absolute 0.04 0.00 - 0.20 R3/P   Basic Metabolic Panel    Collection Time: 07/31/22  6:45 AM   Result Value Ref Range    Sodium 140 132 - 146 mmol/L    Potassium 2.9 (L) 3.5 - 5.0 mmol/L    Chloride 106 98 - 107 mmol/L    CO2 22 22 - 29 mmol/L    Anion Gap 12 7 - 16 mmol/L    Glucose 102 (H) 74 - 99 mg/dL    BUN 3 (L) 6 - 20 mg/dL    Creatinine 0.4 (L) 0.5 - 1.0 mg/dL    GFR Non-African American >60 >=60 mL/min/1.73    GFR African American >60     Calcium 8.3 (L) 8.6 - 10.2 mg/dL         IMP:  Isa Mcburney is a 40 y.o. female [id number] at 29w11d with significant abdominal pain and diarrhea  She does not have any obstetrical complaints  She was on Pepcid prior to coming into the hospital so I think restarting the Pepcid is not necessarily going to significantly improve her symptoms but I will restart the Pepcid  Her potassium is little bit low so we will need to replace that  There does not appear to be an infectious etiology of her diarrhea so this is obviously something that is GI in nature and etiology  Blood pressures are still a little high so I will increase her labetalol to 3 times a day      PLAN:  I would recommend replacing her potassium  I would consider a pain management consult if it is that is available  I would try to wean her off the narcotics and or switch her to oral narcotics  I will increase her labetalol to 100 3 times daily if her blood pressures are still greater than 140 I will increase it to 200 3 times daily  Otherwise continue management      I spent 25 minutes of direct contact time with the patient of which greater than 50% of the time was used to  the patient, discuss complications and problems related to her pregnancy, or coordinating her care. I answered all of her questions to her satisfaction.     SIGNATURE: Betsy Reno MD  DATE: July 31, 2022  TIME: 11:13 AM

## 2022-07-31 NOTE — PROGRESS NOTES
Care of patient taken over. Call light within reach. Patient denies vaginal bleeding, leaking of fluid, ctx. +FM. Patient has had diarrhea throughout the night, denies vomiting. Pain radiating towards her back rating it a 8/10. Stadol given.

## 2022-07-31 NOTE — PROGRESS NOTES
Dr Edin Ortez at nurses station. Labs reviewed and orders reviewed with her from GI.  Orders received for repeat labs in AM.

## 2022-08-01 LAB
ANION GAP SERPL CALCULATED.3IONS-SCNC: 9 MMOL/L (ref 7–16)
BASOPHILS ABSOLUTE: 0.05 E9/L (ref 0–0.2)
BASOPHILS RELATIVE PERCENT: 0.4 % (ref 0–2)
BUN BLDV-MCNC: 3 MG/DL (ref 6–20)
CALCIUM SERPL-MCNC: 8.2 MG/DL (ref 8.6–10.2)
CHLORIDE BLD-SCNC: 107 MMOL/L (ref 98–107)
CO2: 24 MMOL/L (ref 22–29)
CREAT SERPL-MCNC: 0.5 MG/DL (ref 0.5–1)
EOSINOPHILS ABSOLUTE: 0.29 E9/L (ref 0.05–0.5)
EOSINOPHILS RELATIVE PERCENT: 2.5 % (ref 0–6)
FECAL NEUTRAL FAT: NORMAL
FECAL SPLIT FATS: NORMAL
GFR AFRICAN AMERICAN: >60
GFR NON-AFRICAN AMERICAN: >60 ML/MIN/1.73
GLUCOSE BLD-MCNC: 78 MG/DL (ref 74–99)
HCT VFR BLD CALC: 30.3 % (ref 34–48)
HEMOGLOBIN: 9.9 G/DL (ref 11.5–15.5)
IMMATURE GRANULOCYTES #: 0.16 E9/L
IMMATURE GRANULOCYTES %: 1.4 % (ref 0–5)
LYMPHOCYTES ABSOLUTE: 3.69 E9/L (ref 1.5–4)
LYMPHOCYTES RELATIVE PERCENT: 31.9 % (ref 20–42)
MCH RBC QN AUTO: 31.1 PG (ref 26–35)
MCHC RBC AUTO-ENTMCNC: 32.7 % (ref 32–34.5)
MCV RBC AUTO: 95.3 FL (ref 80–99.9)
MONOCYTES ABSOLUTE: 0.75 E9/L (ref 0.1–0.95)
MONOCYTES RELATIVE PERCENT: 6.5 % (ref 2–12)
NEUTROPHILS ABSOLUTE: 6.63 E9/L (ref 1.8–7.3)
NEUTROPHILS RELATIVE PERCENT: 57.3 % (ref 43–80)
ORGANISM: ABNORMAL
PDW BLD-RTO: 13.6 FL (ref 11.5–15)
PLATELET # BLD: 292 E9/L (ref 130–450)
PMV BLD AUTO: 9.1 FL (ref 7–12)
POTASSIUM SERPL-SCNC: 3.3 MMOL/L (ref 3.5–5)
RBC # BLD: 3.18 E12/L (ref 3.5–5.5)
SODIUM BLD-SCNC: 140 MMOL/L (ref 132–146)
URINE CULTURE, ROUTINE: ABNORMAL
WBC # BLD: 11.6 E9/L (ref 4.5–11.5)

## 2022-08-01 PROCEDURE — 6360000002 HC RX W HCPCS: Performed by: OBSTETRICS & GYNECOLOGY

## 2022-08-01 PROCEDURE — 85025 COMPLETE CBC W/AUTO DIFF WBC: CPT

## 2022-08-01 PROCEDURE — 80048 BASIC METABOLIC PNL TOTAL CA: CPT

## 2022-08-01 PROCEDURE — 6360000002 HC RX W HCPCS: Performed by: SPECIALIST

## 2022-08-01 PROCEDURE — 2580000003 HC RX 258: Performed by: SPECIALIST

## 2022-08-01 PROCEDURE — 2580000003 HC RX 258: Performed by: OBSTETRICS & GYNECOLOGY

## 2022-08-01 PROCEDURE — 1220000000 HC SEMI PRIVATE OB R&B

## 2022-08-01 PROCEDURE — 99233 SBSQ HOSP IP/OBS HIGH 50: CPT | Performed by: OBSTETRICS & GYNECOLOGY

## 2022-08-01 PROCEDURE — 6370000000 HC RX 637 (ALT 250 FOR IP): Performed by: INTERNAL MEDICINE

## 2022-08-01 PROCEDURE — 36415 COLL VENOUS BLD VENIPUNCTURE: CPT

## 2022-08-01 PROCEDURE — 6370000000 HC RX 637 (ALT 250 FOR IP): Performed by: OBSTETRICS & GYNECOLOGY

## 2022-08-01 PROCEDURE — 2500000003 HC RX 250 WO HCPCS

## 2022-08-01 RX ORDER — NITROFURANTOIN 25; 75 MG/1; MG/1
100 CAPSULE ORAL 2 TIMES DAILY
Qty: 8 CAPSULE | Refills: 0 | Status: SHIPPED | OUTPATIENT
Start: 2022-08-01 | End: 2022-08-04 | Stop reason: HOSPADM

## 2022-08-01 RX ORDER — SULFASALAZINE 500 MG/1
500 TABLET ORAL 4 TIMES DAILY
Status: DISCONTINUED | OUTPATIENT
Start: 2022-08-01 | End: 2022-08-05 | Stop reason: HOSPADM

## 2022-08-01 RX ADMIN — LABETALOL HYDROCHLORIDE 100 MG: 100 TABLET, FILM COATED ORAL at 20:53

## 2022-08-01 RX ADMIN — BUTORPHANOL TARTRATE 2 MG: 2 INJECTION, SOLUTION INTRAMUSCULAR; INTRAVENOUS at 00:50

## 2022-08-01 RX ADMIN — ONDANSETRON 4 MG: 2 INJECTION INTRAMUSCULAR; INTRAVENOUS at 20:57

## 2022-08-01 RX ADMIN — SODIUM CHLORIDE, SODIUM LACTATE, POTASSIUM CHLORIDE, CALCIUM CHLORIDE AND DEXTROSE MONOHYDRATE: 5; 600; 310; 30; 20 INJECTION, SOLUTION INTRAVENOUS at 01:53

## 2022-08-01 RX ADMIN — POTASSIUM CHLORIDE 10 MEQ: 750 TABLET, EXTENDED RELEASE ORAL at 20:53

## 2022-08-01 RX ADMIN — METOCLOPRAMIDE HYDROCHLORIDE 10 MG: 5 INJECTION INTRAMUSCULAR; INTRAVENOUS at 04:04

## 2022-08-01 RX ADMIN — LABETALOL HYDROCHLORIDE 100 MG: 100 TABLET, FILM COATED ORAL at 07:42

## 2022-08-01 RX ADMIN — SULFASALAZINE 500 MG: 500 TABLET ORAL at 20:53

## 2022-08-01 RX ADMIN — ACETAMINOPHEN 650 MG: 325 TABLET ORAL at 11:40

## 2022-08-01 RX ADMIN — BUTORPHANOL TARTRATE 2 MG: 2 INJECTION, SOLUTION INTRAMUSCULAR; INTRAVENOUS at 04:00

## 2022-08-01 RX ADMIN — POTASSIUM CHLORIDE 10 MEQ: 750 TABLET, EXTENDED RELEASE ORAL at 07:44

## 2022-08-01 RX ADMIN — WATER 1000 MG: 1 INJECTION INTRAMUSCULAR; INTRAVENOUS; SUBCUTANEOUS at 10:08

## 2022-08-01 RX ADMIN — TAZOBACTAM SODIUM AND PIPERACILLIN SODIUM 3375 MG: 375; 3 INJECTION, SOLUTION INTRAVENOUS at 16:35

## 2022-08-01 RX ADMIN — ONDANSETRON 4 MG: 2 INJECTION INTRAMUSCULAR; INTRAVENOUS at 00:55

## 2022-08-01 RX ADMIN — METOCLOPRAMIDE HYDROCHLORIDE 10 MG: 5 INJECTION INTRAMUSCULAR; INTRAVENOUS at 11:41

## 2022-08-01 RX ADMIN — BUTORPHANOL TARTRATE 2 MG: 2 INJECTION, SOLUTION INTRAMUSCULAR; INTRAVENOUS at 15:54

## 2022-08-01 RX ADMIN — ONDANSETRON 4 MG: 2 INJECTION INTRAMUSCULAR; INTRAVENOUS at 07:45

## 2022-08-01 RX ADMIN — SULFASALAZINE 500 MG: 500 TABLET ORAL at 16:33

## 2022-08-01 RX ADMIN — BUTORPHANOL TARTRATE 2 MG: 2 INJECTION, SOLUTION INTRAMUSCULAR; INTRAVENOUS at 07:44

## 2022-08-01 RX ADMIN — BUTORPHANOL TARTRATE 2 MG: 2 INJECTION, SOLUTION INTRAMUSCULAR; INTRAVENOUS at 20:54

## 2022-08-01 RX ADMIN — LABETALOL HYDROCHLORIDE 100 MG: 100 TABLET, FILM COATED ORAL at 15:40

## 2022-08-01 ASSESSMENT — PAIN SCALES - GENERAL
PAINLEVEL_OUTOF10: 9
PAINLEVEL_OUTOF10: 9
PAINLEVEL_OUTOF10: 7
PAINLEVEL_OUTOF10: 8
PAINLEVEL_OUTOF10: 8
PAINLEVEL_OUTOF10: 10

## 2022-08-01 ASSESSMENT — PAIN DESCRIPTION - FREQUENCY: FREQUENCY: INTERMITTENT

## 2022-08-01 ASSESSMENT — PAIN DESCRIPTION - ORIENTATION
ORIENTATION: RIGHT;LEFT
ORIENTATION: UPPER

## 2022-08-01 ASSESSMENT — PAIN DESCRIPTION - LOCATION
LOCATION: ABDOMEN;BACK
LOCATION: ABDOMEN
LOCATION: ABDOMEN;BACK
LOCATION: ABDOMEN;FLANK;BACK
LOCATION: ABDOMEN

## 2022-08-01 ASSESSMENT — PAIN DESCRIPTION - DESCRIPTORS
DESCRIPTORS: DISCOMFORT
DESCRIPTORS: SHARP;STABBING;CRAMPING

## 2022-08-01 ASSESSMENT — PAIN DESCRIPTION - ONSET: ONSET: ON-GOING

## 2022-08-01 NOTE — PROGRESS NOTES
RN at bedside. Patient resting in bed requesting pain and nausea medication. Patient states she is feeling slightly better today. No other complaints at this time. VSS. Assessment as charted. Call light within reach.

## 2022-08-01 NOTE — PROGRESS NOTES
Dr. Stewart Flores requested monitoring. /85   Pulse 85   Temp 98.3 °F (36.8 °C) (Oral)   Resp 16   Ht 5' 4\" (1.626 m)   Wt 157 lb (71.2 kg)   LMP 01/24/2022   SpO2 98%   BMI 26.95 kg/m²   Fetal heart rate:  Baseline Heart Rate 150, accelerations: present  decelerations: absent  Contraction frequency:  0 minutes  Membranes:  Intact    Dr. Mary Ellen Grullon notified.     Plan:   Iup at 27 wks  Crohns disease - decrease narcotic use per mfm  Azulfidine per mfm    Gilmar Reis MD

## 2022-08-01 NOTE — PROGRESS NOTES
MATERNAL FETAL MEDICINE PROGRESS NOTE    NAME: Jessica Juárez  MRN: [id number]            SERVICE DATE: 2022  SERVICE TIME: 9:59 AM  REQUESTING PROVIDER: Rocio Hollis, *          Jessica Juárez is a 40 y.o. female, [id number] at 27w0d with an EMELIA of 10/31/2022, by Last Menstrual Period dating method. Patient is here with the following problems:  Principal Problem:    Hyperemesis gravidarum  Active Problems:    History of IUGR (intrauterine growth retardation) and stillbirth, currently pregnant, first trimester    Hyperemesis    Elevated blood pressure affecting pregnancy in second trimester, antepartum    Positive urine drug screen    Marijuana use  Resolved Problems:    * No resolved hospital problems.  *  Patient does not have hyperemesis gravidarum  I believe that this is a colitis flare    SUBJECTIVE:  HISTORY OF THE PRESENT ILLNESS:  HISTORY REVIEW  Past Medical History:   Diagnosis Date    Acute [address]'s disease ([facility]     Anxiety attack since age 16    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 inhaler     Crohn's colitis ([address]     Depression     medicated with zoloft     Herpes simplex virus (HSV) infection     History of blood transfusion     as an infant in [place]    Hypertension     intermittent     Marijuana abuse     occas    Neurologic disorder     Postpartum depression      delivery      labor     Rh sensitized     Seizure ([address]     loses focus, disoriented unpon arousing; etiology unknown per pt; none since     STD (sexually transmitted disease)     Tobacco abuse      Past Surgical History:   Procedure Laterality Date    CHOLECYSTECTOMY  2018    COLONOSCOPY      DILATION AND CURETTAGE      DILATION AND CURETTAGE OF UTERUS      ENDOSCOPY, COLON, DIAGNOSTIC      HERNIA REPAIR      HERNIA REPAIR      LAPAROSCOPY      UPPER GASTROINTESTINAL ENDOSCOPY N/A 2022    EGD BIOPSY performed by Keren Lopez MD at [facility]     Family History   Problem Relation Age of Onset    Depression Mother     Leukemia Mother     Breast Cancer Mother     Cervical Cancer Mother     Heart Disease Father     Depression Father     Schizophrenia Father     Hypertension Father      Social History     Socioeconomic History    Marital status:      Spouse name: Not on file    Number of children: Not on file    Years of education: Not on file    Highest education level: Not on file   Occupational History    Not on file   Tobacco Use    Smoking status: Former     Packs/day: 1.00     Years: 10.00     Pack years: 10.00     Types: Cigarettes     Quit date: 2020     Years since quittin.4    Smokeless tobacco: Never   Vaping Use    Vaping Use: Never used   Substance and Sexual Activity    Alcohol use: Not Currently     Comment: socially    Drug use: Not Currently     Frequency: 2.0 times per week     Types: Marijuana (Weed)     Comment: states stopped after pregnancy    Sexual activity: Yes     Partners: Male   Other Topics Concern    Not on file   Social History Narrative    Not on file     Social Determinants of Health     Financial Resource Strain: Not on file   Food Insecurity: Not on file   Transportation Needs: Not on file   Physical Activity: Not on file   Stress: Not on file   Social Connections: Not on file   Intimate Partner Violence: Not on file   Housing Stability: Not on file     OB History    Para Term  AB Living   6 3 2 1 2 3   SAB IAB Ectopic Molar Multiple Live Births   2 0 0 0 0 3      # Outcome Date GA Lbr Marco/2nd Weight Sex Delivery Anes PTL Lv   6 Current            5 SAB 2021 6w0d          4 Term 14 37w0d  5 lb 1 oz (2.296 kg) M Vag-Spont EPI N NARENDRA      Apgar1: 8  Apgar5: 9   3  12 35w4d  4 lb 8 oz (2.041 kg) M Vag-Spont  Y NARENDRA      Complications: Oligohydramnios      Name: Orland Najjar: 9  Apgar5: 9   2 2011 11w0d          1 Term 04 42w0d  7 lb 8 oz (3.402 kg) F Vag-Spont EPI N NARENDRA         ALLERGIES: Patient has no known allergies. CURRENT MEDS:   sulfaSALAzine  500 mg Oral 4x Daily    cefTRIAXone (ROCEPHIN) IV  1,000 mg IntraVENous Q24H    potassium chloride  10 mEq Oral BID    labetalol  100 mg Oral TID      dextrose 5% in lactated ringers 125 mL/hr at 08/01/22 0153     metoclopramide, ondansetron, acetaminophen, butorphanol    PRIOR TO ADMISSION MEDICATIONS:  Prior to Admission medications    Medication Sig Start Date End Date Taking?  Authorizing Provider   metoclopramide (REGLAN) 10 MG tablet Take 1 tablet by mouth 3 times daily as needed (nausea, vomiting) 7/25/22   Sabrina Vieira MD   aspirin (ASPIRIN 81) 81 MG chewable tablet Take 1 tablet by mouth daily 6/14/22   Sabrina Vieira MD   sucralfate (CARAFATE) 1 GM tablet Take 1 tablet by mouth 4 times daily 6/11/22 7/12/22  LEIDY Rondon CNP   potassium chloride (KLOR-CON M) 20 MEQ extended release tablet Take 2 tablets by mouth 2 times daily for 2 days 6/11/22 6/13/22  LEIDY Rondon CNP   vitamin D (CHOLECALCIFEROL) 25 MCG (1000 UT) TABS tablet Take 1,000 Units by mouth nightly    Historical Provider, MD   Prenatal Vit-Fe Fumarate-FA (PRENATAL VITAMIN) 27-0.8 MG TABS Take 1 tablet by mouth nightly    Historical Provider, MD   doxyLAMINE succinate (GNP SLEEP AID) 25 MG tablet Take 12.5-25 mg by mouth nightly as needed (SLEEP/NAUSEA)    Historical Provider, MD   fluticasone (FLONASE) 50 MCG/ACT nasal spray 1 spray by Each Nostril route daily    Historical Provider, MD   vitamin B-6 (PYRIDOXINE) 25 MG tablet Take 1 tablet by mouth in the morning, at noon, and at bedtime 5/24/22   Sabrina Vieira MD   famotidine (PEPCID) 20 MG tablet Take 1 tablet by mouth 2 times daily 4/26/22   Sabrina Vieira MD   sertraline (ZOLOFT) 25 MG tablet Take 1 tablet by mouth at bedtime 3/30/22   Alejandro Mccabe MD   ondansetron (ZOFRAN) 4 MG tablet Take 4 mg by mouth every 8 hours as needed for Nausea or Vomiting Patient is now taking PO form  Pt weaned off pump    Historical Provider, MD       OBJECTIVE:    REVIEW OF SYSTEMS:    Fetal Movement normal  Vaginal Bleeding denies  Contractions denies  LOF/ROM denies  S/Sx Pre-eclampsia denies    PHYSICAL EXAM    Well Developed well-nourished  female in small to moderate amount of abdominal distress most likely secondary to her colitis  Lungs/Breathing regular unlabored  Heart regular rhythm and rate  Abdomen Gravid, soft, no uterine tenderness, there is diffuse abdominal tenderness surrounding the uterus, she does not have what I believed to be an acute abdomen, she does have some rebound and guarding, no contractions palpated  Extremities full range of motion in all        LAST VITALS:  /85   Pulse 85   Temp 98.3 °F (36.8 °C) (Oral)   Resp 16   Ht 5' 4\" (1.626 m)   Wt 157 lb (71.2 kg)   LMP 01/24/2022   SpO2 98%   BMI 26.95 kg/m²        PRENATAL LABS  Diagnostic tests reviewed for today's visit:   Recent Results (from the past 24 hour(s))   Basic Metabolic Panel    Collection Time: 08/01/22  6:30 AM   Result Value Ref Range    Sodium 140 132 - 146 mmol/L    Potassium 3.3 (L) 3.5 - 5.0 mmol/L    Chloride 107 98 - 107 mmol/L    CO2 24 22 - 29 mmol/L    Anion Gap 9 7 - 16 mmol/L    Glucose 78 74 - 99 mg/dL    BUN 3 (L) 6 - 20 mg/dL    Creatinine 0.5 0.5 - 1.0 mg/dL    GFR Non-African American >60 >=60 mL/min/1.73    GFR African American >60     Calcium 8.2 (L) 8.6 - 10.2 mg/dL   CBC with Auto Differential    Collection Time: 08/01/22  6:30 AM   Result Value Ref Range    WBC 11.6 (H) 4.5 - 11.5 E9/L    RBC 3.18 (L) 3.50 - 5.50 E12/L    Hemoglobin 9.9 (L) 11.5 - 15.5 g/dL    Hematocrit 30.3 (L) 34.0 - 48.0 %    MCV 95.3 80.0 - 99.9 fL    MCH 31.1 26.0 - 35.0 pg    MCHC 32.7 32.0 - 34.5 %    RDW 13.6 11.5 - 15.0 fL    Platelets 370 870 - 343 E9/L    MPV 9.1 7.0 - 12.0 fL    Neutrophils % 57.3 43.0 - 80.0 %    Immature Granulocytes % 1.4 0.0 - 5.0 %    Lymphocytes % 31.9 20.0 - 42.0 %    Monocytes % 6.5 2.0 - 12.0 %    Eosinophils % 2.5 0.0 - 6.0 %    Basophils % 0.4 0.0 - 2.0 %    Neutrophils Absolute 6.63 1.80 - 7.30 E9/L    Immature Granulocytes # 0.16 E9/L    Lymphocytes Absolute 3.69 1.50 - 4.00 E9/L    Monocytes Absolute 0.75 0.10 - 0.95 E9/L    Eosinophils Absolute 0.29 0.05 - 0.50 E9/L    Basophils Absolute 0.05 0.00 - 0.20 E9/L         IMP:  Baldomero Giles is a 40 y.o. female [id number] at 30w0d with most likely a colitis flare GI declines to treat   She does not have hyperemesis gravidarum  I am concerned about the amount of narcotics that she is taking for her pain  Her blood pressures are much better since she is on labetalol 3 times a day  She does not currently have an obstetrical concern. PLAN:  Given her history of colitis and GIs hesitancy to treat I am going to start a trial of Azulfidine to see if this helps her. I would recommend having addiction medicine see her because I am concerned about the amount of narcotics that we are giving her. I am comfortable switching her to oral pain medication if we need to. I encouraged her to use more Tylenol if possible. Otherwise continue current management. I spent 25 minutes of direct contact time with the patient of which greater than 50% of the time was used to  the patient, discuss complications and problems related to her pregnancy, or coordinating her care. I answered all of her questions to her satisfaction.     SIGNATURE: Angelica Hahn MD  DATE: August 1, 2022  TIME: 9:59 AM

## 2022-08-01 NOTE — PROGRESS NOTES
Comprehensive Nutrition Assessment    Type and Reason for Visit:  Initial (Hyperemesis Gravidarum)    Nutrition Recommendations/Plan:   Continue current diet and ONS, as tolerated  Continue inpatient monitoring. Malnutrition Assessment:  Malnutrition Status: At risk for malnutrition (Comment) (08/01/22 6720)    Context:  Chronic Illness     Findings of the 6 clinical characteristics of malnutrition:  Energy Intake:  75% or less estimated energy requirements for 1 month or longer  Weight Loss:  No significant weight loss     Body Fat Loss:  Unable to assess     Muscle Mass Loss:  Unable to assess    Fluid Accumulation:  No significant fluid accumulation     Strength:  Not Performed    Nutrition Assessment:    Pt has had multiple admissions for intractible N/V and hyperemesis gravidarum. Currently 27 wk gestation and admitted 2/2 N/V/D. Pt also has hx. Crohn's disease and daily marijuana use. Per GI, current HG not d/t marijuana, as this resolves 72 hr after cessation and antinausea meds working. Pt is feeling slightly better and has tolerated some PO intake. Will continue to monitor tolerance and add Ensure Clear ONS (has had juice ordered with all meals). Nutrition Related Findings:    27 wk IUP, 6/2022 CT scan shows no active Crohn's, hypokalemia, nausea, cramping, gravid abdomen, brown/yellow loose stools, Wound Type: None       Current Nutrition Intake & Therapies:    Average Meal Intake: 26-50%  Average Supplements Intake: None Ordered  ADULT DIET; Easy to Chew  ADULT ORAL NUTRITION SUPPLEMENT; Breakfast, Lunch, Dinner; Clear Liquid Oral Supplement    Anthropometric Measures:  Height: 5' 4\" (162.6 cm)  Ideal Body Weight (IBW): 120 lbs (55 kg)    Admission Body Weight: 157 lb (71.2 kg) (stated 7/27)  Current Body Weight: 158 lb 6 oz (71.8 kg) (actual wt - 7/12 prenatal visit), 132 % IBW.     Current BMI (kg/m2): 27.2  Usual Body Weight: 137 lb 10 oz (62.4 kg) (pregravid (Jan 2021))  % Weight Change (Calculated): 15.1                    BMI Categories:  (N/A d/t pregnancy)    Estimated Daily Nutrient Needs:  Energy Requirements Based On: Kcal/kg  Weight Used for Energy Requirements: Usual  Energy (kcal/day):  (kcal/kg =300 kcal/d for pregnancy)  Weight Used for Protein Requirements: Usual  Protein (g/day): 75-85 (1-1.2 g/kg +10 g/d per fetus)  Method Used for Fluid Requirements: ml/Kg  Fluid (ml/day):  (35-37 ml/kg UBW)    Nutrition Diagnosis:   Inadequate oral intake related to altered GI function as evidenced by poor intake prior to admission, intake 0-25%, vomiting, nausea, GI abnormality    Nutrition Interventions:   Food and/or Nutrient Delivery: Continue Current Diet, Start Oral Nutrition Supplement  Nutrition Education/Counseling: No recommendation at this time  Coordination of Nutrition Care: Continue to monitor while inpatient       Goals:     Goals: PO intake 75% or greater       Nutrition Monitoring and Evaluation:   Behavioral-Environmental Outcomes: None Identified  Food/Nutrient Intake Outcomes: Food and Nutrient Intake, Supplement Intake  Physical Signs/Symptoms Outcomes: Nausea or Vomiting, Diarrhea, Biochemical Data, GI Status, Nutrition Focused Physical Findings, Skin, Weight    Discharge Planning:     Too soon to determine     Diana Junior RD, [address] , [place]  Contact: [phone]

## 2022-08-01 NOTE — PROGRESS NOTES
Patient up ambulating in the halls, reports \"feeling better\". Patient given twp pitchers of water.

## 2022-08-01 NOTE — PROGRESS NOTES
[address] [place] [address] [facility]  Progress Note    SUBJECTIVE:  Chief Complaint   Patient presents with    Hyperemesis Gravidarum       Laying in bed, still having diarrhea  Says she has gone 4 times since last night  No fever. She is still having some abdominal pain. Some nausea but no vomiting today    Review of systems:  As stated above in the chief complaint, otherwise negative. Medications:  Scheduled Meds:   sulfaSALAzine  500 mg Oral 4x Daily    cefTRIAXone (ROCEPHIN) IV  1,000 mg IntraVENous Q24H    potassium chloride  10 mEq Oral BID    labetalol  100 mg Oral TID     Continuous Infusions:   dextrose 5% in lactated ringers 125 mL/hr at 22 0153     PRN Meds:metoclopramide, ondansetron, acetaminophen, butorphanol    OBJECTIVE:  /85   Pulse 85   Temp 98.3 °F (36.8 °C) (Oral)   Resp 16   Ht 5' 4\" (1.626 m)   Wt 157 lb (71.2 kg)   LMP 2022   SpO2 98%   BMI 26.95 kg/m²   Temp  Av.3 °F (36.8 °C)  Min: 98.3 °F (36.8 °C)  Max: 98.3 °F (36.8 °C)  Constitutional: The patient is awake, alert, and oriented. She is in bed. Skin: Warm and dry. No rashes were noted. HEENT: Round and reactive pupils. Moist mucous membranes. No ulcerations or thrush. Neck: Supple to movements. Chest: No use of accessory muscles to breathe. Symmetrical expansion. No wheezing, crackles or rhonchi. Cardiovascular: S1 and S2 are rhythmic and regular. No murmurs appreciated. Abdomen: Positive bowel sounds to auscultation. Pregnant uterus. Extremities: No clubbing, no cyanosis, no edema.   Lines: peripheral    Laboratory and Tests Review:  Lab Results   Component Value Date    WBC 11.6 (H) 2022    WBC 10.5 2022    WBC 17.0 (H) 2022    HGB 9.9 (L) 2022    HCT 30.3 (L) 2022    MCV 95.3 2022     2022     Lab Results   Component Value Date    NEUTROABS 6.63 2022    NEUTROABS 6.06 2022    NEUTROABS 11.20 (H) 2022 No results found for: [facility]  Lab Results   Component Value Date    ALT 11 07/27/2022    AST 19 07/27/2022    ALKPHOS 69 07/27/2022    BILITOT 0.3 07/27/2022     Lab Results   Component Value Date/Time     08/01/2022 06:30 AM    K 3.3 08/01/2022 06:30 AM    K 2.7 06/08/2022 03:30 AM     08/01/2022 06:30 AM    CO2 24 08/01/2022 06:30 AM    BUN 3 08/01/2022 06:30 AM    CREATININE 0.5 08/01/2022 06:30 AM    CREATININE 0.4 07/31/2022 06:45 AM    CREATININE 0.4 07/27/2022 02:50 PM    GFRAA >60 08/01/2022 06:30 AM    LABGLOM >60 08/01/2022 06:30 AM    GLUCOSE 78 08/01/2022 06:30 AM    GLUCOSE 92 05/08/2011 02:04 PM    PROT 7.4 07/27/2022 02:50 PM    LABALBU 4.0 07/27/2022 02:50 PM    CALCIUM 8.2 08/01/2022 06:30 AM    BILITOT 0.3 07/27/2022 02:50 PM    ALKPHOS 69 07/27/2022 02:50 PM    AST 19 07/27/2022 02:50 PM    ALT 11 07/27/2022 02:50 PM     Lab Results   Component Value Date    CRP 0.3 07/30/2022    CRP 0.2 01/12/2021    CRP <0.1 01/07/2021     Lab Results   Component Value Date    SEDRATE 37 (H) 07/30/2022    SEDRATE 2 01/12/2021    SEDRATE 0 01/07/2021     Radiology:      Microbiology:   Urine culture 7/27/2022: E. Coli, Enterococcus faecalis, Corynebacterium species  Urine Culture 7/30/22: pending   Giardia antigen: Negative  Stool Culture 7/29/22: negative   C. Diff EIA: indeterminate  C. Diff toxin molecular: negative   Blood Cultures 7/30/22: negative so far    ASSESSMENT:  29 weeks pregnant  Crohn disease  Possible asymptomatic bacteriuria of pregnancy. The specimen was probably not appropriately collected    PLAN:  Stop Ceftriaxone and start Zosyn while inpatient - transition to oral Macrobid for discharge - script in soft chart   Will continue to follow     LEIDY Miguel CNP  11:11 AM  8/1/2022     Pt seen and examined. Above discussed agree with advanced practice nurse. Labs, cultures, and radiographs reviewed. Face to Face encounter occurred. Changes made as necessary. Palak Lorenzo MD

## 2022-08-02 LAB — URINE CULTURE, ROUTINE: NORMAL

## 2022-08-02 PROCEDURE — 99233 SBSQ HOSP IP/OBS HIGH 50: CPT | Performed by: OBSTETRICS & GYNECOLOGY

## 2022-08-02 PROCEDURE — 1220000000 HC SEMI PRIVATE OB R&B

## 2022-08-02 PROCEDURE — 6370000000 HC RX 637 (ALT 250 FOR IP): Performed by: OBSTETRICS & GYNECOLOGY

## 2022-08-02 PROCEDURE — 6360000002 HC RX W HCPCS: Performed by: OBSTETRICS & GYNECOLOGY

## 2022-08-02 PROCEDURE — 6360000002 HC RX W HCPCS

## 2022-08-02 PROCEDURE — 6370000000 HC RX 637 (ALT 250 FOR IP): Performed by: INTERNAL MEDICINE

## 2022-08-02 PROCEDURE — 2580000003 HC RX 258

## 2022-08-02 RX ORDER — LABETALOL 100 MG/1
200 TABLET, FILM COATED ORAL 3 TIMES DAILY
Status: DISCONTINUED | OUTPATIENT
Start: 2022-08-02 | End: 2022-08-05 | Stop reason: HOSPADM

## 2022-08-02 RX ADMIN — ONDANSETRON 4 MG: 2 INJECTION INTRAMUSCULAR; INTRAVENOUS at 04:16

## 2022-08-02 RX ADMIN — ONDANSETRON 4 MG: 2 INJECTION INTRAMUSCULAR; INTRAVENOUS at 20:35

## 2022-08-02 RX ADMIN — BUTORPHANOL TARTRATE 2 MG: 2 INJECTION, SOLUTION INTRAMUSCULAR; INTRAVENOUS at 04:14

## 2022-08-02 RX ADMIN — SULFASALAZINE 500 MG: 500 TABLET ORAL at 17:00

## 2022-08-02 RX ADMIN — METOCLOPRAMIDE HYDROCHLORIDE 10 MG: 5 INJECTION INTRAMUSCULAR; INTRAVENOUS at 00:45

## 2022-08-02 RX ADMIN — BUTORPHANOL TARTRATE 2 MG: 2 INJECTION, SOLUTION INTRAMUSCULAR; INTRAVENOUS at 12:22

## 2022-08-02 RX ADMIN — METOCLOPRAMIDE HYDROCHLORIDE 10 MG: 5 INJECTION INTRAMUSCULAR; INTRAVENOUS at 16:57

## 2022-08-02 RX ADMIN — PIPERACILLIN AND TAZOBACTAM 3375 MG: 3; .375 INJECTION, POWDER, FOR SOLUTION INTRAVENOUS at 08:43

## 2022-08-02 RX ADMIN — METOCLOPRAMIDE HYDROCHLORIDE 10 MG: 5 INJECTION INTRAMUSCULAR; INTRAVENOUS at 06:57

## 2022-08-02 RX ADMIN — LABETALOL HYDROCHLORIDE 200 MG: 100 TABLET, FILM COATED ORAL at 20:34

## 2022-08-02 RX ADMIN — SULFASALAZINE 500 MG: 500 TABLET ORAL at 08:42

## 2022-08-02 RX ADMIN — PIPERACILLIN AND TAZOBACTAM 3375 MG: 3; .375 INJECTION, POWDER, FOR SOLUTION INTRAVENOUS at 00:52

## 2022-08-02 RX ADMIN — BUTORPHANOL TARTRATE 2 MG: 2 INJECTION, SOLUTION INTRAMUSCULAR; INTRAVENOUS at 16:53

## 2022-08-02 RX ADMIN — POTASSIUM CHLORIDE 10 MEQ: 750 TABLET, EXTENDED RELEASE ORAL at 08:42

## 2022-08-02 RX ADMIN — LABETALOL HYDROCHLORIDE 200 MG: 100 TABLET, FILM COATED ORAL at 15:02

## 2022-08-02 RX ADMIN — POTASSIUM CHLORIDE 10 MEQ: 750 TABLET, EXTENDED RELEASE ORAL at 20:34

## 2022-08-02 RX ADMIN — METOCLOPRAMIDE HYDROCHLORIDE 10 MG: 5 INJECTION INTRAMUSCULAR; INTRAVENOUS at 23:21

## 2022-08-02 RX ADMIN — BUTORPHANOL TARTRATE 2 MG: 2 INJECTION, SOLUTION INTRAMUSCULAR; INTRAVENOUS at 20:35

## 2022-08-02 RX ADMIN — SULFASALAZINE 500 MG: 500 TABLET ORAL at 20:35

## 2022-08-02 RX ADMIN — BUTORPHANOL TARTRATE 2 MG: 2 INJECTION, SOLUTION INTRAMUSCULAR; INTRAVENOUS at 07:31

## 2022-08-02 RX ADMIN — ONDANSETRON 4 MG: 2 INJECTION INTRAMUSCULAR; INTRAVENOUS at 12:22

## 2022-08-02 RX ADMIN — PIPERACILLIN AND TAZOBACTAM 3375 MG: 3; .375 INJECTION, POWDER, FOR SOLUTION INTRAVENOUS at 17:00

## 2022-08-02 RX ADMIN — BUTORPHANOL TARTRATE 2 MG: 2 INJECTION, SOLUTION INTRAMUSCULAR; INTRAVENOUS at 00:42

## 2022-08-02 RX ADMIN — LABETALOL HYDROCHLORIDE 100 MG: 100 TABLET, FILM COATED ORAL at 08:37

## 2022-08-02 RX ADMIN — SULFASALAZINE 500 MG: 500 TABLET ORAL at 15:03

## 2022-08-02 ASSESSMENT — PAIN SCALES - GENERAL
PAINLEVEL_OUTOF10: 9
PAINLEVEL_OUTOF10: 8
PAINLEVEL_OUTOF10: 10
PAINLEVEL_OUTOF10: 9
PAINLEVEL_OUTOF10: 8
PAINLEVEL_OUTOF10: 8

## 2022-08-02 ASSESSMENT — PAIN DESCRIPTION - LOCATION
LOCATION: ABDOMEN;FLANK
LOCATION: ABDOMEN;BACK;FLANK
LOCATION: BACK;ABDOMEN
LOCATION: ABDOMEN;BACK;FLANK
LOCATION: ABDOMEN;BACK;FLANK

## 2022-08-02 ASSESSMENT — PAIN - FUNCTIONAL ASSESSMENT
PAIN_FUNCTIONAL_ASSESSMENT: ACTIVITIES ARE NOT PREVENTED

## 2022-08-02 ASSESSMENT — PAIN DESCRIPTION - ORIENTATION
ORIENTATION: RIGHT;LEFT
ORIENTATION: RIGHT;LEFT
ORIENTATION: RIGHT
ORIENTATION: RIGHT
ORIENTATION: RIGHT;LEFT

## 2022-08-02 ASSESSMENT — PAIN DESCRIPTION - DESCRIPTORS
DESCRIPTORS: DISCOMFORT;ACHING
DESCRIPTORS: STABBING;SHARP;DISCOMFORT
DESCRIPTORS: STABBING;SHARP;DISCOMFORT
DESCRIPTORS: ACHING;DISCOMFORT
DESCRIPTORS: STABBING;SHARP;DISCOMFORT

## 2022-08-02 NOTE — PROGRESS NOTES
[address] [place] [address] [facility]  Progress Note    SUBJECTIVE:  Chief Complaint   Patient presents with    Hyperemesis Gravidarum       Laying in bed, diarrhea and vomiting is better. tolerated lunch well. She is still having some abdominal pain. Review of systems:  As stated above in the chief complaint, otherwise negative. Medications:  Scheduled Meds:   labetalol  200 mg Oral TID    sulfaSALAzine  500 mg Oral 4x Daily    piperacillin-tazobactam  3,375 mg IntraVENous Q8H    potassium chloride  10 mEq Oral BID     Continuous Infusions:   dextrose 5% in lactated ringers 125 mL/hr at 22 0153     PRN Meds:metoclopramide, ondansetron, acetaminophen, butorphanol    OBJECTIVE:  /69   Pulse 93   Temp 97.7 °F (36.5 °C) (Oral)   Resp 16   Ht 5' 4\" (1.626 m)   Wt 157 lb (71.2 kg)   LMP 2022   SpO2 99%   BMI 26.95 kg/m²   Temp  Av.9 °F (36.6 °C)  Min: 97.7 °F (36.5 °C)  Max: 98.1 °F (36.7 °C)  Constitutional: The patient is awake, alert, and oriented. She is in bed. Skin: Warm and dry. No rashes were noted. HEENT: Round and reactive pupils. Moist mucous membranes. No ulcerations or thrush. Neck: Supple to movements. Chest: No use of accessory muscles to breathe. Symmetrical expansion. No wheezing, crackles or rhonchi. Cardiovascular: S1 and S2 are rhythmic and regular. No murmurs appreciated. Abdomen: Positive bowel sounds to auscultation. Pregnant uterus. Extremities: No clubbing, no cyanosis, no edema.   Lines: peripheral    Laboratory and Tests Review:  Lab Results   Component Value Date    WBC 11.6 (H) 2022    WBC 10.5 2022    WBC 17.0 (H) 2022    HGB 9.9 (L) 2022    HCT 30.3 (L) 2022    MCV 95.3 2022     2022     Lab Results   Component Value Date    NEUTROABS 6.63 2022    NEUTROABS 6.06 2022    NEUTROABS 11.20 (H) 2022     No results found for: [facility]  Lab Results   Component Value

## 2022-08-02 NOTE — PROGRESS NOTES
PROGRESS NOTE  By Mary Piedra M.D. [facility]  Dr. Johnny Montgomery M.D.,  Dr. Darrion Johnson M.D.,   Dr. Shabbir Gee D.O.,  Dr. Caden Jordan M.D.,  Dr. Jonny Ruff D.O.,  Gerber Sesay D.O.         Virginia Door  40 y.o.  female    SUBJECTIVE:  Improved overall and able to tolerate diet    OBJECTIVE:    BP (!) 143/67   Pulse 100   Temp 97.7 °F (36.5 °C) (Oral)   Resp 16   Ht 5' 4\" (1.626 m)   Wt 157 lb (71.2 kg)   LMP 01/24/2022   SpO2 99%   BMI 26.95 kg/m²     General: NAD/ female  HEENT: Anicteric sclera/moist oral mucosa  Neck: Supple with trachea midline  Chest: Symmetric excursion/labored respirations  Abd.: Soft/gravid  Extr.:  No significant peripheral edema  Skin: Warm and dry/multiple tattoos      DATA:     Lab Results   Component Value Date/Time    WBC 11.6 08/01/2022 06:30 AM    RBC 3.18 08/01/2022 06:30 AM    HGB 9.9 08/01/2022 06:30 AM    HCT 30.3 08/01/2022 06:30 AM    MCV 95.3 08/01/2022 06:30 AM    MCH 31.1 08/01/2022 06:30 AM    MCHC 32.7 08/01/2022 06:30 AM    RDW 13.6 08/01/2022 06:30 AM     08/01/2022 06:30 AM    MPV 9.1 08/01/2022 06:30 AM     Lab Results   Component Value Date/Time     08/01/2022 06:30 AM    K 3.3 08/01/2022 06:30 AM    K 2.7 06/08/2022 03:30 AM     08/01/2022 06:30 AM    CO2 24 08/01/2022 06:30 AM    BUN 3 08/01/2022 06:30 AM    CREATININE 0.5 08/01/2022 06:30 AM    CALCIUM 8.2 08/01/2022 06:30 AM    PROT 7.4 07/27/2022 02:50 PM    LABALBU 4.0 07/27/2022 02:50 PM    BILITOT 0.3 07/27/2022 02:50 PM    ALKPHOS 69 07/27/2022 02:50 PM    AST 19 07/27/2022 02:50 PM    ALT 11 07/27/2022 02:50 PM     Lab Results   Component Value Date/Time    LIPASE 20 03/28/2022 11:53 AM     Lab Results   Component Value Date/Time    AMYLASE 61 06/10/2014 05:20 AM         ASSESSMENT/PLAN:  Patient Active Problem List   Diagnosis    Hyperemesis gravidarum    Cannabis dependence (HCC)    Asthma    Tobacco abuse    Symptomatic cholelithiasis    Crohn disease ([facility]    Crohn's colitis, without complications (HCC)    Intractable vomiting with nausea    Emesis, persistent    Intractable nausea and vomiting    Abdominal pain    Intractable vomiting    Abdominal pain, right lower quadrant    Diarrhea    Marijuana abuse, continuous    AMA (advanced maternal age) multigravida 35+, first trimester    Hypokalemia    Anxiety    History of IUGR (intrauterine growth retardation) and stillbirth, currently pregnant, first trimester    Depression    Hyperemesis    Primary hypertension    Hyperemesis gravidarum with metabolic disturbance, antepartum    Starvation ketoacidosis    Elevated blood pressure affecting pregnancy in second trimester, antepartum    Low vitamin D level    History of prior pregnancy with IUGR     Positive urine drug screen    Marijuana use     1. Crohn's disease / diarrhea  -Consider biologic after resolution of pregnancy  -CT abdomen pelvis 2022 showing no evidence of active Crohn's disease  -Stool studies negative  -Reports flushing with diarrhea in the past -pending CGA     2. GERD / nausea / vomiting  -Likely hyperemesis of pregnancy versus cannabis hyperemesis syndrome  -Recommend discontinuation of cannabis use - persistent positive UDS  -Supportive and symptomatic treatment  -EGD in February of this year revealing mild gastritis  -Cannabinoid hyperemesis should resolve within 72 hours of discontinuation of use  -Patient is responding to current antiemetics - speaks against cannabinoid hyperemesis  -Consider Pepcid 1-2x/day  -Antiemetics per admitting     3. Abdominal pain  -Prior CT abdomen pelvis 2022 showing no significant findings  -Prior cholecystectomy  -Consider gastric source of pain  -Prior EGD showing gastritis  -Consider Pepcid 1-2 times daily - defer to GYN  -Pain management per admitting     4. Pregnancy  -Per admitting/OB/GYN     Okay to be discharged when diet tolerated.   Follow-up in the office in 1 to 2 weeks after discharge  -patient to call for appointment and with questions/concerns at [phone]. Tamika Hallman MD  8/2/2022  2:46 PM    NOTE:  This report was transcribed using voice recognition software. Every effort was made to ensure accuracy; however, inadvertent computerized transcription errors may be present.

## 2022-08-02 NOTE — PROGRESS NOTES
Pt denies any LOF, VB or regular ctx's. States +FM. States she has not vomited since yesterday evening but states she feels nauseous a lot. Pt rates her pain a 9/10 that she states \"feels like kidney pain\". She feels it in her upper abdomen and left and right sides into her back. Some tenderness upon palpation of her upper abdomen, abdomen soft and not distended. Pt requesting pain and nausea medication at this time. Vitals stable, assessment as charted.

## 2022-08-02 NOTE — PROGRESS NOTES
MATERNAL FETAL MEDICINE PROGRESS NOTE    NAME: Emani Jack  MRN: [id number]            SERVICE DATE: 2022  SERVICE TIME: 10:23 AM  REQUESTING PROVIDER: Kailash Negrete, *          Emani Jack is a 40 y.o. female, [id number] at 27w1d with an EMELIA of 10/31/2022, by Last Menstrual Period dating method. Patient is here with the following problems:  Principal Problem:    Hyperemesis gravidarum  Active Problems:    History of IUGR (intrauterine growth retardation) and stillbirth, currently pregnant, first trimester    Hyperemesis    Elevated blood pressure affecting pregnancy in second trimester, antepartum    Positive urine drug screen    Marijuana use  Resolved Problems:    * No resolved hospital problems.  *      SUBJECTIVE:  HISTORY OF THE PRESENT ILLNESS:  HISTORY REVIEW  Past Medical History:   Diagnosis Date    Acute Crohn's disease ([address]     Anxiety attack since age 16    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 inhaler     Crohn's colitis ([address]     Depression     medicated with zoloft     Herpes simplex virus (HSV) infection     History of blood transfusion     as an infant in [place]    Hypertension     intermittent     Marijuana abuse     occas    Neurologic disorder     Postpartum depression      delivery      labor     Rh sensitized     Seizure ([address]     loses focus, disoriented unpon arousing; etiology unknown per pt; none since     STD (sexually transmitted disease)     Tobacco abuse      Past Surgical History:   Procedure Laterality Date    CHOLECYSTECTOMY  2018    COLONOSCOPY      DILATION AND CURETTAGE      DILATION AND CURETTAGE OF UTERUS      ENDOSCOPY, COLON, DIAGNOSTIC      HERNIA REPAIR      HERNIA REPAIR      LAPAROSCOPY      UPPER GASTROINTESTINAL ENDOSCOPY N/A 2022    EGD BIOPSY performed by James Lee MD at [facility] ENDOSCOPY     Family History   Problem Relation Age of Onset    Depression Mother     Leukemia Mother     Breast Cancer Mother     Cervical Cancer Mother     Heart Disease Father     Depression Father     Schizophrenia Father     Hypertension Father      Social History     Socioeconomic History    Marital status:      Spouse name: Not on file    Number of children: Not on file    Years of education: Not on file    Highest education level: Not on file   Occupational History    Not on file   Tobacco Use    Smoking status: Former     Packs/day: 1.00     Years: 10.00     Pack years: 10.00     Types: Cigarettes     Quit date: 2020     Years since quittin.5    Smokeless tobacco: Never   Vaping Use    Vaping Use: Never used   Substance and Sexual Activity    Alcohol use: Not Currently     Comment: socially    Drug use: Not Currently     Frequency: 2.0 times per week     Types: Marijuana (Weed)     Comment: states stopped after pregnancy    Sexual activity: Yes     Partners: Male   Other Topics Concern    Not on file   Social History Narrative    Not on file     Social Determinants of Health     Financial Resource Strain: Not on file   Food Insecurity: Not on file   Transportation Needs: Not on file   Physical Activity: Not on file   Stress: Not on file   Social Connections: Not on file   Intimate Partner Violence: Not on file   Housing Stability: Not on file     OB History    Para Term  AB Living   6 3 2 1 2 3   SAB IAB Ectopic Molar Multiple Live Births   2 0 0 0 0 3      # Outcome Date GA Lbr Marco/2nd Weight Sex Delivery Anes PTL Lv   6 Current            5 SAB 2021 6w0d          4 Term 14 37w0d  5 lb 1 oz (2.296 kg) M Vag-Spont EPI N NARENDRA      Apgar1: 8  Apgar5: 9   3  12 35w4d  4 lb 8 oz (2.041 kg) M Vag-Spont  Y NARENDRA      Complications: Oligohydramnios      Name: Katie Monroe: 9  Apgar5: 9   2 2011 11w0d          1 Term / 42w0d  7 lb 8 oz (3.402 kg) F Vag-Spont EPI N NARENDRA         ALLERGIES: Patient has no known allergies. CURRENT MEDS:   sulfaSALAzine  500 mg Oral 4x Daily    piperacillin-tazobactam  3,375 mg IntraVENous Q8H    potassium chloride  10 mEq Oral BID    labetalol  100 mg Oral TID      dextrose 5% in lactated ringers 125 mL/hr at 08/01/22 0153     metoclopramide, ondansetron, acetaminophen, butorphanol    PRIOR TO ADMISSION MEDICATIONS:  Prior to Admission medications    Medication Sig Start Date End Date Taking? Authorizing Provider   nitrofurantoin, macrocrystal-monohydrate, (MACROBID) 100 MG capsule Take 1 capsule by mouth in the morning and 1 capsule before bedtime. Do all this for 4 days.  8/1/22 8/5/22 Yes LEIDY Domingo CNP   metoclopramide (REGLAN) 10 MG tablet Take 1 tablet by mouth 3 times daily as needed (nausea, vomiting) 7/25/22   Cassidy Alejandro MD   aspirin (ASPIRIN 81) 81 MG chewable tablet Take 1 tablet by mouth daily 6/14/22   Cassidy Alejandro MD   sucralfate (CARAFATE) 1 GM tablet Take 1 tablet by mouth 4 times daily 6/11/22 7/12/22  LEIDY Adams CNP   potassium chloride (KLOR-CON M) 20 MEQ extended release tablet Take 2 tablets by mouth 2 times daily for 2 days 6/11/22 6/13/22  LEIDY Adams CNP   vitamin D (CHOLECALCIFEROL) 25 MCG (1000 UT) TABS tablet Take 1,000 Units by mouth nightly    Historical Provider, MD   Prenatal Vit-Fe Fumarate-FA (PRENATAL VITAMIN) 27-0.8 MG TABS Take 1 tablet by mouth nightly    Historical Provider, MD   doxyLAMINE succinate (GNP SLEEP AID) 25 MG tablet Take 12.5-25 mg by mouth nightly as needed (SLEEP/NAUSEA)    Historical Provider, MD   fluticasone (FLONASE) 50 MCG/ACT nasal spray 1 spray by Each Nostril route daily    Historical Provider, MD   vitamin B-6 (PYRIDOXINE) 25 MG tablet Take 1 tablet by mouth in the morning, at noon, and at bedtime 5/24/22   Cassidy Alejandro MD   famotidine (PEPCID) 20 MG tablet Take 1 tablet by mouth 2 times daily 4/26/22   Cassidy Alejandro MD   sertraline (ZOLOFT) 25 MG tablet Take 1 tablet by mouth at bedtime 3/30/22   Chelly Mya MD   ondansetron (ZOFRAN) 4 MG tablet Take 4 mg by mouth every 8 hours as needed for Nausea or Vomiting Patient is now taking PO form  Pt weaned off pump    Historical Provider, MD       OBJECTIVE:    REVIEW OF SYSTEMS:    Fetal Movement normal  Vaginal Bleeding denies  Contractions denies  LOF/ROM denies  S/Sx Pre-eclampsia denies  HungHenderson states that the pain is better she is able to go longer between pain medications    PHYSICAL EXAM    Well Developed well-nourished  female in less distress than yesterday  Lungs/Breathing regular unlabored  Heart regular rhythm rate  Abdomen Gravid, soft, no rebound, less tenderness to palpation, no uterine tenderness, no contractions palpated  Extremities full range of motion all        LAST VITALS:  BP (!) 143/67   Pulse 100   Temp 97.7 °F (36.5 °C) (Oral)   Resp 16   Ht 5' 4\" (1.626 m)   Wt 157 lb (71.2 kg)   LMP 01/24/2022   SpO2 99%   BMI 26.95 kg/m²        PRENATAL LABS  Diagnostic tests reviewed for today's visit:   No results found for this or any previous visit (from the past 24 hour(s)). IMP:  Remington Jones is a 40 y.o. female [id number] at 27w1d with Crohn's flare now on Azulfidine who is starting to improve  Blood pressures are still higher than I would like I will increase her labetalol to 203 times a day  Continue current medications      PLAN:  Continue current medications  Continue to try to wean pain meds  Increase labetalol to 200 3 times daily  Continue current management      I spent 25 minutes of direct contact time with the patient of which greater than 50% of the time was used to  the patient, discuss complications and problems related to her pregnancy, or coordinating her care. I answered all of her questions to her satisfaction.     SIGNATURE: Sumit Emanuel MD  DATE: August 2, 2022  TIME: 10:23 AM

## 2022-08-02 NOTE — PROGRESS NOTES
Assumed patient care at 0730. Patient resting in bed,states comfortable after receiving pain medication. Pt denies contractions, denies lof, denies vb. States +FM. VSS. Call light in reach.

## 2022-08-03 ENCOUNTER — ANCILLARY PROCEDURE (OUTPATIENT)
Dept: OBGYN CLINIC | Age: 37
DRG: 566 | End: 2022-08-03
Payer: COMMERCIAL

## 2022-08-03 LAB — CHROMOGRANIN A: 47 NG/ML (ref 0–103)

## 2022-08-03 PROCEDURE — 6370000000 HC RX 637 (ALT 250 FOR IP): Performed by: OBSTETRICS & GYNECOLOGY

## 2022-08-03 PROCEDURE — 1220000000 HC SEMI PRIVATE OB R&B

## 2022-08-03 PROCEDURE — 2580000003 HC RX 258

## 2022-08-03 PROCEDURE — 6360000002 HC RX W HCPCS

## 2022-08-03 PROCEDURE — 76820 UMBILICAL ARTERY ECHO: CPT | Performed by: OBSTETRICS & GYNECOLOGY

## 2022-08-03 PROCEDURE — 76815 OB US LIMITED FETUS(S): CPT | Performed by: OBSTETRICS & GYNECOLOGY

## 2022-08-03 PROCEDURE — 76821 MIDDLE CEREBRAL ARTERY ECHO: CPT | Performed by: OBSTETRICS & GYNECOLOGY

## 2022-08-03 PROCEDURE — 6370000000 HC RX 637 (ALT 250 FOR IP): Performed by: INTERNAL MEDICINE

## 2022-08-03 PROCEDURE — 99999 PR OFFICE/OUTPT VISIT,PROCEDURE ONLY: CPT | Performed by: OBSTETRICS & GYNECOLOGY

## 2022-08-03 PROCEDURE — 76819 FETAL BIOPHYS PROFIL W/O NST: CPT | Performed by: OBSTETRICS & GYNECOLOGY

## 2022-08-03 PROCEDURE — 6360000002 HC RX W HCPCS: Performed by: OBSTETRICS & GYNECOLOGY

## 2022-08-03 RX ORDER — ASPIRIN 81 MG/1
TABLET, CHEWABLE ORAL
Status: DISPENSED
Start: 2022-08-03 | End: 2022-08-03

## 2022-08-03 RX ADMIN — PIPERACILLIN AND TAZOBACTAM 3375 MG: 3; .375 INJECTION, POWDER, FOR SOLUTION INTRAVENOUS at 16:30

## 2022-08-03 RX ADMIN — SULFASALAZINE 500 MG: 500 TABLET ORAL at 08:40

## 2022-08-03 RX ADMIN — BUTORPHANOL TARTRATE 2 MG: 2 INJECTION, SOLUTION INTRAMUSCULAR; INTRAVENOUS at 08:51

## 2022-08-03 RX ADMIN — LABETALOL HYDROCHLORIDE 200 MG: 100 TABLET, FILM COATED ORAL at 20:31

## 2022-08-03 RX ADMIN — ONDANSETRON 4 MG: 2 INJECTION INTRAMUSCULAR; INTRAVENOUS at 08:51

## 2022-08-03 RX ADMIN — BUTORPHANOL TARTRATE 2 MG: 2 INJECTION, SOLUTION INTRAMUSCULAR; INTRAVENOUS at 05:12

## 2022-08-03 RX ADMIN — BUTORPHANOL TARTRATE 2 MG: 2 INJECTION, SOLUTION INTRAMUSCULAR; INTRAVENOUS at 13:50

## 2022-08-03 RX ADMIN — SULFASALAZINE 500 MG: 500 TABLET ORAL at 16:34

## 2022-08-03 RX ADMIN — PIPERACILLIN AND TAZOBACTAM 3375 MG: 3; .375 INJECTION, POWDER, FOR SOLUTION INTRAVENOUS at 08:41

## 2022-08-03 RX ADMIN — POTASSIUM CHLORIDE 10 MEQ: 750 TABLET, EXTENDED RELEASE ORAL at 08:40

## 2022-08-03 RX ADMIN — LABETALOL HYDROCHLORIDE 200 MG: 100 TABLET, FILM COATED ORAL at 13:51

## 2022-08-03 RX ADMIN — METOCLOPRAMIDE HYDROCHLORIDE 10 MG: 5 INJECTION INTRAMUSCULAR; INTRAVENOUS at 12:31

## 2022-08-03 RX ADMIN — PIPERACILLIN AND TAZOBACTAM 3375 MG: 3; .375 INJECTION, POWDER, FOR SOLUTION INTRAVENOUS at 01:15

## 2022-08-03 RX ADMIN — METOCLOPRAMIDE HYDROCHLORIDE 10 MG: 5 INJECTION INTRAMUSCULAR; INTRAVENOUS at 06:06

## 2022-08-03 RX ADMIN — BUTORPHANOL TARTRATE 2 MG: 2 INJECTION, SOLUTION INTRAMUSCULAR; INTRAVENOUS at 01:23

## 2022-08-03 RX ADMIN — BUTORPHANOL TARTRATE 2 MG: 2 INJECTION, SOLUTION INTRAMUSCULAR; INTRAVENOUS at 20:31

## 2022-08-03 RX ADMIN — ONDANSETRON 4 MG: 2 INJECTION INTRAMUSCULAR; INTRAVENOUS at 02:34

## 2022-08-03 RX ADMIN — SULFASALAZINE 500 MG: 500 TABLET ORAL at 20:31

## 2022-08-03 RX ADMIN — POTASSIUM CHLORIDE 10 MEQ: 750 TABLET, EXTENDED RELEASE ORAL at 20:32

## 2022-08-03 RX ADMIN — SULFASALAZINE 500 MG: 500 TABLET ORAL at 12:31

## 2022-08-03 RX ADMIN — ONDANSETRON 4 MG: 2 INJECTION INTRAMUSCULAR; INTRAVENOUS at 16:37

## 2022-08-03 RX ADMIN — LABETALOL HYDROCHLORIDE 200 MG: 100 TABLET, FILM COATED ORAL at 08:40

## 2022-08-03 ASSESSMENT — PAIN SCALES - GENERAL
PAINLEVEL_OUTOF10: 9
PAINLEVEL_OUTOF10: 8
PAINLEVEL_OUTOF10: 9
PAINLEVEL_OUTOF10: 9
PAINLEVEL_OUTOF10: 8

## 2022-08-03 ASSESSMENT — PAIN DESCRIPTION - LOCATION
LOCATION: ABDOMEN

## 2022-08-03 ASSESSMENT — PAIN DESCRIPTION - ORIENTATION
ORIENTATION: UPPER

## 2022-08-03 NOTE — PROGRESS NOTES
[address] [place] [address] [facility]  Progress Note    SUBJECTIVE:  Chief Complaint   Patient presents with    Hyperemesis Gravidarum       Laying in bed, tolerated her lunch   Still having some nausea   She is still having some upper abdominal pain. Has had 4 BM since yesterday evening but they are getting solid. Review of systems:  As stated above in the chief complaint, otherwise negative. Medications:  Scheduled Meds:   aspirin        labetalol  200 mg Oral TID    sulfaSALAzine  500 mg Oral 4x Daily    piperacillin-tazobactam  3,375 mg IntraVENous Q8H    potassium chloride  10 mEq Oral BID     Continuous Infusions:   dextrose 5% in lactated ringers 125 mL/hr at 22     PRN Meds:metoclopramide, ondansetron, acetaminophen, butorphanol    OBJECTIVE:  BP (!) 118/55   Pulse 90   Temp 98 °F (36.7 °C) (Oral)   Resp 16   Ht 5' 4\" (1.626 m)   Wt 157 lb (71.2 kg)   LMP 2022   SpO2 99%   BMI 26.95 kg/m²   Temp  Av.1 °F (36.7 °C)  Min: 98 °F (36.7 °C)  Max: 98.2 °F (36.8 °C)  Constitutional: The patient is awake, alert, and oriented. She is in bed. Skin: Warm and dry. No rashes were noted. HEENT: Round and reactive pupils. Moist mucous membranes. No ulcerations or thrush. Neck: Supple to movements. Chest: No use of accessory muscles to breathe. Symmetrical expansion. No wheezing, crackles or rhonchi. Cardiovascular: S1 and S2 are rhythmic and regular. No murmurs appreciated. Abdomen: Positive bowel sounds to auscultation. Pregnant uterus. Extremities: No clubbing, no cyanosis, no edema.   Lines: peripheral    Laboratory and Tests Review:  Lab Results   Component Value Date    WBC 11.6 (H) 2022    WBC 10.5 2022    WBC 17.0 (H) 2022    HGB 9.9 (L) 2022    HCT 30.3 (L) 2022    MCV 95.3 2022     2022     Lab Results   Component Value Date    NEUTROABS 6.63 2022    NEUTROABS 6.06 2022    NEUTROABS 11.20 (H) 07/30/2022     No results found for: [facility]  Lab Results   Component Value Date    ALT 11 07/27/2022    AST 19 07/27/2022    ALKPHOS 69 07/27/2022    BILITOT 0.3 07/27/2022     Lab Results   Component Value Date/Time     08/01/2022 06:30 AM    K 3.3 08/01/2022 06:30 AM    K 2.7 06/08/2022 03:30 AM     08/01/2022 06:30 AM    CO2 24 08/01/2022 06:30 AM    BUN 3 08/01/2022 06:30 AM    CREATININE 0.5 08/01/2022 06:30 AM    CREATININE 0.4 07/31/2022 06:45 AM    CREATININE 0.4 07/27/2022 02:50 PM    GFRAA >60 08/01/2022 06:30 AM    LABGLOM >60 08/01/2022 06:30 AM    GLUCOSE 78 08/01/2022 06:30 AM    GLUCOSE 92 05/08/2011 02:04 PM    PROT 7.4 07/27/2022 02:50 PM    LABALBU 4.0 07/27/2022 02:50 PM    CALCIUM 8.2 08/01/2022 06:30 AM    BILITOT 0.3 07/27/2022 02:50 PM    ALKPHOS 69 07/27/2022 02:50 PM    AST 19 07/27/2022 02:50 PM    ALT 11 07/27/2022 02:50 PM     Lab Results   Component Value Date    CRP 0.3 07/30/2022    CRP 0.2 01/12/2021    CRP <0.1 01/07/2021     Lab Results   Component Value Date    SEDRATE 37 (H) 07/30/2022    SEDRATE 2 01/12/2021    SEDRATE 0 01/07/2021     Radiology:      Microbiology:   Urine culture 7/27/2022: E. Coli, Enterococcus faecalis, Corynebacterium species  Urine Culture 7/30/22: pending   Giardia antigen: Negative  Stool Culture 7/29/22: negative   C. Diff EIA: indeterminate  C. Diff toxin molecular: negative   Blood Cultures 7/30/22: negative so far    ASSESSMENT:  29 weeks pregnant  Crohn disease  Possible asymptomatic bacteriuria of pregnancy. The specimen was probably not appropriately collected    PLAN:  Continue Zosyn while inpatient - transition to oral Macrobid for discharge - script in soft chart   Will continue to follow     LEIDY Irvin CNP  11:34 AM  8/3/2022     Pt seen and examined. Above discussed agree with advanced practice nurse. Labs, cultures, and radiographs reviewed. Face to Face encounter occurred. Changes made as necessary.      Kayley Bird Deneen Worthington MD

## 2022-08-03 NOTE — PROGRESS NOTES
[facility]  [address] [place] [address].  [address] [place] [address]. [address]  Ph: [phone] Fax: [phone]  August 3 , 2022     RE: Hannah Escamilla   [birth year]   Dear Sherrill Amaya : We saw  Ms. Ayala   for antepartum testing  i ultrasound  on the Antepartum Unit at [facility] in [place], [place]  on  8/3/2022     OB History 36yo  6. Para 3  @ 27w2d      Risk Factors Nausea and vomiting -  GI ( Dr Imer Bobo)  and Inf Dz  ( Erin Rashid) following   Right flank pain  Crohn's Disease  Asthma   Depression   Daughter is Type 1 diabetic   First son has Guillain [place] syndrome   Marginal cord insertion            The patient had a nonstress test performed today which was reactive. There was moderate variability with accelerations    The patient had a detailed ultrasound performed today which was reassuring . A detailed report is included in the EMR under the imaging tab from today's date. 1. Breech male at 27w 2d.   2. Amniotic fluid appeared normal amount. 3. BPP score of 8/8.  4. Umbilical artery dopplers were within normal limits.     No Active Labor  No Distress by US/ BPP /Doppler  /NST   No Preeclampsia - Labs OK    Will follow     Eusebia Wright MD  Maternal Fetal Medicine

## 2022-08-03 NOTE — PROGRESS NOTES
Nurse at bedside for assessment. Patient states she would like some stadol for her upper abd pain and wants to try to switch to tylenol later. Pt states she did not sleep well last night and is going to try to get some rest this morning.  States she will call out when she's ready to be on efm

## 2022-08-03 NOTE — PROGRESS NOTES
RN at bedside. Pt denies emesis. Pt denies VB, LOF and CTX. Pt states positive FM. VSS. Pt resting comfortably in bed.

## 2022-08-03 NOTE — PROGRESS NOTES
Assumed patient care. Patient called out for pain medication. Offered PO pain meds, pt states pain is unbearable and requests IV pain medications. VSS. Pt denies lof, denies vb, states +FM, denies cramping or contractions. Spot check fhts 138-140bpm. Call light in reach.

## 2022-08-04 LAB
BLOOD CULTURE, ROUTINE: NORMAL
CULTURE, BLOOD 2: NORMAL

## 2022-08-04 PROCEDURE — 2580000003 HC RX 258: Performed by: OBSTETRICS & GYNECOLOGY

## 2022-08-04 PROCEDURE — 6360000002 HC RX W HCPCS: Performed by: STUDENT IN AN ORGANIZED HEALTH CARE EDUCATION/TRAINING PROGRAM

## 2022-08-04 PROCEDURE — 6360000002 HC RX W HCPCS

## 2022-08-04 PROCEDURE — 6360000002 HC RX W HCPCS: Performed by: OBSTETRICS & GYNECOLOGY

## 2022-08-04 PROCEDURE — 6370000000 HC RX 637 (ALT 250 FOR IP): Performed by: OBSTETRICS & GYNECOLOGY

## 2022-08-04 PROCEDURE — 2580000003 HC RX 258

## 2022-08-04 PROCEDURE — 6370000000 HC RX 637 (ALT 250 FOR IP): Performed by: INTERNAL MEDICINE

## 2022-08-04 PROCEDURE — 2580000003 HC RX 258: Performed by: STUDENT IN AN ORGANIZED HEALTH CARE EDUCATION/TRAINING PROGRAM

## 2022-08-04 PROCEDURE — 1220000000 HC SEMI PRIVATE OB R&B

## 2022-08-04 RX ORDER — SODIUM CHLORIDE 9 MG/ML
INJECTION, SOLUTION INTRAVENOUS CONTINUOUS
Status: DISCONTINUED | OUTPATIENT
Start: 2022-08-04 | End: 2022-08-05 | Stop reason: HOSPADM

## 2022-08-04 RX ADMIN — ONDANSETRON 4 MG: 2 INJECTION INTRAMUSCULAR; INTRAVENOUS at 00:21

## 2022-08-04 RX ADMIN — METOCLOPRAMIDE HYDROCHLORIDE 10 MG: 5 INJECTION INTRAMUSCULAR; INTRAVENOUS at 20:27

## 2022-08-04 RX ADMIN — BUTORPHANOL TARTRATE 2 MG: 2 INJECTION, SOLUTION INTRAMUSCULAR; INTRAVENOUS at 09:04

## 2022-08-04 RX ADMIN — PIPERACILLIN AND TAZOBACTAM 3375 MG: 3; .375 INJECTION, POWDER, FOR SOLUTION INTRAVENOUS at 01:16

## 2022-08-04 RX ADMIN — ONDANSETRON 4 MG: 2 INJECTION INTRAMUSCULAR; INTRAVENOUS at 23:03

## 2022-08-04 RX ADMIN — BUTORPHANOL TARTRATE 2 MG: 2 INJECTION, SOLUTION INTRAMUSCULAR; INTRAVENOUS at 03:58

## 2022-08-04 RX ADMIN — LABETALOL HYDROCHLORIDE 200 MG: 100 TABLET, FILM COATED ORAL at 10:40

## 2022-08-04 RX ADMIN — PIPERACILLIN AND TAZOBACTAM 3375 MG: 3; .375 INJECTION, POWDER, FOR SOLUTION INTRAVENOUS at 08:39

## 2022-08-04 RX ADMIN — SODIUM CHLORIDE: 9 INJECTION, SOLUTION INTRAVENOUS at 22:59

## 2022-08-04 RX ADMIN — ACETAMINOPHEN 650 MG: 325 TABLET ORAL at 20:18

## 2022-08-04 RX ADMIN — LABETALOL HYDROCHLORIDE 200 MG: 100 TABLET, FILM COATED ORAL at 14:44

## 2022-08-04 RX ADMIN — SULFASALAZINE 500 MG: 500 TABLET ORAL at 10:04

## 2022-08-04 RX ADMIN — ONDANSETRON 4 MG: 2 INJECTION INTRAMUSCULAR; INTRAVENOUS at 09:01

## 2022-08-04 RX ADMIN — POTASSIUM CHLORIDE 10 MEQ: 750 TABLET, EXTENDED RELEASE ORAL at 20:20

## 2022-08-04 RX ADMIN — SULFASALAZINE 500 MG: 500 TABLET ORAL at 13:37

## 2022-08-04 RX ADMIN — METOCLOPRAMIDE HYDROCHLORIDE 10 MG: 5 INJECTION INTRAMUSCULAR; INTRAVENOUS at 03:58

## 2022-08-04 RX ADMIN — BUTORPHANOL TARTRATE 2 MG: 2 INJECTION, SOLUTION INTRAMUSCULAR; INTRAVENOUS at 00:22

## 2022-08-04 RX ADMIN — POTASSIUM CHLORIDE 10 MEQ: 750 TABLET, EXTENDED RELEASE ORAL at 10:04

## 2022-08-04 RX ADMIN — BUTORPHANOL TARTRATE 2 MG: 2 INJECTION, SOLUTION INTRAMUSCULAR; INTRAVENOUS at 12:52

## 2022-08-04 RX ADMIN — ONDANSETRON 4 MG: 2 INJECTION INTRAMUSCULAR; INTRAVENOUS at 17:01

## 2022-08-04 RX ADMIN — PIPERACILLIN AND TAZOBACTAM 3375 MG: 3; .375 INJECTION, POWDER, FOR SOLUTION INTRAVENOUS at 17:06

## 2022-08-04 RX ADMIN — BUTORPHANOL TARTRATE 2 MG: 2 INJECTION, SOLUTION INTRAMUSCULAR; INTRAVENOUS at 17:04

## 2022-08-04 RX ADMIN — METOCLOPRAMIDE HYDROCHLORIDE 10 MG: 5 INJECTION INTRAMUSCULAR; INTRAVENOUS at 13:03

## 2022-08-04 RX ADMIN — SULFASALAZINE 500 MG: 500 TABLET ORAL at 19:19

## 2022-08-04 ASSESSMENT — PAIN DESCRIPTION - ORIENTATION
ORIENTATION: RIGHT;MID
ORIENTATION: UPPER
ORIENTATION: UPPER
ORIENTATION: RIGHT
ORIENTATION: UPPER
ORIENTATION: UPPER

## 2022-08-04 ASSESSMENT — PAIN SCALES - GENERAL
PAINLEVEL_OUTOF10: 6
PAINLEVEL_OUTOF10: 9
PAINLEVEL_OUTOF10: 8
PAINLEVEL_OUTOF10: 9
PAINLEVEL_OUTOF10: 8
PAINLEVEL_OUTOF10: 8

## 2022-08-04 ASSESSMENT — PAIN DESCRIPTION - LOCATION
LOCATION: ABDOMEN
LOCATION: BACK
LOCATION: ABDOMEN
LOCATION: BACK

## 2022-08-04 ASSESSMENT — PAIN DESCRIPTION - DESCRIPTORS
DESCRIPTORS: STABBING
DESCRIPTORS: ACHING;DISCOMFORT
DESCRIPTORS: ACHING;STABBING

## 2022-08-04 NOTE — PROGRESS NOTES
Spoke with Dr. Misty Lozada orders received for pt to get set up with zofran pump as she had before and updated on pts blood pressures ok to give pt labetalol as ordered

## 2022-08-04 NOTE — PROGRESS NOTES
[address] [place] [address] [facility]  Progress Note    SUBJECTIVE:  Chief Complaint   Patient presents with    Hyperemesis Gravidarum       Laying in bed, feeling a little better  Still has nausea but tolerating food without vomiting  Waiting for pump to be set up so she can discharge     Review of systems:  As stated above in the chief complaint, otherwise negative. Medications:  Scheduled Meds:   labetalol  200 mg Oral TID    sulfaSALAzine  500 mg Oral 4x Daily    piperacillin-tazobactam  3,375 mg IntraVENous Q8H    potassium chloride  10 mEq Oral BID     Continuous Infusions:   dextrose 5% in lactated ringers 125 mL/hr at 22     PRN Meds:metoclopramide, ondansetron, acetaminophen, butorphanol    OBJECTIVE:  BP (!) 123/58   Pulse 93   Temp 97.6 °F (36.4 °C) (Oral)   Resp 16   Ht 5' 4\" (1.626 m)   Wt 157 lb (71.2 kg)   LMP 2022   SpO2 99%   BMI 26.95 kg/m²   Temp  Av.6 °F (36.4 °C)  Min: 97.6 °F (36.4 °C)  Max: 97.6 °F (36.4 °C)  Constitutional: The patient is awake, alert, and oriented. She is in bed. Skin: Warm and dry. No rashes were noted. HEENT: Round and reactive pupils. Moist mucous membranes. No ulcerations or thrush. Neck: Supple to movements. Chest: No use of accessory muscles to breathe. Symmetrical expansion. No wheezing, crackles or rhonchi. Cardiovascular: S1 and S2 are rhythmic and regular. No murmurs appreciated. Abdomen: Positive bowel sounds to auscultation. Pregnant uterus. Extremities: No clubbing, no cyanosis, no edema.   Lines: peripheral    Laboratory and Tests Review:  Lab Results   Component Value Date    WBC 11.6 (H) 2022    WBC 10.5 2022    WBC 17.0 (H) 2022    HGB 9.9 (L) 2022    HCT 30.3 (L) 2022    MCV 95.3 2022     2022     Lab Results   Component Value Date    NEUTROABS 6.63 2022    NEUTROABS 6.06 2022    NEUTROABS 11.20 (H) 2022     No results found for: CRP  Lab Results   Component Value Date    ALT 11 07/27/2022    AST 19 07/27/2022    ALKPHOS 69 07/27/2022    BILITOT 0.3 07/27/2022     Lab Results   Component Value Date/Time     08/01/2022 06:30 AM    K 3.3 08/01/2022 06:30 AM    K 2.7 06/08/2022 03:30 AM     08/01/2022 06:30 AM    CO2 24 08/01/2022 06:30 AM    BUN 3 08/01/2022 06:30 AM    CREATININE 0.5 08/01/2022 06:30 AM    CREATININE 0.4 07/31/2022 06:45 AM    CREATININE 0.4 07/27/2022 02:50 PM    GFRAA >60 08/01/2022 06:30 AM    LABGLOM >60 08/01/2022 06:30 AM    GLUCOSE 78 08/01/2022 06:30 AM    GLUCOSE 92 05/08/2011 02:04 PM    PROT 7.4 07/27/2022 02:50 PM    LABALBU 4.0 07/27/2022 02:50 PM    CALCIUM 8.2 08/01/2022 06:30 AM    BILITOT 0.3 07/27/2022 02:50 PM    ALKPHOS 69 07/27/2022 02:50 PM    AST 19 07/27/2022 02:50 PM    ALT 11 07/27/2022 02:50 PM     Lab Results   Component Value Date    CRP 0.3 07/30/2022    CRP 0.2 01/12/2021    CRP <0.1 01/07/2021     Lab Results   Component Value Date    SEDRATE 37 (H) 07/30/2022    SEDRATE 2 01/12/2021    SEDRATE 0 01/07/2021     Radiology:      Microbiology:   Urine culture 7/27/2022: E. Coli, Enterococcus faecalis, Corynebacterium species  Urine Culture 7/30/22: pending   Giardia antigen: Negative  Stool Culture 7/29/22: negative   C. Diff EIA: indeterminate  C. Diff toxin molecular: negative   Blood Cultures 7/30/22: negative so far    ASSESSMENT:  29 weeks pregnant  Crohn disease  Possible asymptomatic bacteriuria of pregnancy. The specimen was probably not appropriately collected    PLAN:  Last day of Zosyn, has had 5 days of antibiotics will not need antibiotics on discharge  Okay to DC from ID POV - ID signs off at this time  Please call us back if needed. Ivan Barboza, APRN - CNP  2:24 PM  8/4/2022     Pt seen and examined. Above discussed agree with advanced practice nurse. Labs, cultures, and radiographs reviewed. Face to Face encounter occurred. Changes made as necessary. Rodney Ozuna MD

## 2022-08-05 ENCOUNTER — ANCILLARY PROCEDURE (OUTPATIENT)
Dept: OBGYN CLINIC | Age: 37
DRG: 566 | End: 2022-08-05
Payer: COMMERCIAL

## 2022-08-05 VITALS
SYSTOLIC BLOOD PRESSURE: 132 MMHG | DIASTOLIC BLOOD PRESSURE: 65 MMHG | OXYGEN SATURATION: 100 % | BODY MASS INDEX: 26.8 KG/M2 | HEIGHT: 64 IN | WEIGHT: 157 LBS | HEART RATE: 104 BPM | TEMPERATURE: 98 F | RESPIRATION RATE: 18 BRPM

## 2022-08-05 PROBLEM — K50.118 CROHN'S COLITIS, OTHER COMPLICATION (HCC): Status: ACTIVE | Noted: 2022-08-05

## 2022-08-05 PROCEDURE — 6360000002 HC RX W HCPCS: Performed by: OBSTETRICS & GYNECOLOGY

## 2022-08-05 PROCEDURE — 6370000000 HC RX 637 (ALT 250 FOR IP): Performed by: OBSTETRICS & GYNECOLOGY

## 2022-08-05 PROCEDURE — 76819 FETAL BIOPHYS PROFIL W/O NST: CPT | Performed by: OBSTETRICS & GYNECOLOGY

## 2022-08-05 PROCEDURE — 76821 MIDDLE CEREBRAL ARTERY ECHO: CPT | Performed by: OBSTETRICS & GYNECOLOGY

## 2022-08-05 PROCEDURE — 6370000000 HC RX 637 (ALT 250 FOR IP): Performed by: INTERNAL MEDICINE

## 2022-08-05 PROCEDURE — 76815 OB US LIMITED FETUS(S): CPT | Performed by: OBSTETRICS & GYNECOLOGY

## 2022-08-05 PROCEDURE — 76820 UMBILICAL ARTERY ECHO: CPT | Performed by: OBSTETRICS & GYNECOLOGY

## 2022-08-05 RX ORDER — CEPHALEXIN 500 MG/1
500 CAPSULE ORAL 3 TIMES DAILY
Qty: 21 CAPSULE | Refills: 0 | Status: SHIPPED | OUTPATIENT
Start: 2022-08-05 | End: 2022-10-10

## 2022-08-05 RX ORDER — SULFASALAZINE 500 MG/1
500 TABLET ORAL 4 TIMES DAILY
Qty: 120 TABLET | Refills: 0 | Status: SHIPPED | OUTPATIENT
Start: 2022-08-05 | End: 2022-10-10

## 2022-08-05 RX ADMIN — SULFASALAZINE 500 MG: 500 TABLET ORAL at 15:05

## 2022-08-05 RX ADMIN — POTASSIUM CHLORIDE 10 MEQ: 750 TABLET, EXTENDED RELEASE ORAL at 08:01

## 2022-08-05 RX ADMIN — METOCLOPRAMIDE HYDROCHLORIDE 10 MG: 5 INJECTION INTRAMUSCULAR; INTRAVENOUS at 03:03

## 2022-08-05 RX ADMIN — LABETALOL HYDROCHLORIDE 200 MG: 100 TABLET, FILM COATED ORAL at 15:05

## 2022-08-05 RX ADMIN — ONDANSETRON 4 MG: 2 INJECTION INTRAMUSCULAR; INTRAVENOUS at 07:56

## 2022-08-05 RX ADMIN — ACETAMINOPHEN 650 MG: 325 TABLET ORAL at 09:00

## 2022-08-05 RX ADMIN — ONDANSETRON 4 MG: 2 INJECTION INTRAMUSCULAR; INTRAVENOUS at 15:03

## 2022-08-05 RX ADMIN — LABETALOL HYDROCHLORIDE 200 MG: 100 TABLET, FILM COATED ORAL at 08:00

## 2022-08-05 RX ADMIN — METOCLOPRAMIDE HYDROCHLORIDE 10 MG: 5 INJECTION INTRAMUSCULAR; INTRAVENOUS at 10:00

## 2022-08-05 RX ADMIN — SULFASALAZINE 500 MG: 500 TABLET ORAL at 01:11

## 2022-08-05 RX ADMIN — ACETAMINOPHEN 650 MG: 325 TABLET ORAL at 15:05

## 2022-08-05 RX ADMIN — SULFASALAZINE 500 MG: 500 TABLET ORAL at 08:01

## 2022-08-05 RX ADMIN — ACETAMINOPHEN 650 MG: 325 TABLET ORAL at 03:01

## 2022-08-05 ASSESSMENT — PAIN SCALES - GENERAL
PAINLEVEL_OUTOF10: 7
PAINLEVEL_OUTOF10: 8
PAINLEVEL_OUTOF10: 8

## 2022-08-05 ASSESSMENT — PAIN DESCRIPTION - ORIENTATION: ORIENTATION: UPPER

## 2022-08-05 ASSESSMENT — PAIN DESCRIPTION - LOCATION: LOCATION: ABDOMEN

## 2022-08-05 NOTE — DISCHARGE INSTR - DIET
Good nutrition is important when healing from an illness, injury, or surgery. Follow any nutrition recommendations given to you during your hospital stay. If you were given an oral nutrition supplement while in the hospital, continue to take this Ensure Plus or Ensure Enlive supplement at home, if you cannot eat at least 50% of your meals. You can take it with meals, in-between meals, and/or before bedtime. These supplements can be purchased at most local grocery stores, pharmacies, and chain [employer]-stores. If you have any questions about your diet or nutrition, call the hospital and ask for the dietitian. Hyperemesis Gravidarum  This nutrition therapy will help you with the nausea and vomiting experienced during pregnancy. Everyone's body is different. You may want to try the following tips to see if they work for you:  Try to eat 6 small meals/snacks during the day. Small meals may be easier to tolerate than large meals. Keep easy to digest foods, such as crackers and pretzels, with you during the day and at your bedside. You may even try eating a few crackers before getting out of bed in the morning. Drink water or other beverages (caffeine-free) between meals. Eating celena may improve nausea. Lower-fat foods are easier to digest. High-fat foods can make nausea worse. Foods Recommended  You may eat any foods except those on the Foods Not Recommended list. Eat when you feel hungry. Foods without much smell may be easier for you to tolerate.   The following foods may be easier to eat when you feel nauseous:  Cold foods such as ice cream, smoothies, popsicles, or frozen fruit  Warm foods such as mashed or baked potatoes, soups, or toast  Spicy foods such as salsa, gingersnaps, gingerbread, or curries  Tart/sour foods such as tomato or vegetable juice, dill pickles, juan/lemonade, limes/limeade, or other citrus fruits and juices  Creamy foods such as low-fat milk, custards, puddings, or yogurt  Crunchy foods such as raw vegetables (particularly carrots and celery), pretzels, raw fruits (particularly apples or pears), nuts, crackers, or dry cereal  Soft foods such as cake, cottage cheese, cooked carrots, or green beans  Beverages and liquid foods such as fruit juice, water, gelatin, smoothies, or broth  Salty foods such as chips, salted top crackers, dip, pizza, or tomato or vegetable juice  Foods made with chocolate such as chocolate milk, pudding, or ice cream, or fudge sauce for dipping fruit or crackers. Foods Not Recommended  Beverages  Alcohol  Excessive caffeine  Some herbal teas: avoid drinking herbal teas. If you must, speak with your doctor before drinking. Unpasteurized cider and juices  Meat and Poultry  Raw or uncooked meats, fish, poultry, or eggs  Foods high in mercury:  Shark  Swordfish  Olivier mackerel  Tilefish  Limit all other fish (including tuna) to 12 ounces  or less per week  Hot dogs, luncheon meats, bologna, or other deli meats unless they are heated until steaming hot  Smoked fish   Dairy Products  Raw or unpasteurized milk; cheese and dairy products made with raw or unpasteurized milk  Soft serve yogurt  Soft cheese like brie  Blue cheese like gorgonzola  Fruits and Vegetables  Raw sprouts  Other Items to Avoid  Tobacco  Illicit drugs  Herbal remedies or supplements. Discuss any teas, herbs, or home remedies you do use with your doctor to be sure they are safe for you and your baby. Vitamin or mineral supplements other than those recommended or prescribed by your doctor, nurse practitioner, or midwife.   Morning Sickness Sample 1-Day Menu   Breakfast 1 cup cooked oatmeal  1/4 cup sliced unsalted almonds  1/2 cup sliced strawberries  1 teaspoon margarine-substitute  1 cup low-fat milk  1.5 cups seltzer water or celena tea   Morning Snack 8 saltine crackers  1.5 oz cheddar cheese   Lunch 3/4 cup low-fat cottage cheese  1 cup cubed cantaloupe  6 carrot sticks  1.5 cups seltzer water or celena tea   Afternoon Snack 1 oz pretzels  1 tablespoon peanut butter   Evening Meal 3 oz cooked skinless chicken  1 medium baked potato  1/2 cup cooked carrots  1/2 cup chocolate pudding  1/2 cup 100% fruit juice   Evening Snack 1 slice whole wheat bread  2 tablespoons low-fat cream cheese  1/2 cup sliced cucumber  1/2 cup sliced tomatoes  1.5 cups plain or seltzer water

## 2022-08-05 NOTE — PROGRESS NOTES
Ok to be discharged home per Dr. Adrian Cohen with scripts for keflex and sulfasalazine home with Zofran pump to be administered at home on Monday 8/8/22 with [facility].  Patient updated and verbalized understanding

## 2022-08-05 NOTE — DISCHARGE INSTRUCTIONS
Home Undelivered Discharge Instructions    After Discharge Orders:    No future appointments. Call physician or midwife's office future appt on 8/8/22. Diet:  normal diet as tolerated    Rest: normal activity as tolerated    Other instructions: Do kick counts once a day on your baby. Choose the time of day your baby is most active. Get in a comfortable lying or sitting position and time how long it takes to feel 10 kicks, twists, turns, swishes, or rolls.  Call your physician or midwife if there have not been 10 kicks in 1 hours    Call physician or midwife, return to Labor and Delivery, call 911, or go to the nearest Emergency Room if: increased leakage or fluid, contractions more than  6 per  1 hour, decreased fetal movement, persistent low back pain or cramping, bleeding from vaginal area, difficulty urinating, pain with urination, difficulty breathing, new calf pain, persistent headache, or vision change

## 2022-08-05 NOTE — PROGRESS NOTES
Comprehensive Nutrition Assessment    Type and Reason for Visit:  Reassess    Nutrition Recommendations/Plan:   Continue current diet and ONS, as tolerated     Malnutrition Assessment:  Malnutrition Status: At risk for malnutrition (Comment) (08/01/22 0990)    Context:  Chronic Illness     Findings of the 6 clinical characteristics of malnutrition:  Energy Intake:  75% or less estimated energy requirements for 1 month or longer  Weight Loss:  No significant weight loss     Body Fat Loss:  Unable to assess     Muscle Mass Loss:  Unable to assess    Fluid Accumulation:  No significant fluid accumulation     Strength:  Not Performed    Nutrition Assessment:    Pt at 27 weeks 4/7 days gestation. Her diarrhea and N/V improving. Tolerating PO diet and to continue Reglan and Zofran IV at home. ONS adv by GI to Ensure Enlive Vanilla. Recommend pt continue ONS 1-2x daily if she is not able to eat regular meals. Including recommendation and Hyperemesis Diet copy in Discharge Instructions. Nutrition Related Findings:    27 wk IUP, 6/2022 CT scan shows no active Crohn's, gravid abdomen,stools more formed, Wound Type: None       Current Nutrition Intake & Therapies:    Average Meal Intake: 26-50%  Average Supplements Intake: None Ordered  ADULT DIET; Easy to Chew  ADULT ORAL NUTRITION SUPPLEMENT; Breakfast, Lunch, Dinner; Clear Liquid Oral Supplement  ADULT ORAL NUTRITION SUPPLEMENT; Breakfast, Lunch, Dinner; Standard High Calorie/High Protein Oral Supplement    Anthropometric Measures:  Height: 5' 4\" (162.6 cm)  Ideal Body Weight (IBW): 120 lbs (55 kg)    Admission Body Weight: 157 lb (71.2 kg) (stated 7/27)  Current Body Weight: 158 lb 6 oz (71.8 kg) (actual wt - 7/12 prenatal visit), 132 % IBW.     Current BMI (kg/m2): 27.2  Usual Body Weight: 137 lb 10 oz (62.4 kg) (pregravid (Jan 2021))  % Weight Change (Calculated): 15.1                    BMI Categories:  (N/A d/t pregnancy)    Estimated Daily Nutrient Needs:  Energy Requirements Based On: Kcal/kg  Weight Used for Energy Requirements: Usual  Energy (kcal/day):  (kcal/kg =300 kcal/d for pregnancy)  Weight Used for Protein Requirements: Usual  Protein (g/day): 75-85 (1-1.2 g/kg +10 g/d per fetus)  Method Used for Fluid Requirements: ml/Kg  Fluid (ml/day):  (35-37 ml/kg UBW)    Nutrition Diagnosis:   Inadequate oral intake related to altered GI function as evidenced by poor intake prior to admission, intake 0-25%, vomiting, nausea, GI abnormality    Nutrition Interventions:   Food and/or Nutrient Delivery: Continue Current Diet, Continue Oral Nutrition Supplement  Nutrition Education/Counseling: Education initiated (ONS recommendation and Hyperemesis Gravidarum Nutrition Guidelines in Discharge Instructions)  Coordination of Nutrition Care: Continue to monitor while inpatient       Goals:  Previous Goal Met: Progressing toward Goal(s)  Goals: PO intake 75% or greater       Nutrition Monitoring and Evaluation:   Behavioral-Environmental Outcomes: None Identified  Food/Nutrient Intake Outcomes: Food and Nutrient Intake, Supplement Intake  Physical Signs/Symptoms Outcomes: Nausea or Vomiting, Biochemical Data, Diarrhea, GI Status, Fluid Status or Edema, Nutrition Focused Physical Findings, Skin, Weight    Discharge Planning:    Continue Oral Nutrition Supplement     Rocío Daniel RD, [address] [place] , [place]  Contact: [phone]

## 2022-08-05 NOTE — PROGRESS NOTES
Dr. Pacheco Bernardo requested zofran pump. /74   Pulse 100   Temp 98 °F (36.7 °C) (Oral)   Resp 18   Ht 5' 4\" (1.626 m)   Wt 157 lb (71.2 kg)   LMP 01/24/2022   SpO2 100%   BMI 26.95 kg/m²   Fetal heart rate:  Baseline Heart Rate 150, accelerations: present  decelerations: absent  Contraction frequency:  0 minutes  Membranes:  Intact    Dr. Phuong Awan notified.     Plan:   Crohns  D/c home with zofran pump and reglan  Uti = keflex  precautions  F/u 1 week office    Clarence Tinajero MD

## 2022-08-25 NOTE — H&P
Department of Obstetrics and Gynecology  Attending Obstetrics History and Physical        CHIEF COMPLAINT: Hyperemesis gravidarum     HISTORY OF PRESENT ILLNESS:      The patient is a 40 y.o. female [id number], Patient's last menstrual period was 2022.,  at 30w3d. OB History          6    Para   3    Term   2       1    AB   2    Living   3         SAB   2    IAB        Ectopic        Molar        Multiple        Live Births   3            Patient presents with a chief complaint as above. Estimated Due Date: Estimated Date of Delivery: 10/31/22    PRENATAL CARE:    Complicated by:   Patient Active Problem List   Diagnosis Code    Hyperemesis gravidarum O21.0    Cannabis dependence ([facility]) F12.20    Asthma J45.909    Tobacco abuse Z72.0    Symptomatic cholelithiasis K80.20    Crohn disease ([facility] [address] K50.90    [facility] J20.46    Intractable vomiting with nausea R11.2    Emesis, persistent R11.15    Intractable nausea and vomiting R11.2    Abdominal pain R10.9    Intractable vomiting R11.10    Abdominal pain, right lower quadrant R10.31    Diarrhea R19.7    Marijuana abuse, continuous F12.10    AMA (advanced maternal age) multigravida 35+, first trimester O09.521    Hypokalemia E87.6    Anxiety F41.9    History of IUGR (intrauterine growth retardation) and stillbirth, currently pregnant, first trimester O80.12    Depression F32. A    Hyperemesis R11.10    Primary hypertension I10    Hyperemesis gravidarum with metabolic disturbance, antepartum O21.1    Starvation ketoacidosis E87.2    Elevated blood pressure affecting pregnancy in second trimester, antepartum O16.2    Low vitamin D level R79.89    History of prior pregnancy with IUGR  Z87.59    Positive urine drug screen R82.5    Marijuana use F12.90    Crohn's colitis, other complication ([facility]) F57.699       PAST OB HISTORY  OB History          6    Para   3    Term   2       1    AB   2    Living 08/01/2022 06:30 AM    K 3.3 08/01/2022 06:30 AM    K 2.7 06/08/2022 03:30 AM     08/01/2022 06:30 AM    CO2 24 08/01/2022 06:30 AM    BUN 3 08/01/2022 06:30 AM    PROT 7.4 07/27/2022 02:50 PM     U/A:  No components found for: Danni Mandes, USPGRAV, UPH, UPROTEIN, UGLUCOSE, UKETONE, UBILI, UBLOOD, UNITRITE, UUROBIL, ULEUKEST, USQEPI, URENEPI, UWBC, URBC, Synchari, UHYALINE    ASSESSMENT AND PLAN:  IUP at 26 weeks  Hyperemesis gravidarum recurrent for admission  Dr. Mendy Escamilla and MFM notified      Electronically signed by Ulises Navarro MD on 8/25/2022 at 9:26 AM

## 2022-09-04 NOTE — DISCHARGE SUMMARY
Patient was admitted July 27 to the emergency room where she presented with persistent nausea and vomiting. Patient has known history of Crohn's disease and uses marijuana which she grows herself at home. She was admitted for observation and consultation with MFM and gastro enterology. She required Stadol for pain control    On hospital day #1 #1 she had no complaints vital signs stable care was in gastroenterology was consulted. GI was consulted and recommended mainly    Findings she was 26 weeks 2 cultures were obtained. MFM started on labetalol 100 mg twice daily for blood pressures 148/83 and Pepcid was restarted labetalol was increased to 100 mg 3 times daily. Eventually Azulfidine was started per MFM and labetalol was increased to 200 mg 3 times a day.   She started on Reglan and Zofran for MFM and gastro  Planned discharge on home IV therapy and discharged home on Keflex for UTI

## 2022-10-10 ENCOUNTER — HOSPITAL ENCOUNTER (INPATIENT)
Age: 37
LOS: 2 days | Discharge: HOME | DRG: 463 | End: 2022-10-12
Attending: EMERGENCY MEDICINE | Admitting: FAMILY MEDICINE
Payer: COMMERCIAL

## 2022-10-10 ENCOUNTER — APPOINTMENT (OUTPATIENT)
Dept: CT IMAGING | Age: 37
DRG: 463 | End: 2022-10-10
Payer: COMMERCIAL

## 2022-10-10 ENCOUNTER — APPOINTMENT (OUTPATIENT)
Dept: ULTRASOUND IMAGING | Age: 37
DRG: 463 | End: 2022-10-10
Payer: COMMERCIAL

## 2022-10-10 ENCOUNTER — APPOINTMENT (OUTPATIENT)
Dept: GENERAL RADIOLOGY | Age: 37
DRG: 463 | End: 2022-10-10
Payer: COMMERCIAL

## 2022-10-10 DIAGNOSIS — N12 PYELONEPHRITIS: Primary | ICD-10-CM

## 2022-10-10 LAB
ALBUMIN SERPL-MCNC: 5.2 G/DL (ref 3.5–5.2)
ALP BLD-CCNC: 73 U/L (ref 35–104)
ALT SERPL-CCNC: 22 U/L (ref 0–32)
AMPHETAMINE SCREEN, URINE: POSITIVE
ANION GAP SERPL CALCULATED.3IONS-SCNC: 15 MMOL/L (ref 7–16)
AST SERPL-CCNC: 21 U/L (ref 0–31)
BACTERIA: ABNORMAL /HPF
BARBITURATE SCREEN URINE: NOT DETECTED
BASOPHILS ABSOLUTE: 0.09 E9/L (ref 0–0.2)
BASOPHILS RELATIVE PERCENT: 0.5 % (ref 0–2)
BENZODIAZEPINE SCREEN, URINE: NOT DETECTED
BILIRUB SERPL-MCNC: 0.6 MG/DL (ref 0–1.2)
BILIRUBIN DIRECT: <0.2 MG/DL (ref 0–0.3)
BILIRUBIN URINE: NEGATIVE
BILIRUBIN, INDIRECT: NORMAL MG/DL (ref 0–1)
BLOOD, URINE: ABNORMAL
BUN BLDV-MCNC: 12 MG/DL (ref 6–20)
CALCIUM SERPL-MCNC: 10.1 MG/DL (ref 8.6–10.2)
CANNABINOID SCREEN URINE: POSITIVE
CHLORIDE BLD-SCNC: 104 MMOL/L (ref 98–107)
CLARITY: ABNORMAL
CO2: 22 MMOL/L (ref 22–29)
COCAINE METABOLITE SCREEN URINE: NOT DETECTED
COLOR: YELLOW
CREAT SERPL-MCNC: 0.7 MG/DL (ref 0.5–1)
EOSINOPHILS ABSOLUTE: 0.08 E9/L (ref 0.05–0.5)
EOSINOPHILS RELATIVE PERCENT: 0.4 % (ref 0–6)
EPITHELIAL CELLS, UA: ABNORMAL /HPF
FENTANYL SCREEN, URINE: NOT DETECTED
GFR AFRICAN AMERICAN: >60
GFR NON-AFRICAN AMERICAN: >60 ML/MIN/1.73
GLUCOSE BLD-MCNC: 123 MG/DL (ref 74–99)
GLUCOSE URINE: NEGATIVE MG/DL
HCG, URINE, POC: NEGATIVE
HCT VFR BLD CALC: 45.6 % (ref 34–48)
HEMOGLOBIN: 15.2 G/DL (ref 11.5–15.5)
IMMATURE GRANULOCYTES #: 0.09 E9/L
IMMATURE GRANULOCYTES %: 0.5 % (ref 0–5)
KETONES, URINE: >=80 MG/DL
LACTIC ACID: 1.6 MMOL/L (ref 0.5–2.2)
LEUKOCYTE ESTERASE, URINE: ABNORMAL
LIPASE: 19 U/L (ref 13–60)
LYMPHOCYTES ABSOLUTE: 1.71 E9/L (ref 1.5–4)
LYMPHOCYTES RELATIVE PERCENT: 9.5 % (ref 20–42)
Lab: ABNORMAL
Lab: NORMAL
MCH RBC QN AUTO: 30.2 PG (ref 26–35)
MCHC RBC AUTO-ENTMCNC: 33.3 % (ref 32–34.5)
MCV RBC AUTO: 90.5 FL (ref 80–99.9)
METHADONE SCREEN, URINE: NOT DETECTED
MONOCYTES ABSOLUTE: 0.42 E9/L (ref 0.1–0.95)
MONOCYTES RELATIVE PERCENT: 2.3 % (ref 2–12)
NEGATIVE QC PASS/FAIL: NORMAL
NEUTROPHILS ABSOLUTE: 15.54 E9/L (ref 1.8–7.3)
NEUTROPHILS RELATIVE PERCENT: 86.8 % (ref 43–80)
NITRITE, URINE: POSITIVE
OPIATE SCREEN URINE: POSITIVE
OXYCODONE URINE: NOT DETECTED
PDW BLD-RTO: 13.8 FL (ref 11.5–15)
PH UA: 6 (ref 5–9)
PHENCYCLIDINE SCREEN URINE: NOT DETECTED
PLATELET # BLD: 452 E9/L (ref 130–450)
PMV BLD AUTO: 9.1 FL (ref 7–12)
POSITIVE QC PASS/FAIL: NORMAL
POTASSIUM REFLEX MAGNESIUM: 3.9 MMOL/L (ref 3.5–5)
PROTEIN UA: 30 MG/DL
RBC # BLD: 5.04 E12/L (ref 3.5–5.5)
RBC UA: ABNORMAL /HPF (ref 0–2)
SARS-COV-2, NAAT: NOT DETECTED
SODIUM BLD-SCNC: 141 MMOL/L (ref 132–146)
SPECIFIC GRAVITY UA: >=1.03 (ref 1–1.03)
TOTAL PROTEIN: 8.3 G/DL (ref 6.4–8.3)
UROBILINOGEN, URINE: 0.2 E.U./DL
WBC # BLD: 17.9 E9/L (ref 4.5–11.5)
WBC UA: ABNORMAL /HPF (ref 0–5)

## 2022-10-10 PROCEDURE — 80076 HEPATIC FUNCTION PANEL: CPT

## 2022-10-10 PROCEDURE — 83690 ASSAY OF LIPASE: CPT

## 2022-10-10 PROCEDURE — 99285 EMERGENCY DEPT VISIT HI MDM: CPT

## 2022-10-10 PROCEDURE — 93005 ELECTROCARDIOGRAM TRACING: CPT

## 2022-10-10 PROCEDURE — 71045 X-RAY EXAM CHEST 1 VIEW: CPT

## 2022-10-10 PROCEDURE — 2580000003 HC RX 258

## 2022-10-10 PROCEDURE — 96375 TX/PRO/DX INJ NEW DRUG ADDON: CPT

## 2022-10-10 PROCEDURE — 1200000000 HC SEMI PRIVATE

## 2022-10-10 PROCEDURE — 2580000003 HC RX 258: Performed by: EMERGENCY MEDICINE

## 2022-10-10 PROCEDURE — 36415 COLL VENOUS BLD VENIPUNCTURE: CPT

## 2022-10-10 PROCEDURE — 85025 COMPLETE CBC W/AUTO DIFF WBC: CPT

## 2022-10-10 PROCEDURE — 87077 CULTURE AEROBIC IDENTIFY: CPT

## 2022-10-10 PROCEDURE — 93976 VASCULAR STUDY: CPT

## 2022-10-10 PROCEDURE — 81001 URINALYSIS AUTO W/SCOPE: CPT

## 2022-10-10 PROCEDURE — 80307 DRUG TEST PRSMV CHEM ANLYZR: CPT

## 2022-10-10 PROCEDURE — 87088 URINE BACTERIA CULTURE: CPT

## 2022-10-10 PROCEDURE — 2500000003 HC RX 250 WO HCPCS: Performed by: EMERGENCY MEDICINE

## 2022-10-10 PROCEDURE — 76856 US EXAM PELVIC COMPLETE: CPT

## 2022-10-10 PROCEDURE — 96372 THER/PROPH/DIAG INJ SC/IM: CPT

## 2022-10-10 PROCEDURE — 6360000004 HC RX CONTRAST MEDICATION: Performed by: RADIOLOGY

## 2022-10-10 PROCEDURE — 96376 TX/PRO/DX INJ SAME DRUG ADON: CPT

## 2022-10-10 PROCEDURE — 6360000002 HC RX W HCPCS: Performed by: EMERGENCY MEDICINE

## 2022-10-10 PROCEDURE — 83605 ASSAY OF LACTIC ACID: CPT

## 2022-10-10 PROCEDURE — 87635 SARS-COV-2 COVID-19 AMP PRB: CPT

## 2022-10-10 PROCEDURE — 80048 BASIC METABOLIC PNL TOTAL CA: CPT

## 2022-10-10 PROCEDURE — 74177 CT ABD & PELVIS W/CONTRAST: CPT

## 2022-10-10 PROCEDURE — 6360000002 HC RX W HCPCS

## 2022-10-10 PROCEDURE — 87186 SC STD MICRODIL/AGAR DIL: CPT

## 2022-10-10 PROCEDURE — 96374 THER/PROPH/DIAG INJ IV PUSH: CPT

## 2022-10-10 PROCEDURE — 87040 BLOOD CULTURE FOR BACTERIA: CPT

## 2022-10-10 RX ORDER — PROMETHAZINE HYDROCHLORIDE 25 MG/ML
25 INJECTION, SOLUTION INTRAMUSCULAR; INTRAVENOUS ONCE
Status: COMPLETED | OUTPATIENT
Start: 2022-10-10 | End: 2022-10-10

## 2022-10-10 RX ORDER — 0.9 % SODIUM CHLORIDE 0.9 %
1000 INTRAVENOUS SOLUTION INTRAVENOUS ONCE
Status: COMPLETED | OUTPATIENT
Start: 2022-10-10 | End: 2022-10-10

## 2022-10-10 RX ORDER — MORPHINE SULFATE 4 MG/ML
4 INJECTION, SOLUTION INTRAMUSCULAR; INTRAVENOUS ONCE
Status: COMPLETED | OUTPATIENT
Start: 2022-10-10 | End: 2022-10-10

## 2022-10-10 RX ORDER — DIPHENHYDRAMINE HCL 25 MG
25 TABLET ORAL ONCE
Status: DISCONTINUED | OUTPATIENT
Start: 2022-10-10 | End: 2022-10-12

## 2022-10-10 RX ORDER — DROPERIDOL 2.5 MG/ML
2.5 INJECTION, SOLUTION INTRAMUSCULAR; INTRAVENOUS EVERY 6 HOURS PRN
Status: DISCONTINUED | OUTPATIENT
Start: 2022-10-10 | End: 2022-10-12 | Stop reason: HOSPADM

## 2022-10-10 RX ORDER — LABETALOL HYDROCHLORIDE 5 MG/ML
10 INJECTION, SOLUTION INTRAVENOUS ONCE
Status: COMPLETED | OUTPATIENT
Start: 2022-10-10 | End: 2022-10-10

## 2022-10-10 RX ORDER — METOCLOPRAMIDE HYDROCHLORIDE 5 MG/ML
10 INJECTION INTRAMUSCULAR; INTRAVENOUS ONCE
Status: COMPLETED | OUTPATIENT
Start: 2022-10-10 | End: 2022-10-10

## 2022-10-10 RX ORDER — LISINOPRIL 5 MG/1
5 TABLET ORAL DAILY
COMMUNITY

## 2022-10-10 RX ORDER — 0.9 % SODIUM CHLORIDE 0.9 %
1000 INTRAVENOUS SOLUTION INTRAVENOUS ONCE
Status: COMPLETED | OUTPATIENT
Start: 2022-10-10 | End: 2022-10-11

## 2022-10-10 RX ADMIN — SODIUM CHLORIDE 1000 ML: 9 INJECTION, SOLUTION INTRAVENOUS at 20:39

## 2022-10-10 RX ADMIN — PROMETHAZINE HYDROCHLORIDE 25 MG: 25 INJECTION INTRAMUSCULAR; INTRAVENOUS at 15:40

## 2022-10-10 RX ADMIN — MORPHINE SULFATE 4 MG: 4 INJECTION, SOLUTION INTRAMUSCULAR; INTRAVENOUS at 15:45

## 2022-10-10 RX ADMIN — IOPAMIDOL 75 ML: 755 INJECTION, SOLUTION INTRAVENOUS at 17:04

## 2022-10-10 RX ADMIN — LABETALOL HYDROCHLORIDE 10 MG: 5 INJECTION INTRAVENOUS at 21:27

## 2022-10-10 RX ADMIN — SODIUM CHLORIDE 1000 ML: 9 INJECTION, SOLUTION INTRAVENOUS at 13:33

## 2022-10-10 RX ADMIN — MORPHINE SULFATE 4 MG: 4 INJECTION, SOLUTION INTRAMUSCULAR; INTRAVENOUS at 13:34

## 2022-10-10 RX ADMIN — TRIMETHOBENZAMIDE HYDROCHLORIDE 200 MG: 100 INJECTION INTRAMUSCULAR at 13:18

## 2022-10-10 RX ADMIN — METOCLOPRAMIDE 10 MG: 5 INJECTION, SOLUTION INTRAMUSCULAR; INTRAVENOUS at 13:42

## 2022-10-10 RX ADMIN — CEFTRIAXONE 1000 MG: 1 INJECTION, POWDER, FOR SOLUTION INTRAMUSCULAR; INTRAVENOUS at 18:26

## 2022-10-10 RX ADMIN — DROPERIDOL 2.5 MG: 2.5 INJECTION, SOLUTION INTRAMUSCULAR; INTRAVENOUS at 17:15

## 2022-10-10 ASSESSMENT — ENCOUNTER SYMPTOMS
EYE DISCHARGE: 0
ABDOMINAL DISTENTION: 0
EYE REDNESS: 0
SORE THROAT: 0
NAUSEA: 1
SINUS PRESSURE: 0
BLOOD IN STOOL: 0
SHORTNESS OF BREATH: 0
WHEEZING: 0
COUGH: 0
PHOTOPHOBIA: 0
CONSTIPATION: 0
BACK PAIN: 0
ABDOMINAL PAIN: 1
DIARRHEA: 0
VOMITING: 1
RHINORRHEA: 0

## 2022-10-10 ASSESSMENT — PAIN DESCRIPTION - DESCRIPTORS
DESCRIPTORS: CRAMPING;SHARP;OTHER (COMMENT)
DESCRIPTORS: CRAMPING

## 2022-10-10 ASSESSMENT — PAIN DESCRIPTION - ORIENTATION: ORIENTATION: UPPER;RIGHT

## 2022-10-10 ASSESSMENT — PAIN DESCRIPTION - LOCATION
LOCATION: ABDOMEN
LOCATION: ABDOMEN

## 2022-10-10 ASSESSMENT — PAIN SCALES - GENERAL
PAINLEVEL_OUTOF10: 8
PAINLEVEL_OUTOF10: 8

## 2022-10-10 NOTE — ED PROVIDER NOTES
disturbance. Respiratory:  Negative for cough, shortness of breath and wheezing. Cardiovascular:  Negative for chest pain, palpitations and leg swelling. Gastrointestinal:  Positive for abdominal pain, nausea and vomiting. Negative for abdominal distention, blood in stool, constipation and diarrhea. Endocrine: Negative for cold intolerance and heat intolerance. Genitourinary:  Negative for decreased urine volume, difficulty urinating, dysuria, flank pain, frequency, hematuria, urgency, vaginal bleeding and vaginal discharge. Musculoskeletal:  Negative for arthralgias, back pain, joint swelling and myalgias. Skin:  Negative for rash and wound. Allergic/Immunologic: Negative for immunocompromised state. Neurological:  Negative for dizziness, syncope, facial asymmetry, weakness, light-headedness, numbness and headaches. Hematological:  Does not bruise/bleed easily. Psychiatric/Behavioral:  Negative for behavioral problems and hallucinations. Physical Exam  Constitutional:       General: She is not in acute distress. Appearance: Normal appearance. She is not ill-appearing, toxic-appearing or diaphoretic. HENT:      Head: Normocephalic and atraumatic. Right Ear: Hearing normal.      Left Ear: Hearing normal.      Nose: Nose normal.      Mouth/Throat:      Lips: Pink. Mouth: Mucous membranes are moist.      Pharynx: Oropharynx is clear. Eyes:      Extraocular Movements: Extraocular movements intact. Conjunctiva/sclera: Conjunctivae normal.      Pupils: Pupils are equal, round, and reactive to light. Cardiovascular:      Rate and Rhythm: Normal rate and regular rhythm. Pulses: Normal pulses. Radial pulses are 2+ on the right side and 2+ on the left side. Posterior tibial pulses are 2+ on the right side and 2+ on the left side.       Heart sounds: S1 normal and S2 normal.   Pulmonary:      Effort: Pulmonary effort is normal. No tachypnea, accessory muscle usage or respiratory distress. Breath sounds: Normal breath sounds and air entry. No decreased breath sounds, wheezing, rhonchi or rales. Abdominal:      General: Abdomen is flat. Bowel sounds are normal. There is no distension or abdominal bruit. Palpations: Abdomen is soft. There is no fluid wave or mass. Tenderness: There is generalized abdominal tenderness and tenderness in the right lower quadrant, suprapubic area and left lower quadrant. There is no right CVA tenderness, left CVA tenderness, guarding or rebound. Negative signs include Peoples's sign, Rovsing's sign and McBurney's sign. Musculoskeletal:         General: No swelling or tenderness. Normal range of motion. Cervical back: Normal range of motion and neck supple. No rigidity. Right lower leg: No edema. Left lower leg: No edema. Lymphadenopathy:      Cervical: No cervical adenopathy. Skin:     General: Skin is warm and dry. Capillary Refill: Capillary refill takes less than 2 seconds. Findings: No bruising, lesion or rash. Neurological:      General: No focal deficit present. Mental Status: She is alert and oriented to person, place, and time. Mental status is at baseline. Motor: No weakness. Psychiatric:         Attention and Perception: Attention normal.         Mood and Affect: Mood and affect normal.         Behavior: Behavior is cooperative.         EKG Interpretation    Interpreted by emergency department physician    Rhythm: normal sinus   Rate: normal  Axis: left  Ectopy: none  Conduction: Short HI  ST Segments: no acute change  T Waves: no acute change  Q Waves: none    Clinical Impression: no acute changes when compared with prior EKG on 6/7/2022    Procedures       MDM  Number of Diagnoses or Management Options  Pyelonephritis  Diagnosis management comments: Patient is a 81-year-old female with past medical history of Crohn's disease, preeclampsia delivery 6weeks ago at the [facility] clinic, and history of kidney stones presenting with abdominal pain for 2 days and vomiting today. At time of initial examination patient is hypertensive but otherwise hemodynamically stable. EKG as above. Labs and imaging reviewed as below. Patient was extremely uncomfortable and given multiple antiemetic medications throughout her ED stay. Patient was given rocephin for her UTI. CT scan is concerning for left retroperitoneal stranding or fluid. Pelvic ultrasound shows no evidence of torsion. Given patient's severity of symptoms patient likely has pyelonephritis and decision was made to admit patient. Case was discussed with Dr. Cooper Reno, hospitalist, who agrees to admit the patient. At the time of admission, the patient was hemodynamically stable, demonstrated good understanding of her condition, and had no questions. ED Course as of 10/11/22 0557   Mon Oct 10, 2022   1408 The patient was reevaluated and is feeling some relief of her symptoms but remains nauseous at this time. Patient's blood pressure has improved to 147/121. [VG]   2078 Patient reportedly did not want to provide urine. [VG]   1910 Patient sleeping on reexamination. Concern for a urine infection, nausea significantly improved pain is much improved as well. CAT scan imaging shows concern for retroperitoneal stranding or fluid. Pelvic ultrasound ordered. [SO]   2030 Patient reports that she has a history of hypertension prior to pregnancy.   This hypertension is exacerbated by the pregnancy but is not solely because of it. [SO]      ED Course User Index  [SO] Opal Roy DO  [VG] Laura Turcios MD       --------------------------------------------- PAST HISTORY ---------------------------------------------  Past Medical History:  has a past medical history of Acute Crohn's disease ([facility] [address] Anxiety attack, Asthma, Crohn's colitis ([facility] [address] Depression, Herpes simplex virus (HSV) infection, History of blood transfusion, Hypertension, Marijuana abuse, Neurologic disorder, Postpartum depression,  delivery,  labor, Rh sensitized, Seizure ([facility] [address] STD (sexually transmitted disease), and Tobacco abuse. Past Surgical History:  has a past surgical history that includes Dilation and curettage of uterus (); Colonoscopy; Endoscopy, colon, diagnostic; Cholecystectomy (2018); hernia repair; hernia repair; Upper gastrointestinal endoscopy (N/A, 2022); Dilation & curettage; and laparoscopy. Social History:  reports that she quit smoking about 2 years ago. Her smoking use included cigarettes. She has a 10.00 pack-year smoking history. She has never used smokeless tobacco. She reports that she does not currently use alcohol. She reports that she does not currently use drugs after having used the following drugs: Marijuana Kenard Snooks). Frequency: 2.00 times per week. Family History: family history includes Breast Cancer in her mother; Cervical Cancer in her mother; Depression in her father and mother; Heart Disease in her father; Hypertension in her father; Leukemia in her mother; Schizophrenia in her father. The patients home medications have been reviewed. Allergies: Patient has no known allergies.     -------------------------------------------------- RESULTS -------------------------------------------------    LABS:  Results for orders placed or performed during the hospital encounter of 10/10/22   COVID-19, Rapid    Specimen: Nasopharyngeal Swab   Result Value Ref Range    SARS-CoV-2, NAAT Not Detected Not Detected   CBC with Auto Differential   Result Value Ref Range    WBC 17.9 (H) 4.5 - 11.5 E9/L    RBC 5.04 3.50 - 5.50 E12/L    Hemoglobin 15.2 11.5 - 15.5 g/dL    Hematocrit 45.6 34.0 - 48.0 %    MCV 90.5 80.0 - 99.9 fL    MCH 30.2 26.0 - 35.0 pg    MCHC 33.3 32.0 - 34.5 %    RDW 13.8 11.5 - 15.0 fL    Platelets 421 (H) 977 - 450 E9/L    MPV 9.1 7.0 - 12.0 fL    Neutrophils % 86.8 (H) 43.0 - 80.0 %    Immature Granulocytes % 0.5 0.0 - 5.0 %    Lymphocytes % 9.5 (L) 20.0 - 42.0 %    Monocytes % 2.3 2.0 - 12.0 %    Eosinophils % 0.4 0.0 - 6.0 %    Basophils % 0.5 0.0 - 2.0 %    Neutrophils Absolute 15.54 (H) 1.80 - 7.30 E9/L    Immature Granulocytes # 0.09 E9/L    Lymphocytes Absolute 1.71 1.50 - 4.00 E9/L    Monocytes Absolute 0.42 0.10 - 0.95 E9/L    Eosinophils Absolute 0.08 0.05 - 0.50 E9/L    Basophils Absolute 0.09 0.00 - 0.20 D3/M   Basic Metabolic Panel w/ Reflex to MG   Result Value Ref Range    Sodium 141 132 - 146 mmol/L    Potassium reflex Magnesium 3.9 3.5 - 5.0 mmol/L    Chloride 104 98 - 107 mmol/L    CO2 22 22 - 29 mmol/L    Anion Gap 15 7 - 16 mmol/L    Glucose 123 (H) 74 - 99 mg/dL    BUN 12 6 - 20 mg/dL    Creatinine 0.7 0.5 - 1.0 mg/dL    GFR Non-African American >60 >=60 mL/min/1.73    GFR African American >60     Calcium 10.1 8.6 - 10.2 mg/dL   Hepatic Function Panel   Result Value Ref Range    Total Protein 8.3 6.4 - 8.3 g/dL    Albumin 5.2 3.5 - 5.2 g/dL    Alkaline Phosphatase 73 35 - 104 U/L    ALT 22 0 - 32 U/L    AST 21 0 - 31 U/L    Total Bilirubin 0.6 0.0 - 1.2 mg/dL    Bilirubin, Direct <0.2 0.0 - 0.3 mg/dL    Bilirubin, Indirect see below 0.0 - 1.0 mg/dL   Lipase   Result Value Ref Range    Lipase 19 13 - 60 U/L   Urinalysis with Microscopic   Result Value Ref Range    Color, UA Yellow Straw/Yellow    Clarity, UA SL CLOUDY Clear    Glucose, Ur Negative Negative mg/dL    Bilirubin Urine Negative Negative    Ketones, Urine >=80 (A) Negative mg/dL    Specific Gravity, UA >=1.030 1.005 - 1.030    Blood, Urine MODERATE (A) Negative    pH, UA 6.0 5.0 - 9.0    Protein, UA 30 (A) Negative mg/dL    Urobilinogen, Urine 0.2 <2.0 E.U./dL    Nitrite, Urine POSITIVE (A) Negative    Leukocyte Esterase, Urine SMALL (A) Negative    WBC, UA 10-20 (A) 0 - 5 /HPF    RBC, UA 5-10 (A) 0 - 2 /HPF    Epithelial Cells, UA FEW /HPF    Bacteria, UA MODERATE (A) None Seen /HPF   Lactic Acid   Result Value Ref Range Lactic Acid 1.6 0.5 - 2.2 mmol/L   URINE DRUG SCREEN   Result Value Ref Range    Amphetamine Screen, Urine POSITIVE (A) Negative <1000 ng/mL    Barbiturate Screen, Ur NOT DETECTED Negative < 200 ng/mL    Benzodiazepine Screen, Urine NOT DETECTED Negative < 200 ng/mL    Cannabinoid Scrn, Ur POSITIVE (A) Negative < 50ng/mL    Cocaine Metabolite Screen, Urine NOT DETECTED Negative < 300 ng/mL    Opiate Scrn, Ur POSITIVE (A) Negative < 300ng/mL    PCP Screen, Urine NOT DETECTED Negative < 25 ng/mL    Methadone Screen, Urine NOT DETECTED Negative <300 ng/mL    Oxycodone Urine NOT DETECTED Negative <100 ng/mL    FENTANYL SCREEN, URINE NOT DETECTED Negative <1 ng/mL    Drug Screen Comment: see below    Comprehensive Metabolic Panel w/ Reflex to MG   Result Value Ref Range    Sodium 137 132 - 146 mmol/L    Potassium reflex Magnesium 3.4 (L) 3.5 - 5.0 mmol/L    Chloride 105 98 - 107 mmol/L    CO2 18 (L) 22 - 29 mmol/L    Anion Gap 14 7 - 16 mmol/L    Glucose 118 (H) 74 - 99 mg/dL    BUN 10 6 - 20 mg/dL    Creatinine 0.5 0.5 - 1.0 mg/dL    GFR Non-African American >60 >=60 mL/min/1.73    GFR African American >60     Calcium 8.8 8.6 - 10.2 mg/dL    Total Protein 6.8 6.4 - 8.3 g/dL    Albumin 4.3 3.5 - 5.2 g/dL    Total Bilirubin 0.6 0.0 - 1.2 mg/dL    Alkaline Phosphatase 56 35 - 104 U/L    ALT 16 0 - 32 U/L    AST 18 0 - 31 U/L   Magnesium   Result Value Ref Range    Magnesium 2.0 1.6 - 2.6 mg/dL   POC Pregnancy Urine   Result Value Ref Range    HCG, Urine, POC Negative Negative    Lot Number [id number]     Positive QC Pass/Fail Pass     Negative QC Pass/Fail Pass    EKG 12 Lead   Result Value Ref Range    Ventricular Rate 68 BPM    Atrial Rate 68 BPM    P-R Interval 110 ms    QRS Duration 100 ms    Q-T Interval 442 ms    QTc Calculation (Bazett) 469 ms    P Axis 52 degrees    R Axis 53 degrees    T Axis 57 degrees       RADIOLOGY:  US DUP ABD PEL RETRO SCROT LIMITED   Final Result   No ovarian torsion is seen.          US PELVIS COMPLETE   Final Result   Unremarkable pelvic ultrasound. CT ABDOMEN PELVIS W IV CONTRAST Additional Contrast? None   Final Result   Left retroperitoneal stranding and fluid, which could be related to the left   gonadal vein versus left ureter. Recommend clinical and laboratory   correlation for ureteritis. If there is clinical concern for acute ovarian   torsion, recommend further evaluation with dedicated pelvic ultrasound. XR CHEST PORTABLE   Final Result   No acute process. ------------------------- NURSING NOTES AND VITALS REVIEWED ---------------------------  Date / Time Roomed:  10/10/2022 12:36 PM  ED Bed Assignment:  19/19    The nursing notes within the ED encounter and vital signs as below have been reviewed. Patient Vitals for the past 24 hrs:   BP Temp Temp src Pulse Resp SpO2 Height Weight   10/11/22 0230 134/76 -- -- 73 24 96 % -- --   10/11/22 0125 117/64 -- -- 79 22 97 % -- --   10/11/22 0036 101/62 -- -- 74 21 97 % -- --   10/10/22 2330 (!) 164/94 -- -- 65 23 97 % -- --   10/10/22 2200 138/65 -- -- 85 23 100 % -- --   10/10/22 2127 (!) 161/95 -- -- 69 -- 100 % -- --   10/10/22 2030 (!) 174/99 -- -- 69 16 100 % -- --   10/10/22 1827 (!) 179/99 -- -- 67 24 100 % -- --   10/10/22 1600 (!) 163/99 -- -- 64 22 96 % -- --   10/10/22 1330 (!) 147/121 -- -- 70 18 98 % -- --   10/10/22 1241 (!) 168/115 97.8 °F (36.6 °C) Oral 86 16 99 % 5' 4\" (1.626 m) 157 lb (71.2 kg)       Oxygen Saturation Interpretation: Normal    ------------------------------------------ PROGRESS NOTES ------------------------------------------  Re-evaluation(s):  As outlined in the ED course. Counseling:  I have spoken with the patient and discussed todays results, in addition to providing specific details for the plan of care and counseling regarding the diagnosis and prognosis.   Their questions are answered at this time and they are agreeable with the plan of admission.    --------------------------------- ADDITIONAL PROVIDER NOTES ---------------------------------  Consultations:  Spoke with Dr. Aide Martinez. Discussed case. They will admit the patient. This patient's ED course included: a personal history and physicial examination, re-evaluation prior to disposition, multiple bedside re-evaluations, and IV medications    This patient has remained hemodynamically stable during their ED course. Diagnosis:  1. Pyelonephritis        Disposition:  Patient's disposition: Admit to med/surg floor  Patient's condition is stable. Patient was seen and evaluated by myself and my attending Umair Joseph DO. Assessment and Plan discussed with attending provider, please see attestation for final plan of care.      MD Kimberly Moreno MD  Resident  10/11/22 [id number]

## 2022-10-10 NOTE — ED NOTES
Radiology Procedure Waiver   Name: Florina Way  : 1985  MRN: [id number]    Date:  10/10/22    Time: 3:04 PM EDT    Benefits of immediately proceeding with Radiology exam(s) without pre-testing outweigh the risks or are not indicated as specified below and therefore the following is/are being waived:    [x] Pregnancy test   [] Patients LMP on-time and regular.   [] Patient had Tubal Ligation or has other Contraception Device. [] Patient  is Menopausal or Premenarcheal.    [] Patient had Full or Partial Hysterectomy. [] Protocol for Iodine allergy    [] MRI Questionnaire     [] BUN/Creatinine   [] Patient age w/no hx of renal dysfunction. [] Patient on Dialysis. [] Recent Normal Labs.   Electronically signed by Yasmine Fowler MD on 10/10/22 at 3:04 PM EDT               Yasmine Fowler MD  Resident  10/10/22 [id number]

## 2022-10-10 NOTE — ED NOTES
Patient came in via EMS sent from urgent care for abdominal cramping and diarrhea x 2 days and vomiting starting today. She is 6 wks postpartum, vaginal delivery 10 wks premature due to preeclampsia. Diagnosed with crohn's during pregnancy per patient.       Prerna Lombardo RN  10/10/22 [id number]

## 2022-10-11 LAB
ALBUMIN SERPL-MCNC: 4.3 G/DL (ref 3.5–5.2)
ALP BLD-CCNC: 56 U/L (ref 35–104)
ALT SERPL-CCNC: 16 U/L (ref 0–32)
ANION GAP SERPL CALCULATED.3IONS-SCNC: 14 MMOL/L (ref 7–16)
AST SERPL-CCNC: 18 U/L (ref 0–31)
BASOPHILS ABSOLUTE: 0.03 E9/L (ref 0–0.2)
BASOPHILS RELATIVE PERCENT: 0.2 % (ref 0–2)
BILIRUB SERPL-MCNC: 0.6 MG/DL (ref 0–1.2)
BUN BLDV-MCNC: 10 MG/DL (ref 6–20)
CALCIUM SERPL-MCNC: 8.8 MG/DL (ref 8.6–10.2)
CHLORIDE BLD-SCNC: 105 MMOL/L (ref 98–107)
CO2: 18 MMOL/L (ref 22–29)
CREAT SERPL-MCNC: 0.5 MG/DL (ref 0.5–1)
EKG ATRIAL RATE: 68 BPM
EKG P AXIS: 52 DEGREES
EKG P-R INTERVAL: 110 MS
EKG Q-T INTERVAL: 442 MS
EKG QRS DURATION: 100 MS
EKG QTC CALCULATION (BAZETT): 469 MS
EKG R AXIS: 53 DEGREES
EKG T AXIS: 57 DEGREES
EKG VENTRICULAR RATE: 68 BPM
EOSINOPHILS ABSOLUTE: 0.02 E9/L (ref 0.05–0.5)
EOSINOPHILS RELATIVE PERCENT: 0.1 % (ref 0–6)
GFR AFRICAN AMERICAN: >60
GFR NON-AFRICAN AMERICAN: >60 ML/MIN/1.73
GLUCOSE BLD-MCNC: 118 MG/DL (ref 74–99)
HCT VFR BLD CALC: 36.3 % (ref 34–48)
HEMOGLOBIN: 12.4 G/DL (ref 11.5–15.5)
IMMATURE GRANULOCYTES #: 0.06 E9/L
IMMATURE GRANULOCYTES %: 0.4 % (ref 0–5)
LYMPHOCYTES ABSOLUTE: 3.25 E9/L (ref 1.5–4)
LYMPHOCYTES RELATIVE PERCENT: 19.9 % (ref 20–42)
MAGNESIUM: 2 MG/DL (ref 1.6–2.6)
MCH RBC QN AUTO: 30.9 PG (ref 26–35)
MCHC RBC AUTO-ENTMCNC: 34.2 % (ref 32–34.5)
MCV RBC AUTO: 90.5 FL (ref 80–99.9)
MONOCYTES ABSOLUTE: 1.09 E9/L (ref 0.1–0.95)
MONOCYTES RELATIVE PERCENT: 6.7 % (ref 2–12)
NEUTROPHILS ABSOLUTE: 11.87 E9/L (ref 1.8–7.3)
NEUTROPHILS RELATIVE PERCENT: 72.7 % (ref 43–80)
PDW BLD-RTO: 13.9 FL (ref 11.5–15)
PLATELET # BLD: 378 E9/L (ref 130–450)
PMV BLD AUTO: 9.3 FL (ref 7–12)
POTASSIUM REFLEX MAGNESIUM: 3.4 MMOL/L (ref 3.5–5)
RBC # BLD: 4.01 E12/L (ref 3.5–5.5)
SODIUM BLD-SCNC: 137 MMOL/L (ref 132–146)
TOTAL PROTEIN: 6.8 G/DL (ref 6.4–8.3)
WBC # BLD: 16.3 E9/L (ref 4.5–11.5)

## 2022-10-11 PROCEDURE — 1200000000 HC SEMI PRIVATE

## 2022-10-11 PROCEDURE — 2580000003 HC RX 258: Performed by: FAMILY MEDICINE

## 2022-10-11 PROCEDURE — 6370000000 HC RX 637 (ALT 250 FOR IP): Performed by: FAMILY MEDICINE

## 2022-10-11 PROCEDURE — 85025 COMPLETE CBC W/AUTO DIFF WBC: CPT

## 2022-10-11 PROCEDURE — 83735 ASSAY OF MAGNESIUM: CPT

## 2022-10-11 PROCEDURE — 6360000002 HC RX W HCPCS: Performed by: FAMILY MEDICINE

## 2022-10-11 PROCEDURE — 80053 COMPREHEN METABOLIC PANEL: CPT

## 2022-10-11 RX ORDER — ENOXAPARIN SODIUM 100 MG/ML
40 INJECTION SUBCUTANEOUS DAILY
Status: DISCONTINUED | OUTPATIENT
Start: 2022-10-11 | End: 2022-10-12 | Stop reason: HOSPADM

## 2022-10-11 RX ORDER — SODIUM CHLORIDE 0.9 % (FLUSH) 0.9 %
10 SYRINGE (ML) INJECTION EVERY 12 HOURS SCHEDULED
Status: DISCONTINUED | OUTPATIENT
Start: 2022-10-11 | End: 2022-10-12 | Stop reason: HOSPADM

## 2022-10-11 RX ORDER — ONDANSETRON 2 MG/ML
4 INJECTION INTRAMUSCULAR; INTRAVENOUS EVERY 6 HOURS PRN
Status: DISCONTINUED | OUTPATIENT
Start: 2022-10-11 | End: 2022-10-12 | Stop reason: HOSPADM

## 2022-10-11 RX ORDER — SODIUM CHLORIDE 9 MG/ML
INJECTION, SOLUTION INTRAVENOUS PRN
Status: DISCONTINUED | OUTPATIENT
Start: 2022-10-11 | End: 2022-10-12 | Stop reason: HOSPADM

## 2022-10-11 RX ORDER — PROMETHAZINE HYDROCHLORIDE 12.5 MG/1
12.5 TABLET ORAL EVERY 6 HOURS PRN
Status: DISCONTINUED | OUTPATIENT
Start: 2022-10-11 | End: 2022-10-12 | Stop reason: HOSPADM

## 2022-10-11 RX ORDER — SODIUM CHLORIDE 0.9 % (FLUSH) 0.9 %
10 SYRINGE (ML) INJECTION PRN
Status: DISCONTINUED | OUTPATIENT
Start: 2022-10-11 | End: 2022-10-12 | Stop reason: HOSPADM

## 2022-10-11 RX ORDER — KETOROLAC TROMETHAMINE 30 MG/ML
30 INJECTION, SOLUTION INTRAMUSCULAR; INTRAVENOUS EVERY 6 HOURS PRN
Status: DISCONTINUED | OUTPATIENT
Start: 2022-10-11 | End: 2022-10-11

## 2022-10-11 RX ORDER — ACETAMINOPHEN 325 MG/1
650 TABLET ORAL EVERY 6 HOURS PRN
Status: DISCONTINUED | OUTPATIENT
Start: 2022-10-11 | End: 2022-10-12

## 2022-10-11 RX ORDER — HYDROCODONE BITARTRATE AND ACETAMINOPHEN 5; 325 MG/1; MG/1
1 TABLET ORAL EVERY 6 HOURS PRN
Status: DISCONTINUED | OUTPATIENT
Start: 2022-10-11 | End: 2022-10-12

## 2022-10-11 RX ORDER — LISINOPRIL 5 MG/1
5 TABLET ORAL DAILY
Status: DISCONTINUED | OUTPATIENT
Start: 2022-10-11 | End: 2022-10-12 | Stop reason: HOSPADM

## 2022-10-11 RX ORDER — KETOROLAC TROMETHAMINE 30 MG/ML
30 INJECTION, SOLUTION INTRAMUSCULAR; INTRAVENOUS ONCE
Status: COMPLETED | OUTPATIENT
Start: 2022-10-11 | End: 2022-10-11

## 2022-10-11 RX ORDER — SODIUM CHLORIDE 9 MG/ML
INJECTION, SOLUTION INTRAVENOUS CONTINUOUS
Status: DISCONTINUED | OUTPATIENT
Start: 2022-10-11 | End: 2022-10-12

## 2022-10-11 RX ORDER — POLYETHYLENE GLYCOL 3350 17 G/17G
17 POWDER, FOR SOLUTION ORAL DAILY PRN
Status: DISCONTINUED | OUTPATIENT
Start: 2022-10-11 | End: 2022-10-12 | Stop reason: HOSPADM

## 2022-10-11 RX ORDER — ACETAMINOPHEN 650 MG/1
650 SUPPOSITORY RECTAL EVERY 6 HOURS PRN
Status: DISCONTINUED | OUTPATIENT
Start: 2022-10-11 | End: 2022-10-12

## 2022-10-11 RX ADMIN — Medication 10 ML: at 11:40

## 2022-10-11 RX ADMIN — HYDROCODONE BITARTRATE AND ACETAMINOPHEN 1 TABLET: 5; 325 TABLET ORAL at 18:50

## 2022-10-11 RX ADMIN — ACETAMINOPHEN 650 MG: 325 TABLET, FILM COATED ORAL at 04:57

## 2022-10-11 RX ADMIN — SODIUM CHLORIDE: 9 INJECTION, SOLUTION INTRAVENOUS at 00:35

## 2022-10-11 RX ADMIN — ENOXAPARIN SODIUM 40 MG: 100 INJECTION SUBCUTANEOUS at 11:36

## 2022-10-11 RX ADMIN — CEFTRIAXONE 1000 MG: 1 INJECTION, POWDER, FOR SOLUTION INTRAMUSCULAR; INTRAVENOUS at 18:49

## 2022-10-11 RX ADMIN — SODIUM CHLORIDE: 9 INJECTION, SOLUTION INTRAVENOUS at 13:18

## 2022-10-11 RX ADMIN — SODIUM CHLORIDE: 9 INJECTION, SOLUTION INTRAVENOUS at 23:38

## 2022-10-11 RX ADMIN — ACETAMINOPHEN 650 MG: 325 TABLET, FILM COATED ORAL at 11:36

## 2022-10-11 RX ADMIN — LISINOPRIL 5 MG: 10 TABLET ORAL at 11:35

## 2022-10-11 RX ADMIN — KETOROLAC TROMETHAMINE 30 MG: 30 INJECTION, SOLUTION INTRAMUSCULAR at 06:41

## 2022-10-11 RX ADMIN — HYDROCODONE BITARTRATE AND ACETAMINOPHEN 1 TABLET: 5; 325 TABLET ORAL at 12:26

## 2022-10-11 ASSESSMENT — PAIN SCALES - GENERAL
PAINLEVEL_OUTOF10: 9
PAINLEVEL_OUTOF10: 8
PAINLEVEL_OUTOF10: 9
PAINLEVEL_OUTOF10: 8
PAINLEVEL_OUTOF10: 0
PAINLEVEL_OUTOF10: 7

## 2022-10-11 ASSESSMENT — PAIN DESCRIPTION - DESCRIPTORS
DESCRIPTORS: ACHING
DESCRIPTORS: THROBBING;DULL
DESCRIPTORS: THROBBING;ACHING
DESCRIPTORS: ACHING
DESCRIPTORS: ACHING;SHARP

## 2022-10-11 ASSESSMENT — PAIN DESCRIPTION - ORIENTATION
ORIENTATION: LOWER
ORIENTATION: RIGHT;POSTERIOR
ORIENTATION: RIGHT

## 2022-10-11 ASSESSMENT — PAIN DESCRIPTION - LOCATION
LOCATION: ABDOMEN
LOCATION: FLANK;BACK
LOCATION: FLANK
LOCATION: FLANK
LOCATION: FLANK;RIB CAGE

## 2022-10-11 ASSESSMENT — PAIN - FUNCTIONAL ASSESSMENT: PAIN_FUNCTIONAL_ASSESSMENT: ACTIVITIES ARE NOT PREVENTED

## 2022-10-11 NOTE — ED NOTES
Level 3 surge is in effect per charge nurse Wood Camara. Pt can go up to the floor with transportation now per Wood Camara, kwame RN.       Kyler Jones RN  10/11/22 3194

## 2022-10-11 NOTE — PLAN OF CARE
Problem: Discharge Planning  Goal: Discharge to home or other facility with appropriate resources  Outcome: Progressing  Flowsheets  Taken 10/11/2022 1628  Discharge to home or other facility with appropriate resources:   Identify barriers to discharge with patient and caregiver   Identify discharge learning needs (meds, wound care, etc)   Refer to discharge planning if patient needs post-hospital services based on physician order or complex needs related to functional status, cognitive ability or social support system   Arrange for interpreters to assist at discharge as needed   Arrange for needed discharge resources and transportation as appropriate  Taken 10/11/2022 1245  Discharge to home or other facility with appropriate resources: Identify barriers to discharge with patient and caregiver

## 2022-10-11 NOTE — H&P
Hospitalist History & Physical      PCP: Louann Lobato DO    Date of Service: Pt seen/examined on 10/10/2022     Chief Complaint:  had concerns including Emesis (Vomiting started today, abdominal pain for the last 2 days. 6w post partum. ). History Of Present Illness:    Ms. Huey Patel, a 40y.o. year old female  who  has a past medical history of Acute Crohn's disease ([address] Anxiety attack, Asthma, Crohn's colitis ([address] Depression, Herpes simplex virus (HSV) infection, History of blood transfusion, Hypertension, Marijuana abuse, Neurologic disorder, Postpartum depression,  delivery,  labor, Rh sensitized, Seizure ([address] STD (sexually transmitted disease), and Tobacco abuse. Patient is a 44-year-old female with past medical history of Crohn's disease, preeclampsia delivery 6weeks ago at the [facility] clinic, and history of kidney stones presenting with abdominal pain for 2 days and vomiting today. Patient states that she was at rest when her abdominal pain began. Patient describes a crampy lower abdominal pain that is across the lower abdomen. Nonradiating, not improved with over-the-counter medication, exacerbated by vomiting, not exacerbated or relieved by movement or position. Patient describes sudden onset of intractable vomiting beginning earlier today which has not been helped by her prescribed Zofran. Patient's vomitus is nonbloody, nonbilious. Patient is also been describing diarrhea for 2 days which is nonbloody, not black or tarry. Patient is a former smoker having quit in 2020 with a 10-pack-year history, denies alcohol use, and endorses marijuana use 1-2 times a month. She denies other drug use.   Patient denies headache, fevers, sore throat, cough, chest pain, shortness of breath, hematuria, dysuria, increasing or decreasing urinary frequency, vaginal bleeding/discharge, blood in the stool, constipation, leg pain, leg swelling, recent travel, recent illness, recent surgery, or sick contacts. Vital signs are within normal limits and stable. Patient is hypertensive with pressure 161/95. Patient is afebrile. Laboratory studies demonstrate white count of 17.9. Urinalysis positive for urinary tract infection. Patient was given ceftriaxone for presumed UTI/pyelonephritis. Medicine consulted for admission. 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 inhaler     Crohn's colitis ([facility]     Depression     medicated with zoloft     Herpes simplex virus (HSV) infection     History of blood transfusion     as an infant in [place]    Hypertension     intermittent     Marijuana abuse     occas    Neurologic disorder     Postpartum depression      delivery      labor     Rh sensitized     Seizure ([facility]     loses focus, disoriented unpon arousing; etiology unknown per pt; none since     STD (sexually transmitted disease)     Tobacco abuse        Past Surgical History:   Procedure Laterality Date    CHOLECYSTECTOMY  2018    COLONOSCOPY      DILATION AND CURETTAGE      DILATION AND CURETTAGE OF UTERUS      ENDOSCOPY, COLON, DIAGNOSTIC      HERNIA REPAIR      HERNIA REPAIR      LAPAROSCOPY      UPPER GASTROINTESTINAL ENDOSCOPY N/A 2022    EGD BIOPSY performed by Khoa Kelsey MD at [address] [place] [address]       Prior to Admission medications    Medication Sig Start Date End Date Taking? Authorizing Provider   lisinopril (PRINIVIL;ZESTRIL) 5 MG tablet Take 5 mg by mouth daily   Yes Historical Provider, MD         Allergies:  Patient has no known allergies. Social History:    TOBACCO:   reports that she quit smoking about 2 years ago. Her smoking use included cigarettes. She has a 10.00 pack-year smoking history.  She has never used smokeless tobacco.  ETOH:   reports that she does not currently use alcohol. Family History:    Reviewed in detail and negative for DM, CAD, Cancer, CVA. Positive as follows\"      Problem Relation Age of Onset    Depression Mother     Leukemia Mother     Breast Cancer Mother     Cervical Cancer Mother     Heart Disease Father     Depression Father     Schizophrenia Father     Hypertension Father        REVIEW OF SYSTEMS:   Pertinent positives as noted in the HPI. All other systems reviewed and negative. PHYSICAL EXAM:  BP (!) 161/95   Pulse 69   Temp 97.8 °F (36.6 °C) (Oral)   Resp 16   Ht 5' 4\" (1.626 m)   Wt 157 lb (71.2 kg)   LMP 01/24/2022   SpO2 100%   Breastfeeding Unknown   BMI 26.95 kg/m²   General appearance: No apparent distress, appears stated age and cooperative. HEENT: Normal cephalic, atraumatic without obvious deformity. Pupils equal, round, and reactive to light. Extra ocular muscles intact. Conjunctivae/corneas clear. Neck: Supple, with full range of motion. No jugular venous distention. Trachea midline. Respiratory: CTA  Cardiovascular: RRR  Abdomen: S/TTP suprapubic, right/left lower quadrants nondistended  Musculoskeletal: No clubbing, cyanosis, edema of bilateral lower extremities. Brisk capillary refill. Skin: Normal skin color. No rashes or lesions. Neurologic:  Neurovascularly intact without any focal sensory/motor deficits. Cranial nerves: II-XII intact, grossly non-focal.  Psychiatric: Alert and oriented, thought content appropriate, normal insight    Reviewed EKG and CXR personally      CBC:   Recent Labs     10/10/22  1311   WBC 17.9*   RBC 5.04   HGB 15.2   HCT 45.6   MCV 90.5   RDW 13.8   *     BMP:   Recent Labs     10/10/22  1311      K 3.9      CO2 22   BUN 12   CREATININE 0.7     LFT:  Recent Labs     10/10/22  1311   PROT 8.3   ALKPHOS 73   ALT 22   AST 21   BILITOT 0.6   LIPASE 19     CE:  No results for input(s): [facility] Memory in the last 72 hours.   PT/INR: No results for input(s): INR, APTT in the last 72 hours. BNP: No results for input(s): BNP in the last 72 hours. ESR:   Lab Results   Component Value Date    SEDRATE 37 (H) 07/30/2022     CRP:   Lab Results   Component Value Date    CRP 0.3 07/30/2022     D Dimer:   Lab Results   Component Value Date    DDIMER 394 12/17/2014      Folate and B12:   Lab Results   Component Value Date    ABYLAFOP65 463 07/07/2022   ,   Lab Results   Component Value Date    FOLATE >20.0 07/07/2022     Lactic Acid:   Lab Results   Component Value Date    LACTA 1.6 10/10/2022     Thyroid Studies:   Lab Results   Component Value Date    TSH 0.762 07/07/2022       Oupatient labs:  Lab Results   Component Value Date    TSH 0.762 07/07/2022    INR 1.0 09/25/2016    LABA1C 4.8 07/07/2022       Urinalysis:    Lab Results   Component Value Date/Time    NITRU POSITIVE 10/10/2022 03:20 PM    WBCUA 10-20 10/10/2022 03:20 PM    [address] NONE 02/09/2012 08:05 AM    BACTERIA MODERATE 10/10/2022 03:20 PM    RBCUA 5-10 10/10/2022 03:20 PM    RBCUA NONE 12/31/2013 11:53 AM    BLOODU MODERATE 10/10/2022 03:20 PM    SPECGRAV >=1.030 10/10/2022 03:20 PM    GLUCOSEU Negative 10/10/2022 03:20 PM    GLUCOSEU NEGATIVE 02/09/2012 08:05 AM       Imaging:  US PELVIS COMPLETE    Result Date: 10/10/2022  EXAMINATION: PELVIC ULTRASOUND 10/10/2022 TECHNIQUE: Transabdominal pelvic ultrasound was performed. COMPARISON: CT today HISTORY: ORDERING SYSTEM PROVIDED HISTORY: ro torsion TECHNOLOGIST PROVIDED HISTORY: Reason for exam:->ro torsion What reading provider will be dictating this exam?->CRC FINDINGS: Measurements: Uterus: 8.7 x 4.1 x 5.7 cm Endometrial stripe: 4-5 mm Right Ovary:2 x 1.9 x 2.2 cm Left Ovary: 3.2 x 2.0 x 2.0 cm Ultrasound Findings: Uterus: No significant lesion identified Endometrial stripe: Not thickened Right Ovary: Not enlarged. Follicles present. Left Ovary:  Not enlarged. Apparent dominant follicle at 1.7 cm. Free Fluid: No evidence of free fluid. Unremarkable pelvic ultrasound. CT ABDOMEN PELVIS W IV CONTRAST Additional Contrast? None    Result Date: 10/10/2022  EXAMINATION: CT OF THE ABDOMEN AND PELVIS WITH CONTRAST 10/10/2022 4:56 pm TECHNIQUE: CT of the abdomen and pelvis was performed with the administration of intravenous contrast. Multiplanar reformatted images are provided for review. Automated exposure control, iterative reconstruction, and/or weight based adjustment of the mA/kV was utilized to reduce the radiation dose to as low as reasonably achievable. COMPARISON: 06/08/2022 HISTORY: ORDERING SYSTEM PROVIDED HISTORY: lower abd pain; n/v TECHNOLOGIST PROVIDED HISTORY: Additional Contrast?->None Reason for exam:->lower abd pain; n/v Decision Support Exception - unselect if not a suspected or confirmed emergency medical condition->Emergency Medical Condition (MA) What reading provider will be dictating this exam?->CRC FINDINGS: Lower Chest:  Visualized portion of the lower chest demonstrates no acute abnormality. There is a small hiatal hernia. Organs: The liver, spleen, pancreas, and adrenals are within normal limits. The gallbladder is surgically absent. Bilateral kidneys demonstrate normal parenchymal enhancement. There is no hydronephrosis. No radiopaque ureteral stone is identified. GI/Bowel: There is no evidence of bowel obstruction. No evidence of abnormal bowel wall thickening or distension. Pelvis: The urinary bladder is partially filled. The uterus and adnexa are grossly unremarkable. Peritoneum/Retroperitoneum: There is a left retroperitoneal stranding and fluid. No extraluminal gas is seen. No drainable fluid collection. No evidence of lymphadenopathy. Aorta is normal in caliber. Bones/Soft Tissues:  No acute abnormality of the visualized osseous structures. There is grade 2 anterolisthesis of L5 on S1 with chronic appearing bilateral L5 pars defects.      Left retroperitoneal stranding and fluid, which could be related to the left gonadal vein versus left ureter. Recommend clinical and laboratory correlation for ureteritis. If there is clinical concern for acute ovarian torsion, recommend further evaluation with dedicated pelvic ultrasound. XR CHEST PORTABLE    Result Date: 10/10/2022  EXAMINATION: ONE XRAY VIEW OF THE CHEST 10/10/2022 1:23 pm COMPARISON: None. HISTORY: ORDERING SYSTEM PROVIDED HISTORY: vomiting TECHNOLOGIST PROVIDED HISTORY: Reason for exam:->vomiting What reading provider will be dictating this exam?->CRC FINDINGS: The lungs are without acute focal process. There is no effusion or pneumothorax. The cardiomediastinal silhouette is without acute process. The osseous structures are without acute process. No acute process. US DUP ABD PEL RETRO SCROT LIMITED    Result Date: 10/10/2022  EXAMINATION: DOPPLER EVALUATION OF THE PELVIS 10/10/2022 7:34 pm COMPARISON: None. HISTORY: ORDERING SYSTEM PROVIDED HISTORY: ro torsion TECHNOLOGIST PROVIDED HISTORY: Reason for exam:->ro torsion What reading provider will be dictating this exam?->CRC FINDINGS: There is color flow identified and spectral analysis shows arterial and venous flow being present in the ovaries. Gray scale evaluation is reported on separate exam.     No ovarian torsion is seen.        ASSESSMENT:  -Pyelonephritis  -Urinary tract infection  -Leukocytosis  -Hypertension      PLAN:  -Admit to medicine  -Ceftriaxone 1 g daily  -Urine culture  -Normal saline 75 mL/h  -Continue home medications        Diet: No diet orders on file  Code Status: Prior  Surrogate decision maker confirmed with patient:   Extended Emergency Contact Information  Primary Emergency Contact: Mckay Sibley  Address: [address] [place] [address] [place]           [facility], [address] [facility] [address] [address] Phone: [phone]  Mobile Phone: [phone]  Relation: Domestic Partner    DVT Prophylaxis: []Lovenox []Heparin []PCD [] [address] []Encouraged ambulation  Disposition: []Med/Surg [] Intermediate [] ICU/CCU  Admit status: [] Observation [] Inpatient     +++++++++++++++++++++++++++++++++++++++++++++++++  Jerrye Luzia, DO  +++++++++++++++++++++++++++++++++++++++++++++++++  NOTE: This report was transcribed using voice recognition software. Every effort was made to ensure accuracy; however, inadvertent computerized transcription errors may be present.

## 2022-10-11 NOTE — PROGRESS NOTES
Hospitalist progress note    Patient:  Xuan Carter  YOB: 1985  Date of Service: 10/11/22  MRN: [id number]   Acct:  [de-identified]   Primary Care Physician: Tray Min DO  10/10/2022     Patient Seen, Chart, Consults notes, Labs, Radiology, family and social history were reviewed. Assessment and Plan:    Pyelonephritis, continue antibiotics  Continue DVT &  GERD prophylaxis    UTI, continue antibiotics continue IV fluid  Abdominal pain continue pain medication  Nausea, Zofran  Leukocytosis because of perinephritis, continue antibiotics  History of depression, no change  Mild hypokalemia, monitor  Polysubstance abuse, no change            Chief Complaint: Right flank pain        Subjective     The patient is a 40 y.o. female who presents with perinephritis patient has history of Crohn's disease and anxiety and depression and herpes simplex hypertension with postpartum depression patient also history of seizure comes emergency room because of pyonephritis. Patient still complains of right flank pain  The pain is dull in nature 7/10 continuous pain the pain is not referred or radiated and there is no associated symptoms with the pain. Patient complain of nausea but there is no vomiting.   Patient any fever or chills  The patient denied any chest pain, shortness of breath , palpitations, or irregular heart beats   the patient denied any cough, hemoptysis, orthopnea, pleuritic pain, shortness of breath, sputum changes, stridor, tachypnea or wheezing         Review of systems:    All 10 review of system were negative unless what is mentioned in the present history               PHYSICAL EXAM:  /62   Pulse 78   Temp 97.8 °F (36.6 °C) (Oral)   Resp 22   Ht 5' 4\" (1.626 m)   Wt 157 lb (71.2 kg)   LMP 01/24/2022   SpO2 98%   Breastfeeding Unknown   BMI 26.95 kg/m²     General appearance - alert, well appearing, and in no distress   Head: atraumatic   Pupil: equal, round reactive to light   Neck: Supple, trachea is midline   Chest - clear to auscultation, no wheezes, rales or rhonchi, symmetric air entry   Distant Breath Sounds: No   Heart - S1 and S2 normal   Abdomen - soft, right flank tenderness   normal withou thist focal findings, mental status, speech normal, alert and oriented x3, TAYLOR and reflexes normal and symmetric   Integumentary - Skin color, texture, turgor normal. No rashes or lesions. Musculoskeletal - no joint tenderness, deformity or swelling   Extremities - peripheral pulses normal, no pedal edema, no clubbing or cyanosis times 4         Review of Labs and Diagnostic Testing:         US PELVIS COMPLETE    Result Date: 10/10/2022  EXAMINATION: PELVIC ULTRASOUND 10/10/2022 TECHNIQUE: Transabdominal pelvic ultrasound was performed. COMPARISON: CT today HISTORY: ORDERING SYSTEM PROVIDED HISTORY: ro torsion TECHNOLOGIST PROVIDED HISTORY: Reason for exam:->ro torsion What reading provider will be dictating this exam?->CRC FINDINGS: Measurements: Uterus: 8.7 x 4.1 x 5.7 cm Endometrial stripe: 4-5 mm Right Ovary:2 x 1.9 x 2.2 cm Left Ovary: 3.2 x 2.0 x 2.0 cm Ultrasound Findings: Uterus: No significant lesion identified Endometrial stripe: Not thickened Right Ovary: Not enlarged. Follicles present. Left Ovary:  Not enlarged. Apparent dominant follicle at 1.7 cm. Free Fluid: No evidence of free fluid. Unremarkable pelvic ultrasound. CT ABDOMEN PELVIS W IV CONTRAST Additional Contrast? None    Result Date: 10/10/2022  EXAMINATION: CT OF THE ABDOMEN AND PELVIS WITH CONTRAST 10/10/2022 4:56 pm TECHNIQUE: CT of the abdomen and pelvis was performed with the administration of intravenous contrast. Multiplanar reformatted images are provided for review. Automated exposure control, iterative reconstruction, and/or weight based adjustment of the mA/kV was utilized to reduce the radiation dose to as low as reasonably achievable.  COMPARISON: 06/08/2022 HISTORY: ORDERING SYSTEM PROVIDED HISTORY: lower abd pain; n/v TECHNOLOGIST PROVIDED HISTORY: Additional Contrast?->None Reason for exam:->lower abd pain; n/v Decision Support Exception - unselect if not a suspected or confirmed emergency medical condition->Emergency Medical Condition (MA) What reading provider will be dictating this exam?->CRC FINDINGS: Lower Chest:  Visualized portion of the lower chest demonstrates no acute abnormality. There is a small hiatal hernia. Organs: The liver, spleen, pancreas, and adrenals are within normal limits. The gallbladder is surgically absent. Bilateral kidneys demonstrate normal parenchymal enhancement. There is no hydronephrosis. No radiopaque ureteral stone is identified. GI/Bowel: There is no evidence of bowel obstruction. No evidence of abnormal bowel wall thickening or distension. Pelvis: The urinary bladder is partially filled. The uterus and adnexa are grossly unremarkable. Peritoneum/Retroperitoneum: There is a left retroperitoneal stranding and fluid. No extraluminal gas is seen. No drainable fluid collection. No evidence of lymphadenopathy. Aorta is normal in caliber. Bones/Soft Tissues:  No acute abnormality of the visualized osseous structures. There is grade 2 anterolisthesis of L5 on S1 with chronic appearing bilateral L5 pars defects. Left retroperitoneal stranding and fluid, which could be related to the left gonadal vein versus left ureter. Recommend clinical and laboratory correlation for ureteritis. If there is clinical concern for acute ovarian torsion, recommend further evaluation with dedicated pelvic ultrasound. XR CHEST PORTABLE    Result Date: 10/10/2022  EXAMINATION: ONE XRAY VIEW OF THE CHEST 10/10/2022 1:23 pm COMPARISON: None. HISTORY: ORDERING SYSTEM PROVIDED HISTORY: vomiting TECHNOLOGIST PROVIDED HISTORY: Reason for exam:->vomiting What reading provider will be dictating this exam?->CRC FINDINGS: The lungs are without acute focal process. There is no effusion or pneumothorax. The cardiomediastinal silhouette is without acute process. The osseous structures are without acute process. No acute process. US DUP ABD PEL RETRO SCROT LIMITED    Result Date: 10/10/2022  EXAMINATION: DOPPLER EVALUATION OF THE PELVIS 10/10/2022 7:34 pm COMPARISON: None. HISTORY: ORDERING SYSTEM PROVIDED HISTORY: ro torsion TECHNOLOGIST PROVIDED HISTORY: Reason for exam:->ro torsion What reading provider will be dictating this exam?->CRC FINDINGS: There is color flow identified and spectral analysis shows arterial and venous flow being present in the ovaries. Gray scale evaluation is reported on separate exam.     No ovarian torsion is seen.      Recent Results (from the past 24 hour(s))   CBC with Auto Differential    Collection Time: 10/10/22  1:11 PM   Result Value Ref Range    WBC 17.9 (H) 4.5 - 11.5 E9/L    RBC 5.04 3.50 - 5.50 E12/L    Hemoglobin 15.2 11.5 - 15.5 g/dL    Hematocrit 45.6 34.0 - 48.0 %    MCV 90.5 80.0 - 99.9 fL    MCH 30.2 26.0 - 35.0 pg    MCHC 33.3 32.0 - 34.5 %    RDW 13.8 11.5 - 15.0 fL    Platelets 701 (H) 120 - 450 E9/L    MPV 9.1 7.0 - 12.0 fL    Neutrophils % 86.8 (H) 43.0 - 80.0 %    Immature Granulocytes % 0.5 0.0 - 5.0 %    Lymphocytes % 9.5 (L) 20.0 - 42.0 %    Monocytes % 2.3 2.0 - 12.0 %    Eosinophils % 0.4 0.0 - 6.0 %    Basophils % 0.5 0.0 - 2.0 %    Neutrophils Absolute 15.54 (H) 1.80 - 7.30 E9/L    Immature Granulocytes # 0.09 E9/L    Lymphocytes Absolute 1.71 1.50 - 4.00 E9/L    Monocytes Absolute 0.42 0.10 - 0.95 E9/L    Eosinophils Absolute 0.08 0.05 - 0.50 E9/L    Basophils Absolute 0.09 0.00 - 0.20 O0/D   Basic Metabolic Panel w/ Reflex to MG    Collection Time: 10/10/22  1:11 PM   Result Value Ref Range    Sodium 141 132 - 146 mmol/L    Potassium reflex Magnesium 3.9 3.5 - 5.0 mmol/L    Chloride 104 98 - 107 mmol/L    CO2 22 22 - 29 mmol/L    Anion Gap 15 7 - 16 mmol/L    Glucose 123 (H) 74 - 99 mg/dL    BUN 12 6 - 20 mg/dL Creatinine 0.7 0.5 - 1.0 mg/dL    GFR Non-African American >60 >=60 mL/min/1.73    GFR African American >60     Calcium 10.1 8.6 - 10.2 mg/dL   Hepatic Function Panel    Collection Time: 10/10/22  1:11 PM   Result Value Ref Range    Total Protein 8.3 6.4 - 8.3 g/dL    Albumin 5.2 3.5 - 5.2 g/dL    Alkaline Phosphatase 73 35 - 104 U/L    ALT 22 0 - 32 U/L    AST 21 0 - 31 U/L    Total Bilirubin 0.6 0.0 - 1.2 mg/dL    Bilirubin, Direct <0.2 0.0 - 0.3 mg/dL    Bilirubin, Indirect see below 0.0 - 1.0 mg/dL   Lipase    Collection Time: 10/10/22  1:11 PM   Result Value Ref Range    Lipase 19 13 - 60 U/L   Lactic Acid    Collection Time: 10/10/22  1:11 PM   Result Value Ref Range    Lactic Acid 1.6 0.5 - 2.2 mmol/L   Urinalysis with Microscopic    Collection Time: 10/10/22  3:20 PM   Result Value Ref Range    Color, UA Yellow Straw/Yellow    Clarity, UA SL CLOUDY Clear    Glucose, Ur Negative Negative mg/dL    Bilirubin Urine Negative Negative    Ketones, Urine >=80 (A) Negative mg/dL    Specific Gravity, UA >=1.030 1.005 - 1.030    Blood, Urine MODERATE (A) Negative    pH, UA 6.0 5.0 - 9.0    Protein, UA 30 (A) Negative mg/dL    Urobilinogen, Urine 0.2 <2.0 E.U./dL    Nitrite, Urine POSITIVE (A) Negative    Leukocyte Esterase, Urine SMALL (A) Negative    WBC, UA 10-20 (A) 0 - 5 /HPF    RBC, UA 5-10 (A) 0 - 2 /HPF    Epithelial Cells, UA FEW /HPF    Bacteria, UA MODERATE (A) None Seen /HPF   URINE DRUG SCREEN    Collection Time: 10/10/22  3:20 PM   Result Value Ref Range    Amphetamine Screen, Urine POSITIVE (A) Negative <1000 ng/mL    Barbiturate Screen, Ur NOT DETECTED Negative < 200 ng/mL    Benzodiazepine Screen, Urine NOT DETECTED Negative < 200 ng/mL    Cannabinoid Scrn, Ur POSITIVE (A) Negative < 50ng/mL    Cocaine Metabolite Screen, Urine NOT DETECTED Negative < 300 ng/mL    Opiate Scrn, Ur POSITIVE (A) Negative < 300ng/mL    PCP Screen, Urine NOT DETECTED Negative < 25 ng/mL    Methadone Screen, Urine NOT DETECTED Negative <300 ng/mL    Oxycodone Urine NOT DETECTED Negative <100 ng/mL    FENTANYL SCREEN, URINE NOT DETECTED Negative <1 ng/mL    Drug Screen Comment: see below    POC Pregnancy Urine    Collection Time: 10/10/22  3:21 PM   Result Value Ref Range    HCG, Urine, POC Negative Negative    Lot Number [id number]     Positive QC Pass/Fail Pass     Negative QC Pass/Fail Pass    EKG 12 Lead    Collection Time: 10/10/22  3:53 PM   Result Value Ref Range    Ventricular Rate 68 BPM    Atrial Rate 68 BPM    P-R Interval 110 ms    QRS Duration 100 ms    Q-T Interval 442 ms    QTc Calculation (Bazett) 469 ms    P Axis 52 degrees    R Axis 53 degrees    T Axis 57 degrees   COVID-19, Rapid    Collection Time: 10/10/22  6:19 PM    Specimen: Nasopharyngeal Swab   Result Value Ref Range    SARS-CoV-2, NAAT Not Detected Not Detected   Comprehensive Metabolic Panel w/ Reflex to MG    Collection Time: 10/11/22  4:42 AM   Result Value Ref Range    Sodium 137 132 - 146 mmol/L    Potassium reflex Magnesium 3.4 (L) 3.5 - 5.0 mmol/L    Chloride 105 98 - 107 mmol/L    CO2 18 (L) 22 - 29 mmol/L    Anion Gap 14 7 - 16 mmol/L    Glucose 118 (H) 74 - 99 mg/dL    BUN 10 6 - 20 mg/dL    Creatinine 0.5 0.5 - 1.0 mg/dL    GFR Non-African American >60 >=60 mL/min/1.73    GFR African American >60     Calcium 8.8 8.6 - 10.2 mg/dL    Total Protein 6.8 6.4 - 8.3 g/dL    Albumin 4.3 3.5 - 5.2 g/dL    Total Bilirubin 0.6 0.0 - 1.2 mg/dL    Alkaline Phosphatase 56 35 - 104 U/L    ALT 16 0 - 32 U/L    AST 18 0 - 31 U/L   Magnesium    Collection Time: 10/11/22  4:42 AM   Result Value Ref Range    Magnesium 2.0 1.6 - 2.6 mg/dL   CBC with Auto Differential    Collection Time: 10/11/22  5:25 AM   Result Value Ref Range    WBC 16.3 (H) 4.5 - 11.5 E9/L    RBC 4.01 3.50 - 5.50 E12/L    Hemoglobin 12.4 11.5 - 15.5 g/dL    Hematocrit 36.3 34.0 - 48.0 %    MCV 90.5 80.0 - 99.9 fL    MCH 30.9 26.0 - 35.0 pg    MCHC 34.2 32.0 - 34.5 %    RDW 13.9 11.5 - 15.0 fL Platelets 294 394 - 253 E9/L    MPV 9.3 7.0 - 12.0 fL    Neutrophils % 72.7 43.0 - 80.0 %    Immature Granulocytes % 0.4 0.0 - 5.0 %    Lymphocytes % 19.9 (L) 20.0 - 42.0 %    Monocytes % 6.7 2.0 - 12.0 %    Eosinophils % 0.1 0.0 - 6.0 %    Basophils % 0.2 0.0 - 2.0 %    Neutrophils Absolute 11.87 (H) 1.80 - 7.30 E9/L    Immature Granulocytes # 0.06 E9/L    Lymphocytes Absolute 3.25 1.50 - 4.00 E9/L    Monocytes Absolute 1.09 (H) 0.10 - 0.95 E9/L    Eosinophils Absolute 0.02 (L) 0.05 - 0.50 E9/L    Basophils Absolute 0.03 0.00 - 0.20 E9/L   ]     Current Facility-Administered Medications: lisinopril (PRINIVIL;ZESTRIL) tablet 5 mg, 5 mg, Oral, Daily  sodium chloride flush 0.9 % injection 10 mL, 10 mL, IntraVENous, 2 times per day  sodium chloride flush 0.9 % injection 10 mL, 10 mL, IntraVENous, PRN  0.9 % sodium chloride infusion, , IntraVENous, PRN  enoxaparin (LOVENOX) injection 40 mg, 40 mg, SubCUTAneous, Daily  promethazine (PHENERGAN) tablet 12.5 mg, 12.5 mg, Oral, Q6H PRN **OR** ondansetron (ZOFRAN) injection 4 mg, 4 mg, IntraVENous, Q6H PRN  polyethylene glycol (GLYCOLAX) packet 17 g, 17 g, Oral, Daily PRN  acetaminophen (TYLENOL) tablet 650 mg, 650 mg, Oral, Q6H PRN **OR** acetaminophen (TYLENOL) suppository 650 mg, 650 mg, Rectal, Q6H PRN  0.9 % sodium chloride infusion, , IntraVENous, Continuous  cefTRIAXone (ROCEPHIN) 1,000 mg in sterile water 10 mL IV syringe, 1,000 mg, IntraVENous, Q24H  trimethobenzamide (TIGAN) injection 200 mg, 200 mg, IntraMUSCular, Q6H PRN  droperidol (INAPSINE) injection 2.5 mg, 2.5 mg, IntraVENous, Q6H PRN  diphenhydrAMINE (BENADRYL) tablet 25 mg, 25 mg, Oral, Once   Hospital Problems             Last Modified POA    * (Principal) Pyelonephritis 10/10/2022 Yes                   Electronically signed by Wilson Polo MD on 10/11/2022 at 7:44 AM

## 2022-10-11 NOTE — CARE COORDINATION
10/11/2022 social work transition of care planning  Pt is from home with her children and had been independent. Pt pcp is Dr. Julienne Chahal and pharmacy is [facility]. Explained sw role in transition of care planning. Pt plan is home, pt will have a ride at discharge.   Electronically signed by RIAZ Hernandez on 10/11/2022 at 2:25 PM

## 2022-10-12 VITALS
BODY MASS INDEX: 26.8 KG/M2 | TEMPERATURE: 98.2 F | HEIGHT: 64 IN | RESPIRATION RATE: 18 BRPM | HEART RATE: 86 BPM | WEIGHT: 157 LBS | DIASTOLIC BLOOD PRESSURE: 109 MMHG | OXYGEN SATURATION: 100 % | SYSTOLIC BLOOD PRESSURE: 149 MMHG

## 2022-10-12 PROBLEM — F11.10 OPIATE ABUSE, CONTINUOUS (HCC): Status: ACTIVE | Noted: 2022-10-12

## 2022-10-12 PROBLEM — F15.10 AMPHETAMINE ABUSE (HCC): Status: ACTIVE | Noted: 2022-10-12

## 2022-10-12 LAB
ALBUMIN SERPL-MCNC: 4.3 G/DL (ref 3.5–5.2)
ALP BLD-CCNC: 53 U/L (ref 35–104)
ALT SERPL-CCNC: 18 U/L (ref 0–32)
ANION GAP SERPL CALCULATED.3IONS-SCNC: 12 MMOL/L (ref 7–16)
AST SERPL-CCNC: 17 U/L (ref 0–31)
BASOPHILS ABSOLUTE: 0.07 E9/L (ref 0–0.2)
BASOPHILS RELATIVE PERCENT: 0.8 % (ref 0–2)
BILIRUB SERPL-MCNC: 0.6 MG/DL (ref 0–1.2)
BUN BLDV-MCNC: 5 MG/DL (ref 6–20)
CALCIUM SERPL-MCNC: 8.8 MG/DL (ref 8.6–10.2)
CHLORIDE BLD-SCNC: 106 MMOL/L (ref 98–107)
CO2: 25 MMOL/L (ref 22–29)
CREAT SERPL-MCNC: 0.6 MG/DL (ref 0.5–1)
EOSINOPHILS ABSOLUTE: 0.24 E9/L (ref 0.05–0.5)
EOSINOPHILS RELATIVE PERCENT: 2.8 % (ref 0–6)
FOLATE: 18.2 NG/ML (ref 4.8–24.2)
GFR AFRICAN AMERICAN: >60
GFR NON-AFRICAN AMERICAN: >60 ML/MIN/1.73
GLUCOSE BLD-MCNC: 88 MG/DL (ref 74–99)
HCT VFR BLD CALC: 38.9 % (ref 34–48)
HEMOGLOBIN: 12.9 G/DL (ref 11.5–15.5)
IMMATURE GRANULOCYTES #: 0.03 E9/L
IMMATURE GRANULOCYTES %: 0.3 % (ref 0–5)
LYMPHOCYTES ABSOLUTE: 3.27 E9/L (ref 1.5–4)
LYMPHOCYTES RELATIVE PERCENT: 38.1 % (ref 20–42)
MAGNESIUM: 1.9 MG/DL (ref 1.6–2.6)
MCH RBC QN AUTO: 30.7 PG (ref 26–35)
MCHC RBC AUTO-ENTMCNC: 33.2 % (ref 32–34.5)
MCV RBC AUTO: 92.6 FL (ref 80–99.9)
MONOCYTES ABSOLUTE: 0.44 E9/L (ref 0.1–0.95)
MONOCYTES RELATIVE PERCENT: 5.1 % (ref 2–12)
NEUTROPHILS ABSOLUTE: 4.54 E9/L (ref 1.8–7.3)
NEUTROPHILS RELATIVE PERCENT: 52.9 % (ref 43–80)
PDW BLD-RTO: 13.5 FL (ref 11.5–15)
PLATELET # BLD: 355 E9/L (ref 130–450)
PMV BLD AUTO: 8.9 FL (ref 7–12)
POTASSIUM SERPL-SCNC: 3.5 MMOL/L (ref 3.5–5)
RBC # BLD: 4.2 E12/L (ref 3.5–5.5)
SODIUM BLD-SCNC: 143 MMOL/L (ref 132–146)
TOTAL PROTEIN: 6.7 G/DL (ref 6.4–8.3)
VITAMIN B-12: 438 PG/ML (ref 211–946)
VITAMIN D 25-HYDROXY: 34 NG/ML (ref 30–100)
WBC # BLD: 8.6 E9/L (ref 4.5–11.5)

## 2022-10-12 PROCEDURE — 82306 VITAMIN D 25 HYDROXY: CPT

## 2022-10-12 PROCEDURE — 6370000000 HC RX 637 (ALT 250 FOR IP): Performed by: FAMILY MEDICINE

## 2022-10-12 PROCEDURE — 83735 ASSAY OF MAGNESIUM: CPT

## 2022-10-12 PROCEDURE — 82607 VITAMIN B-12: CPT

## 2022-10-12 PROCEDURE — 2580000003 HC RX 258: Performed by: INTERNAL MEDICINE

## 2022-10-12 PROCEDURE — 6360000002 HC RX W HCPCS: Performed by: INTERNAL MEDICINE

## 2022-10-12 PROCEDURE — 80053 COMPREHEN METABOLIC PANEL: CPT

## 2022-10-12 PROCEDURE — 2580000003 HC RX 258: Performed by: FAMILY MEDICINE

## 2022-10-12 PROCEDURE — 6370000000 HC RX 637 (ALT 250 FOR IP): Performed by: INTERNAL MEDICINE

## 2022-10-12 PROCEDURE — 85025 COMPLETE CBC W/AUTO DIFF WBC: CPT

## 2022-10-12 PROCEDURE — 82746 ASSAY OF FOLIC ACID SERUM: CPT

## 2022-10-12 PROCEDURE — 36415 COLL VENOUS BLD VENIPUNCTURE: CPT

## 2022-10-12 RX ORDER — ACETAMINOPHEN 500 MG
1000 TABLET ORAL EVERY 6 HOURS PRN
Status: DISCONTINUED | OUTPATIENT
Start: 2022-10-12 | End: 2022-10-12 | Stop reason: HOSPADM

## 2022-10-12 RX ORDER — CEFDINIR 300 MG/1
300 CAPSULE ORAL 2 TIMES DAILY
Qty: 12 CAPSULE | Refills: 0 | Status: SHIPPED | OUTPATIENT
Start: 2022-10-13 | End: 2022-10-19

## 2022-10-12 RX ORDER — CLONIDINE HYDROCHLORIDE 0.1 MG/1
0.1 TABLET ORAL EVERY 4 HOURS PRN
Status: DISCONTINUED | OUTPATIENT
Start: 2022-10-12 | End: 2022-10-12 | Stop reason: HOSPADM

## 2022-10-12 RX ORDER — KETOROLAC TROMETHAMINE 30 MG/ML
30 INJECTION, SOLUTION INTRAMUSCULAR; INTRAVENOUS EVERY 6 HOURS PRN
Status: DISCONTINUED | OUTPATIENT
Start: 2022-10-12 | End: 2022-10-12 | Stop reason: HOSPADM

## 2022-10-12 RX ORDER — MECOBALAMIN 5000 MCG
5 TABLET,DISINTEGRATING ORAL NIGHTLY PRN
Status: DISCONTINUED | OUTPATIENT
Start: 2022-10-12 | End: 2022-10-12 | Stop reason: HOSPADM

## 2022-10-12 RX ORDER — MECOBALAMIN 5000 MCG
5 TABLET,DISINTEGRATING ORAL NIGHTLY
Status: DISCONTINUED | OUTPATIENT
Start: 2022-10-12 | End: 2022-10-12 | Stop reason: HOSPADM

## 2022-10-12 RX ORDER — OXYCODONE HYDROCHLORIDE 5 MG/1
5 TABLET ORAL EVERY 4 HOURS PRN
Status: DISCONTINUED | OUTPATIENT
Start: 2022-10-12 | End: 2022-10-12

## 2022-10-12 RX ORDER — LACTOBACILLUS RHAMNOSUS GG 10B CELL
1 CAPSULE ORAL 2 TIMES DAILY
Qty: 28 CAPSULE | Refills: 0 | Status: SHIPPED | OUTPATIENT
Start: 2022-10-12 | End: 2022-10-26

## 2022-10-12 RX ORDER — LACTOBACILLUS RHAMNOSUS GG 10B CELL
1 CAPSULE ORAL 2 TIMES DAILY
Status: DISCONTINUED | OUTPATIENT
Start: 2022-10-12 | End: 2022-10-12 | Stop reason: HOSPADM

## 2022-10-12 RX ADMIN — HYDROCODONE BITARTRATE AND ACETAMINOPHEN 1 TABLET: 5; 325 TABLET ORAL at 00:35

## 2022-10-12 RX ADMIN — Medication 10 ML: at 09:08

## 2022-10-12 RX ADMIN — HYDROCODONE BITARTRATE AND ACETAMINOPHEN 1 TABLET: 5; 325 TABLET ORAL at 06:44

## 2022-10-12 RX ADMIN — KETOROLAC TROMETHAMINE 30 MG: 30 INJECTION, SOLUTION INTRAMUSCULAR at 12:01

## 2022-10-12 RX ADMIN — SODIUM CHLORIDE, PRESERVATIVE FREE 10 ML: 5 INJECTION INTRAVENOUS at 12:02

## 2022-10-12 RX ADMIN — Medication 1 CAPSULE: at 10:53

## 2022-10-12 RX ADMIN — LISINOPRIL 5 MG: 10 TABLET ORAL at 09:07

## 2022-10-12 RX ADMIN — CEFTRIAXONE 1000 MG: 1 INJECTION, POWDER, FOR SOLUTION INTRAMUSCULAR; INTRAVENOUS at 14:20

## 2022-10-12 ASSESSMENT — PAIN DESCRIPTION - FREQUENCY
FREQUENCY: CONTINUOUS
FREQUENCY: CONTINUOUS

## 2022-10-12 ASSESSMENT — PAIN SCALES - GENERAL
PAINLEVEL_OUTOF10: 9
PAINLEVEL_OUTOF10: 0
PAINLEVEL_OUTOF10: 7
PAINLEVEL_OUTOF10: 0
PAINLEVEL_OUTOF10: 8
PAINLEVEL_OUTOF10: 8

## 2022-10-12 ASSESSMENT — PAIN DESCRIPTION - PAIN TYPE
TYPE: ACUTE PAIN
TYPE: ACUTE PAIN

## 2022-10-12 ASSESSMENT — PAIN DESCRIPTION - ORIENTATION
ORIENTATION: RIGHT;LOWER
ORIENTATION: RIGHT;LOWER
ORIENTATION: RIGHT;LEFT;LOWER
ORIENTATION: RIGHT;LOWER

## 2022-10-12 ASSESSMENT — PAIN DESCRIPTION - LOCATION
LOCATION: BACK;FLANK
LOCATION: BACK
LOCATION: ABDOMEN;FLANK
LOCATION: ABDOMEN;FLANK

## 2022-10-12 ASSESSMENT — PAIN DESCRIPTION - ONSET
ONSET: ON-GOING
ONSET: ON-GOING

## 2022-10-12 ASSESSMENT — PAIN DESCRIPTION - DESCRIPTORS
DESCRIPTORS: ACHING;DISCOMFORT
DESCRIPTORS: ACHING;DISCOMFORT;SORE
DESCRIPTORS: ACHING
DESCRIPTORS: ACHING;CRAMPING;SHARP

## 2022-10-12 NOTE — PROGRESS NOTES
CLINICAL PHARMACY NOTE: MEDS TO BEDS    Total # of Prescriptions Filled: 2   The following medications were delivered to the patient:  Cefdinir 300  Culturelle    Additional Documentation:   RX

## 2022-10-12 NOTE — DISCHARGE SUMMARY
Patient: Alexsander Almanza Sex: female DOA: 10/10/2022   YOB: 1985 Age: 40 y.o. LOS:  LOS: 2 days    Admit Date: 10/10/2022   Discharge Date: 10/12/22   Primary Care Physician: Krissy Mazariegos DO   Discharge to: home with support  Readmission risk: Caity Wyman 43 admission:  Per HPI:\" Ms. Alexsander Almanza, a 40y.o. year old female  who  has a past medical history of Acute Crohn's disease ([facility] [address] Anxiety attack, Asthma, Crohn's colitis ([facility] [address] Depression, Herpes simplex virus (HSV) infection, History of blood transfusion, Hypertension, Marijuana abuse, Neurologic disorder, Postpartum depression,  delivery,  labor, Rh sensitized, Seizure ([facility] [address] STD (sexually transmitted disease), and Tobacco abuse. Patient is a 72-year-old female with past medical history of Crohn's disease, preeclampsia delivery 6weeks ago at the [address] [place] [address], and history of kidney stones presenting with abdominal pain for 2 days and vomiting today. Patient states that she was at rest when her abdominal pain began. Patient describes a crampy lower abdominal pain that is across the lower abdomen. Nonradiating, not improved with over-the-counter medication, exacerbated by vomiting, not exacerbated or relieved by movement or position. Patient describes sudden onset of intractable vomiting beginning earlier today which has not been helped by her prescribed Zofran. Patient's vomitus is nonbloody, nonbilious. Patient is also been describing diarrhea for 2 days which is nonbloody, not black or tarry. Patient is a former smoker having quit in  with a 10-pack-year history, denies alcohol use, and endorses marijuana use 1-2 times a month. She denies other drug use.   Patient denies headache, fevers, sore throat, cough, chest pain, shortness of breath, hematuria, dysuria, increasing or decreasing urinary frequency, vaginal bleeding/discharge, blood in the stool, constipation, leg pain, leg swelling, recent travel, recent illness, recent surgery, or sick contacts. Vital signs are within normal limits and stable. Patient is hypertensive with pressure 161/95. Patient is afebrile. Laboratory studies demonstrate white count of 17.9. Urinalysis positive for urinary tract infection. Patient was given ceftriaxone for presumed UTI/pyelonephritis. Medicine consulted for admission.  [facility] Course and discharge assessment with plan:     UTI  Urine cx with 25K colonies  Discharge on cefdinir  Add probiotic     Polysubstance abuse incld THC, opiate and amphetamines     HTN  Cont home meds      Discharge plan of care was discussed with: pt, RN, CM    Discharge Diagnoses:   Patient Active Problem List   Diagnosis    Hyperemesis gravidarum    Cannabis dependence ([address]    Asthma    Tobacco abuse    Symptomatic cholelithiasis    Crohn disease ([address]    Crohn's colitis, without complications ([address]    Intractable vomiting with nausea    Emesis, persistent    Intractable nausea and vomiting    Abdominal pain    Intractable vomiting    Abdominal pain, right lower quadrant    Diarrhea    Marijuana abuse, continuous    AMA (advanced maternal age) multigravida 35+, first trimester    Hypokalemia    Anxiety    History of IUGR (intrauterine growth retardation) and stillbirth, currently pregnant, first trimester    Depression    Hyperemesis    Primary hypertension    Hyperemesis gravidarum with metabolic disturbance, antepartum    Starvation ketoacidosis    Elevated blood pressure affecting pregnancy in second trimester, antepartum    Low vitamin D level    History of prior pregnancy with IUGR     Positive urine drug screen    Marijuana use    Crohn's colitis, other complication ([address]    Pyelonephritis    Amphetamine abuse ([address]    Opiate abuse, continuous ([address]       Discharge Medications:   Current Discharge Medication List             Details   cefdinir (OMNICEF) 300 MG capsule Take 1 capsule by mouth 2 times daily for 6 days  Qty: 12 capsule, Refills: 0      lactobacillus (CULTURELLE) CAPS capsule Take 1 capsule by mouth 2 times daily for 14 days  Qty: 28 capsule, Refills: 0                Details   lisinopril (PRINIVIL;ZESTRIL) 5 MG tablet Take 5 mg by mouth daily              Follow-up: PCP     Discharge Condition: stable    Discharge instruction:   Patient instructed to return to the emergency department if: Chest pain, shortness of breath, altered mental status, fever, chills, nausea, vomiting, abdominal pain or any other concerns. Activity: progressive as tolerated. Diet: regular diet     Wound Care: none needed     Consults: none       Discharge Physical Exam:   BP (!) 149/109   Pulse 86   Temp 98.2 °F (36.8 °C) (Oral)   Resp 18   Ht 5' 4\" (1.626 m)   Wt 157 lb (71.2 kg)   LMP 01/24/2022   SpO2 100%   Breastfeeding Unknown   BMI 26.95 kg/m²     General appearance: Alert, cooperative, no distress, appears stated age   Head: Normocephalic, without obvious abnormality, atraumatic   Neck: Supple, no lymphadenopathy or thyromegaly, trachea midline   Lungs: Clear to auscultation bilaterally, no wheezing or crackles   Heart: Regular rate and rhythm, S1, S2 normal   Abdomen: Soft, non-tender and not-distended.  Bowel sounds normal.   Extremities: Extremities normal, atraumatic, no cyanosis or edema   Skin: Skin color, texture, turgor normal. No rashes or lesions   Neurologic: AAOx3 CN I-XII intact, normal muscle tone and power, normal sensation       Yusuf Bloom MD   10/12/22 12:48 PM    Hospital Medicine   Time Spent: 50 min

## 2022-10-12 NOTE — PLAN OF CARE
Problem: Discharge Planning  Goal: Discharge to home or other facility with appropriate resources  10/12/2022 1032 by Salty Larkin RN  Outcome: Progressing  10/12/2022 1029 by Salty Larkin RN  Outcome: Progressing  Flowsheets (Taken 10/12/2022 0906)  Discharge to home or other facility with appropriate resources: Identify barriers to discharge with patient and caregiver

## 2022-10-12 NOTE — PLAN OF CARE
Problem: Discharge Planning  Goal: Discharge to home or other facility with appropriate resources  10/12/2022 1544 by Malou Rader RN  Outcome: Completed  10/12/2022 1032 by Malou Rader RN  Outcome: Progressing  10/12/2022 1029 by Malou Rader RN  Outcome: Progressing  Flowsheets (Taken 10/12/2022 0906)  Discharge to home or other facility with appropriate resources: Identify barriers to discharge with patient and caregiver

## 2022-10-12 NOTE — CARE COORDINATION
10/12/2022 social work transition of care planning  Pt plan remains for home,pt will call for a ride at discharge.   Electronically signed by RIAZ Ibarra on 10/12/2022 at 7:59 AM

## 2022-10-12 NOTE — PLAN OF CARE
Problem: Discharge Planning  Goal: Discharge to home or other facility with appropriate resources  Outcome: Progressing  Flowsheets (Taken 10/12/2022 0906)  Discharge to home or other facility with appropriate resources: Identify barriers to discharge with patient and caregiver

## 2022-10-13 LAB
ORGANISM: ABNORMAL
URINE CULTURE, ROUTINE: ABNORMAL

## 2022-10-15 LAB
BLOOD CULTURE, ROUTINE: NORMAL
CULTURE, BLOOD 2: NORMAL

## 2022-11-07 ENCOUNTER — APPOINTMENT (OUTPATIENT)
Dept: CT IMAGING | Age: 37
End: 2022-11-07
Payer: COMMERCIAL

## 2022-11-07 ENCOUNTER — HOSPITAL ENCOUNTER (EMERGENCY)
Age: 37
Discharge: HOME | End: 2022-11-07
Attending: EMERGENCY MEDICINE
Payer: COMMERCIAL

## 2022-11-07 VITALS
TEMPERATURE: 98.5 F | OXYGEN SATURATION: 98 % | BODY MASS INDEX: 26.8 KG/M2 | WEIGHT: 157 LBS | RESPIRATION RATE: 16 BRPM | SYSTOLIC BLOOD PRESSURE: 146 MMHG | DIASTOLIC BLOOD PRESSURE: 81 MMHG | HEART RATE: 82 BPM | HEIGHT: 64 IN

## 2022-11-07 DIAGNOSIS — R10.13 ABDOMINAL PAIN, EPIGASTRIC: Primary | ICD-10-CM

## 2022-11-07 DIAGNOSIS — R11.10 HYPEREMESIS: ICD-10-CM

## 2022-11-07 LAB
ALBUMIN SERPL-MCNC: 5.2 G/DL (ref 3.5–5.2)
ALP BLD-CCNC: 62 U/L (ref 35–104)
ALT SERPL-CCNC: 14 U/L (ref 0–32)
ANION GAP SERPL CALCULATED.3IONS-SCNC: 19 MMOL/L (ref 7–16)
AST SERPL-CCNC: 18 U/L (ref 0–31)
BACTERIA: ABNORMAL /HPF
BASOPHILS ABSOLUTE: 0.1 E9/L (ref 0–0.2)
BASOPHILS RELATIVE PERCENT: 0.5 % (ref 0–2)
BILIRUB SERPL-MCNC: 0.6 MG/DL (ref 0–1.2)
BILIRUBIN URINE: NEGATIVE
BLOOD, URINE: ABNORMAL
BUN BLDV-MCNC: 16 MG/DL (ref 6–20)
CALCIUM SERPL-MCNC: 10.3 MG/DL (ref 8.6–10.2)
CHLORIDE BLD-SCNC: 100 MMOL/L (ref 98–107)
CLARITY: CLEAR
CO2: 19 MMOL/L (ref 22–29)
COLOR: YELLOW
CREAT SERPL-MCNC: 0.8 MG/DL (ref 0.5–1)
EOSINOPHILS ABSOLUTE: 0.07 E9/L (ref 0.05–0.5)
EOSINOPHILS RELATIVE PERCENT: 0.3 % (ref 0–6)
EPITHELIAL CELLS, UA: ABNORMAL /HPF
GFR SERPL CREATININE-BSD FRML MDRD: >60 ML/MIN/1.73
GLUCOSE BLD-MCNC: 169 MG/DL (ref 74–99)
GLUCOSE URINE: NEGATIVE MG/DL
HCG QUALITATIVE: NEGATIVE
HCT VFR BLD CALC: 45.8 % (ref 34–48)
HEMOGLOBIN: 15.4 G/DL (ref 11.5–15.5)
IMMATURE GRANULOCYTES #: 0.11 E9/L
IMMATURE GRANULOCYTES %: 0.5 % (ref 0–5)
KETONES, URINE: >=80 MG/DL
LACTIC ACID: 2.1 MMOL/L (ref 0.5–2.2)
LEUKOCYTE ESTERASE, URINE: NEGATIVE
LIPASE: 16 U/L (ref 13–60)
LYMPHOCYTES ABSOLUTE: 1.68 E9/L (ref 1.5–4)
LYMPHOCYTES RELATIVE PERCENT: 8 % (ref 20–42)
MCH RBC QN AUTO: 30.3 PG (ref 26–35)
MCHC RBC AUTO-ENTMCNC: 33.6 % (ref 32–34.5)
MCV RBC AUTO: 90.2 FL (ref 80–99.9)
MONOCYTES ABSOLUTE: 0.52 E9/L (ref 0.1–0.95)
MONOCYTES RELATIVE PERCENT: 2.5 % (ref 2–12)
NEUTROPHILS ABSOLUTE: 18.54 E9/L (ref 1.8–7.3)
NEUTROPHILS RELATIVE PERCENT: 88.2 % (ref 43–80)
NITRITE, URINE: NEGATIVE
PDW BLD-RTO: 14.3 FL (ref 11.5–15)
PH UA: 5.5 (ref 5–9)
PLATELET # BLD: 447 E9/L (ref 130–450)
PMV BLD AUTO: 9.1 FL (ref 7–12)
POTASSIUM SERPL-SCNC: 3.8 MMOL/L (ref 3.5–5)
PROTEIN UA: ABNORMAL MG/DL
RBC # BLD: 5.08 E12/L (ref 3.5–5.5)
RBC UA: ABNORMAL /HPF (ref 0–2)
SODIUM BLD-SCNC: 138 MMOL/L (ref 132–146)
SPECIFIC GRAVITY UA: 1.01 (ref 1–1.03)
TOTAL PROTEIN: 8.5 G/DL (ref 6.4–8.3)
UROBILINOGEN, URINE: 0.2 E.U./DL
WBC # BLD: 21 E9/L (ref 4.5–11.5)
WBC UA: ABNORMAL /HPF (ref 0–5)

## 2022-11-07 PROCEDURE — 6360000002 HC RX W HCPCS: Performed by: STUDENT IN AN ORGANIZED HEALTH CARE EDUCATION/TRAINING PROGRAM

## 2022-11-07 PROCEDURE — 85025 COMPLETE CBC W/AUTO DIFF WBC: CPT

## 2022-11-07 PROCEDURE — 83605 ASSAY OF LACTIC ACID: CPT

## 2022-11-07 PROCEDURE — 96375 TX/PRO/DX INJ NEW DRUG ADDON: CPT

## 2022-11-07 PROCEDURE — 2580000003 HC RX 258: Performed by: STUDENT IN AN ORGANIZED HEALTH CARE EDUCATION/TRAINING PROGRAM

## 2022-11-07 PROCEDURE — 83690 ASSAY OF LIPASE: CPT

## 2022-11-07 PROCEDURE — 84703 CHORIONIC GONADOTROPIN ASSAY: CPT

## 2022-11-07 PROCEDURE — 74177 CT ABD & PELVIS W/CONTRAST: CPT

## 2022-11-07 PROCEDURE — 80053 COMPREHEN METABOLIC PANEL: CPT

## 2022-11-07 PROCEDURE — 96374 THER/PROPH/DIAG INJ IV PUSH: CPT

## 2022-11-07 PROCEDURE — 81001 URINALYSIS AUTO W/SCOPE: CPT

## 2022-11-07 PROCEDURE — 6360000004 HC RX CONTRAST MEDICATION: Performed by: RADIOLOGY

## 2022-11-07 PROCEDURE — 99285 EMERGENCY DEPT VISIT HI MDM: CPT

## 2022-11-07 RX ORDER — ONDANSETRON 4 MG/1
4 TABLET, ORALLY DISINTEGRATING ORAL 3 TIMES DAILY PRN
Qty: 21 TABLET | Refills: 0 | Status: SHIPPED | OUTPATIENT
Start: 2022-11-07

## 2022-11-07 RX ORDER — DIPHENHYDRAMINE HYDROCHLORIDE 50 MG/ML
25 INJECTION INTRAMUSCULAR; INTRAVENOUS ONCE
Status: COMPLETED | OUTPATIENT
Start: 2022-11-07 | End: 2022-11-07

## 2022-11-07 RX ORDER — FENTANYL CITRATE 50 UG/ML
50 INJECTION, SOLUTION INTRAMUSCULAR; INTRAVENOUS ONCE
Status: COMPLETED | OUTPATIENT
Start: 2022-11-07 | End: 2022-11-07

## 2022-11-07 RX ORDER — 0.9 % SODIUM CHLORIDE 0.9 %
1000 INTRAVENOUS SOLUTION INTRAVENOUS ONCE
Status: COMPLETED | OUTPATIENT
Start: 2022-11-07 | End: 2022-11-07

## 2022-11-07 RX ORDER — DROPERIDOL 2.5 MG/ML
1.25 INJECTION, SOLUTION INTRAMUSCULAR; INTRAVENOUS EVERY 6 HOURS PRN
Status: DISCONTINUED | OUTPATIENT
Start: 2022-11-07 | End: 2022-11-08 | Stop reason: HOSPADM

## 2022-11-07 RX ADMIN — DIPHENHYDRAMINE HYDROCHLORIDE 25 MG: 50 INJECTION, SOLUTION INTRAMUSCULAR; INTRAVENOUS at 17:12

## 2022-11-07 RX ADMIN — SODIUM CHLORIDE 1000 ML: 9 INJECTION, SOLUTION INTRAVENOUS at 17:11

## 2022-11-07 RX ADMIN — FENTANYL CITRATE 50 MCG: 0.05 INJECTION, SOLUTION INTRAMUSCULAR; INTRAVENOUS at 17:39

## 2022-11-07 RX ADMIN — IOPAMIDOL 75 ML: 755 INJECTION, SOLUTION INTRAVENOUS at 19:10

## 2022-11-07 RX ADMIN — DROPERIDOL 1.25 MG: 2.5 INJECTION, SOLUTION INTRAMUSCULAR; INTRAVENOUS at 17:12

## 2022-11-07 ASSESSMENT — ENCOUNTER SYMPTOMS
CHEST TIGHTNESS: 0
SHORTNESS OF BREATH: 0
VOMITING: 1
DIARRHEA: 1
NAUSEA: 1
ABDOMINAL PAIN: 1
BACK PAIN: 0
SORE THROAT: 0
COUGH: 0
ABDOMINAL DISTENTION: 0

## 2022-11-07 ASSESSMENT — LIFESTYLE VARIABLES: HOW OFTEN DO YOU HAVE A DRINK CONTAINING ALCOHOL: NEVER

## 2022-11-07 NOTE — ED PROVIDER NOTES
HPI     Patient is a 40 y.o. female presents with a chief complaint of right sided abdominal pain  This has been occurring for 2 days. Patient states that it gets better with nothing. Patient states that it gets worse with time. Patient states that it is moderate in severity. Patient states it was acute in onset. Right sided abdomin patient stated that she was recently seen in the ER and was diagnosed with pyelonephritis. Patient stated that she has been having right-sided flank pain as well as nausea vomiting over the past 2 days. Denies any chest pain or shortness of breath. Denies any vaginal discharge. Denies any fevers or chills. Patient does admit to smoking marijuana daily. Review of Systems   Constitutional:  Negative for activity change, appetite change, chills, fatigue and fever. HENT:  Negative for congestion, drooling and sore throat. Respiratory:  Negative for cough, chest tightness and shortness of breath. Cardiovascular:  Negative for chest pain and palpitations. Gastrointestinal:  Positive for abdominal pain, diarrhea, nausea and vomiting. Negative for abdominal distention. Genitourinary:  Positive for flank pain. Negative for decreased urine volume, difficulty urinating, enuresis, frequency and hematuria. Musculoskeletal:  Negative for arthralgias, back pain and neck stiffness. Skin:  Negative for rash and wound. Neurological:  Negative for dizziness, facial asymmetry, light-headedness and headaches. Psychiatric/Behavioral:  Negative for agitation, confusion and decreased concentration. Physical Exam  Vitals and nursing note reviewed. Constitutional:       Appearance: She is well-developed. HENT:      Head: Normocephalic and atraumatic. Eyes:      Conjunctiva/sclera: Conjunctivae normal.   Cardiovascular:      Rate and Rhythm: Normal rate and regular rhythm. Heart sounds: Normal heart sounds. No murmur heard.   Pulmonary:      Effort: Pulmonary effort is normal. No respiratory distress. Breath sounds: Normal breath sounds. No wheezing or rales. Abdominal:      General: Bowel sounds are normal.      Palpations: Abdomen is soft. Tenderness: There is abdominal tenderness. There is no guarding or rebound. Comments: Diffuse abdominal tenderness    More significant on the right side of the right flank. Musculoskeletal:      Cervical back: Normal range of motion and neck supple. Skin:     General: Skin is warm and dry. Neurological:      Mental Status: She is alert and oriented to person, place, and time. Cranial Nerves: No cranial nerve deficit. Coordination: Coordination normal.        Procedures     MDM     Amount and/or Complexity of Data Reviewed  Decide to obtain previous medical records or to obtain history from someone other than the patient: yes             Patient is a 40 y.o. female presenting with abdominal pain. Patient appeared dehydrated. Patient was given droperidol. Patient does smoke marijuana daily. Patient had significant improvement of symptoms. No longer vomiting. Patient also had a CT of the abdomen. CT of the abdomen was benign. Patient had pain improving. Patient had a leukocytosis likely a stress response. Patient was afebrile. Patient was not tachycardic. Patient had a lactic that was normal.  Urine analysis was benign. No concerns for acute cystitis versus pyelonephritis. Patient will be discharged home. Discussed this with patient is agreeable to follow-up as an outpatient. Patient was given return precautions. Labs were interpreted by me. Patient will follow up with their primary care provider. Patient is agreeable to this plan. Patient has remained stable throughout their stay in the ED. Patient was seen and evaluated by myself and my attending Christina Ash DO. Assessment and Plan discussed with attending provider, please see attestation for final plan of care.   This note was done using dictation software and there may be some grammatical errors associated with this. Kulwinder Mo MD           --------------------------------------------- PAST HISTORY ---------------------------------------------  Past Medical History:  has a past medical history of Acute Crohn's disease ([facility] [address] Anxiety attack, Asthma, Crohn's colitis ([facility] [address] Depression, Herpes simplex virus (HSV) infection, History of blood transfusion, Hypertension, Marijuana abuse, Neurologic disorder, Postpartum depression,  delivery,  labor, Rh sensitized, Seizure ([facility] [address] STD (sexually transmitted disease), and Tobacco abuse. Past Surgical History:  has a past surgical history that includes Dilation and curettage of uterus (); Colonoscopy; Endoscopy, colon, diagnostic; Cholecystectomy (2018); hernia repair; hernia repair; Upper gastrointestinal endoscopy (N/A, 2022); Dilation & curettage; and laparoscopy. Social History:  reports that she quit smoking about 2 years ago. Her smoking use included cigarettes. She has a 10.00 pack-year smoking history. She has never used smokeless tobacco. She reports that she does not currently use alcohol. She reports that she does not currently use drugs after having used the following drugs: Marijuana Jursammie Wilcox). Frequency: 2.00 times per week. Family History: family history includes Breast Cancer in her mother; Cervical Cancer in her mother; Depression in her father and mother; Heart Disease in her father; Hypertension in her father; Leukemia in her mother; Schizophrenia in her father. The patients home medications have been reviewed. Allergies: Patient has no known allergies.     -------------------------------------------------- RESULTS -------------------------------------------------  Labs:  Results for orders placed or performed during the hospital encounter of 22   HCG Qualitative, Serum   Result Value Ref Range    hCG Qual NEGATIVE NEGATIVE   CBC with Auto Differential   Result Value Ref Range    WBC 21.0 (H) 4.5 - 11.5 E9/L    RBC 5.08 3.50 - 5.50 E12/L    Hemoglobin 15.4 11.5 - 15.5 g/dL    Hematocrit 45.8 34.0 - 48.0 %    MCV 90.2 80.0 - 99.9 fL    MCH 30.3 26.0 - 35.0 pg    MCHC 33.6 32.0 - 34.5 %    RDW 14.3 11.5 - 15.0 fL    Platelets 491 792 - 190 E9/L    MPV 9.1 7.0 - 12.0 fL    Neutrophils % 88.2 (H) 43.0 - 80.0 %    Immature Granulocytes % 0.5 0.0 - 5.0 %    Lymphocytes % 8.0 (L) 20.0 - 42.0 %    Monocytes % 2.5 2.0 - 12.0 %    Eosinophils % 0.3 0.0 - 6.0 %    Basophils % 0.5 0.0 - 2.0 %    Neutrophils Absolute 18.54 (H) 1.80 - 7.30 E9/L    Immature Granulocytes # 0.11 E9/L    Lymphocytes Absolute 1.68 1.50 - 4.00 E9/L    Monocytes Absolute 0.52 0.10 - 0.95 E9/L    Eosinophils Absolute 0.07 0.05 - 0.50 E9/L    Basophils Absolute 0.10 0.00 - 0.20 E9/L   CMP   Result Value Ref Range    Sodium 138 132 - 146 mmol/L    Potassium 3.8 3.5 - 5.0 mmol/L    Chloride 100 98 - 107 mmol/L    CO2 19 (L) 22 - 29 mmol/L    Anion Gap 19 (H) 7 - 16 mmol/L    Glucose 169 (H) 74 - 99 mg/dL    BUN 16 6 - 20 mg/dL    Creatinine 0.8 0.5 - 1.0 mg/dL    Est, Glom Filt Rate >60 >=60 mL/min/1.73    Calcium 10.3 (H) 8.6 - 10.2 mg/dL    Total Protein 8.5 (H) 6.4 - 8.3 g/dL    Albumin 5.2 3.5 - 5.2 g/dL    Total Bilirubin 0.6 0.0 - 1.2 mg/dL    Alkaline Phosphatase 62 35 - 104 U/L    ALT 14 0 - 32 U/L    AST 18 0 - 31 U/L   Lactic Acid   Result Value Ref Range    Lactic Acid 2.1 0.5 - 2.2 mmol/L   Lipase   Result Value Ref Range    Lipase 16 13 - 60 U/L   Urinalysis   Result Value Ref Range    Color, UA Yellow Straw/Yellow    Clarity, UA Clear Clear    Glucose, Ur Negative Negative mg/dL    Bilirubin Urine Negative Negative    Ketones, Urine >=80 (A) Negative mg/dL    Specific Gravity, UA 1.015 1.005 - 1.030    Blood, Urine TRACE-INTACT Negative    pH, UA 5.5 5.0 - 9.0    Protein, UA TRACE Negative mg/dL    Urobilinogen, Urine 0.2 <2.0 E.U./dL    Nitrite, Urine Negative Negative    Leukocyte Esterase, Urine Negative Negative   Microscopic Urinalysis   Result Value Ref Range    WBC, UA NONE 0 - 5 /HPF    RBC, UA 0-1 0 - 2 /HPF    Epithelial Cells, UA RARE /HPF    Bacteria, UA RARE (A) None Seen /HPF       Radiology:  CT ABDOMEN PELVIS W IV CONTRAST Additional Contrast? None   Final Result   No acute abnormality.             ------------------------- NURSING NOTES AND VITALS REVIEWED ---------------------------  Date / Time Roomed:  11/7/2022  4:01 PM  ED Bed Assignment:  KINGSLEY CARBALLO/ANDRES    The nursing notes within the ED encounter and vital signs as below have been reviewed. BP (!) 146/81   Pulse 82   Temp 98.5 °F (36.9 °C) (Oral)   Resp 16   Ht 5' 4\" (1.626 m)   Wt 157 lb (71.2 kg)   LMP 01/24/2022   SpO2 98%   BMI 26.95 kg/m²   Oxygen Saturation Interpretation: Normal      ------------------------------------------ PROGRESS NOTES ------------------------------------------  12:18 AM EST  I have spoken with the patient and family if present and discussed todays results, in addition to providing specific details for the plan of care and counseling regarding the diagnosis and prognosis. Their questions are answered at this time and they are agreeable with the plan. I discussed at length with them reasons for immediate return here for re evaluation. They will followup with their primary care provider by calling their office as soon as possible.      --------------------------------- ADDITIONAL PROVIDER NOTES ---------------------------------  At this time the patient is without objective evidence of an acute process requiring hospitalization or inpatient management. They have remained hemodynamically stable throughout their entire ED visit and are stable for discharge with outpatient follow-up. The plan has been discussed in detail and they are aware of the specific conditions for emergent return, as well as the importance of follow-up.       Discharge Medication List as of 11/7/2022 10:35 PM        START taking these medications    Details   ondansetron (ZOFRAN-ODT) 4 MG disintegrating tablet Take 1 tablet by mouth 3 times daily as needed for Nausea or Vomiting, Disp-21 tablet, R-0Normal             Diagnosis:  1. Abdominal pain, epigastric    2. Hyperemesis        Disposition:  Patient's disposition: Discharge to home  Patient's condition is stable.          Aidan Harden MD  Resident  11/08/22 1655

## 2023-06-09 ENCOUNTER — HOSPITAL ENCOUNTER (EMERGENCY)
Age: 38
Discharge: HOME | End: 2023-06-10
Attending: EMERGENCY MEDICINE
Payer: COMMERCIAL

## 2023-06-09 ENCOUNTER — APPOINTMENT (OUTPATIENT)
Dept: GENERAL RADIOLOGY | Age: 38
End: 2023-06-09
Payer: COMMERCIAL

## 2023-06-09 ENCOUNTER — APPOINTMENT (OUTPATIENT)
Dept: CT IMAGING | Age: 38
End: 2023-06-09
Payer: COMMERCIAL

## 2023-06-09 VITALS
DIASTOLIC BLOOD PRESSURE: 91 MMHG | RESPIRATION RATE: 16 BRPM | TEMPERATURE: 97.9 F | HEART RATE: 61 BPM | OXYGEN SATURATION: 98 % | SYSTOLIC BLOOD PRESSURE: 151 MMHG

## 2023-06-09 DIAGNOSIS — R07.9 CHEST PAIN, UNSPECIFIED TYPE: ICD-10-CM

## 2023-06-09 DIAGNOSIS — R10.9 FLANK PAIN: ICD-10-CM

## 2023-06-09 DIAGNOSIS — R11.2 NAUSEA AND VOMITING, UNSPECIFIED VOMITING TYPE: Primary | ICD-10-CM

## 2023-06-09 LAB
ALBUMIN SERPL-MCNC: 4.5 G/DL (ref 3.5–5.2)
ALP SERPL-CCNC: 52 U/L (ref 35–104)
ALT SERPL-CCNC: 36 U/L (ref 0–32)
ANION GAP SERPL CALCULATED.3IONS-SCNC: 13 MMOL/L (ref 7–16)
AST SERPL-CCNC: 43 U/L (ref 0–31)
BASOPHILS # BLD: 0.05 E9/L (ref 0–0.2)
BASOPHILS NFR BLD: 0.4 % (ref 0–2)
BILIRUB SERPL-MCNC: 0.6 MG/DL (ref 0–1.2)
BILIRUB UR QL STRIP: NEGATIVE
BUN SERPL-MCNC: 6 MG/DL (ref 6–20)
CALCIUM SERPL-MCNC: 9.1 MG/DL (ref 8.6–10.2)
CHLORIDE SERPL-SCNC: 100 MMOL/L (ref 98–107)
CLARITY UR: CLEAR
CO2 SERPL-SCNC: 27 MMOL/L (ref 22–29)
COLOR UR: YELLOW
CREAT SERPL-MCNC: 0.5 MG/DL (ref 0.5–1)
EOSINOPHIL # BLD: 0.08 E9/L (ref 0.05–0.5)
EOSINOPHIL NFR BLD: 0.6 % (ref 0–6)
ERYTHROCYTE [DISTWIDTH] IN BLOOD BY AUTOMATED COUNT: 13.9 FL (ref 11.5–15)
GLUCOSE SERPL-MCNC: 99 MG/DL (ref 74–99)
GLUCOSE UR STRIP-MCNC: NEGATIVE MG/DL
HCG UR QL: NEGATIVE
HCT VFR BLD AUTO: 34 % (ref 34–48)
HGB BLD-MCNC: 11.5 G/DL (ref 11.5–15.5)
HGB UR QL STRIP: NEGATIVE
IMM GRANULOCYTES # BLD: 0.09 E9/L
IMM GRANULOCYTES NFR BLD: 0.7 % (ref 0–5)
KETONES UR STRIP-MCNC: 40 MG/DL
LACTATE BLDV-SCNC: 1.2 MMOL/L (ref 0.5–2.2)
LEUKOCYTE ESTERASE UR QL STRIP: NEGATIVE
LIPASE: 27 U/L (ref 13–60)
LYMPHOCYTES # BLD: 3.86 E9/L (ref 1.5–4)
LYMPHOCYTES NFR BLD: 28.3 % (ref 20–42)
MAGNESIUM SERPL-MCNC: 2.1 MG/DL (ref 1.6–2.6)
MCH RBC QN AUTO: 32.1 PG (ref 26–35)
MCHC RBC AUTO-ENTMCNC: 33.8 % (ref 32–34.5)
MCV RBC AUTO: 95 FL (ref 80–99.9)
MONOCYTES # BLD: 1.1 E9/L (ref 0.1–0.95)
MONOCYTES NFR BLD: 8.1 % (ref 2–12)
NEUTROPHILS # BLD: 8.48 E9/L (ref 1.8–7.3)
NEUTS SEG NFR BLD: 61.9 % (ref 43–80)
NITRITE UR QL STRIP: NEGATIVE
PH UR STRIP: 8.5 [PH] (ref 5–9)
PLATELET # BLD AUTO: 377 E9/L (ref 130–450)
PMV BLD AUTO: 9 FL (ref 7–12)
POTASSIUM SERPL-SCNC: 2.8 MMOL/L (ref 3.5–5)
PROT SERPL-MCNC: 6.7 G/DL (ref 6.4–8.3)
PROT UR STRIP-MCNC: NEGATIVE MG/DL
RBC # BLD AUTO: 3.58 E12/L (ref 3.5–5.5)
REASON FOR REJECTION: NORMAL
REJECTED TEST: NORMAL
SODIUM SERPL-SCNC: 140 MMOL/L (ref 132–146)
SP GR UR STRIP: 1.01 (ref 1–1.03)
TROPONIN, HIGH SENSITIVITY: <6 NG/L (ref 0–9)
UROBILINOGEN UR STRIP-ACNC: 1 E.U./DL
WBC # BLD: 13.7 E9/L (ref 4.5–11.5)

## 2023-06-09 PROCEDURE — 80053 COMPREHEN METABOLIC PANEL: CPT

## 2023-06-09 PROCEDURE — 71045 X-RAY EXAM CHEST 1 VIEW: CPT

## 2023-06-09 PROCEDURE — 96372 THER/PROPH/DIAG INJ SC/IM: CPT

## 2023-06-09 PROCEDURE — 6360000002 HC RX W HCPCS: Performed by: EMERGENCY MEDICINE

## 2023-06-09 PROCEDURE — 85025 COMPLETE CBC W/AUTO DIFF WBC: CPT

## 2023-06-09 PROCEDURE — 81025 URINE PREGNANCY TEST: CPT

## 2023-06-09 PROCEDURE — 96366 THER/PROPH/DIAG IV INF ADDON: CPT

## 2023-06-09 PROCEDURE — 74177 CT ABD & PELVIS W/CONTRAST: CPT

## 2023-06-09 PROCEDURE — 96365 THER/PROPH/DIAG IV INF INIT: CPT

## 2023-06-09 PROCEDURE — 2580000003 HC RX 258: Performed by: EMERGENCY MEDICINE

## 2023-06-09 PROCEDURE — 83690 ASSAY OF LIPASE: CPT

## 2023-06-09 PROCEDURE — 99285 EMERGENCY DEPT VISIT HI MDM: CPT

## 2023-06-09 PROCEDURE — 83605 ASSAY OF LACTIC ACID: CPT

## 2023-06-09 PROCEDURE — 83735 ASSAY OF MAGNESIUM: CPT

## 2023-06-09 PROCEDURE — 96374 THER/PROPH/DIAG INJ IV PUSH: CPT

## 2023-06-09 PROCEDURE — 93005 ELECTROCARDIOGRAM TRACING: CPT | Performed by: EMERGENCY MEDICINE

## 2023-06-09 PROCEDURE — 81003 URINALYSIS AUTO W/O SCOPE: CPT

## 2023-06-09 PROCEDURE — 6360000004 HC RX CONTRAST MEDICATION: Performed by: RADIOLOGY

## 2023-06-09 PROCEDURE — 96375 TX/PRO/DX INJ NEW DRUG ADDON: CPT

## 2023-06-09 PROCEDURE — 36415 COLL VENOUS BLD VENIPUNCTURE: CPT

## 2023-06-09 PROCEDURE — 84484 ASSAY OF TROPONIN QUANT: CPT

## 2023-06-09 RX ORDER — SUCRALFATE 1 G/1
1 TABLET ORAL 4 TIMES DAILY
Qty: 120 TABLET | Refills: 3 | Status: SHIPPED | OUTPATIENT
Start: 2023-06-09 | End: 2023-06-10 | Stop reason: SDUPTHER

## 2023-06-09 RX ORDER — FAMOTIDINE 20 MG/1
20 TABLET, FILM COATED ORAL 2 TIMES DAILY
Qty: 14 TABLET | Refills: 0 | Status: SHIPPED | OUTPATIENT
Start: 2023-06-09 | End: 2023-06-10 | Stop reason: SDUPTHER

## 2023-06-09 RX ORDER — 0.9 % SODIUM CHLORIDE 0.9 %
1000 INTRAVENOUS SOLUTION INTRAVENOUS ONCE
Status: COMPLETED | OUTPATIENT
Start: 2023-06-09 | End: 2023-06-09

## 2023-06-09 RX ORDER — DICYCLOMINE HYDROCHLORIDE 10 MG/ML
20 INJECTION INTRAMUSCULAR ONCE
Status: COMPLETED | OUTPATIENT
Start: 2023-06-09 | End: 2023-06-09

## 2023-06-09 RX ORDER — PANTOPRAZOLE SODIUM 40 MG/1
40 TABLET, DELAYED RELEASE ORAL DAILY
Qty: 10 TABLET | Refills: 0 | Status: SHIPPED | OUTPATIENT
Start: 2023-06-09 | End: 2023-06-10 | Stop reason: SDUPTHER

## 2023-06-09 RX ORDER — ONDANSETRON 2 MG/ML
4 INJECTION INTRAMUSCULAR; INTRAVENOUS ONCE
Status: COMPLETED | OUTPATIENT
Start: 2023-06-09 | End: 2023-06-09

## 2023-06-09 RX ORDER — FENTANYL CITRATE 50 UG/ML
50 INJECTION, SOLUTION INTRAMUSCULAR; INTRAVENOUS ONCE
Status: COMPLETED | OUTPATIENT
Start: 2023-06-09 | End: 2023-06-09

## 2023-06-09 RX ORDER — DICYCLOMINE HYDROCHLORIDE 10 MG/1
10 CAPSULE ORAL 4 TIMES DAILY
Qty: 120 CAPSULE | Refills: 0 | Status: SHIPPED | OUTPATIENT
Start: 2023-06-09 | End: 2023-06-10 | Stop reason: SDUPTHER

## 2023-06-09 RX ORDER — POTASSIUM CHLORIDE 20 MEQ/1
40 TABLET, EXTENDED RELEASE ORAL ONCE
Status: CANCELLED | OUTPATIENT
Start: 2023-06-09 | End: 2023-06-09

## 2023-06-09 RX ORDER — POTASSIUM CHLORIDE 7.45 MG/ML
10 INJECTION INTRAVENOUS ONCE
Status: COMPLETED | OUTPATIENT
Start: 2023-06-09 | End: 2023-06-10

## 2023-06-09 RX ORDER — KETOROLAC TROMETHAMINE 30 MG/ML
30 INJECTION, SOLUTION INTRAMUSCULAR; INTRAVENOUS ONCE
Status: COMPLETED | OUTPATIENT
Start: 2023-06-09 | End: 2023-06-09

## 2023-06-09 RX ADMIN — KETOROLAC TROMETHAMINE 30 MG: 30 INJECTION, SOLUTION INTRAMUSCULAR; INTRAVENOUS at 19:29

## 2023-06-09 RX ADMIN — IOPAMIDOL 75 ML: 755 INJECTION, SOLUTION INTRAVENOUS at 20:38

## 2023-06-09 RX ADMIN — FENTANYL CITRATE 50 MCG: 0.05 INJECTION, SOLUTION INTRAMUSCULAR; INTRAVENOUS at 21:38

## 2023-06-09 RX ADMIN — DICYCLOMINE HYDROCHLORIDE 20 MG: 20 INJECTION INTRAMUSCULAR at 23:12

## 2023-06-09 RX ADMIN — Medication 10 MEQ: at 21:35

## 2023-06-09 RX ADMIN — SODIUM CHLORIDE 1000 ML: 9 INJECTION, SOLUTION INTRAVENOUS at 19:35

## 2023-06-09 RX ADMIN — ONDANSETRON 4 MG: 2 INJECTION INTRAMUSCULAR; INTRAVENOUS at 19:28

## 2023-06-09 ASSESSMENT — ENCOUNTER SYMPTOMS
VOMITING: 1
NAUSEA: 1
ABDOMINAL PAIN: 1
SHORTNESS OF BREATH: 0
EYE REDNESS: 0

## 2023-06-09 ASSESSMENT — PAIN SCALES - GENERAL
PAINLEVEL_OUTOF10: 8

## 2023-06-09 ASSESSMENT — PAIN - FUNCTIONAL ASSESSMENT: PAIN_FUNCTIONAL_ASSESSMENT: 0-10

## 2023-06-09 ASSESSMENT — PAIN DESCRIPTION - LOCATION
LOCATION: ABDOMEN;BACK
LOCATION: ABDOMEN;CHEST

## 2023-06-09 ASSESSMENT — LIFESTYLE VARIABLES
HOW OFTEN DO YOU HAVE A DRINK CONTAINING ALCOHOL: NEVER
HOW MANY STANDARD DRINKS CONTAINING ALCOHOL DO YOU HAVE ON A TYPICAL DAY: PATIENT DOES NOT DRINK

## 2023-06-10 VITALS
SYSTOLIC BLOOD PRESSURE: 148 MMHG | HEART RATE: 88 BPM | OXYGEN SATURATION: 92 % | DIASTOLIC BLOOD PRESSURE: 78 MMHG | RESPIRATION RATE: 18 BRPM

## 2023-06-10 DIAGNOSIS — R10.10 PAIN OF UPPER ABDOMEN: ICD-10-CM

## 2023-06-10 DIAGNOSIS — E87.6 HYPOKALEMIA: ICD-10-CM

## 2023-06-10 DIAGNOSIS — R11.2 NAUSEA AND VOMITING, UNSPECIFIED VOMITING TYPE: Primary | ICD-10-CM

## 2023-06-10 LAB
ALBUMIN SERPL-MCNC: 4.5 G/DL (ref 3.5–5.2)
ALP SERPL-CCNC: 57 U/L (ref 35–104)
ALT SERPL-CCNC: 42 U/L (ref 0–32)
AMPHET UR QL SCN: NOT DETECTED
ANION GAP SERPL CALCULATED.3IONS-SCNC: 11 MMOL/L (ref 7–16)
AST SERPL-CCNC: 38 U/L (ref 0–31)
BACTERIA URNS QL MICRO: ABNORMAL /HPF
BARBITURATES UR QL SCN: NOT DETECTED
BASOPHILS # BLD: 0.04 E9/L (ref 0–0.2)
BASOPHILS NFR BLD: 0.3 % (ref 0–2)
BENZODIAZ UR QL SCN: NOT DETECTED
BILIRUB SERPL-MCNC: 0.5 MG/DL (ref 0–1.2)
BILIRUB UR QL STRIP: NEGATIVE
BUN SERPL-MCNC: 6 MG/DL (ref 6–20)
CALCIUM SERPL-MCNC: 9 MG/DL (ref 8.6–10.2)
CANNABINOIDS UR QL SCN: POSITIVE
CHLORIDE SERPL-SCNC: 100 MMOL/L (ref 98–107)
CLARITY UR: CLEAR
CO2 SERPL-SCNC: 29 MMOL/L (ref 22–29)
COCAINE UR QL SCN: NOT DETECTED
COLOR UR: YELLOW
CREAT SERPL-MCNC: 0.6 MG/DL (ref 0.5–1)
DRUG SCREEN COMMENT UR-IMP: ABNORMAL
EOSINOPHIL # BLD: 0.03 E9/L (ref 0.05–0.5)
EOSINOPHIL NFR BLD: 0.2 % (ref 0–6)
EPI CELLS #/AREA URNS HPF: ABNORMAL /HPF
ERYTHROCYTE [DISTWIDTH] IN BLOOD BY AUTOMATED COUNT: 13.7 FL (ref 11.5–15)
FENTANYL SCREEN, URINE: NOT DETECTED
GLUCOSE SERPL-MCNC: 101 MG/DL (ref 74–99)
GLUCOSE UR STRIP-MCNC: NEGATIVE MG/DL
HCG UR QL: NEGATIVE
HCT VFR BLD AUTO: 39.2 % (ref 34–48)
HGB BLD-MCNC: 13 G/DL (ref 11.5–15.5)
HGB UR QL STRIP: NEGATIVE
IMM GRANULOCYTES # BLD: 0.08 E9/L
IMM GRANULOCYTES NFR BLD: 0.6 % (ref 0–5)
KETONES UR STRIP-MCNC: 15 MG/DL
LACTATE BLDV-SCNC: 1 MMOL/L (ref 0.5–2.2)
LEUKOCYTE ESTERASE UR QL STRIP: NEGATIVE
LIPASE: 27 U/L (ref 13–60)
LYMPHOCYTES # BLD: 2.07 E9/L (ref 1.5–4)
LYMPHOCYTES NFR BLD: 16.5 % (ref 20–42)
MCH RBC QN AUTO: 31.8 PG (ref 26–35)
MCHC RBC AUTO-ENTMCNC: 33.2 % (ref 32–34.5)
MCV RBC AUTO: 95.8 FL (ref 80–99.9)
METHADONE UR QL SCN: NOT DETECTED
MONOCYTES # BLD: 0.58 E9/L (ref 0.1–0.95)
MONOCYTES NFR BLD: 4.6 % (ref 2–12)
NEUTROPHILS # BLD: 9.75 E9/L (ref 1.8–7.3)
NEUTS SEG NFR BLD: 77.8 % (ref 43–80)
NITRITE UR QL STRIP: NEGATIVE
OPIATES UR QL SCN: NOT DETECTED
OXYCODONE URINE: NOT DETECTED
PCP UR QL SCN: NOT DETECTED
PH UR STRIP: 7.5 [PH] (ref 5–9)
PLATELET # BLD AUTO: 435 E9/L (ref 130–450)
PMV BLD AUTO: 8.6 FL (ref 7–12)
POTASSIUM SERPL-SCNC: 2.9 MMOL/L (ref 3.5–5)
PROT SERPL-MCNC: 7.2 G/DL (ref 6.4–8.3)
PROT UR STRIP-MCNC: NEGATIVE MG/DL
RBC # BLD AUTO: 4.09 E12/L (ref 3.5–5.5)
RBC #/AREA URNS HPF: ABNORMAL /HPF (ref 0–2)
SODIUM SERPL-SCNC: 140 MMOL/L (ref 132–146)
SP GR UR STRIP: 1.01 (ref 1–1.03)
TROPONIN, HIGH SENSITIVITY: <6 NG/L (ref 0–9)
UROBILINOGEN UR STRIP-ACNC: 1 E.U./DL
WBC # BLD: 12.6 E9/L (ref 4.5–11.5)
WBC #/AREA URNS HPF: ABNORMAL /HPF (ref 0–5)

## 2023-06-10 PROCEDURE — 81025 URINE PREGNANCY TEST: CPT

## 2023-06-10 PROCEDURE — 81001 URINALYSIS AUTO W/SCOPE: CPT

## 2023-06-10 PROCEDURE — 84484 ASSAY OF TROPONIN QUANT: CPT

## 2023-06-10 PROCEDURE — 36415 COLL VENOUS BLD VENIPUNCTURE: CPT

## 2023-06-10 PROCEDURE — 83605 ASSAY OF LACTIC ACID: CPT

## 2023-06-10 PROCEDURE — 6360000002 HC RX W HCPCS: Performed by: STUDENT IN AN ORGANIZED HEALTH CARE EDUCATION/TRAINING PROGRAM

## 2023-06-10 PROCEDURE — 80053 COMPREHEN METABOLIC PANEL: CPT

## 2023-06-10 PROCEDURE — 80307 DRUG TEST PRSMV CHEM ANLYZR: CPT

## 2023-06-10 PROCEDURE — 85025 COMPLETE CBC W/AUTO DIFF WBC: CPT

## 2023-06-10 PROCEDURE — 83690 ASSAY OF LIPASE: CPT

## 2023-06-10 RX ORDER — DIPHENHYDRAMINE HYDROCHLORIDE 50 MG/ML
25 INJECTION INTRAMUSCULAR; INTRAVENOUS ONCE
Status: COMPLETED | OUTPATIENT
Start: 2023-06-10 | End: 2023-06-10

## 2023-06-10 RX ORDER — DROPERIDOL 2.5 MG/ML
2.5 INJECTION, SOLUTION INTRAMUSCULAR; INTRAVENOUS ONCE
Status: COMPLETED | OUTPATIENT
Start: 2023-06-10 | End: 2023-06-10

## 2023-06-10 RX ORDER — PANTOPRAZOLE SODIUM 40 MG/1
40 TABLET, DELAYED RELEASE ORAL DAILY
Qty: 10 TABLET | Refills: 0 | Status: SHIPPED | OUTPATIENT
Start: 2023-06-10 | End: 2023-06-20

## 2023-06-10 RX ORDER — POTASSIUM CHLORIDE 7.45 MG/ML
10 INJECTION INTRAVENOUS
Status: COMPLETED | OUTPATIENT
Start: 2023-06-10 | End: 2023-06-10

## 2023-06-10 RX ORDER — DICYCLOMINE HYDROCHLORIDE 10 MG/1
10 CAPSULE ORAL 4 TIMES DAILY
Qty: 120 CAPSULE | Refills: 0 | Status: SHIPPED | OUTPATIENT
Start: 2023-06-10

## 2023-06-10 RX ORDER — DICYCLOMINE HYDROCHLORIDE 10 MG/ML
20 INJECTION INTRAMUSCULAR ONCE
Status: COMPLETED | OUTPATIENT
Start: 2023-06-10 | End: 2023-06-10

## 2023-06-10 RX ORDER — FAMOTIDINE 20 MG/1
20 TABLET, FILM COATED ORAL 2 TIMES DAILY
Qty: 14 TABLET | Refills: 0 | Status: SHIPPED | OUTPATIENT
Start: 2023-06-10 | End: 2023-06-17

## 2023-06-10 RX ORDER — SUCRALFATE 1 G/1
1 TABLET ORAL 4 TIMES DAILY
Qty: 120 TABLET | Refills: 3 | Status: SHIPPED | OUTPATIENT
Start: 2023-06-10

## 2023-06-10 RX ADMIN — DICYCLOMINE HYDROCHLORIDE 20 MG: 20 INJECTION, SOLUTION INTRAMUSCULAR at 14:54

## 2023-06-10 RX ADMIN — POTASSIUM CHLORIDE 10 MEQ: 7.46 INJECTION, SOLUTION INTRAVENOUS at 18:27

## 2023-06-10 RX ADMIN — POTASSIUM CHLORIDE 10 MEQ: 7.46 INJECTION, SOLUTION INTRAVENOUS at 15:59

## 2023-06-10 RX ADMIN — DIPHENHYDRAMINE HYDROCHLORIDE 25 MG: 50 INJECTION, SOLUTION INTRAMUSCULAR; INTRAVENOUS at 17:29

## 2023-06-10 RX ADMIN — DROPERIDOL 2.5 MG: 2.5 INJECTION, SOLUTION INTRAMUSCULAR; INTRAVENOUS at 15:21

## 2023-06-10 RX ADMIN — POTASSIUM CHLORIDE 10 MEQ: 7.46 INJECTION, SOLUTION INTRAVENOUS at 17:32

## 2023-06-10 ASSESSMENT — PAIN - FUNCTIONAL ASSESSMENT: PAIN_FUNCTIONAL_ASSESSMENT: 0-10

## 2023-06-10 ASSESSMENT — PAIN SCALES - GENERAL
PAINLEVEL_OUTOF10: 10
PAINLEVEL_OUTOF10: 10

## 2023-06-10 NOTE — ED PROVIDER NOTES
[facility] Assessment  BP: (!) 151/91  Pulse: 61           PHYSICAL EXAM  1 or more Elements     ED Triage Vitals [06/09/23 1639]   BP Temp Temp src Pulse Respirations SpO2 Height Weight   (!) 160/94 97.9 °F (36.6 °C) -- 66 18 94 % -- --         Constitutional/General: Alert and oriented x3, well appearing, non toxic in NAD  Head: NC/AT  Eyes:  EOMI  Mouth: Oropharynx clear, handling secretions, no trismus  Neck: Supple, full ROM  Pulmonary: Lungs clear to auscultation bilaterally, no wheezes, rales, or rhonchi. Not in respiratory distress  Cardiovascular:  Regular rate and rhythm, no murmurs, gallops, or rubs. 2+ distal pulses  Abdomen: Soft, mildly tender in right lower quadrant with right CVA tenderness, no rebound, rigidity or guarding  Extremities: Moves all extremities x 4. Warm and well perfused  Skin: warm and dry without rash  Neurologic: GCS 15, no gross focal neurologic deficits  Psych: Normal Affect      DIAGNOSTIC RESULTS     I have personally reviewed all laboratory and imaging results for this patient. Results are listed below.      LABS:  Results for orders placed or performed during the hospital encounter of 06/09/23   CMP   Result Value Ref Range    Sodium 140 132 - 146 mmol/L    Potassium 2.8 (L) 3.5 - 5.0 mmol/L    Chloride 100 98 - 107 mmol/L    CO2 27 22 - 29 mmol/L    Anion Gap 13 7 - 16 mmol/L    Glucose 99 74 - 99 mg/dL    BUN 6 6 - 20 mg/dL    Creatinine 0.5 0.5 - 1.0 mg/dL    Est, Glom Filt Rate >60 >=60 mL/min/1.73    Calcium 9.1 8.6 - 10.2 mg/dL    Total Protein 6.7 6.4 - 8.3 g/dL    Albumin 4.5 3.5 - 5.2 g/dL    Total Bilirubin 0.6 0.0 - 1.2 mg/dL    Alkaline Phosphatase 52 35 - 104 U/L    ALT 36 (H) 0 - 32 U/L    AST 43 (H) 0 - 31 U/L   Lactic Acid   Result Value Ref Range    Lactic Acid 1.2 0.5 - 2.2 mmol/L   Lipase   Result Value Ref Range    Lipase 27 13 - 60 U/L   Troponin   Result Value Ref Range    Troponin, High Sensitivity <6 0 - 9 ng/L   Urinalysis   Result Value Ref

## 2023-06-10 NOTE — ED PROVIDER NOTES
ATTENDING PROVIDER ATTESTATION:     Aidan Chopra presented to the emergency department for evaluation of Abdominal Pain (Abdominal pain radiating to back. Seen last night and at [facility] day before for same complaints  has not picked up scripts for bentyl, Carafate and Pepcid yet /)   and was initially evaluated by the Medical Resident. See Original ED Note for H&P and ED course above. I have reviewed and discussed the case, including pertinent history (medical, surgical, family and social) and exam findings with the Medical Resident assigned to Aidan Chopra. I have personally performed and/or participated in the history, exam, medical decision making, and present for critical portion or entire portion of all procedures and agree with all pertinent clinical information and any additional changes or corrections are noted below in my assessment and plan. I have discussed this patient in detail with the resident, and provided the instruction and education,       I have reviewed my findings and recommendations with the assigned Medical Resident, Aidan Dinorarachele and members of family present at the time of disposition. I have performed a history and physical examination of this patient and directly supervised the resident caring for this patient      Please refer to my separate ED provider note with my attestation and my separate documentation of the encounter. [address] [place] [address] ENCOUNTER        Pt Name: Aidan Chopra  MRN: [id number]  [place] 1985  Date of evaluation: 6/10/2023  Provider: April Henriquez DO  PCP: Rachelle Crum DO  Note Started: 4:42 PM EDT 6/10/23    CHIEF COMPLAINT       Chief Complaint   Patient presents with    Abdominal Pain     Abdominal pain radiating to back.  Seen last night and at [facility] day before for same complaints  has not picked up scripts for bentyl, Carafate and Pepcid yet
-------------------------------------------------- RESULTS -------------------------------------------------  I have personally reviewed all laboratory and imaging results for this patient. Results are listed below.      LABS: (Lab results interpreted by me)  Results for orders placed or performed during the hospital encounter of 06/10/23   CBC with Auto Differential   Result Value Ref Range    WBC 12.6 (H) 4.5 - 11.5 E9/L    RBC 4.09 3.50 - 5.50 E12/L    Hemoglobin 13.0 11.5 - 15.5 g/dL    Hematocrit 39.2 34.0 - 48.0 %    MCV 95.8 80.0 - 99.9 fL    MCH 31.8 26.0 - 35.0 pg    MCHC 33.2 32.0 - 34.5 %    RDW 13.7 11.5 - 15.0 fL    Platelets 595 276 - 347 E9/L    MPV 8.6 7.0 - 12.0 fL    Neutrophils % 77.8 43.0 - 80.0 %    Immature Granulocytes % 0.6 0.0 - 5.0 %    Lymphocytes % 16.5 (L) 20.0 - 42.0 %    Monocytes % 4.6 2.0 - 12.0 %    Eosinophils % 0.2 0.0 - 6.0 %    Basophils % 0.3 0.0 - 2.0 %    Neutrophils Absolute 9.75 (H) 1.80 - 7.30 E9/L    Immature Granulocytes # 0.08 E9/L    Lymphocytes Absolute 2.07 1.50 - 4.00 E9/L    Monocytes Absolute 0.58 0.10 - 0.95 E9/L    Eosinophils Absolute 0.03 (L) 0.05 - 0.50 E9/L    Basophils Absolute 0.04 0.00 - 0.20 E9/L   Comprehensive Metabolic Panel   Result Value Ref Range    Sodium 140 132 - 146 mmol/L    Potassium 2.9 (L) 3.5 - 5.0 mmol/L    Chloride 100 98 - 107 mmol/L    CO2 29 22 - 29 mmol/L    Anion Gap 11 7 - 16 mmol/L    Glucose 101 (H) 74 - 99 mg/dL    BUN 6 6 - 20 mg/dL    Creatinine 0.6 0.5 - 1.0 mg/dL    Est, Glom Filt Rate >60 >=60 mL/min/1.73    Calcium 9.0 8.6 - 10.2 mg/dL    Total Protein 7.2 6.4 - 8.3 g/dL    Albumin 4.5 3.5 - 5.2 g/dL    Total Bilirubin 0.5 0.0 - 1.2 mg/dL    Alkaline Phosphatase 57 35 - 104 U/L    ALT 42 (H) 0 - 32 U/L    AST 38 (H) 0 - 31 U/L   Troponin   Result Value Ref Range    Troponin, High Sensitivity <6 0 - 9 ng/L   Lactic Acid   Result Value Ref Range    Lactic Acid 1.0 0.5 - 2.2 mmol/L   Lipase   Result Value Ref Range

## 2023-06-11 LAB
EKG ATRIAL RATE: 64 BPM
EKG ATRIAL RATE: 67 BPM
EKG P AXIS: 47 DEGREES
EKG P AXIS: 70 DEGREES
EKG P-R INTERVAL: 130 MS
EKG P-R INTERVAL: 134 MS
EKG Q-T INTERVAL: 424 MS
EKG Q-T INTERVAL: 442 MS
EKG QRS DURATION: 94 MS
EKG QRS DURATION: 98 MS
EKG QTC CALCULATION (BAZETT): 437 MS
EKG QTC CALCULATION (BAZETT): 467 MS
EKG R AXIS: 10 DEGREES
EKG R AXIS: 37 DEGREES
EKG T AXIS: 21 DEGREES
EKG T AXIS: 38 DEGREES
EKG VENTRICULAR RATE: 64 BPM
EKG VENTRICULAR RATE: 67 BPM

## 2023-06-11 PROCEDURE — 93010 ELECTROCARDIOGRAM REPORT: CPT | Performed by: INTERNAL MEDICINE

## 2023-07-23 ENCOUNTER — HOSPITAL ENCOUNTER (EMERGENCY)
Age: 38
Discharge: HOME | End: 2023-07-23
Payer: COMMERCIAL

## 2023-07-23 ENCOUNTER — APPOINTMENT (OUTPATIENT)
Dept: CT IMAGING | Age: 38
End: 2023-07-23
Payer: COMMERCIAL

## 2023-07-23 ENCOUNTER — APPOINTMENT (OUTPATIENT)
Dept: GENERAL RADIOLOGY | Age: 38
End: 2023-07-23
Payer: COMMERCIAL

## 2023-07-23 VITALS
SYSTOLIC BLOOD PRESSURE: 150 MMHG | HEIGHT: 64 IN | OXYGEN SATURATION: 98 % | WEIGHT: 155 LBS | BODY MASS INDEX: 26.46 KG/M2 | TEMPERATURE: 98.3 F | DIASTOLIC BLOOD PRESSURE: 84 MMHG | RESPIRATION RATE: 20 BRPM | HEART RATE: 100 BPM

## 2023-07-23 DIAGNOSIS — F12.90 CANNABINOID HYPEREMESIS SYNDROME: ICD-10-CM

## 2023-07-23 DIAGNOSIS — R11.2 NAUSEA AND VOMITING, UNSPECIFIED VOMITING TYPE: Primary | ICD-10-CM

## 2023-07-23 DIAGNOSIS — D72.829 LEUKOCYTOSIS, UNSPECIFIED TYPE: ICD-10-CM

## 2023-07-23 DIAGNOSIS — R11.2 CANNABINOID HYPEREMESIS SYNDROME: ICD-10-CM

## 2023-07-23 LAB
ALBUMIN SERPL-MCNC: 5.7 G/DL (ref 3.5–5.2)
ALP SERPL-CCNC: 77 U/L (ref 35–104)
ALT SERPL-CCNC: 18 U/L (ref 0–32)
ANION GAP SERPL CALCULATED.3IONS-SCNC: 16 MMOL/L (ref 7–16)
AST SERPL-CCNC: 21 U/L (ref 0–31)
BACTERIA URNS QL MICRO: ABNORMAL
BASOPHILS # BLD: 0 K/UL (ref 0–0.2)
BASOPHILS # BLD: 0 K/UL (ref 0–0.2)
BASOPHILS NFR BLD: 0 % (ref 0–2)
BASOPHILS NFR BLD: 0 % (ref 0–2)
BILIRUB SERPL-MCNC: 0.5 MG/DL (ref 0–1.2)
BILIRUB UR QL STRIP: NEGATIVE
BUN SERPL-MCNC: 21 MG/DL (ref 6–20)
CALCIUM SERPL-MCNC: 10.6 MG/DL (ref 8.6–10.2)
CHLORIDE SERPL-SCNC: 95 MMOL/L (ref 98–107)
CLARITY UR: ABNORMAL
CO2 SERPL-SCNC: 23 MMOL/L (ref 22–29)
COLOR UR: YELLOW
CREAT SERPL-MCNC: 0.7 MG/DL (ref 0.5–1)
EOSINOPHIL # BLD: 0 K/UL (ref 0.05–0.5)
EOSINOPHIL # BLD: 0 K/UL (ref 0.05–0.5)
EOSINOPHILS RELATIVE PERCENT: 0 % (ref 0–6)
EOSINOPHILS RELATIVE PERCENT: 0 % (ref 0–6)
EPI CELLS #/AREA URNS HPF: ABNORMAL /HPF
ERYTHROCYTE [DISTWIDTH] IN BLOOD BY AUTOMATED COUNT: 13 % (ref 11.5–15)
ERYTHROCYTE [DISTWIDTH] IN BLOOD BY AUTOMATED COUNT: 13.2 % (ref 11.5–15)
GFR SERPL CREATININE-BSD FRML MDRD: >60 ML/MIN/1.73M2
GLUCOSE SERPL-MCNC: 157 MG/DL (ref 74–99)
GLUCOSE UR STRIP-MCNC: NEGATIVE MG/DL
HCG SERPL QL: NEGATIVE
HCG, URINE, POC: NEGATIVE
HCT VFR BLD AUTO: 37.9 % (ref 34–48)
HCT VFR BLD AUTO: 43.2 % (ref 34–48)
HGB BLD-MCNC: 13.1 G/DL (ref 11.5–15.5)
HGB BLD-MCNC: 14.7 G/DL (ref 11.5–15.5)
HGB UR QL STRIP.AUTO: NEGATIVE
KETONES UR STRIP-MCNC: ABNORMAL MG/DL
LACTATE BLDV-SCNC: 2 MMOL/L (ref 0.5–2.2)
LEUKOCYTE ESTERASE UR QL STRIP: ABNORMAL
LIPASE SERPL-CCNC: 17 U/L (ref 13–60)
LYMPHOCYTES NFR BLD: 2.18 K/UL (ref 1.5–4)
LYMPHOCYTES NFR BLD: 3.23 K/UL (ref 1.5–4)
LYMPHOCYTES RELATIVE PERCENT: 10 % (ref 20–42)
LYMPHOCYTES RELATIVE PERCENT: 15 % (ref 20–42)
Lab: NORMAL
MAGNESIUM SERPL-MCNC: 2.1 MG/DL (ref 1.6–2.6)
MCH RBC QN AUTO: 30.8 PG (ref 26–35)
MCH RBC QN AUTO: 31 PG (ref 26–35)
MCHC RBC AUTO-ENTMCNC: 34 G/DL (ref 32–34.5)
MCHC RBC AUTO-ENTMCNC: 34.6 G/DL (ref 32–34.5)
MCV RBC AUTO: 89.6 FL (ref 80–99.9)
MCV RBC AUTO: 90.6 FL (ref 80–99.9)
MONOCYTES NFR BLD: 1.09 K/UL (ref 0.1–0.95)
MONOCYTES NFR BLD: 1.51 K/UL (ref 0.1–0.95)
MONOCYTES NFR BLD: 5 % (ref 2–12)
MONOCYTES NFR BLD: 7 % (ref 2–12)
MUCOUS THREADS URNS QL MICRO: PRESENT
NEGATIVE QC PASS/FAIL: NORMAL
NEUTROPHILS NFR BLD: 78 % (ref 43–80)
NEUTROPHILS NFR BLD: 85 % (ref 43–80)
NEUTS SEG NFR BLD: 16.77 K/UL (ref 1.8–7.3)
NEUTS SEG NFR BLD: 18.53 K/UL (ref 1.8–7.3)
NITRITE UR QL STRIP: NEGATIVE
PH UR STRIP: 6.5 [PH] (ref 5–9)
PLATELET # BLD AUTO: 493 K/UL (ref 130–450)
PLATELET # BLD AUTO: 543 K/UL (ref 130–450)
PMV BLD AUTO: 8.9 FL (ref 7–12)
PMV BLD AUTO: 8.9 FL (ref 7–12)
POSITIVE QC PASS/FAIL: NORMAL
POTASSIUM SERPL-SCNC: 4.1 MMOL/L (ref 3.5–5)
PROT SERPL-MCNC: 9.3 G/DL (ref 6.4–8.3)
PROT UR STRIP-MCNC: >=300 MG/DL
RBC # BLD AUTO: 4.23 M/UL (ref 3.5–5.5)
RBC # BLD AUTO: 4.77 M/UL (ref 3.5–5.5)
RBC # BLD: ABNORMAL 10*6/UL
RBC # BLD: ABNORMAL 10*6/UL
RBC #/AREA URNS HPF: ABNORMAL /HPF
SODIUM SERPL-SCNC: 134 MMOL/L (ref 132–146)
SP GR UR STRIP: 1.01 (ref 1–1.03)
UROBILINOGEN UR STRIP-ACNC: 0.2 EU/DL (ref 0–1)
WBC #/AREA URNS HPF: ABNORMAL /HPF
WBC OTHER # BLD: 21.5 K/UL (ref 4.5–11.5)
WBC OTHER # BLD: 21.8 K/UL (ref 4.5–11.5)

## 2023-07-23 PROCEDURE — 96374 THER/PROPH/DIAG INJ IV PUSH: CPT

## 2023-07-23 PROCEDURE — 96375 TX/PRO/DX INJ NEW DRUG ADDON: CPT

## 2023-07-23 PROCEDURE — 83690 ASSAY OF LIPASE: CPT

## 2023-07-23 PROCEDURE — 2580000003 HC RX 258: Performed by: RADIOLOGY

## 2023-07-23 PROCEDURE — 83735 ASSAY OF MAGNESIUM: CPT

## 2023-07-23 PROCEDURE — 6360000004 HC RX CONTRAST MEDICATION: Performed by: RADIOLOGY

## 2023-07-23 PROCEDURE — 83605 ASSAY OF LACTIC ACID: CPT

## 2023-07-23 PROCEDURE — 2580000003 HC RX 258: Performed by: NURSE PRACTITIONER

## 2023-07-23 PROCEDURE — 6360000002 HC RX W HCPCS: Performed by: NURSE PRACTITIONER

## 2023-07-23 PROCEDURE — 80053 COMPREHEN METABOLIC PANEL: CPT

## 2023-07-23 PROCEDURE — 84703 CHORIONIC GONADOTROPIN ASSAY: CPT

## 2023-07-23 PROCEDURE — 74177 CT ABD & PELVIS W/CONTRAST: CPT

## 2023-07-23 PROCEDURE — 85027 COMPLETE CBC AUTOMATED: CPT

## 2023-07-23 PROCEDURE — 96361 HYDRATE IV INFUSION ADD-ON: CPT

## 2023-07-23 PROCEDURE — 71046 X-RAY EXAM CHEST 2 VIEWS: CPT

## 2023-07-23 PROCEDURE — 99285 EMERGENCY DEPT VISIT HI MDM: CPT

## 2023-07-23 PROCEDURE — 81001 URINALYSIS AUTO W/SCOPE: CPT

## 2023-07-23 RX ORDER — KETOROLAC TROMETHAMINE 30 MG/ML
15 INJECTION, SOLUTION INTRAMUSCULAR; INTRAVENOUS ONCE
Status: COMPLETED | OUTPATIENT
Start: 2023-07-23 | End: 2023-07-23

## 2023-07-23 RX ORDER — HYDRALAZINE HYDROCHLORIDE 20 MG/ML
10 INJECTION INTRAMUSCULAR; INTRAVENOUS ONCE
Status: COMPLETED | OUTPATIENT
Start: 2023-07-23 | End: 2023-07-23

## 2023-07-23 RX ORDER — DROPERIDOL 2.5 MG/ML
2.5 INJECTION, SOLUTION INTRAMUSCULAR; INTRAVENOUS ONCE
Status: COMPLETED | OUTPATIENT
Start: 2023-07-23 | End: 2023-07-23

## 2023-07-23 RX ORDER — ONDANSETRON 2 MG/ML
4 INJECTION INTRAMUSCULAR; INTRAVENOUS ONCE
Status: COMPLETED | OUTPATIENT
Start: 2023-07-23 | End: 2023-07-23

## 2023-07-23 RX ORDER — SODIUM CHLORIDE 0.9 % (FLUSH) 0.9 %
10 SYRINGE (ML) INJECTION ONCE
Status: COMPLETED | OUTPATIENT
Start: 2023-07-23 | End: 2023-07-23

## 2023-07-23 RX ORDER — 0.9 % SODIUM CHLORIDE 0.9 %
1000 INTRAVENOUS SOLUTION INTRAVENOUS ONCE
Status: COMPLETED | OUTPATIENT
Start: 2023-07-23 | End: 2023-07-23

## 2023-07-23 RX ORDER — PROMETHAZINE HYDROCHLORIDE 25 MG/1
25 SUPPOSITORY RECTAL EVERY 4 HOURS PRN
Qty: 10 SUPPOSITORY | Refills: 0 | Status: SHIPPED | OUTPATIENT
Start: 2023-07-23 | End: 2023-07-30

## 2023-07-23 RX ADMIN — SODIUM CHLORIDE 1000 ML: 9 INJECTION, SOLUTION INTRAVENOUS at 08:57

## 2023-07-23 RX ADMIN — DROPERIDOL 2.5 MG: 2.5 INJECTION, SOLUTION INTRAMUSCULAR; INTRAVENOUS at 12:05

## 2023-07-23 RX ADMIN — IOPAMIDOL 75 ML: 755 INJECTION, SOLUTION INTRAVENOUS at 10:31

## 2023-07-23 RX ADMIN — ONDANSETRON 4 MG: 2 INJECTION INTRAMUSCULAR; INTRAVENOUS at 08:56

## 2023-07-23 RX ADMIN — KETOROLAC TROMETHAMINE 15 MG: 30 INJECTION, SOLUTION INTRAMUSCULAR; INTRAVENOUS at 12:05

## 2023-07-23 RX ADMIN — Medication 10 ML: at 10:31

## 2023-07-23 RX ADMIN — HYDRALAZINE HYDROCHLORIDE 10 MG: 20 INJECTION INTRAMUSCULAR; INTRAVENOUS at 09:02

## 2023-07-23 ASSESSMENT — PAIN - FUNCTIONAL ASSESSMENT: PAIN_FUNCTIONAL_ASSESSMENT: 0-10

## 2023-07-23 ASSESSMENT — PAIN SCALES - GENERAL: PAINLEVEL_OUTOF10: 9

## 2023-07-23 ASSESSMENT — PAIN DESCRIPTION - LOCATION: LOCATION: ABDOMEN

## 2023-07-23 NOTE — ED PROVIDER NOTES
[facility]  ED  Encounter Note  Admit Date/RoomTime: 2023 11:34 AM  ED Room: [address] [place] [address] MAUREEN Visit. HPI:  23,   Time: 8:57 AM EDT         Miguel Michaud is a 41 y.o. female presenting to the ED for nausea vomiting and abdominal pain, beginning 2 days ago. The complaint has been persistent, moderate in severity, and worsened by nothing. Presents for complaints of nausea, vomiting, diarrhea with a headache and diffuse abdominal pain which she states began 2 days ago. She denies any fever, body aches or chills. Denies any chest pain or shortness of breath. She has been diagnosed with Crohn's in the past but that was when she was pregnant and at the end of  she has not followed up with gastroenterology since that time. She was seen in  for the same complaint. Denies any alcohol use. States her vomiting has been so severe she is unable to take her hypertensive medications. She is hypertensive at the time of triage at 169/[facility]. She does admit to smoking marijuana on a daily basis. ROS:   Pertinent positives and negatives are stated within HPI, all other systems reviewed and are negative.  --------------------------------------------- PAST HISTORY ---------------------------------------------  Past Medical History:  has a past medical history of Acute Crohn's disease ([address]), Anxiety attack, Asthma, Crohn's colitis ([address]), Depression, Herpes simplex virus (HSV) infection, History of blood transfusion, Hypertension, Marijuana abuse, Neurologic disorder, Postpartum depression,  delivery,  labor, Rh sensitized, Seizure ([address]), STD (sexually transmitted disease), and Tobacco abuse. Past Surgical History:  has a past surgical history that includes Dilation and curettage of uterus (); Colonoscopy; Endoscopy, colon, diagnostic; Cholecystectomy (2018); hernia repair; hernia repair;  Upper

## 2023-09-04 ENCOUNTER — HOSPITAL ENCOUNTER (EMERGENCY)
Age: 38
Discharge: HOME | End: 2023-09-04
Payer: COMMERCIAL

## 2023-09-04 VITALS
DIASTOLIC BLOOD PRESSURE: 81 MMHG | TEMPERATURE: 98 F | HEART RATE: 87 BPM | RESPIRATION RATE: 16 BRPM | OXYGEN SATURATION: 98 % | SYSTOLIC BLOOD PRESSURE: 132 MMHG

## 2023-09-04 DIAGNOSIS — K04.7 DENTAL ABSCESS: Primary | ICD-10-CM

## 2023-09-04 PROCEDURE — 6370000000 HC RX 637 (ALT 250 FOR IP): Performed by: NURSE PRACTITIONER

## 2023-09-04 PROCEDURE — 99283 EMERGENCY DEPT VISIT LOW MDM: CPT

## 2023-09-04 RX ORDER — AMOXICILLIN AND CLAVULANATE POTASSIUM 875; 125 MG/1; MG/1
1 TABLET, FILM COATED ORAL 2 TIMES DAILY
Qty: 20 TABLET | Refills: 0 | Status: SHIPPED | OUTPATIENT
Start: 2023-09-04 | End: 2023-09-14

## 2023-09-04 RX ORDER — AMOXICILLIN AND CLAVULANATE POTASSIUM 875; 125 MG/1; MG/1
1 TABLET, FILM COATED ORAL ONCE
Status: COMPLETED | OUTPATIENT
Start: 2023-09-04 | End: 2023-09-04

## 2023-09-04 RX ORDER — HYDROCODONE BITARTRATE AND ACETAMINOPHEN 5; 325 MG/1; MG/1
1 TABLET ORAL ONCE
Status: COMPLETED | OUTPATIENT
Start: 2023-09-04 | End: 2023-09-04

## 2023-09-04 RX ADMIN — AMOXICILLIN AND CLAVULANATE POTASSIUM 1 TABLET: 875; 125 TABLET, FILM COATED ORAL at 19:34

## 2023-09-04 RX ADMIN — HYDROCODONE BITARTRATE AND ACETAMINOPHEN 1 TABLET: 5; 325 TABLET ORAL at 17:17

## 2023-09-04 ASSESSMENT — LIFESTYLE VARIABLES
HOW MANY STANDARD DRINKS CONTAINING ALCOHOL DO YOU HAVE ON A TYPICAL DAY: PATIENT DOES NOT DRINK
HOW OFTEN DO YOU HAVE A DRINK CONTAINING ALCOHOL: NEVER

## 2023-09-04 ASSESSMENT — PAIN SCALES - GENERAL: PAINLEVEL_OUTOF10: 10

## 2023-09-04 NOTE — ED PROVIDER NOTES
and oriented x3, well appearing, non toxic in NAD. Head: Atraumatic and normocephalic. Eyes: No discharge from the eyes the sclerae are normal.  ENT: The oropharynx is normal. No pharyngeal erythema, uvular edema, tonsillar exudates, asymmetry or trismus. Mouth is normal to inspection  With the exception of a pain on percussion of the tooth noted above. There is mild evidence of facial asymmetry  on the left and is an abscess formation. Floor of the mouth is soft. No tenderness in the submental or submandibular space. No tongue elevation. Dental caries noted at the stated tooth. Neck: The neck demonstrates normal range of motion. No meningeals signs are present. No stridor. Respiratory/chest: The chest is nontender. Breath sounds are normal. no respiratory distress is noted  Cardiovascular: Heart shows a regular rate and rhythm no murmurs no rubs no gallops. Skin: The skin exam shows no evidence of rashes  Neuro: GCS is 15  Lymphatic: No cervical lymphadenopathy       -------------------------------------------------- RESULTS -------------------------------------------------  I have personally reviewed all laboratory and imaging results for this patient. Results are listed below. LABS:  No results found for this visit on 09/04/23. RADIOLOGY:  Interpreted by Radiologist.  No orders to display       [id number]- dental called will be in about 30 min  1755- dental present        Medical Decision Making: Presents with complaints of pain and swelling to the left side states he is currently on amoxicillin which has been on since Friday states her pain is getting worse. She does have an upcoming appointment to follow-up with her dentist. Exam and history c/w  dental pain without evidence of gross infection. At this time we will  the patient on following up in dental clinic and provide pain relief. .  Patient was seen and evaluated by the dental resident, dental resident did perform a bedside incision and

## 2023-09-04 NOTE — DISCHARGE INSTRUCTIONS
Stop the Amoxil Antibiotic and Start the Augmentin   Follow up with dental Clinic in AM as Instructed

## 2023-10-25 ENCOUNTER — APPOINTMENT (OUTPATIENT)
Dept: CT IMAGING | Age: 38
End: 2023-10-25
Payer: COMMERCIAL

## 2023-10-25 ENCOUNTER — APPOINTMENT (OUTPATIENT)
Dept: GENERAL RADIOLOGY | Age: 38
End: 2023-10-25
Payer: COMMERCIAL

## 2023-10-25 ENCOUNTER — HOSPITAL ENCOUNTER (EMERGENCY)
Age: 38
Discharge: HOME | End: 2023-10-25
Payer: COMMERCIAL

## 2023-10-25 VITALS
DIASTOLIC BLOOD PRESSURE: 103 MMHG | RESPIRATION RATE: 20 BRPM | SYSTOLIC BLOOD PRESSURE: 168 MMHG | OXYGEN SATURATION: 97 % | HEART RATE: 78 BPM | TEMPERATURE: 97.6 F

## 2023-10-25 DIAGNOSIS — R10.9 ABDOMINAL PAIN, UNSPECIFIED ABDOMINAL LOCATION: Primary | ICD-10-CM

## 2023-10-25 DIAGNOSIS — N83.201 RIGHT OVARIAN CYST: ICD-10-CM

## 2023-10-25 DIAGNOSIS — R11.2 NAUSEA AND VOMITING, UNSPECIFIED VOMITING TYPE: ICD-10-CM

## 2023-10-25 DIAGNOSIS — R07.9 CHEST PAIN, UNSPECIFIED TYPE: ICD-10-CM

## 2023-10-25 LAB
ALBUMIN SERPL-MCNC: 4.9 G/DL (ref 3.5–5.2)
ALP SERPL-CCNC: 66 U/L (ref 35–104)
ALT SERPL-CCNC: 15 U/L (ref 0–32)
ANION GAP SERPL CALCULATED.3IONS-SCNC: 16 MMOL/L (ref 7–16)
AST SERPL-CCNC: 24 U/L (ref 0–31)
BACTERIA URNS QL MICRO: ABNORMAL
BASOPHILS # BLD: 0.06 K/UL (ref 0–0.2)
BASOPHILS NFR BLD: 0 % (ref 0–2)
BILIRUB SERPL-MCNC: 0.4 MG/DL (ref 0–1.2)
BILIRUB UR QL STRIP: ABNORMAL
BUN SERPL-MCNC: 15 MG/DL (ref 6–20)
CALCIUM SERPL-MCNC: 9.8 MG/DL (ref 8.6–10.2)
CHLORIDE SERPL-SCNC: 94 MMOL/L (ref 98–107)
CLARITY UR: CLEAR
CO2 SERPL-SCNC: 25 MMOL/L (ref 22–29)
COLOR UR: YELLOW
CREAT SERPL-MCNC: 0.5 MG/DL (ref 0.5–1)
CRYSTALS URNS MICRO: ABNORMAL /HPF
D DIMER: <200 NG/ML DDU (ref 0–232)
EOSINOPHIL # BLD: 0.11 K/UL (ref 0.05–0.5)
EOSINOPHILS RELATIVE PERCENT: 1 % (ref 0–6)
EPI CELLS #/AREA URNS HPF: ABNORMAL /HPF
ERYTHROCYTE [DISTWIDTH] IN BLOOD BY AUTOMATED COUNT: 13.7 % (ref 11.5–15)
GFR SERPL CREATININE-BSD FRML MDRD: >60 ML/MIN/1.73M2
GLUCOSE SERPL-MCNC: 110 MG/DL (ref 74–99)
GLUCOSE UR STRIP-MCNC: NEGATIVE MG/DL
HCG, URINE, POC: NEGATIVE
HCT VFR BLD AUTO: 45.3 % (ref 34–48)
HGB BLD-MCNC: 15.5 G/DL (ref 11.5–15.5)
HGB UR QL STRIP.AUTO: NEGATIVE
IMM GRANULOCYTES # BLD AUTO: 0.1 K/UL (ref 0–0.58)
IMM GRANULOCYTES NFR BLD: 1 % (ref 0–5)
KETONES UR STRIP-MCNC: 15 MG/DL
LACTATE BLDV-SCNC: 2.4 MMOL/L (ref 0.5–2.2)
LEUKOCYTE ESTERASE UR QL STRIP: ABNORMAL
LIPASE SERPL-CCNC: 28 U/L (ref 13–60)
LYMPHOCYTES NFR BLD: 2.77 K/UL (ref 1.5–4)
LYMPHOCYTES RELATIVE PERCENT: 17 % (ref 20–42)
Lab: NORMAL
MAGNESIUM SERPL-MCNC: 2.3 MG/DL (ref 1.6–2.6)
MCH RBC QN AUTO: 31.6 PG (ref 26–35)
MCHC RBC AUTO-ENTMCNC: 34.2 G/DL (ref 32–34.5)
MCV RBC AUTO: 92.3 FL (ref 80–99.9)
MONOCYTES NFR BLD: 1.05 K/UL (ref 0.1–0.95)
MONOCYTES NFR BLD: 6 % (ref 2–12)
MUCOUS THREADS URNS QL MICRO: PRESENT
NEGATIVE QC PASS/FAIL: NORMAL
NEUTROPHILS NFR BLD: 76 % (ref 43–80)
NEUTS SEG NFR BLD: 12.73 K/UL (ref 1.8–7.3)
NITRITE UR QL STRIP: NEGATIVE
PH UR STRIP: 6.5 [PH] (ref 5–9)
PLATELET # BLD AUTO: 537 K/UL (ref 130–450)
PMV BLD AUTO: 9.2 FL (ref 7–12)
POSITIVE QC PASS/FAIL: NORMAL
POTASSIUM SERPL-SCNC: 3.5 MMOL/L (ref 3.5–5)
PROT SERPL-MCNC: 8.1 G/DL (ref 6.4–8.3)
PROT UR STRIP-MCNC: 100 MG/DL
RBC # BLD AUTO: 4.91 M/UL (ref 3.5–5.5)
RBC #/AREA URNS HPF: ABNORMAL /HPF
SARS-COV-2 RDRP RESP QL NAA+PROBE: NOT DETECTED
SODIUM SERPL-SCNC: 135 MMOL/L (ref 132–146)
SP GR UR STRIP: >1.03 (ref 1–1.03)
SPECIMEN DESCRIPTION: NORMAL
TROPONIN I SERPL HS-MCNC: <6 NG/L (ref 0–9)
UROBILINOGEN UR STRIP-ACNC: 1 EU/DL (ref 0–1)
WBC #/AREA URNS HPF: ABNORMAL /HPF
WBC OTHER # BLD: 16.8 K/UL (ref 4.5–11.5)

## 2023-10-25 PROCEDURE — 87635 SARS-COV-2 COVID-19 AMP PRB: CPT

## 2023-10-25 PROCEDURE — 99285 EMERGENCY DEPT VISIT HI MDM: CPT

## 2023-10-25 PROCEDURE — 83690 ASSAY OF LIPASE: CPT

## 2023-10-25 PROCEDURE — 93005 ELECTROCARDIOGRAM TRACING: CPT | Performed by: PHYSICIAN ASSISTANT

## 2023-10-25 PROCEDURE — 96374 THER/PROPH/DIAG INJ IV PUSH: CPT

## 2023-10-25 PROCEDURE — 80053 COMPREHEN METABOLIC PANEL: CPT

## 2023-10-25 PROCEDURE — 71046 X-RAY EXAM CHEST 2 VIEWS: CPT

## 2023-10-25 PROCEDURE — 84484 ASSAY OF TROPONIN QUANT: CPT

## 2023-10-25 PROCEDURE — 83605 ASSAY OF LACTIC ACID: CPT

## 2023-10-25 PROCEDURE — 81001 URINALYSIS AUTO W/SCOPE: CPT

## 2023-10-25 PROCEDURE — 87077 CULTURE AEROBIC IDENTIFY: CPT

## 2023-10-25 PROCEDURE — 2580000003 HC RX 258: Performed by: PHYSICIAN ASSISTANT

## 2023-10-25 PROCEDURE — 85025 COMPLETE CBC W/AUTO DIFF WBC: CPT

## 2023-10-25 PROCEDURE — 96375 TX/PRO/DX INJ NEW DRUG ADDON: CPT

## 2023-10-25 PROCEDURE — 87086 URINE CULTURE/COLONY COUNT: CPT

## 2023-10-25 PROCEDURE — 6360000002 HC RX W HCPCS: Performed by: PHYSICIAN ASSISTANT

## 2023-10-25 PROCEDURE — 74177 CT ABD & PELVIS W/CONTRAST: CPT

## 2023-10-25 PROCEDURE — 6370000000 HC RX 637 (ALT 250 FOR IP): Performed by: PHYSICIAN ASSISTANT

## 2023-10-25 PROCEDURE — 83735 ASSAY OF MAGNESIUM: CPT

## 2023-10-25 PROCEDURE — 85379 FIBRIN DEGRADATION QUANT: CPT

## 2023-10-25 PROCEDURE — 6360000004 HC RX CONTRAST MEDICATION: Performed by: RADIOLOGY

## 2023-10-25 RX ORDER — ONDANSETRON 2 MG/ML
4 INJECTION INTRAMUSCULAR; INTRAVENOUS ONCE
Status: COMPLETED | OUTPATIENT
Start: 2023-10-25 | End: 2023-10-25

## 2023-10-25 RX ORDER — KETOROLAC TROMETHAMINE 15 MG/ML
15 INJECTION, SOLUTION INTRAMUSCULAR; INTRAVENOUS ONCE
Status: COMPLETED | OUTPATIENT
Start: 2023-10-25 | End: 2023-10-25

## 2023-10-25 RX ORDER — DROPERIDOL 2.5 MG/ML
2.5 INJECTION, SOLUTION INTRAMUSCULAR; INTRAVENOUS ONCE
Status: COMPLETED | OUTPATIENT
Start: 2023-10-25 | End: 2023-10-25

## 2023-10-25 RX ORDER — SODIUM CHLORIDE 0.9 % (FLUSH) 0.9 %
10 SYRINGE (ML) INJECTION
Status: DISCONTINUED | OUTPATIENT
Start: 2023-10-25 | End: 2023-10-25 | Stop reason: HOSPADM

## 2023-10-25 RX ORDER — 0.9 % SODIUM CHLORIDE 0.9 %
1000 INTRAVENOUS SOLUTION INTRAVENOUS ONCE
Status: COMPLETED | OUTPATIENT
Start: 2023-10-25 | End: 2023-10-25

## 2023-10-25 RX ORDER — OXYCODONE HYDROCHLORIDE AND ACETAMINOPHEN 5; 325 MG/1; MG/1
1 TABLET ORAL ONCE
Status: COMPLETED | OUTPATIENT
Start: 2023-10-25 | End: 2023-10-25

## 2023-10-25 RX ORDER — ONDANSETRON 4 MG/1
4 TABLET, ORALLY DISINTEGRATING ORAL 3 TIMES DAILY PRN
Qty: 21 TABLET | Refills: 0 | Status: SHIPPED | OUTPATIENT
Start: 2023-10-25

## 2023-10-25 RX ADMIN — IOPAMIDOL 75 ML: 755 INJECTION, SOLUTION INTRAVENOUS at 11:23

## 2023-10-25 RX ADMIN — KETOROLAC TROMETHAMINE 15 MG: 15 INJECTION, SOLUTION INTRAMUSCULAR; INTRAVENOUS at 08:53

## 2023-10-25 RX ADMIN — OXYCODONE AND ACETAMINOPHEN 1 TABLET: 5; 325 TABLET ORAL at 14:46

## 2023-10-25 RX ADMIN — SODIUM CHLORIDE 1000 ML: 9 INJECTION, SOLUTION INTRAVENOUS at 08:52

## 2023-10-25 RX ADMIN — DROPERIDOL 2.5 MG: 2.5 INJECTION, SOLUTION INTRAMUSCULAR; INTRAVENOUS at 09:57

## 2023-10-25 RX ADMIN — ONDANSETRON 4 MG: 2 INJECTION INTRAMUSCULAR; INTRAVENOUS at 08:53

## 2023-10-25 ASSESSMENT — PAIN SCALES - GENERAL
PAINLEVEL_OUTOF10: 10
PAINLEVEL_OUTOF10: 9

## 2023-10-25 ASSESSMENT — HEART SCORE: ECG: 0

## 2023-10-25 ASSESSMENT — PAIN DESCRIPTION - DESCRIPTORS: DESCRIPTORS: ACHING;CRUSHING

## 2023-10-25 ASSESSMENT — PAIN DESCRIPTION - LOCATION
LOCATION: BACK;ABDOMEN;CHEST
LOCATION: ABDOMEN

## 2023-10-25 ASSESSMENT — PAIN - FUNCTIONAL ASSESSMENT: PAIN_FUNCTIONAL_ASSESSMENT: 0-10

## 2023-10-25 NOTE — ED PROVIDER NOTES
[address]  Department of Emergency Medicine   ED  Encounter Note  Admit Date/RoomTime: 10/25/2023  1:50 PM  ED Room: [place] [address]    NAME: Tj Le  : 1985  MRN: [id number]     Chief Complaint:  Chest Pain (Patient c/o pain in back, abdomen and chest. Emesis x 3 days. Hx chrons per EMS. )    History of Present Illness       Tj Le is a 41 y.o. old female who presents to the emergency department by private vehicle, for abdominal pain, vomiting x2 days. Patient states she is also having history of chest pain, productive cough, and upper back pain. Patient has a history of Crohn's disease and cannabinoid hyperemesis syndrome. Patient states her symptoms are mild in severity and describes it as an aching. Patient denies anything making it better or worse. Denies fever/chills, headache, vision change, dizziness, hemoptysis, dyspnea, diarrhea, hematemesis, melena, bloody stool, urinary symptoms, hematuria, numbness/weakness. ROS   Pertinent positives and negatives are stated within HPI, all other systems reviewed and are negative. Past Medical History:  has a past medical history of Acute Crohn's disease ([address]), Anxiety attack, Asthma, Crohn's colitis ([address]), Depression, Herpes simplex virus (HSV) infection, History of blood transfusion, Hypertension, Marijuana abuse, Neurologic disorder, Postpartum depression,  delivery,  labor, Rh sensitized, Seizure ([address]), STD (sexually transmitted disease), and Tobacco abuse. Surgical History has a past surgical history that includes Dilation and curettage of uterus (); Colonoscopy; Endoscopy, colon, diagnostic; Cholecystectomy (2018); hernia repair; hernia repair; Upper gastrointestinal endoscopy (N/A, 2022); Dilation & curettage; and laparoscopy. Social History:  reports that she quit smoking about 3 years ago. Her smoking use included cigarettes.  She has a 10.00

## 2023-10-27 LAB
EKG ATRIAL RATE: 71 BPM
EKG P AXIS: 8 DEGREES
EKG P-R INTERVAL: 132 MS
EKG Q-T INTERVAL: 404 MS
EKG QRS DURATION: 114 MS
EKG QTC CALCULATION (BAZETT): 439 MS
EKG R AXIS: 0 DEGREES
EKG T AXIS: 42 DEGREES
EKG VENTRICULAR RATE: 71 BPM
MICROORGANISM SPEC CULT: ABNORMAL
MICROORGANISM SPEC CULT: ABNORMAL
SPECIMEN DESCRIPTION: ABNORMAL

## 2023-10-27 PROCEDURE — 93010 ELECTROCARDIOGRAM REPORT: CPT | Performed by: INTERNAL MEDICINE

## 2023-12-24 ENCOUNTER — APPOINTMENT (OUTPATIENT)
Dept: CT IMAGING | Age: 38
End: 2023-12-24
Payer: COMMERCIAL

## 2023-12-24 ENCOUNTER — HOSPITAL ENCOUNTER (EMERGENCY)
Age: 38
Discharge: HOME | End: 2023-12-25
Attending: EMERGENCY MEDICINE
Payer: COMMERCIAL

## 2023-12-24 DIAGNOSIS — R11.2 NAUSEA AND VOMITING, UNSPECIFIED VOMITING TYPE: Primary | ICD-10-CM

## 2023-12-24 DIAGNOSIS — R10.84 GENERALIZED ABDOMINAL PAIN: ICD-10-CM

## 2023-12-24 LAB
ALBUMIN SERPL-MCNC: 4.8 G/DL (ref 3.5–5.2)
ALP SERPL-CCNC: 60 U/L (ref 35–104)
ALT SERPL-CCNC: 12 U/L (ref 0–32)
ANION GAP SERPL CALCULATED.3IONS-SCNC: 15 MMOL/L (ref 7–16)
AST SERPL-CCNC: 14 U/L (ref 0–31)
BACTERIA URNS QL MICRO: ABNORMAL
BASOPHILS # BLD: 0.02 K/UL (ref 0–0.2)
BASOPHILS NFR BLD: 0 % (ref 0–2)
BILIRUB SERPL-MCNC: 0.3 MG/DL (ref 0–1.2)
BILIRUB UR QL STRIP: NEGATIVE
BUN SERPL-MCNC: 25 MG/DL (ref 6–20)
CALCIUM SERPL-MCNC: 9.4 MG/DL (ref 8.6–10.2)
CHLORIDE SERPL-SCNC: 97 MMOL/L (ref 98–107)
CLARITY UR: CLEAR
CO2 SERPL-SCNC: 20 MMOL/L (ref 22–29)
COLOR UR: YELLOW
CREAT SERPL-MCNC: 0.7 MG/DL (ref 0.5–1)
EOSINOPHIL # BLD: 0.02 K/UL (ref 0.05–0.5)
EOSINOPHILS RELATIVE PERCENT: 0 % (ref 0–6)
EPI CELLS #/AREA URNS HPF: ABNORMAL /HPF
ERYTHROCYTE [DISTWIDTH] IN BLOOD BY AUTOMATED COUNT: 13.1 % (ref 11.5–15)
GFR SERPL CREATININE-BSD FRML MDRD: >60 ML/MIN/1.73M2
GLUCOSE SERPL-MCNC: 124 MG/DL (ref 74–99)
GLUCOSE UR STRIP-MCNC: NEGATIVE MG/DL
HCG SERPL QL: NEGATIVE
HCG, URINE, POC: NEGATIVE
HCT VFR BLD AUTO: 38.9 % (ref 34–48)
HGB BLD-MCNC: 13.4 G/DL (ref 11.5–15.5)
HGB UR QL STRIP.AUTO: NEGATIVE
IMM GRANULOCYTES # BLD AUTO: 0.06 K/UL (ref 0–0.58)
IMM GRANULOCYTES NFR BLD: 0 % (ref 0–5)
KETONES UR STRIP-MCNC: ABNORMAL MG/DL
LACTATE BLDV-SCNC: 1.5 MMOL/L (ref 0.5–2.2)
LEUKOCYTE ESTERASE UR QL STRIP: ABNORMAL
LIPASE SERPL-CCNC: 26 U/L (ref 13–60)
LYMPHOCYTES NFR BLD: 1.38 K/UL (ref 1.5–4)
LYMPHOCYTES RELATIVE PERCENT: 9 % (ref 20–42)
Lab: NORMAL
MCH RBC QN AUTO: 30.9 PG (ref 26–35)
MCHC RBC AUTO-ENTMCNC: 34.4 G/DL (ref 32–34.5)
MCV RBC AUTO: 89.6 FL (ref 80–99.9)
MONOCYTES NFR BLD: 0.48 K/UL (ref 0.1–0.95)
MONOCYTES NFR BLD: 3 % (ref 2–12)
NEGATIVE QC PASS/FAIL: NORMAL
NEUTROPHILS NFR BLD: 87 % (ref 43–80)
NEUTS SEG NFR BLD: 12.94 K/UL (ref 1.8–7.3)
NITRITE UR QL STRIP: NEGATIVE
PH UR STRIP: 6.5 [PH] (ref 5–9)
PLATELET # BLD AUTO: 521 K/UL (ref 130–450)
PMV BLD AUTO: 8.8 FL (ref 7–12)
POSITIVE QC PASS/FAIL: NORMAL
POTASSIUM SERPL-SCNC: 3.6 MMOL/L (ref 3.5–5)
PROT SERPL-MCNC: 7.7 G/DL (ref 6.4–8.3)
PROT UR STRIP-MCNC: 30 MG/DL
RBC # BLD AUTO: 4.34 M/UL (ref 3.5–5.5)
RBC #/AREA URNS HPF: ABNORMAL /HPF
SODIUM SERPL-SCNC: 132 MMOL/L (ref 132–146)
SP GR UR STRIP: 1.01 (ref 1–1.03)
UROBILINOGEN UR STRIP-ACNC: 0.2 EU/DL (ref 0–1)
WBC #/AREA URNS HPF: ABNORMAL /HPF
WBC OTHER # BLD: 14.9 K/UL (ref 4.5–11.5)

## 2023-12-24 PROCEDURE — 2580000003 HC RX 258

## 2023-12-24 PROCEDURE — 80053 COMPREHEN METABOLIC PANEL: CPT

## 2023-12-24 PROCEDURE — 87086 URINE CULTURE/COLONY COUNT: CPT

## 2023-12-24 PROCEDURE — 6370000000 HC RX 637 (ALT 250 FOR IP)

## 2023-12-24 PROCEDURE — 85025 COMPLETE CBC W/AUTO DIFF WBC: CPT

## 2023-12-24 PROCEDURE — 83605 ASSAY OF LACTIC ACID: CPT

## 2023-12-24 PROCEDURE — 84703 CHORIONIC GONADOTROPIN ASSAY: CPT

## 2023-12-24 PROCEDURE — 6360000004 HC RX CONTRAST MEDICATION: Performed by: RADIOLOGY

## 2023-12-24 PROCEDURE — 83690 ASSAY OF LIPASE: CPT

## 2023-12-24 PROCEDURE — 74177 CT ABD & PELVIS W/CONTRAST: CPT

## 2023-12-24 PROCEDURE — 81001 URINALYSIS AUTO W/SCOPE: CPT

## 2023-12-24 RX ORDER — ACETAMINOPHEN 325 MG/1
650 TABLET ORAL ONCE
Status: COMPLETED | OUTPATIENT
Start: 2023-12-24 | End: 2023-12-24

## 2023-12-24 RX ORDER — 0.9 % SODIUM CHLORIDE 0.9 %
1000 INTRAVENOUS SOLUTION INTRAVENOUS ONCE
Status: COMPLETED | OUTPATIENT
Start: 2023-12-24 | End: 2023-12-24

## 2023-12-24 RX ADMIN — ACETAMINOPHEN 650 MG: 325 TABLET ORAL at 21:03

## 2023-12-24 RX ADMIN — IOPAMIDOL 75 ML: 755 INJECTION, SOLUTION INTRAVENOUS at 23:42

## 2023-12-24 RX ADMIN — SODIUM CHLORIDE 1000 ML: 9 INJECTION, SOLUTION INTRAVENOUS at 21:03

## 2023-12-25 ENCOUNTER — HOSPITAL ENCOUNTER (EMERGENCY)
Age: 38
Discharge: LEFT BEFORE DISCHARGE | End: 2023-12-25
Attending: STUDENT IN AN ORGANIZED HEALTH CARE EDUCATION/TRAINING PROGRAM
Payer: COMMERCIAL

## 2023-12-25 VITALS
SYSTOLIC BLOOD PRESSURE: 128 MMHG | HEART RATE: 76 BPM | BODY MASS INDEX: 27.31 KG/M2 | WEIGHT: 160 LBS | TEMPERATURE: 97.9 F | HEIGHT: 64 IN | DIASTOLIC BLOOD PRESSURE: 79 MMHG | RESPIRATION RATE: 16 BRPM | OXYGEN SATURATION: 97 %

## 2023-12-25 VITALS
SYSTOLIC BLOOD PRESSURE: 118 MMHG | DIASTOLIC BLOOD PRESSURE: 83 MMHG | TEMPERATURE: 97.6 F | HEART RATE: 90 BPM | BODY MASS INDEX: 27.31 KG/M2 | HEIGHT: 64 IN | RESPIRATION RATE: 18 BRPM | OXYGEN SATURATION: 98 % | WEIGHT: 160 LBS

## 2023-12-25 DIAGNOSIS — F12.90 CANNABINOID HYPEREMESIS SYNDROME: Primary | ICD-10-CM

## 2023-12-25 DIAGNOSIS — R11.2 CANNABINOID HYPEREMESIS SYNDROME: Primary | ICD-10-CM

## 2023-12-25 PROCEDURE — 6360000002 HC RX W HCPCS: Performed by: EMERGENCY MEDICINE

## 2023-12-25 PROCEDURE — 2580000003 HC RX 258

## 2023-12-25 PROCEDURE — 96375 TX/PRO/DX INJ NEW DRUG ADDON: CPT

## 2023-12-25 PROCEDURE — 6360000002 HC RX W HCPCS

## 2023-12-25 PROCEDURE — 99284 EMERGENCY DEPT VISIT MOD MDM: CPT

## 2023-12-25 PROCEDURE — 96372 THER/PROPH/DIAG INJ SC/IM: CPT

## 2023-12-25 PROCEDURE — 6370000000 HC RX 637 (ALT 250 FOR IP)

## 2023-12-25 PROCEDURE — 99285 EMERGENCY DEPT VISIT HI MDM: CPT

## 2023-12-25 PROCEDURE — 96374 THER/PROPH/DIAG INJ IV PUSH: CPT

## 2023-12-25 RX ORDER — PROMETHAZINE HYDROCHLORIDE 25 MG/ML
12.5 INJECTION, SOLUTION INTRAMUSCULAR; INTRAVENOUS ONCE
Status: COMPLETED | OUTPATIENT
Start: 2023-12-25 | End: 2023-12-25

## 2023-12-25 RX ORDER — DROPERIDOL 2.5 MG/ML
1.25 INJECTION, SOLUTION INTRAMUSCULAR; INTRAVENOUS EVERY 6 HOURS PRN
Status: DISCONTINUED | OUTPATIENT
Start: 2023-12-25 | End: 2023-12-25

## 2023-12-25 RX ORDER — KETOROLAC TROMETHAMINE 30 MG/ML
15 INJECTION, SOLUTION INTRAMUSCULAR; INTRAVENOUS ONCE
Status: COMPLETED | OUTPATIENT
Start: 2023-12-25 | End: 2023-12-25

## 2023-12-25 RX ORDER — ONDANSETRON 4 MG/1
8 TABLET, ORALLY DISINTEGRATING ORAL ONCE
Status: COMPLETED | OUTPATIENT
Start: 2023-12-25 | End: 2023-12-25

## 2023-12-25 RX ORDER — DROPERIDOL 2.5 MG/ML
1.25 INJECTION, SOLUTION INTRAMUSCULAR; INTRAVENOUS ONCE
Status: COMPLETED | OUTPATIENT
Start: 2023-12-25 | End: 2023-12-25

## 2023-12-25 RX ORDER — 0.9 % SODIUM CHLORIDE 0.9 %
1000 INTRAVENOUS SOLUTION INTRAVENOUS ONCE
Status: COMPLETED | OUTPATIENT
Start: 2023-12-25 | End: 2023-12-25

## 2023-12-25 RX ORDER — FENTANYL CITRATE 50 UG/ML
50 INJECTION, SOLUTION INTRAMUSCULAR; INTRAVENOUS ONCE
Status: COMPLETED | OUTPATIENT
Start: 2023-12-25 | End: 2023-12-25

## 2023-12-25 RX ORDER — DROPERIDOL 2.5 MG/ML
1.25 INJECTION, SOLUTION INTRAMUSCULAR; INTRAVENOUS ONCE
Status: DISCONTINUED | OUTPATIENT
Start: 2023-12-25 | End: 2023-12-25

## 2023-12-25 RX ORDER — PROMETHAZINE HYDROCHLORIDE 25 MG/1
25 TABLET ORAL EVERY 6 HOURS PRN
Qty: 28 TABLET | Refills: 0 | Status: SHIPPED | OUTPATIENT
Start: 2023-12-25 | End: 2024-01-01

## 2023-12-25 RX ADMIN — SODIUM CHLORIDE 1000 ML: 9 INJECTION, SOLUTION INTRAVENOUS at 01:15

## 2023-12-25 RX ADMIN — DROPERIDOL 1.25 MG: 2.5 INJECTION, SOLUTION INTRAMUSCULAR; INTRAVENOUS at 06:12

## 2023-12-25 RX ADMIN — PROMETHAZINE HYDROCHLORIDE 12.5 MG: 25 INJECTION INTRAMUSCULAR; INTRAVENOUS at 01:17

## 2023-12-25 RX ADMIN — KETOROLAC TROMETHAMINE 15 MG: 30 INJECTION, SOLUTION INTRAMUSCULAR; INTRAVENOUS at 01:16

## 2023-12-25 RX ADMIN — ONDANSETRON 8 MG: 4 TABLET, ORALLY DISINTEGRATING ORAL at 04:15

## 2023-12-25 RX ADMIN — FENTANYL CITRATE 50 MCG: 50 INJECTION INTRAMUSCULAR; INTRAVENOUS at 00:18

## 2023-12-25 NOTE — ED PROVIDER NOTES
pregnancy [[place]]   Mon Dec 25, 2023   0031 CT ABDOMEN PELVIS W IV CONTRAST Additional Contrast? None    IMPRESSION:  Normal appearing appendix. No evidence of inflammatory process involving the  right lower quadrant. No urinary tract stones or hydronephrosis. Normal renal parenchyma. Diffuse urinary bladder wall thickening.      [[place]]   0210 Upon revaluaiton patient feels symptomatic improvement is no longer nauseated after receiving Phenergan. Her pain is much better controlled after receiving Toradol. She is currently receiving 2 L of fluids. Will reevaluate after second unit fluids plan to discharge the patient if patient feels better. Patient agrees to plan [[place]]      ED Course User Index  [[place]] Gurvinder Mueller, DO       --------------------------------------------- PAST HISTORY ---------------------------------------------  Past Medical History:  has a past medical history of Acute Crohn's disease ([address]), Anxiety attack, Asthma, Crohn's colitis ([address]), Depression, Herpes simplex virus (HSV) infection, History of blood transfusion, Hypertension, Marijuana abuse, Neurologic disorder, Postpartum depression,  delivery,  labor, Rh sensitized, Seizure ([address]), STD (sexually transmitted disease), and Tobacco abuse. Past Surgical History:  has a past surgical history that includes Dilation and curettage of uterus (); Colonoscopy; Endoscopy, colon, diagnostic; Cholecystectomy (2018); hernia repair; hernia repair; Upper gastrointestinal endoscopy (N/A, 2022); Dilation & curettage; and laparoscopy. Social History:  reports that she quit smoking about 3 years ago. Her smoking use included cigarettes. She started smoking about 13 years ago. She has a 10.0 pack-year smoking history. She has never used smokeless tobacco. She reports that she does not currently use alcohol. She reports current drug use. Drug: Marijuana June Saner).     Family History: family history includes Breast Cancer in

## 2023-12-25 NOTE — ED PROVIDER NOTES
Department of Emergency Medicine     Written by: Hugo Dumont MD  Patient Name: Rosalie Butts Date: No admission date for patient encounter. MRN: [id number]                   : 1985    HPI  Chief Complaint   Patient presents with    Emesis     Just discharged and states she has vomited three times. Jennifer Sullivan is a 41 y.o. female that presents to the ED with complaints of continuous abdominal pain with nausea and vomiting. The patient says that she has had nausea and vomiting for the past 3 days, with abdominal pain for the past week. The patient says that she just recently had an upper and lower endoscopy for evaluation abdominal pain. She says that she has been smoking marijuana for the past 20 years. She stopped smoking 2 weeks ago. She was recently discharged from the ER prior to return this time for abdominal pain and nausea and vomiting. She was given Phenergan, improved, she was asleep. Upon discharge, she was doing okay. When she left, she says she threw up 3 times. She has had chills with vomiting. No fevers. She says she has had months of joint pain and bodyaches, her father has a history of rheumatoid arthritis. She does not know if she has any autoimmune disease, is unsure if this may be gastroparesis. She was being evaluated for possible Crohn's disease versus IBS versus other causes of her abdominal pain. She follows with [facility] in the past    Review of systems:  Pertinent positives and negatives mentioned in the HPI/MDM. Physical Exam  Constitutional:       Appearance: Normal appearance. She is not toxic-appearing or diaphoretic. Eyes:      Extraocular Movements: Extraocular movements intact. Pupils: Pupils are equal, round, and reactive to light. Cardiovascular:      Rate and Rhythm: Normal rate and regular rhythm. Pulses: Normal pulses. Heart sounds: Normal heart sounds.    Pulmonary:      Effort: Pulmonary effort is

## 2023-12-25 NOTE — DISCHARGE INSTRUCTIONS
Follow-up with your primary care was initiated and your GI doctor. ,  Come back to the emergency department if symptoms were to worsen.

## 2023-12-26 LAB
MICROORGANISM SPEC CULT: ABNORMAL
MICROORGANISM SPEC CULT: ABNORMAL
SPECIMEN DESCRIPTION: ABNORMAL

## 2023-12-29 LAB
EKG ATRIAL RATE: 70 BPM
EKG ATRIAL RATE: 83 BPM
EKG P AXIS: 62 DEGREES
EKG P AXIS: 73 DEGREES
EKG P-R INTERVAL: 138 MS
EKG P-R INTERVAL: 150 MS
EKG Q-T INTERVAL: 382 MS
EKG Q-T INTERVAL: 384 MS
EKG QRS DURATION: 110 MS
EKG QRS DURATION: 112 MS
EKG QTC CALCULATION (BAZETT): 414 MS
EKG QTC CALCULATION (BAZETT): 448 MS
EKG R AXIS: 42 DEGREES
EKG R AXIS: 59 DEGREES
EKG T AXIS: 57 DEGREES
EKG T AXIS: 63 DEGREES
EKG VENTRICULAR RATE: 70 BPM
EKG VENTRICULAR RATE: 83 BPM

## 2023-12-31 ENCOUNTER — APPOINTMENT (OUTPATIENT)
Dept: CT IMAGING | Age: 38
End: 2023-12-31
Payer: COMMERCIAL

## 2023-12-31 ENCOUNTER — HOSPITAL ENCOUNTER (EMERGENCY)
Age: 38
Discharge: LEFT BEFORE DISCHARGE | End: 2023-12-31
Payer: COMMERCIAL

## 2023-12-31 VITALS
RESPIRATION RATE: 18 BRPM | SYSTOLIC BLOOD PRESSURE: 113 MMHG | DIASTOLIC BLOOD PRESSURE: 98 MMHG | TEMPERATURE: 98.1 F | OXYGEN SATURATION: 98 % | HEART RATE: 98 BPM

## 2023-12-31 DIAGNOSIS — R10.9 ABDOMINAL PAIN, UNSPECIFIED ABDOMINAL LOCATION: Primary | ICD-10-CM

## 2023-12-31 LAB
AMPHET UR QL SCN: NEGATIVE
APAP SERPL-MCNC: 7 UG/ML (ref 10–30)
BARBITURATES UR QL SCN: NEGATIVE
BASOPHILS # BLD: 0.03 K/UL (ref 0–0.2)
BASOPHILS NFR BLD: 0 % (ref 0–2)
BENZODIAZ UR QL: NEGATIVE
BUPRENORPHINE UR QL: NEGATIVE
CANNABINOIDS UR QL SCN: POSITIVE
COCAINE UR QL SCN: NEGATIVE
EOSINOPHIL # BLD: 0 K/UL (ref 0.05–0.5)
EOSINOPHILS RELATIVE PERCENT: 0 % (ref 0–6)
ERYTHROCYTE [DISTWIDTH] IN BLOOD BY AUTOMATED COUNT: 13.6 % (ref 11.5–15)
FENTANYL UR QL: NEGATIVE
HCG, URINE, POC: NEGATIVE
HCT VFR BLD AUTO: 43.5 % (ref 34–48)
HGB BLD-MCNC: 14.9 G/DL (ref 11.5–15.5)
IMM GRANULOCYTES # BLD AUTO: 0.11 K/UL (ref 0–0.58)
IMM GRANULOCYTES NFR BLD: 1 % (ref 0–5)
LACTATE BLDV-SCNC: 0.9 MMOL/L (ref 0.5–2.2)
LACTATE BLDV-SCNC: 2.2 MMOL/L (ref 0.5–2.2)
LYMPHOCYTES NFR BLD: 0.87 K/UL (ref 1.5–4)
LYMPHOCYTES RELATIVE PERCENT: 4 % (ref 20–42)
Lab: NORMAL
MCH RBC QN AUTO: 31.7 PG (ref 26–35)
MCHC RBC AUTO-ENTMCNC: 34.3 G/DL (ref 32–34.5)
MCV RBC AUTO: 92.6 FL (ref 80–99.9)
METHADONE UR QL: NEGATIVE
MONOCYTES NFR BLD: 0.42 K/UL (ref 0.1–0.95)
MONOCYTES NFR BLD: 2 % (ref 2–12)
NEGATIVE QC PASS/FAIL: NORMAL
NEUTROPHILS NFR BLD: 93 % (ref 43–80)
NEUTS SEG NFR BLD: 18.55 K/UL (ref 1.8–7.3)
OPIATES UR QL SCN: NEGATIVE
OXYCODONE UR QL SCN: NEGATIVE
PCP UR QL SCN: NEGATIVE
PLATELET CONFIRMATION: NORMAL
PLATELET, FLUORESCENCE: 286 K/UL (ref 130–450)
PMV BLD AUTO: 10.1 FL (ref 7–12)
POSITIVE QC PASS/FAIL: NORMAL
RBC # BLD AUTO: 4.7 M/UL (ref 3.5–5.5)
SALICYLATES SERPL-MCNC: <0.3 MG/DL (ref 0–30)
TEST INFORMATION: ABNORMAL
TOXIC TRICYCLIC SC,BLOOD: NEGATIVE
TROPONIN I SERPL HS-MCNC: <6 NG/L (ref 0–9)
WBC OTHER # BLD: 20 K/UL (ref 4.5–11.5)

## 2023-12-31 PROCEDURE — 85025 COMPLETE CBC W/AUTO DIFF WBC: CPT

## 2023-12-31 PROCEDURE — 6360000004 HC RX CONTRAST MEDICATION: Performed by: RADIOLOGY

## 2023-12-31 PROCEDURE — 74177 CT ABD & PELVIS W/CONTRAST: CPT

## 2023-12-31 PROCEDURE — 83605 ASSAY OF LACTIC ACID: CPT

## 2023-12-31 PROCEDURE — 99285 EMERGENCY DEPT VISIT HI MDM: CPT

## 2023-12-31 PROCEDURE — 96375 TX/PRO/DX INJ NEW DRUG ADDON: CPT

## 2023-12-31 PROCEDURE — 6360000002 HC RX W HCPCS: Performed by: PHYSICIAN ASSISTANT

## 2023-12-31 PROCEDURE — G0480 DRUG TEST DEF 1-7 CLASSES: HCPCS

## 2023-12-31 PROCEDURE — 84484 ASSAY OF TROPONIN QUANT: CPT

## 2023-12-31 PROCEDURE — 96374 THER/PROPH/DIAG INJ IV PUSH: CPT

## 2023-12-31 PROCEDURE — 80307 DRUG TEST PRSMV CHEM ANLYZR: CPT

## 2023-12-31 PROCEDURE — 2580000003 HC RX 258: Performed by: PHYSICIAN ASSISTANT

## 2023-12-31 PROCEDURE — 80179 DRUG ASSAY SALICYLATE: CPT

## 2023-12-31 PROCEDURE — 80143 DRUG ASSAY ACETAMINOPHEN: CPT

## 2023-12-31 RX ORDER — METOCLOPRAMIDE HYDROCHLORIDE 5 MG/ML
10 INJECTION INTRAMUSCULAR; INTRAVENOUS ONCE
Status: COMPLETED | OUTPATIENT
Start: 2023-12-31 | End: 2023-12-31

## 2023-12-31 RX ORDER — 0.9 % SODIUM CHLORIDE 0.9 %
1000 INTRAVENOUS SOLUTION INTRAVENOUS ONCE
Status: COMPLETED | OUTPATIENT
Start: 2023-12-31 | End: 2023-12-31

## 2023-12-31 RX ORDER — KETOROLAC TROMETHAMINE 30 MG/ML
15 INJECTION, SOLUTION INTRAMUSCULAR; INTRAVENOUS ONCE
Status: COMPLETED | OUTPATIENT
Start: 2023-12-31 | End: 2023-12-31

## 2023-12-31 RX ADMIN — SODIUM CHLORIDE 1000 ML: 9 INJECTION, SOLUTION INTRAVENOUS at 20:13

## 2023-12-31 RX ADMIN — IOPAMIDOL 75 ML: 755 INJECTION, SOLUTION INTRAVENOUS at 21:27

## 2023-12-31 RX ADMIN — METOCLOPRAMIDE 10 MG: 5 INJECTION, SOLUTION INTRAMUSCULAR; INTRAVENOUS at 20:20

## 2023-12-31 RX ADMIN — KETOROLAC TROMETHAMINE 15 MG: 30 INJECTION, SOLUTION INTRAMUSCULAR; INTRAVENOUS at 20:20

## 2023-12-31 RX ADMIN — WATER 2000 MG: 1 INJECTION INTRAMUSCULAR; INTRAVENOUS; SUBCUTANEOUS at 20:22

## 2023-12-31 ASSESSMENT — PAIN DESCRIPTION - ORIENTATION: ORIENTATION: LOWER

## 2023-12-31 ASSESSMENT — PAIN DESCRIPTION - LOCATION: LOCATION: ABDOMEN

## 2023-12-31 ASSESSMENT — PAIN SCALES - GENERAL: PAINLEVEL_OUTOF10: 10

## 2023-12-31 ASSESSMENT — PAIN DESCRIPTION - DESCRIPTORS: DESCRIPTORS: SHARP

## 2023-12-31 NOTE — ED PROVIDER NOTES
Independent [facility] Visit.    HPI:  23,   Time: 6:59 PM SERGIO Ayala is a 38 y.o. female with past medical history of asthma, depression, drug use, colitis, Crohn's presenting to the ED by EMS for \"body wide pain\" abd pain, nasuea vomiting diarrhea. Recent colonoscopy and upper endoscopy with scopes before [facility].  Is following up with GI for evaluation of inflammatory bowel disease.  History of drug use but denies any use of drugs, marijuana or alcohol in the past 2 weeks.  Symptoms flared up today.  Patient reports 3 episodes of diarrhea vomiting \"every 10 minutes\".  Has tried Phenergan and Zofran at home with no relief.  Nothing makes it better or worse.  Denies any recent fevers, chills, body aches, headache, dizziness, syncope, chest pain, shortness of breath, hematemesis, hematochezia, melena, constipation, dysuria or hematuria.      ROS:   Pertinent positives and negatives are stated within HPI, all other systems reviewed and are negative.  --------------------------------------------- PAST HISTORY ---------------------------------------------  Past Medical History:  has a past medical history of Acute Crohn's disease (HCC), Anxiety attack, Asthma, Crohn's colitis (HCC), Depression, Herpes simplex virus (HSV) infection, History of blood transfusion, Hypertension, Marijuana abuse, Neurologic disorder, Postpartum depression,  delivery,  labor, Rh sensitized, Seizure (HCC), STD (sexually transmitted disease), and Tobacco abuse.    Past Surgical History:  has a past surgical history that includes Dilation and curettage of uterus (); Colonoscopy; Endoscopy, colon, diagnostic; Cholecystectomy (2018); hernia repair; hernia repair; Upper gastrointestinal endoscopy (N/A, 2022); Dilation & curettage; and laparoscopy.    Social History:  reports that she quit smoking about 3 years ago. Her smoking use included cigarettes. She started smoking about 13 years ago. She

## 2024-03-04 ENCOUNTER — APPOINTMENT (OUTPATIENT)
Dept: CT IMAGING | Age: 39
End: 2024-03-04
Payer: COMMERCIAL

## 2024-03-04 ENCOUNTER — HOSPITAL ENCOUNTER (OUTPATIENT)
Age: 39
Setting detail: OBSERVATION
Discharge: HOME | End: 2024-03-06
Attending: EMERGENCY MEDICINE | Admitting: INTERNAL MEDICINE
Payer: COMMERCIAL

## 2024-03-04 DIAGNOSIS — R10.9 ABDOMINAL PAIN, UNSPECIFIED ABDOMINAL LOCATION: Primary | ICD-10-CM

## 2024-03-04 DIAGNOSIS — M54.59 INTRACTABLE LOW BACK PAIN: ICD-10-CM

## 2024-03-04 LAB
ALBUMIN SERPL-MCNC: 4.9 G/DL (ref 3.5–5.2)
ALP SERPL-CCNC: 77 U/L (ref 35–104)
ALT SERPL-CCNC: 15 U/L (ref 0–32)
ANION GAP SERPL CALCULATED.3IONS-SCNC: 15 MMOL/L (ref 7–16)
AST SERPL-CCNC: 25 U/L (ref 0–31)
BASOPHILS # BLD: 0.12 K/UL (ref 0–0.2)
BASOPHILS NFR BLD: 1 % (ref 0–2)
BILIRUB SERPL-MCNC: 0.3 MG/DL (ref 0–1.2)
BILIRUB UR QL STRIP: NEGATIVE
BUN SERPL-MCNC: 19 MG/DL (ref 6–20)
CALCIUM SERPL-MCNC: 9.3 MG/DL (ref 8.6–10.2)
CHLORIDE SERPL-SCNC: 102 MMOL/L (ref 98–107)
CLARITY UR: CLEAR
CO2 SERPL-SCNC: 20 MMOL/L (ref 22–29)
COLOR UR: YELLOW
CREAT SERPL-MCNC: 0.5 MG/DL (ref 0.5–1)
EOSINOPHIL # BLD: 0.04 K/UL (ref 0.05–0.5)
EOSINOPHILS RELATIVE PERCENT: 0 % (ref 0–6)
ERYTHROCYTE [DISTWIDTH] IN BLOOD BY AUTOMATED COUNT: 13.3 % (ref 11.5–15)
GFR SERPL CREATININE-BSD FRML MDRD: >60 ML/MIN/1.73M2
GLUCOSE SERPL-MCNC: 127 MG/DL (ref 74–99)
GLUCOSE UR STRIP-MCNC: 100 MG/DL
HCG UR QL: NEGATIVE
HCG, URINE, POC: NEGATIVE
HCT VFR BLD AUTO: 40.6 % (ref 34–48)
HGB BLD-MCNC: 13.6 G/DL (ref 11.5–15.5)
HGB UR QL STRIP.AUTO: NEGATIVE
IMM GRANULOCYTES # BLD AUTO: 0.13 K/UL (ref 0–0.58)
IMM GRANULOCYTES NFR BLD: 1 % (ref 0–5)
INR PPP: 1.1
KETONES UR STRIP-MCNC: 15 MG/DL
LEUKOCYTE ESTERASE UR QL STRIP: NEGATIVE
LIPASE SERPL-CCNC: 17 U/L (ref 13–60)
LYMPHOCYTES NFR BLD: 1.94 K/UL (ref 1.5–4)
LYMPHOCYTES RELATIVE PERCENT: 9 % (ref 20–42)
Lab: NORMAL
MAGNESIUM SERPL-MCNC: 2.1 MG/DL (ref 1.6–2.6)
MCH RBC QN AUTO: 31 PG (ref 26–35)
MCHC RBC AUTO-ENTMCNC: 33.5 G/DL (ref 32–34.5)
MCV RBC AUTO: 92.5 FL (ref 80–99.9)
MONOCYTES NFR BLD: 0.79 K/UL (ref 0.1–0.95)
MONOCYTES NFR BLD: 4 % (ref 2–12)
NEGATIVE QC PASS/FAIL: NORMAL
NEUTROPHILS NFR BLD: 87 % (ref 43–80)
NEUTS SEG NFR BLD: 19.28 K/UL (ref 1.8–7.3)
NITRITE UR QL STRIP: NEGATIVE
PH UR STRIP: 6 [PH] (ref 5–9)
PLATELET # BLD AUTO: 497 K/UL (ref 130–450)
PMV BLD AUTO: 9.2 FL (ref 7–12)
POSITIVE QC PASS/FAIL: NORMAL
POTASSIUM SERPL-SCNC: 3.9 MMOL/L (ref 3.5–5)
PROT SERPL-MCNC: 7.9 G/DL (ref 6.4–8.3)
PROT UR STRIP-MCNC: 30 MG/DL
PROTHROMBIN TIME: 11.8 SEC (ref 9.3–12.4)
RBC # BLD AUTO: 4.39 M/UL (ref 3.5–5.5)
RBC #/AREA URNS HPF: ABNORMAL /HPF
SODIUM SERPL-SCNC: 137 MMOL/L (ref 132–146)
SP GR UR STRIP: >1.03 (ref 1–1.03)
UROBILINOGEN UR STRIP-ACNC: 0.2 EU/DL (ref 0–1)
WBC #/AREA URNS HPF: ABNORMAL /HPF
WBC OTHER # BLD: 22.3 K/UL (ref 4.5–11.5)

## 2024-03-04 PROCEDURE — 85025 COMPLETE CBC W/AUTO DIFF WBC: CPT

## 2024-03-04 PROCEDURE — 85610 PROTHROMBIN TIME: CPT

## 2024-03-04 PROCEDURE — 6360000004 HC RX CONTRAST MEDICATION: Performed by: EMERGENCY MEDICINE

## 2024-03-04 PROCEDURE — 74177 CT ABD & PELVIS W/CONTRAST: CPT

## 2024-03-04 PROCEDURE — 80053 COMPREHEN METABOLIC PANEL: CPT

## 2024-03-04 PROCEDURE — 83690 ASSAY OF LIPASE: CPT

## 2024-03-04 PROCEDURE — 96374 THER/PROPH/DIAG INJ IV PUSH: CPT

## 2024-03-04 PROCEDURE — 96375 TX/PRO/DX INJ NEW DRUG ADDON: CPT

## 2024-03-04 PROCEDURE — 83735 ASSAY OF MAGNESIUM: CPT

## 2024-03-04 PROCEDURE — 80307 DRUG TEST PRSMV CHEM ANLYZR: CPT

## 2024-03-04 PROCEDURE — 84703 CHORIONIC GONADOTROPIN ASSAY: CPT

## 2024-03-04 PROCEDURE — 93005 ELECTROCARDIOGRAM TRACING: CPT | Performed by: EMERGENCY MEDICINE

## 2024-03-04 PROCEDURE — 2500000003 HC RX 250 WO HCPCS: Performed by: EMERGENCY MEDICINE

## 2024-03-04 PROCEDURE — 81001 URINALYSIS AUTO W/SCOPE: CPT

## 2024-03-04 PROCEDURE — 96376 TX/PRO/DX INJ SAME DRUG ADON: CPT

## 2024-03-04 PROCEDURE — 99285 EMERGENCY DEPT VISIT HI MDM: CPT

## 2024-03-04 PROCEDURE — 6360000002 HC RX W HCPCS: Performed by: EMERGENCY MEDICINE

## 2024-03-04 RX ORDER — FENTANYL CITRATE 50 UG/ML
50 INJECTION, SOLUTION INTRAMUSCULAR; INTRAVENOUS ONCE
Status: COMPLETED | OUTPATIENT
Start: 2024-03-04 | End: 2024-03-04

## 2024-03-04 RX ORDER — ONDANSETRON 2 MG/ML
4 INJECTION INTRAMUSCULAR; INTRAVENOUS ONCE
Status: COMPLETED | OUTPATIENT
Start: 2024-03-04 | End: 2024-03-04

## 2024-03-04 RX ORDER — HYDROMORPHONE HYDROCHLORIDE 1 MG/ML
1 INJECTION, SOLUTION INTRAMUSCULAR; INTRAVENOUS; SUBCUTANEOUS ONCE
Status: DISCONTINUED | OUTPATIENT
Start: 2024-03-04 | End: 2024-03-06 | Stop reason: HOSPADM

## 2024-03-04 RX ORDER — ONDANSETRON 2 MG/ML
INJECTION INTRAMUSCULAR; INTRAVENOUS
Status: DISPENSED
Start: 2024-03-04 | End: 2024-03-05

## 2024-03-04 RX ORDER — DROPERIDOL 2.5 MG/ML
1.25 INJECTION, SOLUTION INTRAMUSCULAR; INTRAVENOUS ONCE
Status: COMPLETED | OUTPATIENT
Start: 2024-03-04 | End: 2024-03-04

## 2024-03-04 RX ORDER — HYDROMORPHONE HYDROCHLORIDE 1 MG/ML
1 INJECTION, SOLUTION INTRAMUSCULAR; INTRAVENOUS; SUBCUTANEOUS ONCE
Status: COMPLETED | OUTPATIENT
Start: 2024-03-04 | End: 2024-03-04

## 2024-03-04 RX ADMIN — DROPERIDOL 1.25 MG: 2.5 INJECTION, SOLUTION INTRAMUSCULAR; INTRAVENOUS at 18:15

## 2024-03-04 RX ADMIN — HYDROMORPHONE HYDROCHLORIDE 1 MG: 1 INJECTION, SOLUTION INTRAMUSCULAR; INTRAVENOUS; SUBCUTANEOUS at 22:27

## 2024-03-04 RX ADMIN — ONDANSETRON 4 MG: 2 INJECTION INTRAMUSCULAR; INTRAVENOUS at 22:32

## 2024-03-04 RX ADMIN — ONDANSETRON 4 MG: 2 INJECTION INTRAMUSCULAR; INTRAVENOUS at 17:22

## 2024-03-04 RX ADMIN — FENTANYL CITRATE 50 MCG: 50 INJECTION, SOLUTION INTRAMUSCULAR; INTRAVENOUS at 17:25

## 2024-03-04 RX ADMIN — IOPAMIDOL 75 ML: 755 INJECTION, SOLUTION INTRAVENOUS at 21:49

## 2024-03-04 ASSESSMENT — PAIN SCALES - GENERAL
PAINLEVEL_OUTOF10: 0
PAINLEVEL_OUTOF10: 10

## 2024-03-04 ASSESSMENT — PAIN DESCRIPTION - LOCATION
LOCATION: ABDOMEN
LOCATION: ABDOMEN;BACK

## 2024-03-04 ASSESSMENT — PAIN - FUNCTIONAL ASSESSMENT: PAIN_FUNCTIONAL_ASSESSMENT: 0-10

## 2024-03-04 ASSESSMENT — PAIN DESCRIPTION - ORIENTATION: ORIENTATION: RIGHT

## 2024-03-04 NOTE — ED PROVIDER NOTES
Result Value Ref Range    HCG, Urine, POC Negative Negative    Lot Number [id number]     Positive QC Pass/Fail Acceptable     Negative QC Pass/Fail Acceptable    EKG 12 Lead   Result Value Ref Range    Ventricular Rate 60 BPM    Atrial Rate 60 BPM    P-R Interval 114 ms    QRS Duration 106 ms    Q-T Interval 438 ms    QTc Calculation (Bazett) 438 ms    P Axis 14 degrees    R Axis 17 degrees    T Axis 46 degrees       RADIOLOGY:  Interpreted by Radiologist.  CT ABDOMEN PELVIS W IV CONTRAST Additional Contrast? None   Final Result   No indication for acute intraperitoneal or retroperitoneal process in the   abdomen or in the pelvis.             EKG:  This EKG is signed and interpreted by the EP.    Time: 1738  Rate: 60  Rhythm: Sinus  Interpretation: non-specific EKG  Comparison: None      ------------------------- NURSING NOTES AND VITALS REVIEWED ---------------------------   The nursing notes within the ED encounter and vital signs as below have been reviewed by myself.  BP (!) 165/101   Pulse 82   Temp 97.6 °F (36.4 °C) (Oral)   Resp 25   Wt 72.6 kg (160 lb)   SpO2 99%   BMI 27.46 kg/m²   Oxygen Saturation Interpretation: Normal    The patient’s available past medical records and past encounters were reviewed.        ------------------------------ ED COURSE/MEDICAL DECISION MAKING----------------------  Medications   HYDROmorphone HCl PF (DILAUDID) injection 1 mg (1 mg IntraVENous Not Given 3/4/24 2230)   methylPREDNISolone sodium succ (SOLU-MEDROL) 125 mg in sterile water 2 mL injection (has no administration in time range)   ondansetron (ZOFRAN) injection 4 mg (4 mg IntraVENous Given 3/4/24 1722)   fentaNYL (SUBLIMAZE) injection 50 mcg (50 mcg IntraVENous Given 3/4/24 1725)   droPERidol (INAPSINE) injection 1.25 mg (1.25 mg IntraVENous Given 3/4/24 1815)   iopamidol (ISOVUE-370) 76 % injection 75 mL (75 mLs IntraVENous Given 3/4/24 2149)   HYDROmorphone HCl PF (DILAUDID) injection 1 mg (1 mg IntraVENous

## 2024-03-04 NOTE — ED NOTES
The following labs were labeled with appropriate pt sticker and tubed to lab:     [x] Blue     [x] Lavender   [] on ice  [x] Green/yellow  [] Green/black [] on ice  [] Grey  [] on ice  [] Yellow  [] Red  [] Type/ Screen  [] ABG  [] VBG    [] COVID-19 swab    [] Rapid  [] PCR  [] Flu swab  [] Peds Viral Panel     [] Urine Sample  [] Fecal Sample  [] Pelvic Cultures  [] Blood Cultures  [] X 2  [] STREP Cultures

## 2024-03-05 PROBLEM — M54.59 INTRACTABLE LOW BACK PAIN: Status: ACTIVE | Noted: 2024-03-05

## 2024-03-05 PROBLEM — R10.9 INTRACTABLE ABDOMINAL PAIN: Status: ACTIVE | Noted: 2024-03-05

## 2024-03-05 LAB
AMPHET UR QL SCN: NEGATIVE
ANION GAP SERPL CALCULATED.3IONS-SCNC: 17 MMOL/L (ref 7–16)
BARBITURATES UR QL SCN: NEGATIVE
BENZODIAZ UR QL: POSITIVE
BUN SERPL-MCNC: 11 MG/DL (ref 6–20)
BUPRENORPHINE UR QL: NEGATIVE
CALCIUM SERPL-MCNC: 9.3 MG/DL (ref 8.6–10.2)
CANNABINOIDS UR QL SCN: POSITIVE
CHLORIDE SERPL-SCNC: 100 MMOL/L (ref 98–107)
CO2 SERPL-SCNC: 21 MMOL/L (ref 22–29)
COCAINE UR QL SCN: NEGATIVE
CREAT SERPL-MCNC: 0.5 MG/DL (ref 0.5–1)
EKG ATRIAL RATE: 60 BPM
EKG P AXIS: 14 DEGREES
EKG P-R INTERVAL: 114 MS
EKG Q-T INTERVAL: 438 MS
EKG QRS DURATION: 106 MS
EKG QTC CALCULATION (BAZETT): 438 MS
EKG R AXIS: 17 DEGREES
EKG T AXIS: 46 DEGREES
EKG VENTRICULAR RATE: 60 BPM
ERYTHROCYTE [DISTWIDTH] IN BLOOD BY AUTOMATED COUNT: 13.7 % (ref 11.5–15)
FENTANYL UR QL: POSITIVE
GFR SERPL CREATININE-BSD FRML MDRD: >60 ML/MIN/1.73M2
GLUCOSE SERPL-MCNC: 130 MG/DL (ref 74–99)
HCT VFR BLD AUTO: 39.8 % (ref 34–48)
HGB BLD-MCNC: 13 G/DL (ref 11.5–15.5)
MCH RBC QN AUTO: 30.7 PG (ref 26–35)
MCHC RBC AUTO-ENTMCNC: 32.7 G/DL (ref 32–34.5)
MCV RBC AUTO: 93.9 FL (ref 80–99.9)
METHADONE UR QL: NEGATIVE
OPIATES UR QL SCN: POSITIVE
OXYCODONE UR QL SCN: NEGATIVE
PCP UR QL SCN: NEGATIVE
PLATELET # BLD AUTO: 482 K/UL (ref 130–450)
PMV BLD AUTO: 9.5 FL (ref 7–12)
POTASSIUM SERPL-SCNC: 3.3 MMOL/L (ref 3.5–5)
RBC # BLD AUTO: 4.24 M/UL (ref 3.5–5.5)
SODIUM SERPL-SCNC: 138 MMOL/L (ref 132–146)
TEST INFORMATION: ABNORMAL
WBC OTHER # BLD: 14.8 K/UL (ref 4.5–11.5)

## 2024-03-05 PROCEDURE — 96372 THER/PROPH/DIAG INJ SC/IM: CPT

## 2024-03-05 PROCEDURE — 6370000000 HC RX 637 (ALT 250 FOR IP): Performed by: INTERNAL MEDICINE

## 2024-03-05 PROCEDURE — 6360000002 HC RX W HCPCS: Performed by: EMERGENCY MEDICINE

## 2024-03-05 PROCEDURE — 6370000000 HC RX 637 (ALT 250 FOR IP): Performed by: NURSE PRACTITIONER

## 2024-03-05 PROCEDURE — 96361 HYDRATE IV INFUSION ADD-ON: CPT

## 2024-03-05 PROCEDURE — 85027 COMPLETE CBC AUTOMATED: CPT

## 2024-03-05 PROCEDURE — 2580000003 HC RX 258: Performed by: NURSE PRACTITIONER

## 2024-03-05 PROCEDURE — 96375 TX/PRO/DX INJ NEW DRUG ADDON: CPT

## 2024-03-05 PROCEDURE — G0378 HOSPITAL OBSERVATION PER HR: HCPCS

## 2024-03-05 PROCEDURE — 36415 COLL VENOUS BLD VENIPUNCTURE: CPT

## 2024-03-05 PROCEDURE — 80048 BASIC METABOLIC PNL TOTAL CA: CPT

## 2024-03-05 PROCEDURE — 93010 ELECTROCARDIOGRAM REPORT: CPT | Performed by: INTERNAL MEDICINE

## 2024-03-05 PROCEDURE — 6360000002 HC RX W HCPCS: Performed by: NURSE PRACTITIONER

## 2024-03-05 PROCEDURE — 2580000003 HC RX 258: Performed by: EMERGENCY MEDICINE

## 2024-03-05 RX ORDER — GABAPENTIN 300 MG/1
300 CAPSULE ORAL 3 TIMES DAILY
Status: DISCONTINUED | OUTPATIENT
Start: 2024-03-05 | End: 2024-03-06 | Stop reason: HOSPADM

## 2024-03-05 RX ORDER — LISINOPRIL 5 MG/1
5 TABLET ORAL DAILY
Status: DISCONTINUED | OUTPATIENT
Start: 2024-03-05 | End: 2024-03-06 | Stop reason: HOSPADM

## 2024-03-05 RX ORDER — POTASSIUM CHLORIDE 20 MEQ/1
40 TABLET, EXTENDED RELEASE ORAL PRN
Status: DISCONTINUED | OUTPATIENT
Start: 2024-03-05 | End: 2024-03-06 | Stop reason: HOSPADM

## 2024-03-05 RX ORDER — ENOXAPARIN SODIUM 100 MG/ML
40 INJECTION SUBCUTANEOUS DAILY
Status: DISCONTINUED | OUTPATIENT
Start: 2024-03-05 | End: 2024-03-06 | Stop reason: HOSPADM

## 2024-03-05 RX ORDER — HYDROXYZINE HYDROCHLORIDE 10 MG/1
10 TABLET, FILM COATED ORAL EVERY 8 HOURS PRN
Status: DISCONTINUED | OUTPATIENT
Start: 2024-03-05 | End: 2024-03-06 | Stop reason: HOSPADM

## 2024-03-05 RX ORDER — ACETAMINOPHEN 650 MG/1
650 SUPPOSITORY RECTAL EVERY 6 HOURS PRN
Status: DISCONTINUED | OUTPATIENT
Start: 2024-03-05 | End: 2024-03-06 | Stop reason: HOSPADM

## 2024-03-05 RX ORDER — VENLAFAXINE 75 MG/1
37.5 TABLET ORAL DAILY
Status: DISCONTINUED | OUTPATIENT
Start: 2024-03-05 | End: 2024-03-06 | Stop reason: HOSPADM

## 2024-03-05 RX ORDER — OXYCODONE HYDROCHLORIDE AND ACETAMINOPHEN 5; 325 MG/1; MG/1
1 TABLET ORAL EVERY 4 HOURS PRN
Status: DISCONTINUED | OUTPATIENT
Start: 2024-03-05 | End: 2024-03-06

## 2024-03-05 RX ORDER — 0.9 % SODIUM CHLORIDE 0.9 %
1000 INTRAVENOUS SOLUTION INTRAVENOUS ONCE
Status: COMPLETED | OUTPATIENT
Start: 2024-03-05 | End: 2024-03-05

## 2024-03-05 RX ORDER — MAGNESIUM SULFATE IN WATER 40 MG/ML
2000 INJECTION, SOLUTION INTRAVENOUS PRN
Status: DISCONTINUED | OUTPATIENT
Start: 2024-03-05 | End: 2024-03-06 | Stop reason: HOSPADM

## 2024-03-05 RX ORDER — DICYCLOMINE HYDROCHLORIDE 10 MG/1
10 CAPSULE ORAL 4 TIMES DAILY
Status: DISCONTINUED | OUTPATIENT
Start: 2024-03-05 | End: 2024-03-06 | Stop reason: HOSPADM

## 2024-03-05 RX ORDER — SODIUM CHLORIDE 9 MG/ML
INJECTION, SOLUTION INTRAVENOUS CONTINUOUS
Status: DISCONTINUED | OUTPATIENT
Start: 2024-03-05 | End: 2024-03-06 | Stop reason: HOSPADM

## 2024-03-05 RX ORDER — POLYETHYLENE GLYCOL 3350 17 G/17G
17 POWDER, FOR SOLUTION ORAL DAILY PRN
Status: DISCONTINUED | OUTPATIENT
Start: 2024-03-05 | End: 2024-03-06 | Stop reason: HOSPADM

## 2024-03-05 RX ORDER — ACETAMINOPHEN 325 MG/1
650 TABLET ORAL EVERY 6 HOURS PRN
Status: DISCONTINUED | OUTPATIENT
Start: 2024-03-05 | End: 2024-03-06 | Stop reason: HOSPADM

## 2024-03-05 RX ORDER — PROCHLORPERAZINE EDISYLATE 5 MG/ML
10 INJECTION INTRAMUSCULAR; INTRAVENOUS EVERY 6 HOURS PRN
Status: DISCONTINUED | OUTPATIENT
Start: 2024-03-05 | End: 2024-03-06 | Stop reason: HOSPADM

## 2024-03-05 RX ORDER — ONDANSETRON 2 MG/ML
4 INJECTION INTRAMUSCULAR; INTRAVENOUS EVERY 6 HOURS PRN
Status: DISCONTINUED | OUTPATIENT
Start: 2024-03-05 | End: 2024-03-06 | Stop reason: HOSPADM

## 2024-03-05 RX ORDER — GABAPENTIN 300 MG/1
300 CAPSULE ORAL 3 TIMES DAILY
Status: ON HOLD | COMMUNITY
End: 2024-03-06

## 2024-03-05 RX ORDER — LIDOCAINE 4 G/G
1 PATCH TOPICAL DAILY
Status: DISCONTINUED | OUTPATIENT
Start: 2024-03-05 | End: 2024-03-06 | Stop reason: HOSPADM

## 2024-03-05 RX ORDER — CAPSAICIN 0.75 MG/G
CREAM TOPICAL 3 TIMES DAILY
Status: DISCONTINUED | OUTPATIENT
Start: 2024-03-05 | End: 2024-03-05

## 2024-03-05 RX ORDER — SODIUM CHLORIDE 9 MG/ML
INJECTION, SOLUTION INTRAVENOUS PRN
Status: DISCONTINUED | OUTPATIENT
Start: 2024-03-05 | End: 2024-03-06 | Stop reason: HOSPADM

## 2024-03-05 RX ORDER — LIDOCAINE 4 G/G
1 PATCH TOPICAL DAILY
Status: DISCONTINUED | OUTPATIENT
Start: 2024-03-05 | End: 2024-03-05

## 2024-03-05 RX ORDER — HYDROXYZINE HYDROCHLORIDE 10 MG/1
10 TABLET, FILM COATED ORAL EVERY 8 HOURS PRN
COMMUNITY

## 2024-03-05 RX ORDER — PANTOPRAZOLE SODIUM 40 MG/1
40 TABLET, DELAYED RELEASE ORAL DAILY
Status: DISCONTINUED | OUTPATIENT
Start: 2024-03-05 | End: 2024-03-06 | Stop reason: HOSPADM

## 2024-03-05 RX ORDER — SUCRALFATE 1 G/1
1 TABLET ORAL 4 TIMES DAILY
Status: DISCONTINUED | OUTPATIENT
Start: 2024-03-05 | End: 2024-03-06 | Stop reason: HOSPADM

## 2024-03-05 RX ORDER — POTASSIUM CHLORIDE 7.45 MG/ML
10 INJECTION INTRAVENOUS PRN
Status: DISCONTINUED | OUTPATIENT
Start: 2024-03-05 | End: 2024-03-06 | Stop reason: HOSPADM

## 2024-03-05 RX ORDER — VENLAFAXINE 37.5 MG/1
37.5 TABLET ORAL DAILY
COMMUNITY

## 2024-03-05 RX ORDER — SODIUM CHLORIDE 9 MG/ML
INJECTION, SOLUTION INTRAVENOUS CONTINUOUS
Status: DISCONTINUED | OUTPATIENT
Start: 2024-03-05 | End: 2024-03-05

## 2024-03-05 RX ORDER — CAPSAICIN 0.75 MG/G
CREAM TOPICAL 3 TIMES DAILY
Status: DISCONTINUED | OUTPATIENT
Start: 2024-03-05 | End: 2024-03-06 | Stop reason: HOSPADM

## 2024-03-05 RX ORDER — SODIUM CHLORIDE 0.9 % (FLUSH) 0.9 %
5-40 SYRINGE (ML) INJECTION PRN
Status: DISCONTINUED | OUTPATIENT
Start: 2024-03-05 | End: 2024-03-06 | Stop reason: HOSPADM

## 2024-03-05 RX ORDER — ONDANSETRON 4 MG/1
4 TABLET, ORALLY DISINTEGRATING ORAL EVERY 8 HOURS PRN
Status: DISCONTINUED | OUTPATIENT
Start: 2024-03-05 | End: 2024-03-06 | Stop reason: HOSPADM

## 2024-03-05 RX ORDER — SODIUM CHLORIDE 0.9 % (FLUSH) 0.9 %
5-40 SYRINGE (ML) INJECTION EVERY 12 HOURS SCHEDULED
Status: DISCONTINUED | OUTPATIENT
Start: 2024-03-05 | End: 2024-03-06 | Stop reason: HOSPADM

## 2024-03-05 RX ADMIN — GABAPENTIN 300 MG: 300 CAPSULE ORAL at 12:49

## 2024-03-05 RX ADMIN — HYDROXYZINE HYDROCHLORIDE 10 MG: 10 TABLET, FILM COATED ORAL at 02:59

## 2024-03-05 RX ADMIN — SODIUM CHLORIDE 1000 ML: 9 INJECTION, SOLUTION INTRAVENOUS at 01:18

## 2024-03-05 RX ADMIN — SODIUM CHLORIDE: 9 INJECTION, SOLUTION INTRAVENOUS at 01:09

## 2024-03-05 RX ADMIN — DICYCLOMINE HYDROCHLORIDE 10 MG: 10 CAPSULE ORAL at 08:36

## 2024-03-05 RX ADMIN — GABAPENTIN 300 MG: 300 CAPSULE ORAL at 08:37

## 2024-03-05 RX ADMIN — PANTOPRAZOLE SODIUM 40 MG: 40 TABLET, DELAYED RELEASE ORAL at 08:36

## 2024-03-05 RX ADMIN — OXYCODONE HYDROCHLORIDE AND ACETAMINOPHEN 1 TABLET: 5; 325 TABLET ORAL at 22:48

## 2024-03-05 RX ADMIN — ENOXAPARIN SODIUM 40 MG: 100 INJECTION SUBCUTANEOUS at 08:37

## 2024-03-05 RX ADMIN — SUCRALFATE 1 G: 1 TABLET ORAL at 14:13

## 2024-03-05 RX ADMIN — SODIUM CHLORIDE, PRESERVATIVE FREE 10 ML: 5 INJECTION INTRAVENOUS at 21:13

## 2024-03-05 RX ADMIN — OXYCODONE HYDROCHLORIDE AND ACETAMINOPHEN 1 TABLET: 5; 325 TABLET ORAL at 11:58

## 2024-03-05 RX ADMIN — PROCHLORPERAZINE EDISYLATE 10 MG: 5 INJECTION INTRAMUSCULAR; INTRAVENOUS at 01:13

## 2024-03-05 RX ADMIN — GABAPENTIN 300 MG: 300 CAPSULE ORAL at 21:12

## 2024-03-05 RX ADMIN — DICYCLOMINE HYDROCHLORIDE 10 MG: 10 CAPSULE ORAL at 01:11

## 2024-03-05 RX ADMIN — CAPSAICIN: 0.75 CREAM TOPICAL at 08:36

## 2024-03-05 RX ADMIN — WATER 125 MG: 1 INJECTION INTRAMUSCULAR; INTRAVENOUS; SUBCUTANEOUS at 01:12

## 2024-03-05 RX ADMIN — POTASSIUM CHLORIDE 40 MEQ: 1500 TABLET, EXTENDED RELEASE ORAL at 17:21

## 2024-03-05 RX ADMIN — VENLAFAXINE 37.5 MG: 75 TABLET ORAL at 08:48

## 2024-03-05 RX ADMIN — DICYCLOMINE HYDROCHLORIDE 10 MG: 10 CAPSULE ORAL at 21:15

## 2024-03-05 RX ADMIN — ACETAMINOPHEN 650 MG: 325 TABLET ORAL at 21:12

## 2024-03-05 RX ADMIN — HYDROXYZINE HYDROCHLORIDE 10 MG: 10 TABLET, FILM COATED ORAL at 22:59

## 2024-03-05 RX ADMIN — SUCRALFATE 1 G: 1 TABLET ORAL at 01:11

## 2024-03-05 RX ADMIN — GABAPENTIN 300 MG: 300 CAPSULE ORAL at 02:59

## 2024-03-05 RX ADMIN — DICYCLOMINE HYDROCHLORIDE 10 MG: 10 CAPSULE ORAL at 12:49

## 2024-03-05 RX ADMIN — LISINOPRIL 5 MG: 10 TABLET ORAL at 08:37

## 2024-03-05 RX ADMIN — SUCRALFATE 1 G: 1 TABLET ORAL at 08:37

## 2024-03-05 RX ADMIN — SODIUM CHLORIDE: 9 INJECTION, SOLUTION INTRAVENOUS at 01:17

## 2024-03-05 RX ADMIN — SODIUM CHLORIDE, PRESERVATIVE FREE 10 ML: 5 INJECTION INTRAVENOUS at 08:44

## 2024-03-05 RX ADMIN — OXYCODONE HYDROCHLORIDE AND ACETAMINOPHEN 1 TABLET: 5; 325 TABLET ORAL at 18:47

## 2024-03-05 ASSESSMENT — PAIN DESCRIPTION - DESCRIPTORS
DESCRIPTORS: ACHING;DISCOMFORT;THROBBING
DESCRIPTORS: ACHING
DESCRIPTORS: ACHING;TEARING
DESCRIPTORS: ACHING;TINGLING
DESCRIPTORS: ACHING

## 2024-03-05 ASSESSMENT — PAIN DESCRIPTION - PAIN TYPE: TYPE: ACUTE PAIN

## 2024-03-05 ASSESSMENT — PAIN DESCRIPTION - LOCATION
LOCATION: BACK
LOCATION: ABDOMEN
LOCATION: BACK
LOCATION: BACK

## 2024-03-05 ASSESSMENT — PAIN SCALES - GENERAL
PAINLEVEL_OUTOF10: 8
PAINLEVEL_OUTOF10: 7
PAINLEVEL_OUTOF10: 9
PAINLEVEL_OUTOF10: 7
PAINLEVEL_OUTOF10: 7
PAINLEVEL_OUTOF10: 8
PAINLEVEL_OUTOF10: 7

## 2024-03-05 ASSESSMENT — PAIN DESCRIPTION - ORIENTATION
ORIENTATION: LOWER
ORIENTATION: LOWER
ORIENTATION: RIGHT;LOWER
ORIENTATION: RIGHT;LOWER

## 2024-03-05 ASSESSMENT — PAIN DESCRIPTION - FREQUENCY: FREQUENCY: CONTINUOUS

## 2024-03-05 ASSESSMENT — PAIN DESCRIPTION - ONSET: ONSET: ON-GOING

## 2024-03-05 ASSESSMENT — PAIN - FUNCTIONAL ASSESSMENT: PAIN_FUNCTIONAL_ASSESSMENT: PREVENTS OR INTERFERES SOME ACTIVE ACTIVITIES AND ADLS

## 2024-03-05 NOTE — ED NOTES
Upon assessment, patient's IV was infiltrated. No pain noted. This RN attempted IV access x3 with no success. MD notified.

## 2024-03-06 ENCOUNTER — APPOINTMENT (OUTPATIENT)
Dept: MRI IMAGING | Age: 39
End: 2024-03-06
Payer: COMMERCIAL

## 2024-03-06 VITALS
TEMPERATURE: 97.2 F | SYSTOLIC BLOOD PRESSURE: 129 MMHG | RESPIRATION RATE: 18 BRPM | BODY MASS INDEX: 27.46 KG/M2 | DIASTOLIC BLOOD PRESSURE: 95 MMHG | HEART RATE: 86 BPM | WEIGHT: 160 LBS | OXYGEN SATURATION: 98 %

## 2024-03-06 PROCEDURE — 99239 HOSP IP/OBS DSCHRG MGMT >30: CPT | Performed by: INTERNAL MEDICINE

## 2024-03-06 PROCEDURE — 96372 THER/PROPH/DIAG INJ SC/IM: CPT

## 2024-03-06 PROCEDURE — 99232 SBSQ HOSP IP/OBS MODERATE 35: CPT | Performed by: INTERNAL MEDICINE

## 2024-03-06 PROCEDURE — 6370000000 HC RX 637 (ALT 250 FOR IP): Performed by: INTERNAL MEDICINE

## 2024-03-06 PROCEDURE — 96376 TX/PRO/DX INJ SAME DRUG ADON: CPT

## 2024-03-06 PROCEDURE — G0378 HOSPITAL OBSERVATION PER HR: HCPCS

## 2024-03-06 PROCEDURE — 6360000002 HC RX W HCPCS: Performed by: INTERNAL MEDICINE

## 2024-03-06 PROCEDURE — 6370000000 HC RX 637 (ALT 250 FOR IP): Performed by: NURSE PRACTITIONER

## 2024-03-06 PROCEDURE — 6360000002 HC RX W HCPCS: Performed by: NURSE PRACTITIONER

## 2024-03-06 PROCEDURE — 96361 HYDRATE IV INFUSION ADD-ON: CPT

## 2024-03-06 PROCEDURE — 72148 MRI LUMBAR SPINE W/O DYE: CPT

## 2024-03-06 PROCEDURE — 2580000003 HC RX 258: Performed by: NURSE PRACTITIONER

## 2024-03-06 RX ORDER — CYCLOBENZAPRINE HCL 10 MG
10 TABLET ORAL 3 TIMES DAILY PRN
Status: DISCONTINUED | OUTPATIENT
Start: 2024-03-06 | End: 2024-03-06 | Stop reason: HOSPADM

## 2024-03-06 RX ORDER — ACETAMINOPHEN 325 MG/1
650 TABLET ORAL EVERY 6 HOURS PRN
Qty: 112 TABLET | Refills: 0 | Status: SHIPPED | OUTPATIENT
Start: 2024-03-06 | End: 2024-03-20

## 2024-03-06 RX ORDER — GABAPENTIN 300 MG/1
300 CAPSULE ORAL 3 TIMES DAILY
Qty: 90 CAPSULE | Refills: 0 | Status: SHIPPED | OUTPATIENT
Start: 2024-03-06 | End: 2024-04-05

## 2024-03-06 RX ORDER — CYCLOBENZAPRINE HCL 10 MG
10 TABLET ORAL 3 TIMES DAILY PRN
Qty: 30 TABLET | Refills: 0 | Status: SHIPPED | OUTPATIENT
Start: 2024-03-06 | End: 2024-03-16

## 2024-03-06 RX ORDER — OXYCODONE HYDROCHLORIDE 10 MG/1
10 TABLET ORAL EVERY 4 HOURS PRN
Status: DISCONTINUED | OUTPATIENT
Start: 2024-03-06 | End: 2024-03-06 | Stop reason: HOSPADM

## 2024-03-06 RX ORDER — OXYCODONE HYDROCHLORIDE 10 MG/1
10 TABLET ORAL EVERY 4 HOURS PRN
Qty: 42 TABLET | Refills: 0 | Status: SHIPPED | OUTPATIENT
Start: 2024-03-06 | End: 2024-03-13

## 2024-03-06 RX ADMIN — ENOXAPARIN SODIUM 40 MG: 100 INJECTION SUBCUTANEOUS at 08:22

## 2024-03-06 RX ADMIN — VENLAFAXINE 37.5 MG: 75 TABLET ORAL at 08:20

## 2024-03-06 RX ADMIN — PROCHLORPERAZINE EDISYLATE 10 MG: 5 INJECTION INTRAMUSCULAR; INTRAVENOUS at 11:15

## 2024-03-06 RX ADMIN — HYDROMORPHONE HYDROCHLORIDE 0.5 MG: 1 INJECTION, SOLUTION INTRAMUSCULAR; INTRAVENOUS; SUBCUTANEOUS at 10:37

## 2024-03-06 RX ADMIN — ONDANSETRON 4 MG: 2 INJECTION INTRAMUSCULAR; INTRAVENOUS at 09:43

## 2024-03-06 RX ADMIN — OXYCODONE HYDROCHLORIDE AND ACETAMINOPHEN 1 TABLET: 5; 325 TABLET ORAL at 08:30

## 2024-03-06 RX ADMIN — SODIUM CHLORIDE, PRESERVATIVE FREE 10 ML: 5 INJECTION INTRAVENOUS at 08:24

## 2024-03-06 RX ADMIN — PANTOPRAZOLE SODIUM 40 MG: 40 TABLET, DELAYED RELEASE ORAL at 08:21

## 2024-03-06 RX ADMIN — LISINOPRIL 5 MG: 10 TABLET ORAL at 08:21

## 2024-03-06 RX ADMIN — GABAPENTIN 300 MG: 300 CAPSULE ORAL at 08:20

## 2024-03-06 RX ADMIN — OXYCODONE HYDROCHLORIDE AND ACETAMINOPHEN 1 TABLET: 5; 325 TABLET ORAL at 03:17

## 2024-03-06 RX ADMIN — ONDANSETRON 4 MG: 2 INJECTION INTRAMUSCULAR; INTRAVENOUS at 15:23

## 2024-03-06 RX ADMIN — GABAPENTIN 300 MG: 300 CAPSULE ORAL at 14:38

## 2024-03-06 RX ADMIN — OXYCODONE HYDROCHLORIDE 10 MG: 10 TABLET ORAL at 14:38

## 2024-03-06 ASSESSMENT — PAIN SCALES - GENERAL
PAINLEVEL_OUTOF10: 8
PAINLEVEL_OUTOF10: 8
PAINLEVEL_OUTOF10: 10
PAINLEVEL_OUTOF10: 8

## 2024-03-06 ASSESSMENT — PAIN DESCRIPTION - ORIENTATION: ORIENTATION: LOWER

## 2024-03-06 ASSESSMENT — PAIN DESCRIPTION - DESCRIPTORS: DESCRIPTORS: ACHING

## 2024-03-06 ASSESSMENT — PAIN DESCRIPTION - LOCATION: LOCATION: BACK

## 2024-03-06 NOTE — CONSULTS
Vascular Surgery Inpatient Consultation Note      Reason for Consultation: Lumbar spondylolisthesis    HISTORY OF PRESENT ILLNESS:                The patient is a 38 y.o. female who is admitted to the hospital for treatment of intractable back pain.  The patient was scheduled to see me in the office for preoperative evaluation prior to anterior approach to the lumbar interbody fusion.  She missed her appointment due to the pain.  Vascular surgery is consulted for evaluation and treatment.    Only previous abdominal surgery was laparoscopic cholecystectomy.    IMPRESSION: Intractable back pain.  L5-S1 spondylolisthesis.    I reviewed with the patient the plans for anterior approach to the lumbar spine.  I reviewed the risk, benefits, and alternatives with this approach.  She understands and agrees.  From my standpoint she does not need to return to the office.  We will plan for elective surgery with Dr. Mitchell.    RECOMMENDATIONS: Okay for discharge from vascular surgery standpoint.    Patient Active Problem List   Diagnosis Code    Hyperemesis gravidarum O21.0    Cannabis dependence ([facility]) F12.20    Asthma J45.909    Tobacco abuse Z72.0    Symptomatic cholelithiasis K80.20    Crohn disease ([facility]) K50.90    Crohn's colitis, without complications ([facility]) K50.10    Intractable vomiting with nausea R11.2    Emesis, persistent R11.15    Intractable nausea and vomiting R11.2    Abdominal pain R10.9    Intractable vomiting R11.10    Abdominal pain, right lower quadrant R10.31    Diarrhea R19.7    Marijuana abuse, continuous F12.10    AMA (advanced maternal age) multigravida 35+, first trimester O09.521    Hypokalemia E87.6    Anxiety F41.9    History of IUGR (intrauterine growth retardation) and stillbirth, currently pregnant, first trimester O09.291    Depression F32.A    Hyperemesis R11.10    Primary hypertension I10    Hyperemesis gravidarum with metabolic disturbance, antepartum O21.1    Starvation ketoacidosis 
surgery.    - Follow-up in office next week       Li Mitchell,

## 2024-03-06 NOTE — PLAN OF CARE
NOTIFIED DR HE THAT PT REFUSES OXY PO FOR PAIN WANTS IV PAIN MED. PT IS CRYING AND YELLING OUT IN PAIN. SEE ORDERS.

## 2024-03-06 NOTE — PLAN OF CARE
Problem: Safety - Adult  Goal: Free from fall injury  Outcome: Progressing  Flowsheets (Taken 3/5/2024 2015)  Free From Fall Injury: Based on caregiver fall risk screen, instruct family/caregiver to ask for assistance with transferring infant if caregiver noted to have fall risk factors     Problem: Discharge Planning  Goal: Discharge to home or other facility with appropriate resources  Outcome: Progressing  Flowsheets (Taken 3/5/2024 2015)  Discharge to home or other facility with appropriate resources: Identify barriers to discharge with patient and caregiver     Problem: Skin/Tissue Integrity  Goal: Absence of new skin breakdown  Description: 1.  Monitor for areas of redness and/or skin breakdown  2.  Assess vascular access sites hourly  3.  Every 4-6 hours minimum:  Change oxygen saturation probe site  4.  Every 4-6 hours:  If on nasal continuous positive airway pressure, respiratory therapy assess nares and determine need for appliance change or resting period.  Outcome: Progressing     Problem: Pain  Goal: Verbalizes/displays adequate comfort level or baseline comfort level  Outcome: Progressing  Flowsheets (Taken 3/5/2024 2015)  Verbalizes/displays adequate comfort level or baseline comfort level: Encourage patient to monitor pain and request assistance

## 2024-03-06 NOTE — DISCHARGE SUMMARY
[facility] Hospitalist Physician Discharge Summary       Li Mitchell, DO  [address] [facility] [id number] [phone]    Call in 1 week(s)  Hospital follow up      Activity level: As tolerated     Dispo: Home      Condition on discharge: Stable     Patient ID:  Cynthia Ayala  [id number]  38 y.o.  1985    Admit date: 3/4/2024    Discharge date and time:  3/6/2024  7 PM    Admission Diagnoses: Principal Problem:    Intractable low back pain  Active Problems:    Intractable abdominal pain  Resolved Problems:    * No resolved hospital problems. *      Discharge Diagnoses: Principal Problem:    Intractable low back pain  Active Problems:    Intractable abdominal pain  Resolved Problems:    * No resolved hospital problems. *      Consults:  IP CONSULT TO INTERNAL MEDICINE  IP CONSULT TO NEUROSURGERY  IP CONSULT TO NEUROSURGERY    Hospital Course:   Patient Cynthia Ayala is a 38 y.o. presented with Intractable abdominal pain [R10.9]  Abdominal pain, unspecified abdominal location [R10.9]    Ms. Cynthia Ayala, a 38 y.o. year old female  who  has a past medical history of Acute Crohn's disease (HCC), Anxiety attack, Asthma, Crohn's colitis (HCC), Depression, Herpes simplex virus (HSV) infection, History of blood transfusion, Hypertension, Marijuana abuse, Neurologic disorder, Postpartum depression,  delivery,  labor, Rh sensitized, Seizure (HCC), STD (sexually transmitted disease), and Tobacco abuse.      Patient presented to the ED with complaints of RUQ abdominal pain, nausea, vomiting, and diarrhea beginning several days ago.  She has presented to the ED 8 times in the past year for the same.  She has a history of Crohn's as well as cannabinoid hyperemesis syndrome.  She follows with GI outpatient and had upper and lower scopes in 2023 which were negative. She reports on follow up her GI doctor told her a polyp was removed and placed her on omeprazole.

## 2024-03-06 NOTE — CARE COORDINATION
3/6/2024Social work transition of care planning  Pt is from home with family and had been independent. Per patient pcp is [facility] And pharmacy is [facility]. Explained sw role in transition of car eplanning. Pt plan is home,pt will call for a ride at [place]. Awaiting further evals and recs.  Electronically signed by RIAZ Salcido on 3/6/2024 at 1:13 PM

## 2024-03-06 NOTE — PROGRESS NOTES
[facility] Hospitalist Progress Note    Admitting Date and Time: 3/4/2024  4:44 PM  Admit Dx: Intractable abdominal pain [R10.9]    Synopsis:    Ms. Cynthia Ayala, a 38 y.o. year old female  who  has a past medical history of Acute Crohn's disease (HCC), Anxiety attack, Asthma, Crohn's colitis (HCC), Depression, Herpes simplex virus (HSV) infection, History of blood transfusion, Hypertension, Marijuana abuse, Neurologic disorder, Postpartum depression,  delivery,  labor, Rh sensitized, Seizure (HCC), STD (sexually transmitted disease), and Tobacco abuse.      Patient presented to the ED with complaints of RUQ abdominal pain, nausea, vomiting, and diarrhea beginning several days ago.  She has presented to the ED 8 times in the past year for the same.  She has a history of Crohn's as well as cannabinoid hyperemesis syndrome.  She follows with GI outpatient and had upper and lower scopes in 2023 which were negative. She reports on follow up her GI doctor told her a polyp was removed and placed her on omeprazole. She mentions she is supposed to have another follow up with her GI doctor soon. She also mentions she is supposed to have an appt with vascular surgery tomorrow regarding an anterior approach back surgery and is having a tumor removed from her right shoulder in the upcoming months.     Upon entering room patient was sleeping. Upon waking she is tearful and writhing in pain. She complaints of uncontrolled abd pain for which dilaudid helps the best. There is a emesis bag on her bedside table with bilious vomitus. She reports she is not currently on any medications for Crohn's because she has not yet been diagnosed by her current GI doctor but was diagnosed previously.  She is requesting something to drink.      In ED she was intermittently hypertensive.Laboratory workup significant for leukocytosis and thrombocytosis.  CTAP without acute process but did note spondylosis of L5 
4 Eyes Skin Assessment     NAME:  Cynthia Ayala  YOB: 1985  MEDICAL RECORD NUMBER:  [id number]    The patient is being assessed for  Admission    I agree that at least one RN has performed a thorough Head to Toe Skin Assessment on the patient. ALL assessment sites listed below have been assessed.      Areas assessed by both nurses:    Head, Face, Ears, Shoulders, Back, Chest, Arms, Elbows, Hands, Sacrum. Buttock, Coccyx, Ischium, Legs. Feet and Heels, and Under Medical Devices         Does the Patient have a Wound? No noted wound(s)       King Prevention initiated by RN: Yes  Wound Care Orders initiated by RN: No    Pressure Injury (Stage 3,4, Unstageable, DTI, NWPT, and Complex wounds) if present, place Wound referral order by RN under : No    New Ostomies, if present place, Ostomy referral order under : No     Nurse 1 eSignature: Electronically signed by Quin Rosen RN on 3/5/24 at 6:51 PM EST    **SHARE this note so that the co-signing nurse can place an eSignature**    Nurse 2 eSignature: {Esignature:[id number]}  
Attempted to call the office that Dr Mitchell uses at this time. Office is closed with no on call number or option to leave a voicemail. Will have to contact the office tomorrow [phone]  
Called to Dr Mitchell and told her about consult. Dr Mitchell will come to see Cynthia after office hours.  
Dr Mk noble served consult for Dr Mitchell because known to her  ntractable back pain.     
Kim served Dr. Gómez. Ok to discharge pt [facility].   
MRI screening needs completed, thank you.  
Patient is known to Dr. Mitchell from [facility]. Referral placed for Dr. Mitchell to see patient.   
CTAP without acute process but did note spondylosis of L5 with spondylolisthesis of L5-S1 with advanced degenerative disc disease and L5-S1 which are old findings..  EKG with an incomplete right bundle branch block but no evidence of ischemia.  She was given droperidol, fentanyl, Dilaudid, Zofran, and solumedrol in ED.     Subjective:  Patient is being followed for Intractable abdominal pain [R10.9]  Abdominal pain, unspecified abdominal location [R10.9]     Patient seen and examined at bedside this morning.  Reports continued severe low back pain.  Poor relief from Percocet.    ROS: denies fever, chills, cp, sob, n/v, HA unless stated above.      dicyclomine  10 mg Oral 4x Daily    lisinopril  5 mg Oral Daily    sucralfate  1 g Oral 4x Daily    pantoprazole  40 mg Oral Daily    sodium chloride flush  5-40 mL IntraVENous 2 times per day    enoxaparin  40 mg SubCUTAneous Daily    venlafaxine  37.5 mg Oral Daily    gabapentin  300 mg Oral TID    capsicum   Topical TID    lidocaine  1 patch TransDERmal Daily    HYDROmorphone  1 mg IntraVENous Once     oxyCODONE, 10 mg, Q4H PRN  sodium chloride flush, 5-40 mL, PRN  sodium chloride, , PRN  potassium chloride, 40 mEq, PRN   Or  potassium alternative oral replacement, 40 mEq, PRN   Or  potassium chloride, 10 mEq, PRN  magnesium sulfate, 2,000 mg, PRN  ondansetron, 4 mg, Q8H PRN   Or  ondansetron, 4 mg, Q6H PRN  polyethylene glycol, 17 g, Daily PRN  acetaminophen, 650 mg, Q6H PRN   Or  acetaminophen, 650 mg, Q6H PRN  prochlorperazine, 10 mg, Q6H PRN  hydrOXYzine HCl, 10 mg, Q8H PRN         Objective:    BP (!) 129/95   Pulse 86   Temp 97.2 °F (36.2 °C) (Temporal)   Resp 18   Wt 72.6 kg (160 lb)   SpO2 98%   BMI 27.46 kg/m²     General Appearance: alert and oriented to person, place and time and in no acute distress  Skin: warm and dry  Head: normocephalic and atraumatic  Eyes: pupils equal, round, and reactive to light, extraocular eye movements intact, conjunctivae

## 2024-03-19 ENCOUNTER — HOSPITAL ENCOUNTER (OUTPATIENT)
Age: 39
Discharge: HOME | End: 2024-03-21

## 2024-03-19 PROCEDURE — 88304 TISSUE EXAM BY PATHOLOGIST: CPT

## 2024-03-22 LAB — SURGICAL PATHOLOGY REPORT: NORMAL

## 2024-04-06 ENCOUNTER — APPOINTMENT (OUTPATIENT)
Dept: CT IMAGING | Age: 39
End: 2024-04-06
Payer: COMMERCIAL

## 2024-04-06 ENCOUNTER — HOSPITAL ENCOUNTER (OUTPATIENT)
Age: 39
Setting detail: OBSERVATION
Discharge: HOME | End: 2024-04-08
Attending: EMERGENCY MEDICINE | Admitting: INTERNAL MEDICINE
Payer: COMMERCIAL

## 2024-04-06 DIAGNOSIS — E86.0 DEHYDRATION: ICD-10-CM

## 2024-04-06 DIAGNOSIS — E87.20 LACTIC ACIDOSIS: ICD-10-CM

## 2024-04-06 DIAGNOSIS — M54.9 INTRACTABLE BACK PAIN: ICD-10-CM

## 2024-04-06 DIAGNOSIS — R19.7 DIARRHEA, UNSPECIFIED TYPE: ICD-10-CM

## 2024-04-06 DIAGNOSIS — M54.59 INTRACTABLE LOW BACK PAIN: Primary | ICD-10-CM

## 2024-04-06 LAB
ALBUMIN SERPL-MCNC: 5 G/DL (ref 3.5–5.2)
ALP SERPL-CCNC: 77 U/L (ref 35–104)
ALT SERPL-CCNC: 13 U/L (ref 0–32)
AMPHET UR QL SCN: NEGATIVE
ANION GAP SERPL CALCULATED.3IONS-SCNC: 17 MMOL/L (ref 7–16)
APAP SERPL-MCNC: <5 UG/ML (ref 10–30)
AST SERPL-CCNC: 17 U/L (ref 0–31)
BARBITURATES UR QL SCN: NEGATIVE
BASOPHILS # BLD: 0.13 K/UL (ref 0–0.2)
BASOPHILS NFR BLD: 1 % (ref 0–2)
BENZODIAZ UR QL: NEGATIVE
BILIRUB SERPL-MCNC: 0.4 MG/DL (ref 0–1.2)
BILIRUB UR QL STRIP: NEGATIVE
BUN SERPL-MCNC: 14 MG/DL (ref 6–20)
BUPRENORPHINE UR QL: NEGATIVE
CALCIUM SERPL-MCNC: 10.1 MG/DL (ref 8.6–10.2)
CANNABINOIDS UR QL SCN: POSITIVE
CHLORIDE SERPL-SCNC: 103 MMOL/L (ref 98–107)
CLARITY UR: CLEAR
CO2 SERPL-SCNC: 19 MMOL/L (ref 22–29)
COCAINE UR QL SCN: NEGATIVE
COLOR UR: YELLOW
CREAT SERPL-MCNC: 0.6 MG/DL (ref 0.5–1)
EOSINOPHIL # BLD: 0.1 K/UL (ref 0.05–0.5)
EOSINOPHILS RELATIVE PERCENT: 1 % (ref 0–6)
ERYTHROCYTE [DISTWIDTH] IN BLOOD BY AUTOMATED COUNT: 13.6 % (ref 11.5–15)
ETHANOLAMINE SERPL-MCNC: <10 MG/DL
FENTANYL UR QL: NEGATIVE
GFR SERPL CREATININE-BSD FRML MDRD: >90 ML/MIN/1.73M2
GLUCOSE SERPL-MCNC: 146 MG/DL (ref 74–99)
GLUCOSE UR STRIP-MCNC: NEGATIVE MG/DL
HCG, URINE, POC: NEGATIVE
HCT VFR BLD AUTO: 43.3 % (ref 34–48)
HGB BLD-MCNC: 14.5 G/DL (ref 11.5–15.5)
HGB UR QL STRIP.AUTO: NEGATIVE
IMM GRANULOCYTES # BLD AUTO: 0.09 K/UL (ref 0–0.58)
IMM GRANULOCYTES NFR BLD: 1 % (ref 0–5)
KETONES UR STRIP-MCNC: 15 MG/DL
LACTATE BLDV-SCNC: 2.6 MMOL/L (ref 0.5–2.2)
LACTATE BLDV-SCNC: 2.9 MMOL/L (ref 0.5–2.2)
LACTATE BLDV-SCNC: 4.7 MMOL/L (ref 0.5–2.2)
LEUKOCYTE ESTERASE UR QL STRIP: NEGATIVE
LYMPHOCYTES NFR BLD: 3.32 K/UL (ref 1.5–4)
LYMPHOCYTES RELATIVE PERCENT: 20 % (ref 20–42)
Lab: NORMAL
MAGNESIUM SERPL-MCNC: 1.8 MG/DL (ref 1.6–2.6)
MCH RBC QN AUTO: 30.3 PG (ref 26–35)
MCHC RBC AUTO-ENTMCNC: 33.5 G/DL (ref 32–34.5)
MCV RBC AUTO: 90.4 FL (ref 80–99.9)
METHADONE UR QL: NEGATIVE
MONOCYTES NFR BLD: 0.75 K/UL (ref 0.1–0.95)
MONOCYTES NFR BLD: 5 % (ref 2–12)
MUCOUS THREADS URNS QL MICRO: PRESENT
NEGATIVE QC PASS/FAIL: NORMAL
NEUTROPHILS NFR BLD: 73 % (ref 43–80)
NEUTS SEG NFR BLD: 12 K/UL (ref 1.8–7.3)
NITRITE UR QL STRIP: NEGATIVE
OPIATES UR QL SCN: POSITIVE
OXYCODONE UR QL SCN: POSITIVE
PCP UR QL SCN: NEGATIVE
PH UR STRIP: 6 [PH] (ref 5–9)
PLATELET # BLD AUTO: 528 K/UL (ref 130–450)
PMV BLD AUTO: 9 FL (ref 7–12)
POSITIVE QC PASS/FAIL: NORMAL
POTASSIUM SERPL-SCNC: 4.1 MMOL/L (ref 3.5–5)
PROT SERPL-MCNC: 8.1 G/DL (ref 6.4–8.3)
PROT UR STRIP-MCNC: 30 MG/DL
RBC # BLD AUTO: 4.79 M/UL (ref 3.5–5.5)
RBC #/AREA URNS HPF: ABNORMAL /HPF
SALICYLATES SERPL-MCNC: <0.3 MG/DL (ref 0–30)
SODIUM SERPL-SCNC: 139 MMOL/L (ref 132–146)
SP GR UR STRIP: >1.03 (ref 1–1.03)
TEST INFORMATION: ABNORMAL
TOXIC TRICYCLIC SC,BLOOD: NEGATIVE
UROBILINOGEN UR STRIP-ACNC: 0.2 EU/DL (ref 0–1)
WBC #/AREA URNS HPF: ABNORMAL /HPF
WBC OTHER # BLD: 16.4 K/UL (ref 4.5–11.5)

## 2024-04-06 PROCEDURE — 96361 HYDRATE IV INFUSION ADD-ON: CPT

## 2024-04-06 PROCEDURE — 80143 DRUG ASSAY ACETAMINOPHEN: CPT

## 2024-04-06 PROCEDURE — 6360000004 HC RX CONTRAST MEDICATION: Performed by: RADIOLOGY

## 2024-04-06 PROCEDURE — 6360000002 HC RX W HCPCS: Performed by: EMERGENCY MEDICINE

## 2024-04-06 PROCEDURE — 6360000002 HC RX W HCPCS

## 2024-04-06 PROCEDURE — G0378 HOSPITAL OBSERVATION PER HR: HCPCS

## 2024-04-06 PROCEDURE — G0480 DRUG TEST DEF 1-7 CLASSES: HCPCS

## 2024-04-06 PROCEDURE — 80053 COMPREHEN METABOLIC PANEL: CPT

## 2024-04-06 PROCEDURE — 2580000003 HC RX 258

## 2024-04-06 PROCEDURE — 96376 TX/PRO/DX INJ SAME DRUG ADON: CPT

## 2024-04-06 PROCEDURE — 96374 THER/PROPH/DIAG INJ IV PUSH: CPT

## 2024-04-06 PROCEDURE — 83605 ASSAY OF LACTIC ACID: CPT

## 2024-04-06 PROCEDURE — 96372 THER/PROPH/DIAG INJ SC/IM: CPT

## 2024-04-06 PROCEDURE — 74177 CT ABD & PELVIS W/CONTRAST: CPT

## 2024-04-06 PROCEDURE — 6360000002 HC RX W HCPCS: Performed by: PHYSICIAN ASSISTANT

## 2024-04-06 PROCEDURE — 81001 URINALYSIS AUTO W/SCOPE: CPT

## 2024-04-06 PROCEDURE — 2580000003 HC RX 258: Performed by: PHYSICIAN ASSISTANT

## 2024-04-06 PROCEDURE — 85025 COMPLETE CBC W/AUTO DIFF WBC: CPT

## 2024-04-06 PROCEDURE — 80307 DRUG TEST PRSMV CHEM ANLYZR: CPT

## 2024-04-06 PROCEDURE — 6370000000 HC RX 637 (ALT 250 FOR IP)

## 2024-04-06 PROCEDURE — 99285 EMERGENCY DEPT VISIT HI MDM: CPT

## 2024-04-06 PROCEDURE — 96375 TX/PRO/DX INJ NEW DRUG ADDON: CPT

## 2024-04-06 PROCEDURE — 72131 CT LUMBAR SPINE W/O DYE: CPT

## 2024-04-06 PROCEDURE — 80179 DRUG ASSAY SALICYLATE: CPT

## 2024-04-06 PROCEDURE — 83735 ASSAY OF MAGNESIUM: CPT

## 2024-04-06 PROCEDURE — APPSS60 APP SPLIT SHARED TIME 46-60 MINUTES

## 2024-04-06 RX ORDER — ACETAMINOPHEN 325 MG/1
650 TABLET ORAL EVERY 6 HOURS PRN
Status: DISCONTINUED | OUTPATIENT
Start: 2024-04-06 | End: 2024-04-08 | Stop reason: HOSPADM

## 2024-04-06 RX ORDER — 0.9 % SODIUM CHLORIDE 0.9 %
1000 INTRAVENOUS SOLUTION INTRAVENOUS ONCE
Status: COMPLETED | OUTPATIENT
Start: 2024-04-06 | End: 2024-04-06

## 2024-04-06 RX ORDER — SODIUM CHLORIDE 0.9 % (FLUSH) 0.9 %
5-40 SYRINGE (ML) INJECTION EVERY 12 HOURS SCHEDULED
Status: DISCONTINUED | OUTPATIENT
Start: 2024-04-06 | End: 2024-04-08 | Stop reason: HOSPADM

## 2024-04-06 RX ORDER — DROPERIDOL 2.5 MG/ML
2.5 INJECTION, SOLUTION INTRAMUSCULAR; INTRAVENOUS EVERY 6 HOURS PRN
Status: DISCONTINUED | OUTPATIENT
Start: 2024-04-06 | End: 2024-04-08 | Stop reason: HOSPADM

## 2024-04-06 RX ORDER — LISINOPRIL 5 MG/1
5 TABLET ORAL DAILY
Status: DISCONTINUED | OUTPATIENT
Start: 2024-04-06 | End: 2024-04-08 | Stop reason: HOSPADM

## 2024-04-06 RX ORDER — SODIUM CHLORIDE 0.9 % (FLUSH) 0.9 %
5-40 SYRINGE (ML) INJECTION PRN
Status: DISCONTINUED | OUTPATIENT
Start: 2024-04-06 | End: 2024-04-08 | Stop reason: HOSPADM

## 2024-04-06 RX ORDER — POLYETHYLENE GLYCOL 3350 17 G/17G
17 POWDER, FOR SOLUTION ORAL DAILY PRN
Status: DISCONTINUED | OUTPATIENT
Start: 2024-04-06 | End: 2024-04-08 | Stop reason: HOSPADM

## 2024-04-06 RX ORDER — SODIUM CHLORIDE 9 MG/ML
INJECTION, SOLUTION INTRAVENOUS PRN
Status: DISCONTINUED | OUTPATIENT
Start: 2024-04-06 | End: 2024-04-08 | Stop reason: HOSPADM

## 2024-04-06 RX ORDER — ENOXAPARIN SODIUM 100 MG/ML
40 INJECTION SUBCUTANEOUS DAILY
Status: DISCONTINUED | OUTPATIENT
Start: 2024-04-06 | End: 2024-04-08 | Stop reason: HOSPADM

## 2024-04-06 RX ORDER — HYDROXYZINE HYDROCHLORIDE 10 MG/1
10 TABLET, FILM COATED ORAL EVERY 8 HOURS PRN
Status: DISCONTINUED | OUTPATIENT
Start: 2024-04-06 | End: 2024-04-08 | Stop reason: HOSPADM

## 2024-04-06 RX ORDER — ONDANSETRON 4 MG/1
4 TABLET, ORALLY DISINTEGRATING ORAL EVERY 8 HOURS PRN
Status: DISCONTINUED | OUTPATIENT
Start: 2024-04-06 | End: 2024-04-08 | Stop reason: HOSPADM

## 2024-04-06 RX ORDER — MAGNESIUM HYDROXIDE/ALUMINUM HYDROXICE/SIMETHICONE 120; 1200; 1200 MG/30ML; MG/30ML; MG/30ML
30 SUSPENSION ORAL EVERY 6 HOURS PRN
Status: DISCONTINUED | OUTPATIENT
Start: 2024-04-06 | End: 2024-04-08 | Stop reason: HOSPADM

## 2024-04-06 RX ORDER — LANOLIN ALCOHOL/MO/W.PET/CERES
3 CREAM (GRAM) TOPICAL NIGHTLY
Status: DISCONTINUED | OUTPATIENT
Start: 2024-04-06 | End: 2024-04-08 | Stop reason: HOSPADM

## 2024-04-06 RX ORDER — MORPHINE SULFATE 2 MG/ML
2 INJECTION, SOLUTION INTRAMUSCULAR; INTRAVENOUS EVERY 4 HOURS PRN
Status: DISCONTINUED | OUTPATIENT
Start: 2024-04-06 | End: 2024-04-08 | Stop reason: HOSPADM

## 2024-04-06 RX ORDER — AMLODIPINE BESYLATE 5 MG/1
5 TABLET ORAL DAILY
Status: DISCONTINUED | OUTPATIENT
Start: 2024-04-06 | End: 2024-04-08 | Stop reason: HOSPADM

## 2024-04-06 RX ORDER — ACETAMINOPHEN 650 MG/1
650 SUPPOSITORY RECTAL EVERY 6 HOURS PRN
Status: DISCONTINUED | OUTPATIENT
Start: 2024-04-06 | End: 2024-04-08 | Stop reason: HOSPADM

## 2024-04-06 RX ORDER — GABAPENTIN 300 MG/1
300 CAPSULE ORAL 3 TIMES DAILY
Status: DISCONTINUED | OUTPATIENT
Start: 2024-04-06 | End: 2024-04-08 | Stop reason: HOSPADM

## 2024-04-06 RX ORDER — MORPHINE SULFATE 4 MG/ML
4 INJECTION, SOLUTION INTRAMUSCULAR; INTRAVENOUS ONCE
Status: COMPLETED | OUTPATIENT
Start: 2024-04-06 | End: 2024-04-06

## 2024-04-06 RX ORDER — POTASSIUM CHLORIDE 7.45 MG/ML
10 INJECTION INTRAVENOUS PRN
Status: DISCONTINUED | OUTPATIENT
Start: 2024-04-06 | End: 2024-04-08 | Stop reason: HOSPADM

## 2024-04-06 RX ORDER — MORPHINE SULFATE 2 MG/ML
2 INJECTION, SOLUTION INTRAMUSCULAR; INTRAVENOUS
Status: DISCONTINUED | OUTPATIENT
Start: 2024-04-06 | End: 2024-04-06 | Stop reason: SDUPTHER

## 2024-04-06 RX ORDER — MAGNESIUM SULFATE IN WATER 40 MG/ML
2000 INJECTION, SOLUTION INTRAVENOUS PRN
Status: DISCONTINUED | OUTPATIENT
Start: 2024-04-06 | End: 2024-04-08 | Stop reason: HOSPADM

## 2024-04-06 RX ORDER — POTASSIUM CHLORIDE 20 MEQ/1
40 TABLET, EXTENDED RELEASE ORAL PRN
Status: DISCONTINUED | OUTPATIENT
Start: 2024-04-06 | End: 2024-04-08 | Stop reason: HOSPADM

## 2024-04-06 RX ORDER — LABETALOL HYDROCHLORIDE 5 MG/ML
10 INJECTION, SOLUTION INTRAVENOUS EVERY 4 HOURS PRN
Status: DISCONTINUED | OUTPATIENT
Start: 2024-04-06 | End: 2024-04-08 | Stop reason: HOSPADM

## 2024-04-06 RX ORDER — ONDANSETRON 2 MG/ML
4 INJECTION INTRAMUSCULAR; INTRAVENOUS EVERY 6 HOURS PRN
Status: DISCONTINUED | OUTPATIENT
Start: 2024-04-06 | End: 2024-04-08 | Stop reason: HOSPADM

## 2024-04-06 RX ORDER — ONDANSETRON 2 MG/ML
4 INJECTION INTRAMUSCULAR; INTRAVENOUS ONCE
Status: COMPLETED | OUTPATIENT
Start: 2024-04-06 | End: 2024-04-06

## 2024-04-06 RX ORDER — DROPERIDOL 2.5 MG/ML
0.62 INJECTION, SOLUTION INTRAMUSCULAR; INTRAVENOUS ONCE
Status: COMPLETED | OUTPATIENT
Start: 2024-04-06 | End: 2024-04-06

## 2024-04-06 RX ORDER — OXYCODONE HYDROCHLORIDE AND ACETAMINOPHEN 5; 325 MG/1; MG/1
1 TABLET ORAL EVERY 6 HOURS PRN
Status: DISCONTINUED | OUTPATIENT
Start: 2024-04-06 | End: 2024-04-07

## 2024-04-06 RX ADMIN — DROPERIDOL 0.62 MG: 2.5 INJECTION, SOLUTION INTRAMUSCULAR; INTRAVENOUS at 15:44

## 2024-04-06 RX ADMIN — MORPHINE SULFATE 2 MG: 2 INJECTION, SOLUTION INTRAMUSCULAR; INTRAVENOUS at 18:41

## 2024-04-06 RX ADMIN — DROPERIDOL 2.5 MG: 2.5 INJECTION, SOLUTION INTRAMUSCULAR; INTRAVENOUS at 18:40

## 2024-04-06 RX ADMIN — MORPHINE SULFATE 2 MG: 2 INJECTION, SOLUTION INTRAMUSCULAR; INTRAVENOUS at 23:31

## 2024-04-06 RX ADMIN — SODIUM CHLORIDE 1000 ML: 9 INJECTION, SOLUTION INTRAVENOUS at 14:58

## 2024-04-06 RX ADMIN — SODIUM CHLORIDE, PRESERVATIVE FREE 10 ML: 5 INJECTION INTRAVENOUS at 23:31

## 2024-04-06 RX ADMIN — SODIUM CHLORIDE 1000 ML: 9 INJECTION, SOLUTION INTRAVENOUS at 17:22

## 2024-04-06 RX ADMIN — ONDANSETRON 4 MG: 2 INJECTION INTRAMUSCULAR; INTRAVENOUS at 14:59

## 2024-04-06 RX ADMIN — SODIUM CHLORIDE 1000 ML: 9 INJECTION, SOLUTION INTRAVENOUS at 18:55

## 2024-04-06 RX ADMIN — IOPAMIDOL 75 ML: 755 INJECTION, SOLUTION INTRAVENOUS at 17:46

## 2024-04-06 RX ADMIN — ONDANSETRON 4 MG: 2 INJECTION INTRAMUSCULAR; INTRAVENOUS at 23:31

## 2024-04-06 RX ADMIN — MORPHINE SULFATE 4 MG: 4 INJECTION, SOLUTION INTRAMUSCULAR; INTRAVENOUS at 14:57

## 2024-04-06 ASSESSMENT — PAIN DESCRIPTION - LOCATION
LOCATION: BACK
LOCATION: BACK;ABDOMEN

## 2024-04-06 ASSESSMENT — PAIN DESCRIPTION - DESCRIPTORS: DESCRIPTORS: PATIENT UNABLE TO DESCRIBE

## 2024-04-06 ASSESSMENT — PAIN - FUNCTIONAL ASSESSMENT: PAIN_FUNCTIONAL_ASSESSMENT: PREVENTS OR INTERFERES SOME ACTIVE ACTIVITIES AND ADLS

## 2024-04-06 ASSESSMENT — PAIN SCALES - GENERAL
PAINLEVEL_OUTOF10: 10
PAINLEVEL_OUTOF10: 10
PAINLEVEL_OUTOF10: 7

## 2024-04-06 ASSESSMENT — PAIN DESCRIPTION - ONSET: ONSET: ON-GOING

## 2024-04-06 ASSESSMENT — PAIN DESCRIPTION - PAIN TYPE: TYPE: ACUTE PAIN;CHRONIC PAIN

## 2024-04-06 ASSESSMENT — PAIN DESCRIPTION - FREQUENCY: FREQUENCY: CONTINUOUS

## 2024-04-06 NOTE — ED PROVIDER NOTES
Shared [facility]-ED Attending Visit.  CC: No    HPI:  24,   Time: 2:46 PM EDT         Cynthia Ayala is a 38 y.o. female with past medical history of asthma, depression, marijuana use, Crohn's colitis, polysubstance abuse and low back pain presenting to the ED by EMS for acute exacerbation of her low back pain.  She reports her pain started to flareup yesterday.  Today it worsened.  She states is a 10 out of 10 sharp pain that is constant.  There is radiation down her legs with numbness and tingling.  Is currently on oxycodone every 4 hours.  Also reports she is smoking marijuana for her pain.  Her pain is so bad she is currently vomiting.  She reports couple episodes of diarrhea as well.  No new injury or trauma.  Nothing makes it better or worse.  She denies any fever, chills, body aches, headache, dizziness, syncope, chest pain, shortness of breath, abdominal pains, hematemesis, melena, hematochezia, constipation, dysuria, hematuria, saddle anesthesia, or bladder or bowel incontinence.    ROS:   Pertinent positives and negatives are stated within HPI, all other systems reviewed and are negative.  --------------------------------------------- PAST HISTORY ---------------------------------------------  Past Medical History:  has a past medical history of Acute Crohn's disease (HCC), Anxiety attack, Asthma, Crohn's colitis (HCC), Depression, Herpes simplex virus (HSV) infection, History of blood transfusion, Hypertension, Marijuana abuse, Neurologic disorder, Postpartum depression,  delivery,  labor, Rh sensitized, Seizure (HCC), STD (sexually transmitted disease), and Tobacco abuse.    Past Surgical History:  has a past surgical history that includes Dilation and curettage of uterus (); Colonoscopy; Endoscopy, colon, diagnostic; Cholecystectomy (2018); hernia repair; hernia repair; Upper gastrointestinal endoscopy (N/A, 2022); Dilation & curettage; and laparoscopy.    Social History:

## 2024-04-07 LAB
ALBUMIN SERPL-MCNC: 4.8 G/DL (ref 3.5–5.2)
ALP SERPL-CCNC: 69 U/L (ref 35–104)
ALT SERPL-CCNC: 11 U/L (ref 0–32)
ANION GAP SERPL CALCULATED.3IONS-SCNC: 17 MMOL/L (ref 7–16)
AST SERPL-CCNC: 17 U/L (ref 0–31)
BASOPHILS # BLD: 0.04 K/UL (ref 0–0.2)
BASOPHILS NFR BLD: 0 % (ref 0–2)
BILIRUB SERPL-MCNC: 0.4 MG/DL (ref 0–1.2)
BUN SERPL-MCNC: 7 MG/DL (ref 6–20)
CALCIUM SERPL-MCNC: 9.2 MG/DL (ref 8.6–10.2)
CHLORIDE SERPL-SCNC: 98 MMOL/L (ref 98–107)
CO2 SERPL-SCNC: 22 MMOL/L (ref 22–29)
CREAT SERPL-MCNC: 0.5 MG/DL (ref 0.5–1)
EOSINOPHIL # BLD: 0 K/UL (ref 0.05–0.5)
EOSINOPHILS RELATIVE PERCENT: 0 % (ref 0–6)
ERYTHROCYTE [DISTWIDTH] IN BLOOD BY AUTOMATED COUNT: 13.8 % (ref 11.5–15)
GFR SERPL CREATININE-BSD FRML MDRD: >90 ML/MIN/1.73M2
GLUCOSE SERPL-MCNC: 130 MG/DL (ref 74–99)
HBA1C MFR BLD: 5.3 % (ref 4–5.6)
HCT VFR BLD AUTO: 38.8 % (ref 34–48)
HGB BLD-MCNC: 13.1 G/DL (ref 11.5–15.5)
IMM GRANULOCYTES # BLD AUTO: 0.09 K/UL (ref 0–0.58)
IMM GRANULOCYTES NFR BLD: 1 % (ref 0–5)
LACTATE BLDV-SCNC: 1.7 MMOL/L (ref 0.5–2.2)
LYMPHOCYTES NFR BLD: 2.16 K/UL (ref 1.5–4)
LYMPHOCYTES RELATIVE PERCENT: 12 % (ref 20–42)
MAGNESIUM SERPL-MCNC: 1.7 MG/DL (ref 1.6–2.6)
MCH RBC QN AUTO: 30.2 PG (ref 26–35)
MCHC RBC AUTO-ENTMCNC: 33.8 G/DL (ref 32–34.5)
MCV RBC AUTO: 89.4 FL (ref 80–99.9)
MONOCYTES NFR BLD: 0.78 K/UL (ref 0.1–0.95)
MONOCYTES NFR BLD: 5 % (ref 2–12)
NEUTROPHILS NFR BLD: 83 % (ref 43–80)
NEUTS SEG NFR BLD: 14.42 K/UL (ref 1.8–7.3)
PLATELET # BLD AUTO: 465 K/UL (ref 130–450)
PMV BLD AUTO: 9.1 FL (ref 7–12)
POTASSIUM SERPL-SCNC: 3.2 MMOL/L (ref 3.5–5)
PROT SERPL-MCNC: 8 G/DL (ref 6.4–8.3)
RBC # BLD AUTO: 4.34 M/UL (ref 3.5–5.5)
SODIUM SERPL-SCNC: 137 MMOL/L (ref 132–146)
WBC OTHER # BLD: 17.5 K/UL (ref 4.5–11.5)

## 2024-04-07 PROCEDURE — 6370000000 HC RX 637 (ALT 250 FOR IP): Performed by: INTERNAL MEDICINE

## 2024-04-07 PROCEDURE — 80053 COMPREHEN METABOLIC PANEL: CPT

## 2024-04-07 PROCEDURE — 97535 SELF CARE MNGMENT TRAINING: CPT

## 2024-04-07 PROCEDURE — 97161 PT EVAL LOW COMPLEX 20 MIN: CPT

## 2024-04-07 PROCEDURE — 83735 ASSAY OF MAGNESIUM: CPT

## 2024-04-07 PROCEDURE — G0378 HOSPITAL OBSERVATION PER HR: HCPCS

## 2024-04-07 PROCEDURE — 97165 OT EVAL LOW COMPLEX 30 MIN: CPT

## 2024-04-07 PROCEDURE — 83036 HEMOGLOBIN GLYCOSYLATED A1C: CPT

## 2024-04-07 PROCEDURE — 96372 THER/PROPH/DIAG INJ SC/IM: CPT

## 2024-04-07 PROCEDURE — 99222 1ST HOSP IP/OBS MODERATE 55: CPT | Performed by: INTERNAL MEDICINE

## 2024-04-07 PROCEDURE — 96376 TX/PRO/DX INJ SAME DRUG ADON: CPT

## 2024-04-07 PROCEDURE — 6360000002 HC RX W HCPCS

## 2024-04-07 PROCEDURE — 6360000002 HC RX W HCPCS: Performed by: INTERNAL MEDICINE

## 2024-04-07 PROCEDURE — 83605 ASSAY OF LACTIC ACID: CPT

## 2024-04-07 PROCEDURE — 6370000000 HC RX 637 (ALT 250 FOR IP)

## 2024-04-07 PROCEDURE — 99232 SBSQ HOSP IP/OBS MODERATE 35: CPT | Performed by: INTERNAL MEDICINE

## 2024-04-07 PROCEDURE — 6360000002 HC RX W HCPCS: Performed by: EMERGENCY MEDICINE

## 2024-04-07 PROCEDURE — 96375 TX/PRO/DX INJ NEW DRUG ADDON: CPT

## 2024-04-07 PROCEDURE — 85025 COMPLETE CBC W/AUTO DIFF WBC: CPT

## 2024-04-07 PROCEDURE — 6370000000 HC RX 637 (ALT 250 FOR IP): Performed by: STUDENT IN AN ORGANIZED HEALTH CARE EDUCATION/TRAINING PROGRAM

## 2024-04-07 PROCEDURE — 36415 COLL VENOUS BLD VENIPUNCTURE: CPT

## 2024-04-07 PROCEDURE — 97530 THERAPEUTIC ACTIVITIES: CPT

## 2024-04-07 PROCEDURE — 2580000003 HC RX 258

## 2024-04-07 RX ORDER — CYCLOBENZAPRINE HCL 10 MG
10 TABLET ORAL 3 TIMES DAILY PRN
Status: DISCONTINUED | OUTPATIENT
Start: 2024-04-07 | End: 2024-04-08 | Stop reason: HOSPADM

## 2024-04-07 RX ORDER — OXYCODONE HYDROCHLORIDE 10 MG/1
10 TABLET ORAL EVERY 4 HOURS PRN
Status: DISCONTINUED | OUTPATIENT
Start: 2024-04-07 | End: 2024-04-08 | Stop reason: HOSPADM

## 2024-04-07 RX ORDER — TRAMADOL HYDROCHLORIDE 50 MG/1
50 TABLET ORAL EVERY 6 HOURS PRN
Status: DISCONTINUED | OUTPATIENT
Start: 2024-04-07 | End: 2024-04-08 | Stop reason: HOSPADM

## 2024-04-07 RX ORDER — TRAMADOL HYDROCHLORIDE 50 MG/1
50 TABLET ORAL EVERY 6 HOURS PRN
Qty: 56 TABLET | Refills: 0 | Status: SHIPPED | OUTPATIENT
Start: 2024-04-07 | End: 2024-04-21

## 2024-04-07 RX ORDER — LORAZEPAM 2 MG/ML
1 INJECTION INTRAMUSCULAR ONCE
Status: COMPLETED | OUTPATIENT
Start: 2024-04-07 | End: 2024-04-07

## 2024-04-07 RX ORDER — DIPHENHYDRAMINE HYDROCHLORIDE 50 MG/ML
25 INJECTION INTRAMUSCULAR; INTRAVENOUS EVERY 6 HOURS PRN
Status: DISCONTINUED | OUTPATIENT
Start: 2024-04-07 | End: 2024-04-08 | Stop reason: HOSPADM

## 2024-04-07 RX ORDER — DIPHENHYDRAMINE HYDROCHLORIDE 50 MG/ML
25 INJECTION INTRAMUSCULAR; INTRAVENOUS ONCE
Status: COMPLETED | OUTPATIENT
Start: 2024-04-07 | End: 2024-04-07

## 2024-04-07 RX ADMIN — CYCLOBENZAPRINE 10 MG: 10 TABLET, FILM COATED ORAL at 20:33

## 2024-04-07 RX ADMIN — POTASSIUM BICARBONATE 40 MEQ: 782 TABLET, EFFERVESCENT ORAL at 10:04

## 2024-04-07 RX ADMIN — DIPHENHYDRAMINE HYDROCHLORIDE 25 MG: 50 INJECTION INTRAMUSCULAR; INTRAVENOUS at 02:44

## 2024-04-07 RX ADMIN — LABETALOL HYDROCHLORIDE 10 MG: 5 INJECTION INTRAVENOUS at 02:34

## 2024-04-07 RX ADMIN — SODIUM CHLORIDE, PRESERVATIVE FREE 10 ML: 5 INJECTION INTRAVENOUS at 04:14

## 2024-04-07 RX ADMIN — SODIUM CHLORIDE, PRESERVATIVE FREE 10 ML: 5 INJECTION INTRAVENOUS at 22:31

## 2024-04-07 RX ADMIN — SODIUM CHLORIDE, PRESERVATIVE FREE 10 ML: 5 INJECTION INTRAVENOUS at 02:45

## 2024-04-07 RX ADMIN — DIPHENHYDRAMINE HYDROCHLORIDE 25 MG: 50 INJECTION, SOLUTION INTRAMUSCULAR; INTRAVENOUS at 22:31

## 2024-04-07 RX ADMIN — HYDROXYZINE HYDROCHLORIDE 10 MG: 10 TABLET, FILM COATED ORAL at 20:33

## 2024-04-07 RX ADMIN — GABAPENTIN 300 MG: 300 CAPSULE ORAL at 20:25

## 2024-04-07 RX ADMIN — GABAPENTIN 300 MG: 300 CAPSULE ORAL at 14:04

## 2024-04-07 RX ADMIN — LORAZEPAM 1 MG: 2 INJECTION INTRAMUSCULAR; INTRAVENOUS at 02:44

## 2024-04-07 RX ADMIN — MORPHINE SULFATE 2 MG: 2 INJECTION, SOLUTION INTRAMUSCULAR; INTRAVENOUS at 20:25

## 2024-04-07 RX ADMIN — LISINOPRIL 5 MG: 5 TABLET ORAL at 08:07

## 2024-04-07 RX ADMIN — GABAPENTIN 300 MG: 300 CAPSULE ORAL at 08:06

## 2024-04-07 RX ADMIN — SODIUM CHLORIDE, PRESERVATIVE FREE 10 ML: 5 INJECTION INTRAVENOUS at 20:26

## 2024-04-07 RX ADMIN — ACETAMINOPHEN 650 MG: 325 TABLET ORAL at 22:31

## 2024-04-07 RX ADMIN — ENOXAPARIN SODIUM 40 MG: 100 INJECTION SUBCUTANEOUS at 08:05

## 2024-04-07 RX ADMIN — SODIUM CHLORIDE, PRESERVATIVE FREE 10 ML: 5 INJECTION INTRAVENOUS at 08:03

## 2024-04-07 RX ADMIN — MORPHINE SULFATE 2 MG: 2 INJECTION, SOLUTION INTRAMUSCULAR; INTRAVENOUS at 04:13

## 2024-04-07 RX ADMIN — OXYCODONE HYDROCHLORIDE 10 MG: 10 TABLET ORAL at 22:31

## 2024-04-07 RX ADMIN — MORPHINE SULFATE 2 MG: 2 INJECTION, SOLUTION INTRAMUSCULAR; INTRAVENOUS at 08:01

## 2024-04-07 RX ADMIN — MORPHINE SULFATE 2 MG: 2 INJECTION, SOLUTION INTRAMUSCULAR; INTRAVENOUS at 12:04

## 2024-04-07 RX ADMIN — AMLODIPINE BESYLATE 5 MG: 5 TABLET ORAL at 08:06

## 2024-04-07 RX ADMIN — Medication 3 MG: at 20:25

## 2024-04-07 RX ADMIN — DROPERIDOL 2.5 MG: 2.5 INJECTION, SOLUTION INTRAMUSCULAR; INTRAVENOUS at 01:06

## 2024-04-07 RX ADMIN — SODIUM CHLORIDE, PRESERVATIVE FREE 10 ML: 5 INJECTION INTRAVENOUS at 02:34

## 2024-04-07 ASSESSMENT — PAIN DESCRIPTION - ONSET
ONSET: ON-GOING

## 2024-04-07 ASSESSMENT — PAIN SCALES - GENERAL
PAINLEVEL_OUTOF10: 10
PAINLEVEL_OUTOF10: 7
PAINLEVEL_OUTOF10: 10
PAINLEVEL_OUTOF10: 7
PAINLEVEL_OUTOF10: 9

## 2024-04-07 ASSESSMENT — PAIN DESCRIPTION - DESCRIPTORS
DESCRIPTORS: SHARP;CRAMPING;SQUEEZING
DESCRIPTORS: THROBBING;STABBING;SHARP
DESCRIPTORS: SHARP;SQUEEZING;STABBING
DESCRIPTORS: SHARP;SQUEEZING;STABBING

## 2024-04-07 ASSESSMENT — PAIN DESCRIPTION - FREQUENCY
FREQUENCY: CONTINUOUS

## 2024-04-07 ASSESSMENT — PAIN DESCRIPTION - LOCATION
LOCATION: BACK;ABDOMEN
LOCATION: BACK;ABDOMEN
LOCATION: BACK
LOCATION: ABDOMEN;BACK
LOCATION: ABDOMEN;BACK
LOCATION: BACK

## 2024-04-07 ASSESSMENT — PAIN DESCRIPTION - PAIN TYPE
TYPE: CHRONIC PAIN
TYPE: ACUTE PAIN;CHRONIC PAIN
TYPE: CHRONIC PAIN
TYPE: ACUTE PAIN;CHRONIC PAIN

## 2024-04-07 ASSESSMENT — PAIN - FUNCTIONAL ASSESSMENT
PAIN_FUNCTIONAL_ASSESSMENT: PREVENTS OR INTERFERES SOME ACTIVE ACTIVITIES AND ADLS

## 2024-04-07 ASSESSMENT — PAIN SCALES - WONG BAKER
WONGBAKER_NUMERICALRESPONSE: HURTS A LITTLE BIT
WONGBAKER_NUMERICALRESPONSE: HURTS A LITTLE BIT

## 2024-04-07 ASSESSMENT — PAIN DESCRIPTION - ORIENTATION
ORIENTATION: RIGHT;LEFT
ORIENTATION: RIGHT;LEFT

## 2024-04-07 NOTE — PROGRESS NOTES
ED nurse transported patient to the floor. Less than five minutes after patient arrival I went in to admit her and she was found to be taking a shower. Shortly after she put on her call light and could be heard across the entire nursing unit crying, carrying on and yelling out in pain. Patient was found squirming all around in the bed thrashing from side to side in a hyper-dramatic state crying that her stomach and back was hurting and she was extremely nauseous. Patient behavior was akin to a child. Patient medicated with IV med's (see mar). Immediately after before I could even give her the PO med's, begin any of the admission process or even finish her vitals the patient got back up and went back in to get another shower. Which is were she is presently. Will complete admission process as able.

## 2024-04-07 NOTE — PROGRESS NOTES
Patient finally out of the bathroom/shower. Behavior continues. The patient keeps yelling mommy help me. The patient is all over the bed and laying in it backwards.

## 2024-04-07 NOTE — PROGRESS NOTES
Patient has been in the shower/bathroom for nearly an hour now. Patient can be heard wrenching, moaning, and yelling out from across the nursing unit.

## 2024-04-07 NOTE — PROGRESS NOTES
Notified provider of the following: \"This patients behavior is totally out of control, she is acting like a hyper-dramatic child, crying, moaning, yelling out in pain for her \"mommy\" so loud she can be heard across the unit. She can be heard far and wide making wrenching noises. We had to close her door as others on the floor were complaining. She has been in the bathroom/shower almost the entire time she has been up here. For the couple moments she was in the bed she was thrashing all over the place, forwards, backwards, fetal position, exc. I haven't been able to do about anything with her in regards to my role to admit her. (see nursing notes for further details)\"

## 2024-04-07 NOTE — PROGRESS NOTES
OCCUPATIONAL THERAPY INITIAL EVALUATION    [facility] [address] [place], [place]      Date:2024                                                Patient Name: Cynthia Ayala  MRN: [id number]  : 1985  Room: [address] [place], [place] [address]     Evaluating OT:Marie Foss, OTR/L   License #  [id number]       Referring Provider: Jair Friedman APRN - CNP     Specific Provider Orders/Date: OT evaluation & treatment        Diagnosis: Dehydration [E86.0]  Lactic acidosis [E87.20]  Intractable back pain [M54.9]  Intractable low back pain [M54.59]  Diarrhea, unspecified type [R19.7]      Pertinent Medical History:  has a past medical history of Acute Crohn's disease (HCC), Anxiety attack, Asthma, Crohn's colitis (HCC), Depression, Herpes simplex virus (HSV) infection, History of blood transfusion, Hypertension, Marijuana abuse, Neurologic disorder, Postpartum depression,  delivery,  labor, Rh sensitized, Seizure (HCC), STD (sexually transmitted disease), and Tobacco abuse.    Surgery: none noted    Past Surgical History:  has a past surgical history that includes Dilation and curettage of uterus (); Colonoscopy; Endoscopy, colon, diagnostic; Cholecystectomy (2018); hernia repair; hernia repair; Upper gastrointestinal endoscopy (N/A, 2022); Dilation & curettage; and laparoscopy.       Precautions:  Fall Risk, c-diff rule out      Assessment of current deficits    [x] Functional mobility            [x]ADLs           [x] Strength                  [x]Cognition    [x] Functional transfers          [x] IADLs         [x] Safety Awareness   [x]Endurance    [x] Fine Coordination                         [x] Balance      [] Vision/perception   [x]Sensation      []Gross Motor Coordination             [] ROM           [] Delirium                   [] Motor Control      OT PLAN OF CARE   OT POC based on physician orders, patient diagnosis and

## 2024-04-07 NOTE — PROGRESS NOTES
[facility] Hospitalist Progress Note    Admitting Date and Time: 2024  2:26 PM  Admit Dx: Dehydration [E86.0]  Lactic acidosis [E87.20]  Intractable back pain [M54.9]  Intractable low back pain [M54.59]  Diarrhea, unspecified type [R19.7]    Synopsis:    Ms. Cynthia Ayala, a 38 y.o. year old female  who  has a past medical history of Acute Crohn's disease (HCC), Anxiety attack, Asthma, Crohn's colitis (HCC), Depression, Herpes simplex virus (HSV) infection, History of blood transfusion, Hypertension, Marijuana abuse, Neurologic disorder, Postpartum depression,  delivery,  labor, Rh sensitized, Seizure (HCC), STD (sexually transmitted disease), and Tobacco abuse.  Patient presented to emergency department with back pain, vomiting, and diarrhea.  She reports currently taking oxycodone every 4 hours and smoking marijuana as needed for back pain.  At time of examination patient is very tearful and not forthcoming with information.  States back pain began began 2 years ago while pregnant, and has worsened since, states pain goes everywhere and has neuropathy in upper and lower extremities.  Patient states she is scheduled for surgery on 24 at [facility]. current hospital course significant for elevated lactic acid from 2.9 to 4.7 to now 2.6.  Patient received 3 L boluses and lactic came down to 2.6.  Glucose elevated at 146, will check hemoglobin A1c in AM.  White blood cell count is elevated at 16.4.  Patient's urine toxicology report positive for cannabinoid, oxycodone, and opioid. Reviewed CT of lumbar spine and abdomen showed no change from prior CT, recommend MRI of spine, last MRI was on 3/6/2024 which showed severe degenerative disc disease at L5-S1 and foraminal stenosis.  Patient admitted to medicine for further observation of intractable lower back pain.     Subjective:  Patient is being followed for Dehydration [E86.0]  Lactic acidosis [E87.20]  Intractable back pain

## 2024-04-07 NOTE — PROGRESS NOTES
1x dose IV Ativan and Benadryl given. Seems to have calmed the patient down and she is now laying in the bed correctly. Will continue to monitor situation.

## 2024-04-07 NOTE — PROGRESS NOTES
Physical Therapy Initial Evaluation    Name: Cynthia Ayala  : 1985  MRN: [id number]      Date of Service: 2024    Evaluating PT:  Elver Leyva, PT [id number]    Referring provider/PT Order:  PT Eval and Treat   24  PT evaluation and treat  Start:  24,   End:  24,   ONE TIME,   Standing Count:  1 Occurrences,   R         Idalia, Jair LUCIANO, APRN - CNP     Room #:  5412/5412-A  Diagnosis:  Dehydration [E86.0]  Lactic acidosis [E87.20]  Intractable back pain [M54.9]  Intractable low back pain [M54.59]  Diarrhea, unspecified type [R19.7]  PMHx/PSHx:     has a past medical history of Acute Crohn's disease (HCC), Anxiety attack, Asthma, Crohn's colitis (HCC), Depression, Herpes simplex virus (HSV) infection, History of blood transfusion, Hypertension, Marijuana abuse, Neurologic disorder, Postpartum depression,  delivery,  labor, Rh sensitized, Seizure (HCC), STD (sexually transmitted disease), and Tobacco abuse.    has a past surgical history that includes Dilation and curettage of uterus (); Colonoscopy; Endoscopy, colon, diagnostic; Cholecystectomy (2018); hernia repair; hernia repair; Upper gastrointestinal endoscopy (N/A, 2022); Dilation & curettage; and laparoscopy.     Procedure/Surgery:  NONE  Precautions:  Falls, FWB (full weight bearing)   Equipment Needs: Wheeled Walker,??    SUBJECTIVE:     Patient lives with FAMILY 4 KIDS   in a two story home resides first  with 2 steps to enter.   Bed is on 1 floor and bath is on 1 floor.  Patient ambulated independently  PTA. Equipment owned: None,    OBJECTIVE:   Initial Evaluation  Date: 24 Treatment Short Term/ Long Term   Goals   AM-PAC 6 Clicks      Was pt agreeable to Eval/treatment? Yes      Does pt have pain? Yes across LB.       Bed Mobility  Rolling: Ind  Supine to sit:   Ind   Sit to supine: Ind   Scooting: Ind   Rolling: Ind  Supine to sit: Ind  Sit to supine: Ind  Scooting: Ind

## 2024-04-07 NOTE — CONSULTS
Department of Orthopedic Surgery  Ortho Spine Service Consult           HPI: Patient is a 38yoF known to me with L5-S1 grade II spondylolysis and lumbar stenosis. She has had more back pain the last four days. She is trying to stop smoking marijuana and cigarettes, but the pain has been getting worse. She was admitted to the hospital on 4/6/24 due to her increase in pain. She has been taking oxycodone, tylenol, celebrex, flexeril, and gabapentin 300mg TID at home. She is planned for ALIF L5-S1 and PSF L4-S1 on 4/24/24.      Review of Systems: Chart review of systems was reviewed prior to the interview.            Physical Exam:      General:  Vitals     No acute distress      Psych:   A+Ox3, Affect is normal     Spine:  +tenderness to lumbar spine     MSK Extremity -         Neuro:         Lower Extremity Strength: secondary to pain    Right Left   Hip Flexors L2 5-/5 5-/5   Quadriceps L3 5-/5 5-/5   Anterior Tibialis L4 5-/5 5-/5   EHL L5 5-/5 5-/5   Hamstrings 5-/5 5-/5   Gastrocsoleus S1 5-/5 5-/5   Peroneals 5-/5 5/-5      Reflexes:     Right Left                           Patellar L4 2+ 2+   Achilles S1 2+ 2+      Clonus (R/L): 0/0           Sensation to light touch in the lower extremities:     Sensory to light touch and pinprick  L2 (ant/med thigh): R-intact L-intact  L3 (ant/lat thigh): R-intact L-intact  L4 (medial leg): R-intact L-intact  L5 (lat leg/dorsal foot): R-altered L-altered  S1 (post leg/lat foot): R-altered L-altered     Pulses:   2        Imaging     CT 3/4/24: FINDINGS:  The liver appears mildly enlarged with left lobe extending across the midline  into the left upper quadrant, mid located anterior to the spleen.  There is  normal portal venous phase contrast enhancement for the liver.  No focal  lesions are seen.  A localized area of fat replacement is seen in the segment  4 B of the left lobe liver lateral to the region of the falciform ligament.  The portal venous system is patent the.

## 2024-04-07 NOTE — PROGRESS NOTES
Went to go try to get the patient vitals, complete the admission process, and perform a head to toe assessment and the patient once again found to be in the bathroom/shower. She has been in the shower 4x already since arriving on the floor since ~11:15pm.

## 2024-04-07 NOTE — PROGRESS NOTES
4 Eyes Skin Assessment     NAME:  Cynthia Ayala  YOB: 1985  MEDICAL RECORD NUMBER:  [id number]    The patient is being assessed for  Admission    I agree that at least one RN has performed a thorough Head to Toe Skin Assessment on the patient. ALL assessment sites listed below have been assessed.      Areas assessed by both nurses:    Head, Face, Ears, Shoulders, Back, Chest, Arms, Elbows, Hands, Sacrum. Buttock, Coccyx, Ischium, Legs. Feet and Heels, and Under Medical Devices         Does the Patient have a Wound? No noted wound(s)       King Prevention initiated by RN: Yes  Wound Care Orders initiated by RN: No    Pressure Injury (Stage 3,4, Unstageable, DTI, NWPT, and Complex wounds) if present, place Wound referral order by RN under : No    New Ostomies, if present place, Ostomy referral order under : No     Nurse 1 eSignature: Electronically signed by Phil Lopez RN on 4/7/24 at 3:57 AM EDT    **SHARE this note so that the co-signing nurse can place an eSignature**    Nurse 2 eSignature: Electronically signed by Roseline Bang RN on 4/7/24 at 4:29 AM EDT

## 2024-04-07 NOTE — H&P
lisinopril  Start Norvasc    Hyperglycemia  Check hemoglobin A1c in a.m.    Other chronic comorbidities; Crohn's disease, asthma, herpes, seizure, tobacco abuse, depression, anxiety.  Continue rest of home medications  Replete labs as indicated          Diet: ADULT DIET; Regular  Code Status: Full Code  Surrogate decision maker confirmed with patient:   Extended Emergency Contact Information  Primary Emergency Contact: Mckay Sibley  Address: [address] [place] [address] [facility]  Home Phone: [phone]  Mobile Phone: [phone]  Relation: Domestic Partner    DVT Prophylaxis: [x]Lovenox []Heparin []PCD [] Warfarin/NOAC []Encouraged ambulation  Disposition: [x]Med/Surg [] Intermediate [] ICU/CCU  Admit status: [x] Observation [] Inpatient     +++++++++++++++++++++++++++++++++++++++++++++++++  LEIDY Whitaker  [facility] Hospitalist  [place], [place]    I can be reached via [employer] or at [phone] with any questions or concerns through 0700. After 0700 one of my colleagues with the [facility] Hospitalist team will assume patient's care.     +++++++++++++++++++++++++++++++++++++++++++++++++  NOTE: This report was transcribed using voice recognition software. Every effort was made to ensure accuracy; however, inadvertent computerized transcription errors may be present.

## 2024-04-08 VITALS
RESPIRATION RATE: 16 BRPM | TEMPERATURE: 96.5 F | WEIGHT: 160 LBS | OXYGEN SATURATION: 97 % | HEART RATE: 97 BPM | SYSTOLIC BLOOD PRESSURE: 126 MMHG | DIASTOLIC BLOOD PRESSURE: 90 MMHG | BODY MASS INDEX: 27.46 KG/M2

## 2024-04-08 PROCEDURE — 6360000002 HC RX W HCPCS

## 2024-04-08 PROCEDURE — 6360000002 HC RX W HCPCS: Performed by: INTERNAL MEDICINE

## 2024-04-08 PROCEDURE — 6370000000 HC RX 637 (ALT 250 FOR IP)

## 2024-04-08 PROCEDURE — G0378 HOSPITAL OBSERVATION PER HR: HCPCS

## 2024-04-08 PROCEDURE — 2580000003 HC RX 258

## 2024-04-08 PROCEDURE — 96372 THER/PROPH/DIAG INJ SC/IM: CPT

## 2024-04-08 PROCEDURE — 6370000000 HC RX 637 (ALT 250 FOR IP): Performed by: STUDENT IN AN ORGANIZED HEALTH CARE EDUCATION/TRAINING PROGRAM

## 2024-04-08 PROCEDURE — 96376 TX/PRO/DX INJ SAME DRUG ADON: CPT

## 2024-04-08 PROCEDURE — 99239 HOSP IP/OBS DSCHRG MGMT >30: CPT | Performed by: INTERNAL MEDICINE

## 2024-04-08 RX ADMIN — OXYCODONE HYDROCHLORIDE 10 MG: 10 TABLET ORAL at 03:21

## 2024-04-08 RX ADMIN — ACETAMINOPHEN 650 MG: 325 TABLET ORAL at 05:55

## 2024-04-08 RX ADMIN — MORPHINE SULFATE 2 MG: 2 INJECTION, SOLUTION INTRAMUSCULAR; INTRAVENOUS at 10:46

## 2024-04-08 RX ADMIN — MORPHINE SULFATE 2 MG: 2 INJECTION, SOLUTION INTRAMUSCULAR; INTRAVENOUS at 01:21

## 2024-04-08 RX ADMIN — ENOXAPARIN SODIUM 40 MG: 100 INJECTION SUBCUTANEOUS at 07:50

## 2024-04-08 RX ADMIN — LISINOPRIL 5 MG: 5 TABLET ORAL at 07:50

## 2024-04-08 RX ADMIN — OXYCODONE HYDROCHLORIDE 10 MG: 10 TABLET ORAL at 07:50

## 2024-04-08 RX ADMIN — CYCLOBENZAPRINE 10 MG: 10 TABLET, FILM COATED ORAL at 05:55

## 2024-04-08 RX ADMIN — HYDROXYZINE HYDROCHLORIDE 10 MG: 10 TABLET, FILM COATED ORAL at 05:55

## 2024-04-08 RX ADMIN — MORPHINE SULFATE 2 MG: 2 INJECTION, SOLUTION INTRAMUSCULAR; INTRAVENOUS at 05:55

## 2024-04-08 RX ADMIN — SODIUM CHLORIDE, PRESERVATIVE FREE 10 ML: 5 INJECTION INTRAVENOUS at 05:56

## 2024-04-08 RX ADMIN — DIPHENHYDRAMINE HYDROCHLORIDE 25 MG: 50 INJECTION, SOLUTION INTRAMUSCULAR; INTRAVENOUS at 05:55

## 2024-04-08 RX ADMIN — AMLODIPINE BESYLATE 5 MG: 5 TABLET ORAL at 07:50

## 2024-04-08 RX ADMIN — GABAPENTIN 300 MG: 300 CAPSULE ORAL at 07:51

## 2024-04-08 RX ADMIN — SODIUM CHLORIDE, PRESERVATIVE FREE 10 ML: 5 INJECTION INTRAVENOUS at 01:21

## 2024-04-08 ASSESSMENT — PAIN - FUNCTIONAL ASSESSMENT
PAIN_FUNCTIONAL_ASSESSMENT: PREVENTS OR INTERFERES SOME ACTIVE ACTIVITIES AND ADLS

## 2024-04-08 ASSESSMENT — PAIN SCALES - GENERAL
PAINLEVEL_OUTOF10: 10
PAINLEVEL_OUTOF10: 10
PAINLEVEL_OUTOF10: 8
PAINLEVEL_OUTOF10: 10
PAINLEVEL_OUTOF10: 10

## 2024-04-08 ASSESSMENT — PAIN DESCRIPTION - ONSET
ONSET: ON-GOING

## 2024-04-08 ASSESSMENT — PAIN DESCRIPTION - LOCATION
LOCATION: BACK

## 2024-04-08 ASSESSMENT — PAIN DESCRIPTION - PAIN TYPE
TYPE: CHRONIC PAIN

## 2024-04-08 ASSESSMENT — PAIN DESCRIPTION - FREQUENCY
FREQUENCY: CONTINUOUS

## 2024-04-08 ASSESSMENT — PAIN DESCRIPTION - DESCRIPTORS
DESCRIPTORS: THROBBING;STABBING;SQUEEZING
DESCRIPTORS: SHARP;SQUEEZING;STABBING
DESCRIPTORS: SHARP;SQUEEZING;STABBING
DESCRIPTORS: ACHING;TENDER;STABBING
DESCRIPTORS: SHARP;SQUEEZING;STABBING

## 2024-04-08 NOTE — CARE COORDINATION
weakness    Additional Case Management Notes: see above    The Plan for Transition of Care is related to the following treatment goals of Dehydration [E86.0]  Lactic acidosis [E87.20]  Intractable back pain [M54.9]  Intractable low back pain [M54.59]  Diarrhea, unspecified type [R19.7]    IF APPLICABLE: The Patient and/or patient representative Cynthia and her family were provided with a choice of provider and agrees with the discharge plan. Freedom of choice list with basic dialogue that supports the patient's individualized plan of care/goals and shares the quality data associated with the providers was provided to:     Patient Representative Name:       The Patient and/or Patient Representative Agree with the Discharge Plan?      Manjula Alvarenga RN  Case Management Department  Ph: [phone]

## 2024-04-08 NOTE — DISCHARGE SUMMARY
[facility] Hospitalist Physician Discharge Summary     Patient ID:  Cynthia Ayala  [id number]  38 y.o.  1985    Admit date: 2024    Discharge date and time:  2024  9:51 AM    Hospital Course:   Patient Cynthia Ayala is a 38 y.o. presented with Dehydration [E86.0]  Lactic acidosis [E87.20]  Intractable back pain [M54.9]  Intractable low back pain [M54.59]  Diarrhea, unspecified type [R19.7]    Ms. Cynthia Ayala, a 38 y.o. year old female who has a past medical history of Acute Crohn's disease (HCC), Anxiety attack, Asthma, Crohn's colitis (HCC), Depression, Herpes simplex virus (HSV) infection, History of blood transfusion, Hypertension, Marijuana abuse, Neurologic disorder, Postpartum depression,  delivery,  labor, Rh sensitized, Seizure (HCC), STD (sexually transmitted disease), and Tobacco abuse. Patient presented to emergency department with back pain, vomiting, and diarrhea. She reports currently taking oxycodone every 4 hours and smoking marijuana as needed for back pain. At time of examination patient is very tearful and not forthcoming with information. States back pain began began 2 years ago while pregnant, and has worsened since, states pain goes everywhere and has neuropathy in upper and lower extremities. Patient states she is scheduled for surgery on 24 at [facility]. current hospital course significant for elevated lactic acid from 2.9 to 4.7 to now 2.6. Patient received 3 L boluses and lactic came down to 2.6. Glucose elevated at 146, will check hemoglobin A1c in AM. White blood cell count is elevated at 16.4. Patient's urine toxicology report positive for cannabinoid, oxycodone, and opioid. Reviewed CT of lumbar spine and abdomen showed no change from prior CT, recommend MRI of spine, last MRI was on 3/6/2024 which showed severe degenerative disc disease at L5-S1 and foraminal stenosis. Patient admitted to medicine for further observation of

## 2024-04-08 NOTE — PLAN OF CARE
Problem: Discharge Planning  Goal: Discharge to home or other facility with appropriate resources  Outcome: Progressing     Problem: Pain  Goal: Verbalizes/displays adequate comfort level or baseline comfort level  Outcome: Progressing     Problem: Neurosensory - Adult  Goal: Achieves maximal functionality and self care  Outcome: Progressing     Problem: Musculoskeletal - Adult  Goal: Maintain proper alignment of affected body part  Outcome: Progressing     Problem: Gastrointestinal - Adult  Goal: Minimal or absence of nausea and vomiting  Outcome: Progressing  Goal: Maintains or returns to baseline bowel function  Outcome: Progressing  Goal: Maintains adequate nutritional intake  Outcome: Progressing     Problem: Metabolic/Fluid and Electrolytes - Adult  Goal: Electrolytes maintained within normal limits  Outcome: Progressing     Problem: Chronic Conditions and Co-morbidities  Goal: Patient's chronic conditions and co-morbidity symptoms are monitored and maintained or improved  Outcome: Not Progressing     Problem: Behavior  Goal: Pt/Family maintain appropriate behavior and adhere to behavioral management agreement, if implemented  Description: INTERVENTIONS:  1. Assess patient/family's coping skills and  non-compliant behavior (including use of illegal substances)  2. Notify security of behavior or suspected illegal substances which indicate the need for search of the family and/or belongings  3. Encourage verbalization of thoughts and concerns in a socially appropriate manner  4. Utilize positive, consistent limit setting strategies supporting safety of patient, staff and others  5. Encourage participation in the decision making process about the behavioral management agreement  6. If a visitor's behavior poses a threat to safety call refer to organization policy.  7. Initiate consult with , Psychosocial CNS, Spiritual Care as appropriate  Outcome: Progressing     Problem: Anxiety  Goal: Will 
  Problem: Discharge Planning  Goal: Discharge to home or other facility with appropriate resources  4/7/2024 1027 by Lydia Barros RN  Outcome: Progressing  4/7/2024 0338 by Phil Lopez RN  Outcome: Not Progressing     Problem: Pain  Goal: Verbalizes/displays adequate comfort level or baseline comfort level  4/7/2024 1027 by Lydia Barros RN  Outcome: Progressing  4/7/2024 0338 by Phil Lopez RN  Outcome: Not Progressing     Problem: Gastrointestinal - Adult  Goal: Minimal or absence of nausea and vomiting  4/7/2024 1027 by Lydia Barros RN  Outcome: Progressing  4/7/2024 0338 by Phil Lopez RN  Outcome: Not Progressing  Goal: Maintains or returns to baseline bowel function  4/7/2024 1027 by Lydia Barros RN  Outcome: Progressing  4/7/2024 0338 by Phil Lopez RN  Outcome: Not Progressing  Goal: Maintains adequate nutritional intake  4/7/2024 1027 by Lydia Barros RN  Outcome: Progressing  4/7/2024 0338 by Phil Lopez RN  Outcome: Not Progressing     Problem: Metabolic/Fluid and Electrolytes - Adult  Goal: Electrolytes maintained within normal limits  4/7/2024 1027 by Lydia Barros RN  Outcome: Progressing  4/7/2024 0338 by Phil Lopez RN  Outcome: Not Progressing     Problem: Chronic Conditions and Co-morbidities  Goal: Patient's chronic conditions and co-morbidity symptoms are monitored and maintained or improved  4/7/2024 1027 by Lydia Barros RN  Outcome: Progressing  4/7/2024 0338 by Phil Lopez RN  Outcome: Not Progressing     Problem: Behavior  Goal: Pt/Family maintain appropriate behavior and adhere to behavioral management agreement, if implemented  Description: INTERVENTIONS:  1. Assess patient/family's coping skills and  non-compliant behavior (including use of illegal substances)  2. Notify security of behavior or suspected illegal substances which indicate the need for search of the family and/or belongings  3. Encourage verbalization 
  Problem: Anxiety  Goal: Will report anxiety at manageable levels  Description: INTERVENTIONS:  1. Administer medication as ordered  2. Teach and rehearse alternative coping skills  3. Provide emotional support with 1:1 interaction with staff  Outcome: Not Progressing     Problem: Sleep Disturbance  Goal: Will exhibit normal sleeping pattern  Description: INTERVENTIONS:  1. Administer medication as ordered  2. Decrease environmental stimuli, including noise, as appropriate  3. Discourage social isolation and naps during the day  Outcome: Not Progressing     Problem: Self Care Deficit  Goal: Return ADL status to a safe level of function  Description: INTERVENTIONS:  1. Administer medication as ordered  2. Assess ADL deficits and provide assistive devices as needed  3. Obtain PT/OT consults as needed  4. Assist and instruct patient to increase activity and self care as tolerated  Outcome: Progressing

## 2024-04-08 NOTE — PROGRESS NOTES
Patient behavior and emotional state greatly improved since prior evening. Extremely odd and impulsive behavior seems to have stopped. Patient still tearful and complains of excruciating back pain resulting in little sleep. However the GI symptoms have eased up substantially.

## 2024-04-18 ENCOUNTER — HOSPITAL ENCOUNTER (OUTPATIENT)
Age: 39
Discharge: HOME | End: 2024-04-20

## 2024-04-18 LAB
ABO + RH BLD: NORMAL
ARM BAND NUMBER: NORMAL
BLOOD BANK SAMPLE EXPIRATION: NORMAL
BLOOD GROUP ANTIBODIES SERPL: NEGATIVE
INR PPP: 1
PARTIAL THROMBOPLASTIN TIME: 32.3 SEC (ref 24.5–35.1)
PROTHROMBIN TIME: 11.1 SEC (ref 9.3–12.4)

## 2024-04-18 PROCEDURE — 86850 RBC ANTIBODY SCREEN: CPT

## 2024-04-18 PROCEDURE — 85610 PROTHROMBIN TIME: CPT

## 2024-04-18 PROCEDURE — 86900 BLOOD TYPING SEROLOGIC ABO: CPT

## 2024-04-18 PROCEDURE — 85730 THROMBOPLASTIN TIME PARTIAL: CPT

## 2024-04-18 PROCEDURE — 86901 BLOOD TYPING SEROLOGIC RH(D): CPT

## 2024-04-18 PROCEDURE — 87081 CULTURE SCREEN ONLY: CPT

## 2024-04-20 LAB
MICROORGANISM SPEC CULT: ABNORMAL
SPECIMEN DESCRIPTION: ABNORMAL

## 2024-05-08 ENCOUNTER — HOSPITAL ENCOUNTER (EMERGENCY)
Age: 39
Discharge: LEFT BEFORE DISCHARGE | End: 2024-05-08
Attending: EMERGENCY MEDICINE

## 2024-05-08 VITALS
HEART RATE: 105 BPM | RESPIRATION RATE: 20 BRPM | SYSTOLIC BLOOD PRESSURE: 142 MMHG | TEMPERATURE: 97.8 F | DIASTOLIC BLOOD PRESSURE: 92 MMHG | OXYGEN SATURATION: 96 %

## 2024-05-18 ENCOUNTER — HOSPITAL ENCOUNTER (INPATIENT)
Age: 39
LOS: 4 days | Discharge: HOME | DRG: 347 | End: 2024-05-22
Attending: EMERGENCY MEDICINE | Admitting: STUDENT IN AN ORGANIZED HEALTH CARE EDUCATION/TRAINING PROGRAM
Payer: COMMERCIAL

## 2024-05-18 ENCOUNTER — APPOINTMENT (OUTPATIENT)
Dept: CT IMAGING | Age: 39
DRG: 347 | End: 2024-05-18
Payer: COMMERCIAL

## 2024-05-18 DIAGNOSIS — K50.919 CROHN'S DISEASE WITH COMPLICATION, UNSPECIFIED GASTROINTESTINAL TRACT LOCATION (HCC): ICD-10-CM

## 2024-05-18 DIAGNOSIS — R11.2 NAUSEA AND VOMITING, UNSPECIFIED VOMITING TYPE: ICD-10-CM

## 2024-05-18 DIAGNOSIS — R10.84 GENERALIZED ABDOMINAL PAIN: ICD-10-CM

## 2024-05-18 DIAGNOSIS — M54.9 INTRACTABLE BACK PAIN: Primary | ICD-10-CM

## 2024-05-18 DIAGNOSIS — M54.59 INTRACTABLE LOW BACK PAIN: ICD-10-CM

## 2024-05-18 LAB
ALBUMIN SERPL-MCNC: 4.8 G/DL (ref 3.5–5.2)
ALP SERPL-CCNC: 75 U/L (ref 35–104)
ALT SERPL-CCNC: 19 U/L (ref 0–32)
ANION GAP SERPL CALCULATED.3IONS-SCNC: 15 MMOL/L (ref 7–16)
AST SERPL-CCNC: 25 U/L (ref 0–31)
BASOPHILS # BLD: 0.08 K/UL (ref 0–0.2)
BASOPHILS NFR BLD: 0 % (ref 0–2)
BILIRUB SERPL-MCNC: 0.4 MG/DL (ref 0–1.2)
BILIRUB UR QL STRIP: NEGATIVE
BUN SERPL-MCNC: 14 MG/DL (ref 6–20)
CALCIUM SERPL-MCNC: 9.2 MG/DL (ref 8.6–10.2)
CHLORIDE SERPL-SCNC: 96 MMOL/L (ref 98–107)
CLARITY UR: CLEAR
CO2 SERPL-SCNC: 22 MMOL/L (ref 22–29)
COLOR UR: YELLOW
CREAT SERPL-MCNC: 0.6 MG/DL (ref 0.5–1)
EOSINOPHIL # BLD: 0.07 K/UL (ref 0.05–0.5)
EOSINOPHILS RELATIVE PERCENT: 0 % (ref 0–6)
ERYTHROCYTE [DISTWIDTH] IN BLOOD BY AUTOMATED COUNT: 13.5 % (ref 11.5–15)
GFR, ESTIMATED: >90 ML/MIN/1.73M2
GLUCOSE SERPL-MCNC: 128 MG/DL (ref 74–99)
GLUCOSE UR STRIP-MCNC: NEGATIVE MG/DL
HBA1C MFR BLD: 5.4 % (ref 4–5.6)
HCG SERPL QL: NEGATIVE
HCT VFR BLD AUTO: 42.2 % (ref 34–48)
HGB BLD-MCNC: 14.3 G/DL (ref 11.5–15.5)
HGB UR QL STRIP.AUTO: ABNORMAL
IMM GRANULOCYTES # BLD AUTO: 0.14 K/UL (ref 0–0.58)
IMM GRANULOCYTES NFR BLD: 1 % (ref 0–5)
KETONES UR STRIP-MCNC: NEGATIVE MG/DL
LACTATE BLDV-SCNC: 2.5 MMOL/L (ref 0.5–2.2)
LEUKOCYTE ESTERASE UR QL STRIP: NEGATIVE
LIPASE SERPL-CCNC: 24 U/L (ref 13–60)
LYMPHOCYTES NFR BLD: 2.04 K/UL (ref 1.5–4)
LYMPHOCYTES RELATIVE PERCENT: 9 % (ref 20–42)
MCH RBC QN AUTO: 30.1 PG (ref 26–35)
MCHC RBC AUTO-ENTMCNC: 33.9 G/DL (ref 32–34.5)
MCV RBC AUTO: 88.8 FL (ref 80–99.9)
MONOCYTES NFR BLD: 0.73 K/UL (ref 0.1–0.95)
MONOCYTES NFR BLD: 3 % (ref 2–12)
NEUTROPHILS NFR BLD: 86 % (ref 43–80)
NEUTS SEG NFR BLD: 19.21 K/UL (ref 1.8–7.3)
NITRITE UR QL STRIP: NEGATIVE
PH UR STRIP: 6.5 [PH] (ref 5–9)
PLATELET # BLD AUTO: 469 K/UL (ref 130–450)
PMV BLD AUTO: 9.5 FL (ref 7–12)
POTASSIUM SERPL-SCNC: 3.4 MMOL/L (ref 3.5–5)
PROT SERPL-MCNC: 7.9 G/DL (ref 6.4–8.3)
PROT UR STRIP-MCNC: ABNORMAL MG/DL
RBC # BLD AUTO: 4.75 M/UL (ref 3.5–5.5)
RBC #/AREA URNS HPF: ABNORMAL /HPF
SODIUM SERPL-SCNC: 133 MMOL/L (ref 132–146)
SP GR UR STRIP: 1.02 (ref 1–1.03)
UROBILINOGEN UR STRIP-ACNC: 0.2 EU/DL (ref 0–1)
WBC #/AREA URNS HPF: ABNORMAL /HPF
WBC OTHER # BLD: 22.3 K/UL (ref 4.5–11.5)

## 2024-05-18 PROCEDURE — 84703 CHORIONIC GONADOTROPIN ASSAY: CPT

## 2024-05-18 PROCEDURE — 85025 COMPLETE CBC W/AUTO DIFF WBC: CPT

## 2024-05-18 PROCEDURE — 99222 1ST HOSP IP/OBS MODERATE 55: CPT | Performed by: STUDENT IN AN ORGANIZED HEALTH CARE EDUCATION/TRAINING PROGRAM

## 2024-05-18 PROCEDURE — 87086 URINE CULTURE/COLONY COUNT: CPT

## 2024-05-18 PROCEDURE — 96375 TX/PRO/DX INJ NEW DRUG ADDON: CPT

## 2024-05-18 PROCEDURE — 83605 ASSAY OF LACTIC ACID: CPT

## 2024-05-18 PROCEDURE — 2580000003 HC RX 258

## 2024-05-18 PROCEDURE — 6360000002 HC RX W HCPCS: Performed by: STUDENT IN AN ORGANIZED HEALTH CARE EDUCATION/TRAINING PROGRAM

## 2024-05-18 PROCEDURE — 96374 THER/PROPH/DIAG INJ IV PUSH: CPT

## 2024-05-18 PROCEDURE — 6360000002 HC RX W HCPCS

## 2024-05-18 PROCEDURE — 93005 ELECTROCARDIOGRAM TRACING: CPT

## 2024-05-18 PROCEDURE — 81001 URINALYSIS AUTO W/SCOPE: CPT

## 2024-05-18 PROCEDURE — 83036 HEMOGLOBIN GLYCOSYLATED A1C: CPT

## 2024-05-18 PROCEDURE — 82746 ASSAY OF FOLIC ACID SERUM: CPT

## 2024-05-18 PROCEDURE — 6370000000 HC RX 637 (ALT 250 FOR IP): Performed by: STUDENT IN AN ORGANIZED HEALTH CARE EDUCATION/TRAINING PROGRAM

## 2024-05-18 PROCEDURE — 83690 ASSAY OF LIPASE: CPT

## 2024-05-18 PROCEDURE — 2580000003 HC RX 258: Performed by: STUDENT IN AN ORGANIZED HEALTH CARE EDUCATION/TRAINING PROGRAM

## 2024-05-18 PROCEDURE — 6370000000 HC RX 637 (ALT 250 FOR IP): Performed by: INTERNAL MEDICINE

## 2024-05-18 PROCEDURE — 74176 CT ABD & PELVIS W/O CONTRAST: CPT

## 2024-05-18 PROCEDURE — 99285 EMERGENCY DEPT VISIT HI MDM: CPT

## 2024-05-18 PROCEDURE — 82607 VITAMIN B-12: CPT

## 2024-05-18 PROCEDURE — 80053 COMPREHEN METABOLIC PANEL: CPT

## 2024-05-18 PROCEDURE — 1200000000 HC SEMI PRIVATE

## 2024-05-18 RX ORDER — 0.9 % SODIUM CHLORIDE 0.9 %
1000 INTRAVENOUS SOLUTION INTRAVENOUS ONCE
Status: COMPLETED | OUTPATIENT
Start: 2024-05-18 | End: 2024-05-18

## 2024-05-18 RX ORDER — VENLAFAXINE 37.5 MG/1
37.5 TABLET ORAL DAILY
Status: DISCONTINUED | OUTPATIENT
Start: 2024-05-18 | End: 2024-05-18 | Stop reason: DRUGHIGH

## 2024-05-18 RX ORDER — POTASSIUM CHLORIDE 7.45 MG/ML
10 INJECTION INTRAVENOUS ONCE
Status: COMPLETED | OUTPATIENT
Start: 2024-05-18 | End: 2024-05-18

## 2024-05-18 RX ORDER — GABAPENTIN 300 MG/1
300 CAPSULE ORAL 3 TIMES DAILY
Status: DISCONTINUED | OUTPATIENT
Start: 2024-05-18 | End: 2024-05-22 | Stop reason: HOSPADM

## 2024-05-18 RX ORDER — HYDROXYZINE HYDROCHLORIDE 10 MG/1
10 TABLET, FILM COATED ORAL EVERY 8 HOURS PRN
Status: DISCONTINUED | OUTPATIENT
Start: 2024-05-18 | End: 2024-05-22 | Stop reason: HOSPADM

## 2024-05-18 RX ORDER — DICYCLOMINE HYDROCHLORIDE 10 MG/1
10 CAPSULE ORAL 4 TIMES DAILY
Status: DISCONTINUED | OUTPATIENT
Start: 2024-05-18 | End: 2024-05-22 | Stop reason: HOSPADM

## 2024-05-18 RX ORDER — DEXAMETHASONE SODIUM PHOSPHATE 10 MG/ML
6 INJECTION INTRAMUSCULAR; INTRAVENOUS EVERY 24 HOURS
Status: DISCONTINUED | OUTPATIENT
Start: 2024-05-18 | End: 2024-05-22 | Stop reason: HOSPADM

## 2024-05-18 RX ORDER — SODIUM CHLORIDE 9 MG/ML
INJECTION, SOLUTION INTRAVENOUS CONTINUOUS
Status: DISCONTINUED | OUTPATIENT
Start: 2024-05-18 | End: 2024-05-20

## 2024-05-18 RX ORDER — LISINOPRIL 5 MG/1
5 TABLET ORAL DAILY
Status: DISCONTINUED | OUTPATIENT
Start: 2024-05-18 | End: 2024-05-22 | Stop reason: HOSPADM

## 2024-05-18 RX ORDER — KETOROLAC TROMETHAMINE 15 MG/ML
30 INJECTION, SOLUTION INTRAMUSCULAR; INTRAVENOUS EVERY 6 HOURS PRN
Status: DISCONTINUED | OUTPATIENT
Start: 2024-05-18 | End: 2024-05-22 | Stop reason: HOSPADM

## 2024-05-18 RX ORDER — METOCLOPRAMIDE HYDROCHLORIDE 5 MG/ML
20 INJECTION INTRAMUSCULAR; INTRAVENOUS ONCE
Status: COMPLETED | OUTPATIENT
Start: 2024-05-18 | End: 2024-05-18

## 2024-05-18 RX ORDER — ACETAMINOPHEN 325 MG/1
650 TABLET ORAL EVERY 6 HOURS PRN
Status: DISCONTINUED | OUTPATIENT
Start: 2024-05-18 | End: 2024-05-22 | Stop reason: HOSPADM

## 2024-05-18 RX ORDER — KETOROLAC TROMETHAMINE 30 MG/ML
30 INJECTION, SOLUTION INTRAMUSCULAR; INTRAVENOUS EVERY 6 HOURS PRN
Status: DISCONTINUED | OUTPATIENT
Start: 2024-05-18 | End: 2024-05-18 | Stop reason: SDUPTHER

## 2024-05-18 RX ORDER — FAMOTIDINE 20 MG/1
20 TABLET, FILM COATED ORAL 2 TIMES DAILY
Status: DISCONTINUED | OUTPATIENT
Start: 2024-05-18 | End: 2024-05-22 | Stop reason: HOSPADM

## 2024-05-18 RX ORDER — VENLAFAXINE HYDROCHLORIDE 75 MG/1
75 CAPSULE, EXTENDED RELEASE ORAL
Status: DISCONTINUED | OUTPATIENT
Start: 2024-05-18 | End: 2024-05-22 | Stop reason: HOSPADM

## 2024-05-18 RX ORDER — OXYCODONE HYDROCHLORIDE 5 MG/1
10 TABLET ORAL EVERY 4 HOURS PRN
Status: DISCONTINUED | OUTPATIENT
Start: 2024-05-18 | End: 2024-05-21

## 2024-05-18 RX ORDER — ONDANSETRON 4 MG/1
4 TABLET, ORALLY DISINTEGRATING ORAL 3 TIMES DAILY PRN
Status: DISCONTINUED | OUTPATIENT
Start: 2024-05-18 | End: 2024-05-22 | Stop reason: HOSPADM

## 2024-05-18 RX ORDER — TRAMADOL HYDROCHLORIDE 50 MG/1
50 TABLET ORAL EVERY 6 HOURS PRN
Status: DISCONTINUED | OUTPATIENT
Start: 2024-05-18 | End: 2024-05-22 | Stop reason: HOSPADM

## 2024-05-18 RX ADMIN — HYDROMORPHONE HYDROCHLORIDE 0.5 MG: 1 INJECTION, SOLUTION INTRAMUSCULAR; INTRAVENOUS; SUBCUTANEOUS at 10:43

## 2024-05-18 RX ADMIN — DICYCLOMINE HYDROCHLORIDE 10 MG: 10 CAPSULE ORAL at 20:39

## 2024-05-18 RX ADMIN — HYDROXYZINE HYDROCHLORIDE 10 MG: 10 TABLET ORAL at 15:56

## 2024-05-18 RX ADMIN — VENLAFAXINE HYDROCHLORIDE 75 MG: 75 CAPSULE, EXTENDED RELEASE ORAL at 18:35

## 2024-05-18 RX ADMIN — DEXAMETHASONE SODIUM PHOSPHATE 6 MG: 10 INJECTION INTRAMUSCULAR; INTRAVENOUS at 15:53

## 2024-05-18 RX ADMIN — METOCLOPRAMIDE 20 MG: 5 INJECTION, SOLUTION INTRAMUSCULAR; INTRAVENOUS at 11:49

## 2024-05-18 RX ADMIN — GABAPENTIN 300 MG: 300 CAPSULE ORAL at 20:39

## 2024-05-18 RX ADMIN — FAMOTIDINE 20 MG: 20 TABLET ORAL at 15:56

## 2024-05-18 RX ADMIN — DICYCLOMINE HYDROCHLORIDE 10 MG: 10 CAPSULE ORAL at 15:55

## 2024-05-18 RX ADMIN — LISINOPRIL 5 MG: 5 TABLET ORAL at 15:55

## 2024-05-18 RX ADMIN — OXYCODONE 10 MG: 5 TABLET ORAL at 20:38

## 2024-05-18 RX ADMIN — SODIUM CHLORIDE: 9 INJECTION, SOLUTION INTRAVENOUS at 16:02

## 2024-05-18 RX ADMIN — TRAMADOL HYDROCHLORIDE 50 MG: 50 TABLET, COATED ORAL at 23:12

## 2024-05-18 RX ADMIN — POTASSIUM CHLORIDE 10 MEQ: 7.46 INJECTION, SOLUTION INTRAVENOUS at 11:50

## 2024-05-18 RX ADMIN — OXYCODONE 10 MG: 5 TABLET ORAL at 15:55

## 2024-05-18 RX ADMIN — GABAPENTIN 300 MG: 300 CAPSULE ORAL at 15:55

## 2024-05-18 RX ADMIN — HYDROMORPHONE HYDROCHLORIDE 0.5 MG: 1 INJECTION, SOLUTION INTRAMUSCULAR; INTRAVENOUS; SUBCUTANEOUS at 11:48

## 2024-05-18 RX ADMIN — SODIUM CHLORIDE 1000 ML: 9 INJECTION, SOLUTION INTRAVENOUS at 11:49

## 2024-05-18 RX ADMIN — KETOROLAC TROMETHAMINE 30 MG: 15 INJECTION, SOLUTION INTRAMUSCULAR; INTRAVENOUS at 15:52

## 2024-05-18 RX ADMIN — FAMOTIDINE 20 MG: 20 TABLET ORAL at 20:39

## 2024-05-18 ASSESSMENT — PAIN SCALES - GENERAL
PAINLEVEL_OUTOF10: 9
PAINLEVEL_OUTOF10: 10
PAINLEVEL_OUTOF10: 7
PAINLEVEL_OUTOF10: 10
PAINLEVEL_OUTOF10: 9
PAINLEVEL_OUTOF10: 10
PAINLEVEL_OUTOF10: 8

## 2024-05-18 ASSESSMENT — PAIN DESCRIPTION - DESCRIPTORS
DESCRIPTORS: ACHING;DISCOMFORT
DESCRIPTORS: ACHING;DISCOMFORT;SHARP
DESCRIPTORS: ACHING;GNAWING;DULL
DESCRIPTORS: ACHING;DISCOMFORT;SHARP

## 2024-05-18 ASSESSMENT — PAIN DESCRIPTION - LOCATION
LOCATION: BACK

## 2024-05-18 ASSESSMENT — PAIN - FUNCTIONAL ASSESSMENT
PAIN_FUNCTIONAL_ASSESSMENT: PREVENTS OR INTERFERES SOME ACTIVE ACTIVITIES AND ADLS
PAIN_FUNCTIONAL_ASSESSMENT: ACTIVITIES ARE NOT PREVENTED
PAIN_FUNCTIONAL_ASSESSMENT: PREVENTS OR INTERFERES SOME ACTIVE ACTIVITIES AND ADLS

## 2024-05-18 ASSESSMENT — PAIN DESCRIPTION - PAIN TYPE
TYPE: CHRONIC PAIN
TYPE: ACUTE PAIN;CHRONIC PAIN
TYPE: ACUTE PAIN;CHRONIC PAIN

## 2024-05-18 ASSESSMENT — PAIN DESCRIPTION - ONSET
ONSET: ON-GOING

## 2024-05-18 ASSESSMENT — PAIN DESCRIPTION - FREQUENCY: FREQUENCY: CONTINUOUS

## 2024-05-18 ASSESSMENT — PAIN DESCRIPTION - ORIENTATION
ORIENTATION: RIGHT;LEFT;MID
ORIENTATION: RIGHT;LOWER
ORIENTATION: RIGHT;LEFT;MID
ORIENTATION: LOWER

## 2024-05-18 NOTE — ED NOTES
ED to Inpatient Handoff Report    Notified floor that electronic handoff available and patient ready for transport to room 743.    Safety Risks: None identified    Patient in Restraints: no    Constant Observer or Patient : no    Telemetry Monitoring Ordered: No          Order to transfer to unit without monitor: NA    Last MEWS: 0 Time completed: 1442    Deterioration Index: 17.67    Vitals:    05/18/24 1032 05/18/24 1130 05/18/24 1230 05/18/24 1441   BP: (!) 147/106 (!) 146/101 136/77 120/83   Pulse: 80   81   Resp: 16   14   Temp: 97.7 °F (36.5 °C)   98.1 °F (36.7 °C)   TempSrc: Axillary   Oral   SpO2: 94% 95% 96% 97%   Weight: 73.9 kg (163 lb)      Height: 1.626 m (5' 4\")          Opportunity for questions and clarification was provided.

## 2024-05-18 NOTE — ED PROVIDER NOTES
Urinalysis with small amount of urine hemoglobin and protein.  CT abdomen pelvis showed possible duodenitis however read showed no acute intra-abdominal or pelvic process.     ED Course as of 05/18/24 1327   Sat May 18, 2024   1124 EKG:  This EKG is signed and interpreted by the EP.    Time: 11:18  Rate: 67  Rhythm: Sinus  Interpretation: Short OR sinus rhythm  Comparison: stable as compared to patient's most recent EKG   [CF]   1144 Patient with increased pain and nausea and emesis.  At this time 20 mg Reglan IV and a second dose of half milligram Dilaudid ordered.  Patient still to obtain sodium chloride bolus and IV K [FC]      ED Course User Index  [CF] Ranjeet Garrido DO  [FC] Alin Little MD        ------------------------- NURSING NOTES AND VITALS REVIEWED ---------------------------  Date / Time Roomed:  5/18/2024 10:24 AM  ED Bed Assignment:  10/10    The nursing notes within the ED encounter and vital signs as below have been reviewed.     Patient Vitals for the past 24 hrs:   BP Temp Temp src Pulse Resp SpO2 Height Weight   05/18/24 1130 (!) 146/101 -- -- -- -- 95 % -- --   05/18/24 1032 (!) 147/106 97.7 °F (36.5 °C) Axillary 80 16 94 % 1.626 m (5' 4\") 73.9 kg (163 lb)       Oxygen Saturation Interpretation: Normal    ------------------------------------------ PROGRESS NOTES ------------------------------------------  Re-evaluation(s):  Patient’s symptoms show no change  Repeat physical examination is not changed    Counseling:  I have spoken with the patient and discussed today’s results, in addition to providing specific details for the plan of care and counseling regarding the diagnosis and prognosis.  Their questions are answered at this time and they are agreeable with the plan of admission.    --------------------------------- ADDITIONAL PROVIDER NOTES ---------------------------------  Consultations:  Spoke with Dr. Caban.  Discussed case.  They will admit the patient.  This patient's ED

## 2024-05-18 NOTE — H&P
[facility] Hospitalist Group History and Physical      CHIEF COMPLAINT:  intractable back and abdominal pain     History of Present Illness:      This is a 38 year old female with past medical history of Acute Crohn's disease (HCC), Anxiety attack, Asthma, Crohn's colitis (HCC), Depression, Herpes simplex virus (HSV) infection, History of blood transfusion, Hypertension, Marijuana abuse, Neurologic disorder, Postpartum depression,  delivery,  labor, Rh sensitized, Seizure (HCC), STD (sexually transmitted disease), and Tobacco abuse.  Patient presented to emergency department with intractable back and abdominal pain. She was given Fentanyl and Zofran en route to ER. 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 Amador, an orthopedic spine surgeon at [facility], plans for surgery on 24.     She notes decreased po intake, nausea, and decreased urine output. She states no saddle anesthesia, but numbness down back of her legs. EKG NSR. She is hypertensive on arrival. Lab workup: K 3.4, LA 2.5, WBC 22.3. CT Abdomen negative. In ER she was given Diluadid 1 mg, Reglan, Potassium and 1 L bolus. Decision to admit.     Informant(s) for H&P: patient    REVIEW OF SYSTEMS:  A comprehensive review of systems was negative except for: what is in the HPI    PMH:  Past Medical History:   Diagnosis Date    Acute Crohn's disease (HCC)     Anxiety attack since age 14    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 inhaler     Crohn's colitis (HCC)     Depression     medicated with zoloft     Herpes simplex virus (HSV) infection     History of blood transfusion     as an infant in [place]    Hypertension     intermittent     Marijuana abuse

## 2024-05-19 LAB
ANION GAP SERPL CALCULATED.3IONS-SCNC: 9 MMOL/L (ref 7–16)
BUN SERPL-MCNC: 9 MG/DL (ref 6–20)
CALCIUM SERPL-MCNC: 8.4 MG/DL (ref 8.6–10.2)
CHLORIDE SERPL-SCNC: 105 MMOL/L (ref 98–107)
CO2 SERPL-SCNC: 22 MMOL/L (ref 22–29)
CREAT SERPL-MCNC: 0.6 MG/DL (ref 0.5–1)
FOLATE SERPL-MCNC: 11 NG/ML (ref 4.8–24.2)
GFR, ESTIMATED: >90 ML/MIN/1.73M2
GLUCOSE SERPL-MCNC: 102 MG/DL (ref 74–99)
HCT VFR BLD AUTO: 35.9 % (ref 34–48)
HCT VFR BLD AUTO: 38.8 % (ref 34–48)
HGB BLD-MCNC: 12 G/DL (ref 11.5–15.5)
HGB BLD-MCNC: 12.9 G/DL (ref 11.5–15.5)
POTASSIUM SERPL-SCNC: 3.3 MMOL/L (ref 3.5–5)
SODIUM SERPL-SCNC: 136 MMOL/L (ref 132–146)
VIT B12 SERPL-MCNC: 465 PG/ML (ref 211–946)

## 2024-05-19 PROCEDURE — 6370000000 HC RX 637 (ALT 250 FOR IP): Performed by: INTERNAL MEDICINE

## 2024-05-19 PROCEDURE — 2580000003 HC RX 258: Performed by: STUDENT IN AN ORGANIZED HEALTH CARE EDUCATION/TRAINING PROGRAM

## 2024-05-19 PROCEDURE — 99232 SBSQ HOSP IP/OBS MODERATE 35: CPT | Performed by: STUDENT IN AN ORGANIZED HEALTH CARE EDUCATION/TRAINING PROGRAM

## 2024-05-19 PROCEDURE — 85014 HEMATOCRIT: CPT

## 2024-05-19 PROCEDURE — 80048 BASIC METABOLIC PNL TOTAL CA: CPT

## 2024-05-19 PROCEDURE — 85018 HEMOGLOBIN: CPT

## 2024-05-19 PROCEDURE — 1200000000 HC SEMI PRIVATE

## 2024-05-19 PROCEDURE — 6370000000 HC RX 637 (ALT 250 FOR IP): Performed by: STUDENT IN AN ORGANIZED HEALTH CARE EDUCATION/TRAINING PROGRAM

## 2024-05-19 PROCEDURE — 36415 COLL VENOUS BLD VENIPUNCTURE: CPT

## 2024-05-19 PROCEDURE — 6360000002 HC RX W HCPCS: Performed by: STUDENT IN AN ORGANIZED HEALTH CARE EDUCATION/TRAINING PROGRAM

## 2024-05-19 RX ORDER — POTASSIUM CHLORIDE 20 MEQ/1
40 TABLET, EXTENDED RELEASE ORAL ONCE
Status: COMPLETED | OUTPATIENT
Start: 2024-05-19 | End: 2024-05-19

## 2024-05-19 RX ADMIN — KETOROLAC TROMETHAMINE 30 MG: 15 INJECTION, SOLUTION INTRAMUSCULAR; INTRAVENOUS at 08:33

## 2024-05-19 RX ADMIN — HYDROXYZINE HYDROCHLORIDE 10 MG: 10 TABLET ORAL at 08:36

## 2024-05-19 RX ADMIN — DICYCLOMINE HYDROCHLORIDE 10 MG: 10 CAPSULE ORAL at 21:05

## 2024-05-19 RX ADMIN — OXYCODONE 10 MG: 5 TABLET ORAL at 08:35

## 2024-05-19 RX ADMIN — SODIUM CHLORIDE: 9 INJECTION, SOLUTION INTRAVENOUS at 12:46

## 2024-05-19 RX ADMIN — OXYCODONE 10 MG: 5 TABLET ORAL at 21:05

## 2024-05-19 RX ADMIN — GABAPENTIN 300 MG: 300 CAPSULE ORAL at 12:47

## 2024-05-19 RX ADMIN — FAMOTIDINE 20 MG: 20 TABLET ORAL at 21:05

## 2024-05-19 RX ADMIN — OXYCODONE 10 MG: 5 TABLET ORAL at 01:14

## 2024-05-19 RX ADMIN — FAMOTIDINE 20 MG: 20 TABLET ORAL at 08:36

## 2024-05-19 RX ADMIN — OXYCODONE 10 MG: 5 TABLET ORAL at 12:47

## 2024-05-19 RX ADMIN — LISINOPRIL 5 MG: 5 TABLET ORAL at 08:36

## 2024-05-19 RX ADMIN — GABAPENTIN 300 MG: 300 CAPSULE ORAL at 21:05

## 2024-05-19 RX ADMIN — DEXAMETHASONE SODIUM PHOSPHATE 6 MG: 10 INJECTION INTRAMUSCULAR; INTRAVENOUS at 12:47

## 2024-05-19 RX ADMIN — DICYCLOMINE HYDROCHLORIDE 10 MG: 10 CAPSULE ORAL at 08:35

## 2024-05-19 RX ADMIN — KETOROLAC TROMETHAMINE 30 MG: 15 INJECTION, SOLUTION INTRAMUSCULAR; INTRAVENOUS at 16:59

## 2024-05-19 RX ADMIN — OXYCODONE 10 MG: 5 TABLET ORAL at 17:00

## 2024-05-19 RX ADMIN — VENLAFAXINE HYDROCHLORIDE 75 MG: 75 CAPSULE, EXTENDED RELEASE ORAL at 08:36

## 2024-05-19 RX ADMIN — OXYCODONE 10 MG: 5 TABLET ORAL at 05:15

## 2024-05-19 RX ADMIN — GABAPENTIN 300 MG: 300 CAPSULE ORAL at 08:36

## 2024-05-19 RX ADMIN — HYDROXYZINE HYDROCHLORIDE 10 MG: 10 TABLET ORAL at 17:00

## 2024-05-19 RX ADMIN — POTASSIUM CHLORIDE 40 MEQ: 1500 TABLET, EXTENDED RELEASE ORAL at 12:47

## 2024-05-19 RX ADMIN — DICYCLOMINE HYDROCHLORIDE 10 MG: 10 CAPSULE ORAL at 12:47

## 2024-05-19 RX ADMIN — DICYCLOMINE HYDROCHLORIDE 10 MG: 10 CAPSULE ORAL at 17:01

## 2024-05-19 RX ADMIN — SODIUM CHLORIDE: 9 INJECTION, SOLUTION INTRAVENOUS at 01:18

## 2024-05-19 ASSESSMENT — PAIN DESCRIPTION - DESCRIPTORS
DESCRIPTORS: ACHING;DISCOMFORT;JABBING
DESCRIPTORS: ACHING;DISCOMFORT;DULL
DESCRIPTORS: ACHING;BURNING;JABBING
DESCRIPTORS: ACHING;DISCOMFORT;DULL
DESCRIPTORS: DISCOMFORT;CRAMPING;PRESSURE

## 2024-05-19 ASSESSMENT — PAIN DESCRIPTION - ORIENTATION
ORIENTATION: RIGHT;LEFT;MID
ORIENTATION: RIGHT;LEFT;MID
ORIENTATION: LOWER;MID
ORIENTATION: RIGHT;LEFT;MID
ORIENTATION: MID;LOWER
ORIENTATION: LEFT

## 2024-05-19 ASSESSMENT — PAIN DESCRIPTION - LOCATION
LOCATION: BACK

## 2024-05-19 ASSESSMENT — PAIN DESCRIPTION - ONSET
ONSET: ON-GOING

## 2024-05-19 ASSESSMENT — PAIN - FUNCTIONAL ASSESSMENT
PAIN_FUNCTIONAL_ASSESSMENT: ACTIVITIES ARE NOT PREVENTED
PAIN_FUNCTIONAL_ASSESSMENT: PREVENTS OR INTERFERES SOME ACTIVE ACTIVITIES AND ADLS

## 2024-05-19 ASSESSMENT — PAIN SCALES - WONG BAKER
WONGBAKER_NUMERICALRESPONSE: HURTS A LITTLE BIT
WONGBAKER_NUMERICALRESPONSE: HURTS A LITTLE BIT

## 2024-05-19 ASSESSMENT — PAIN SCALES - GENERAL
PAINLEVEL_OUTOF10: 7
PAINLEVEL_OUTOF10: 7
PAINLEVEL_OUTOF10: 4
PAINLEVEL_OUTOF10: 2
PAINLEVEL_OUTOF10: 8
PAINLEVEL_OUTOF10: 9
PAINLEVEL_OUTOF10: 4
PAINLEVEL_OUTOF10: 8
PAINLEVEL_OUTOF10: 7

## 2024-05-19 ASSESSMENT — PAIN DESCRIPTION - FREQUENCY
FREQUENCY: CONTINUOUS

## 2024-05-19 ASSESSMENT — PAIN DESCRIPTION - PAIN TYPE
TYPE: ACUTE PAIN;CHRONIC PAIN

## 2024-05-19 NOTE — PROGRESS NOTES
[facility] Hospitalist Progress Note    Admitting Date and Time: 5/18/2024 10:24 AM  Admit Dx: Generalized abdominal pain [R10.84]  Intractable back pain [M54.9]  Crohn's disease with complication, unspecified gastrointestinal tract location (HCC) [K50.919]  Nausea and vomiting, unspecified vomiting type [R11.2]    Subjective:  Patient is being followed for Generalized abdominal pain [R10.84]  Intractable back pain [M54.9]  Crohn's disease with complication, unspecified gastrointestinal tract location (HCC) [K50.919]  Nausea and vomiting, unspecified vomiting type [R11.2]   Pt feels pain slightly better.  Per RN: No major concerns    ROS: denies fever, chills, cp, sob, n/v, HA unless stated above.      dicyclomine  10 mg Oral 4x Daily    gabapentin  300 mg Oral TID    lisinopril  5 mg Oral Daily    famotidine  20 mg Oral BID    dexAMETHasone  6 mg IntraVENous Q24H    venlafaxine  75 mg Oral Daily with breakfast     oxyCODONE, 10 mg, Q4H PRN  traMADol, 50 mg, Q6H PRN  acetaminophen, 650 mg, Q6H PRN  hydrOXYzine HCl, 10 mg, Q8H PRN  ondansetron, 4 mg, TID PRN  ketorolac, 30 mg, Q6H PRN         Objective:    BP (!) 132/93   Pulse 86   Temp 98 °F (36.7 °C) (Oral)   Resp 16   Ht 1.626 m (5' 4\")   Wt 73.9 kg (163 lb)   SpO2 100%   BMI 27.98 kg/m²     General Appearance: alert and oriented to person, place and time and in no acute distress  Skin: warm and dry, Tattoos +   Head: normocephalic and atraumatic  Eyes: pupils equal, round, and reactive to light, extraocular eye movements intact, conjunctivae normal  Neck: neck supple and non tender without mass   Pulmonary/Chest: clear to auscultation bilaterally- no wheezes, rales or rhonchi, normal air movement, no respiratory distress  Cardiovascular: normal rate, normal S1 and S2 and no carotid bruits  Abdomen: soft, non-tender, non-distended, normal bowel sounds, no masses or organomegaly  Extremities: no cyanosis, no clubbing and no edema  Neurologic: no

## 2024-05-19 NOTE — CONSULTS
[facility] CONSULT NOTE    Reason for Consult: Leukocytosis    Requested by: Velma Caban MD     Chief complaint: Abdominal and back pain and vomiting    History Obtained From: Patient and EMR    HISTORY OFPRESENT ILLNESS              The patient is a 38 y.o. 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 On admission, she was afebrile and hemodynamically stable with leukocytosis of 22,000. Urinalysis showed insignificant pyuria of 0-5 WBC. CT abdomen and pelvis showed no acute intraabdominal or pelvic process. Dexamethasone was started on admission. ID service was subsequently consulted for further recommendations.    Past Medical History  Past Medical History:   Diagnosis Date    Acute Crohn's disease (HCC)     Anxiety attack since age 14    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 inhaler     Crohn's colitis (HCC)     Depression     medicated with zoloft     Herpes simplex virus (HSV) infection     History of blood transfusion     as an infant in [place]    Hypertension     intermittent     Marijuana abuse     occas    Neurologic disorder     Postpartum depression      delivery      labor     Rh sensitized     Seizure (HCC)     loses focus, disoriented unpon arousing; etiology unknown per pt; none since     STD (sexually transmitted disease)     Tobacco abuse        Current Facility-Administered Medications   Medication Dose Route

## 2024-05-19 NOTE — PLAN OF CARE
Problem: Discharge Planning  Goal: Discharge to home or other facility with appropriate resources  5/19/2024 0404 by Christopher Diaz, RN  Outcome: Progressing     Problem: Pain  Goal: Verbalizes/displays adequate comfort level or baseline comfort level  5/19/2024 0404 by Christopher Diaz, RN  Outcome: Progressing     Problem: Safety - Adult  Goal: Free from fall injury  5/19/2024 0404 by Christopher Diaz, RN  Outcome: Progressing

## 2024-05-19 NOTE — PLAN OF CARE
Problem: Discharge Planning  Goal: Discharge to home or other facility with appropriate resources  5/19/2024 0838 by Arthur Wilson, ADEEL  Outcome: Adequate for Discharge  5/19/2024 0838 by Arthur Wilson RN  Outcome: Adequate for Discharge  5/19/2024 0404 by Christopher Diaz RN  Outcome: Progressing     Problem: Pain  Goal: Verbalizes/displays adequate comfort level or baseline comfort level  5/19/2024 0838 by Arthur Wilson RN  Outcome: Adequate for Discharge  5/19/2024 0404 by Christopher Diaz RN  Outcome: Progressing     Problem: Safety - Adult  Goal: Free from fall injury  5/19/2024 0838 by Arthur Wilson RN  Outcome: Adequate for Discharge  5/19/2024 0404 by Christopher Diaz, RN  Outcome: Progressing

## 2024-05-20 LAB
ANION GAP SERPL CALCULATED.3IONS-SCNC: 9 MMOL/L (ref 7–16)
BUN SERPL-MCNC: 11 MG/DL (ref 6–20)
CALCIUM SERPL-MCNC: 8.4 MG/DL (ref 8.6–10.2)
CHLORIDE SERPL-SCNC: 106 MMOL/L (ref 98–107)
CO2 SERPL-SCNC: 24 MMOL/L (ref 22–29)
CREAT SERPL-MCNC: 1 MG/DL (ref 0.5–1)
EKG ATRIAL RATE: 67 BPM
EKG P AXIS: 37 DEGREES
EKG P-R INTERVAL: 110 MS
EKG Q-T INTERVAL: 420 MS
EKG QRS DURATION: 104 MS
EKG QTC CALCULATION (BAZETT): 443 MS
EKG R AXIS: 18 DEGREES
EKG T AXIS: 52 DEGREES
EKG VENTRICULAR RATE: 67 BPM
ERYTHROCYTE [DISTWIDTH] IN BLOOD BY AUTOMATED COUNT: 13.4 % (ref 11.5–15)
GFR, ESTIMATED: 79 ML/MIN/1.73M2
GLUCOSE SERPL-MCNC: 98 MG/DL (ref 74–99)
HCT VFR BLD AUTO: 34.3 % (ref 34–48)
HGB BLD-MCNC: 11.4 G/DL (ref 11.5–15.5)
MCH RBC QN AUTO: 30 PG (ref 26–35)
MCHC RBC AUTO-ENTMCNC: 33.2 G/DL (ref 32–34.5)
MCV RBC AUTO: 90.3 FL (ref 80–99.9)
MICROORGANISM SPEC CULT: ABNORMAL
PLATELET # BLD AUTO: 390 K/UL (ref 130–450)
PMV BLD AUTO: 9.2 FL (ref 7–12)
POTASSIUM SERPL-SCNC: 3.6 MMOL/L (ref 3.5–5)
RBC # BLD AUTO: 3.8 M/UL (ref 3.5–5.5)
SODIUM SERPL-SCNC: 139 MMOL/L (ref 132–146)
SPECIMEN DESCRIPTION: ABNORMAL
WBC OTHER # BLD: 12.8 K/UL (ref 4.5–11.5)

## 2024-05-20 PROCEDURE — 6370000000 HC RX 637 (ALT 250 FOR IP): Performed by: STUDENT IN AN ORGANIZED HEALTH CARE EDUCATION/TRAINING PROGRAM

## 2024-05-20 PROCEDURE — 93010 ELECTROCARDIOGRAM REPORT: CPT | Performed by: INTERNAL MEDICINE

## 2024-05-20 PROCEDURE — 1200000000 HC SEMI PRIVATE

## 2024-05-20 PROCEDURE — 85027 COMPLETE CBC AUTOMATED: CPT

## 2024-05-20 PROCEDURE — 87081 CULTURE SCREEN ONLY: CPT

## 2024-05-20 PROCEDURE — 97161 PT EVAL LOW COMPLEX 20 MIN: CPT

## 2024-05-20 PROCEDURE — 99232 SBSQ HOSP IP/OBS MODERATE 35: CPT | Performed by: INTERNAL MEDICINE

## 2024-05-20 PROCEDURE — 2580000003 HC RX 258: Performed by: STUDENT IN AN ORGANIZED HEALTH CARE EDUCATION/TRAINING PROGRAM

## 2024-05-20 PROCEDURE — 6360000002 HC RX W HCPCS: Performed by: STUDENT IN AN ORGANIZED HEALTH CARE EDUCATION/TRAINING PROGRAM

## 2024-05-20 PROCEDURE — 80048 BASIC METABOLIC PNL TOTAL CA: CPT

## 2024-05-20 PROCEDURE — 6370000000 HC RX 637 (ALT 250 FOR IP): Performed by: INTERNAL MEDICINE

## 2024-05-20 RX ORDER — POTASSIUM CHLORIDE 20 MEQ/1
40 TABLET, EXTENDED RELEASE ORAL ONCE
Status: DISCONTINUED | OUTPATIENT
Start: 2024-05-20 | End: 2024-05-22 | Stop reason: HOSPADM

## 2024-05-20 RX ADMIN — DICYCLOMINE HYDROCHLORIDE 10 MG: 10 CAPSULE ORAL at 12:22

## 2024-05-20 RX ADMIN — OXYCODONE 10 MG: 5 TABLET ORAL at 10:53

## 2024-05-20 RX ADMIN — DICYCLOMINE HYDROCHLORIDE 10 MG: 10 CAPSULE ORAL at 16:52

## 2024-05-20 RX ADMIN — SODIUM CHLORIDE: 9 INJECTION, SOLUTION INTRAVENOUS at 08:51

## 2024-05-20 RX ADMIN — GABAPENTIN 300 MG: 300 CAPSULE ORAL at 19:50

## 2024-05-20 RX ADMIN — OXYCODONE 10 MG: 5 TABLET ORAL at 06:34

## 2024-05-20 RX ADMIN — KETOROLAC TROMETHAMINE 30 MG: 15 INJECTION, SOLUTION INTRAMUSCULAR; INTRAVENOUS at 18:40

## 2024-05-20 RX ADMIN — FAMOTIDINE 20 MG: 20 TABLET ORAL at 08:50

## 2024-05-20 RX ADMIN — HYDROXYZINE HYDROCHLORIDE 10 MG: 10 TABLET ORAL at 16:57

## 2024-05-20 RX ADMIN — DICYCLOMINE HYDROCHLORIDE 10 MG: 10 CAPSULE ORAL at 08:50

## 2024-05-20 RX ADMIN — GABAPENTIN 300 MG: 300 CAPSULE ORAL at 08:49

## 2024-05-20 RX ADMIN — OXYCODONE 10 MG: 5 TABLET ORAL at 01:02

## 2024-05-20 RX ADMIN — OXYCODONE 10 MG: 5 TABLET ORAL at 19:50

## 2024-05-20 RX ADMIN — FAMOTIDINE 20 MG: 20 TABLET ORAL at 19:49

## 2024-05-20 RX ADMIN — GABAPENTIN 300 MG: 300 CAPSULE ORAL at 13:51

## 2024-05-20 RX ADMIN — VENLAFAXINE HYDROCHLORIDE 75 MG: 75 CAPSULE, EXTENDED RELEASE ORAL at 08:50

## 2024-05-20 RX ADMIN — TRAMADOL HYDROCHLORIDE 50 MG: 50 TABLET, COATED ORAL at 08:49

## 2024-05-20 RX ADMIN — OXYCODONE 10 MG: 5 TABLET ORAL at 15:33

## 2024-05-20 RX ADMIN — KETOROLAC TROMETHAMINE 30 MG: 15 INJECTION, SOLUTION INTRAMUSCULAR; INTRAVENOUS at 12:21

## 2024-05-20 RX ADMIN — DICYCLOMINE HYDROCHLORIDE 10 MG: 10 CAPSULE ORAL at 19:50

## 2024-05-20 RX ADMIN — LISINOPRIL 5 MG: 5 TABLET ORAL at 08:49

## 2024-05-20 RX ADMIN — TRAMADOL HYDROCHLORIDE 50 MG: 50 TABLET, COATED ORAL at 16:52

## 2024-05-20 ASSESSMENT — PAIN DESCRIPTION - FREQUENCY
FREQUENCY: CONTINUOUS
FREQUENCY: CONTINUOUS

## 2024-05-20 ASSESSMENT — PAIN DESCRIPTION - LOCATION
LOCATION: BACK

## 2024-05-20 ASSESSMENT — PAIN SCALES - GENERAL
PAINLEVEL_OUTOF10: 5
PAINLEVEL_OUTOF10: 6
PAINLEVEL_OUTOF10: 8
PAINLEVEL_OUTOF10: 7
PAINLEVEL_OUTOF10: 8
PAINLEVEL_OUTOF10: 6
PAINLEVEL_OUTOF10: 3
PAINLEVEL_OUTOF10: 6
PAINLEVEL_OUTOF10: 7
PAINLEVEL_OUTOF10: 2
PAINLEVEL_OUTOF10: 8
PAINLEVEL_OUTOF10: 7
PAINLEVEL_OUTOF10: 7

## 2024-05-20 ASSESSMENT — PAIN DESCRIPTION - PAIN TYPE
TYPE: ACUTE PAIN;CHRONIC PAIN
TYPE: ACUTE PAIN;CHRONIC PAIN

## 2024-05-20 ASSESSMENT — PAIN DESCRIPTION - ONSET
ONSET: ON-GOING
ONSET: ON-GOING

## 2024-05-20 ASSESSMENT — PAIN - FUNCTIONAL ASSESSMENT
PAIN_FUNCTIONAL_ASSESSMENT: PREVENTS OR INTERFERES SOME ACTIVE ACTIVITIES AND ADLS

## 2024-05-20 ASSESSMENT — PAIN DESCRIPTION - DESCRIPTORS
DESCRIPTORS: STABBING
DESCRIPTORS: SHARP;STABBING
DESCRIPTORS: ACHING;DISCOMFORT;DULL
DESCRIPTORS: STABBING
DESCRIPTORS: SHARP;STABBING
DESCRIPTORS: ACHING
DESCRIPTORS: JABBING
DESCRIPTORS: ACHING;DISCOMFORT;DULL
DESCRIPTORS: SHARP;STABBING

## 2024-05-20 ASSESSMENT — PAIN SCALES - WONG BAKER
WONGBAKER_NUMERICALRESPONSE: HURTS A LITTLE BIT
WONGBAKER_NUMERICALRESPONSE: HURTS A LITTLE BIT

## 2024-05-20 ASSESSMENT — PAIN DESCRIPTION - ORIENTATION
ORIENTATION: LOWER
ORIENTATION: LEFT;RIGHT
ORIENTATION: RIGHT;LEFT

## 2024-05-20 NOTE — PROGRESS NOTES
[facility] Quality Flow/Interdisciplinary Rounds Progress Note        Quality Flow Rounds held on May 20, 2024    Disciplines Attending:  Bedside Nurse, , , and Nursing Unit Leadership    Cynthia Ayala was admitted on 5/18/2024 10:24 AM    Anticipated Discharge Date:       Disposition:    King Score:  King Scale Score: 21    Readmission Risk              Risk of Unplanned Readmission:  22           Discussed patient goal for the day, patient clinical progression, and barriers to discharge.  The following Goal(s) of the Day/Commitment(s) have been identified:   ortho consult pending, discharge planning      Jame Mchugh, RN  May 20, 2024

## 2024-05-20 NOTE — PROGRESS NOTES
Progress  Note  Chief Complaint   Patient presents with    Abdominal Pain     100 mcg fentanyl and 4mg Zofran by EMS en route, hx crohns and  degenerative disc    Back Pain     Due for surgery in June     Emesis     Historical Issues:  Current Facility-Administered Medications   Medication Dose Route Frequency Provider Last Rate Last Admin    oxyCODONE (ROXICODONE) immediate release tablet 10 mg  10 mg Oral Q4H PRN Rosa Villalobos APRN - NP   10 mg at 05/20/24 1053    traMADol (ULTRAM) tablet 50 mg  50 mg Oral Q6H PRN Rosa Villalobos APRN - NP   50 mg at 05/20/24 0849    acetaminophen (TYLENOL) tablet 650 mg  650 mg Oral Q6H PRN Velma Caban MD        dicyclomine (BENTYL) capsule 10 mg  10 mg Oral 4x Daily Velma Caban MD   10 mg at 05/20/24 1222    gabapentin (NEURONTIN) capsule 300 mg  300 mg Oral TID Velma Caban MD   300 mg at 05/20/24 1351    hydrOXYzine HCl (ATARAX) tablet 10 mg  10 mg Oral Q8H PRN Velma Caban MD   10 mg at 05/19/24 1700    lisinopril (PRINIVIL;ZESTRIL) tablet 5 mg  5 mg Oral Daily Velma Caban MD   5 mg at 05/20/24 0849    ondansetron (ZOFRAN-ODT) disintegrating tablet 4 mg  4 mg Oral TID PRN Velma Caban MD        famotidine (PEPCID) tablet 20 mg  20 mg Oral BID Velma Caban MD   20 mg at 05/20/24 0850    [Held by provider] dexAMETHasone (DECADRON) injection 6 mg  6 mg IntraVENous Q24H Velma Caban MD   6 mg at 05/19/24 1247    0.9 % sodium chloride infusion   IntraVENous Continuous Velma Caban  mL/hr at 05/20/24 0851 New Bag at 05/20/24 0851    venlafaxine (EFFEXOR XR) extended release capsule 75 mg  75 mg Oral Daily with breakfast Velma Caban MD   75 mg at 05/20/24 0850    ketorolac (TORADOL) injection 30 mg  30 mg IntraVENous Q6H PRN Velma Caban MD   30 mg at 05/20/24 1221     Recent Complaints:  Review of Systems  Vitals:    05/20/24 1053   BP:    Pulse:    Resp: 16   Temp:    SpO2:      Physical Exam  Heart is regular rate rhythm.  Lungs are

## 2024-05-20 NOTE — PROGRESS NOTES
Occupational Therapy      Occupational Therapy referral received. Patient reports no need for OT services.    OT order discontinued .

## 2024-05-20 NOTE — PROGRESS NOTES
Physical Therapy  Facility/Department: [address] MED SURG  Physical Therapy Initial Assessment    Name: Cynthia Ayala  : 1985  MRN: [id number]  Date of Service: 2024    Attending Provider:  Maldonado Cuenca MD    Evaluating PT:  Korey Walton Jr., P.T.    Room #:  43/0743-A  Diagnosis:  Generalized abdominal pain [R10.84]  Intractable back pain [M54.9]  Crohn's disease with complication, unspecified gastrointestinal tract location (HCC) [K50.919]  Nausea and vomiting, unspecified vomiting type [R11.2]  Pertinent PMHx/PSHx:  Grade 2 anterior subluxation of L5 on S1 related to B L5 pars interarticularis defects with stable severe L5-S1 DDD, she was supposed to have back surgery in April and May and now has been re-scheduled for 2024. Also has history of Crohn's disease, Depression, Herpes simplex virus (HSV) infection, Marijuana abuse, Neurologic disorder, Postpartum depression, Seizure (HCC), STD (sexually transmitted disease), and Tobacco abuse.   Precautions:  falls    SUBJECTIVE:    Pt lives with her 4 children ages 20,11,9,and 2.  Her boyfriend comes over daily to assist and her parents live next door.  She lives in a 2 story home with 2 stairs and no rail to enter.  She has 1st floor bed and bath.  Pt ambulated with a ww recently due to back pain.    OBJECTIVE:   Initial Evaluation  Date: 24   Was pt agreeable to Eval/treatment? yes   Does pt have pain? C/o /10 LBP that radiates to her hips and down to her toes and R side is worse than L.    Bed Mobility  Rolling: Independent  Supine to sit: Independent  Sit to supine: Independent  Scooting: Independent   Transfers Sit to stand: Independent  Stand to sit: Independent  Stand pivot: Independent with ww   Ambulation   130 feet with ww Independent    Stair negotiation: ascended and descended NA   AM-PAC 6 Clicks      BLE ROM is WFL.   BLE strength is grossly 4-/5 to 4/5.   Sensation:  Pt reports numbness and tingling

## 2024-05-21 LAB
ANION GAP SERPL CALCULATED.3IONS-SCNC: 10 MMOL/L (ref 7–16)
BASOPHILS # BLD: 0.08 K/UL (ref 0–0.2)
BASOPHILS NFR BLD: 1 % (ref 0–2)
BUN SERPL-MCNC: 14 MG/DL (ref 6–20)
CALCIUM SERPL-MCNC: 8.3 MG/DL (ref 8.6–10.2)
CHLORIDE SERPL-SCNC: 104 MMOL/L (ref 98–107)
CO2 SERPL-SCNC: 26 MMOL/L (ref 22–29)
CREAT SERPL-MCNC: 0.8 MG/DL (ref 0.5–1)
EOSINOPHIL # BLD: 0.38 K/UL (ref 0.05–0.5)
EOSINOPHILS RELATIVE PERCENT: 3 % (ref 0–6)
ERYTHROCYTE [DISTWIDTH] IN BLOOD BY AUTOMATED COUNT: 13.3 % (ref 11.5–15)
GFR, ESTIMATED: >90 ML/MIN/1.73M2
GLUCOSE SERPL-MCNC: 79 MG/DL (ref 74–99)
HCT VFR BLD AUTO: 36.2 % (ref 34–48)
HGB BLD-MCNC: 11.8 G/DL (ref 11.5–15.5)
IMM GRANULOCYTES # BLD AUTO: 0.06 K/UL (ref 0–0.58)
IMM GRANULOCYTES NFR BLD: 1 % (ref 0–5)
LYMPHOCYTES NFR BLD: 5.87 K/UL (ref 1.5–4)
LYMPHOCYTES RELATIVE PERCENT: 51 % (ref 20–42)
MCH RBC QN AUTO: 29.4 PG (ref 26–35)
MCHC RBC AUTO-ENTMCNC: 32.6 G/DL (ref 32–34.5)
MCV RBC AUTO: 90 FL (ref 80–99.9)
MICROORGANISM SPEC CULT: NORMAL
MONOCYTES NFR BLD: 0.66 K/UL (ref 0.1–0.95)
MONOCYTES NFR BLD: 6 % (ref 2–12)
NEUTROPHILS NFR BLD: 39 % (ref 43–80)
NEUTS SEG NFR BLD: 4.44 K/UL (ref 1.8–7.3)
PLATELET # BLD AUTO: 408 K/UL (ref 130–450)
PMV BLD AUTO: 9.1 FL (ref 7–12)
POTASSIUM SERPL-SCNC: 3.7 MMOL/L (ref 3.5–5)
RBC # BLD AUTO: 4.02 M/UL (ref 3.5–5.5)
SODIUM SERPL-SCNC: 140 MMOL/L (ref 132–146)
SPECIMEN DESCRIPTION: NORMAL
WBC OTHER # BLD: 11.5 K/UL (ref 4.5–11.5)

## 2024-05-21 PROCEDURE — 6370000000 HC RX 637 (ALT 250 FOR IP): Performed by: INTERNAL MEDICINE

## 2024-05-21 PROCEDURE — 85025 COMPLETE CBC W/AUTO DIFF WBC: CPT

## 2024-05-21 PROCEDURE — 1200000000 HC SEMI PRIVATE

## 2024-05-21 PROCEDURE — 99232 SBSQ HOSP IP/OBS MODERATE 35: CPT | Performed by: INTERNAL MEDICINE

## 2024-05-21 PROCEDURE — 36415 COLL VENOUS BLD VENIPUNCTURE: CPT

## 2024-05-21 PROCEDURE — 80048 BASIC METABOLIC PNL TOTAL CA: CPT

## 2024-05-21 PROCEDURE — 6370000000 HC RX 637 (ALT 250 FOR IP): Performed by: STUDENT IN AN ORGANIZED HEALTH CARE EDUCATION/TRAINING PROGRAM

## 2024-05-21 PROCEDURE — 6360000002 HC RX W HCPCS: Performed by: STUDENT IN AN ORGANIZED HEALTH CARE EDUCATION/TRAINING PROGRAM

## 2024-05-21 RX ORDER — OXYCODONE HYDROCHLORIDE 5 MG/1
10 TABLET ORAL EVERY 4 HOURS
Status: DISCONTINUED | OUTPATIENT
Start: 2024-05-21 | End: 2024-05-22 | Stop reason: HOSPADM

## 2024-05-21 RX ORDER — BACLOFEN 10 MG/1
10 TABLET ORAL 4 TIMES DAILY
Status: DISCONTINUED | OUTPATIENT
Start: 2024-05-21 | End: 2024-05-22 | Stop reason: HOSPADM

## 2024-05-21 RX ADMIN — DICYCLOMINE HYDROCHLORIDE 10 MG: 10 CAPSULE ORAL at 22:17

## 2024-05-21 RX ADMIN — OXYCODONE 10 MG: 5 TABLET ORAL at 04:59

## 2024-05-21 RX ADMIN — LISINOPRIL 5 MG: 5 TABLET ORAL at 09:29

## 2024-05-21 RX ADMIN — OXYCODONE 10 MG: 5 TABLET ORAL at 17:02

## 2024-05-21 RX ADMIN — FAMOTIDINE 20 MG: 20 TABLET ORAL at 22:17

## 2024-05-21 RX ADMIN — FAMOTIDINE 20 MG: 20 TABLET ORAL at 09:29

## 2024-05-21 RX ADMIN — KETOROLAC TROMETHAMINE 30 MG: 15 INJECTION, SOLUTION INTRAMUSCULAR; INTRAVENOUS at 07:15

## 2024-05-21 RX ADMIN — GABAPENTIN 300 MG: 300 CAPSULE ORAL at 14:03

## 2024-05-21 RX ADMIN — OXYCODONE 10 MG: 5 TABLET ORAL at 09:28

## 2024-05-21 RX ADMIN — BACLOFEN 10 MG: 10 TABLET ORAL at 10:24

## 2024-05-21 RX ADMIN — DICYCLOMINE HYDROCHLORIDE 10 MG: 10 CAPSULE ORAL at 13:12

## 2024-05-21 RX ADMIN — DICYCLOMINE HYDROCHLORIDE 10 MG: 10 CAPSULE ORAL at 09:28

## 2024-05-21 RX ADMIN — KETOROLAC TROMETHAMINE 30 MG: 15 INJECTION, SOLUTION INTRAMUSCULAR; INTRAVENOUS at 00:58

## 2024-05-21 RX ADMIN — KETOROLAC TROMETHAMINE 30 MG: 15 INJECTION, SOLUTION INTRAMUSCULAR; INTRAVENOUS at 14:14

## 2024-05-21 RX ADMIN — OXYCODONE 10 MG: 5 TABLET ORAL at 13:11

## 2024-05-21 RX ADMIN — VENLAFAXINE HYDROCHLORIDE 75 MG: 75 CAPSULE, EXTENDED RELEASE ORAL at 09:29

## 2024-05-21 RX ADMIN — BACLOFEN 10 MG: 10 TABLET ORAL at 22:17

## 2024-05-21 RX ADMIN — DICYCLOMINE HYDROCHLORIDE 10 MG: 10 CAPSULE ORAL at 17:03

## 2024-05-21 RX ADMIN — GABAPENTIN 300 MG: 300 CAPSULE ORAL at 09:28

## 2024-05-21 RX ADMIN — OXYCODONE 10 MG: 5 TABLET ORAL at 22:17

## 2024-05-21 RX ADMIN — TRAMADOL HYDROCHLORIDE 50 MG: 50 TABLET, COATED ORAL at 11:50

## 2024-05-21 RX ADMIN — GABAPENTIN 300 MG: 300 CAPSULE ORAL at 22:17

## 2024-05-21 RX ADMIN — TRAMADOL HYDROCHLORIDE 50 MG: 50 TABLET, COATED ORAL at 20:12

## 2024-05-21 RX ADMIN — TRAMADOL HYDROCHLORIDE 50 MG: 50 TABLET, COATED ORAL at 02:52

## 2024-05-21 RX ADMIN — KETOROLAC TROMETHAMINE 30 MG: 15 INJECTION, SOLUTION INTRAMUSCULAR; INTRAVENOUS at 20:12

## 2024-05-21 RX ADMIN — OXYCODONE 10 MG: 5 TABLET ORAL at 00:58

## 2024-05-21 RX ADMIN — BACLOFEN 10 MG: 10 TABLET ORAL at 18:10

## 2024-05-21 ASSESSMENT — PAIN DESCRIPTION - ORIENTATION
ORIENTATION: LOWER

## 2024-05-21 ASSESSMENT — PAIN DESCRIPTION - FREQUENCY
FREQUENCY: CONTINUOUS
FREQUENCY: CONTINUOUS

## 2024-05-21 ASSESSMENT — PAIN DESCRIPTION - DESCRIPTORS
DESCRIPTORS: ACHING;DISCOMFORT;SORE;TENDER
DESCRIPTORS: SPASM;SHARP;STABBING
DESCRIPTORS: STABBING;SHARP
DESCRIPTORS: SPASM;SHARP;STABBING
DESCRIPTORS: SPASM;SHARP;STABBING
DESCRIPTORS: SPASM;STABBING;SHARP
DESCRIPTORS: SPASM;STABBING
DESCRIPTORS: SPASM;SHARP;STABBING
DESCRIPTORS: ACHING;DISCOMFORT

## 2024-05-21 ASSESSMENT — PAIN SCALES - GENERAL
PAINLEVEL_OUTOF10: 8
PAINLEVEL_OUTOF10: 6
PAINLEVEL_OUTOF10: 7
PAINLEVEL_OUTOF10: 6
PAINLEVEL_OUTOF10: 7
PAINLEVEL_OUTOF10: 8
PAINLEVEL_OUTOF10: 8
PAINLEVEL_OUTOF10: 5
PAINLEVEL_OUTOF10: 9
PAINLEVEL_OUTOF10: 7
PAINLEVEL_OUTOF10: 10
PAINLEVEL_OUTOF10: 8
PAINLEVEL_OUTOF10: 7
PAINLEVEL_OUTOF10: 8
PAINLEVEL_OUTOF10: 8
PAINLEVEL_OUTOF10: 7
PAINLEVEL_OUTOF10: 6

## 2024-05-21 ASSESSMENT — PAIN DESCRIPTION - LOCATION
LOCATION: BACK

## 2024-05-21 ASSESSMENT — PAIN DESCRIPTION - ONSET
ONSET: ON-GOING
ONSET: ON-GOING

## 2024-05-21 ASSESSMENT — PAIN - FUNCTIONAL ASSESSMENT
PAIN_FUNCTIONAL_ASSESSMENT: ACTIVITIES ARE NOT PREVENTED

## 2024-05-21 ASSESSMENT — PAIN DESCRIPTION - PAIN TYPE
TYPE: ACUTE PAIN;CHRONIC PAIN
TYPE: ACUTE PAIN;CHRONIC PAIN

## 2024-05-21 NOTE — PROGRESS NOTES
P Quality Flow/Interdisciplinary Rounds Progress Note        Quality Flow Rounds held on May 21, 2024    Disciplines Attending:  Bedside Nurse, , , and Nursing Unit Leadership    Cynthia Ayala was admitted on 5/18/2024 10:24 AM    Anticipated Discharge Date:       Disposition:    King Score:  King Scale Score: 20    Readmission Risk              Risk of Unplanned Readmission:  20           Discussed patient goal for the day, patient clinical progression, and barriers to discharge.  The following Goal(s) of the Day/Commitment(s) have been identified:   pain control, discharge planning      Jame Mchugh RN  May 21, 2024

## 2024-05-21 NOTE — PROGRESS NOTES
SPIRITUAL HEALTH SERVICES - [facility] Encounter    Name: Cynthia Ayala                  Referral: Routine Visit    Sacraments  Anointed (Last Rites): Yes  Apostolic [place]: No  Confession: No  Communion: No     Assessment:  Patient receptive to  visit.      Intervention:   provided spiritual support and sacramental ministry for patient.     Outcome:  Patient expressed gratitude for visit.    Plan:  Chaplains will remain available to offer spiritual and emotional support as needed.      Electronically signed by Chaplain Fredy, on 5/21/2024 at 7:50 PM.  Spiritual Care Department  [facility]  [phone]

## 2024-05-21 NOTE — PROGRESS NOTES
Progress  Note  Chief Complaint   Patient presents with    Abdominal Pain     100 mcg fentanyl and 4mg Zofran by EMS en route, hx crohns and  degenerative disc    Back Pain     Due for surgery in June     Emesis     Historical Issues:  Current Facility-Administered Medications   Medication Dose Route Frequency Provider Last Rate Last Admin    oxyCODONE (ROXICODONE) immediate release tablet 10 mg  10 mg Oral Q4H Maldonado Cuenca MD   10 mg at 05/21/24 0928    baclofen (LIORESAL) tablet 10 mg  10 mg Oral 4x Daily Maldonado Cuenca MD   10 mg at 05/21/24 1024    potassium chloride (KLOR-CON M) extended release tablet 40 mEq  40 mEq Oral Once Maldonado Cuenca MD        traMADol (ULTRAM) tablet 50 mg  50 mg Oral Q6H PRN Rosa Villalobos APRN - NP   50 mg at 05/21/24 1150    acetaminophen (TYLENOL) tablet 650 mg  650 mg Oral Q6H PRN eVlma Caban MD        dicyclomine (BENTYL) capsule 10 mg  10 mg Oral 4x Daily Velma Caban MD   10 mg at 05/21/24 0928    gabapentin (NEURONTIN) capsule 300 mg  300 mg Oral TID Velma Caban MD   300 mg at 05/21/24 0928    hydrOXYzine HCl (ATARAX) tablet 10 mg  10 mg Oral Q8H PRN Velma aCban MD   10 mg at 05/20/24 1657    lisinopril (PRINIVIL;ZESTRIL) tablet 5 mg  5 mg Oral Daily Velma Caban MD   5 mg at 05/21/24 0929    ondansetron (ZOFRAN-ODT) disintegrating tablet 4 mg  4 mg Oral TID PRN Velma Caban MD        famotidine (PEPCID) tablet 20 mg  20 mg Oral BID Velma Caban MD   20 mg at 05/21/24 0929    [Held by provider] dexAMETHasone (DECADRON) injection 6 mg  6 mg IntraVENous Q24H Velma Caban MD   6 mg at 05/19/24 1247    venlafaxine (EFFEXOR XR) extended release capsule 75 mg  75 mg Oral Daily with breakfast Velma Caban MD   75 mg at 05/21/24 0929    ketorolac (TORADOL) injection 30 mg  30 mg IntraVENous Q6H PRN Velma Caban MD   30 mg at 05/21/24 0715     Recent Complaints:  Review of Systems  Vitals:    05/21/24 1150   BP:    Pulse:    Resp: 18   Temp:    SpO2:

## 2024-05-21 NOTE — PLAN OF CARE
Problem: Discharge Planning  Goal: Discharge to home or other facility with appropriate resources  5/20/2024 2002 by Mikki Rapp RN  Outcome: Progressing  5/20/2024 1621 by Mikki Diaz RN  Outcome: Progressing     Problem: Pain  Goal: Verbalizes/displays adequate comfort level or baseline comfort level  5/20/2024 2002 by Mikki Rapp RN  Outcome: Progressing  5/20/2024 1621 by Mikki Diaz RN  Outcome: Progressing     Problem: Safety - Adult  Goal: Free from fall injury  5/20/2024 2002 by Mikki Rapp RN  Outcome: Progressing  5/20/2024 1621 by Mikki Diaz RN  Outcome: Progressing

## 2024-05-21 NOTE — CONSULTS
Department of Orthopedic Surgery  Ortho Spine Service Consult      HPI: Patient is a 38yoF well known to me with L5-S1 grade II spondylolysis and lumbar stenosis. She has had an increase in back and leg pain recently. She was admitted to the hospital on 5/18 due to her increase in pain and diarrhea after finishing mupirocin ointment for previous +MRSA test for upcoming surgery. UA demonstrated insignificant pyuria. She has been taking oxycodone, tylenol, celebrex, flexeril, and gabapentin 300mg TID at home. She is planned for ALIF L5-S1 and PSF L4-S1 on 6/7/24. Her surgery has been delayed due to previous nicotine use and previous insurance denial.      Review of Systems: Chart review of systems was reviewed prior to the interview.            Physical Exam:      General:  Vitals     No acute distress      Psych:   A+Ox3, Affect is normal     Spine:  +tenderness to lumbar spine     MSK Extremity -         Neuro:         Lower Extremity Strength: secondary to pain    Right Left   Hip Flexors L2 5-/5 5-/5   Quadriceps L3 5-/5 5-/5   Anterior Tibialis L4 4/5 5-/5   EHL L5 4/5 5-/5   Hamstrings 5-/5 5-/5   Gastrocsoleus S1 5-/5 5-/5   Peroneals 5-/5 5/-5      Reflexes:     Right Left                           Patellar L4 2+ 2+   Achilles S1 2+ 2+      Clonus (R/L): 0/0           Sensation to light touch in the lower extremities:     Sensory to light touch and pinprick  L2 (ant/med thigh): R-intact L-intact  L3 (ant/lat thigh): R-intact L-intact  L4 (medial leg): R-intact L-intact  L5 (lat leg/dorsal foot): R-altered L-altered  S1 (post leg/lat foot): R-altered L-altered     Pulses:   2        Imaging     CT 3/4/24: FINDINGS:  The liver appears mildly enlarged with left lobe extending across the midline  into the left upper quadrant, mid located anterior to the spleen.  There is  normal portal venous phase contrast enhancement for the liver.  No focal  lesions are seen.  A localized area of fat replacement is seen in the

## 2024-05-22 VITALS
RESPIRATION RATE: 18 BRPM | HEART RATE: 69 BPM | BODY MASS INDEX: 27.83 KG/M2 | TEMPERATURE: 97.7 F | HEIGHT: 64 IN | WEIGHT: 163 LBS | OXYGEN SATURATION: 100 % | DIASTOLIC BLOOD PRESSURE: 87 MMHG | SYSTOLIC BLOOD PRESSURE: 135 MMHG

## 2024-05-22 LAB
ANION GAP SERPL CALCULATED.3IONS-SCNC: 10 MMOL/L (ref 7–16)
BASOPHILS # BLD: 0.11 K/UL (ref 0–0.2)
BASOPHILS NFR BLD: 1 % (ref 0–2)
BUN SERPL-MCNC: 14 MG/DL (ref 6–20)
CALCIUM SERPL-MCNC: 8.4 MG/DL (ref 8.6–10.2)
CHLORIDE SERPL-SCNC: 103 MMOL/L (ref 98–107)
CO2 SERPL-SCNC: 26 MMOL/L (ref 22–29)
CREAT SERPL-MCNC: 0.7 MG/DL (ref 0.5–1)
EOSINOPHIL # BLD: 0.55 K/UL (ref 0.05–0.5)
EOSINOPHILS RELATIVE PERCENT: 5 % (ref 0–6)
ERYTHROCYTE [DISTWIDTH] IN BLOOD BY AUTOMATED COUNT: 13.2 % (ref 11.5–15)
GFR, ESTIMATED: >90 ML/MIN/1.73M2
GLUCOSE SERPL-MCNC: 73 MG/DL (ref 74–99)
HCT VFR BLD AUTO: 39 % (ref 34–48)
HGB BLD-MCNC: 12.7 G/DL (ref 11.5–15.5)
IMM GRANULOCYTES # BLD AUTO: 0.09 K/UL (ref 0–0.58)
IMM GRANULOCYTES NFR BLD: 1 % (ref 0–5)
LYMPHOCYTES NFR BLD: 5.17 K/UL (ref 1.5–4)
LYMPHOCYTES RELATIVE PERCENT: 47 % (ref 20–42)
MCH RBC QN AUTO: 29.9 PG (ref 26–35)
MCHC RBC AUTO-ENTMCNC: 32.6 G/DL (ref 32–34.5)
MCV RBC AUTO: 91.8 FL (ref 80–99.9)
MONOCYTES NFR BLD: 0.63 K/UL (ref 0.1–0.95)
MONOCYTES NFR BLD: 6 % (ref 2–12)
NEUTROPHILS NFR BLD: 41 % (ref 43–80)
NEUTS SEG NFR BLD: 4.49 K/UL (ref 1.8–7.3)
PLATELET # BLD AUTO: 432 K/UL (ref 130–450)
PMV BLD AUTO: 9.4 FL (ref 7–12)
POTASSIUM SERPL-SCNC: 3.8 MMOL/L (ref 3.5–5)
RBC # BLD AUTO: 4.25 M/UL (ref 3.5–5.5)
SODIUM SERPL-SCNC: 139 MMOL/L (ref 132–146)
WBC OTHER # BLD: 11 K/UL (ref 4.5–11.5)

## 2024-05-22 PROCEDURE — 85025 COMPLETE CBC W/AUTO DIFF WBC: CPT

## 2024-05-22 PROCEDURE — 6360000002 HC RX W HCPCS: Performed by: STUDENT IN AN ORGANIZED HEALTH CARE EDUCATION/TRAINING PROGRAM

## 2024-05-22 PROCEDURE — 6370000000 HC RX 637 (ALT 250 FOR IP): Performed by: STUDENT IN AN ORGANIZED HEALTH CARE EDUCATION/TRAINING PROGRAM

## 2024-05-22 PROCEDURE — 36415 COLL VENOUS BLD VENIPUNCTURE: CPT

## 2024-05-22 PROCEDURE — 6370000000 HC RX 637 (ALT 250 FOR IP): Performed by: INTERNAL MEDICINE

## 2024-05-22 PROCEDURE — 99239 HOSP IP/OBS DSCHRG MGMT >30: CPT | Performed by: INTERNAL MEDICINE

## 2024-05-22 PROCEDURE — 80048 BASIC METABOLIC PNL TOTAL CA: CPT

## 2024-05-22 RX ORDER — VENLAFAXINE HYDROCHLORIDE 75 MG/1
75 CAPSULE, EXTENDED RELEASE ORAL
Qty: 30 CAPSULE | Refills: 3 | Status: SHIPPED | OUTPATIENT
Start: 2024-05-22

## 2024-05-22 RX ORDER — BACLOFEN 10 MG/1
20 TABLET ORAL 4 TIMES DAILY
Qty: 120 TABLET | Refills: 0 | Status: SHIPPED | OUTPATIENT
Start: 2024-05-22 | End: 2024-06-06

## 2024-05-22 RX ORDER — NAPROXEN 500 MG/1
500 TABLET ORAL 2 TIMES DAILY WITH MEALS
Qty: 60 TABLET | Refills: 0 | Status: SHIPPED | OUTPATIENT
Start: 2024-05-22

## 2024-05-22 RX ORDER — TRAMADOL HYDROCHLORIDE 50 MG/1
50 TABLET ORAL EVERY 6 HOURS PRN
Qty: 12 TABLET | Refills: 0 | Status: SHIPPED | OUTPATIENT
Start: 2024-05-22 | End: 2024-05-25

## 2024-05-22 RX ORDER — OXYCODONE HYDROCHLORIDE 10 MG/1
10 TABLET ORAL EVERY 6 HOURS PRN
Qty: 12 TABLET | Refills: 0 | Status: SHIPPED | OUTPATIENT
Start: 2024-05-22 | End: 2024-05-25

## 2024-05-22 RX ADMIN — DICYCLOMINE HYDROCHLORIDE 10 MG: 10 CAPSULE ORAL at 09:51

## 2024-05-22 RX ADMIN — OXYCODONE 10 MG: 5 TABLET ORAL at 02:45

## 2024-05-22 RX ADMIN — OXYCODONE 10 MG: 5 TABLET ORAL at 09:51

## 2024-05-22 RX ADMIN — LISINOPRIL 5 MG: 5 TABLET ORAL at 09:51

## 2024-05-22 RX ADMIN — OXYCODONE 10 MG: 5 TABLET ORAL at 06:36

## 2024-05-22 RX ADMIN — KETOROLAC TROMETHAMINE 30 MG: 15 INJECTION, SOLUTION INTRAMUSCULAR; INTRAVENOUS at 06:42

## 2024-05-22 RX ADMIN — VENLAFAXINE HYDROCHLORIDE 75 MG: 75 CAPSULE, EXTENDED RELEASE ORAL at 10:12

## 2024-05-22 RX ADMIN — BACLOFEN 10 MG: 10 TABLET ORAL at 09:51

## 2024-05-22 RX ADMIN — FAMOTIDINE 20 MG: 20 TABLET ORAL at 09:51

## 2024-05-22 RX ADMIN — GABAPENTIN 300 MG: 300 CAPSULE ORAL at 09:51

## 2024-05-22 ASSESSMENT — PAIN DESCRIPTION - ORIENTATION: ORIENTATION: INNER;MID;POSTERIOR

## 2024-05-22 ASSESSMENT — PAIN SCALES - GENERAL
PAINLEVEL_OUTOF10: 8
PAINLEVEL_OUTOF10: 7

## 2024-05-22 ASSESSMENT — PAIN DESCRIPTION - LOCATION
LOCATION: BACK
LOCATION: BACK

## 2024-05-22 ASSESSMENT — PAIN DESCRIPTION - DESCRIPTORS
DESCRIPTORS: ACHING;DISCOMFORT;SORE;TENDER
DESCRIPTORS: ACHING;DISCOMFORT;PRESSURE;SORE;TENDER

## 2024-05-22 NOTE — PLAN OF CARE
Problem: Discharge Planning  Goal: Discharge to home or other facility with appropriate resources  5/22/2024 0156 by Anitha Simon, RN  Outcome: Progressing  Flowsheets (Taken 5/21/2024 1945)  Discharge to home or other facility with appropriate resources: Identify barriers to discharge with patient and caregiver     Problem: Pain  Goal: Verbalizes/displays adequate comfort level or baseline comfort level  5/22/2024 0156 by Anitha Simon, RN  Outcome: Progressing     Problem: Safety - Adult  Goal: Free from fall injury  5/22/2024 0156 by Anitha Simon, RN  Outcome: Progressing

## 2024-05-22 NOTE — FLOWSHEET NOTE
05/22/24 1002   AVS Reviewed   AVS & discharge instructions reviewed with patient and/or representative? Yes   Reviewed instructions with Patient   Level of Understanding Questions answered;Verbalized understanding

## 2024-05-22 NOTE — DISCHARGE SUMMARY
Reason for exam:->Abdominal pain, back pain Additional Contrast?->None Decision Support Exception - unselect if not a suspected or confirmed emergency medical condition->Emergency Medical Condition (MA) FINDINGS: Evaluation of the solid organs and vascular structures limited without IV contrast. Lower Chest: Negative. Organs: Fatty liver suspected.  Cholecystectomy..  Unremarkable spleen. Unremarkable pancreas and adrenal glands.  No renal mass or hydronephrosis. GI/Bowel: No acute disease.  Normal appendix. Pelvis: Unremarkable by CT. Peritoneum/Retroperitoneum: Atherosclerotic vascular calcifications. Bones/Soft Tissues: No acute osseous findings or destructive bone lesions. Chronic L5 pars defects with 1 cm spondylolisthesis.  Severe disc degeneration at the L5-S1 level.     1. No acute intra-abdominal or pelvic process.       Patient Instructions:   Discharge Medication List as of 5/22/2024 10:05 AM        START taking these medications    Details   oxyCODONE (OXY-IR) 10 MG immediate release tablet Take 1 tablet by mouth every 6 hours as needed for Pain for up to 3 days. Max Daily Amount: 40 mg, Disp-12 tablet, R-0Normal      traMADol (ULTRAM) 50 MG tablet Take 1 tablet by mouth every 6 hours as needed for Pain for up to 3 days. Max Daily Amount: 200 mg, Disp-12 tablet, R-0Normal      venlafaxine (EFFEXOR XR) 75 MG extended release capsule Take 1 capsule by mouth daily (with breakfast), Disp-30 capsule, R-3Normal      baclofen (LIORESAL) 10 MG tablet Take 2 tablets by mouth 4 times daily for 15 days, Disp-120 tablet, R-0Normal      naproxen (NAPROSYN) 500 MG tablet Take 1 tablet by mouth 2 times daily (with meals), Disp-60 tablet, R-0Normal           CONTINUE these medications which have NOT CHANGED    Details   gabapentin (NEURONTIN) 300 MG capsule Take 1 capsule by mouth 3 times daily for 30 days., Disp-90 capsule, R-0Print      hydrOXYzine HCl (ATARAX) 10 MG tablet Take 1 tablet by mouth every 8 hours as

## 2024-05-23 NOTE — PROGRESS NOTES
CLINICAL PHARMACY NOTE: MEDS TO BEDS    Total # of Prescriptions Filled: 4   The following medications were delivered to the patient:  Oxycodone 10 mg        Baclofen 10 mg  Naproxen 500 mg  Tramadol 50 mg    Additional Documentation:

## 2024-05-31 ENCOUNTER — HOSPITAL ENCOUNTER (OUTPATIENT)
Age: 39
End: 2024-05-31

## 2024-05-31 ENCOUNTER — HOSPITAL ENCOUNTER (OUTPATIENT)
Age: 39
Discharge: HOME | End: 2024-05-31

## 2024-05-31 LAB
ABO + RH BLD: NORMAL
ARM BAND NUMBER: NORMAL
BLOOD BANK SAMPLE EXPIRATION: NORMAL
BLOOD GROUP ANTIBODIES SERPL: NEGATIVE

## 2024-05-31 PROCEDURE — 86901 BLOOD TYPING SEROLOGIC RH(D): CPT

## 2024-05-31 PROCEDURE — 86900 BLOOD TYPING SEROLOGIC ABO: CPT

## 2024-05-31 PROCEDURE — 86850 RBC ANTIBODY SCREEN: CPT

## 2024-05-31 PROCEDURE — 87081 CULTURE SCREEN ONLY: CPT

## 2024-06-02 LAB
MICROORGANISM SPEC CULT: NORMAL
SPECIMEN DESCRIPTION: NORMAL

## 2024-06-07 ENCOUNTER — HOSPITAL ENCOUNTER (OUTPATIENT)
Age: 39
Discharge: HOME | End: 2024-06-09

## 2024-06-07 LAB
ERYTHROCYTE [DISTWIDTH] IN BLOOD BY AUTOMATED COUNT: 14.5 % (ref 11.5–15)
HCT VFR BLD AUTO: 39.5 % (ref 34–48)
HGB BLD-MCNC: 12.6 G/DL (ref 11.5–15.5)
MCH RBC QN AUTO: 29.6 PG (ref 26–35)
MCHC RBC AUTO-ENTMCNC: 31.9 G/DL (ref 32–34.5)
MCV RBC AUTO: 92.7 FL (ref 80–99.9)
PLATELET # BLD AUTO: 462 K/UL (ref 130–450)
PMV BLD AUTO: 9.5 FL (ref 7–12)
RBC # BLD AUTO: 4.26 M/UL (ref 3.5–5.5)
WBC OTHER # BLD: 11.7 K/UL (ref 4.5–11.5)

## 2024-06-07 PROCEDURE — 85027 COMPLETE CBC AUTOMATED: CPT

## 2024-06-08 ENCOUNTER — HOSPITAL ENCOUNTER (OUTPATIENT)
Age: 39
Discharge: HOME | End: 2024-06-10

## 2024-06-08 LAB
ANION GAP SERPL CALCULATED.3IONS-SCNC: 13 MMOL/L (ref 7–16)
BUN SERPL-MCNC: 6 MG/DL (ref 6–20)
CALCIUM SERPL-MCNC: 8.8 MG/DL (ref 8.6–10.2)
CHLORIDE SERPL-SCNC: 105 MMOL/L (ref 98–107)
CO2 SERPL-SCNC: 24 MMOL/L (ref 22–29)
CREAT SERPL-MCNC: 0.6 MG/DL (ref 0.5–1)
ERYTHROCYTE [DISTWIDTH] IN BLOOD BY AUTOMATED COUNT: 14.7 % (ref 11.5–15)
GFR, ESTIMATED: >90 ML/MIN/1.73M2
GLUCOSE SERPL-MCNC: 83 MG/DL (ref 74–99)
HCT VFR BLD AUTO: 33.2 % (ref 34–48)
HGB BLD-MCNC: 10.6 G/DL (ref 11.5–15.5)
MCH RBC QN AUTO: 29.9 PG (ref 26–35)
MCHC RBC AUTO-ENTMCNC: 31.9 G/DL (ref 32–34.5)
MCV RBC AUTO: 93.5 FL (ref 80–99.9)
PLATELET # BLD AUTO: 433 K/UL (ref 130–450)
PMV BLD AUTO: 10.3 FL (ref 7–12)
POTASSIUM SERPL-SCNC: 4.1 MMOL/L (ref 3.5–5)
RBC # BLD AUTO: 3.55 M/UL (ref 3.5–5.5)
SODIUM SERPL-SCNC: 142 MMOL/L (ref 132–146)
WBC OTHER # BLD: 11.7 K/UL (ref 4.5–11.5)

## 2024-06-08 PROCEDURE — 80048 BASIC METABOLIC PNL TOTAL CA: CPT

## 2024-06-08 PROCEDURE — 85027 COMPLETE CBC AUTOMATED: CPT

## 2024-06-09 ENCOUNTER — HOSPITAL ENCOUNTER (OUTPATIENT)
Age: 39
Discharge: HOME | End: 2024-06-11

## 2024-06-09 LAB
ANION GAP SERPL CALCULATED.3IONS-SCNC: 11 MMOL/L (ref 7–16)
BUN SERPL-MCNC: 4 MG/DL (ref 6–20)
CHLORIDE SERPL-SCNC: 103 MMOL/L (ref 98–107)
CO2 SERPL-SCNC: 22 MMOL/L (ref 22–29)
CREAT SERPL-MCNC: 0.5 MG/DL (ref 0.5–1)
ERYTHROCYTE [DISTWIDTH] IN BLOOD BY AUTOMATED COUNT: 14.6 % (ref 11.5–15)
GFR, ESTIMATED: >90 ML/MIN/1.73M2
GLUCOSE SERPL-MCNC: 75 MG/DL (ref 74–99)
HCT VFR BLD AUTO: 33.2 % (ref 34–48)
HGB BLD-MCNC: 10.6 G/DL (ref 11.5–15.5)
MCH RBC QN AUTO: 29.7 PG (ref 26–35)
MCHC RBC AUTO-ENTMCNC: 31.9 G/DL (ref 32–34.5)
MCV RBC AUTO: 93 FL (ref 80–99.9)
PLATELET # BLD AUTO: 184 K/UL (ref 130–450)
PMV BLD AUTO: 10.2 FL (ref 7–12)
POTASSIUM SERPL-SCNC: 3.8 MMOL/L (ref 3.5–5)
RBC # BLD AUTO: 3.57 M/UL (ref 3.5–5.5)
SODIUM SERPL-SCNC: 136 MMOL/L (ref 132–146)
WBC OTHER # BLD: 12.9 K/UL (ref 4.5–11.5)

## 2024-06-09 PROCEDURE — 85027 COMPLETE CBC AUTOMATED: CPT

## 2024-06-09 PROCEDURE — 80048 BASIC METABOLIC PNL TOTAL CA: CPT

## 2024-06-10 ENCOUNTER — HOSPITAL ENCOUNTER (OUTPATIENT)
Age: 39
Discharge: HOME | End: 2024-06-12

## 2024-06-10 LAB
ANION GAP SERPL CALCULATED.3IONS-SCNC: 12 MMOL/L (ref 7–16)
BASOPHILS # BLD: 0.01 K/UL (ref 0–0.2)
BASOPHILS NFR BLD: 0 % (ref 0–2)
BUN SERPL-MCNC: 13 MG/DL (ref 6–20)
CALCIUM SERPL-MCNC: 9 MG/DL (ref 8.6–10.2)
CHLORIDE SERPL-SCNC: 102 MMOL/L (ref 98–107)
CO2 SERPL-SCNC: 22 MMOL/L (ref 22–29)
CREAT SERPL-MCNC: 0.4 MG/DL (ref 0.5–1)
EOSINOPHIL # BLD: 0 K/UL (ref 0.05–0.5)
EOSINOPHILS RELATIVE PERCENT: 0 % (ref 0–6)
ERYTHROCYTE [DISTWIDTH] IN BLOOD BY AUTOMATED COUNT: 14.4 % (ref 11.5–15)
GFR, ESTIMATED: >90 ML/MIN/1.73M2
GLUCOSE SERPL-MCNC: 140 MG/DL (ref 74–99)
HCT VFR BLD AUTO: 32.8 % (ref 34–48)
HGB BLD-MCNC: 10.5 G/DL (ref 11.5–15.5)
IMM GRANULOCYTES # BLD AUTO: 0.1 K/UL (ref 0–0.58)
IMM GRANULOCYTES NFR BLD: 1 % (ref 0–5)
LYMPHOCYTES NFR BLD: 0.9 K/UL (ref 1.5–4)
LYMPHOCYTES RELATIVE PERCENT: 7 % (ref 20–42)
MCH RBC QN AUTO: 29.4 PG (ref 26–35)
MCHC RBC AUTO-ENTMCNC: 32 G/DL (ref 32–34.5)
MCV RBC AUTO: 91.9 FL (ref 80–99.9)
MONOCYTES NFR BLD: 0.33 K/UL (ref 0.1–0.95)
MONOCYTES NFR BLD: 2 % (ref 2–12)
NEUTROPHILS NFR BLD: 90 % (ref 43–80)
NEUTS SEG NFR BLD: 12.35 K/UL (ref 1.8–7.3)
PLATELET # BLD AUTO: 452 K/UL (ref 130–450)
PMV BLD AUTO: 9.9 FL (ref 7–12)
POTASSIUM SERPL-SCNC: 4 MMOL/L (ref 3.5–5)
SODIUM SERPL-SCNC: 136 MMOL/L (ref 132–146)
WBC OTHER # BLD: 13.7 K/UL (ref 4.5–11.5)

## 2024-06-10 PROCEDURE — 80048 BASIC METABOLIC PNL TOTAL CA: CPT

## 2024-06-10 PROCEDURE — 85025 COMPLETE CBC W/AUTO DIFF WBC: CPT

## 2024-06-11 ENCOUNTER — HOSPITAL ENCOUNTER (OUTPATIENT)
Age: 39
Discharge: HOME | End: 2024-06-13

## 2024-06-11 LAB
ANION GAP SERPL CALCULATED.3IONS-SCNC: 12 MMOL/L (ref 7–16)
BUN SERPL-MCNC: 11 MG/DL (ref 6–20)
CALCIUM SERPL-MCNC: 9.3 MG/DL (ref 8.6–10.2)
CHLORIDE SERPL-SCNC: 103 MMOL/L (ref 98–107)
CO2 SERPL-SCNC: 25 MMOL/L (ref 22–29)
CREAT SERPL-MCNC: 0.5 MG/DL (ref 0.5–1)
ERYTHROCYTE [DISTWIDTH] IN BLOOD BY AUTOMATED COUNT: 14.6 % (ref 11.5–15)
GFR, ESTIMATED: >90 ML/MIN/1.73M2
GLUCOSE SERPL-MCNC: 120 MG/DL (ref 74–99)
HCT VFR BLD AUTO: 32.1 % (ref 34–48)
HGB BLD-MCNC: 10.2 G/DL (ref 11.5–15.5)
MCH RBC QN AUTO: 30.1 PG (ref 26–35)
MCHC RBC AUTO-ENTMCNC: 31.8 G/DL (ref 32–34.5)
MCV RBC AUTO: 94.7 FL (ref 80–99.9)
PLATELET # BLD AUTO: 509 K/UL (ref 130–450)
PMV BLD AUTO: 10 FL (ref 7–12)
POTASSIUM SERPL-SCNC: 4.9 MMOL/L (ref 3.5–5)
RBC # BLD AUTO: 3.39 M/UL (ref 3.5–5.5)
SODIUM SERPL-SCNC: 140 MMOL/L (ref 132–146)
WBC OTHER # BLD: 13.8 K/UL (ref 4.5–11.5)

## 2024-06-11 PROCEDURE — 85027 COMPLETE CBC AUTOMATED: CPT

## 2024-06-11 PROCEDURE — 80048 BASIC METABOLIC PNL TOTAL CA: CPT

## 2024-06-13 LAB
BASOPHILS ABSOLUTE: 0.09 K/UL (ref 0–0.2)
BASOPHILS RELATIVE PERCENT: 1 % (ref 0–2)
EOSINOPHILS ABSOLUTE: 0.31 K/UL (ref 0.05–0.5)
EOSINOPHILS RELATIVE PERCENT: 2 % (ref 0–6)
HCT VFR BLD CALC: 36.4 % (ref 34–48)
IMMATURE GRANULOCYTES %: 1 % (ref 0–5)
IMMATURE GRANULOCYTES ABSOLUTE: 0.21 K/UL (ref 0–0.58)
LYMPHOCYTES ABSOLUTE: 5.03 K/UL (ref 1.5–4)
LYMPHOCYTES RELATIVE PERCENT: 32 % (ref 20–42)
MCH RBC QN AUTO: 31 PG (ref 26–35)
MCHC RBC AUTO-ENTMCNC: 33.8 G/DL (ref 32–34.5)
MCV RBC AUTO: 91.7 FL (ref 80–99.9)
MONOCYTES ABSOLUTE: 0.58 K/UL (ref 0.1–0.95)
MONOCYTES RELATIVE PERCENT: 4 % (ref 2–12)
NEUTROPHILS ABSOLUTE: 9.66 K/UL (ref 1.8–7.3)
NEUTROPHILS RELATIVE PERCENT: 61 % (ref 43–80)
PDW BLD-RTO: 14.2 % (ref 11.5–15)
PLATELET # BLD: 716 K/UL (ref 130–450)
PMV BLD AUTO: 9.2 FL (ref 7–12)
RBC # BLD: 3.97 M/UL (ref 3.5–5.5)
WBC # BLD: 15.9 K/UL (ref 4.5–11.5)

## 2024-06-21 LAB
BASOPHILS ABSOLUTE: 0.11 K/UL (ref 0–0.2)
BASOPHILS RELATIVE PERCENT: 1 % (ref 0–2)
EOSINOPHILS ABSOLUTE: 0.23 K/UL (ref 0.05–0.5)
EOSINOPHILS RELATIVE PERCENT: 2 % (ref 0–6)
HCT VFR BLD CALC: 39.4 % (ref 34–48)
HEMOGLOBIN: 12.3 G/DL (ref 11.5–15.5)
IMMATURE GRANULOCYTES %: 2 % (ref 0–5)
IMMATURE GRANULOCYTES ABSOLUTE: 0.27 K/UL (ref 0–0.58)
LYMPHOCYTES ABSOLUTE: 4.23 K/UL (ref 1.5–4)
LYMPHOCYTES RELATIVE PERCENT: 29 % (ref 20–42)
MCH RBC QN AUTO: 29.1 PG (ref 26–35)
MCHC RBC AUTO-ENTMCNC: 31.2 G/DL (ref 32–34.5)
MCV RBC AUTO: 93.4 FL (ref 80–99.9)
MONOCYTES ABSOLUTE: 0.8 K/UL (ref 0.1–0.95)
MONOCYTES RELATIVE PERCENT: 5 % (ref 2–12)
NEUTROPHILS ABSOLUTE: 9.08 K/UL (ref 1.8–7.3)
NEUTROPHILS RELATIVE PERCENT: 62 % (ref 43–80)
PDW BLD-RTO: 15.1 % (ref 11.5–15)
PLATELET # BLD: 761 K/UL (ref 130–450)
PMV BLD AUTO: 9.3 FL (ref 7–12)
RBC # BLD: 4.22 M/UL (ref 3.5–5.5)
WBC # BLD: 14.7 K/UL (ref 4.5–11.5)

## 2024-06-25 LAB
BASOPHILS ABSOLUTE: 0.08 K/UL (ref 0–0.2)
BASOPHILS RELATIVE PERCENT: 1 % (ref 0–2)
EOSINOPHILS ABSOLUTE: 0.21 K/UL (ref 0.05–0.5)
EOSINOPHILS RELATIVE PERCENT: 2 % (ref 0–6)
HCT VFR BLD CALC: 40.3 % (ref 34–48)
HEMOGLOBIN: 12.4 G/DL (ref 11.5–15.5)
IMMATURE GRANULOCYTES %: 1 % (ref 0–5)
IMMATURE GRANULOCYTES ABSOLUTE: 0.05 K/UL (ref 0–0.58)
LYMPHOCYTES ABSOLUTE: 3.46 K/UL (ref 1.5–4)
LYMPHOCYTES RELATIVE PERCENT: 32 % (ref 20–42)
MCH RBC QN AUTO: 28.8 PG (ref 26–35)
MCHC RBC AUTO-ENTMCNC: 30.8 G/DL (ref 32–34.5)
MCV RBC AUTO: 93.5 FL (ref 80–99.9)
MONOCYTES ABSOLUTE: 0.73 K/UL (ref 0.1–0.95)
MONOCYTES RELATIVE PERCENT: 7 % (ref 2–12)
NEUTROPHILS ABSOLUTE: 6.46 K/UL (ref 1.8–7.3)
NEUTROPHILS RELATIVE PERCENT: 59 % (ref 43–80)
PDW BLD-RTO: 14.7 % (ref 11.5–15)
PLATELET # BLD: 661 K/UL (ref 130–450)
PMV BLD AUTO: 9.1 FL (ref 7–12)
RBC # BLD: 4.31 M/UL (ref 3.5–5.5)
WBC # BLD: 11 K/UL (ref 4.5–11.5)

## 2024-07-08 ENCOUNTER — APPOINTMENT (OUTPATIENT)
Dept: CT IMAGING | Age: 39
End: 2024-07-08
Payer: COMMERCIAL

## 2024-07-08 ENCOUNTER — APPOINTMENT (OUTPATIENT)
Dept: ULTRASOUND IMAGING | Age: 39
End: 2024-07-08
Payer: COMMERCIAL

## 2024-07-08 ENCOUNTER — HOSPITAL ENCOUNTER (EMERGENCY)
Age: 39
Discharge: HOME | End: 2024-07-09
Attending: STUDENT IN AN ORGANIZED HEALTH CARE EDUCATION/TRAINING PROGRAM
Payer: COMMERCIAL

## 2024-07-08 VITALS
DIASTOLIC BLOOD PRESSURE: 115 MMHG | BODY MASS INDEX: 27.46 KG/M2 | HEART RATE: 83 BPM | SYSTOLIC BLOOD PRESSURE: 144 MMHG | OXYGEN SATURATION: 100 % | TEMPERATURE: 97.8 F | WEIGHT: 160 LBS | RESPIRATION RATE: 14 BRPM

## 2024-07-08 DIAGNOSIS — R11.2 NAUSEA AND VOMITING, UNSPECIFIED VOMITING TYPE: Primary | ICD-10-CM

## 2024-07-08 DIAGNOSIS — G89.29 CHRONIC MIDLINE LOW BACK PAIN WITHOUT SCIATICA: ICD-10-CM

## 2024-07-08 DIAGNOSIS — M54.50 CHRONIC MIDLINE LOW BACK PAIN WITHOUT SCIATICA: ICD-10-CM

## 2024-07-08 LAB
ALBUMIN SERPL-MCNC: 4.6 G/DL (ref 3.5–5.2)
ALP SERPL-CCNC: 117 U/L (ref 35–104)
ALT SERPL-CCNC: 12 U/L (ref 0–32)
ANION GAP SERPL CALCULATED.3IONS-SCNC: 16 MMOL/L (ref 7–16)
AST SERPL-CCNC: 16 U/L (ref 0–31)
BACTERIA URNS QL MICRO: ABNORMAL
BASOPHILS # BLD: 0.07 K/UL (ref 0–0.2)
BASOPHILS NFR BLD: 0 % (ref 0–2)
BILIRUB SERPL-MCNC: 0.3 MG/DL (ref 0–1.2)
BILIRUB UR QL STRIP: NEGATIVE
BUN SERPL-MCNC: 14 MG/DL (ref 6–20)
CALCIUM SERPL-MCNC: 10 MG/DL (ref 8.6–10.2)
CHLORIDE SERPL-SCNC: 99 MMOL/L (ref 98–107)
CLARITY UR: CLEAR
CO2 SERPL-SCNC: 20 MMOL/L (ref 22–29)
COLOR UR: YELLOW
CREAT SERPL-MCNC: 0.6 MG/DL (ref 0.5–1)
EOSINOPHIL # BLD: 0.02 K/UL (ref 0.05–0.5)
EOSINOPHILS RELATIVE PERCENT: 0 % (ref 0–6)
EPI CELLS #/AREA URNS HPF: ABNORMAL /HPF
ERYTHROCYTE [DISTWIDTH] IN BLOOD BY AUTOMATED COUNT: 14.6 % (ref 11.5–15)
GFR, ESTIMATED: >90 ML/MIN/1.73M2
GLUCOSE SERPL-MCNC: 157 MG/DL (ref 74–99)
GLUCOSE UR STRIP-MCNC: NEGATIVE MG/DL
HCG SERPL QL: NEGATIVE
HCT VFR BLD AUTO: 37.4 % (ref 34–48)
HGB BLD-MCNC: 12.3 G/DL (ref 11.5–15.5)
HGB UR QL STRIP.AUTO: NEGATIVE
IMM GRANULOCYTES # BLD AUTO: 0.07 K/UL (ref 0–0.58)
IMM GRANULOCYTES NFR BLD: 0 % (ref 0–5)
KETONES UR STRIP-MCNC: >=160 MG/DL
LACTATE BLDV-SCNC: 1.8 MMOL/L (ref 0.5–2.2)
LEUKOCYTE ESTERASE UR QL STRIP: NEGATIVE
LIPASE SERPL-CCNC: 14 U/L (ref 13–60)
LYMPHOCYTES NFR BLD: 1.45 K/UL (ref 1.5–4)
LYMPHOCYTES RELATIVE PERCENT: 8 % (ref 20–42)
MCH RBC QN AUTO: 28.9 PG (ref 26–35)
MCHC RBC AUTO-ENTMCNC: 32.9 G/DL (ref 32–34.5)
MCV RBC AUTO: 87.8 FL (ref 80–99.9)
MONOCYTES NFR BLD: 0.15 K/UL (ref 0.1–0.95)
MONOCYTES NFR BLD: 1 % (ref 2–12)
MUCOUS THREADS URNS QL MICRO: PRESENT
NEUTROPHILS NFR BLD: 90 % (ref 43–80)
NEUTS SEG NFR BLD: 15.44 K/UL (ref 1.8–7.3)
NITRITE UR QL STRIP: NEGATIVE
PH UR STRIP: 6 [PH] (ref 5–9)
PLATELET # BLD AUTO: 637 K/UL (ref 130–450)
PMV BLD AUTO: 8.7 FL (ref 7–12)
POTASSIUM SERPL-SCNC: 3.9 MMOL/L (ref 3.5–5)
PROT SERPL-MCNC: 8.3 G/DL (ref 6.4–8.3)
PROT UR STRIP-MCNC: NEGATIVE MG/DL
RBC # BLD AUTO: 4.26 M/UL (ref 3.5–5.5)
RBC #/AREA URNS HPF: ABNORMAL /HPF
SODIUM SERPL-SCNC: 135 MMOL/L (ref 132–146)
SP GR UR STRIP: >1.03 (ref 1–1.03)
TROPONIN I SERPL HS-MCNC: <6 NG/L (ref 0–9)
UROBILINOGEN UR STRIP-ACNC: 0.2 EU/DL (ref 0–1)
WBC #/AREA URNS HPF: ABNORMAL /HPF
WBC OTHER # BLD: 17.2 K/UL (ref 4.5–11.5)

## 2024-07-08 PROCEDURE — 96376 TX/PRO/DX INJ SAME DRUG ADON: CPT

## 2024-07-08 PROCEDURE — 6360000002 HC RX W HCPCS: Performed by: STUDENT IN AN ORGANIZED HEALTH CARE EDUCATION/TRAINING PROGRAM

## 2024-07-08 PROCEDURE — 83690 ASSAY OF LIPASE: CPT

## 2024-07-08 PROCEDURE — 84703 CHORIONIC GONADOTROPIN ASSAY: CPT

## 2024-07-08 PROCEDURE — 85025 COMPLETE CBC W/AUTO DIFF WBC: CPT

## 2024-07-08 PROCEDURE — 84484 ASSAY OF TROPONIN QUANT: CPT

## 2024-07-08 PROCEDURE — 93971 EXTREMITY STUDY: CPT

## 2024-07-08 PROCEDURE — 2580000003 HC RX 258: Performed by: STUDENT IN AN ORGANIZED HEALTH CARE EDUCATION/TRAINING PROGRAM

## 2024-07-08 PROCEDURE — 83605 ASSAY OF LACTIC ACID: CPT

## 2024-07-08 PROCEDURE — 6360000004 HC RX CONTRAST MEDICATION: Performed by: RADIOLOGY

## 2024-07-08 PROCEDURE — 99285 EMERGENCY DEPT VISIT HI MDM: CPT

## 2024-07-08 PROCEDURE — 72131 CT LUMBAR SPINE W/O DYE: CPT

## 2024-07-08 PROCEDURE — 74177 CT ABD & PELVIS W/CONTRAST: CPT

## 2024-07-08 PROCEDURE — 96375 TX/PRO/DX INJ NEW DRUG ADDON: CPT

## 2024-07-08 PROCEDURE — 93005 ELECTROCARDIOGRAM TRACING: CPT | Performed by: STUDENT IN AN ORGANIZED HEALTH CARE EDUCATION/TRAINING PROGRAM

## 2024-07-08 PROCEDURE — 81001 URINALYSIS AUTO W/SCOPE: CPT

## 2024-07-08 PROCEDURE — 80053 COMPREHEN METABOLIC PANEL: CPT

## 2024-07-08 PROCEDURE — 96374 THER/PROPH/DIAG INJ IV PUSH: CPT

## 2024-07-08 RX ORDER — DROPERIDOL 2.5 MG/ML
0.62 INJECTION, SOLUTION INTRAMUSCULAR; INTRAVENOUS ONCE
Status: COMPLETED | OUTPATIENT
Start: 2024-07-09 | End: 2024-07-09

## 2024-07-08 RX ORDER — ONDANSETRON 4 MG/1
4 TABLET, ORALLY DISINTEGRATING ORAL 3 TIMES DAILY PRN
Qty: 21 TABLET | Refills: 0 | Status: SHIPPED | OUTPATIENT
Start: 2024-07-08

## 2024-07-08 RX ORDER — ONDANSETRON 2 MG/ML
4 INJECTION INTRAMUSCULAR; INTRAVENOUS ONCE
Status: COMPLETED | OUTPATIENT
Start: 2024-07-08 | End: 2024-07-08

## 2024-07-08 RX ORDER — MORPHINE SULFATE 4 MG/ML
4 INJECTION, SOLUTION INTRAMUSCULAR; INTRAVENOUS ONCE
Status: COMPLETED | OUTPATIENT
Start: 2024-07-08 | End: 2024-07-08

## 2024-07-08 RX ORDER — PROCHLORPERAZINE EDISYLATE 5 MG/ML
10 INJECTION INTRAMUSCULAR; INTRAVENOUS ONCE
Status: COMPLETED | OUTPATIENT
Start: 2024-07-08 | End: 2024-07-08

## 2024-07-08 RX ORDER — 0.9 % SODIUM CHLORIDE 0.9 %
1000 INTRAVENOUS SOLUTION INTRAVENOUS ONCE
Status: COMPLETED | OUTPATIENT
Start: 2024-07-08 | End: 2024-07-08

## 2024-07-08 RX ORDER — DIPHENHYDRAMINE HYDROCHLORIDE 50 MG/ML
25 INJECTION INTRAMUSCULAR; INTRAVENOUS ONCE
Status: COMPLETED | OUTPATIENT
Start: 2024-07-08 | End: 2024-07-08

## 2024-07-08 RX ORDER — MORPHINE SULFATE 4 MG/ML
4 INJECTION, SOLUTION INTRAMUSCULAR; INTRAVENOUS ONCE
Status: COMPLETED | OUTPATIENT
Start: 2024-07-09 | End: 2024-07-09

## 2024-07-08 RX ORDER — DROPERIDOL 2.5 MG/ML
1.25 INJECTION, SOLUTION INTRAMUSCULAR; INTRAVENOUS EVERY 6 HOURS PRN
Status: DISCONTINUED | OUTPATIENT
Start: 2024-07-08 | End: 2024-07-09 | Stop reason: HOSPADM

## 2024-07-08 RX ORDER — METHOCARBAMOL 750 MG/1
750 TABLET, FILM COATED ORAL 4 TIMES DAILY
Qty: 40 TABLET | Refills: 0 | Status: SHIPPED | OUTPATIENT
Start: 2024-07-08 | End: 2024-07-18

## 2024-07-08 RX ORDER — LIDOCAINE 50 MG/G
1 PATCH TOPICAL DAILY
Qty: 10 PATCH | Refills: 0 | Status: SHIPPED | OUTPATIENT
Start: 2024-07-08 | End: 2024-07-18

## 2024-07-08 RX ADMIN — DIPHENHYDRAMINE HYDROCHLORIDE 25 MG: 50 INJECTION INTRAMUSCULAR; INTRAVENOUS at 21:30

## 2024-07-08 RX ADMIN — HYDROMORPHONE HYDROCHLORIDE 0.5 MG: 1 INJECTION, SOLUTION INTRAMUSCULAR; INTRAVENOUS; SUBCUTANEOUS at 18:54

## 2024-07-08 RX ADMIN — DROPERIDOL 1.25 MG: 2.5 INJECTION, SOLUTION INTRAMUSCULAR; INTRAVENOUS at 19:26

## 2024-07-08 RX ADMIN — SODIUM CHLORIDE 1000 ML: 9 INJECTION, SOLUTION INTRAVENOUS at 18:55

## 2024-07-08 RX ADMIN — IOPAMIDOL 75 ML: 755 INJECTION, SOLUTION INTRAVENOUS at 21:04

## 2024-07-08 RX ADMIN — ONDANSETRON 4 MG: 2 INJECTION INTRAMUSCULAR; INTRAVENOUS at 18:55

## 2024-07-08 RX ADMIN — DIPHENHYDRAMINE HYDROCHLORIDE 25 MG: 50 INJECTION INTRAMUSCULAR; INTRAVENOUS at 19:27

## 2024-07-08 RX ADMIN — HYDROMORPHONE HYDROCHLORIDE 0.5 MG: 1 INJECTION, SOLUTION INTRAMUSCULAR; INTRAVENOUS; SUBCUTANEOUS at 19:25

## 2024-07-08 RX ADMIN — MORPHINE SULFATE 4 MG: 4 INJECTION, SOLUTION INTRAMUSCULAR; INTRAVENOUS at 21:30

## 2024-07-08 RX ADMIN — PROCHLORPERAZINE EDISYLATE 10 MG: 5 INJECTION INTRAMUSCULAR; INTRAVENOUS at 21:30

## 2024-07-08 ASSESSMENT — PAIN DESCRIPTION - DESCRIPTORS: DESCRIPTORS: SHARP;STABBING

## 2024-07-08 ASSESSMENT — PAIN SCALES - GENERAL
PAINLEVEL_OUTOF10: 9
PAINLEVEL_OUTOF10: 9
PAINLEVEL_OUTOF10: 10

## 2024-07-08 ASSESSMENT — PAIN DESCRIPTION - LOCATION
LOCATION: ABDOMEN;BACK
LOCATION: ABDOMEN;BACK
LOCATION: BACK;ABDOMEN

## 2024-07-08 ASSESSMENT — PAIN - FUNCTIONAL ASSESSMENT: PAIN_FUNCTIONAL_ASSESSMENT: 0-10

## 2024-07-08 ASSESSMENT — PAIN DESCRIPTION - ORIENTATION
ORIENTATION: MID
ORIENTATION: LOWER

## 2024-07-08 NOTE — ED PROVIDER NOTES
[facility] EMERGENCY DEPARTMENT  EMERGENCY DEPARTMENT ENCOUNTER    Pt Name: Cynthia Ayala  MRN: [id number]  Birthdate 1985  Date of evaluation: 7/8/2024  Provider: Nate Zambrano MD  PCP: Elver Gamboa DO  Note Started: 6:36 PM EDT 7/8/24    HPI     Patient is a 38 y.o. female presents with a chief complaint of No chief complaint on file.  .  Patient presents for leg pain.  Patient stated that this has been occurring for the past 5 days.  Patient is crying in the room and difficulty obtaining history secondary to this.  Patient denies any chest pain, shortness of breath, abdominal pain.  Patient has a history of Crohn's disease.  Patient states that she has been connected with neck.  Patient states that she has pain medicine at home and has not been able to take any of it.    Nursing Notes were all reviewed and agreed with or any disagreements were addressed in the HPI.    History From: Patient    Review of Systems   Pertinent positives and negatives as per HPI.     Physical Exam  Vitals and nursing note reviewed.   Constitutional:       Appearance: She is well-developed.   HENT:      Head: Normocephalic and atraumatic.   Eyes:      Conjunctiva/sclera: Conjunctivae normal.   Cardiovascular:      Rate and Rhythm: Normal rate and regular rhythm.      Heart sounds: Normal heart sounds. No murmur heard.  Pulmonary:      Effort: Pulmonary effort is normal. No respiratory distress.      Breath sounds: Normal breath sounds. No wheezing or rales.   Abdominal:      General: Bowel sounds are normal.      Palpations: Abdomen is soft.      Tenderness: There is no abdominal tenderness. There is no guarding or rebound.   Musculoskeletal:      Cervical back: Normal range of motion and neck supple.   Skin:     General: Skin is warm and dry.      Comments: Surgical scar clean dry and intact   Neurological:      Mental Status: She is alert and oriented to person, place, and time.

## 2024-07-09 LAB
EKG ATRIAL RATE: 76 BPM
EKG P AXIS: 56 DEGREES
EKG P-R INTERVAL: 108 MS
EKG Q-T INTERVAL: 404 MS
EKG QRS DURATION: 96 MS
EKG QTC CALCULATION (BAZETT): 454 MS
EKG R AXIS: 1 DEGREES
EKG T AXIS: 48 DEGREES
EKG VENTRICULAR RATE: 76 BPM

## 2024-07-09 PROCEDURE — 93010 ELECTROCARDIOGRAM REPORT: CPT | Performed by: INTERNAL MEDICINE

## 2024-07-09 PROCEDURE — 96376 TX/PRO/DX INJ SAME DRUG ADON: CPT

## 2024-07-09 PROCEDURE — 6360000002 HC RX W HCPCS: Performed by: STUDENT IN AN ORGANIZED HEALTH CARE EDUCATION/TRAINING PROGRAM

## 2024-07-09 RX ADMIN — DROPERIDOL 0.62 MG: 2.5 INJECTION, SOLUTION INTRAMUSCULAR; INTRAVENOUS at 00:17

## 2024-07-09 RX ADMIN — MORPHINE SULFATE 4 MG: 4 INJECTION, SOLUTION INTRAMUSCULAR; INTRAVENOUS at 00:18

## 2024-08-25 ENCOUNTER — HOSPITAL ENCOUNTER (EMERGENCY)
Age: 39
Discharge: HOME | End: 2024-08-25
Attending: EMERGENCY MEDICINE
Payer: COMMERCIAL

## 2024-08-25 VITALS
DIASTOLIC BLOOD PRESSURE: 92 MMHG | HEART RATE: 96 BPM | RESPIRATION RATE: 18 BRPM | HEIGHT: 64 IN | OXYGEN SATURATION: 100 % | WEIGHT: 170 LBS | BODY MASS INDEX: 29.02 KG/M2 | TEMPERATURE: 98.4 F | SYSTOLIC BLOOD PRESSURE: 138 MMHG

## 2024-08-25 DIAGNOSIS — R10.9 ABDOMINAL PAIN, UNSPECIFIED ABDOMINAL LOCATION: ICD-10-CM

## 2024-08-25 DIAGNOSIS — R11.2 NAUSEA AND VOMITING, UNSPECIFIED VOMITING TYPE: Primary | ICD-10-CM

## 2024-08-25 LAB
ALBUMIN SERPL-MCNC: 4.5 G/DL (ref 3.5–5.2)
ALP SERPL-CCNC: 78 U/L (ref 35–104)
ALT SERPL-CCNC: 12 U/L (ref 0–32)
AMORPH SED URNS QL MICRO: PRESENT
ANION GAP SERPL CALCULATED.3IONS-SCNC: 12 MMOL/L (ref 7–16)
AST SERPL-CCNC: 16 U/L (ref 0–31)
BASOPHILS # BLD: 0.06 K/UL (ref 0–0.2)
BASOPHILS NFR BLD: 1 % (ref 0–2)
BILIRUB SERPL-MCNC: 0.4 MG/DL (ref 0–1.2)
BILIRUB UR QL STRIP: NEGATIVE
BUN SERPL-MCNC: 16 MG/DL (ref 6–20)
CALCIUM SERPL-MCNC: 9.4 MG/DL (ref 8.6–10.2)
CHLORIDE SERPL-SCNC: 104 MMOL/L (ref 98–107)
CLARITY UR: ABNORMAL
CO2 SERPL-SCNC: 23 MMOL/L (ref 22–29)
COLOR UR: YELLOW
CREAT SERPL-MCNC: 0.6 MG/DL (ref 0.5–1)
CRYSTALS URNS MICRO: ABNORMAL /HPF
EOSINOPHIL # BLD: 0.04 K/UL (ref 0.05–0.5)
EOSINOPHILS RELATIVE PERCENT: 0 % (ref 0–6)
EPI CELLS #/AREA URNS HPF: ABNORMAL /HPF
ERYTHROCYTE [DISTWIDTH] IN BLOOD BY AUTOMATED COUNT: 15.9 % (ref 11.5–15)
GFR, ESTIMATED: >90 ML/MIN/1.73M2
GLUCOSE SERPL-MCNC: 114 MG/DL (ref 74–99)
GLUCOSE UR STRIP-MCNC: NEGATIVE MG/DL
HCG, URINE, POC: NEGATIVE
HCT VFR BLD AUTO: 39.8 % (ref 34–48)
HGB BLD-MCNC: 13.1 G/DL (ref 11.5–15.5)
HGB UR QL STRIP.AUTO: NEGATIVE
IMM GRANULOCYTES # BLD AUTO: 0.06 K/UL (ref 0–0.58)
IMM GRANULOCYTES NFR BLD: 1 % (ref 0–5)
KETONES UR STRIP-MCNC: NEGATIVE MG/DL
LEUKOCYTE ESTERASE UR QL STRIP: NEGATIVE
LIPASE SERPL-CCNC: 31 U/L (ref 13–60)
LYMPHOCYTES NFR BLD: 1.69 K/UL (ref 1.5–4)
LYMPHOCYTES RELATIVE PERCENT: 14 % (ref 20–42)
Lab: NORMAL
MCH RBC QN AUTO: 28.7 PG (ref 26–35)
MCHC RBC AUTO-ENTMCNC: 32.9 G/DL (ref 32–34.5)
MCV RBC AUTO: 87.3 FL (ref 80–99.9)
MONOCYTES NFR BLD: 0.48 K/UL (ref 0.1–0.95)
MONOCYTES NFR BLD: 4 % (ref 2–12)
NEGATIVE QC PASS/FAIL: NORMAL
NEUTROPHILS NFR BLD: 81 % (ref 43–80)
NEUTS SEG NFR BLD: 9.93 K/UL (ref 1.8–7.3)
NITRITE UR QL STRIP: NEGATIVE
PH UR STRIP: 6 [PH] (ref 5–9)
PLATELET # BLD AUTO: 359 K/UL (ref 130–450)
PMV BLD AUTO: 9.2 FL (ref 7–12)
POSITIVE QC PASS/FAIL: NORMAL
POTASSIUM SERPL-SCNC: 3.7 MMOL/L (ref 3.5–5)
PROT SERPL-MCNC: 7.5 G/DL (ref 6.4–8.3)
PROT UR STRIP-MCNC: ABNORMAL MG/DL
RBC # BLD AUTO: 4.56 M/UL (ref 3.5–5.5)
RBC #/AREA URNS HPF: ABNORMAL /HPF
SODIUM SERPL-SCNC: 139 MMOL/L (ref 132–146)
SP GR UR STRIP: >1.03 (ref 1–1.03)
UROBILINOGEN UR STRIP-ACNC: 0.2 EU/DL (ref 0–1)
WBC #/AREA URNS HPF: ABNORMAL /HPF
WBC OTHER # BLD: 12.3 K/UL (ref 4.5–11.5)

## 2024-08-25 PROCEDURE — 81001 URINALYSIS AUTO W/SCOPE: CPT

## 2024-08-25 PROCEDURE — 93005 ELECTROCARDIOGRAM TRACING: CPT | Performed by: EMERGENCY MEDICINE

## 2024-08-25 PROCEDURE — 96374 THER/PROPH/DIAG INJ IV PUSH: CPT

## 2024-08-25 PROCEDURE — 80053 COMPREHEN METABOLIC PANEL: CPT

## 2024-08-25 PROCEDURE — 99284 EMERGENCY DEPT VISIT MOD MDM: CPT

## 2024-08-25 PROCEDURE — 2580000003 HC RX 258: Performed by: EMERGENCY MEDICINE

## 2024-08-25 PROCEDURE — 6360000002 HC RX W HCPCS: Performed by: EMERGENCY MEDICINE

## 2024-08-25 PROCEDURE — 83690 ASSAY OF LIPASE: CPT

## 2024-08-25 PROCEDURE — 85025 COMPLETE CBC W/AUTO DIFF WBC: CPT

## 2024-08-25 PROCEDURE — 96375 TX/PRO/DX INJ NEW DRUG ADDON: CPT

## 2024-08-25 RX ORDER — 0.9 % SODIUM CHLORIDE 0.9 %
1000 INTRAVENOUS SOLUTION INTRAVENOUS ONCE
Status: COMPLETED | OUTPATIENT
Start: 2024-08-25 | End: 2024-08-25

## 2024-08-25 RX ORDER — ONDANSETRON 2 MG/ML
4 INJECTION INTRAMUSCULAR; INTRAVENOUS ONCE
Status: COMPLETED | OUTPATIENT
Start: 2024-08-25 | End: 2024-08-25

## 2024-08-25 RX ORDER — KETOROLAC TROMETHAMINE 15 MG/ML
15 INJECTION, SOLUTION INTRAMUSCULAR; INTRAVENOUS ONCE
Status: COMPLETED | OUTPATIENT
Start: 2024-08-25 | End: 2024-08-25

## 2024-08-25 RX ADMIN — ONDANSETRON 4 MG: 2 INJECTION INTRAMUSCULAR; INTRAVENOUS at 11:47

## 2024-08-25 RX ADMIN — KETOROLAC TROMETHAMINE 15 MG: 15 INJECTION, SOLUTION INTRAMUSCULAR; INTRAVENOUS at 11:47

## 2024-08-25 RX ADMIN — SODIUM CHLORIDE 1000 ML: 9 INJECTION, SOLUTION INTRAVENOUS at 11:47

## 2024-08-25 ASSESSMENT — PAIN SCALES - GENERAL
PAINLEVEL_OUTOF10: 9
PAINLEVEL_OUTOF10: 8

## 2024-08-25 ASSESSMENT — PAIN - FUNCTIONAL ASSESSMENT: PAIN_FUNCTIONAL_ASSESSMENT: NONE - DENIES PAIN

## 2024-08-25 ASSESSMENT — PAIN DESCRIPTION - LOCATION
LOCATION: ABDOMEN
LOCATION: GROIN;ABDOMEN

## 2024-08-25 ASSESSMENT — PAIN DESCRIPTION - DESCRIPTORS: DESCRIPTORS: ACHING

## 2024-08-25 NOTE — ED PROVIDER NOTES
HPI:  24, Time: 11:28 AM EDT         Cynthia Ayala is a 39 y.o. female presenting to the ED for dysuria and right sided abdominal pain beginning last week.  Patient was seen in the ED in [place] 2 days ago for the same symptoms.  I reviewed the patient's chart.  She underwent a CT of her abdomen and pelvis at that time which showed no acute findings.  She was diagnosed with a UTI and discharged home on Keflex.  She states she has been having intractable nausea and vomiting, and she has not been able to tolerate the Keflex.  She reports persistent right-sided abdominal pain and dysuria.  She denies documented fevers, hematuria, chest pain, shortness of breath, cough, diarrhea, constipation, vaginal discharge, or vaginal bleeding.  Remote history of cholecystectomy.  Chart also indicates a history of Crohn's disease.    --------------------------------------------- PAST HISTORY ---------------------------------------------  Past Medical History:  has a past medical history of Acute Crohn's disease (HCC), Anxiety attack, Asthma, Crohn's colitis (HCC), Depression, Herpes simplex virus (HSV) infection, History of blood transfusion, Hypertension, Marijuana abuse, Neurologic disorder, Postpartum depression,  delivery,  labor, Rh sensitized, Seizure (HCC), STD (sexually transmitted disease), and Tobacco abuse.    Past Surgical History:  has a past surgical history that includes Dilation and curettage of uterus (); Colonoscopy; Endoscopy, colon, diagnostic; Cholecystectomy (2018); hernia repair; hernia repair; Upper gastrointestinal endoscopy (N/A, 2022); Dilation & curettage; and laparoscopy.    Social History:  reports that she quit smoking about 4 years ago. Her smoking use included cigarettes. She started smoking about 14 years ago. She has a 10 pack-year smoking history. She has never used smokeless tobacco. She reports that she does not currently use alcohol. She reports current

## 2024-08-25 NOTE — DISCHARGE INSTR - COC
Diarrhea R19.7    Marijuana abuse, continuous F12.10    AMA (advanced maternal age) multigravida 35+, first trimester O09.521    Hypokalemia E87.6    Anxiety F41.9    History of IUGR (intrauterine growth retardation) and stillbirth, currently pregnant, first trimester O09.291    Depression F32.A    Hyperemesis R11.10    Primary hypertension I10    Hyperemesis gravidarum with metabolic disturbance, antepartum O21.1    Starvation ketoacidosis T73.0XXA, E87.29    Elevated blood pressure affecting pregnancy in second trimester, antepartum O16.2    Low vitamin D level R79.89    History of prior pregnancy with IUGR  Z87.59    Positive urine drug screen R82.5    Marijuana use F12.90    Crohn's colitis, other complication ([facility]) K50.118    Pyelonephritis N12    Amphetamine abuse ([facility]) F15.10    Opiate abuse, continuous ([facility]) F11.10    Intractable abdominal pain R10.9    Intractable low back pain M54.59    Intractable back pain M54.9       Isolation/Infection:   Isolation            No Isolation          Patient Infection Status       Infection Onset Added Last Indicated Last Indicated By Review Planned Expiration Resolved Resolved By    MRSA 24 Culture, MRSA, Screening                           Nurse Assessment:  Last Vital Signs: BP (!) 138/92   Pulse 96   Temp 98.4 °F (36.9 °C) (Oral)   Resp 18   Ht 1.626 m (5' 4\")   Wt 77.1 kg (170 lb)   SpO2 100%   BMI 29.18 kg/m²     Last documented pain score (0-10 scale): Pain Level: 8  Last Weight:   Wt Readings from Last 1 Encounters:   24 77.1 kg (170 lb)     Mental Status:  {IP PT MENTAL STATUS:}    IV Access:  { SHONA IV ACCESS:226632750}    Nursing Mobility/ADLs:  Walking   {P DME ADLs:986896970}  Transfer  {P DME ADLs:281264617}  Bathing  {P DME ADLs:132434298}  Dressing  {P DME ADLs:554556895}  Toileting  {P DME ADLs:590693175}  Feeding  {P DME ADLs:996971654}  Med Admin  {P DME ADLs:536838561}  Med Delivery   {  {Prognosis:4607068795}    Condition at Discharge: { Patient Condition:474840925}    Rehab Potential (if transferring to Rehab): {Prognosis:4970155093}    Recommended Labs or Other Treatments After Discharge: ***    Physician Certification: I certify the above information and transfer of Cynthia Ayala  is necessary for the continuing treatment of the diagnosis listed and that she requires {Admit to Appropriate Level of Care:25020} for {GREATER/LESS:788562082} 30 days.     Update Admission H&P: {CHP DME Changes in HandP:672622597}    PHYSICIAN SIGNATURE:  {Esignature:625679740}

## 2024-08-26 LAB
EKG ATRIAL RATE: 89 BPM
EKG P AXIS: 40 DEGREES
EKG P-R INTERVAL: 134 MS
EKG Q-T INTERVAL: 362 MS
EKG QRS DURATION: 98 MS
EKG QTC CALCULATION (BAZETT): 440 MS
EKG R AXIS: 8 DEGREES
EKG T AXIS: 47 DEGREES
EKG VENTRICULAR RATE: 89 BPM

## 2024-08-26 PROCEDURE — 93010 ELECTROCARDIOGRAM REPORT: CPT | Performed by: INTERNAL MEDICINE

## 2024-09-16 ENCOUNTER — APPOINTMENT (OUTPATIENT)
Dept: CT IMAGING | Age: 39
End: 2024-09-16
Payer: COMMERCIAL

## 2024-09-16 ENCOUNTER — HOSPITAL ENCOUNTER (EMERGENCY)
Age: 39
Discharge: LEFT BEFORE DISCHARGE | End: 2024-09-16
Attending: EMERGENCY MEDICINE
Payer: COMMERCIAL

## 2024-09-16 VITALS
HEART RATE: 97 BPM | TEMPERATURE: 97.7 F | BODY MASS INDEX: 29.18 KG/M2 | SYSTOLIC BLOOD PRESSURE: 127 MMHG | RESPIRATION RATE: 21 BRPM | WEIGHT: 170 LBS | OXYGEN SATURATION: 99 % | DIASTOLIC BLOOD PRESSURE: 115 MMHG

## 2024-09-16 DIAGNOSIS — Z87.19 H/O CROHN'S DISEASE: ICD-10-CM

## 2024-09-16 DIAGNOSIS — R10.9 ABDOMINAL PAIN, UNSPECIFIED ABDOMINAL LOCATION: Primary | ICD-10-CM

## 2024-09-16 LAB
ALBUMIN SERPL-MCNC: 4.5 G/DL (ref 3.5–5.2)
ALP SERPL-CCNC: 86 U/L (ref 35–104)
ALT SERPL-CCNC: 14 U/L (ref 0–32)
AMPHET UR QL SCN: NEGATIVE
ANION GAP SERPL CALCULATED.3IONS-SCNC: 16 MMOL/L (ref 7–16)
AST SERPL-CCNC: 21 U/L (ref 0–31)
BARBITURATES UR QL SCN: NEGATIVE
BASOPHILS # BLD: 0.07 K/UL (ref 0–0.2)
BASOPHILS NFR BLD: 0 % (ref 0–2)
BENZODIAZ UR QL: NEGATIVE
BILIRUB SERPL-MCNC: 0.3 MG/DL (ref 0–1.2)
BILIRUB UR QL STRIP: NEGATIVE
BUN SERPL-MCNC: 19 MG/DL (ref 6–20)
BUPRENORPHINE UR QL: NEGATIVE
CALCIUM SERPL-MCNC: 9.4 MG/DL (ref 8.6–10.2)
CANNABINOIDS UR QL SCN: POSITIVE
CHLORIDE SERPL-SCNC: 102 MMOL/L (ref 98–107)
CLARITY UR: CLEAR
CO2 SERPL-SCNC: 18 MMOL/L (ref 22–29)
COCAINE UR QL SCN: NEGATIVE
COLOR UR: YELLOW
CREAT SERPL-MCNC: 0.5 MG/DL (ref 0.5–1)
EOSINOPHIL # BLD: 0.04 K/UL (ref 0.05–0.5)
EOSINOPHILS RELATIVE PERCENT: 0 % (ref 0–6)
EPI CELLS #/AREA URNS HPF: ABNORMAL /HPF
ERYTHROCYTE [DISTWIDTH] IN BLOOD BY AUTOMATED COUNT: 16.5 % (ref 11.5–15)
FENTANYL UR QL: NEGATIVE
GFR, ESTIMATED: >90 ML/MIN/1.73M2
GLUCOSE SERPL-MCNC: 123 MG/DL (ref 74–99)
GLUCOSE UR STRIP-MCNC: NEGATIVE MG/DL
HCG, URINE, POC: NEGATIVE
HCT VFR BLD AUTO: 42.4 % (ref 34–48)
HGB BLD-MCNC: 14.4 G/DL (ref 11.5–15.5)
HGB UR QL STRIP.AUTO: NEGATIVE
IMM GRANULOCYTES # BLD AUTO: 0.1 K/UL (ref 0–0.58)
IMM GRANULOCYTES NFR BLD: 1 % (ref 0–5)
KETONES UR STRIP-MCNC: >80 MG/DL
LACTATE BLDV-SCNC: 1.4 MMOL/L (ref 0.5–2.2)
LEUKOCYTE ESTERASE UR QL STRIP: NEGATIVE
LIPASE SERPL-CCNC: 13 U/L (ref 13–60)
LYMPHOCYTES NFR BLD: 1.81 K/UL (ref 1.5–4)
LYMPHOCYTES RELATIVE PERCENT: 11 % (ref 20–42)
Lab: NORMAL
MCH RBC QN AUTO: 29 PG (ref 26–35)
MCHC RBC AUTO-ENTMCNC: 34 G/DL (ref 32–34.5)
MCV RBC AUTO: 85.3 FL (ref 80–99.9)
METHADONE UR QL: NEGATIVE
MONOCYTES NFR BLD: 0.31 K/UL (ref 0.1–0.95)
MONOCYTES NFR BLD: 2 % (ref 2–12)
NEGATIVE QC PASS/FAIL: NORMAL
NEUTROPHILS NFR BLD: 86 % (ref 43–80)
NEUTS SEG NFR BLD: 14.44 K/UL (ref 1.8–7.3)
NITRITE UR QL STRIP: NEGATIVE
OPIATES UR QL SCN: NEGATIVE
OXYCODONE UR QL SCN: POSITIVE
PCP UR QL SCN: NEGATIVE
PH UR STRIP: 6 [PH] (ref 5–9)
PLATELET # BLD AUTO: 515 K/UL (ref 130–450)
PMV BLD AUTO: 9.5 FL (ref 7–12)
POSITIVE QC PASS/FAIL: NORMAL
POTASSIUM SERPL-SCNC: 4.4 MMOL/L (ref 3.5–5)
PROT SERPL-MCNC: 7.7 G/DL (ref 6.4–8.3)
PROT UR STRIP-MCNC: ABNORMAL MG/DL
RBC # BLD AUTO: 4.97 M/UL (ref 3.5–5.5)
RBC #/AREA URNS HPF: ABNORMAL /HPF
SODIUM SERPL-SCNC: 136 MMOL/L (ref 132–146)
SP GR UR STRIP: 1.02 (ref 1–1.03)
TEST INFORMATION: ABNORMAL
TROPONIN I SERPL HS-MCNC: 7 NG/L (ref 0–9)
UROBILINOGEN UR STRIP-ACNC: 0.2 EU/DL (ref 0–1)
WBC #/AREA URNS HPF: ABNORMAL /HPF
WBC OTHER # BLD: 16.8 K/UL (ref 4.5–11.5)

## 2024-09-16 PROCEDURE — 2580000003 HC RX 258

## 2024-09-16 PROCEDURE — 99284 EMERGENCY DEPT VISIT MOD MDM: CPT

## 2024-09-16 PROCEDURE — 83605 ASSAY OF LACTIC ACID: CPT

## 2024-09-16 PROCEDURE — 6360000002 HC RX W HCPCS

## 2024-09-16 PROCEDURE — 80307 DRUG TEST PRSMV CHEM ANLYZR: CPT

## 2024-09-16 PROCEDURE — 96374 THER/PROPH/DIAG INJ IV PUSH: CPT

## 2024-09-16 PROCEDURE — 83690 ASSAY OF LIPASE: CPT

## 2024-09-16 PROCEDURE — 81001 URINALYSIS AUTO W/SCOPE: CPT

## 2024-09-16 PROCEDURE — 96375 TX/PRO/DX INJ NEW DRUG ADDON: CPT

## 2024-09-16 PROCEDURE — 85025 COMPLETE CBC W/AUTO DIFF WBC: CPT

## 2024-09-16 PROCEDURE — 80053 COMPREHEN METABOLIC PANEL: CPT

## 2024-09-16 PROCEDURE — 84484 ASSAY OF TROPONIN QUANT: CPT

## 2024-09-16 RX ORDER — 0.9 % SODIUM CHLORIDE 0.9 %
1000 INTRAVENOUS SOLUTION INTRAVENOUS ONCE
Status: COMPLETED | OUTPATIENT
Start: 2024-09-16 | End: 2024-09-16

## 2024-09-16 RX ORDER — DIPHENHYDRAMINE HYDROCHLORIDE 50 MG/ML
25 INJECTION INTRAMUSCULAR; INTRAVENOUS ONCE
Status: COMPLETED | OUTPATIENT
Start: 2024-09-16 | End: 2024-09-16

## 2024-09-16 RX ORDER — KETOROLAC TROMETHAMINE 15 MG/ML
15 INJECTION, SOLUTION INTRAMUSCULAR; INTRAVENOUS ONCE
Status: COMPLETED | OUTPATIENT
Start: 2024-09-16 | End: 2024-09-16

## 2024-09-16 RX ORDER — PROCHLORPERAZINE EDISYLATE 5 MG/ML
10 INJECTION INTRAMUSCULAR; INTRAVENOUS ONCE
Status: COMPLETED | OUTPATIENT
Start: 2024-09-16 | End: 2024-09-16

## 2024-09-16 RX ORDER — LORAZEPAM 2 MG/ML
0.5 INJECTION INTRAMUSCULAR ONCE
Status: DISCONTINUED | OUTPATIENT
Start: 2024-09-16 | End: 2024-09-16 | Stop reason: HOSPADM

## 2024-09-16 RX ADMIN — KETOROLAC TROMETHAMINE 15 MG: 15 INJECTION, SOLUTION INTRAMUSCULAR; INTRAVENOUS at 17:54

## 2024-09-16 RX ADMIN — DIPHENHYDRAMINE HYDROCHLORIDE 25 MG: 50 INJECTION INTRAMUSCULAR; INTRAVENOUS at 17:53

## 2024-09-16 RX ADMIN — PROCHLORPERAZINE EDISYLATE 10 MG: 5 INJECTION INTRAMUSCULAR; INTRAVENOUS at 17:54

## 2024-09-16 RX ADMIN — SODIUM CHLORIDE 1000 ML: 9 INJECTION, SOLUTION INTRAVENOUS at 17:53

## 2024-09-16 ASSESSMENT — PAIN SCALES - GENERAL: PAINLEVEL_OUTOF10: 10

## 2024-09-16 ASSESSMENT — PAIN DESCRIPTION - LOCATION: LOCATION: BACK

## 2024-09-16 ASSESSMENT — PAIN DESCRIPTION - FREQUENCY: FREQUENCY: CONTINUOUS

## 2024-09-16 ASSESSMENT — PAIN - FUNCTIONAL ASSESSMENT: PAIN_FUNCTIONAL_ASSESSMENT: PREVENTS OR INTERFERES SOME ACTIVE ACTIVITIES AND ADLS

## 2024-09-16 ASSESSMENT — PAIN DESCRIPTION - DESCRIPTORS: DESCRIPTORS: ACHING;BURNING;CRAMPING;CRUSHING

## 2024-09-16 ASSESSMENT — PAIN DESCRIPTION - ORIENTATION: ORIENTATION: MID

## 2024-09-16 ASSESSMENT — PAIN DESCRIPTION - ONSET: ONSET: ON-GOING

## 2024-09-16 ASSESSMENT — PAIN DESCRIPTION - PAIN TYPE: TYPE: ACUTE PAIN;INTRACTABLE PAIN

## 2024-10-29 ENCOUNTER — HOSPITAL ENCOUNTER (EMERGENCY)
Age: 39
Discharge: HOME | End: 2024-10-29
Attending: EMERGENCY MEDICINE
Payer: COMMERCIAL

## 2024-10-29 ENCOUNTER — APPOINTMENT (OUTPATIENT)
Dept: CT IMAGING | Age: 39
End: 2024-10-29
Payer: COMMERCIAL

## 2024-10-29 VITALS
RESPIRATION RATE: 18 BRPM | WEIGHT: 170 LBS | SYSTOLIC BLOOD PRESSURE: 137 MMHG | BODY MASS INDEX: 29.02 KG/M2 | HEIGHT: 64 IN | DIASTOLIC BLOOD PRESSURE: 81 MMHG | HEART RATE: 79 BPM | TEMPERATURE: 98 F | OXYGEN SATURATION: 100 %

## 2024-10-29 DIAGNOSIS — R11.2 NAUSEA AND VOMITING, UNSPECIFIED VOMITING TYPE: ICD-10-CM

## 2024-10-29 DIAGNOSIS — N83.201 CYST OF RIGHT OVARY: Primary | ICD-10-CM

## 2024-10-29 LAB
ALBUMIN SERPL-MCNC: 5.4 G/DL (ref 3.5–5.2)
ALP SERPL-CCNC: 99 U/L (ref 35–104)
ALT SERPL-CCNC: 14 U/L (ref 0–32)
ANION GAP SERPL CALCULATED.3IONS-SCNC: 14 MMOL/L (ref 7–16)
AST SERPL-CCNC: 18 U/L (ref 0–31)
BASOPHILS # BLD: 0.08 K/UL (ref 0–0.2)
BASOPHILS NFR BLD: 1 % (ref 0–2)
BILIRUB SERPL-MCNC: 0.5 MG/DL (ref 0–1.2)
BILIRUB UR QL STRIP: NEGATIVE
BUN SERPL-MCNC: 19 MG/DL (ref 6–20)
CALCIUM SERPL-MCNC: 10 MG/DL (ref 8.6–10.2)
CHLORIDE SERPL-SCNC: 99 MMOL/L (ref 98–107)
CLARITY UR: CLEAR
CO2 SERPL-SCNC: 24 MMOL/L (ref 22–29)
COLOR UR: YELLOW
CREAT SERPL-MCNC: 0.7 MG/DL (ref 0.5–1)
EKG ATRIAL RATE: 72 BPM
EKG P AXIS: 43 DEGREES
EKG P-R INTERVAL: 106 MS
EKG Q-T INTERVAL: 412 MS
EKG QRS DURATION: 102 MS
EKG QTC CALCULATION (BAZETT): 451 MS
EKG R AXIS: 20 DEGREES
EKG T AXIS: 63 DEGREES
EKG VENTRICULAR RATE: 72 BPM
EOSINOPHIL # BLD: 0.04 K/UL (ref 0.05–0.5)
EOSINOPHILS RELATIVE PERCENT: 0 % (ref 0–6)
ERYTHROCYTE [DISTWIDTH] IN BLOOD BY AUTOMATED COUNT: 15.3 % (ref 11.5–15)
GFR, ESTIMATED: >90 ML/MIN/1.73M2
GLUCOSE SERPL-MCNC: 136 MG/DL (ref 74–99)
GLUCOSE UR STRIP-MCNC: NEGATIVE MG/DL
HCG, URINE, POC: NEGATIVE
HCT VFR BLD AUTO: 44.8 % (ref 34–48)
HGB BLD-MCNC: 14.8 G/DL (ref 11.5–15.5)
HGB UR QL STRIP.AUTO: NEGATIVE
IMM GRANULOCYTES # BLD AUTO: 0.09 K/UL (ref 0–0.58)
IMM GRANULOCYTES NFR BLD: 1 % (ref 0–5)
KETONES UR STRIP-MCNC: >80 MG/DL
LACTATE BLDV-SCNC: 1.8 MMOL/L (ref 0.5–2.2)
LEUKOCYTE ESTERASE UR QL STRIP: NEGATIVE
LIPASE SERPL-CCNC: 11 U/L (ref 13–60)
LYMPHOCYTES NFR BLD: 1.75 K/UL (ref 1.5–4)
LYMPHOCYTES RELATIVE PERCENT: 12 % (ref 20–42)
Lab: NORMAL
MAGNESIUM SERPL-MCNC: 2 MG/DL (ref 1.6–2.6)
MCH RBC QN AUTO: 29.3 PG (ref 26–35)
MCHC RBC AUTO-ENTMCNC: 33 G/DL (ref 32–34.5)
MCV RBC AUTO: 88.7 FL (ref 80–99.9)
MONOCYTES NFR BLD: 0.31 K/UL (ref 0.1–0.95)
MONOCYTES NFR BLD: 2 % (ref 2–12)
NEGATIVE QC PASS/FAIL: NORMAL
NEUTROPHILS NFR BLD: 85 % (ref 43–80)
NEUTS SEG NFR BLD: 12.93 K/UL (ref 1.8–7.3)
NITRITE UR QL STRIP: NEGATIVE
PH UR STRIP: 6.5 [PH] (ref 5–9)
PLATELET # BLD AUTO: 438 K/UL (ref 130–450)
PMV BLD AUTO: 8.8 FL (ref 7–12)
POSITIVE QC PASS/FAIL: NORMAL
POTASSIUM SERPL-SCNC: 4.2 MMOL/L (ref 3.5–5)
PROT SERPL-MCNC: 8.5 G/DL (ref 6.4–8.3)
PROT UR STRIP-MCNC: 30 MG/DL
RBC # BLD AUTO: 5.05 M/UL (ref 3.5–5.5)
RBC #/AREA URNS HPF: ABNORMAL /HPF
SODIUM SERPL-SCNC: 137 MMOL/L (ref 132–146)
SP GR UR STRIP: 1.02 (ref 1–1.03)
UROBILINOGEN UR STRIP-ACNC: 0.2 EU/DL (ref 0–1)
WBC #/AREA URNS HPF: ABNORMAL /HPF
WBC OTHER # BLD: 15.2 K/UL (ref 4.5–11.5)

## 2024-10-29 PROCEDURE — 74177 CT ABD & PELVIS W/CONTRAST: CPT

## 2024-10-29 PROCEDURE — 99285 EMERGENCY DEPT VISIT HI MDM: CPT

## 2024-10-29 PROCEDURE — 80053 COMPREHEN METABOLIC PANEL: CPT

## 2024-10-29 PROCEDURE — 96372 THER/PROPH/DIAG INJ SC/IM: CPT

## 2024-10-29 PROCEDURE — 83735 ASSAY OF MAGNESIUM: CPT

## 2024-10-29 PROCEDURE — 81001 URINALYSIS AUTO W/SCOPE: CPT

## 2024-10-29 PROCEDURE — 6360000002 HC RX W HCPCS

## 2024-10-29 PROCEDURE — 93010 ELECTROCARDIOGRAM REPORT: CPT | Performed by: INTERNAL MEDICINE

## 2024-10-29 PROCEDURE — 93005 ELECTROCARDIOGRAM TRACING: CPT

## 2024-10-29 PROCEDURE — 6370000000 HC RX 637 (ALT 250 FOR IP)

## 2024-10-29 PROCEDURE — 83605 ASSAY OF LACTIC ACID: CPT

## 2024-10-29 PROCEDURE — 96375 TX/PRO/DX INJ NEW DRUG ADDON: CPT

## 2024-10-29 PROCEDURE — 83690 ASSAY OF LIPASE: CPT

## 2024-10-29 PROCEDURE — 85025 COMPLETE CBC W/AUTO DIFF WBC: CPT

## 2024-10-29 PROCEDURE — 96374 THER/PROPH/DIAG INJ IV PUSH: CPT

## 2024-10-29 PROCEDURE — 6360000004 HC RX CONTRAST MEDICATION: Performed by: RADIOLOGY

## 2024-10-29 RX ORDER — HYDROCODONE BITARTRATE AND ACETAMINOPHEN 5; 325 MG/1; MG/1
1 TABLET ORAL ONCE
Status: COMPLETED | OUTPATIENT
Start: 2024-10-29 | End: 2024-10-29

## 2024-10-29 RX ORDER — DROPERIDOL 2.5 MG/ML
2.5 INJECTION, SOLUTION INTRAMUSCULAR; INTRAVENOUS ONCE
Status: COMPLETED | OUTPATIENT
Start: 2024-10-29 | End: 2024-10-29

## 2024-10-29 RX ORDER — IOPAMIDOL 755 MG/ML
75 INJECTION, SOLUTION INTRAVASCULAR
Status: COMPLETED | OUTPATIENT
Start: 2024-10-29 | End: 2024-10-29

## 2024-10-29 RX ORDER — FENTANYL CITRATE 50 UG/ML
50 INJECTION, SOLUTION INTRAMUSCULAR; INTRAVENOUS ONCE
Status: COMPLETED | OUTPATIENT
Start: 2024-10-29 | End: 2024-10-29

## 2024-10-29 RX ORDER — PROCHLORPERAZINE EDISYLATE 5 MG/ML
10 INJECTION INTRAMUSCULAR; INTRAVENOUS ONCE
Status: COMPLETED | OUTPATIENT
Start: 2024-10-29 | End: 2024-10-29

## 2024-10-29 RX ORDER — HYDROCODONE BITARTRATE AND ACETAMINOPHEN 5; 325 MG/1; MG/1
1 TABLET ORAL EVERY 4 HOURS PRN
Qty: 6 TABLET | Refills: 0 | Status: SHIPPED | OUTPATIENT
Start: 2024-10-29 | End: 2024-11-01

## 2024-10-29 RX ORDER — KETOROLAC TROMETHAMINE 15 MG/ML
15 INJECTION, SOLUTION INTRAMUSCULAR; INTRAVENOUS ONCE
Status: COMPLETED | OUTPATIENT
Start: 2024-10-29 | End: 2024-10-29

## 2024-10-29 RX ADMIN — PROCHLORPERAZINE EDISYLATE 10 MG: 5 INJECTION INTRAMUSCULAR; INTRAVENOUS at 14:28

## 2024-10-29 RX ADMIN — KETOROLAC TROMETHAMINE 15 MG: 15 INJECTION, SOLUTION INTRAMUSCULAR; INTRAVENOUS at 14:20

## 2024-10-29 RX ADMIN — FENTANYL CITRATE 50 MCG: 50 INJECTION INTRAMUSCULAR; INTRAVENOUS at 11:53

## 2024-10-29 RX ADMIN — IOPAMIDOL 75 ML: 755 INJECTION, SOLUTION INTRAVENOUS at 12:39

## 2024-10-29 RX ADMIN — DROPERIDOL 2.5 MG: 2.5 INJECTION, SOLUTION INTRAMUSCULAR; INTRAVENOUS at 09:59

## 2024-10-29 RX ADMIN — HYDROCODONE BITARTRATE AND ACETAMINOPHEN 1 TABLET: 5; 325 TABLET ORAL at 15:03

## 2024-10-29 ASSESSMENT — ENCOUNTER SYMPTOMS
RHINORRHEA: 0
SORE THROAT: 0
DIARRHEA: 0
SHORTNESS OF BREATH: 0
VOMITING: 1
CHEST TIGHTNESS: 0
PHOTOPHOBIA: 0
BACK PAIN: 0
NAUSEA: 1
ABDOMINAL PAIN: 1
COUGH: 0

## 2024-10-29 ASSESSMENT — PAIN DESCRIPTION - LOCATION
LOCATION: ABDOMEN

## 2024-10-29 ASSESSMENT — PAIN DESCRIPTION - ORIENTATION
ORIENTATION: MID
ORIENTATION: MID

## 2024-10-29 ASSESSMENT — PAIN - FUNCTIONAL ASSESSMENT
PAIN_FUNCTIONAL_ASSESSMENT: ACTIVITIES ARE NOT PREVENTED
PAIN_FUNCTIONAL_ASSESSMENT: ACTIVITIES ARE NOT PREVENTED
PAIN_FUNCTIONAL_ASSESSMENT: 0-10

## 2024-10-29 ASSESSMENT — PAIN DESCRIPTION - DESCRIPTORS: DESCRIPTORS: SHARP;STABBING

## 2024-10-29 ASSESSMENT — PAIN SCALES - GENERAL
PAINLEVEL_OUTOF10: 10
PAINLEVEL_OUTOF10: 8
PAINLEVEL_OUTOF10: 8

## 2024-10-29 NOTE — ED PROVIDER NOTES
Reassessment, Tests Considered, Patient expectation:     Patient presents emergency department for evaluation of nausea and vomiting abdominal pain.  Patient CT scan shows no evidence of any appendicitis.  Patient has no evidence any bowel perforation.  Patient has no evidence of any urinary tract infection.  Patient has no evidence of a perforated viscus.  Patient has no intra-abdominal abscess.  Patient does have a cyst on her right ovary.  Patient was informed of this finding.  Patient is given pain medications and OB follow-up.  Patient was agreeable to discharge.  Patient was given Zofran as well for nausea.    ED Course as of 10/29/24 1553   Tue Oct 29, 2024   0957 EKG G shows sinus rhythm with short HI interval rate of 72 bpm QTc is 4 and 51 ms no ischemic changes noted this was reviewed interpreted by me Dr. Cortez. [JN]      ED Course User Index  [JN] Jared Cortez DO            Chronic Conditions:   Past Medical History:   Diagnosis Date    Acute Crohn's disease (HCC)     Anxiety attack since age 14    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 inhaler     Crohn's colitis (HCC)     Depression     medicated with zoloft     Herpes simplex virus (HSV) infection     History of blood transfusion     as an infant in [place]    Hypertension     intermittent     Marijuana abuse     occas    Neurologic disorder     Postpartum depression      delivery      labor     Rh sensitized     Seizure (HCC)     loses focus, disoriented unpon arousing; etiology unknown per pt; none since     STD (sexually transmitted disease)     Tobacco abuse          Records Reviewed: Note from 2024 for abdominal pain, note from 2024 for postop pain  Note from 10/23/2024 10/23/2024    CONSULTS: (Who and What was discussed)  None      FINAL IMPRESSION      1. Cyst of right ovary    2. Nausea and vomiting, unspecified vomiting type          DISPOSITION/PLAN     DISPOSITION

## 2024-10-29 NOTE — DISCHARGE INSTRUCTIONS
CT ABDOMEN PELVIS W IV CONTRAST Additional Contrast? None   Final Result   There is no evidence of acute abdominal/pelvic abnormality.      2.4 cm mildly complex right ovarian cystic lesion is likely physiologic.   Follow-up pelvic ultrasound in 2-3 menstrual cycles could confirm resolution.

## 2024-10-31 ENCOUNTER — APPOINTMENT (OUTPATIENT)
Dept: ULTRASOUND IMAGING | Age: 39
DRG: 251 | End: 2024-10-31
Payer: COMMERCIAL

## 2024-10-31 ENCOUNTER — APPOINTMENT (OUTPATIENT)
Dept: CT IMAGING | Age: 39
DRG: 251 | End: 2024-10-31
Payer: COMMERCIAL

## 2024-10-31 ENCOUNTER — HOSPITAL ENCOUNTER (INPATIENT)
Age: 39
LOS: 1 days | Discharge: HOME | DRG: 251 | End: 2024-11-01
Attending: EMERGENCY MEDICINE | Admitting: INTERNAL MEDICINE
Payer: COMMERCIAL

## 2024-10-31 DIAGNOSIS — D72.829 LEUKOCYTOSIS, UNSPECIFIED TYPE: ICD-10-CM

## 2024-10-31 DIAGNOSIS — R10.31 INTRACTABLE RIGHT LOWER QUADRANT ABDOMINAL PAIN: Primary | ICD-10-CM

## 2024-10-31 LAB
ALBUMIN SERPL-MCNC: 5 G/DL (ref 3.5–5.2)
ALP SERPL-CCNC: 86 U/L (ref 35–104)
ALT SERPL-CCNC: 15 U/L (ref 0–32)
ANION GAP SERPL CALCULATED.3IONS-SCNC: 16 MMOL/L (ref 7–16)
AST SERPL-CCNC: 23 U/L (ref 0–31)
BACTERIA URNS QL MICRO: ABNORMAL
BASOPHILS # BLD: 0.05 K/UL (ref 0–0.2)
BASOPHILS NFR BLD: 0 % (ref 0–2)
BILIRUB SERPL-MCNC: 0.6 MG/DL (ref 0–1.2)
BILIRUB UR QL STRIP: NEGATIVE
BUN SERPL-MCNC: 14 MG/DL (ref 6–20)
C TRACH DNA SPEC QL PROBE+SIG AMP: NORMAL
CALCIUM SERPL-MCNC: 9.8 MG/DL (ref 8.6–10.2)
CHLORIDE SERPL-SCNC: 97 MMOL/L (ref 98–107)
CLARITY UR: CLEAR
CLUE CELLS VAG QL WET PREP: NORMAL
CO2 SERPL-SCNC: 22 MMOL/L (ref 22–29)
COLOR UR: YELLOW
CREAT SERPL-MCNC: 0.7 MG/DL (ref 0.5–1)
EOSINOPHIL # BLD: 0.01 K/UL (ref 0.05–0.5)
EOSINOPHILS RELATIVE PERCENT: 0 % (ref 0–6)
EPI CELLS #/AREA URNS HPF: ABNORMAL /HPF
ERYTHROCYTE [DISTWIDTH] IN BLOOD BY AUTOMATED COUNT: 14.9 % (ref 11.5–15)
GFR, ESTIMATED: >90 ML/MIN/1.73M2
GLUCOSE SERPL-MCNC: 113 MG/DL (ref 74–99)
GLUCOSE UR STRIP-MCNC: NEGATIVE MG/DL
HCG, URINE, POC: NEGATIVE
HCT VFR BLD AUTO: 44.1 % (ref 34–48)
HGB BLD-MCNC: 15.3 G/DL (ref 11.5–15.5)
HGB UR QL STRIP.AUTO: NEGATIVE
IMM GRANULOCYTES # BLD AUTO: 0.11 K/UL (ref 0–0.58)
IMM GRANULOCYTES NFR BLD: 1 % (ref 0–5)
KETONES UR STRIP-MCNC: 15 MG/DL
LACTATE BLDV-SCNC: 1.9 MMOL/L (ref 0.5–2.2)
LEUKOCYTE ESTERASE UR QL STRIP: NEGATIVE
LYMPHOCYTES NFR BLD: 3.66 K/UL (ref 1.5–4)
LYMPHOCYTES RELATIVE PERCENT: 18 % (ref 20–42)
Lab: NORMAL
MCH RBC QN AUTO: 30.1 PG (ref 26–35)
MCHC RBC AUTO-ENTMCNC: 34.7 G/DL (ref 32–34.5)
MCV RBC AUTO: 86.6 FL (ref 80–99.9)
MONOCYTES NFR BLD: 0.84 K/UL (ref 0.1–0.95)
MONOCYTES NFR BLD: 4 % (ref 2–12)
MUCOUS THREADS URNS QL MICRO: PRESENT
N GONORRHOEA DNA SPEC QL PROBE+SIG AMP: NORMAL
NEGATIVE QC PASS/FAIL: NORMAL
NEUTROPHILS NFR BLD: 77 % (ref 43–80)
NEUTS SEG NFR BLD: 15.54 K/UL (ref 1.8–7.3)
NITRITE UR QL STRIP: NEGATIVE
PH UR STRIP: 6.5 [PH] (ref 5–9)
PLATELET # BLD AUTO: 526 K/UL (ref 130–450)
PMV BLD AUTO: 9.3 FL (ref 7–12)
POSITIVE QC PASS/FAIL: NORMAL
POTASSIUM SERPL-SCNC: 4.3 MMOL/L (ref 3.5–5)
PROCALCITONIN SERPL-MCNC: 0.05 NG/ML (ref 0–0.08)
PROT SERPL-MCNC: 8 G/DL (ref 6.4–8.3)
PROT UR STRIP-MCNC: ABNORMAL MG/DL
RBC # BLD AUTO: 5.09 M/UL (ref 3.5–5.5)
RBC #/AREA URNS HPF: ABNORMAL /HPF
SODIUM SERPL-SCNC: 135 MMOL/L (ref 132–146)
SOURCE WET PREP: NORMAL
SP GR UR STRIP: 1.01 (ref 1–1.03)
SPECIMEN DESCRIPTION: NORMAL
T VAGINALIS VAG QL WET PREP: NORMAL
UROBILINOGEN UR STRIP-ACNC: 0.2 EU/DL (ref 0–1)
WBC #/AREA URNS HPF: ABNORMAL /HPF
WBC OTHER # BLD: 20.2 K/UL (ref 4.5–11.5)
YEAST WET PREP: NORMAL

## 2024-10-31 PROCEDURE — 2580000003 HC RX 258: Performed by: NURSE PRACTITIONER

## 2024-10-31 PROCEDURE — 76705 ECHO EXAM OF ABDOMEN: CPT

## 2024-10-31 PROCEDURE — 6360000002 HC RX W HCPCS: Performed by: NURSE PRACTITIONER

## 2024-10-31 PROCEDURE — 99285 EMERGENCY DEPT VISIT HI MDM: CPT

## 2024-10-31 PROCEDURE — 96361 HYDRATE IV INFUSION ADD-ON: CPT

## 2024-10-31 PROCEDURE — 6360000004 HC RX CONTRAST MEDICATION: Performed by: RADIOLOGY

## 2024-10-31 PROCEDURE — 96375 TX/PRO/DX INJ NEW DRUG ADDON: CPT

## 2024-10-31 PROCEDURE — 85025 COMPLETE CBC W/AUTO DIFF WBC: CPT

## 2024-10-31 PROCEDURE — 83605 ASSAY OF LACTIC ACID: CPT

## 2024-10-31 PROCEDURE — 99223 1ST HOSP IP/OBS HIGH 75: CPT | Performed by: INTERNAL MEDICINE

## 2024-10-31 PROCEDURE — 76830 TRANSVAGINAL US NON-OB: CPT

## 2024-10-31 PROCEDURE — 87040 BLOOD CULTURE FOR BACTERIA: CPT

## 2024-10-31 PROCEDURE — 80053 COMPREHEN METABOLIC PANEL: CPT

## 2024-10-31 PROCEDURE — APPSS60 APP SPLIT SHARED TIME 46-60 MINUTES: Performed by: NURSE PRACTITIONER

## 2024-10-31 PROCEDURE — 87210 SMEAR WET MOUNT SALINE/INK: CPT

## 2024-10-31 PROCEDURE — 96374 THER/PROPH/DIAG INJ IV PUSH: CPT

## 2024-10-31 PROCEDURE — 81001 URINALYSIS AUTO W/SCOPE: CPT

## 2024-10-31 PROCEDURE — 87591 N.GONORRHOEAE DNA AMP PROB: CPT

## 2024-10-31 PROCEDURE — G0378 HOSPITAL OBSERVATION PER HR: HCPCS

## 2024-10-31 PROCEDURE — 96376 TX/PRO/DX INJ SAME DRUG ADON: CPT

## 2024-10-31 PROCEDURE — 84145 PROCALCITONIN (PCT): CPT

## 2024-10-31 PROCEDURE — 74177 CT ABD & PELVIS W/CONTRAST: CPT

## 2024-10-31 PROCEDURE — 87491 CHLMYD TRACH DNA AMP PROBE: CPT

## 2024-10-31 PROCEDURE — 1200000000 HC SEMI PRIVATE

## 2024-10-31 RX ORDER — ACETAMINOPHEN 325 MG/1
650 TABLET ORAL EVERY 6 HOURS PRN
Status: DISCONTINUED | OUTPATIENT
Start: 2024-10-31 | End: 2024-11-01 | Stop reason: HOSPADM

## 2024-10-31 RX ORDER — SODIUM CHLORIDE 0.9 % (FLUSH) 0.9 %
5-40 SYRINGE (ML) INJECTION PRN
Status: DISCONTINUED | OUTPATIENT
Start: 2024-10-31 | End: 2024-11-01 | Stop reason: HOSPADM

## 2024-10-31 RX ORDER — FENTANYL CITRATE 50 UG/ML
25 INJECTION, SOLUTION INTRAMUSCULAR; INTRAVENOUS ONCE
Status: COMPLETED | OUTPATIENT
Start: 2024-10-31 | End: 2024-10-31

## 2024-10-31 RX ORDER — ONDANSETRON 2 MG/ML
4 INJECTION INTRAMUSCULAR; INTRAVENOUS EVERY 6 HOURS PRN
Status: DISCONTINUED | OUTPATIENT
Start: 2024-10-31 | End: 2024-11-01 | Stop reason: HOSPADM

## 2024-10-31 RX ORDER — ENOXAPARIN SODIUM 100 MG/ML
40 INJECTION SUBCUTANEOUS DAILY
Status: DISCONTINUED | OUTPATIENT
Start: 2024-11-01 | End: 2024-11-01 | Stop reason: HOSPADM

## 2024-10-31 RX ORDER — ACETAMINOPHEN 650 MG/1
650 SUPPOSITORY RECTAL EVERY 6 HOURS PRN
Status: DISCONTINUED | OUTPATIENT
Start: 2024-10-31 | End: 2024-11-01 | Stop reason: HOSPADM

## 2024-10-31 RX ORDER — SODIUM CHLORIDE 9 MG/ML
INJECTION, SOLUTION INTRAVENOUS PRN
Status: DISCONTINUED | OUTPATIENT
Start: 2024-10-31 | End: 2024-11-01 | Stop reason: HOSPADM

## 2024-10-31 RX ORDER — MAGNESIUM SULFATE IN WATER 40 MG/ML
2000 INJECTION, SOLUTION INTRAVENOUS PRN
Status: DISCONTINUED | OUTPATIENT
Start: 2024-10-31 | End: 2024-11-01 | Stop reason: HOSPADM

## 2024-10-31 RX ORDER — IOPAMIDOL 755 MG/ML
75 INJECTION, SOLUTION INTRAVASCULAR
Status: COMPLETED | OUTPATIENT
Start: 2024-10-31 | End: 2024-10-31

## 2024-10-31 RX ORDER — POLYETHYLENE GLYCOL 3350 17 G/17G
17 POWDER, FOR SOLUTION ORAL DAILY PRN
Status: DISCONTINUED | OUTPATIENT
Start: 2024-10-31 | End: 2024-11-01 | Stop reason: HOSPADM

## 2024-10-31 RX ORDER — FAMOTIDINE 20 MG/1
20 TABLET, FILM COATED ORAL 2 TIMES DAILY
Status: DISCONTINUED | OUTPATIENT
Start: 2024-10-31 | End: 2024-11-01

## 2024-10-31 RX ORDER — SODIUM CHLORIDE 9 MG/ML
15 INJECTION, SOLUTION INTRAMUSCULAR; INTRAVENOUS; SUBCUTANEOUS ONCE
Status: COMPLETED | OUTPATIENT
Start: 2024-10-31 | End: 2024-10-31

## 2024-10-31 RX ORDER — 0.9 % SODIUM CHLORIDE 0.9 %
1000 INTRAVENOUS SOLUTION INTRAVENOUS ONCE
Status: COMPLETED | OUTPATIENT
Start: 2024-10-31 | End: 2024-11-01

## 2024-10-31 RX ORDER — KETOROLAC TROMETHAMINE 15 MG/ML
30 INJECTION, SOLUTION INTRAMUSCULAR; INTRAVENOUS EVERY 6 HOURS PRN
Status: DISCONTINUED | OUTPATIENT
Start: 2024-10-31 | End: 2024-11-01 | Stop reason: HOSPADM

## 2024-10-31 RX ORDER — ONDANSETRON 2 MG/ML
4 INJECTION INTRAMUSCULAR; INTRAVENOUS ONCE
Status: COMPLETED | OUTPATIENT
Start: 2024-10-31 | End: 2024-10-31

## 2024-10-31 RX ORDER — LISINOPRIL 5 MG/1
5 TABLET ORAL DAILY
Status: DISCONTINUED | OUTPATIENT
Start: 2024-11-01 | End: 2024-11-01 | Stop reason: HOSPADM

## 2024-10-31 RX ORDER — VENLAFAXINE HYDROCHLORIDE 75 MG/1
75 CAPSULE, EXTENDED RELEASE ORAL
Status: DISCONTINUED | OUTPATIENT
Start: 2024-11-01 | End: 2024-11-01 | Stop reason: HOSPADM

## 2024-10-31 RX ORDER — GABAPENTIN 300 MG/1
300 CAPSULE ORAL 3 TIMES DAILY
Status: DISCONTINUED | OUTPATIENT
Start: 2024-10-31 | End: 2024-11-01 | Stop reason: HOSPADM

## 2024-10-31 RX ORDER — ONDANSETRON 4 MG/1
4 TABLET, ORALLY DISINTEGRATING ORAL EVERY 8 HOURS PRN
Status: DISCONTINUED | OUTPATIENT
Start: 2024-10-31 | End: 2024-11-01 | Stop reason: HOSPADM

## 2024-10-31 RX ORDER — PIPERACILLIN SODIUM, TAZOBACTAM SODIUM 3; .375 G/15ML; G/15ML
3375 INJECTION, POWDER, LYOPHILIZED, FOR SOLUTION INTRAVENOUS ONCE
Status: COMPLETED | OUTPATIENT
Start: 2024-10-31 | End: 2024-10-31

## 2024-10-31 RX ORDER — SODIUM CHLORIDE 0.9 % (FLUSH) 0.9 %
5-40 SYRINGE (ML) INJECTION EVERY 12 HOURS SCHEDULED
Status: DISCONTINUED | OUTPATIENT
Start: 2024-10-31 | End: 2024-11-01 | Stop reason: HOSPADM

## 2024-10-31 RX ORDER — KETOROLAC TROMETHAMINE 15 MG/ML
15 INJECTION, SOLUTION INTRAMUSCULAR; INTRAVENOUS ONCE
Status: COMPLETED | OUTPATIENT
Start: 2024-10-31 | End: 2024-10-31

## 2024-10-31 RX ORDER — HYDROXYZINE HYDROCHLORIDE 10 MG/1
10 TABLET, FILM COATED ORAL EVERY 8 HOURS PRN
Status: DISCONTINUED | OUTPATIENT
Start: 2024-10-31 | End: 2024-11-01 | Stop reason: HOSPADM

## 2024-10-31 RX ORDER — POTASSIUM CHLORIDE 1500 MG/1
40 TABLET, EXTENDED RELEASE ORAL PRN
Status: DISCONTINUED | OUTPATIENT
Start: 2024-10-31 | End: 2024-11-01 | Stop reason: HOSPADM

## 2024-10-31 RX ORDER — DICYCLOMINE HYDROCHLORIDE 10 MG/1
10 CAPSULE ORAL 4 TIMES DAILY
Status: DISCONTINUED | OUTPATIENT
Start: 2024-10-31 | End: 2024-11-01

## 2024-10-31 RX ADMIN — FENTANYL CITRATE 25 MCG: 50 INJECTION INTRAMUSCULAR; INTRAVENOUS at 21:42

## 2024-10-31 RX ADMIN — ONDANSETRON 4 MG: 2 INJECTION INTRAMUSCULAR; INTRAVENOUS at 18:41

## 2024-10-31 RX ADMIN — SODIUM CHLORIDE 1000 ML: 9 INJECTION, SOLUTION INTRAVENOUS at 22:45

## 2024-10-31 RX ADMIN — SODIUM CHLORIDE 15 ML: 9 INJECTION, SOLUTION INTRAMUSCULAR; INTRAVENOUS; SUBCUTANEOUS at 22:46

## 2024-10-31 RX ADMIN — PIPERACILLIN AND TAZOBACTAM 3375 MG: 3; .375 INJECTION, POWDER, LYOPHILIZED, FOR SOLUTION INTRAVENOUS at 22:46

## 2024-10-31 RX ADMIN — KETOROLAC TROMETHAMINE 15 MG: 15 INJECTION, SOLUTION INTRAMUSCULAR; INTRAVENOUS at 18:40

## 2024-10-31 RX ADMIN — IOPAMIDOL 75 ML: 755 INJECTION, SOLUTION INTRAVENOUS at 21:21

## 2024-10-31 RX ADMIN — FENTANYL CITRATE 25 MCG: 50 INJECTION INTRAMUSCULAR; INTRAVENOUS at 19:51

## 2024-10-31 ASSESSMENT — PAIN - FUNCTIONAL ASSESSMENT: PAIN_FUNCTIONAL_ASSESSMENT: 0-10

## 2024-10-31 ASSESSMENT — LIFESTYLE VARIABLES
HOW OFTEN DO YOU HAVE A DRINK CONTAINING ALCOHOL: MONTHLY OR LESS
HOW MANY STANDARD DRINKS CONTAINING ALCOHOL DO YOU HAVE ON A TYPICAL DAY: 1 OR 2

## 2024-10-31 ASSESSMENT — PAIN DESCRIPTION - ORIENTATION
ORIENTATION: RIGHT;LOWER
ORIENTATION: RIGHT;LOWER

## 2024-10-31 ASSESSMENT — PAIN DESCRIPTION - LOCATION
LOCATION: ABDOMEN

## 2024-10-31 ASSESSMENT — PAIN SCALES - GENERAL
PAINLEVEL_OUTOF10: 10
PAINLEVEL_OUTOF10: 10
PAINLEVEL_OUTOF10: 8
PAINLEVEL_OUTOF10: 10

## 2024-10-31 NOTE — ED PROVIDER NOTES
Shared MAUREEN-ED Attending Visit.  CC: No     [facility] EMERGENCY DEPARTMENT  EMERGENCY DEPARTMENT ENCOUNTER      Pt Name: Cynthia Ayala  MRN: [id number]  Birthdate 1985  Date of evaluation: 10/31/2024  Provider: LEIDY Lund - CNP  PCP: Margarita Puentes PA  Note Started: 6:03 PM EDT 10/31/24    CHIEF COMPLAINT       Chief Complaint   Patient presents with    Abdominal Pain     Hx of ovarian cyst, right sided abd pain    Cyst       HISTORY OF PRESENT ILLNESS: 1 or more Elements   History From: Patient  Limitations to history : None    Cynthia Ayala is a 39 y.o. female who has a past medical history of asthma, depression, hypertension, marijuana use, Crohn's colitis, opiate abuse presents to the emergency department with ongoing right lower quadrant abdominal pain.  Patient was evaluated in this emergency department on 10/29/2024 due to right lower quadrant abdominal pain.  Patient underwent multiple labs and CT scan with no identifiable cause of her right lower quadrant abdominal pain, she did have a cystic structure to the right ovary.  Patient is arriving via EMS with complaints of ongoing right lower quadrant abdominal pain stating that the pain has not gotten any better at all, states that she is laid in bed for 2 days with a heating pad on it and the pain is persistent.  She endorses some nausea but no vomiting, no diarrhea, no constipation, no blood in the stool.  Denies any abnormal vaginal discharge, no urinary symptoms.    Nursing Notes were all reviewed and agreed with or any disagreements were addressed in the HPI.    REVIEW OF SYSTEMS :    Positives and Pertinent negatives as per HPI.     PAST MEDICAL HISTORY/Chronic Conditions Affecting Care    has a past medical history of Acute Crohn's disease (HCC) (2015), Anxiety attack (since age 14), Asthma, Crohn's colitis (HCC), Depression, Herpes simplex virus (HSV) infection, History of blood transfusion,  Leukocytosis, unspecified type          DISPOSITION/PLAN     DISPOSITION Decision To Admit 10/31/2024 10:16:13 PM    PATIENT REFERRED TO:  No follow-up provider specified.    DISCHARGE MEDICATIONS:  New Prescriptions    No medications on file       DISCONTINUED MEDICATIONS:  Discontinued Medications    No medications on file            (Please note that portions of this note were completed with a voice recognition program.  Efforts were made to edit the dictations but occasionally words are mis-transcribed.)    LEIDY Lund - CNP (electronically signed)

## 2024-10-31 NOTE — ED NOTES
Pt reports to ED via Ems from home due to right sided abdominal pain. Pt was recently discharged/diagnosed with right sided ovarian cyst.

## 2024-11-01 VITALS
TEMPERATURE: 98 F | WEIGHT: 170 LBS | RESPIRATION RATE: 18 BRPM | HEART RATE: 80 BPM | DIASTOLIC BLOOD PRESSURE: 74 MMHG | SYSTOLIC BLOOD PRESSURE: 119 MMHG | BODY MASS INDEX: 29.02 KG/M2 | HEIGHT: 64 IN | OXYGEN SATURATION: 98 %

## 2024-11-01 PROBLEM — R10.31 RLQ ABDOMINAL PAIN: Status: ACTIVE | Noted: 2024-11-01

## 2024-11-01 LAB
ALBUMIN SERPL-MCNC: 4.3 G/DL (ref 3.5–5.2)
ALP SERPL-CCNC: 75 U/L (ref 35–104)
ALT SERPL-CCNC: 13 U/L (ref 0–32)
ANION GAP SERPL CALCULATED.3IONS-SCNC: 13 MMOL/L (ref 7–16)
AST SERPL-CCNC: 17 U/L (ref 0–31)
BASOPHILS # BLD: 0.09 K/UL (ref 0–0.2)
BASOPHILS NFR BLD: 1 % (ref 0–2)
BILIRUB SERPL-MCNC: 0.7 MG/DL (ref 0–1.2)
BUN SERPL-MCNC: 16 MG/DL (ref 6–20)
CALCIUM SERPL-MCNC: 9 MG/DL (ref 8.6–10.2)
CHLORIDE SERPL-SCNC: 100 MMOL/L (ref 98–107)
CO2 SERPL-SCNC: 21 MMOL/L (ref 22–29)
CREAT SERPL-MCNC: 0.7 MG/DL (ref 0.5–1)
CRP SERPL HS-MCNC: <3 MG/L (ref 0–5)
EOSINOPHIL # BLD: 0.29 K/UL (ref 0.05–0.5)
EOSINOPHILS RELATIVE PERCENT: 2 % (ref 0–6)
ERYTHROCYTE [DISTWIDTH] IN BLOOD BY AUTOMATED COUNT: 15 % (ref 11.5–15)
ERYTHROCYTE [SEDIMENTATION RATE] IN BLOOD BY WESTERGREN METHOD: 5 MM/HR (ref 0–20)
GFR, ESTIMATED: >90 ML/MIN/1.73M2
GLUCOSE SERPL-MCNC: 94 MG/DL (ref 74–99)
HCT VFR BLD AUTO: 41.1 % (ref 34–48)
HGB BLD-MCNC: 14 G/DL (ref 11.5–15.5)
IMM GRANULOCYTES # BLD AUTO: 0.07 K/UL (ref 0–0.58)
IMM GRANULOCYTES NFR BLD: 1 % (ref 0–5)
LYMPHOCYTES NFR BLD: 5.92 K/UL (ref 1.5–4)
LYMPHOCYTES RELATIVE PERCENT: 38 % (ref 20–42)
MAGNESIUM SERPL-MCNC: 2.2 MG/DL (ref 1.6–2.6)
MCH RBC QN AUTO: 29.9 PG (ref 26–35)
MCHC RBC AUTO-ENTMCNC: 34.1 G/DL (ref 32–34.5)
MCV RBC AUTO: 87.8 FL (ref 80–99.9)
MONOCYTES NFR BLD: 1.08 K/UL (ref 0.1–0.95)
MONOCYTES NFR BLD: 7 % (ref 2–12)
NEUTROPHILS NFR BLD: 52 % (ref 43–80)
NEUTS SEG NFR BLD: 8.01 K/UL (ref 1.8–7.3)
PLATELET # BLD AUTO: 423 K/UL (ref 130–450)
PMV BLD AUTO: 9.4 FL (ref 7–12)
POTASSIUM SERPL-SCNC: 3.3 MMOL/L (ref 3.5–5)
PROT SERPL-MCNC: 6.7 G/DL (ref 6.4–8.3)
RBC # BLD AUTO: 4.68 M/UL (ref 3.5–5.5)
SODIUM SERPL-SCNC: 134 MMOL/L (ref 132–146)
WBC OTHER # BLD: 15.5 K/UL (ref 4.5–11.5)

## 2024-11-01 PROCEDURE — 86140 C-REACTIVE PROTEIN: CPT

## 2024-11-01 PROCEDURE — 6370000000 HC RX 637 (ALT 250 FOR IP): Performed by: INTERNAL MEDICINE

## 2024-11-01 PROCEDURE — 2580000003 HC RX 258: Performed by: NURSE PRACTITIONER

## 2024-11-01 PROCEDURE — 96376 TX/PRO/DX INJ SAME DRUG ADON: CPT

## 2024-11-01 PROCEDURE — 6370000000 HC RX 637 (ALT 250 FOR IP): Performed by: NURSE PRACTITIONER

## 2024-11-01 PROCEDURE — 85025 COMPLETE CBC W/AUTO DIFF WBC: CPT

## 2024-11-01 PROCEDURE — 83735 ASSAY OF MAGNESIUM: CPT

## 2024-11-01 PROCEDURE — G0378 HOSPITAL OBSERVATION PER HR: HCPCS

## 2024-11-01 PROCEDURE — 96375 TX/PRO/DX INJ NEW DRUG ADDON: CPT

## 2024-11-01 PROCEDURE — 6360000002 HC RX W HCPCS: Performed by: NURSE PRACTITIONER

## 2024-11-01 PROCEDURE — 6360000002 HC RX W HCPCS: Performed by: INTERNAL MEDICINE

## 2024-11-01 PROCEDURE — 85652 RBC SED RATE AUTOMATED: CPT

## 2024-11-01 PROCEDURE — 80053 COMPREHEN METABOLIC PANEL: CPT

## 2024-11-01 PROCEDURE — 36415 COLL VENOUS BLD VENIPUNCTURE: CPT

## 2024-11-01 PROCEDURE — 99239 HOSP IP/OBS DSCHRG MGMT >30: CPT | Performed by: INTERNAL MEDICINE

## 2024-11-01 PROCEDURE — 96361 HYDRATE IV INFUSION ADD-ON: CPT

## 2024-11-01 PROCEDURE — 2580000003 HC RX 258: Performed by: SURGERY

## 2024-11-01 RX ORDER — ASPIRIN 81 MG/1
81 TABLET, CHEWABLE ORAL DAILY
COMMUNITY

## 2024-11-01 RX ORDER — CIPROFLOXACIN 500 MG/1
500 TABLET, FILM COATED ORAL EVERY 12 HOURS SCHEDULED
Status: DISCONTINUED | OUTPATIENT
Start: 2024-11-01 | End: 2024-11-01 | Stop reason: HOSPADM

## 2024-11-01 RX ORDER — METRONIDAZOLE 500 MG/1
500 TABLET ORAL EVERY 8 HOURS SCHEDULED
Qty: 21 TABLET | Refills: 0 | Status: SHIPPED | OUTPATIENT
Start: 2024-11-01 | End: 2024-11-08

## 2024-11-01 RX ORDER — POTASSIUM CHLORIDE 1500 MG/1
20 TABLET, EXTENDED RELEASE ORAL ONCE
Status: COMPLETED | OUTPATIENT
Start: 2024-11-01 | End: 2024-11-01

## 2024-11-01 RX ORDER — CIPROFLOXACIN 500 MG/1
500 TABLET, FILM COATED ORAL EVERY 12 HOURS SCHEDULED
Qty: 14 TABLET | Refills: 0 | Status: SHIPPED | OUTPATIENT
Start: 2024-11-01 | End: 2024-11-08

## 2024-11-01 RX ORDER — METRONIDAZOLE 500 MG/1
500 TABLET ORAL EVERY 8 HOURS SCHEDULED
Status: DISCONTINUED | OUTPATIENT
Start: 2024-11-01 | End: 2024-11-01 | Stop reason: HOSPADM

## 2024-11-01 RX ORDER — POLYETHYLENE GLYCOL 3350 17 G/17G
17 POWDER, FOR SOLUTION ORAL DAILY PRN
Qty: 7 PACKET | Refills: 0 | Status: SHIPPED | OUTPATIENT
Start: 2024-11-01 | End: 2024-11-08

## 2024-11-01 RX ORDER — GABAPENTIN 600 MG/1
600 TABLET ORAL 3 TIMES DAILY
COMMUNITY

## 2024-11-01 RX ORDER — OXYCODONE HYDROCHLORIDE 5 MG/1
5 TABLET ORAL EVERY 8 HOURS PRN
COMMUNITY

## 2024-11-01 RX ORDER — CYCLOBENZAPRINE HCL 10 MG
10 TABLET ORAL 3 TIMES DAILY PRN
COMMUNITY

## 2024-11-01 RX ORDER — TRAMADOL HYDROCHLORIDE 50 MG/1
50 TABLET ORAL EVERY 6 HOURS PRN
COMMUNITY

## 2024-11-01 RX ORDER — MORPHINE SULFATE 2 MG/ML
2 INJECTION, SOLUTION INTRAMUSCULAR; INTRAVENOUS EVERY 4 HOURS PRN
Status: COMPLETED | OUTPATIENT
Start: 2024-11-01 | End: 2024-11-01

## 2024-11-01 RX ORDER — SODIUM CHLORIDE 9 MG/ML
INJECTION, SOLUTION INTRAVENOUS ONCE
Status: COMPLETED | OUTPATIENT
Start: 2024-11-01 | End: 2024-11-01

## 2024-11-01 RX ADMIN — KETOROLAC TROMETHAMINE 30 MG: 15 INJECTION, SOLUTION INTRAMUSCULAR; INTRAVENOUS at 12:36

## 2024-11-01 RX ADMIN — KETOROLAC TROMETHAMINE 30 MG: 15 INJECTION, SOLUTION INTRAMUSCULAR; INTRAVENOUS at 06:45

## 2024-11-01 RX ADMIN — GABAPENTIN 300 MG: 300 CAPSULE ORAL at 13:46

## 2024-11-01 RX ADMIN — GABAPENTIN 300 MG: 300 CAPSULE ORAL at 01:29

## 2024-11-01 RX ADMIN — MORPHINE SULFATE 2 MG: 2 INJECTION, SOLUTION INTRAMUSCULAR; INTRAVENOUS at 02:42

## 2024-11-01 RX ADMIN — SODIUM CHLORIDE, PRESERVATIVE FREE 10 ML: 5 INJECTION INTRAVENOUS at 01:37

## 2024-11-01 RX ADMIN — POTASSIUM CHLORIDE 20 MEQ: 1500 TABLET, EXTENDED RELEASE ORAL at 12:37

## 2024-11-01 RX ADMIN — KETOROLAC TROMETHAMINE 30 MG: 15 INJECTION, SOLUTION INTRAMUSCULAR; INTRAVENOUS at 01:29

## 2024-11-01 RX ADMIN — LISINOPRIL 5 MG: 5 TABLET ORAL at 11:04

## 2024-11-01 RX ADMIN — VENLAFAXINE HYDROCHLORIDE 75 MG: 75 CAPSULE, EXTENDED RELEASE ORAL at 11:04

## 2024-11-01 RX ADMIN — SODIUM CHLORIDE: 9 INJECTION, SOLUTION INTRAVENOUS at 12:41

## 2024-11-01 RX ADMIN — GABAPENTIN 300 MG: 300 CAPSULE ORAL at 11:04

## 2024-11-01 ASSESSMENT — PAIN - FUNCTIONAL ASSESSMENT
PAIN_FUNCTIONAL_ASSESSMENT: ACTIVITIES ARE NOT PREVENTED

## 2024-11-01 ASSESSMENT — PAIN DESCRIPTION - DESCRIPTORS
DESCRIPTORS: ACHING;THROBBING;CRAMPING;DISCOMFORT
DESCRIPTORS: ACHING;THROBBING;DISCOMFORT
DESCRIPTORS: ACHING;DISCOMFORT
DESCRIPTORS: SHOOTING;STABBING;THROBBING
DESCRIPTORS: ACHING;DISCOMFORT
DESCRIPTORS: STABBING;DISCOMFORT;SHOOTING

## 2024-11-01 ASSESSMENT — PAIN DESCRIPTION - ORIENTATION
ORIENTATION: RIGHT;LOWER;MID
ORIENTATION: RIGHT;LOWER;MID
ORIENTATION: RIGHT;LOWER
ORIENTATION: RIGHT;MID;LOWER
ORIENTATION: RIGHT;LOWER;MID
ORIENTATION: RIGHT;LOWER

## 2024-11-01 ASSESSMENT — PAIN SCALES - GENERAL
PAINLEVEL_OUTOF10: 3
PAINLEVEL_OUTOF10: 9
PAINLEVEL_OUTOF10: 4
PAINLEVEL_OUTOF10: 8
PAINLEVEL_OUTOF10: 9
PAINLEVEL_OUTOF10: 4
PAINLEVEL_OUTOF10: 7
PAINLEVEL_OUTOF10: 8
PAINLEVEL_OUTOF10: 10

## 2024-11-01 ASSESSMENT — PAIN SCALES - WONG BAKER
WONGBAKER_NUMERICALRESPONSE: 0;4
WONGBAKER_NUMERICALRESPONSE: 0;4

## 2024-11-01 ASSESSMENT — PAIN DESCRIPTION - LOCATION
LOCATION: ABDOMEN

## 2024-11-01 NOTE — ED NOTES
ED to Inpatient Handoff Report    Notified Melissa that electronic handoff available and patient ready for transport to room 0642.    Safety Risks: None identified    Patient in Restraints: no    Constant Observer or Patient : no    Telemetry Monitoring Ordered :No           Order to transfer to unit without monitor:N/A    Last MEWS: 1 Time completed: 2350    Deterioration Index Score:   Predictive Model Details          19 (Normal)  Factor Value    Calculated 11/1/2024 00:03 35% Age 39 years old    Deterioration Index Model 25% WBC count abnormal (20.2 k/uL)     12% Potassium 4.3 mmol/L     9% Pulse oximetry 100 %     8% Systolic 135     5% Sodium 135 mmol/L     3% Platelet count abnormal (526 k/uL)     2% Pulse 87     1% Hematocrit 44.1 %     0% Temperature 98 °F (36.7 °C)     0% Respiratory rate 16        Vitals:    10/31/24 1823 10/31/24 1952 10/31/24 2146 10/31/24 2350   BP:  (!) 142/90 129/84 (!) 135/96   Pulse:  (!) 102  87   Resp:  18  16   Temp:    98 °F (36.7 °C)   TempSrc:    Oral   SpO2:  97% 94% 100%   Weight: 77.1 kg (170 lb)      Height: 1.626 m (5' 4\")            Opportunity for questions and clarification was provided.

## 2024-11-01 NOTE — PROGRESS NOTES
4 Eyes Skin Assessment     NAME:  Cynthia Ayala  YOB: 1985  MEDICAL RECORD NUMBER:  [id number]    The patient is being assessed for  Admission    I agree that at least one RN has performed a thorough Head to Toe Skin Assessment on the patient. ALL assessment sites listed below have been assessed.      Areas assessed by both nurses:    Head, Face, Ears, Shoulders, Back, Chest, Arms, Elbows, Hands, Sacrum. Buttock, Coccyx, Ischium, Legs. Feet and Heels, and Under Medical Devices         Does the Patient have a Wound? No noted wound(s)       King Prevention initiated by RN: No  Wound Care Orders initiated by RN: No    Pressure Injury (Stage 3,4, Unstageable, DTI, NWPT, and Complex wounds) if present, place Wound referral order by RN under : No    New Ostomies, if present place, Ostomy referral order under : No     Nurse 1 eSignature: Electronically signed by Melissa Ford RN on 11/1/24 at 1:46 AM EDT    **SHARE this note so that the co-signing nurse can place an eSignature**    Nurse 2 eSignature: Electronically signed by Mabel Stevens RN on 11/1/24 at 1:46 AM EDT

## 2024-11-01 NOTE — PROGRESS NOTES
P Quality Flow/Interdisciplinary Rounds Progress Note        Quality Flow Rounds held on November 1, 2024    Disciplines Attending:  Bedside Nurse, , , and Nursing Unit Leadership    Cynthia Ayala was admitted on 10/31/2024  6:13 PM    Anticipated Discharge Date:       Disposition:    King Score:  King Scale Score: 20    Readmission Risk              Risk of Unplanned Readmission:  20           Discussed patient goal for the day, patient clinical progression, and barriers to discharge.  The following Goal(s) of the Day/Commitment(s) have been identified:   discharge      Zaki Reyna RN  November 1, 2024

## 2024-11-01 NOTE — CONSULTS
General Surgery   Consult Note      Patient's Name/Date of Birth: Cynthia Ayala / 1985    Date: 2024     PCP: Margarita Puentes PA     Reason for consult::     HPI:   Cynthia Ayala is a 39 y.o. female who presents for evaluation of RLQ pain. Russel has a history of Crohns disease. She has been seen by surgery service at [facility] and [facility]/ for the same issue. No surgical intervention was ever done. Patient says that her pain started Saturday. Complains of some nausea/vomiting. Passing flatus and having BM. She does not follow with a GI doctor. CTAP unremarkable.       Patient Active Problem List   Diagnosis    Hyperemesis gravidarum    Cannabis dependence (HCC)    Asthma    Tobacco abuse    Symptomatic cholelithiasis    Crohn disease (HCC)    Crohn's colitis, without complications (HCC)    Intractable vomiting with nausea    Emesis, persistent    Intractable nausea and vomiting    Abdominal pain    Intractable vomiting    Abdominal pain, right lower quadrant    Diarrhea    Marijuana abuse, continuous    AMA (advanced maternal age) multigravida 35+, first trimester    Hypokalemia    Anxiety    History of IUGR (intrauterine growth retardation) and stillbirth, currently pregnant, first trimester    Depression    Hyperemesis    Primary hypertension    Hyperemesis gravidarum with metabolic disturbance, antepartum    Starvation ketoacidosis    Elevated blood pressure affecting pregnancy in second trimester, antepartum    Low vitamin D level    History of prior pregnancy with IUGR     Positive urine drug screen    Marijuana use    Crohn's colitis, other complication (HCC)    Pyelonephritis    Amphetamine abuse (HCC)    Opiate abuse, continuous (HCC)    Intractable abdominal pain    Intractable low back pain    Intractable back pain    Leukocytosis    RLQ abdominal pain       No Known Allergies    Past Medical History:   Diagnosis Date    Acute Crohn's disease (HCC)     Anxiety attack since  that available in the EMR including CT abd/pel from ED. My assessment is in HPI     Assessment:  Cynthia Ayala is a 39 y.o. female with RLQ tenderness    Patient Active Problem List   Diagnosis    Hyperemesis gravidarum    Cannabis dependence (HCC)    Asthma    Tobacco abuse    Symptomatic cholelithiasis    Crohn disease (HCC)    Crohn's colitis, without complications (HCC)    Intractable vomiting with nausea    Emesis, persistent    Intractable nausea and vomiting    Abdominal pain    Intractable vomiting    Abdominal pain, right lower quadrant    Diarrhea    Marijuana abuse, continuous    AMA (advanced maternal age) multigravida 35+, first trimester    Hypokalemia    Anxiety    History of IUGR (intrauterine growth retardation) and stillbirth, currently pregnant, first trimester    Depression    Hyperemesis    Primary hypertension    Hyperemesis gravidarum with metabolic disturbance, antepartum    Starvation ketoacidosis    Elevated blood pressure affecting pregnancy in second trimester, antepartum    Low vitamin D level    History of prior pregnancy with IUGR     Positive urine drug screen    Marijuana use    Crohn's colitis, other complication (HCC)    Pyelonephritis    Amphetamine abuse (HCC)    Opiate abuse, continuous (HCC)    Intractable abdominal pain    Intractable low back pain    Intractable back pain    Leukocytosis    RLQ abdominal pain       Plan:  - ok for diet  - No acute surgical interventions planned  - pain/nausea control PRN  - Rest per primary       Rose Marie Hendrix,   General Surgery Resident'    Seen/examined  Agree with above  Diagnostic and therapeutic plan discussed with the residents  No acute surgical issue  Some chronic RLQ pain-  pt has history of appendicitis during pregnancy that was treated non-operatively.  Also with ovarian cyst  Chronic GI issues- last colonoscopy   Ok for diet  Follow up in office for colonoscopy  If unremarkable will schedule for a diagnostic  laparoscopy with appendectomy and drainage of ovarian cyst  Leonardo

## 2024-11-01 NOTE — H&P
[facility] History and Physical      CHIEF COMPLAINT:  Right lower abdominal pain    History of Present Illness:  This is a 39 year old female with PMH significant for anxiety, asthma, Crohn's colitis, HTN, marijuana abuse, tobacco abuse, and opioid abuse.  To ED due to right lower abdominal pain.  Patient in ED yesterday due to same right lower quadrant pain and discharged home.  Patient reports ongoing pain at home that worsened.  Associated symptoms include diaphoresis, lightheadedness, nausea and vomiting.  Patient reports not being able to keep anything down.  Describes right lower quadrant pain as constant sharp pain that radiates to pelvic region.  Patient rates pain 10 out of 10.  Reports nothing helping improve pain.  Unable to get comfortable.  Past surgical history includes cholecystectomy, recent lumbar fusion in June, and right inguinal hernia repair at age 7.  Does admit to having ongoing issues related to nausea and diarrhea and states that she follows with Dr. Mckeon.    Labs remarkable for WBC 15.2 yesterday and today 20.2.  CMP essentially unremarkable.  Lactic acid within normal limits.  Procalcitonin only 0.05.  Blood cultures x 2 done and pending.  Cervical examination done in ED and normal.  Wet prep negative.  Specimens for trichomonas and gonorrhea pending.  CT scan showing small 2.6 cm cyst that is unchanged and diverticulosis.  Patient continues to have ongoing abdominal pain in ED.  Will admit for further evaluation and treatment.    Informant(s) for H&P: Patient and chart review    REVIEW OF SYSTEMS:  A comprehensive review of systems was negative except for: what is in the HPI      PMH:  Past Medical History:   Diagnosis Date    Acute Crohn's disease (HCC) 2015    Anxiety attack since age 14    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 inhaler     Crohn's colitis (HCC)     Depression     medicated with zoloft     Herpes simplex  3 times daily as needed for Nausea or Vomiting 10/25/23   Jaclyn Brown PA-C   dicyclomine (BENTYL) 10 MG capsule Take 1 capsule by mouth 4 times daily 6/10/23   Carlos Maynard DO   famotidine (PEPCID) 20 MG tablet Take 1 tablet by mouth 2 times daily for 7 days 6/10/23 5/18/24  Carlos Maynard DO   lisinopril (PRINIVIL;ZESTRIL) 5 MG tablet Take 1 tablet by mouth daily    Provider, MD Cory       Allergies:    Patient has no known allergies.    Social History:    reports that she quit smoking about 4 years ago. Her smoking use included cigarettes. She started smoking about 14 years ago. She has a 10 pack-year smoking history. She has never used smokeless tobacco. She reports that she does not currently use alcohol. She reports current drug use. Drug: Marijuana (Weed).    Family History:   family history includes Breast Cancer in her mother; Cervical Cancer in her mother; Depression in her father and mother; Heart Disease in her father; Hypertension in her father; Leukemia in her mother; Schizophrenia in her father.       PHYSICAL EXAM:  Vitals:  /84   Pulse (!) 102   Temp 97.7 °F (36.5 °C)   Resp 18   Ht 1.626 m (5' 4\")   Wt 77.1 kg (170 lb)   SpO2 94%   BMI 29.18 kg/m²     General Appearance: anxious, teary eyed, alert and oriented to person, place and time   Skin: warm and dry  Head: normocephalic and atraumatic  Eyes: pupils equal, round, and reactive to light, conjunctivae normal  Pulmonary/Chest: clear to auscultation bilaterally- no wheezes, rales or rhonchi, normal air movement, no respiratory distress  Cardiovascular: normal rate, normal S1 and S2   Abdomen: soft, right lower quadrant and pelvic region tender to palpation, non-distended, normal bowel sounds, no masses or organomegaly  Extremities: no cyanosis, no clubbing and no edema  Neurologic: speech normal        LABS:  Recent Labs     10/29/24  1036 10/31/24  1836    135   K 4.2 4.3   CL 99 97*   CO2 24 22   BUN 19 14    CREATININE 0.7 0.7   GLUCOSE 136* 113*   CALCIUM 10.0 9.8       Recent Labs     10/29/24  1036 10/31/24  1836   WBC 15.2* 20.2*   RBC 5.05 5.09   HGB 14.8 15.3   HCT 44.8 44.1   MCV 88.7 86.6   MCH 29.3 30.1   MCHC 33.0 34.7*   RDW 15.3* 14.9    526*   MPV 8.8 9.3       No results for input(s): \"POCGLU\" in the last 72 hours.        Radiology:   CT ABDOMEN PELVIS W IV CONTRAST Additional Contrast? Oral   Final Result   1. No acute intra-abdominal or pelvic abnormality.   2. Normal appendix.   3. 2.6 cm right adnexal cyst, unchanged.   4. Diverticulosis.         US NON OB TRANSVAGINAL   Final Result   1. 2.4 cm simple right ovarian cyst unchanged in size compared to CT scan   abdomen pelvis 10/29/2024.   2. Otherwise unremarkable pelvic ultrasound.         US ABDOMEN LIMITED Specify organ? APPENDIX   Final Result   The appendix is not visualized. No gross abnormalities involving the right   lower quadrant.             EKG:     ASSESSMENT:      Principal Problem:    Abdominal pain  Active Problems:    Primary hypertension    Intractable nausea and vomiting    Anxiety    Leukocytosis  Resolved Problems:    * No resolved hospital problems. *      PLAN:    1.  Abdominal pain-CT scan of the abdomen showing small cyst on right ovary.  Doubtful cyst causing pain.  Consult general surgery for input.  Patient does report following with Dr. Mckeon for ongoing GI issues?  Pelvic exam done in ED and within normal limits.  Wet prep negative.  Lactic acid normal.  Gonorrhea and Chlamydia pending.  2.  Intractable nausea and vomiting-clear liquid diet and advance as tolerated.  Zofran as needed.  3.  Leukocytosis-white blood cell count yesterday 15.2 and today 20.2 does have history of elevated WBC count.  Was on steroids in June.  Monitor and trend.  Blood cultures x 2 pending.  Urine negative for infection.  Will check CRP and sed rate.  Procalcitonin 0.05.  Hold off on antibiotics at this time.  Possibly also elevated due

## 2024-11-01 NOTE — PROGRESS NOTES
Itzel Vera NP due patient being in extreme pain. Morphine 2mg x1 was given @ 0230. She had toradal at 0130. NP states to give PRN toradol now.

## 2024-11-01 NOTE — CARE COORDINATION
Social Work / Discharge Planning : Patient has D/C order noted. Patient form HOME with family. Patient has an established PCP and insurance. No needs for SW. SW to follow. Electronically signed by RIAZ Mullins on 11/1/24 at 11:14 AM EDT

## 2024-11-01 NOTE — PROGRESS NOTES
CLINICAL PHARMACY NOTE: MEDS TO BEDS    Total # of Prescriptions Filled: 3   The following medications were delivered to the patient:  Miralax   Metronidazole 500 mg   Ciprofloxacin 500 mg     Additional Documentation:

## 2024-11-01 NOTE — DISCHARGE SUMMARY
[facility] Hospitalist Physician Discharge Summary       Margarita Puentes PA  [address]  [address]  [facility] [address]  [phone]    Call in 1 day(s)  follow up    Ranjit Mckeon MD  [address]  [address]  [facility] [address]  [phone]    Follow up        Activity level: As tolerated     Dispo: Home      Condition on discharge: Stable     Patient ID:  Cynthia Aayla  [id number]  39 y.o.  1985    Admit date: 10/31/2024    Discharge date and time:  11/1/2024  3:14 PM    Admission Diagnoses: Principal Problem:    Abdominal pain  Active Problems:    Primary hypertension    Intractable nausea and vomiting    Anxiety    Leukocytosis    RLQ abdominal pain  Resolved Problems:    * No resolved hospital problems. *      Discharge Diagnoses: Principal Problem:    Abdominal pain  Active Problems:    Primary hypertension    Intractable nausea and vomiting    Anxiety    Leukocytosis    RLQ abdominal pain  Resolved Problems:    * No resolved hospital problems. *      Consults:  IP CONSULT TO HOSPITALIST  IP CONSULT TO GENERAL SURGERY    Procedures: None    Hospital Course:   Patient Cynthia Ayala is a 39 y.o. presented with Abdominal pain [R10.9]  Leukocytosis, unspecified type [D72.829]  Intractable right lower quadrant abdominal pain [R10.31]    This is a 39-year-old female presents with right lower quadrant pain.  Patient has a history of Crohn's disease.  She was seen by general surgery.  Recommended to initiate diet.  No further surgical recommendations.  They will follow-up with her outpatient.  Patient is going to see OB/GYN for ovarian cyst as well.    Discharge Exam:    General Appearance: alert and oriented to person, place and time and in no acute distress  Skin: warm and dry  Head: normocephalic and atraumatic  Eyes: pupils equal, round, extraocular eye movements intact, conjunctivae normal  Pulmonary/Chest: clear to auscultation bilaterally- no wheezes, rales or  distension.  The appendix is normal.  There are scattered diverticula. Pelvis: The urinary bladder is decompressed.  The uterus is unremarkable. There is a 2.6 cm right adnexal cyst. Peritoneum/Retroperitoneum: No evidence of ascites or free air.  No evidence of lymphadenopathy.  Aorta is normal in caliber. Bones/Soft Tissues:  No acute abnormality of the visualized osseous structures.  Status post L3-S1 posterior fixation.  No focal soft tissue abnormality.     1. No acute intra-abdominal or pelvic abnormality. 2. Normal appendix. 3. 2.6 cm right adnexal cyst, unchanged. 4. Diverticulosis.     US NON OB TRANSVAGINAL    Result Date: 10/31/2024  EXAMINATION: PELVIC ULTRASOUND 10/31/2024 TECHNIQUE: Ultrasound non OB transvaginal COMPARISON: CT scan and pelvis 10/29/2024 HISTORY: ORDERING SYSTEM PROVIDED HISTORY: Ovarian cyst seen on CT 2 days ago, ongoing pain TECHNOLOGIST PROVIDED HISTORY: Reason for exam:->Ovarian cyst seen on CT 2 days ago, ongoing pain What reading provider will be dictating this exam?->CRC FINDINGS: Measurements: Uterus: 7.2 cm x 4.5 cm x 3.4 cm Endometrial stripe: 5 mm Right Ovary:2.7 cm x 2.3 cm x 2.8 cm Left Ovary: 1.4 cm x 1.6 cm x 1.7 cm Ultrasound Findings: Uterus: Uterus demonstrates normal myometrial echotexture. Endometrial stripe: Endometrial stripe is within normal limits. Right Ovary: Right ovary is within normal limits.    Normal arterial and venous flow.  2.4 cm simple right ovarian cyst unchanged in size compared to CT scan abdomen pelvis 10/29/2024. Left Ovary:  Left ovary is within normal limits.   Normal arterial and venous flow. Free Fluid: No evidence of free fluid.     1. 2.4 cm simple right ovarian cyst unchanged in size compared to CT scan abdomen pelvis 10/29/2024. 2. Otherwise unremarkable pelvic ultrasound.     US ABDOMEN LIMITED Specify organ? APPENDIX    Result Date: 10/31/2024  EXAMINATION: Ultrasound abdomen limited 10/31/2024 7:43 pm COMPARISON: None. HISTORY:

## 2024-11-03 LAB
MICROORGANISM SPEC CULT: NORMAL
MICROORGANISM SPEC CULT: NORMAL
SERVICE CMNT-IMP: NORMAL
SERVICE CMNT-IMP: NORMAL
SPECIMEN DESCRIPTION: NORMAL
SPECIMEN DESCRIPTION: NORMAL

## 2024-11-04 LAB
C TRACH DNA SPEC QL PROBE+SIG AMP: NEGATIVE
N GONORRHOEA DNA SPEC QL PROBE+SIG AMP: NEGATIVE
SPECIMEN DESCRIPTION: NORMAL

## 2024-11-13 NOTE — PROGRESS NOTES
Physician Progress Note      PATIENT:               REJI LAYTON  CSN #:                  [id number]  :                       1985  ADMIT DATE:       10/31/2024 6:13 PM  DISCH DATE:        2024 3:00 PM  RESPONDING  PROVIDER #:        Francisca Sanchez DO          QUERY TEXT:    Pt admitted with abd. pain and noted to have right ovarian cyst.  If possible,   please document in progress notes and discharge summary if you are evaluating   and /or treating any of the following:    The medical record reflects the following:  Risk Factors: Crohn's' Disease, Ovarian Cyst  Clinical Indicators: Presents for abd. pain with past history of Crohn's   Disease and known right ovarian cyst. Abd. CT also showed Diverticulosis.  Treatment: CT abd.< ABx. were started with Zosyn IV, but then stopped.  Options provided:  -- Abdominal Pain due to right ovarian cyst.  -- Abdominal Pain due to Crohn's Disease.  -- Other - I will add my own diagnosis  -- Disagree - Not applicable / Not valid  -- Disagree - Clinically unable to determine / Unknown  -- Refer to Clinical Documentation Reviewer    PROVIDER RESPONSE TEXT:    Abdominal Pain due to Crohn's Disease.    Query created by: Cris Epstein on 2024 1:11 PM      Electronically signed by:  Francisca Sanchez DO 2024 8:24 AM

## 2024-11-14 ENCOUNTER — HOSPITAL ENCOUNTER (OUTPATIENT)
Age: 39
Discharge: HOME | End: 2024-11-16

## 2024-11-15 NOTE — PROGRESS NOTES
[facility] PRE-ADMISSION TESTING INSTRUCTIONS    The Preadmission Testing patient is instructed accordingly using the following criteria (check applicable):    ARRIVAL INSTRUCTIONS:  [x] Parking the day of Surgery is located in the Main Entrance lot.  Upon entering the door, make an immediate right to the surgery reception desk    [x] Bring photo ID and insurance card    [] Bring in a copy of Living will or Durable Power of  papers.    [x] Please be sure to arrange for responsible adult to provide transportation to and from the hospital    [x] Please arrange for responsible adult to be with you for the 24 hour period post procedure due to having anesthesia    [x] If you awake am of surgery not feeling well or have temperature >100 please call [phone]    GENERAL INSTRUCTIONS:    [x] May have clear liquids until 4 hours prior to surgery. Examples include water, fruit juices (no pulp), jello, popsicles, black coffee or tea, beef or chicken broth.               No gum, candy or mints.    [x] You may brush your teeth, but do not swallow any water    [x] Take medications as instructed with 1-2 oz of water    [] Stop herbal supplements and vitamins 5 days prior to procedure    [] Follow preop dosing of blood thinners per physician instructions    [] Take 1/2 dose of evening insulin, but no insulin after midnight    [] No oral diabetic medications after midnight    [] If diabetic and have low blood sugar or feel symptomatic, take 1-2oz apple juice only    [] Bring inhalers day of surgery    [] Bring C-PAP/ Bi-Pap day of surgery    [x] Bring urine specimen day of surgery    [x] Shower or bath with soap, lather and rinse well, AM of Surgery, no lotion, powders or creams to surgical site    [] Follow bowel prep as instructed per surgeon    [x] No tobacco products within 24 hours of surgery     [x] No alcohol or illegal drug use within 24 hours of surgery.    [x] Jewelry, body piercing's,

## 2024-11-18 ENCOUNTER — ANESTHESIA EVENT (OUTPATIENT)
Dept: OPERATING ROOM | Age: 39
End: 2024-11-18
Payer: COMMERCIAL

## 2024-11-18 ENCOUNTER — PREP FOR PROCEDURE (OUTPATIENT)
Dept: SURGERY | Age: 39
End: 2024-11-18

## 2024-11-18 RX ORDER — SODIUM CHLORIDE 0.9 % (FLUSH) 0.9 %
5-40 SYRINGE (ML) INJECTION EVERY 12 HOURS SCHEDULED
Status: CANCELLED | OUTPATIENT
Start: 2024-11-18

## 2024-11-18 RX ORDER — SODIUM CHLORIDE 0.9 % (FLUSH) 0.9 %
5-40 SYRINGE (ML) INJECTION PRN
Status: CANCELLED | OUTPATIENT
Start: 2024-11-18

## 2024-11-18 RX ORDER — SODIUM CHLORIDE, SODIUM LACTATE, POTASSIUM CHLORIDE, CALCIUM CHLORIDE 600; 310; 30; 20 MG/100ML; MG/100ML; MG/100ML; MG/100ML
INJECTION, SOLUTION INTRAVENOUS CONTINUOUS
Status: CANCELLED | OUTPATIENT
Start: 2024-11-18

## 2024-11-18 ASSESSMENT — LIFESTYLE VARIABLES: SMOKING_STATUS: 1

## 2024-11-18 NOTE — H&P (VIEW-ONLY)
Plan  Assessment and Plan  (1) Right lower quadrant pain:         Code(s):  R10.31 - Right lower quadrant pain        Status: None        Plan:   Colonoscopy scheduled.  Random biopsies.  Assuming no findings, schedule laparoscopy with appendectomy drainage of right ovarian cyst  (2) Chronic diarrhea:         Code(s):  K52.9 - Noninfective gastroenteritis and colitis, unspecified        Status: None  (3) Ovarian cyst:         Code(s):  N83.209 - Unspecified ovarian cyst, unspecified side        Status: None  Plan Details  Additional Comments:   Risks, benefits, alternatives, and complications were all discussed.    Transcription software was used in the creation of this document. Despite all efforts to prevent them, transcription errors may have occurred.             Dictated By:  Ranjit Mckeon M.D.  Signed By:  <Electronically signed by Ranjit Mckeon M.D.>  11/06/24 0818  DD/DT: 11/06/24 0806

## 2024-11-18 NOTE — H&P
Chart - [id number]  User  LR       Diagnostics  Provider Notes  Nurse/Allied Health  Medications  History & Problems  Administrative  Other Clinical  Workload Items  Activity  Flowsheets  Health Mgmt  Summary    Summary  Viewing last 15 visits in date range:  07/24/24 - 11/27/24  Thrombocytosis  Leukocytosis  Ovarian cyst  Dyspnea  Panic attacks  Hypertension  MRSA (methicillin resistant Staphylococcus aureus)  Hospitalization within last 30 days  Nexplanon in place  Neoplasm of shoulder  Lumbar degenerative disc disease  Severe pre-eclampsia  Depressive disorder  Starvation ketoacidosis  Hypokalemia  Anxiety  Cannabis dependence  Tobacco user  Biliary calculus  Crohn's disease  Asthma  Gastritis, hypertrophic  Low back pain  Essential hypertension  History of dilation and curettage  ~2011  History of lumbar fusion  ~06/2024  History of esophagogastroduodenoscopy (EGD)  H/O excision of mass  ~03/19/24  History of herniorrhaphy  History of cholecystectomy  History of colonoscopy  11/14/24  12/15/23  07/12/24  11/01/24  12/15/23  03/28/24  10/31/24  11/06/24  11/14/24  10/31/24  06/12/24  07/08/24  04/18/24  09/19/24  08/30/24  08/29/24  11/14/24  11/14/24  11/14/24  07/24/24  10/31/24  11/14/24  08/29/24  03/06/24  10/31/24  10/25/23  06/25/24  11/15/24  11/30/23  06/04/24  11/14/24  07/24/24  07/24/24  11/06/24  11/14/24  09/19/24  11/14/24  11/15/24  10/02/24  03/25/24  Primary Language  English  Preferred Language For Discussing Healthcare  English   Required  Hx MRSA  Hx Vancomycin-Resistant Enterococci  Clinical Trial Participation  Clinical Trial Designation  Care Source authorization for [facility] for skilled nursing  06/25/24  11/15/24  11/14/24  07/24/24  07/24/24  11/06/24  11/14/24  09/19/24  11/14/24  11/15/24  Employment  SE  Portal  Preferred Phone  [phone]  Address  [address] [place] [address]  Next of Kin  Person to Notify  Primary Insurance  [employer]  No

## 2024-11-19 ENCOUNTER — HOSPITAL ENCOUNTER (OUTPATIENT)
Age: 39
Setting detail: OUTPATIENT SURGERY
Discharge: HOME | End: 2024-11-19
Attending: SURGERY | Admitting: SURGERY
Payer: COMMERCIAL

## 2024-11-19 ENCOUNTER — ANESTHESIA (OUTPATIENT)
Dept: OPERATING ROOM | Age: 39
End: 2024-11-19
Payer: COMMERCIAL

## 2024-11-19 VITALS
SYSTOLIC BLOOD PRESSURE: 134 MMHG | BODY MASS INDEX: 29.02 KG/M2 | RESPIRATION RATE: 16 BRPM | TEMPERATURE: 97 F | DIASTOLIC BLOOD PRESSURE: 76 MMHG | WEIGHT: 170 LBS | OXYGEN SATURATION: 99 % | HEIGHT: 64 IN | HEART RATE: 86 BPM

## 2024-11-19 DIAGNOSIS — R10.31 RLQ ABDOMINAL PAIN: ICD-10-CM

## 2024-11-19 LAB
HCG, URINE, POC: NEGATIVE
Lab: NORMAL
NEGATIVE QC PASS/FAIL: NORMAL
POSITIVE QC PASS/FAIL: NORMAL
SURGICAL PATHOLOGY REPORT: NORMAL

## 2024-11-19 PROCEDURE — 2709999900 HC NON-CHARGEABLE SUPPLY: Performed by: SURGERY

## 2024-11-19 PROCEDURE — 6370000000 HC RX 637 (ALT 250 FOR IP): Performed by: STUDENT IN AN ORGANIZED HEALTH CARE EDUCATION/TRAINING PROGRAM

## 2024-11-19 PROCEDURE — 6370000000 HC RX 637 (ALT 250 FOR IP): Performed by: ANESTHESIOLOGY

## 2024-11-19 PROCEDURE — 7100000000 HC PACU RECOVERY - FIRST 15 MIN: Performed by: SURGERY

## 2024-11-19 PROCEDURE — 2500000003 HC RX 250 WO HCPCS: Performed by: SURGERY

## 2024-11-19 PROCEDURE — 2720000010 HC SURG SUPPLY STERILE: Performed by: SURGERY

## 2024-11-19 PROCEDURE — 2500000003 HC RX 250 WO HCPCS: Performed by: ANESTHESIOLOGY

## 2024-11-19 PROCEDURE — 3600000014 HC SURGERY LEVEL 4 ADDTL 15MIN: Performed by: SURGERY

## 2024-11-19 PROCEDURE — 2580000003 HC RX 258: Performed by: SURGERY

## 2024-11-19 PROCEDURE — 88304 TISSUE EXAM BY PATHOLOGIST: CPT

## 2024-11-19 PROCEDURE — 7100000001 HC PACU RECOVERY - ADDTL 15 MIN: Performed by: SURGERY

## 2024-11-19 PROCEDURE — 6360000002 HC RX W HCPCS: Performed by: SURGERY

## 2024-11-19 PROCEDURE — 3700000001 HC ADD 15 MINUTES (ANESTHESIA): Performed by: SURGERY

## 2024-11-19 PROCEDURE — 6360000002 HC RX W HCPCS: Performed by: NURSE ANESTHETIST, CERTIFIED REGISTERED

## 2024-11-19 PROCEDURE — 3600000004 HC SURGERY LEVEL 4 BASE: Performed by: SURGERY

## 2024-11-19 PROCEDURE — 7100000011 HC PHASE II RECOVERY - ADDTL 15 MIN: Performed by: SURGERY

## 2024-11-19 PROCEDURE — 2500000003 HC RX 250 WO HCPCS: Performed by: NURSE ANESTHETIST, CERTIFIED REGISTERED

## 2024-11-19 PROCEDURE — 2580000003 HC RX 258: Performed by: ANESTHESIOLOGY

## 2024-11-19 PROCEDURE — 3700000000 HC ANESTHESIA ATTENDED CARE: Performed by: SURGERY

## 2024-11-19 PROCEDURE — 6360000002 HC RX W HCPCS: Performed by: ANESTHESIOLOGY

## 2024-11-19 PROCEDURE — 7100000010 HC PHASE II RECOVERY - FIRST 15 MIN: Performed by: SURGERY

## 2024-11-19 RX ORDER — DIPHENHYDRAMINE HYDROCHLORIDE 50 MG/ML
INJECTION INTRAMUSCULAR; INTRAVENOUS
Status: DISCONTINUED | OUTPATIENT
Start: 2024-11-19 | End: 2024-11-19 | Stop reason: SDUPTHER

## 2024-11-19 RX ORDER — SODIUM CHLORIDE, SODIUM LACTATE, POTASSIUM CHLORIDE, CALCIUM CHLORIDE 600; 310; 30; 20 MG/100ML; MG/100ML; MG/100ML; MG/100ML
INJECTION, SOLUTION INTRAVENOUS CONTINUOUS
Status: DISCONTINUED | OUTPATIENT
Start: 2024-11-19 | End: 2024-11-19 | Stop reason: HOSPADM

## 2024-11-19 RX ORDER — OXYCODONE HYDROCHLORIDE 5 MG/1
5 TABLET ORAL ONCE AS NEEDED
Status: COMPLETED | OUTPATIENT
Start: 2024-11-19 | End: 2024-11-19

## 2024-11-19 RX ORDER — KETOROLAC TROMETHAMINE 30 MG/ML
INJECTION, SOLUTION INTRAMUSCULAR; INTRAVENOUS
Status: DISCONTINUED | OUTPATIENT
Start: 2024-11-19 | End: 2024-11-19 | Stop reason: SDUPTHER

## 2024-11-19 RX ORDER — PROPOFOL 10 MG/ML
INJECTION, EMULSION INTRAVENOUS
Status: DISCONTINUED | OUTPATIENT
Start: 2024-11-19 | End: 2024-11-19 | Stop reason: SDUPTHER

## 2024-11-19 RX ORDER — SODIUM CHLORIDE 0.9 % (FLUSH) 0.9 %
5-40 SYRINGE (ML) INJECTION PRN
Status: DISCONTINUED | OUTPATIENT
Start: 2024-11-19 | End: 2024-11-19 | Stop reason: HOSPADM

## 2024-11-19 RX ORDER — NALOXONE HYDROCHLORIDE 0.4 MG/ML
INJECTION, SOLUTION INTRAMUSCULAR; INTRAVENOUS; SUBCUTANEOUS PRN
Status: DISCONTINUED | OUTPATIENT
Start: 2024-11-19 | End: 2024-11-19 | Stop reason: HOSPADM

## 2024-11-19 RX ORDER — ACETAMINOPHEN 500 MG
1000 TABLET ORAL ONCE
Status: COMPLETED | OUTPATIENT
Start: 2024-11-19 | End: 2024-11-19

## 2024-11-19 RX ORDER — LIDOCAINE HYDROCHLORIDE 20 MG/ML
INJECTION, SOLUTION EPIDURAL; INFILTRATION; INTRACAUDAL; PERINEURAL
Status: DISCONTINUED | OUTPATIENT
Start: 2024-11-19 | End: 2024-11-19 | Stop reason: SDUPTHER

## 2024-11-19 RX ORDER — FENTANYL CITRATE 50 UG/ML
25 INJECTION, SOLUTION INTRAMUSCULAR; INTRAVENOUS EVERY 5 MIN PRN
Status: DISCONTINUED | OUTPATIENT
Start: 2024-11-19 | End: 2024-11-19 | Stop reason: HOSPADM

## 2024-11-19 RX ORDER — DEXAMETHASONE SODIUM PHOSPHATE 4 MG/ML
INJECTION, SOLUTION INTRA-ARTICULAR; INTRALESIONAL; INTRAMUSCULAR; INTRAVENOUS; SOFT TISSUE
Status: DISCONTINUED | OUTPATIENT
Start: 2024-11-19 | End: 2024-11-19 | Stop reason: SDUPTHER

## 2024-11-19 RX ORDER — LABETALOL HYDROCHLORIDE 5 MG/ML
5 INJECTION, SOLUTION INTRAVENOUS
Status: DISCONTINUED | OUTPATIENT
Start: 2024-11-19 | End: 2024-11-19 | Stop reason: HOSPADM

## 2024-11-19 RX ORDER — SCOLOPAMINE TRANSDERMAL SYSTEM 1 MG/1
1 PATCH, EXTENDED RELEASE TRANSDERMAL ONCE
Status: DISCONTINUED | OUTPATIENT
Start: 2024-11-19 | End: 2024-11-19 | Stop reason: HOSPADM

## 2024-11-19 RX ORDER — FENTANYL CITRATE 50 UG/ML
INJECTION, SOLUTION INTRAMUSCULAR; INTRAVENOUS
Status: DISCONTINUED | OUTPATIENT
Start: 2024-11-19 | End: 2024-11-19 | Stop reason: SDUPTHER

## 2024-11-19 RX ORDER — LABETALOL HYDROCHLORIDE 5 MG/ML
INJECTION, SOLUTION INTRAVENOUS
Status: DISCONTINUED | OUTPATIENT
Start: 2024-11-19 | End: 2024-11-19 | Stop reason: SDUPTHER

## 2024-11-19 RX ORDER — SODIUM CHLORIDE 0.9 % (FLUSH) 0.9 %
5-40 SYRINGE (ML) INJECTION EVERY 12 HOURS SCHEDULED
Status: DISCONTINUED | OUTPATIENT
Start: 2024-11-19 | End: 2024-11-19 | Stop reason: HOSPADM

## 2024-11-19 RX ORDER — METHOCARBAMOL 100 MG/ML
1000 INJECTION, SOLUTION INTRAMUSCULAR; INTRAVENOUS
Status: COMPLETED | OUTPATIENT
Start: 2024-11-19 | End: 2024-11-19

## 2024-11-19 RX ORDER — PROCHLORPERAZINE EDISYLATE 5 MG/ML
5 INJECTION INTRAMUSCULAR; INTRAVENOUS
Status: DISCONTINUED | OUTPATIENT
Start: 2024-11-19 | End: 2024-11-19 | Stop reason: HOSPADM

## 2024-11-19 RX ORDER — OXYCODONE HYDROCHLORIDE 5 MG/1
TABLET ORAL
Status: DISCONTINUED
Start: 2024-11-19 | End: 2024-11-19 | Stop reason: HOSPADM

## 2024-11-19 RX ORDER — HYDRALAZINE HYDROCHLORIDE 20 MG/ML
5 INJECTION INTRAMUSCULAR; INTRAVENOUS
Status: DISCONTINUED | OUTPATIENT
Start: 2024-11-19 | End: 2024-11-19 | Stop reason: HOSPADM

## 2024-11-19 RX ORDER — ONDANSETRON 2 MG/ML
INJECTION INTRAMUSCULAR; INTRAVENOUS
Status: DISCONTINUED | OUTPATIENT
Start: 2024-11-19 | End: 2024-11-19 | Stop reason: SDUPTHER

## 2024-11-19 RX ORDER — DIPHENHYDRAMINE HYDROCHLORIDE 50 MG/ML
12.5 INJECTION INTRAMUSCULAR; INTRAVENOUS
Status: DISCONTINUED | OUTPATIENT
Start: 2024-11-19 | End: 2024-11-19 | Stop reason: HOSPADM

## 2024-11-19 RX ORDER — MIDAZOLAM HYDROCHLORIDE 1 MG/ML
INJECTION, SOLUTION INTRAMUSCULAR; INTRAVENOUS
Status: DISCONTINUED | OUTPATIENT
Start: 2024-11-19 | End: 2024-11-19 | Stop reason: SDUPTHER

## 2024-11-19 RX ORDER — ROCURONIUM BROMIDE 10 MG/ML
INJECTION, SOLUTION INTRAVENOUS
Status: DISCONTINUED | OUTPATIENT
Start: 2024-11-19 | End: 2024-11-19 | Stop reason: SDUPTHER

## 2024-11-19 RX ORDER — SODIUM CHLORIDE 9 MG/ML
INJECTION, SOLUTION INTRAVENOUS PRN
Status: DISCONTINUED | OUTPATIENT
Start: 2024-11-19 | End: 2024-11-19 | Stop reason: HOSPADM

## 2024-11-19 RX ORDER — SODIUM CHLORIDE 9 MG/ML
INJECTION, SOLUTION INTRAVENOUS
Status: DISCONTINUED
Start: 2024-11-19 | End: 2024-11-19 | Stop reason: HOSPADM

## 2024-11-19 RX ORDER — MEPERIDINE HYDROCHLORIDE 25 MG/ML
12.5 INJECTION INTRAMUSCULAR; INTRAVENOUS; SUBCUTANEOUS EVERY 5 MIN PRN
Status: DISCONTINUED | OUTPATIENT
Start: 2024-11-19 | End: 2024-11-19 | Stop reason: HOSPADM

## 2024-11-19 RX ADMIN — FENTANYL CITRATE 100 MCG: 50 INJECTION, SOLUTION INTRAMUSCULAR; INTRAVENOUS at 07:26

## 2024-11-19 RX ADMIN — LABETALOL HYDROCHLORIDE 10 MG: 5 INJECTION INTRAVENOUS at 07:35

## 2024-11-19 RX ADMIN — FENTANYL CITRATE 50 MCG: 50 INJECTION, SOLUTION INTRAMUSCULAR; INTRAVENOUS at 07:50

## 2024-11-19 RX ADMIN — HYDROMORPHONE HYDROCHLORIDE 0.5 MG: 1 INJECTION, SOLUTION INTRAMUSCULAR; INTRAVENOUS; SUBCUTANEOUS at 09:18

## 2024-11-19 RX ADMIN — FAMOTIDINE 20 MG: 10 INJECTION, SOLUTION INTRAVENOUS at 06:52

## 2024-11-19 RX ADMIN — SODIUM CHLORIDE, POTASSIUM CHLORIDE, SODIUM LACTATE AND CALCIUM CHLORIDE: 600; 310; 30; 20 INJECTION, SOLUTION INTRAVENOUS at 07:06

## 2024-11-19 RX ADMIN — FENTANYL CITRATE 50 MCG: 50 INJECTION, SOLUTION INTRAMUSCULAR; INTRAVENOUS at 07:19

## 2024-11-19 RX ADMIN — PROPOFOL 200 MG: 10 INJECTION, EMULSION INTRAVENOUS at 07:19

## 2024-11-19 RX ADMIN — ROCURONIUM BROMIDE 50 MG: 10 INJECTION, SOLUTION INTRAVENOUS at 07:19

## 2024-11-19 RX ADMIN — ONDANSETRON 4 MG: 2 INJECTION INTRAMUSCULAR; INTRAVENOUS at 07:26

## 2024-11-19 RX ADMIN — LIDOCAINE HYDROCHLORIDE 100 MG: 20 INJECTION, SOLUTION EPIDURAL; INFILTRATION; INTRACAUDAL; PERINEURAL at 07:19

## 2024-11-19 RX ADMIN — MIDAZOLAM 2 MG: 1 INJECTION INTRAMUSCULAR; INTRAVENOUS at 07:09

## 2024-11-19 RX ADMIN — OXYCODONE 5 MG: 5 TABLET ORAL at 10:16

## 2024-11-19 RX ADMIN — FENTANYL CITRATE 50 MCG: 50 INJECTION, SOLUTION INTRAMUSCULAR; INTRAVENOUS at 07:43

## 2024-11-19 RX ADMIN — SUGAMMADEX 200 MG: 100 INJECTION, SOLUTION INTRAVENOUS at 07:42

## 2024-11-19 RX ADMIN — WATER 2000 MG: 1 INJECTION INTRAMUSCULAR; INTRAVENOUS; SUBCUTANEOUS at 07:15

## 2024-11-19 RX ADMIN — DEXAMETHASONE SODIUM PHOSPHATE 8 MG: 4 INJECTION, SOLUTION INTRAMUSCULAR; INTRAVENOUS at 07:26

## 2024-11-19 RX ADMIN — DIPHENHYDRAMINE HYDROCHLORIDE 25 MG: 50 INJECTION, SOLUTION INTRAMUSCULAR; INTRAVENOUS at 07:10

## 2024-11-19 RX ADMIN — METHOCARBAMOL 1000 MG: 100 INJECTION INTRAMUSCULAR; INTRAVENOUS at 08:19

## 2024-11-19 RX ADMIN — ACETAMINOPHEN 1000 MG: 500 TABLET ORAL at 06:51

## 2024-11-19 RX ADMIN — KETOROLAC TROMETHAMINE 15 MG: 30 INJECTION, SOLUTION INTRAMUSCULAR; INTRAVENOUS at 07:42

## 2024-11-19 ASSESSMENT — PAIN DESCRIPTION - DESCRIPTORS
DESCRIPTORS: DISCOMFORT
DESCRIPTORS: ACHING;DISCOMFORT;SORE;TENDER
DESCRIPTORS: ACHING;DISCOMFORT;BURNING;CRAMPING;SORE;TENDER

## 2024-11-19 ASSESSMENT — PAIN - FUNCTIONAL ASSESSMENT
PAIN_FUNCTIONAL_ASSESSMENT: 0-10
PAIN_FUNCTIONAL_ASSESSMENT: 0-10
PAIN_FUNCTIONAL_ASSESSMENT: ACTIVITIES ARE NOT PREVENTED
PAIN_FUNCTIONAL_ASSESSMENT: 0-10
PAIN_FUNCTIONAL_ASSESSMENT: 0-10

## 2024-11-19 ASSESSMENT — PAIN SCALES - GENERAL
PAINLEVEL_OUTOF10: 8
PAINLEVEL_OUTOF10: 7

## 2024-11-19 ASSESSMENT — PAIN DESCRIPTION - LOCATION: LOCATION: ABDOMEN

## 2024-11-19 NOTE — DISCHARGE INSTRUCTIONS
Home Care   Keep the incision area clean and dry   Okay to take a shower starting tomorrow.   Place ice on incisions to decrease the pain and bruising.   Physical Activity   Walk frequently to prevent blood clots in the legs, but do not do anything that causes pain in the incisions.   Breath deeply every hour to prevent pneumonia.   No heavy lifting over 10lbs for 4-6wks.  Work   Okay to return to work with light duty next week when your pain is controlled   Avoid Heavy lifting while at work   Await follow-up appointment in 2 weeks for full work clearance   Medications   Restart blood thinners in 24 hours if no sign of bleeding   Take Ibuprofen for moderate pain. Use the Oxycodone for more severe pain.   No driving while taking prescription pain medication   Follow-up   Schedule a follow-up appointment with Dr. Mckeon in 2 weeks.  Call Your Doctor If Any of the Following Occurs   Signs of infection, including fever and chills   Redness, swelling, increasing pain, excessive bleeding, or discharge at the incision site   Cough, shortness of breath, chest pain   Increased abdominal pain   Nausea and/or vomiting that does not resolve off narcotics.   Pain, burning, urgency or frequency of urination, or blood in the urine   Pain and/or swelling in your feet, calves, or legs   Dark urine, light stools, or evidence of jaundice (yellowing of the skin or eyes).   In case of an emergency, CALL 911 immediately.

## 2024-11-19 NOTE — ANESTHESIA PRE PROCEDURE
(170 lb)   Height: 1.626 m (5' 4\")                                              BP Readings from Last 3 Encounters:   11/01/24 119/74   10/29/24 137/81   09/16/24 (!) 127/115       NPO Status:                                                                                 BMI:   Wt Readings from Last 3 Encounters:   11/01/24 77.1 kg (170 lb)   10/29/24 77.1 kg (170 lb)   09/16/24 77.1 kg (170 lb)     Body mass index is 29.18 kg/m².    CBC:   Lab Results   Component Value Date/Time    WBC 15.5 11/01/2024 06:22 AM    RBC 4.68 11/01/2024 06:22 AM    HGB 14.0 11/01/2024 06:22 AM    HCT 41.1 11/01/2024 06:22 AM    MCV 87.8 11/01/2024 06:22 AM    RDW 15.0 11/01/2024 06:22 AM     11/01/2024 06:22 AM       CMP:   Lab Results   Component Value Date/Time     11/01/2024 06:22 AM    K 3.3 11/01/2024 06:22 AM    K 3.4 10/11/2022 04:42 AM     11/01/2024 06:22 AM    CO2 21 11/01/2024 06:22 AM    BUN 16 11/01/2024 06:22 AM    CREATININE 0.7 11/01/2024 06:22 AM    GFRAA >60 10/12/2022 10:40 AM    LABGLOM >90 11/01/2024 06:22 AM    LABGLOM >90 04/07/2024 05:12 AM    GLUCOSE 94 11/01/2024 06:22 AM    GLUCOSE 92 05/08/2011 02:04 PM    CALCIUM 9.0 11/01/2024 06:22 AM    BILITOT 0.7 11/01/2024 06:22 AM    ALKPHOS 75 11/01/2024 06:22 AM    AST 17 11/01/2024 06:22 AM    ALT 13 11/01/2024 06:22 AM       POC Tests: No results for input(s): \"POCGLU\", \"POCNA\", \"POCK\", \"POCCL\", \"POCBUN\", \"POCHEMO\", \"POCHCT\" in the last 72 hours.    Coags:   Lab Results   Component Value Date/Time    PROTIME 11.1 04/18/2024 09:40 AM    INR 1.0 04/18/2024 09:40 AM    APTT 32.3 04/18/2024 09:40 AM    APTT 29.1 01/12/2021 01:40 PM       HCG (If Applicable):   Lab Results   Component Value Date    PREGTESTUR NEGATIVE 03/04/2024    PREGSERUM NEGATIVE 07/08/2024    HCG 54961.5 (H) 07/30/2012    HCGQUANT 97833.0 (H) 03/28/2022        ABGs: No results found for: \"PHART\", \"PO2ART\", \"YLN6XXD\", \"TRX9KFY\", \"BEART\", \"Y9IBOFLA\"     Type & Screen (If 
no chest pain/no palpitations

## 2024-11-19 NOTE — OP NOTE
Operative Note      Patient: Cynthia Ayala  YOB: 1985  MRN: [id number]    Date of Procedure: 11/19/2024    Pre-Op Diagnosis Codes:      * RLQ abdominal pain [R10.31]    Post-Op Diagnosis: Same       Procedure(s):  LAPAROSCOPY APPENDECTOMY    Surgeon(s):  Ranjit Mckeon MD    Assistant:   Resident: Valentina Amos MD    Anesthesia: General    Estimated Blood Loss (mL): Minimal    Complications: None    Specimens:   ID Type Source Tests Collected by Time Destination   A : appendix Tissue Appendix SURGICAL PATHOLOGY Ranjit Mckeon MD 11/19/2024 0736        Implants:  * No implants in log *      Drains:   [REMOVED] Urinary Catheter 11/19/24 Sánchez (Removed)       Findings:  Infection Present At Time Of Surgery (PATOS) (choose all levels that have infection present):  No infection present  Other Findings: no ovarian cyst, no pelvic pathology, no Meckel's diverticulum    Detailed Description of Procedure:   The patient was identified and brought to the operating room. She was placed supine on the operating table. SCDs were on and functioning.  General anesthesia was induced per the anesthesia record.  Perioperative antibiotics were administered.  A timeout was called and agreed upon by all present.  The abdomen was prepped and draped in the usual sterile fashion.    Using a #11 blade a supraumbilical incision was made and the Veress needle was inserted into the abdominal cavity.  Pneumoperitoneum was instilled to 15 mmHg which the patient tolerated well.  A 5 mm trocar was inserted under direct visualization and the camera placed in the abdomen.  We examined the abdomen including the liver, peritoneum and pelvis.  A suprapubic 5 mm port was inserted under direct visualization and another 10 mm left lower quadrant port was also placed under direct visualization.  2 graspers were used to fully examine the pelvis including both ovaries.  There were no obvious ovarian cysts or other abnormalities in the

## 2024-11-19 NOTE — ANESTHESIA POSTPROCEDURE EVALUATION
Department of Anesthesiology  Postprocedure Note    Patient: Cynthia Ayala  MRN: [id number]  YOB: 1985  Date of evaluation: 11/19/2024    Procedure Summary       Date: 11/19/24 Room / Location: [address]    Anesthesia Start: 0705 Anesthesia Stop:     Procedure: LAPAROSCOPY APPENDECTOMY (Abdomen) Diagnosis:       RLQ abdominal pain      (RLQ abdominal pain [R10.31])    Surgeons: Ranjit Mckeon MD Responsible Provider: Angeli Magaña DO    Anesthesia Type: general ASA Status: 3            Anesthesia Type: No value filed.    Alan Phase I: Alan Score: 10    Alan Phase II:      Anesthesia Post Evaluation    Patient location during evaluation: PACU  Patient participation: complete - patient participated  Level of consciousness: awake  Airway patency: patent  Nausea & Vomiting: no nausea and no vomiting  Cardiovascular status: hemodynamically stable  Respiratory status: acceptable  Hydration status: euvolemic  Pain management: adequate        No notable events documented.

## 2024-11-19 NOTE — INTERVAL H&P NOTE
Update History & Physical    The patient's History and Physical of November 6, 2024 was reviewed with the patient and I examined the patient. There was no change. The surgical site was confirmed by the patient and me.     Plan: The risks, benefits, expected outcome, and alternative to the recommended procedure have been discussed with the patient. Patient understands and wants to proceed with the procedure.     Electronically signed by Ranjit Mckeon MD on 11/19/2024 at 6:14 AM

## 2024-11-21 DIAGNOSIS — R10.31 ABDOMINAL PAIN, RIGHT LOWER QUADRANT: Primary | ICD-10-CM

## 2024-11-21 LAB — SURGICAL PATHOLOGY REPORT: NORMAL

## 2024-11-21 RX ORDER — OXYCODONE AND ACETAMINOPHEN 5; 325 MG/1; MG/1
1 TABLET ORAL EVERY 6 HOURS PRN
Qty: 20 TABLET | Refills: 0 | Status: SHIPPED | OUTPATIENT
Start: 2024-11-21 | End: 2024-11-26

## 2024-12-09 ENCOUNTER — HOSPITAL ENCOUNTER (EMERGENCY)
Age: 39
Discharge: HOME | End: 2024-12-09
Attending: EMERGENCY MEDICINE
Payer: COMMERCIAL

## 2024-12-09 ENCOUNTER — APPOINTMENT (OUTPATIENT)
Dept: ULTRASOUND IMAGING | Age: 39
End: 2024-12-09
Payer: COMMERCIAL

## 2024-12-09 ENCOUNTER — APPOINTMENT (OUTPATIENT)
Dept: CT IMAGING | Age: 39
End: 2024-12-09
Payer: COMMERCIAL

## 2024-12-09 VITALS
WEIGHT: 172 LBS | BODY MASS INDEX: 29.37 KG/M2 | RESPIRATION RATE: 20 BRPM | SYSTOLIC BLOOD PRESSURE: 129 MMHG | TEMPERATURE: 97.9 F | HEIGHT: 64 IN | DIASTOLIC BLOOD PRESSURE: 85 MMHG | OXYGEN SATURATION: 99 % | HEART RATE: 85 BPM

## 2024-12-09 DIAGNOSIS — R11.2 NAUSEA AND VOMITING, UNSPECIFIED VOMITING TYPE: ICD-10-CM

## 2024-12-09 DIAGNOSIS — R10.9 ABDOMINAL PAIN, UNSPECIFIED ABDOMINAL LOCATION: Primary | ICD-10-CM

## 2024-12-09 LAB
ALBUMIN SERPL-MCNC: 4.7 G/DL (ref 3.5–5.2)
ALP SERPL-CCNC: 93 U/L (ref 35–104)
ALT SERPL-CCNC: 12 U/L (ref 0–32)
ANION GAP SERPL CALCULATED.3IONS-SCNC: 12 MMOL/L (ref 7–16)
AST SERPL-CCNC: 16 U/L (ref 0–31)
BACTERIA URNS QL MICRO: ABNORMAL
BASOPHILS # BLD: 0.08 K/UL (ref 0–0.2)
BASOPHILS NFR BLD: 0 % (ref 0–2)
BILIRUB SERPL-MCNC: 0.5 MG/DL (ref 0–1.2)
BILIRUB UR QL STRIP: NEGATIVE
BUN SERPL-MCNC: 18 MG/DL (ref 6–20)
CALCIUM SERPL-MCNC: 9.9 MG/DL (ref 8.6–10.2)
CHLORIDE SERPL-SCNC: 104 MMOL/L (ref 98–107)
CLARITY UR: ABNORMAL
CO2 SERPL-SCNC: 21 MMOL/L (ref 22–29)
COLOR UR: YELLOW
CREAT SERPL-MCNC: 0.6 MG/DL (ref 0.5–1)
EKG ATRIAL RATE: 103 BPM
EKG P AXIS: 79 DEGREES
EKG P-R INTERVAL: 110 MS
EKG Q-T INTERVAL: 362 MS
EKG QRS DURATION: 88 MS
EKG QTC CALCULATION (BAZETT): 474 MS
EKG R AXIS: -44 DEGREES
EKG T AXIS: 48 DEGREES
EKG VENTRICULAR RATE: 103 BPM
EOSINOPHIL # BLD: 0.31 K/UL (ref 0.05–0.5)
EOSINOPHILS RELATIVE PERCENT: 2 % (ref 0–6)
EPI CELLS #/AREA URNS HPF: ABNORMAL /HPF
ERYTHROCYTE [DISTWIDTH] IN BLOOD BY AUTOMATED COUNT: 14.9 % (ref 11.5–15)
GFR, ESTIMATED: >90 ML/MIN/1.73M2
GLUCOSE SERPL-MCNC: 129 MG/DL (ref 74–99)
GLUCOSE UR STRIP-MCNC: NEGATIVE MG/DL
HCG SERPL QL: NEGATIVE
HCT VFR BLD AUTO: 42.9 % (ref 34–48)
HGB BLD-MCNC: 14.6 G/DL (ref 11.5–15.5)
HGB UR QL STRIP.AUTO: NEGATIVE
IMM GRANULOCYTES # BLD AUTO: 0.09 K/UL (ref 0–0.58)
IMM GRANULOCYTES NFR BLD: 1 % (ref 0–5)
KETONES UR STRIP-MCNC: ABNORMAL MG/DL
LACTATE BLDV-SCNC: 1.4 MMOL/L (ref 0.5–2.2)
LEUKOCYTE ESTERASE UR QL STRIP: NEGATIVE
LIPASE SERPL-CCNC: 15 U/L (ref 13–60)
LYMPHOCYTES NFR BLD: 2.46 K/UL (ref 1.5–4)
LYMPHOCYTES RELATIVE PERCENT: 14 % (ref 20–42)
MCH RBC QN AUTO: 30.9 PG (ref 26–35)
MCHC RBC AUTO-ENTMCNC: 34 G/DL (ref 32–34.5)
MCV RBC AUTO: 90.9 FL (ref 80–99.9)
MONOCYTES NFR BLD: 0.66 K/UL (ref 0.1–0.95)
MONOCYTES NFR BLD: 4 % (ref 2–12)
NEUTROPHILS NFR BLD: 80 % (ref 43–80)
NEUTS SEG NFR BLD: 14.68 K/UL (ref 1.8–7.3)
NITRITE UR QL STRIP: NEGATIVE
PH UR STRIP: 6 [PH] (ref 5–9)
PLATELET # BLD AUTO: 528 K/UL (ref 130–450)
PMV BLD AUTO: 9.1 FL (ref 7–12)
POTASSIUM SERPL-SCNC: 4.4 MMOL/L (ref 3.5–5)
PROT SERPL-MCNC: 7.6 G/DL (ref 6.4–8.3)
PROT UR STRIP-MCNC: 30 MG/DL
RBC # BLD AUTO: 4.72 M/UL (ref 3.5–5.5)
RBC #/AREA URNS HPF: ABNORMAL /HPF
SODIUM SERPL-SCNC: 137 MMOL/L (ref 132–146)
SP GR UR STRIP: >1.03 (ref 1–1.03)
UROBILINOGEN UR STRIP-ACNC: 0.2 EU/DL (ref 0–1)
WBC #/AREA URNS HPF: ABNORMAL /HPF
WBC OTHER # BLD: 18.3 K/UL (ref 4.5–11.5)

## 2024-12-09 PROCEDURE — 6360000002 HC RX W HCPCS: Performed by: EMERGENCY MEDICINE

## 2024-12-09 PROCEDURE — 99285 EMERGENCY DEPT VISIT HI MDM: CPT

## 2024-12-09 PROCEDURE — 83690 ASSAY OF LIPASE: CPT

## 2024-12-09 PROCEDURE — 85025 COMPLETE CBC W/AUTO DIFF WBC: CPT

## 2024-12-09 PROCEDURE — 71275 CT ANGIOGRAPHY CHEST: CPT

## 2024-12-09 PROCEDURE — 96374 THER/PROPH/DIAG INJ IV PUSH: CPT

## 2024-12-09 PROCEDURE — 76830 TRANSVAGINAL US NON-OB: CPT

## 2024-12-09 PROCEDURE — 93005 ELECTROCARDIOGRAM TRACING: CPT | Performed by: NURSE PRACTITIONER

## 2024-12-09 PROCEDURE — 83605 ASSAY OF LACTIC ACID: CPT

## 2024-12-09 PROCEDURE — 93010 ELECTROCARDIOGRAM REPORT: CPT | Performed by: INTERNAL MEDICINE

## 2024-12-09 PROCEDURE — 80053 COMPREHEN METABOLIC PANEL: CPT

## 2024-12-09 PROCEDURE — 6360000002 HC RX W HCPCS: Performed by: NURSE PRACTITIONER

## 2024-12-09 PROCEDURE — 6360000004 HC RX CONTRAST MEDICATION: Performed by: RADIOLOGY

## 2024-12-09 PROCEDURE — 84703 CHORIONIC GONADOTROPIN ASSAY: CPT

## 2024-12-09 PROCEDURE — 96375 TX/PRO/DX INJ NEW DRUG ADDON: CPT

## 2024-12-09 PROCEDURE — 96372 THER/PROPH/DIAG INJ SC/IM: CPT

## 2024-12-09 PROCEDURE — 81001 URINALYSIS AUTO W/SCOPE: CPT

## 2024-12-09 PROCEDURE — 74177 CT ABD & PELVIS W/CONTRAST: CPT

## 2024-12-09 RX ORDER — ONDANSETRON 2 MG/ML
4 INJECTION INTRAMUSCULAR; INTRAVENOUS ONCE
Status: COMPLETED | OUTPATIENT
Start: 2024-12-09 | End: 2024-12-09

## 2024-12-09 RX ORDER — POTASSIUM CHLORIDE 7.45 MG/ML
10 INJECTION INTRAVENOUS ONCE
Status: DISCONTINUED | OUTPATIENT
Start: 2024-12-09 | End: 2024-12-09 | Stop reason: HOSPADM

## 2024-12-09 RX ORDER — DROPERIDOL 2.5 MG/ML
5 INJECTION, SOLUTION INTRAMUSCULAR; INTRAVENOUS ONCE
Status: COMPLETED | OUTPATIENT
Start: 2024-12-09 | End: 2024-12-09

## 2024-12-09 RX ORDER — METOCLOPRAMIDE 10 MG/1
10 TABLET ORAL 4 TIMES DAILY
Qty: 120 TABLET | Refills: 0 | Status: SHIPPED | OUTPATIENT
Start: 2024-12-09

## 2024-12-09 RX ORDER — IOPAMIDOL 755 MG/ML
75 INJECTION, SOLUTION INTRAVASCULAR
Status: COMPLETED | OUTPATIENT
Start: 2024-12-09 | End: 2024-12-09

## 2024-12-09 RX ORDER — FENTANYL CITRATE 50 UG/ML
25 INJECTION, SOLUTION INTRAMUSCULAR; INTRAVENOUS ONCE
Status: COMPLETED | OUTPATIENT
Start: 2024-12-09 | End: 2024-12-09

## 2024-12-09 RX ORDER — ASPIRIN 81 MG
1 TABLET,CHEWABLE ORAL 2 TIMES DAILY PRN
Qty: 60 G | Refills: 0 | Status: SHIPPED | OUTPATIENT
Start: 2024-12-09

## 2024-12-09 RX ADMIN — IOPAMIDOL 75 ML: 755 INJECTION, SOLUTION INTRAVENOUS at 11:51

## 2024-12-09 RX ADMIN — DROPERIDOL 5 MG: 2.5 INJECTION, SOLUTION INTRAMUSCULAR; INTRAVENOUS at 15:02

## 2024-12-09 RX ADMIN — ONDANSETRON 4 MG: 2 INJECTION, SOLUTION INTRAMUSCULAR; INTRAVENOUS at 09:47

## 2024-12-09 RX ADMIN — FENTANYL CITRATE 25 MCG: 50 INJECTION INTRAMUSCULAR; INTRAVENOUS at 09:47

## 2024-12-09 RX ADMIN — DROPERIDOL 5 MG: 2.5 INJECTION, SOLUTION INTRAMUSCULAR; INTRAVENOUS at 10:16

## 2024-12-09 ASSESSMENT — PAIN DESCRIPTION - DESCRIPTORS: DESCRIPTORS: SHARP;STABBING;TEARING

## 2024-12-09 ASSESSMENT — PAIN DESCRIPTION - FREQUENCY: FREQUENCY: CONTINUOUS

## 2024-12-09 ASSESSMENT — PAIN DESCRIPTION - ORIENTATION: ORIENTATION: LEFT;UPPER

## 2024-12-09 ASSESSMENT — PAIN DESCRIPTION - PAIN TYPE: TYPE: ACUTE PAIN

## 2024-12-09 ASSESSMENT — PAIN DESCRIPTION - LOCATION: LOCATION: ABDOMEN

## 2024-12-09 ASSESSMENT — PAIN SCALES - GENERAL
PAINLEVEL_OUTOF10: 10
PAINLEVEL_OUTOF10: 10

## 2024-12-09 ASSESSMENT — PAIN - FUNCTIONAL ASSESSMENT
PAIN_FUNCTIONAL_ASSESSMENT: 0-10
PAIN_FUNCTIONAL_ASSESSMENT: INTOLERABLE, UNABLE TO DO ANY ACTIVE OR PASSIVE ACTIVITIES

## 2024-12-09 NOTE — ED NOTES
[facility] due to 10/10 abdominal pain.Pt recently had appendectomy (3 weeks post op). Pt complains pain in ULQ started approx three days ago, progressively getting worse.

## 2024-12-09 NOTE — ED PROVIDER NOTES
ordered.  Radiology: ordered.  ECG/medicine tests: ordered.    Risk  OTC drugs.  Prescription drug management.    Patient presenting to ED today for evaluation of abdominal pain, nausea, vomiting x 3 days.  Appears in mild distress upon initial evaluation.  Her vital signs are within normal limits.  Patient was seen and evaluated with Dr. Cortez.  Differential diagnosis include but not limited to: UTI, gastroenteritis, pyelonephritis, renal calculi, SBO to name a few.  Workup today consisted of urinalysis without evidence of urinary tract infection.  WBCs mildly elevated at 18.3, electrolytes are within normal limits, lactic acid 1.4, lipase 15, pregnancy negative.  CT abdomen pelvis and pulmonary interpreted by radiologist as no acute intra-abdominal process and no PE.  Ultrasound interpreted by radiologist as no torsion, simple cyst noted on right ovary.  Patient was evaluated after droperidol administration, she is feeling better.  She has had no vomiting.  She has been up ambulating to the bathroom.  Results were discussed with patient.  She is stable for outpatient management.  She was advised on discontinuing any marijuana products as this may be contributing to her abdominal pain and nausea/vomiting.  ED return precautions were discussed.  She is to follow-up with her primary care provider and her surgeon.  Patient verbalized understanding and is agreeable with plan of care.    ED Course as of 12/09/24 1657   Mon Dec 09, 2024   1042 EKG shows sinus tachycardia rate 103 bpm QTc is 474 ms with left axis deviation no ischemic changes noted this was reviewed interpreted by me Dr. Cortez. [JN]      ED Course User Index  [JN] Jared Cortez DO       CONSULTS: (Who and What was discussed)  None      I am the Primary Clinician of Record.    FINAL IMPRESSION      1. Abdominal pain, unspecified abdominal location    2. Nausea and vomiting, unspecified vomiting type          DISPOSITION/PLAN     DISPOSITION Decision To

## 2025-04-07 ENCOUNTER — HOSPITAL ENCOUNTER (INPATIENT)
Age: 40
LOS: 1 days | Discharge: HOME | DRG: 347 | End: 2025-04-08
Attending: EMERGENCY MEDICINE | Admitting: FAMILY MEDICINE
Payer: COMMERCIAL

## 2025-04-07 ENCOUNTER — APPOINTMENT (OUTPATIENT)
Dept: CT IMAGING | Age: 40
DRG: 347 | End: 2025-04-07
Attending: EMERGENCY MEDICINE
Payer: COMMERCIAL

## 2025-04-07 ENCOUNTER — APPOINTMENT (OUTPATIENT)
Dept: MRI IMAGING | Age: 40
DRG: 347 | End: 2025-04-07
Payer: COMMERCIAL

## 2025-04-07 DIAGNOSIS — M54.9 INTRACTABLE BACK PAIN: ICD-10-CM

## 2025-04-07 DIAGNOSIS — G89.29 ACUTE EXACERBATION OF CHRONIC LOW BACK PAIN: Primary | ICD-10-CM

## 2025-04-07 DIAGNOSIS — M54.50 ACUTE EXACERBATION OF CHRONIC LOW BACK PAIN: Primary | ICD-10-CM

## 2025-04-07 LAB
ALBUMIN SERPL-MCNC: 5.2 G/DL (ref 3.5–5.2)
ALP SERPL-CCNC: 83 U/L (ref 35–104)
ALT SERPL-CCNC: 21 U/L (ref 0–32)
ANION GAP SERPL CALCULATED.3IONS-SCNC: 19 MMOL/L (ref 7–16)
AST SERPL-CCNC: 30 U/L (ref 0–31)
BACTERIA URNS QL MICRO: ABNORMAL
BASOPHILS # BLD: 0.13 K/UL (ref 0–0.2)
BASOPHILS NFR BLD: 1 % (ref 0–2)
BILIRUB SERPL-MCNC: 0.4 MG/DL (ref 0–1.2)
BILIRUB UR QL STRIP: NEGATIVE
BUN SERPL-MCNC: 18 MG/DL (ref 6–20)
CALCIUM SERPL-MCNC: 9.7 MG/DL (ref 8.6–10.2)
CHLORIDE SERPL-SCNC: 105 MMOL/L (ref 98–107)
CLARITY UR: ABNORMAL
CO2 SERPL-SCNC: 13 MMOL/L (ref 22–29)
COLOR UR: ABNORMAL
CREAT SERPL-MCNC: 0.6 MG/DL (ref 0.5–1)
CRP SERPL HS-MCNC: 6 MG/L (ref 0–5)
EOSINOPHIL # BLD: 0.06 K/UL (ref 0.05–0.5)
EOSINOPHILS RELATIVE PERCENT: 0 % (ref 0–6)
EPI CELLS #/AREA URNS HPF: ABNORMAL /HPF
ERYTHROCYTE [DISTWIDTH] IN BLOOD BY AUTOMATED COUNT: 14 % (ref 11.5–15)
GFR, ESTIMATED: >90 ML/MIN/1.73M2
GLUCOSE SERPL-MCNC: 197 MG/DL (ref 74–99)
GLUCOSE UR STRIP-MCNC: NEGATIVE MG/DL
HCG, URINE, POC: NEGATIVE
HCT VFR BLD AUTO: 42.2 % (ref 34–48)
HGB BLD-MCNC: 14.5 G/DL (ref 11.5–15.5)
HGB UR QL STRIP.AUTO: ABNORMAL
IMM GRANULOCYTES # BLD AUTO: 0.28 K/UL (ref 0–0.58)
IMM GRANULOCYTES NFR BLD: 1 % (ref 0–5)
KETONES UR STRIP-MCNC: 40 MG/DL
LEUKOCYTE ESTERASE UR QL STRIP: ABNORMAL
LYMPHOCYTES NFR BLD: 2.72 K/UL (ref 1.5–4)
LYMPHOCYTES RELATIVE PERCENT: 12 % (ref 20–42)
Lab: NORMAL
MCH RBC QN AUTO: 31.2 PG (ref 26–35)
MCHC RBC AUTO-ENTMCNC: 34.4 G/DL (ref 32–34.5)
MCV RBC AUTO: 90.8 FL (ref 80–99.9)
MONOCYTES NFR BLD: 0.66 K/UL (ref 0.1–0.95)
MONOCYTES NFR BLD: 3 % (ref 2–12)
MUCOUS THREADS URNS QL MICRO: PRESENT
NEGATIVE QC PASS/FAIL: NORMAL
NEUTROPHILS NFR BLD: 83 % (ref 43–80)
NEUTS SEG NFR BLD: 18.37 K/UL (ref 1.8–7.3)
NITRITE UR QL STRIP: NEGATIVE
PH UR STRIP: 6 [PH] (ref 5–8)
PLATELET # BLD AUTO: 546 K/UL (ref 130–450)
PMV BLD AUTO: 9.1 FL (ref 7–12)
POSITIVE QC PASS/FAIL: NORMAL
POTASSIUM SERPL-SCNC: 5.5 MMOL/L (ref 3.5–5)
PROT SERPL-MCNC: 8.3 G/DL (ref 6.4–8.3)
PROT UR STRIP-MCNC: >=300 MG/DL
RBC # BLD AUTO: 4.65 M/UL (ref 3.5–5.5)
RBC #/AREA URNS HPF: ABNORMAL /HPF
SODIUM SERPL-SCNC: 137 MMOL/L (ref 132–146)
SP GR UR STRIP: >1.03 (ref 1–1.03)
UROBILINOGEN UR STRIP-ACNC: 1 EU/DL (ref 0–1)
WBC #/AREA URNS HPF: ABNORMAL /HPF
WBC OTHER # BLD: 22.2 K/UL (ref 4.5–11.5)

## 2025-04-07 PROCEDURE — 80053 COMPREHEN METABOLIC PANEL: CPT

## 2025-04-07 PROCEDURE — 2580000003 HC RX 258: Performed by: EMERGENCY MEDICINE

## 2025-04-07 PROCEDURE — 85652 RBC SED RATE AUTOMATED: CPT

## 2025-04-07 PROCEDURE — A9579 GAD-BASE MR CONTRAST NOS,1ML: HCPCS | Performed by: RADIOLOGY

## 2025-04-07 PROCEDURE — 51701 INSERT BLADDER CATHETER: CPT

## 2025-04-07 PROCEDURE — 6360000002 HC RX W HCPCS: Performed by: EMERGENCY MEDICINE

## 2025-04-07 PROCEDURE — 86140 C-REACTIVE PROTEIN: CPT

## 2025-04-07 PROCEDURE — 96374 THER/PROPH/DIAG INJ IV PUSH: CPT

## 2025-04-07 PROCEDURE — 72131 CT LUMBAR SPINE W/O DYE: CPT

## 2025-04-07 PROCEDURE — 99285 EMERGENCY DEPT VISIT HI MDM: CPT

## 2025-04-07 PROCEDURE — 85025 COMPLETE CBC W/AUTO DIFF WBC: CPT

## 2025-04-07 PROCEDURE — 6370000000 HC RX 637 (ALT 250 FOR IP): Performed by: EMERGENCY MEDICINE

## 2025-04-07 PROCEDURE — 81001 URINALYSIS AUTO W/SCOPE: CPT

## 2025-04-07 PROCEDURE — 96372 THER/PROPH/DIAG INJ SC/IM: CPT

## 2025-04-07 PROCEDURE — 6360000004 HC RX CONTRAST MEDICATION: Performed by: RADIOLOGY

## 2025-04-07 PROCEDURE — 72158 MRI LUMBAR SPINE W/O & W/DYE: CPT

## 2025-04-07 PROCEDURE — 96375 TX/PRO/DX INJ NEW DRUG ADDON: CPT

## 2025-04-07 RX ORDER — KETOROLAC TROMETHAMINE 30 MG/ML
15 INJECTION, SOLUTION INTRAMUSCULAR; INTRAVENOUS ONCE
Status: COMPLETED | OUTPATIENT
Start: 2025-04-07 | End: 2025-04-07

## 2025-04-07 RX ORDER — SODIUM CHLORIDE 9 MG/ML
INJECTION, SOLUTION INTRAVENOUS CONTINUOUS
Status: DISCONTINUED | OUTPATIENT
Start: 2025-04-07 | End: 2025-04-09 | Stop reason: HOSPADM

## 2025-04-07 RX ORDER — GABAPENTIN 400 MG/1
800 CAPSULE ORAL ONCE
Status: COMPLETED | OUTPATIENT
Start: 2025-04-07 | End: 2025-04-07

## 2025-04-07 RX ORDER — MORPHINE SULFATE 4 MG/ML
4 INJECTION, SOLUTION INTRAMUSCULAR; INTRAVENOUS ONCE
Refills: 0 | Status: COMPLETED | OUTPATIENT
Start: 2025-04-07 | End: 2025-04-07

## 2025-04-07 RX ORDER — PROCHLORPERAZINE EDISYLATE 5 MG/ML
10 INJECTION INTRAMUSCULAR; INTRAVENOUS ONCE
Status: COMPLETED | OUTPATIENT
Start: 2025-04-07 | End: 2025-04-07

## 2025-04-07 RX ORDER — FENTANYL CITRATE 50 UG/ML
50 INJECTION, SOLUTION INTRAMUSCULAR; INTRAVENOUS ONCE
Status: COMPLETED | OUTPATIENT
Start: 2025-04-07 | End: 2025-04-07

## 2025-04-07 RX ORDER — DIPHENHYDRAMINE HYDROCHLORIDE 50 MG/ML
50 INJECTION, SOLUTION INTRAMUSCULAR; INTRAVENOUS ONCE
Status: COMPLETED | OUTPATIENT
Start: 2025-04-07 | End: 2025-04-07

## 2025-04-07 RX ORDER — PROMETHAZINE HYDROCHLORIDE 25 MG/ML
12.5 INJECTION, SOLUTION INTRAMUSCULAR; INTRAVENOUS ONCE
Status: COMPLETED | OUTPATIENT
Start: 2025-04-07 | End: 2025-04-07

## 2025-04-07 RX ADMIN — KETOROLAC TROMETHAMINE 15 MG: 30 INJECTION, SOLUTION INTRAMUSCULAR at 22:36

## 2025-04-07 RX ADMIN — DIPHENHYDRAMINE HYDROCHLORIDE 50 MG: 50 INJECTION INTRAMUSCULAR; INTRAVENOUS at 17:05

## 2025-04-07 RX ADMIN — GADOTERIDOL 16 ML: 279.3 INJECTION, SOLUTION INTRAVENOUS at 23:20

## 2025-04-07 RX ADMIN — FENTANYL CITRATE 50 MCG: 50 INJECTION INTRAMUSCULAR; INTRAVENOUS at 21:12

## 2025-04-07 RX ADMIN — GABAPENTIN 800 MG: 400 CAPSULE ORAL at 18:02

## 2025-04-07 RX ADMIN — MORPHINE SULFATE 4 MG: 4 INJECTION, SOLUTION INTRAMUSCULAR; INTRAVENOUS at 17:08

## 2025-04-07 RX ADMIN — PROCHLORPERAZINE EDISYLATE 10 MG: 5 INJECTION INTRAMUSCULAR; INTRAVENOUS at 17:06

## 2025-04-07 RX ADMIN — PROMETHAZINE HYDROCHLORIDE 12.5 MG: 25 INJECTION INTRAMUSCULAR; INTRAVENOUS at 22:48

## 2025-04-07 RX ADMIN — SODIUM CHLORIDE: 0.9 INJECTION, SOLUTION INTRAVENOUS at 17:04

## 2025-04-07 RX ADMIN — HYDROMORPHONE HYDROCHLORIDE 0.5 MG: 1 INJECTION, SOLUTION INTRAMUSCULAR; INTRAVENOUS; SUBCUTANEOUS at 22:38

## 2025-04-07 ASSESSMENT — PAIN DESCRIPTION - ONSET: ONSET: ON-GOING

## 2025-04-07 ASSESSMENT — PAIN DESCRIPTION - DESCRIPTORS
DESCRIPTORS: NAGGING;DISCOMFORT
DESCRIPTORS: STABBING
DESCRIPTORS: SHOOTING
DESCRIPTORS: ACHING;DISCOMFORT

## 2025-04-07 ASSESSMENT — PAIN DESCRIPTION - PAIN TYPE: TYPE: ACUTE PAIN

## 2025-04-07 ASSESSMENT — PAIN DESCRIPTION - LOCATION
LOCATION: BACK

## 2025-04-07 ASSESSMENT — PAIN DESCRIPTION - ORIENTATION
ORIENTATION: LOWER

## 2025-04-07 ASSESSMENT — PAIN SCALES - GENERAL
PAINLEVEL_OUTOF10: 8
PAINLEVEL_OUTOF10: 7
PAINLEVEL_OUTOF10: 10
PAINLEVEL_OUTOF10: 6

## 2025-04-07 ASSESSMENT — LIFESTYLE VARIABLES: HOW OFTEN DO YOU HAVE A DRINK CONTAINING ALCOHOL: MONTHLY OR LESS

## 2025-04-07 ASSESSMENT — PAIN - FUNCTIONAL ASSESSMENT: PAIN_FUNCTIONAL_ASSESSMENT: 0-10

## 2025-04-07 ASSESSMENT — PAIN DESCRIPTION - FREQUENCY: FREQUENCY: CONTINUOUS

## 2025-04-08 VITALS
BODY MASS INDEX: 29.32 KG/M2 | TEMPERATURE: 97.5 F | RESPIRATION RATE: 18 BRPM | HEART RATE: 86 BPM | OXYGEN SATURATION: 100 % | WEIGHT: 171.74 LBS | SYSTOLIC BLOOD PRESSURE: 129 MMHG | HEIGHT: 64 IN | DIASTOLIC BLOOD PRESSURE: 84 MMHG

## 2025-04-08 LAB
ERYTHROCYTE [SEDIMENTATION RATE] IN BLOOD BY WESTERGREN METHOD: 15 MM/HR (ref 0–20)
PROCALCITONIN SERPL-MCNC: 0.04 NG/ML (ref 0–0.08)

## 2025-04-08 PROCEDURE — 97161 PT EVAL LOW COMPLEX 20 MIN: CPT

## 2025-04-08 PROCEDURE — 1200000000 HC SEMI PRIVATE

## 2025-04-08 PROCEDURE — G0378 HOSPITAL OBSERVATION PER HR: HCPCS

## 2025-04-08 PROCEDURE — 97530 THERAPEUTIC ACTIVITIES: CPT

## 2025-04-08 PROCEDURE — 6360000002 HC RX W HCPCS: Performed by: INTERNAL MEDICINE

## 2025-04-08 PROCEDURE — 99223 1ST HOSP IP/OBS HIGH 75: CPT | Performed by: FAMILY MEDICINE

## 2025-04-08 PROCEDURE — 2580000003 HC RX 258: Performed by: EMERGENCY MEDICINE

## 2025-04-08 PROCEDURE — 6370000000 HC RX 637 (ALT 250 FOR IP): Performed by: INTERNAL MEDICINE

## 2025-04-08 PROCEDURE — 6370000000 HC RX 637 (ALT 250 FOR IP): Performed by: FAMILY MEDICINE

## 2025-04-08 PROCEDURE — 84145 PROCALCITONIN (PCT): CPT

## 2025-04-08 PROCEDURE — 36415 COLL VENOUS BLD VENIPUNCTURE: CPT

## 2025-04-08 PROCEDURE — 6360000002 HC RX W HCPCS: Performed by: EMERGENCY MEDICINE

## 2025-04-08 PROCEDURE — 97165 OT EVAL LOW COMPLEX 30 MIN: CPT

## 2025-04-08 PROCEDURE — 2500000003 HC RX 250 WO HCPCS: Performed by: FAMILY MEDICINE

## 2025-04-08 PROCEDURE — 96375 TX/PRO/DX INJ NEW DRUG ADDON: CPT

## 2025-04-08 PROCEDURE — 96376 TX/PRO/DX INJ SAME DRUG ADON: CPT

## 2025-04-08 RX ORDER — ACETAMINOPHEN 500 MG
1000 TABLET ORAL EVERY 8 HOURS SCHEDULED
Qty: 60 TABLET | Refills: 0 | Status: SHIPPED | OUTPATIENT
Start: 2025-04-08 | End: 2025-04-18

## 2025-04-08 RX ORDER — ACETAMINOPHEN 500 MG
1000 TABLET ORAL EVERY 8 HOURS SCHEDULED
Status: DISCONTINUED | OUTPATIENT
Start: 2025-04-08 | End: 2025-04-09 | Stop reason: HOSPADM

## 2025-04-08 RX ORDER — ACETAMINOPHEN 650 MG/1
650 SUPPOSITORY RECTAL EVERY 6 HOURS PRN
Status: DISCONTINUED | OUTPATIENT
Start: 2025-04-08 | End: 2025-04-09 | Stop reason: HOSPADM

## 2025-04-08 RX ORDER — SODIUM CHLORIDE 0.9 % (FLUSH) 0.9 %
5-40 SYRINGE (ML) INJECTION EVERY 12 HOURS SCHEDULED
Status: DISCONTINUED | OUTPATIENT
Start: 2025-04-08 | End: 2025-04-09 | Stop reason: HOSPADM

## 2025-04-08 RX ORDER — PREDNISONE 20 MG/1
TABLET ORAL
Qty: 30 TABLET | Refills: 0 | Status: SHIPPED | OUTPATIENT
Start: 2025-04-08

## 2025-04-08 RX ORDER — POTASSIUM CHLORIDE 7.45 MG/ML
10 INJECTION INTRAVENOUS PRN
Status: DISCONTINUED | OUTPATIENT
Start: 2025-04-08 | End: 2025-04-09 | Stop reason: HOSPADM

## 2025-04-08 RX ORDER — MAGNESIUM SULFATE IN WATER 40 MG/ML
2000 INJECTION, SOLUTION INTRAVENOUS PRN
Status: DISCONTINUED | OUTPATIENT
Start: 2025-04-08 | End: 2025-04-09 | Stop reason: HOSPADM

## 2025-04-08 RX ORDER — OXYCODONE HYDROCHLORIDE 5 MG/1
5 TABLET ORAL EVERY 4 HOURS PRN
Refills: 0 | Status: DISCONTINUED | OUTPATIENT
Start: 2025-04-08 | End: 2025-04-09 | Stop reason: HOSPADM

## 2025-04-08 RX ORDER — OXYCODONE AND ACETAMINOPHEN 5; 325 MG/1; MG/1
1 TABLET ORAL EVERY 6 HOURS PRN
Refills: 0 | Status: DISCONTINUED | OUTPATIENT
Start: 2025-04-08 | End: 2025-04-08

## 2025-04-08 RX ORDER — ONDANSETRON 4 MG/1
4 TABLET, ORALLY DISINTEGRATING ORAL EVERY 8 HOURS PRN
Status: DISCONTINUED | OUTPATIENT
Start: 2025-04-08 | End: 2025-04-09 | Stop reason: HOSPADM

## 2025-04-08 RX ORDER — POTASSIUM CHLORIDE 1500 MG/1
40 TABLET, EXTENDED RELEASE ORAL PRN
Status: DISCONTINUED | OUTPATIENT
Start: 2025-04-08 | End: 2025-04-09 | Stop reason: HOSPADM

## 2025-04-08 RX ORDER — SODIUM CHLORIDE 0.9 % (FLUSH) 0.9 %
5-40 SYRINGE (ML) INJECTION PRN
Status: DISCONTINUED | OUTPATIENT
Start: 2025-04-08 | End: 2025-04-09 | Stop reason: HOSPADM

## 2025-04-08 RX ORDER — POLYETHYLENE GLYCOL 3350 17 G/17G
17 POWDER, FOR SOLUTION ORAL DAILY PRN
Status: DISCONTINUED | OUTPATIENT
Start: 2025-04-08 | End: 2025-04-09 | Stop reason: HOSPADM

## 2025-04-08 RX ORDER — ONDANSETRON 2 MG/ML
4 INJECTION INTRAMUSCULAR; INTRAVENOUS EVERY 6 HOURS PRN
Status: DISCONTINUED | OUTPATIENT
Start: 2025-04-08 | End: 2025-04-09 | Stop reason: HOSPADM

## 2025-04-08 RX ORDER — OXYCODONE HYDROCHLORIDE 5 MG/1
5 TABLET ORAL EVERY 6 HOURS PRN
Qty: 20 TABLET | Refills: 0 | Status: SHIPPED | OUTPATIENT
Start: 2025-04-08 | End: 2025-04-13

## 2025-04-08 RX ORDER — ONDANSETRON 2 MG/ML
4 INJECTION INTRAMUSCULAR; INTRAVENOUS ONCE
Status: COMPLETED | OUTPATIENT
Start: 2025-04-08 | End: 2025-04-08

## 2025-04-08 RX ORDER — ENOXAPARIN SODIUM 100 MG/ML
40 INJECTION SUBCUTANEOUS DAILY
Status: DISCONTINUED | OUTPATIENT
Start: 2025-04-08 | End: 2025-04-09 | Stop reason: HOSPADM

## 2025-04-08 RX ORDER — HYDROMORPHONE HYDROCHLORIDE 1 MG/ML
1 INJECTION, SOLUTION INTRAMUSCULAR; INTRAVENOUS; SUBCUTANEOUS EVERY 4 HOURS PRN
Status: DISCONTINUED | OUTPATIENT
Start: 2025-04-08 | End: 2025-04-08

## 2025-04-08 RX ORDER — OXYCODONE HYDROCHLORIDE 10 MG/1
10 TABLET ORAL EVERY 4 HOURS PRN
Refills: 0 | Status: DISCONTINUED | OUTPATIENT
Start: 2025-04-08 | End: 2025-04-09 | Stop reason: HOSPADM

## 2025-04-08 RX ORDER — SODIUM CHLORIDE 9 MG/ML
INJECTION, SOLUTION INTRAVENOUS PRN
Status: DISCONTINUED | OUTPATIENT
Start: 2025-04-08 | End: 2025-04-09 | Stop reason: HOSPADM

## 2025-04-08 RX ORDER — ACETAMINOPHEN 325 MG/1
650 TABLET ORAL EVERY 6 HOURS PRN
Status: DISCONTINUED | OUTPATIENT
Start: 2025-04-08 | End: 2025-04-09 | Stop reason: HOSPADM

## 2025-04-08 RX ORDER — CYCLOBENZAPRINE HCL 10 MG
10 TABLET ORAL 3 TIMES DAILY
Qty: 30 TABLET | Refills: 1 | Status: SHIPPED | OUTPATIENT
Start: 2025-04-08 | End: 2025-04-28

## 2025-04-08 RX ORDER — CYCLOBENZAPRINE HCL 10 MG
10 TABLET ORAL 3 TIMES DAILY
Status: DISCONTINUED | OUTPATIENT
Start: 2025-04-08 | End: 2025-04-09 | Stop reason: HOSPADM

## 2025-04-08 RX ADMIN — CYCLOBENZAPRINE 10 MG: 10 TABLET, FILM COATED ORAL at 13:46

## 2025-04-08 RX ADMIN — OXYCODONE HYDROCHLORIDE 10 MG: 10 TABLET ORAL at 13:46

## 2025-04-08 RX ADMIN — OXYCODONE AND ACETAMINOPHEN 1 TABLET: 325; 5 TABLET ORAL at 05:39

## 2025-04-08 RX ADMIN — SODIUM CHLORIDE, PRESERVATIVE FREE 10 ML: 5 INJECTION INTRAVENOUS at 04:23

## 2025-04-08 RX ADMIN — SODIUM CHLORIDE: 0.9 INJECTION, SOLUTION INTRAVENOUS at 05:35

## 2025-04-08 RX ADMIN — ONDANSETRON 4 MG: 2 INJECTION, SOLUTION INTRAMUSCULAR; INTRAVENOUS at 00:46

## 2025-04-08 RX ADMIN — HYDROMORPHONE HYDROCHLORIDE 1 MG: 1 INJECTION, SOLUTION INTRAMUSCULAR; INTRAVENOUS; SUBCUTANEOUS at 10:31

## 2025-04-08 RX ADMIN — HYDROMORPHONE HYDROCHLORIDE 1 MG: 1 INJECTION, SOLUTION INTRAMUSCULAR; INTRAVENOUS; SUBCUTANEOUS at 04:22

## 2025-04-08 RX ADMIN — ACETAMINOPHEN 1000 MG: 500 TABLET ORAL at 13:46

## 2025-04-08 RX ADMIN — HYDROMORPHONE HYDROCHLORIDE 0.5 MG: 1 INJECTION, SOLUTION INTRAMUSCULAR; INTRAVENOUS; SUBCUTANEOUS at 15:24

## 2025-04-08 ASSESSMENT — PAIN DESCRIPTION - LOCATION
LOCATION: BACK

## 2025-04-08 ASSESSMENT — PAIN DESCRIPTION - DESCRIPTORS
DESCRIPTORS: ACHING;DISCOMFORT;THROBBING
DESCRIPTORS: SHOOTING;THROBBING
DESCRIPTORS: ACHING;STABBING

## 2025-04-08 ASSESSMENT — PAIN - FUNCTIONAL ASSESSMENT
PAIN_FUNCTIONAL_ASSESSMENT: PREVENTS OR INTERFERES SOME ACTIVE ACTIVITIES AND ADLS
PAIN_FUNCTIONAL_ASSESSMENT: ACTIVITIES ARE NOT PREVENTED
PAIN_FUNCTIONAL_ASSESSMENT: ACTIVITIES ARE NOT PREVENTED
PAIN_FUNCTIONAL_ASSESSMENT: PREVENTS OR INTERFERES SOME ACTIVE ACTIVITIES AND ADLS
PAIN_FUNCTIONAL_ASSESSMENT: ACTIVITIES ARE NOT PREVENTED

## 2025-04-08 ASSESSMENT — PAIN DESCRIPTION - ORIENTATION
ORIENTATION: LEFT
ORIENTATION: LOWER
ORIENTATION: MID
ORIENTATION: MID

## 2025-04-08 ASSESSMENT — PAIN SCALES - GENERAL
PAINLEVEL_OUTOF10: 7
PAINLEVEL_OUTOF10: 7
PAINLEVEL_OUTOF10: 6
PAINLEVEL_OUTOF10: 10
PAINLEVEL_OUTOF10: 4
PAINLEVEL_OUTOF10: 3
PAINLEVEL_OUTOF10: 6
PAINLEVEL_OUTOF10: 7
PAINLEVEL_OUTOF10: 8

## 2025-04-08 NOTE — ED PROVIDER NOTES
pack-year smoking history. She has never used smokeless tobacco. She reports that she does not currently use alcohol. She reports current drug use. Drug: Marijuana (Weed).    Family History: family history includes Breast Cancer in her mother; Cervical Cancer in her mother; Depression in her father and mother; Heart Disease in her father; Hypertension in her father; Leukemia in her mother; Schizophrenia in her father.     The patient’s home medications have been reviewed.    Allergies: Patient has no known allergies.    -------------------------------------------------- RESULTS -------------------------------------------------  All laboratory and radiology results have been personally reviewed by myself   LABS:  Results for orders placed or performed during the hospital encounter of 04/07/25   CBC with Auto Differential   Result Value Ref Range    WBC 22.2 (H) 4.5 - 11.5 k/uL    RBC 4.65 3.50 - 5.50 m/uL    Hemoglobin 14.5 11.5 - 15.5 g/dL    Hematocrit 42.2 34.0 - 48.0 %    MCV 90.8 80.0 - 99.9 fL    MCH 31.2 26.0 - 35.0 pg    MCHC 34.4 32.0 - 34.5 g/dL    RDW 14.0 11.5 - 15.0 %    Platelets 546 (H) 130 - 450 k/uL    MPV 9.1 7.0 - 12.0 fL    Neutrophils % 83 (H) 43.0 - 80.0 %    Lymphocytes % 12 (L) 20.0 - 42.0 %    Monocytes % 3 2.0 - 12.0 %    Eosinophils % 0 0 - 6 %    Basophils % 1 0.0 - 2.0 %    Immature Granulocytes % 1 0.0 - 5.0 %    Neutrophils Absolute 18.37 (H) 1.80 - 7.30 k/uL    Lymphocytes Absolute 2.72 1.50 - 4.00 k/uL    Monocytes Absolute 0.66 0.10 - 0.95 k/uL    Eosinophils Absolute 0.06 0.05 - 0.50 k/uL    Basophils Absolute 0.13 0.00 - 0.20 k/uL    Immature Granulocytes Absolute 0.28 0.00 - 0.58 k/uL   Comprehensive Metabolic Panel   Result Value Ref Range    Sodium 137 132 - 146 mmol/L    Potassium 5.5 (H) 3.5 - 5.0 mmol/L    Chloride 105 98 - 107 mmol/L    CO2 13 (L) 22 - 29 mmol/L    Anion Gap 19 (H) 7 - 16 mmol/L    Glucose 197 (H) 74 - 99 mg/dL    BUN 18 6 - 20 mg/dL    Creatinine 0.6 0.50 -

## 2025-04-08 NOTE — H&P
non-focal.  Psychiatric: Alert and oriented, thought content appropriate, normal insight    Reviewed EKG and CXR personally      CBC:   Recent Labs     04/07/25  1700   WBC 22.2*   RBC 4.65   HGB 14.5   HCT 42.2   MCV 90.8   RDW 14.0   *     BMP:   Recent Labs     04/07/25  1700      K 5.5*      CO2 13*   BUN 18   CREATININE 0.6     LFT:  Recent Labs     04/07/25  1700   ALKPHOS 83   ALT 21   AST 30   BILITOT 0.4     CE:  No results for input(s): \"CKTOTAL\", \"TROPONINI\" in the last 72 hours.  PT/INR: No results for input(s): \"INR\", \"APTT\" in the last 72 hours.  BNP: No results for input(s): \"BNP\" in the last 72 hours.  ESR:   Lab Results   Component Value Date    SEDRATE 15 04/07/2025     CRP:   Lab Results   Component Value Date    CRP 6.0 (H) 04/07/2025     D Dimer:   Lab Results   Component Value Date    DDIMER <200 10/25/2023      Folate and B12:   Lab Results   Component Value Date    BLMDDAMA52 465 05/18/2024   ,   Lab Results   Component Value Date    FOLATE 11.0 05/18/2024     Lactic Acid:   Lab Results   Component Value Date    LACTA 1.4 12/09/2024     Thyroid Studies:   Lab Results   Component Value Date    TSH 0.762 07/07/2022       Oupatient labs:  Lab Results   Component Value Date    TSH 0.762 07/07/2022    INR 1.0 04/18/2024    LABA1C 5.4 05/18/2024       Urinalysis:    Lab Results   Component Value Date/Time    NITRU NEGATIVE 04/07/2025 05:00 PM    WBCUA 6 TO 9 04/07/2025 05:00 PM    WBCUA NONE 02/09/2012 08:05 AM    BACTERIA 1+ 04/07/2025 05:00 PM    RBCUA 21 TO 50 04/07/2025 05:00 PM    RBCUA NONE 12/31/2013 11:53 AM    BLOODU Negative 06/10/2023 02:38 PM    GLUCOSEU NEGATIVE 04/07/2025 05:00 PM    GLUCOSEU NEGATIVE 02/09/2012 08:05 AM       Imaging:  MRI LUMBAR SPINE W WO CONTRAST  Result Date: 4/8/2025  EXAMINATION: MRI OF THE LUMBAR SPINE WITHOUT AND WITH CONTRAST  4/7/2025 10:51 pm TECHNIQUE: Multiplanar multisequence MRI of the lumbar spine was performed without and with the

## 2025-04-08 NOTE — PLAN OF CARE
Patient admitted by samir this morning.  Per H&P:    39 y.o. year old female  who  has a past medical history of Acute Crohn's disease (HCC), Anxiety attack, Asthma, Crohn's colitis (HCC), Depression, Herpes simplex virus (HSV) infection, History of blood transfusion, Hypertension, Marijuana abuse, Neurologic disorder, Seizure (HCC), STD (sexually transmitted disease), and Tobacco abuse.      Patient presented to emergency department with back pain.  Patient has a history of back pain and back surgery.  Developed severe low back pain yesterday.  Nothing makes better or worse.  Denies any saddle paresthesia.  Denies any change in bowel or bladder.  Denies any urinary incontinence or retention.  Denies fever, chills, nausea, vomiting, chest pain, shortness of breath, headache, dizziness, abdominal pain, dysuria, hematuria, constipation or diarrhea.  Vital signs are within normal and stable.  The patient is afebrile.  Laboratory studies demonstrate potassium 5.5, glucose 197, CRP 6.0 and a white count of 22.2.  It appears she has a chronically elevated white count.  Orthopedic surgery was consulted by emergency department physician who advised the MRI be ordered which was obtained which showed no acute lumbar spine abnormalities.  Case was discussed with emergency department physician and medicine was consulted for admission.        -Intractable back pain  -Hyperkalemia  -Hypertension  -Anxiety/depression     PLAN:  -Admit to medicine  -Orthopedic following  -Pain management  -PT/OT  -Monitor serum electrolytes  -Continue home medications    Jarrod Peterson MD

## 2025-04-08 NOTE — DISCHARGE SUMMARY
Physician Discharge Summary     Patient ID:  Cynthia Ayala  [id number]  39 y.o.  1985    Admit date: 4/7/2025    Discharge date and time:  4/8/2025    Discharge Diagnoses: Principal Problem:    Intractable back pain  Resolved Problems:    * No resolved hospital problems. *      Consults: IP CONSULT TO ORTHOPEDIC SURGERY  IP CONSULT TO HOSPITALIST    Procedures: See below    Hospital Course: 39 y.o. year old female  who  has a past medical history of Acute Crohn's disease (HCC), Anxiety attack, Asthma, Crohn's colitis (HCC), Depression, Herpes simplex virus (HSV) infection, History of blood transfusion, Hypertension, Marijuana abuse, Neurologic disorder, Seizure (HCC), STD (sexually transmitted disease), and Tobacco abuse.      Patient presented to emergency department with back pain.  Patient has a history of back pain and back surgery.  Developed severe low back pain yesterday.  Nothing makes better or worse.  Denies any saddle paresthesia.  Denies any change in bowel or bladder.  Denies any urinary incontinence or retention.  Denies fever, chills, nausea, vomiting, chest pain, shortness of breath, headache, dizziness, abdominal pain, dysuria, hematuria, constipation or diarrhea.  Vital signs are within normal and stable.  The patient is afebrile.  Laboratory studies demonstrate potassium 5.5, glucose 197, CRP 6.0 and a white count of 22.2.  It appears she has a chronically elevated white count.  Orthopedic surgery was consulted by emergency department physician who advised the MRI be ordered which was obtained which showed no acute lumbar spine abnormalities.  Case was discussed with emergency department physician and medicine was consulted for admission.          -Admit to medicine  -Orthopedic following  -Pain management  -PT/OT  -Monitor serum electrolytes  -Continue home medications  Patient has worked with therapy and did okay.  I have adjusted her medications.  Plan for discharge home with multimodal

## 2025-04-09 NOTE — PROGRESS NOTES
Date: 2025       Patient Name: Cynthia Ayala  : 1985      MRN: [id number]    Patient pending orthopedic surgery consult with Dr. Willingham.  Will hold pending recommendations.     Vernon Simon PT, DPT  [id number]      
4 Eyes Skin Assessment     NAME:  Cynthia Ayala  YOB: 1985  MEDICAL RECORD NUMBER:  [id number]    The patient is being assessed for  Admission    I agree that at least one RN has performed a thorough Head to Toe Skin Assessment on the patient. ALL assessment sites listed below have been assessed.      Areas assessed by both nurses:    Head, Face, Ears, Shoulders, Back, Chest, Arms, Elbows, Hands, Sacrum. Buttock, Coccyx, Ischium, Legs. Feet and Heels, and Under Medical Devices         Does the Patient have a Wound? No noted wound(s)       King Prevention initiated by RN: No  Wound Care Orders initiated by RN: No    Pressure Injury (Stage 3,4, Unstageable, DTI, NWPT, and Complex wounds) if present, place Wound referral order by RN under : No    New Ostomies, if present place, Ostomy referral order under : No     Nurse 1 eSignature: Electronically signed by Ana Neely RN on 4/8/25 at 4:53 AM EDT    **SHARE this note so that the co-signing nurse can place an eSignature**    Nurse 2 eSignature: Electronically signed by Ranjeet Alvarado RN on 4/8/25 at 4:54 AM EDT  
Consult received  Reviewed CT and MRI Lumbar  Okay to be activity as tolerates  Will be in later today for consult  
OCCUPATIONAL THERAPY TREATMENT NOTE    [facility]      OT BEDSIDE TREATMENT NOTE      Date:2025  Patient Name: Cynthia Ayala  MRN: [id number]  : 1985  Room: 54/5416-A     OT orders received & appreciated, chart reviewed.  Patient pending orthopedic surgery consult with Dr. Willingham.  Will hold pending recommendations.     Thank you,  Marie Foss, OTR/L   License #  [id number]      
PATIENT ADMITTED TO [facility], C/O BACK PAIN AFTER NOT TAKING GABAPENTIN FOR A WEEK AND TOOK MEDICATIONS YESTERDAY BUT HAD EMESIS AFTER TAKING MEDICATIONS. PATIENT IS A&OX4; PATIENT AMBULATORY/INDEPENDENT WITH MOANING WHILE AMBULATING. PATIENT STATED SHE HAD SURGERY EARLIER THIS YEAR AND HAS NOT STARTED THERAPY. PRN PAIN MEDICATION GIVEN VIA IV. PATIENT APPEARED CONTENT AND ABLE TO ANSWER ADMISSION QUESTIONS.  
Patient received the Sacrament of the Anointing of the Sick by Father Martin Mcelroy on Tuesday, April 8, 2025.     If additional support is requested or needed please reach out to Spiritual Health ([phone]).     Chap. Rahat Martin MDIV, BCC    
Physical Therapy    Physical Therapy Initial Assessment       Name: Cynthia Ayala  : 1985  MRN: [id number]      Date of Service: 2025    Evaluating PT:  Vernon Simon PT, DPT  [id number]    Room #:  5416/5416-A  Diagnosis:  Intractable back pain [M54.9]  Acute exacerbation of chronic low back pain [M54.50, G89.29]  PMHx/PSHx:   has a past medical history of Anxiety attack, Asthma, Crohn's colitis (HCC), Depression, Herpes simplex virus (HSV) infection, History of blood transfusion, Hypertension, Marijuana abuse, Neurologic disorder, Seizure (HCC), STD (sexually transmitted disease), and Tobacco abuse.  Procedure/Surgery:    Precautions:  Falls, Activity as tolerated- Dr. Willingham  Equipment Needs:  None    SUBJECTIVE:    Pt lives with 4 kids in a 1 story home with 2 stairs to enter.  Bed is on first floor and bath is on first floor.  Pt ambulated with no AD independently PTA.  Equipment Owned:     OBJECTIVE:   Initial Evaluation  Date: 25 Treatment Short Term/ Long Term   Goals   AM-PAC 6 Clicks      Was pt agreeable to Eval/treatment? yes     Does pt have pain? 8/10 back and hip pain radiating to RLE     Bed Mobility  Rolling: Independent    Supine to sit: Independent    Sit to supine: Independent    Scooting: Independent    Rolling: Independent    Supine to sit: Independent    Sit to supine: Independent    Scooting: Independent     Transfers Sit to stand: SBA  Stand to sit: SBA  Stand pivot: SBA no AD  Sit to stand: Independent    Stand to sit: Independent    Stand pivot: Independent     Ambulation    100 feet with SBA no AD   300 feet with Independent      Stair negotiation: ascended and descended  NT- politely declined reports no concerns  4 steps with single rail Independent     ROM BUE:  Defer to OT  BLE:  WFL     Strength BUE:  Defer to OT  RLE: 3+/5  LLE: 4/5  Improve 1 MMT   Balance Sitting EOB:  SBA  Dynamic Standing:  SBA  Sitting EOB:  Independent    Dynamic Standing:  Independent   
and quality of life.     Treatment: OT treatment provided this date includes:   Instruction/training on safety and adapted techniques for completion of ADLs: to increase Stanly in self care   Instruction/training on safe functional mobility/transfer techniques: with focus on safety, technique & precautions   Instruction/training on energy conservation/work simplification for completion of ADLs: techniques to increase Stanly with self care ADLs & iADLs, work simplification to improve endurance   Proper Positioning/Alignment: for optimal healing, skin integrity to prevent breakdown, decrease edema  Skilled monitoring of vitals: to include BP, spO2 & HR during session  Sitting/standing Balance/Tolerance- to increased balance & activity tolerance during ADLs as well as facilitate proper posture and/or positioning.     Rehab Potential: Good for established goals     Patient / Family Goal: to return home soon, have pain relief (RN notified)       Patient and/or family were instructed on functional diagnosis, prognosis/goals and OT plan of care. Demonstrated G understanding.      Eval Complexity: Low     Time In: 15:00  Time Out: 15:15  Total Treatment Time: eval only    Min Units   OT Eval Low 97165  x  1   OT Eval Medium 28257       OT Eval High 67752       OT Re-Eval 95188       Therapeutic Ex 67373       Therapeutic Activities 01310       ADL/Self Care 35092       Orthotic Management 98619       Manual 77160       Neuro Re-Ed 17854       Non-Billable Time          Evaluation Time additionally includes thorough review of current medical information, gathering information on past medical history/social history and prior level of function, interpretation of standardized testing/informal observation of tasks, assessment of data and development of plan of care and goals.        Marie Foss, OTR/L   License #  [id number]

## 2025-04-10 LAB
EKG ATRIAL RATE: 102 BPM
EKG P AXIS: 35 DEGREES
EKG P-R INTERVAL: 124 MS
EKG Q-T INTERVAL: 372 MS
EKG QRS DURATION: 92 MS
EKG QTC CALCULATION (BAZETT): 484 MS
EKG R AXIS: 11 DEGREES
EKG T AXIS: 36 DEGREES
EKG VENTRICULAR RATE: 102 BPM

## 2025-04-15 ENCOUNTER — HOSPITAL ENCOUNTER (EMERGENCY)
Age: 40
Discharge: HOME | End: 2025-04-15
Attending: STUDENT IN AN ORGANIZED HEALTH CARE EDUCATION/TRAINING PROGRAM
Payer: COMMERCIAL

## 2025-04-15 ENCOUNTER — APPOINTMENT (OUTPATIENT)
Dept: CT IMAGING | Age: 40
End: 2025-04-15
Payer: COMMERCIAL

## 2025-04-15 VITALS
RESPIRATION RATE: 18 BRPM | DIASTOLIC BLOOD PRESSURE: 68 MMHG | HEART RATE: 78 BPM | TEMPERATURE: 97.9 F | SYSTOLIC BLOOD PRESSURE: 124 MMHG | OXYGEN SATURATION: 98 %

## 2025-04-15 DIAGNOSIS — M54.50 ACUTE EXACERBATION OF CHRONIC LOW BACK PAIN: Primary | ICD-10-CM

## 2025-04-15 DIAGNOSIS — G89.29 ACUTE EXACERBATION OF CHRONIC LOW BACK PAIN: Primary | ICD-10-CM

## 2025-04-15 DIAGNOSIS — I10 ELEVATED BLOOD PRESSURE READING WITH DIAGNOSIS OF HYPERTENSION: ICD-10-CM

## 2025-04-15 LAB
ALBUMIN SERPL-MCNC: 4.6 G/DL (ref 3.5–5.2)
ALP SERPL-CCNC: 78 U/L (ref 35–104)
ALT SERPL-CCNC: 19 U/L (ref 0–35)
ANION GAP SERPL CALCULATED.3IONS-SCNC: 13 MMOL/L (ref 7–16)
AST SERPL-CCNC: 27 U/L (ref 0–35)
BACTERIA URNS QL MICRO: ABNORMAL
BASOPHILS # BLD: 0.15 K/UL (ref 0–0.2)
BASOPHILS NFR BLD: 1 % (ref 0–2)
BILIRUB SERPL-MCNC: 0.3 MG/DL (ref 0–1.2)
BILIRUB UR QL STRIP: NEGATIVE
BUN SERPL-MCNC: 11 MG/DL (ref 6–20)
CALCIUM SERPL-MCNC: 9.4 MG/DL (ref 8.6–10)
CHLORIDE SERPL-SCNC: 104 MMOL/L (ref 98–107)
CLARITY UR: CLEAR
CO2 SERPL-SCNC: 21 MMOL/L (ref 22–29)
COLOR UR: YELLOW
CREAT SERPL-MCNC: 0.5 MG/DL (ref 0.5–1)
EOSINOPHIL # BLD: 0.26 K/UL (ref 0.05–0.5)
EOSINOPHILS RELATIVE PERCENT: 1 % (ref 0–6)
EPI CELLS #/AREA URNS HPF: ABNORMAL /HPF
ERYTHROCYTE [DISTWIDTH] IN BLOOD BY AUTOMATED COUNT: 14.1 % (ref 11.5–15)
GFR, ESTIMATED: >90 ML/MIN/1.73M2
GLUCOSE SERPL-MCNC: 125 MG/DL (ref 74–99)
GLUCOSE UR STRIP-MCNC: NEGATIVE MG/DL
HCG, URINE, POC: NEGATIVE
HCT VFR BLD AUTO: 40.3 % (ref 34–48)
HGB BLD-MCNC: 13.5 G/DL (ref 11.5–15.5)
HGB UR QL STRIP.AUTO: NEGATIVE
IMM GRANULOCYTES # BLD AUTO: 0.21 K/UL (ref 0–0.58)
IMM GRANULOCYTES NFR BLD: 1 % (ref 0–5)
KETONES UR STRIP-MCNC: 15 MG/DL
LACTATE BLDV-SCNC: 1.1 MMOL/L (ref 0.5–1.9)
LEUKOCYTE ESTERASE UR QL STRIP: NEGATIVE
LIPASE SERPL-CCNC: 17 U/L (ref 13–60)
LYMPHOCYTES NFR BLD: 2.37 K/UL (ref 1.5–4)
LYMPHOCYTES RELATIVE PERCENT: 12 % (ref 20–42)
Lab: NORMAL
MCH RBC QN AUTO: 30.8 PG (ref 26–35)
MCHC RBC AUTO-ENTMCNC: 33.5 G/DL (ref 32–34.5)
MCV RBC AUTO: 92 FL (ref 80–99.9)
MONOCYTES NFR BLD: 0.59 K/UL (ref 0.1–0.95)
MONOCYTES NFR BLD: 3 % (ref 2–12)
NEGATIVE QC PASS/FAIL: NORMAL
NEUTROPHILS NFR BLD: 81 % (ref 43–80)
NEUTS SEG NFR BLD: 15.73 K/UL (ref 1.8–7.3)
NITRITE UR QL STRIP: NEGATIVE
PH UR STRIP: 6 [PH] (ref 5–8)
PLATELET # BLD AUTO: 505 K/UL (ref 130–450)
PMV BLD AUTO: 9.1 FL (ref 7–12)
POSITIVE QC PASS/FAIL: NORMAL
POTASSIUM SERPL-SCNC: 4.6 MMOL/L (ref 3.4–4.5)
PROT SERPL-MCNC: 7.6 G/DL (ref 6.4–8.3)
PROT UR STRIP-MCNC: 30 MG/DL
RBC # BLD AUTO: 4.38 M/UL (ref 3.5–5.5)
RBC #/AREA URNS HPF: ABNORMAL /HPF
SODIUM SERPL-SCNC: 137 MMOL/L (ref 136–145)
SP GR UR STRIP: 1.02 (ref 1–1.03)
UROBILINOGEN UR STRIP-ACNC: 0.2 EU/DL (ref 0–1)
WBC #/AREA URNS HPF: ABNORMAL /HPF
WBC OTHER # BLD: 19.3 K/UL (ref 4.5–11.5)

## 2025-04-15 PROCEDURE — 96374 THER/PROPH/DIAG INJ IV PUSH: CPT

## 2025-04-15 PROCEDURE — 6370000000 HC RX 637 (ALT 250 FOR IP): Performed by: STUDENT IN AN ORGANIZED HEALTH CARE EDUCATION/TRAINING PROGRAM

## 2025-04-15 PROCEDURE — 6370000000 HC RX 637 (ALT 250 FOR IP): Performed by: NURSE PRACTITIONER

## 2025-04-15 PROCEDURE — 74177 CT ABD & PELVIS W/CONTRAST: CPT

## 2025-04-15 PROCEDURE — 6360000004 HC RX CONTRAST MEDICATION: Performed by: RADIOLOGY

## 2025-04-15 PROCEDURE — 99285 EMERGENCY DEPT VISIT HI MDM: CPT

## 2025-04-15 PROCEDURE — 6360000002 HC RX W HCPCS: Performed by: NURSE PRACTITIONER

## 2025-04-15 PROCEDURE — 96375 TX/PRO/DX INJ NEW DRUG ADDON: CPT

## 2025-04-15 PROCEDURE — 81001 URINALYSIS AUTO W/SCOPE: CPT

## 2025-04-15 PROCEDURE — 2580000003 HC RX 258: Performed by: NURSE PRACTITIONER

## 2025-04-15 PROCEDURE — 85025 COMPLETE CBC W/AUTO DIFF WBC: CPT

## 2025-04-15 PROCEDURE — 83690 ASSAY OF LIPASE: CPT

## 2025-04-15 PROCEDURE — 80053 COMPREHEN METABOLIC PANEL: CPT

## 2025-04-15 PROCEDURE — 6360000002 HC RX W HCPCS: Performed by: STUDENT IN AN ORGANIZED HEALTH CARE EDUCATION/TRAINING PROGRAM

## 2025-04-15 PROCEDURE — 83605 ASSAY OF LACTIC ACID: CPT

## 2025-04-15 RX ORDER — 0.9 % SODIUM CHLORIDE 0.9 %
1000 INTRAVENOUS SOLUTION INTRAVENOUS ONCE
Status: COMPLETED | OUTPATIENT
Start: 2025-04-15 | End: 2025-04-15

## 2025-04-15 RX ORDER — LIDOCAINE 50 MG/G
1 PATCH TOPICAL EVERY 24 HOURS
Qty: 5 PATCH | Refills: 0 | Status: SHIPPED | OUTPATIENT
Start: 2025-04-15 | End: 2025-04-20

## 2025-04-15 RX ORDER — KETOROLAC TROMETHAMINE 10 MG/1
10 TABLET, FILM COATED ORAL EVERY 8 HOURS PRN
Qty: 15 TABLET | Refills: 0 | Status: SHIPPED | OUTPATIENT
Start: 2025-04-15 | End: 2025-04-20

## 2025-04-15 RX ORDER — KETOROLAC TROMETHAMINE 30 MG/ML
15 INJECTION, SOLUTION INTRAMUSCULAR; INTRAVENOUS ONCE
Status: COMPLETED | OUTPATIENT
Start: 2025-04-15 | End: 2025-04-15

## 2025-04-15 RX ORDER — DIPHENHYDRAMINE HYDROCHLORIDE 50 MG/ML
25 INJECTION, SOLUTION INTRAMUSCULAR; INTRAVENOUS ONCE
Status: COMPLETED | OUTPATIENT
Start: 2025-04-15 | End: 2025-04-15

## 2025-04-15 RX ORDER — ONDANSETRON 2 MG/ML
4 INJECTION INTRAMUSCULAR; INTRAVENOUS ONCE
Status: COMPLETED | OUTPATIENT
Start: 2025-04-15 | End: 2025-04-15

## 2025-04-15 RX ORDER — OXYCODONE AND ACETAMINOPHEN 5; 325 MG/1; MG/1
1 TABLET ORAL ONCE
Refills: 0 | Status: COMPLETED | OUTPATIENT
Start: 2025-04-15 | End: 2025-04-15

## 2025-04-15 RX ORDER — MORPHINE SULFATE 4 MG/ML
8 INJECTION, SOLUTION INTRAMUSCULAR; INTRAVENOUS ONCE
Status: COMPLETED | OUTPATIENT
Start: 2025-04-15 | End: 2025-04-15

## 2025-04-15 RX ORDER — LIDOCAINE 4 G/G
1 PATCH TOPICAL ONCE
Status: DISCONTINUED | OUTPATIENT
Start: 2025-04-15 | End: 2025-04-15 | Stop reason: HOSPADM

## 2025-04-15 RX ORDER — LISINOPRIL 10 MG/1
5 TABLET ORAL ONCE
Status: COMPLETED | OUTPATIENT
Start: 2025-04-15 | End: 2025-04-15

## 2025-04-15 RX ORDER — PROCHLORPERAZINE EDISYLATE 5 MG/ML
10 INJECTION INTRAMUSCULAR; INTRAVENOUS ONCE
Status: COMPLETED | OUTPATIENT
Start: 2025-04-15 | End: 2025-04-15

## 2025-04-15 RX ORDER — IOPAMIDOL 755 MG/ML
75 INJECTION, SOLUTION INTRAVASCULAR
Status: COMPLETED | OUTPATIENT
Start: 2025-04-15 | End: 2025-04-15

## 2025-04-15 RX ORDER — MORPHINE SULFATE 4 MG/ML
4 INJECTION, SOLUTION INTRAMUSCULAR; INTRAVENOUS ONCE
Status: COMPLETED | OUTPATIENT
Start: 2025-04-15 | End: 2025-04-15

## 2025-04-15 RX ORDER — METHOCARBAMOL 750 MG/1
750 TABLET, FILM COATED ORAL 4 TIMES DAILY PRN
Qty: 20 TABLET | Refills: 0 | Status: SHIPPED | OUTPATIENT
Start: 2025-04-15 | End: 2025-04-20

## 2025-04-15 RX ORDER — DIAZEPAM 5 MG/1
5 TABLET ORAL ONCE
Status: COMPLETED | OUTPATIENT
Start: 2025-04-15 | End: 2025-04-15

## 2025-04-15 RX ADMIN — IOPAMIDOL 75 ML: 755 INJECTION, SOLUTION INTRAVENOUS at 14:31

## 2025-04-15 RX ADMIN — LISINOPRIL 5 MG: 10 TABLET ORAL at 17:27

## 2025-04-15 RX ADMIN — KETOROLAC TROMETHAMINE 15 MG: 30 INJECTION, SOLUTION INTRAMUSCULAR at 11:44

## 2025-04-15 RX ADMIN — MORPHINE SULFATE 8 MG: 4 INJECTION, SOLUTION INTRAMUSCULAR; INTRAVENOUS at 11:43

## 2025-04-15 RX ADMIN — PROCHLORPERAZINE EDISYLATE 10 MG: 5 INJECTION INTRAMUSCULAR; INTRAVENOUS at 11:44

## 2025-04-15 RX ADMIN — OXYCODONE HYDROCHLORIDE AND ACETAMINOPHEN 1 TABLET: 5; 325 TABLET ORAL at 14:49

## 2025-04-15 RX ADMIN — SODIUM CHLORIDE 1000 ML: 0.9 INJECTION, SOLUTION INTRAVENOUS at 11:45

## 2025-04-15 RX ADMIN — ONDANSETRON 4 MG: 2 INJECTION, SOLUTION INTRAMUSCULAR; INTRAVENOUS at 14:49

## 2025-04-15 RX ADMIN — MORPHINE SULFATE 4 MG: 4 INJECTION, SOLUTION INTRAMUSCULAR; INTRAVENOUS at 19:22

## 2025-04-15 RX ADMIN — DIAZEPAM 5 MG: 5 TABLET ORAL at 18:15

## 2025-04-15 RX ADMIN — DIPHENHYDRAMINE HYDROCHLORIDE 25 MG: 50 INJECTION INTRAMUSCULAR; INTRAVENOUS at 19:21

## 2025-04-15 ASSESSMENT — PAIN DESCRIPTION - LOCATION
LOCATION: BACK

## 2025-04-15 ASSESSMENT — PAIN SCALES - GENERAL
PAINLEVEL_OUTOF10: 10
PAINLEVEL_OUTOF10: 10
PAINLEVEL_OUTOF10: 9
PAINLEVEL_OUTOF10: 7
PAINLEVEL_OUTOF10: 10

## 2025-04-15 ASSESSMENT — PAIN DESCRIPTION - DESCRIPTORS
DESCRIPTORS: ACHING

## 2025-04-15 ASSESSMENT — PAIN - FUNCTIONAL ASSESSMENT: PAIN_FUNCTIONAL_ASSESSMENT: 0-10

## 2025-04-15 ASSESSMENT — PAIN DESCRIPTION - ORIENTATION
ORIENTATION: LOWER

## 2025-04-15 NOTE — ED NOTES
Radiology Procedure Waiver   Name: Cynthia Ayala  : 1985  MRN: [id number]    Date:  4/15/25    Time: 1:24 PM EDT    Benefits of immediately proceeding with Radiology exam(s) without pre-testing outweigh the risks or are not indicated as specified below and therefore the following is/are being waived:    [x] Pregnancy test   [] Patients pregnancy was negative on 25   [] Patient had Tubal Ligation or has other Contraception Device.   [] Patient  is Menopausal or Premenarcheal.    [] Patient had Full or Partial Hysterectomy.    [] Protocol for Iodine allergy    [] MRI Questionnaire     [] BUN/Creatinine   [] Patient age w/no hx of renal dysfunction.   [] Patient on Dialysis.   [] Recent Normal Labs.  Electronically signed by LEIDY Yang CNP on 4/15/25 at 1:24 PM EDT               Rosa Fleming APRN - CNP  04/15/25 [id number]

## 2025-04-15 NOTE — ED PROVIDER NOTES
Shared MAUREEN-ED Attending Visit.  CC: No            [facility]  Department of Emergency Medicine   ED  Encounter Note  Admit Date/RoomTime: 4/15/2025 10:50 AM  ED Room: [facility]ICKY/EVANS  NAME: Cynthia Ayala  : 1985  MRN: [id number]     Chief Complaint:  Back Pain (Patient c/o back pain , hx of back sx a couple months ago , no pain medication at home per EMS )    HISTORY OF PRESENT ILLNESS      Patient presents to the emergency department for evaluation of back pain.  Patient had a recent hospital admission for this after evaluation in the emergency department.  She reports that she has a 2-year-old autistic child and she overdid it over the weekend when he was upset.  She has had persistent pain in her back and she also has abdominal pain.  Her pain is aggravated by any movement.  She is on prednisone taper, Tylenol, oxycodone and Flexeril.  She does have a listed diagnosis of Crohn's colitis however she advises that it was a misdiagnosis.  Additionally she has a history of ovarian cysts in the past.  She has a surgical history of the abdomen D&C, cholecystectomy, appendectomy.  She has no concern for pregnancy or STI.  She has been experiencing nausea, vomiting, soft brown stools and persistent back pain and now abdominal pain.  There is no associated syncope, traumatic event since her hospital admission, anticoagulant use, IV drug use, hematemesis, black or bloody stools, loss of bowel or bladder control, saddle paresthesia, urinary retention, leg weakness or ataxia.     ROS   Pertinent positives and negatives are stated within HPI, all other systems reviewed and are negative.    Past Medical History:  has a past medical history of Anxiety attack, Asthma, Crohn's colitis (HCC), Depression, Herpes simplex virus (HSV) infection, History of blood transfusion, Hypertension, Marijuana abuse, Neurologic disorder, Seizure (HCC), STD (sexually transmitted disease), and Tobacco abuse.    Surgical

## 2025-06-17 LAB
ALBUMIN SERPL-MCNC: 4.6 G/DL (ref 3.5–5.2)
ALP SERPL-CCNC: 78 U/L (ref 35–104)
ALT SERPL-CCNC: 19 U/L (ref 0–35)
ANION GAP SERPL CALCULATED.3IONS-SCNC: 13 MMOL/L (ref 7–16)
AST SERPL-CCNC: 27 U/L (ref 0–35)
BILIRUB SERPL-MCNC: 0.3 MG/DL (ref 0–1.2)
BUN SERPL-MCNC: 11 MG/DL (ref 6–20)
CALCIUM SERPL-MCNC: 9.4 MG/DL (ref 8.6–10)
CHLORIDE SERPL-SCNC: 104 MMOL/L (ref 98–107)
CO2 SERPL-SCNC: 21 MMOL/L (ref 22–29)
CREAT SERPL-MCNC: 0.5 MG/DL (ref 0.5–1)
GFR, ESTIMATED: >90 ML/MIN/1.73M2
GLUCOSE SERPL-MCNC: 125 MG/DL (ref 74–99)
POTASSIUM SERPL-SCNC: 4.6 MMOL/L (ref 3.4–4.5)
PROT SERPL-MCNC: 7.6 G/DL (ref 6.4–8.3)
SODIUM SERPL-SCNC: 137 MMOL/L (ref 136–145)

## 2025-07-13 ENCOUNTER — APPOINTMENT (OUTPATIENT)
Dept: CT IMAGING | Age: 40
End: 2025-07-13
Payer: COMMERCIAL

## 2025-07-13 ENCOUNTER — HOSPITAL ENCOUNTER (OUTPATIENT)
Age: 40
Setting detail: OBSERVATION
Discharge: HOME | End: 2025-07-14
Attending: STUDENT IN AN ORGANIZED HEALTH CARE EDUCATION/TRAINING PROGRAM | Admitting: FAMILY MEDICINE
Payer: COMMERCIAL

## 2025-07-13 ENCOUNTER — HOSPITAL ENCOUNTER (EMERGENCY)
Age: 40
Discharge: HOME | End: 2025-07-13

## 2025-07-13 VITALS
HEIGHT: 64 IN | TEMPERATURE: 98 F | RESPIRATION RATE: 18 BRPM | SYSTOLIC BLOOD PRESSURE: 162 MMHG | OXYGEN SATURATION: 99 % | HEART RATE: 102 BPM | BODY MASS INDEX: 29.19 KG/M2 | WEIGHT: 171 LBS | DIASTOLIC BLOOD PRESSURE: 113 MMHG

## 2025-07-13 DIAGNOSIS — F41.1 ANXIETY STATE: ICD-10-CM

## 2025-07-13 DIAGNOSIS — G89.29 ACUTE ON CHRONIC LOW BACK PAIN: ICD-10-CM

## 2025-07-13 DIAGNOSIS — M54.50 ACUTE ON CHRONIC LOW BACK PAIN: ICD-10-CM

## 2025-07-13 DIAGNOSIS — M54.9 INTRACTABLE BACK PAIN: Primary | ICD-10-CM

## 2025-07-13 LAB
ALBUMIN SERPL-MCNC: 5 G/DL (ref 3.5–5.2)
ALP SERPL-CCNC: 83 U/L (ref 35–104)
ALT SERPL-CCNC: 15 U/L (ref 0–35)
AMPHET UR QL SCN: NEGATIVE
ANION GAP SERPL CALCULATED.3IONS-SCNC: 17 MMOL/L (ref 7–16)
APAP SERPL-MCNC: <5 UG/ML (ref 10–30)
AST SERPL-CCNC: 25 U/L (ref 0–35)
BACTERIA URNS QL MICRO: ABNORMAL
BARBITURATES UR QL SCN: NEGATIVE
BASOPHILS # BLD: 0.21 K/UL (ref 0–0.2)
BASOPHILS NFR BLD: 1 % (ref 0–2)
BENZODIAZ UR QL: NEGATIVE
BILIRUB SERPL-MCNC: 0.4 MG/DL (ref 0–1.2)
BILIRUB UR QL STRIP: NEGATIVE
BUN SERPL-MCNC: 17 MG/DL (ref 6–20)
BUPRENORPHINE UR QL: NEGATIVE
CALCIUM SERPL-MCNC: 9.9 MG/DL (ref 8.6–10)
CANNABINOIDS UR QL SCN: POSITIVE
CHLORIDE SERPL-SCNC: 102 MMOL/L (ref 98–107)
CLARITY UR: CLEAR
CO2 SERPL-SCNC: 17 MMOL/L (ref 22–29)
COCAINE UR QL SCN: NEGATIVE
COLOR UR: YELLOW
CREAT SERPL-MCNC: 0.7 MG/DL (ref 0.5–1)
EOSINOPHIL # BLD: 0 K/UL (ref 0.05–0.5)
EOSINOPHILS RELATIVE PERCENT: 0 % (ref 0–6)
EPI CELLS #/AREA URNS HPF: ABNORMAL /HPF
ERYTHROCYTE [DISTWIDTH] IN BLOOD BY AUTOMATED COUNT: 13.2 % (ref 11.5–15)
ETHANOLAMINE SERPL-MCNC: <10 MG/DL (ref 0–0.08)
FENTANYL UR QL: NEGATIVE
GFR, ESTIMATED: >90 ML/MIN/1.73M2
GLUCOSE SERPL-MCNC: 122 MG/DL (ref 74–99)
GLUCOSE UR STRIP-MCNC: NEGATIVE MG/DL
HCG SERPL QL: NEGATIVE
HCT VFR BLD AUTO: 41.8 % (ref 34–48)
HGB BLD-MCNC: 14.5 G/DL (ref 11.5–15.5)
HGB UR QL STRIP.AUTO: NEGATIVE
INR PPP: 1.3
KETONES UR STRIP-MCNC: 15 MG/DL
LEUKOCYTE ESTERASE UR QL STRIP: NEGATIVE
LYMPHOCYTES NFR BLD: 2.67 K/UL (ref 1.5–4)
LYMPHOCYTES RELATIVE PERCENT: 11 % (ref 20–42)
MAGNESIUM SERPL-MCNC: 1.9 MG/DL (ref 1.6–2.6)
MCH RBC QN AUTO: 31.3 PG (ref 26–35)
MCHC RBC AUTO-ENTMCNC: 34.7 G/DL (ref 32–34.5)
MCV RBC AUTO: 90.1 FL (ref 80–99.9)
METHADONE UR QL: NEGATIVE
MONOCYTES NFR BLD: 0.62 K/UL (ref 0.1–0.95)
MONOCYTES NFR BLD: 3 % (ref 2–12)
NEUTROPHILS NFR BLD: 85 % (ref 43–80)
NEUTS SEG NFR BLD: 20.11 K/UL (ref 1.8–7.3)
NITRITE UR QL STRIP: NEGATIVE
OPIATES UR QL SCN: NEGATIVE
OXYCODONE UR QL SCN: NEGATIVE
PCP UR QL SCN: NEGATIVE
PH UR STRIP: 6 [PH] (ref 5–8)
PLATELET # BLD AUTO: 471 K/UL (ref 130–450)
PMV BLD AUTO: 9.4 FL (ref 7–12)
POTASSIUM SERPL-SCNC: 4.6 MMOL/L (ref 3.5–5.1)
PROT SERPL-MCNC: 8.3 G/DL (ref 6.4–8.3)
PROT UR STRIP-MCNC: NEGATIVE MG/DL
PROTHROMBIN TIME: 14.3 SEC (ref 9.3–12.4)
RBC # BLD AUTO: 4.64 M/UL (ref 3.5–5.5)
RBC # BLD: NORMAL 10*6/UL
RBC #/AREA URNS HPF: ABNORMAL /HPF
SALICYLATES SERPL-MCNC: <0.5 MG/DL (ref 0–30)
SODIUM SERPL-SCNC: 135 MMOL/L (ref 136–145)
SP GR UR STRIP: 1.01 (ref 1–1.03)
TEST INFORMATION: ABNORMAL
TOXIC TRICYCLIC SC,BLOOD: NEGATIVE
UROBILINOGEN UR STRIP-ACNC: 0.2 EU/DL (ref 0–1)
WBC #/AREA URNS HPF: ABNORMAL /HPF
WBC OTHER # BLD: 23.6 K/UL (ref 4.5–11.5)

## 2025-07-13 PROCEDURE — 2500000003 HC RX 250 WO HCPCS: Performed by: FAMILY MEDICINE

## 2025-07-13 PROCEDURE — 72131 CT LUMBAR SPINE W/O DYE: CPT

## 2025-07-13 PROCEDURE — 80143 DRUG ASSAY ACETAMINOPHEN: CPT

## 2025-07-13 PROCEDURE — 6360000004 HC RX CONTRAST MEDICATION: Performed by: RADIOLOGY

## 2025-07-13 PROCEDURE — 74174 CTA ABD&PLVS W/CONTRAST: CPT

## 2025-07-13 PROCEDURE — 80307 DRUG TEST PRSMV CHEM ANLYZR: CPT

## 2025-07-13 PROCEDURE — 85025 COMPLETE CBC W/AUTO DIFF WBC: CPT

## 2025-07-13 PROCEDURE — G0480 DRUG TEST DEF 1-7 CLASSES: HCPCS

## 2025-07-13 PROCEDURE — 81001 URINALYSIS AUTO W/SCOPE: CPT

## 2025-07-13 PROCEDURE — 80053 COMPREHEN METABOLIC PANEL: CPT

## 2025-07-13 PROCEDURE — 96375 TX/PRO/DX INJ NEW DRUG ADDON: CPT

## 2025-07-13 PROCEDURE — 96361 HYDRATE IV INFUSION ADD-ON: CPT

## 2025-07-13 PROCEDURE — 96376 TX/PRO/DX INJ SAME DRUG ADON: CPT

## 2025-07-13 PROCEDURE — 83735 ASSAY OF MAGNESIUM: CPT

## 2025-07-13 PROCEDURE — 6360000002 HC RX W HCPCS: Performed by: STUDENT IN AN ORGANIZED HEALTH CARE EDUCATION/TRAINING PROGRAM

## 2025-07-13 PROCEDURE — 2580000003 HC RX 258: Performed by: STUDENT IN AN ORGANIZED HEALTH CARE EDUCATION/TRAINING PROGRAM

## 2025-07-13 PROCEDURE — 87086 URINE CULTURE/COLONY COUNT: CPT

## 2025-07-13 PROCEDURE — 84703 CHORIONIC GONADOTROPIN ASSAY: CPT

## 2025-07-13 PROCEDURE — 99285 EMERGENCY DEPT VISIT HI MDM: CPT

## 2025-07-13 PROCEDURE — 96374 THER/PROPH/DIAG INJ IV PUSH: CPT

## 2025-07-13 PROCEDURE — G0378 HOSPITAL OBSERVATION PER HR: HCPCS

## 2025-07-13 PROCEDURE — 6360000002 HC RX W HCPCS

## 2025-07-13 PROCEDURE — 6370000000 HC RX 637 (ALT 250 FOR IP): Performed by: STUDENT IN AN ORGANIZED HEALTH CARE EDUCATION/TRAINING PROGRAM

## 2025-07-13 PROCEDURE — 99223 1ST HOSP IP/OBS HIGH 75: CPT | Performed by: FAMILY MEDICINE

## 2025-07-13 PROCEDURE — 85610 PROTHROMBIN TIME: CPT

## 2025-07-13 PROCEDURE — 80179 DRUG ASSAY SALICYLATE: CPT

## 2025-07-13 PROCEDURE — 93005 ELECTROCARDIOGRAM TRACING: CPT | Performed by: STUDENT IN AN ORGANIZED HEALTH CARE EDUCATION/TRAINING PROGRAM

## 2025-07-13 RX ORDER — DEXAMETHASONE SODIUM PHOSPHATE 10 MG/ML
10 INJECTION, SOLUTION INTRAMUSCULAR; INTRAVENOUS ONCE
Status: COMPLETED | OUTPATIENT
Start: 2025-07-13 | End: 2025-07-13

## 2025-07-13 RX ORDER — POLYETHYLENE GLYCOL 3350 17 G/17G
17 POWDER, FOR SOLUTION ORAL DAILY PRN
Status: DISCONTINUED | OUTPATIENT
Start: 2025-07-13 | End: 2025-07-14 | Stop reason: HOSPADM

## 2025-07-13 RX ORDER — ONDANSETRON 2 MG/ML
4 INJECTION INTRAMUSCULAR; INTRAVENOUS EVERY 6 HOURS PRN
Status: DISCONTINUED | OUTPATIENT
Start: 2025-07-13 | End: 2025-07-14 | Stop reason: HOSPADM

## 2025-07-13 RX ORDER — FENTANYL CITRATE 50 UG/ML
100 INJECTION, SOLUTION INTRAMUSCULAR; INTRAVENOUS ONCE
Status: COMPLETED | OUTPATIENT
Start: 2025-07-13 | End: 2025-07-13

## 2025-07-13 RX ORDER — POTASSIUM CHLORIDE 7.45 MG/ML
10 INJECTION INTRAVENOUS PRN
Status: DISCONTINUED | OUTPATIENT
Start: 2025-07-13 | End: 2025-07-14 | Stop reason: HOSPADM

## 2025-07-13 RX ORDER — HYDROMORPHONE HYDROCHLORIDE 1 MG/ML
1 INJECTION, SOLUTION INTRAMUSCULAR; INTRAVENOUS; SUBCUTANEOUS ONCE
Status: COMPLETED | OUTPATIENT
Start: 2025-07-13 | End: 2025-07-13

## 2025-07-13 RX ORDER — ACETAMINOPHEN 325 MG/1
650 TABLET ORAL EVERY 6 HOURS PRN
Status: DISCONTINUED | OUTPATIENT
Start: 2025-07-13 | End: 2025-07-14 | Stop reason: HOSPADM

## 2025-07-13 RX ORDER — SODIUM CHLORIDE 0.9 % (FLUSH) 0.9 %
5-40 SYRINGE (ML) INJECTION EVERY 12 HOURS SCHEDULED
Status: DISCONTINUED | OUTPATIENT
Start: 2025-07-13 | End: 2025-07-14 | Stop reason: HOSPADM

## 2025-07-13 RX ORDER — MORPHINE SULFATE 4 MG/ML
4 INJECTION, SOLUTION INTRAMUSCULAR; INTRAVENOUS ONCE
Refills: 0 | Status: COMPLETED | OUTPATIENT
Start: 2025-07-13 | End: 2025-07-13

## 2025-07-13 RX ORDER — SODIUM CHLORIDE 9 MG/ML
INJECTION, SOLUTION INTRAVENOUS PRN
Status: DISCONTINUED | OUTPATIENT
Start: 2025-07-13 | End: 2025-07-14 | Stop reason: HOSPADM

## 2025-07-13 RX ORDER — PROCHLORPERAZINE EDISYLATE 5 MG/ML
10 INJECTION INTRAMUSCULAR; INTRAVENOUS EVERY 6 HOURS PRN
Status: DISCONTINUED | OUTPATIENT
Start: 2025-07-13 | End: 2025-07-14 | Stop reason: HOSPADM

## 2025-07-13 RX ORDER — POTASSIUM CHLORIDE 1500 MG/1
40 TABLET, EXTENDED RELEASE ORAL PRN
Status: DISCONTINUED | OUTPATIENT
Start: 2025-07-13 | End: 2025-07-14 | Stop reason: HOSPADM

## 2025-07-13 RX ORDER — SODIUM CHLORIDE 0.9 % (FLUSH) 0.9 %
5-40 SYRINGE (ML) INJECTION PRN
Status: DISCONTINUED | OUTPATIENT
Start: 2025-07-13 | End: 2025-07-14 | Stop reason: HOSPADM

## 2025-07-13 RX ORDER — ONDANSETRON 2 MG/ML
INJECTION INTRAMUSCULAR; INTRAVENOUS
Status: COMPLETED
Start: 2025-07-13 | End: 2025-07-13

## 2025-07-13 RX ORDER — IOPAMIDOL 755 MG/ML
75 INJECTION, SOLUTION INTRAVASCULAR
Status: COMPLETED | OUTPATIENT
Start: 2025-07-13 | End: 2025-07-13

## 2025-07-13 RX ORDER — DIPHENHYDRAMINE HYDROCHLORIDE 50 MG/ML
50 INJECTION, SOLUTION INTRAMUSCULAR; INTRAVENOUS ONCE
Status: COMPLETED | OUTPATIENT
Start: 2025-07-13 | End: 2025-07-13

## 2025-07-13 RX ORDER — HYDROMORPHONE HYDROCHLORIDE 1 MG/ML
1 INJECTION, SOLUTION INTRAMUSCULAR; INTRAVENOUS; SUBCUTANEOUS EVERY 4 HOURS PRN
Status: DISCONTINUED | OUTPATIENT
Start: 2025-07-14 | End: 2025-07-14 | Stop reason: HOSPADM

## 2025-07-13 RX ORDER — ACETAMINOPHEN 650 MG/1
650 SUPPOSITORY RECTAL EVERY 6 HOURS PRN
Status: DISCONTINUED | OUTPATIENT
Start: 2025-07-13 | End: 2025-07-14 | Stop reason: HOSPADM

## 2025-07-13 RX ORDER — MAGNESIUM SULFATE IN WATER 40 MG/ML
2000 INJECTION, SOLUTION INTRAVENOUS PRN
Status: DISCONTINUED | OUTPATIENT
Start: 2025-07-13 | End: 2025-07-14 | Stop reason: HOSPADM

## 2025-07-13 RX ORDER — OXYCODONE AND ACETAMINOPHEN 5; 325 MG/1; MG/1
2 TABLET ORAL ONCE
Refills: 0 | Status: COMPLETED | OUTPATIENT
Start: 2025-07-13 | End: 2025-07-13

## 2025-07-13 RX ORDER — ONDANSETRON 4 MG/1
4 TABLET, ORALLY DISINTEGRATING ORAL EVERY 8 HOURS PRN
Status: DISCONTINUED | OUTPATIENT
Start: 2025-07-13 | End: 2025-07-14 | Stop reason: HOSPADM

## 2025-07-13 RX ORDER — 0.9 % SODIUM CHLORIDE 0.9 %
1000 INTRAVENOUS SOLUTION INTRAVENOUS ONCE
Status: COMPLETED | OUTPATIENT
Start: 2025-07-13 | End: 2025-07-13

## 2025-07-13 RX ADMIN — HYDROMORPHONE HYDROCHLORIDE 1 MG: 1 INJECTION, SOLUTION INTRAMUSCULAR; INTRAVENOUS; SUBCUTANEOUS at 15:51

## 2025-07-13 RX ADMIN — FENTANYL CITRATE 100 MCG: 50 INJECTION, SOLUTION INTRAMUSCULAR; INTRAVENOUS at 17:53

## 2025-07-13 RX ADMIN — DEXAMETHASONE SODIUM PHOSPHATE 10 MG: 10 INJECTION, SOLUTION INTRAMUSCULAR; INTRAVENOUS at 16:42

## 2025-07-13 RX ADMIN — DIPHENHYDRAMINE HYDROCHLORIDE 50 MG: 50 INJECTION INTRAMUSCULAR; INTRAVENOUS at 20:43

## 2025-07-13 RX ADMIN — IOPAMIDOL 75 ML: 755 INJECTION, SOLUTION INTRAVENOUS at 16:14

## 2025-07-13 RX ADMIN — SODIUM CHLORIDE, PRESERVATIVE FREE 10 ML: 5 INJECTION INTRAVENOUS at 22:39

## 2025-07-13 RX ADMIN — HYDROMORPHONE HYDROCHLORIDE 1 MG: 1 INJECTION, SOLUTION INTRAMUSCULAR; INTRAVENOUS; SUBCUTANEOUS at 20:15

## 2025-07-13 RX ADMIN — ONDANSETRON HYDROCHLORIDE: 2 INJECTION, SOLUTION INTRAMUSCULAR; INTRAVENOUS at 16:41

## 2025-07-13 RX ADMIN — MORPHINE SULFATE 4 MG: 4 INJECTION, SOLUTION INTRAMUSCULAR; INTRAVENOUS at 16:43

## 2025-07-13 RX ADMIN — PROCHLORPERAZINE EDISYLATE 10 MG: 5 INJECTION INTRAMUSCULAR; INTRAVENOUS at 20:41

## 2025-07-13 RX ADMIN — SODIUM CHLORIDE 1000 ML: 0.9 INJECTION, SOLUTION INTRAVENOUS at 15:51

## 2025-07-13 RX ADMIN — OXYCODONE AND ACETAMINOPHEN 2 TABLET: 5; 325 TABLET ORAL at 22:37

## 2025-07-13 ASSESSMENT — PAIN DESCRIPTION - DESCRIPTORS
DESCRIPTORS: SHARP;SHOOTING;ACHING
DESCRIPTORS: ACHING;THROBBING;NAGGING
DESCRIPTORS: ACHING;CRUSHING;PRESSURE

## 2025-07-13 ASSESSMENT — PAIN SCALES - GENERAL
PAINLEVEL_OUTOF10: 8
PAINLEVEL_OUTOF10: 8
PAINLEVEL_OUTOF10: 10
PAINLEVEL_OUTOF10: 7
PAINLEVEL_OUTOF10: 10

## 2025-07-13 ASSESSMENT — PAIN DESCRIPTION - ORIENTATION
ORIENTATION: MID;LOWER
ORIENTATION: ANTERIOR

## 2025-07-13 ASSESSMENT — PAIN DESCRIPTION - LOCATION
LOCATION: BACK

## 2025-07-13 NOTE — ED NOTES
Patient moved to [facility] as a result of Dr. Rose evaluating patient (on floor of lobby/before returning back to [facility]) due to IV pain medication & screaming hysterically demanding a bed. Brad Clinical Supervisor & Maddison, Charge RN both aware of situation & tried to rationalize w patient prior to moving patient.

## 2025-07-13 NOTE — ED NOTES
Radiology Procedure Waiver   Name: Cynthia Ayala  : 1985  MRN: [id number]    Date:  25    Time: 3:44 PM EDT    Benefits of immediately proceeding with radiology exam(s) without pre-testing outweigh the risks or are not indicated as specified below and therefore the following is/are being waived:    [x] Benefits of immediate radiology exam(s) outweigh any risk.                                               OR    Pre-exam testing is not indicated for the following reason(s):  [] Pregnancy test   [] Patients LMP on-time and regular.   [] Patient had Tubal Ligation or has other Contraception Device.   [] Patient  is Menopausal or Premenarcheal.    [] Patient had Full or Partial Hysterectomy.    [] Protocol for CT contrast allegry   [] Patient has tolerated well previously   [] Patient does not have a true allergy    [] MRI Questionnaire     [] BUN/Creatinine   [] Patient age w/no hx of renal dysfunction.   [] Patient on Dialysis.   [] Recent Normal Labs.  Electronically signed by Babita Rose DO on 25 at 3:44 PM EDT               Babita Rose DO  25 [phone]

## 2025-07-13 NOTE — ED PROVIDER NOTES
[address]  [address]  [facility] [address]  [phone]    Schedule an appointment as soon as possible for a visit  for hospital followup      DISCHARGE MEDICATIONS:  Discharge Medication List as of 7/14/2025  1:58 PM        START taking these medications    Details   !! ondansetron (ZOFRAN-ODT) 4 MG disintegrating tablet Take 1 tablet by mouth every 8 hours as needed for Nausea or Vomiting, Disp-15 tablet, R-0Normal       !! - Potential duplicate medications found. Please discuss with provider.          DISCONTINUED MEDICATIONS:  Discharge Medication List as of 7/14/2025  1:58 PM        STOP taking these medications       predniSONE (DELTASONE) 20 MG tablet Comments:   Reason for Stopping:         Capsaicin 0.1 % CREA Comments:   Reason for Stopping:                           Babita Rose D.O.     Emergency Medicine      7/14/2025 6:01 PM      NOTE: This report was transcribed using voice recognition software. Every effort was made to ensure accuracy; however, inadvertent computerized transcription errors may be present            Babita Rose DO  07/14/25 1808

## 2025-07-14 VITALS
WEIGHT: 175 LBS | BODY MASS INDEX: 29.88 KG/M2 | SYSTOLIC BLOOD PRESSURE: 143 MMHG | HEART RATE: 98 BPM | RESPIRATION RATE: 18 BRPM | DIASTOLIC BLOOD PRESSURE: 97 MMHG | OXYGEN SATURATION: 98 % | HEIGHT: 64 IN | TEMPERATURE: 98.6 F

## 2025-07-14 LAB
ANION GAP SERPL CALCULATED.3IONS-SCNC: 14 MMOL/L (ref 7–16)
BASOPHILS # BLD: 0.03 K/UL (ref 0–0.2)
BASOPHILS NFR BLD: 0 % (ref 0–2)
BUN SERPL-MCNC: 10 MG/DL (ref 6–20)
CALCIUM SERPL-MCNC: 9.1 MG/DL (ref 8.6–10)
CHLORIDE SERPL-SCNC: 102 MMOL/L (ref 98–107)
CO2 SERPL-SCNC: 21 MMOL/L (ref 22–29)
CREAT SERPL-MCNC: 0.5 MG/DL (ref 0.5–1)
EKG ATRIAL RATE: 87 BPM
EKG P AXIS: 22 DEGREES
EKG P-R INTERVAL: 126 MS
EKG Q-T INTERVAL: 390 MS
EKG QRS DURATION: 104 MS
EKG QTC CALCULATION (BAZETT): 469 MS
EKG R AXIS: 9 DEGREES
EKG T AXIS: 53 DEGREES
EKG VENTRICULAR RATE: 87 BPM
EOSINOPHIL # BLD: 0.01 K/UL (ref 0.05–0.5)
EOSINOPHILS RELATIVE PERCENT: 0 % (ref 0–6)
ERYTHROCYTE [DISTWIDTH] IN BLOOD BY AUTOMATED COUNT: 13.6 % (ref 11.5–15)
GFR, ESTIMATED: >90 ML/MIN/1.73M2
GLUCOSE SERPL-MCNC: 110 MG/DL (ref 74–99)
HCT VFR BLD AUTO: 36.9 % (ref 34–48)
HGB BLD-MCNC: 13.1 G/DL (ref 11.5–15.5)
IMM GRANULOCYTES # BLD AUTO: 0.15 K/UL (ref 0–0.58)
IMM GRANULOCYTES NFR BLD: 1 % (ref 0–5)
LYMPHOCYTES NFR BLD: 2.42 K/UL (ref 1.5–4)
LYMPHOCYTES RELATIVE PERCENT: 12 % (ref 20–42)
MCH RBC QN AUTO: 32.2 PG (ref 26–35)
MCHC RBC AUTO-ENTMCNC: 35.5 G/DL (ref 32–34.5)
MCV RBC AUTO: 90.7 FL (ref 80–99.9)
MICROORGANISM SPEC CULT: ABNORMAL
MONOCYTES NFR BLD: 1.03 K/UL (ref 0.1–0.95)
MONOCYTES NFR BLD: 5 % (ref 2–12)
NEUTROPHILS NFR BLD: 82 % (ref 43–80)
NEUTS SEG NFR BLD: 16.24 K/UL (ref 1.8–7.3)
PLATELET # BLD AUTO: 408 K/UL (ref 130–450)
PMV BLD AUTO: 9.7 FL (ref 7–12)
POTASSIUM SERPL-SCNC: 3.7 MMOL/L (ref 3.5–5.1)
RBC # BLD AUTO: 4.07 M/UL (ref 3.5–5.5)
SODIUM SERPL-SCNC: 136 MMOL/L (ref 136–145)
SPECIMEN DESCRIPTION: ABNORMAL
WBC OTHER # BLD: 19.9 K/UL (ref 4.5–11.5)

## 2025-07-14 PROCEDURE — 36415 COLL VENOUS BLD VENIPUNCTURE: CPT

## 2025-07-14 PROCEDURE — 96375 TX/PRO/DX INJ NEW DRUG ADDON: CPT

## 2025-07-14 PROCEDURE — 6370000000 HC RX 637 (ALT 250 FOR IP): Performed by: FAMILY MEDICINE

## 2025-07-14 PROCEDURE — 85025 COMPLETE CBC W/AUTO DIFF WBC: CPT

## 2025-07-14 PROCEDURE — 6360000002 HC RX W HCPCS: Performed by: FAMILY MEDICINE

## 2025-07-14 PROCEDURE — G0378 HOSPITAL OBSERVATION PER HR: HCPCS

## 2025-07-14 PROCEDURE — 2500000003 HC RX 250 WO HCPCS: Performed by: FAMILY MEDICINE

## 2025-07-14 PROCEDURE — 96376 TX/PRO/DX INJ SAME DRUG ADON: CPT

## 2025-07-14 PROCEDURE — 99238 HOSP IP/OBS DSCHRG MGMT 30/<: CPT | Performed by: INTERNAL MEDICINE

## 2025-07-14 PROCEDURE — 93010 ELECTROCARDIOGRAM REPORT: CPT | Performed by: INTERNAL MEDICINE

## 2025-07-14 PROCEDURE — 80048 BASIC METABOLIC PNL TOTAL CA: CPT

## 2025-07-14 RX ORDER — OXYCODONE AND ACETAMINOPHEN 5; 325 MG/1; MG/1
1 TABLET ORAL EVERY 6 HOURS PRN
Refills: 0 | Status: DISCONTINUED | OUTPATIENT
Start: 2025-07-14 | End: 2025-07-14 | Stop reason: HOSPADM

## 2025-07-14 RX ORDER — GABAPENTIN 800 MG/1
800 TABLET ORAL 3 TIMES DAILY
Qty: 90 TABLET | Refills: 0 | Status: SHIPPED | OUTPATIENT
Start: 2025-07-14 | End: 2025-08-13

## 2025-07-14 RX ORDER — TRAMADOL HYDROCHLORIDE 50 MG/1
50 TABLET ORAL EVERY 6 HOURS PRN
COMMUNITY

## 2025-07-14 RX ORDER — ONDANSETRON 4 MG/1
4 TABLET, ORALLY DISINTEGRATING ORAL EVERY 8 HOURS PRN
Qty: 15 TABLET | Refills: 0 | Status: SHIPPED | OUTPATIENT
Start: 2025-07-14

## 2025-07-14 RX ORDER — MIDAZOLAM HYDROCHLORIDE 2 MG/2ML
2 INJECTION, SOLUTION INTRAMUSCULAR; INTRAVENOUS ONCE
Status: COMPLETED | OUTPATIENT
Start: 2025-07-14 | End: 2025-07-14

## 2025-07-14 RX ADMIN — MIDAZOLAM HYDROCHLORIDE 2 MG: 1 INJECTION, SOLUTION INTRAMUSCULAR; INTRAVENOUS at 05:45

## 2025-07-14 RX ADMIN — SODIUM CHLORIDE, PRESERVATIVE FREE 10 ML: 5 INJECTION INTRAVENOUS at 09:33

## 2025-07-14 RX ADMIN — OXYCODONE AND ACETAMINOPHEN 1 TABLET: 5; 325 TABLET ORAL at 12:47

## 2025-07-14 RX ADMIN — ACETAMINOPHEN 650 MG: 325 TABLET ORAL at 09:32

## 2025-07-14 RX ADMIN — HYDROMORPHONE HYDROCHLORIDE 1 MG: 1 INJECTION, SOLUTION INTRAMUSCULAR; INTRAVENOUS; SUBCUTANEOUS at 10:46

## 2025-07-14 RX ADMIN — HYDROMORPHONE HYDROCHLORIDE 1 MG: 1 INJECTION, SOLUTION INTRAMUSCULAR; INTRAVENOUS; SUBCUTANEOUS at 02:28

## 2025-07-14 RX ADMIN — HYDROMORPHONE HYDROCHLORIDE 1 MG: 1 INJECTION, SOLUTION INTRAMUSCULAR; INTRAVENOUS; SUBCUTANEOUS at 06:47

## 2025-07-14 RX ADMIN — OXYCODONE AND ACETAMINOPHEN 1 TABLET: 5; 325 TABLET ORAL at 05:26

## 2025-07-14 RX ADMIN — ACETAMINOPHEN 650 MG: 325 TABLET ORAL at 04:33

## 2025-07-14 ASSESSMENT — PAIN SCALES - GENERAL
PAINLEVEL_OUTOF10: 9
PAINLEVEL_OUTOF10: 4
PAINLEVEL_OUTOF10: 6
PAINLEVEL_OUTOF10: 10
PAINLEVEL_OUTOF10: 8
PAINLEVEL_OUTOF10: 10
PAINLEVEL_OUTOF10: 9

## 2025-07-14 ASSESSMENT — PAIN DESCRIPTION - DESCRIPTORS
DESCRIPTORS: ACHING;DISCOMFORT;SHOOTING;SORE
DESCRIPTORS: ACHING;STABBING;SPASM
DESCRIPTORS: ACHING;DISCOMFORT;SHOOTING;SORE
DESCRIPTORS: ACHING;SPASM;SHOOTING
DESCRIPTORS: SHOOTING;SPASM;ACHING
DESCRIPTORS: ACHING;DISCOMFORT;SHARP;SHOOTING;SORE
DESCRIPTORS: ACHING;SHOOTING;STABBING

## 2025-07-14 ASSESSMENT — PAIN DESCRIPTION - LOCATION
LOCATION: BACK
LOCATION: BACK
LOCATION: HIP;LEG;BACK
LOCATION: BACK
LOCATION: BACK;HIP
LOCATION: BACK
LOCATION: BACK

## 2025-07-14 ASSESSMENT — PAIN - FUNCTIONAL ASSESSMENT
PAIN_FUNCTIONAL_ASSESSMENT: PREVENTS OR INTERFERES SOME ACTIVE ACTIVITIES AND ADLS
PAIN_FUNCTIONAL_ASSESSMENT: NONE - DENIES PAIN
PAIN_FUNCTIONAL_ASSESSMENT: ACTIVITIES ARE NOT PREVENTED
PAIN_FUNCTIONAL_ASSESSMENT: PREVENTS OR INTERFERES SOME ACTIVE ACTIVITIES AND ADLS
PAIN_FUNCTIONAL_ASSESSMENT: PREVENTS OR INTERFERES SOME ACTIVE ACTIVITIES AND ADLS

## 2025-07-14 ASSESSMENT — PAIN DESCRIPTION - ORIENTATION
ORIENTATION: MID;LOWER
ORIENTATION: RIGHT;MID;LOWER;LEFT

## 2025-07-14 NOTE — ED NOTES
.Report given to ADEEL Fried from [facility].   Report method in person   The following was reviewed with receiving RN:   Current vital signs:  /63   Pulse 100   Temp 98.2 °F (36.8 °C)   Resp 17   SpO2 96%                      Any medication or safety alerts were reviewed. Any pending diagnostics and notifications were also reviewed, as well as any safety concerns or issues, abnormal labs, abnormal imaging, and abnormal assessment findings. Questions were answered.

## 2025-07-14 NOTE — DISCHARGE SUMMARY
[facility] Hospitalist Physician Discharge Summary       Margarita Puentes, PA  [address]  [address]  [facility] [place] [id number] [phone]    Schedule an appointment as soon as possible for a visit  for hospital followup      Activity level:  As tolerated    Dispo: Home      Condition on discharge:  Stable    Patient ID:  Cynthia Ayala  [id number]  40 y.o.  1985    Admit date: 7/13/2025    Discharge date and time:  7/14/2025  2:19 PM    Admission Diagnoses: Principal Problem:    Intractable back pain  Resolved Problems:    * No resolved hospital problems. *      Discharge Diagnoses: Principal Problem:    Intractable back pain  Resolved Problems:    * No resolved hospital problems. *      Consults:  IP CONSULT TO INTERNAL MEDICINE  IP CONSULT TO ORTHOPEDIC SURGERY    Procedures:  None    Hospital Course:   Patient Cynthia Ayala is a 40 y.o. presented with Intractable back pain [M54.9]  Ms. Cynthia Ayala, a 40 y.o. year old female  who  has a past medical history of Anxiety attack, Asthma, Crohn's colitis (HCC), Depression, Herpes simplex virus (HSV) infection, History of blood transfusion, Hypertension, Marijuana abuse, Neurologic disorder, Seizure (HCC), STD (sexually transmitted disease), and Tobacco abuse, came here due to intractable back pain.  She needed IV pain medication that helped her very well.  She feels so that she can go back home and keep continue taking her home medication for pain.    Discharge Exam:    General Appearance: alert and oriented to person, place and time and in no acute distress  Skin: warm and dry  Head: normocephalic and atraumatic  Eyes: pupils equal, round, and reactive to light, extraocular eye movements intact, conjunctivae normal  Neck: neck supple and non tender without mass   Pulmonary/Chest: clear to auscultation bilaterally- no wheezes, rales or rhonchi, normal air movement, no respiratory distress  Cardiovascular: normal rate, normal S1

## 2025-07-14 NOTE — H&P
Hospitalist History & Physical      PCP: Margarita Puentes PA    Date of Service: Pt seen/examined on 7/13/2025     Chief Complaint:  had concerns including Back Pain.    History Of Present Illness:    Ms. Cynthia Ayala, a 40 y.o. year old female  who  has a past medical history of Anxiety attack, Asthma, Crohn's colitis (HCC), Depression, Herpes simplex virus (HSV) infection, History of blood transfusion, Hypertension, Marijuana abuse, Neurologic disorder, Seizure (HCC), STD (sexually transmitted disease), and Tobacco abuse.     Patient presented to the emergency department with low back pain that started earlier in the day.  Patient has a history of chronic back pain and takes tramadol and gabapentin.  Patient reports pain became severe since this morning.  Describes it as a sharp stabbing low back pain.  Nonradiating.  Patient on arrival to the emergency department was screaming and behaving erratically writhing around in bed.  Patient denies fever, chills,, chest pain, shortness of breath, headache, dizziness, abdominal pain, dysuria, hematuria, constipation, diarrhea, incontinence of bowel or bladder.  Vital signs within normal limits and stable.  The patient is afebrile.  Workup was largely unremarkable including laboratory studies which showed a WBC of 23.6 but she is chronically elevated white count.  CT of the back did not show any acute features.  Case was discussed with emergency department physician.  Medicine consulted for admission.      Past Medical History:   Diagnosis Date    Anxiety attack since age 14    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 inhaler     Crohn's colitis (HCC)     Depression     medicated with zoloft     Herpes simplex virus (HSV) infection     History of blood transfusion     as an infant in [place]    Hypertension     intermittent     Marijuana abuse     occas    Neurologic disorder     Seizure (HCC)     loses focus, disoriented

## 2025-07-14 NOTE — CARE COORDINATION
"Telephone Encounter by Shaheen Nogueira at 07/06/17 11:10 AM     Author:  Shaheen Nogueira Service:  (none) Author Type:       Filed:  07/06/17 11:12 AM Encounter Date:  7/6/2017 Status:  Signed     :  Shaheen Nogueira ()              Win Frias    Patient Age: 56year old    ACCT STATUS:   MESSAGE:[KD1.1T]   Sarah from [address] [place] [address] calling to ask Dr. Shauna Rose if pt needs refill on Pilocarpine since fax was received but did not state if pt needed a refill. Please advise on rx sent for Pilocarpine. Routed to pulm hld nurse aydeo[KD1.1M]   Next and Last Visit with Provider and Department  Next visit with Chan Peres is on No match found  Next visit with PULMONARY MEDICINE is on No match found  Last visit with Chan Peres was on 06/21/2016 at  4:30 PM in [address] [place] [address] visit with PULMONARY MEDICINE was on 06/21/2016 at  4:30 PM in PULMONARY D     WEIGHT AND HEIGHT: As of 04/04/2017 weight is 215 lbs. (97.523 kg). Height is 5' 7""(1.702 m). BMI is 33.67 kg/(m^2) calculated from:     Height 5' 7\"" (1.702 m) as of 4/4/17     Weight 215 lb (97.523 kg) as of 4/4/17      Allergies      Allergen   Reactions   â¢ Atorvastatin  Anaphylaxis   â¢ Bee  Anaphylaxis     Any stinging insect    â¢ Clindamycin  Hives   â¢ Erythromycin  Hives     URTICARIA AND DIARRHEA. States has tolerates Zpak several times since the reaction to E-mycin    â¢ Latex  Rash   â¢ Lipitor [Atorvastatin Calcium]  Anaphylaxis     Hives    â¢ Crestor [Rosuvastatin]  Other - See Comments     Myalgias    â¢ Penicillins  Other - See Comments     YEAST INFECTION    â¢ Levaquin  Diarrhea   â¢ Vytorin  Diarrhea     Current outpatient prescriptions       Medication  Sig Dispense Refill   â¢ pregabalin (LYRICA) 150 MG Cap Take 1 Cap by mouth 3 (three) times daily. 90 Cap 5   â¢ methylphenidate (RITALIN LA) 40 MG 24 hr Cap Take 1 Cap by mouth every morning.  30 Cap 0   â¢ Hydroxychloroquine Sulfate (PLAQUENIL) 200 MG tablet TAKE " 7/14/25, Patient admitted for intractable back pain.  SW went to meet with patient who had already discharged.  Patient is from home with significant other and children.  Patient lives with family in a 1 story home with 2 steps to enter.  PCP is Dr. Puentes and Pharmacy is [facility].  Patient was observed by staff walking up and down the jensen.  No needs.      Case Management Assessment  Initial Evaluation    Date/Time of Evaluation: 7/14/2025 2:50 PM  Assessment Completed by: RIAZ Collier    If patient is discharged prior to next notation, then this note serves as note for discharge by case management.    Patient Name: Cynthia Ayala                   YOB: 1985  Diagnosis: Intractable back pain [M54.9]                   Date / Time: 7/13/2025  3:15 PM    Patient Admission Status: Observation   Readmission Risk (Low < 19, Mod (19-27), High > 27): Readmission Risk Score: 12.4    Current PCP: Margarita Puentes PA  PCP verified by CM? Yes    Chart Reviewed: Yes      History Provided by: Patient  Patient Orientation: Alert and Oriented    Patient Cognition: Alert    Hospitalization in the last 30 days (Readmission):  No    If yes, Readmission Assessment in CM Navigator will be completed.    Advance Directives:      Code Status: Full Code   Patient's Primary Decision Maker is: Legal Next of Kin      Discharge Planning:    Patient lives with: Children Type of Home: House  Primary Care Giver: Self  Patient Support Systems include: Spouse/Significant Other, Children   Current Financial resources:    Current community resources:    Current services prior to admission: None            Current DME:              Type of Home Care services:  None    ADLS  Prior functional level: Independent in ADLs/IADLs  Current functional level: Independent in ADLs/IADLs    PT AM-PAC:   /24  OT AM-PAC:   /24    Family can provide assistance at DC: Yes  Would you like Case Management to discuss the discharge plan with any  1 TABLET BY MOUTH TWO TIMES A DAY  60 Tab 0   â¢ nystatin (MYCOSTATIN) 956213 UNITS TABS TAKE 1 TABLET BY MOUTH ONE TIME A DAY  30 Tab 0   â¢ celecoxib (CELEBREX) 200 MG Cap TAKE 1 CAPSULE BY MOUTH THREE TIMES A DAY  90 Cap 0   â¢ ropinirole (REQUIP) 1 MG tablet TAKE 1 TABLET BY MOUTH NIGHTLY 30 Tab 1   â¢ EPINEPHrine (EPIPEN 2-RONNY) 0.3 MG/0.3ML SOAJ Use as directed 2 Each 0   â¢ montelukast (SINGULAIR) 10 MG tablet Take 1 Tab by mouth nightly. 30 Tab 0   â¢ mupirocin (BACTROBAN) 2 % ointment Apply to the affected area(upper extremities) twice daily as directed 30 g 1   â¢ methotrexate 2.5 MG tablet Take 6 Tabs by mouth once a week. as directed 90 Tab 0   â¢ folic acid (FOLVITE) 1 MG tablet Take 1 Tab by mouth daily. 30 Tab 2   â¢ trazodone (DESYREL) 100 MG tablet TAKE 2 TABLETS BY MOUTH NIGHTLY 60 Tab 3   â¢ fluoxetine (PROZAC) 40 MG Cap Take 1 Cap by mouth daily. 30 Cap 3   â¢ methylphenidate (RITALIN) 5 MG tablet Take 1 Tab by mouth daily as needed. 30 Tab 0   â¢ modafinil (PROVIGIL) 200 MG tablet TAKE 1 AND 1/2 TABLETS BY MOUTH IN THE MORNING AND 1/2 TABS IN THE AFTERNOON 60 Tab 0   â¢ Topiramate (TOPAMAX) 25 MG tablet Start with 1 pill qhs for 1 week, then 2 pills qhs for another week, then 3 pills qhs 90 Tab 5   â¢ methadone (DOLOPHINE) 10 MG tablet Take 1 Tab by mouth daily. 0   â¢ lidocaine (XYLOCAINE) 5 % ointment   2   â¢ MYRBETRIQ 50 MG TB24   4   â¢ RaNITidine HCl (ZANTAC) 300 MG tablet   2   â¢ ropinirole (REQUIP) 0.5 MG tablet   10   â¢ predniSONE (DELTASONE) 20 MG tablet 3 tabs daily for 3 days, then 2 tabs daily for 3 days, then 1 tab daily for 3 days, then stop 18 Tab 0   â¢ Pilocarpine HCl (SALAGEN) 5 MG tablet TAKE 1 TABLET BY MOUTH THREE TIMES A DAY  90 Tab 3   â¢ folic acid (FOLVITE) 1 MG tablet TAKE 1 TABLET BY MOUTH ONE TIME A DAY  30 Tab 0   â¢ benzonatate (TESSALON) 200 MG Cap Take 1 Cap by mouth 3 (three) times daily as needed for Cough.  40 Cap 2   â¢ budesonide-formoterol (SYMBICORT) 160-4.5 MCG/ACT inhaler Inhale 2 Puffs by mouth 2 (two) times daily. 1 Inhaler 5   â¢ furosemide (LASIX) 40 MG tablet Take 1 Tab by mouth daily. 30 Tab 11   â¢ nystatin-triamcinolone (MYCOLOG II) cream Apply to the affected area twice daily until clear 30 g 1   â¢ Diclofenac Sodium 3 % GEL   11   â¢ Adalimumab (HUMIRA) 40 MG/0.8ML PSKT 40mg subcutaneous every other week 6 Kit 0   â¢ methotrexate 2.5 MG tablet Take 6 Tabs by mouth once a week. as directed 60 Tab 0   â¢ cyclobenzaprine (FLEXERIL) 10 MG tablet Take 2.5 Tabs by mouth 2 (two) times daily. 150 Tab 11   â¢ hyoscyamine ER 0.375 MG 12 hr tablet Take 1 Tab by mouth every 12 (twelve) hours as needed for Cramping. 60 Tab 11   â¢ trospium (SANCTURA) 20 MG tablet Take 1 Tab by mouth daily. 0   â¢ metoprolol (TOPROL XL) 25 MG 24 hr tablet Take 0.5 Tabs by mouth 2 (two) times daily. 30 Tab 0   â¢ fexofenadine (ALLEGRA ALLERGY) 180 MG tablet Take 1 Tab by mouth daily. 30 Tab 2   â¢ doxepin (SINEQUAN) 25 MG Cap 1 Cap nightly. 30 Cap 4   â¢ sumatriptan (IMITREX) 100 MG tablet Take 1 Tab by mouth as needed for Migraine. 10 Tab 0   â¢ potassium chloride (KLOR-CON) 20 MEQ packet Take 40 mEq by mouth daily. 0   â¢ Methylnaltrexone Bromide (RELISTOR) 8 MG/0.4ML SOLN Inject  into the skin daily. 0   â¢ nystatin (MYCOSTATIN) powder Apply bid to affected area 15 g 1   â¢ Vitamin D, Ergocalciferol, 49074 UNITS CAPS      â¢ levalbuterol (XOPENEX) 1.25 MG/3ML nebulizer solution Inhale 3 mL by mouth every 4 (four) hours as needed for Wheezing. Use 1 ampule 90 mL 12   â¢ benazepril-hydrochlorthiazide (LOTENSIN HCT) 20-25 MG per tablet Take 1 Tab by mouth daily. 30 Tab 2   â¢ norethindrone-ethinyl estradiol (MICROGESTIN 1/20) 1-20 MG-MCG per tablet   10   â¢ pravastatin (PRAVACHOL) 80 MG tablet   1   â¢ econazole nitrate 1 % cream Apply  topically 2 (two) times daily. 30 g 0   â¢ fluticasone (FLONASE) 50 MCG/ACT nasal spray Use 2 Sprays in each nostril daily.  16 g 0   â¢ nystatin (MYCOSTATIN) cream APPLY TOPICALLY 2 TIMES DAILY 30 g 0   â¢ urea (CARMOL) 40 % CREA Apply to affected areas QHS 85 g 0   â¢ ketoconazole (NIZORAL) 2 % cream Apply to affected areas on bilateral feet twice a day as directed 60 g 0   â¢ Lactic Acid 10 % LOTN Apply to affected areas twice daily 354.84 mL 0   â¢ Lubiprostone (AMITIZA) 8 MCG Cap Take 8 mcg by mouth 2 (two) times daily with meals. â¢ ranitidine (ZANTAC) 300 MG Cap Take 300 mg by mouth. 1 tablet by mouth 3x daily     â¢ esomeprazole (NEXIUM) 40 MG Cap Take 40 mg by mouth every morning before breakfast.       â¢ cetirizine (ZYRTEC ALLERGY) 10 MG tablet Take 10 mg by mouth daily. â¢ hydrochlorothiazide (HYDRODIURIL) 50 MG tablet Take 50 mg by mouth daily. PHARMACY to use: [facility] [id number] [id number]      CALL BACK INFO:[KD1.1T] Ok to leave response (including medical information) with family member or on answering machine[KD1.1M]  ROUTING:[KD1.1T] Patient's physician/staff[KD1.1M]        PCP: Alison Wang. Alissa Barone MD         INS: Payor: [employer] / Plan: *No Plan* / Product Type: *No Product type* / Note: This is the primary coverage, but no account was found for this location or the patient's primary location.    ADDRESS:  [address] [place] [address]       Revision History        User Key Date/Time User Provider Type Action    > KD1.1 07/06/17 11:12 AM Margaux Patterson  Sign    M - Manual, T - Template

## 2025-07-14 NOTE — ED NOTES
ED TO INPATIENT SBAR HANDOFF     Patient Name: Cynthia Ayala   Preferred Name: Cynthia  : 1985  40 y.o.             Family/Caregiver Present: yes   Code Status Order: Full Code  PO Status:[unfilled]  Telemetry Order: No  C-SSRS:    Sitter no   Restraints:    Sepsis Risk Score Sepsis V2 Risk Score: 3.7    Situation:  Chief Complaint   Patient presents with    Back Pain     Brief Description of Patient's Condition: extensive surgical hx to back, chronic pain  Mental Status: oriented and alert  Arrived from: Home  Abnormal labs:   Abnormal Labs Reviewed   CBC WITH AUTO DIFFERENTIAL - Abnormal; Notable for the following components:       Result Value    WBC 23.6 (*)     MCHC 34.7 (*)     Platelets 471 (*)     Neutrophils % 85 (*)     Lymphocytes % 11 (*)     Neutrophils Absolute 20.11 (*)     Eosinophils Absolute 0.00 (*)     Basophils Absolute 0.21 (*)     All other components within normal limits   COMPREHENSIVE METABOLIC PANEL - Abnormal; Notable for the following components:    Sodium 135 (*)     CO2 17 (*)     Anion Gap 17 (*)     Glucose 122 (*)     All other components within normal limits   PROTIME-INR - Abnormal; Notable for the following components:    Protime 14.3 (*)     All other components within normal limits   URINE DRUG SCREEN - Abnormal; Notable for the following components:    Cannabinoid Scrn, Ur POSITIVE (*)     All other components within normal limits   SERUM DRUG SCREEN - Abnormal; Notable for the following components:    Acetaminophen Level <5 (*)     All other components within normal limits   URINALYSIS WITH MICROSCOPIC - Abnormal; Notable for the following components:    Ketones, Urine 15 (*)     Bacteria, UA TRACE (*)     All other components within normal limits        Background:  Allergies: No Known Allergies  History:   Past Medical History:   Diagnosis Date    Anxiety attack since age 14    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

## 2025-07-14 NOTE — PROGRESS NOTES
Patient is requesting to be discharged, Dr. Valles notified via perfect serve to see if possible for for today

## 2025-07-14 NOTE — PROGRESS NOTES
4 Eyes Skin Assessment     NAME:  Cynthia Ayala  YOB: 1985  MEDICAL RECORD NUMBER:  [id number]    The patient is being assessed for  Admission    I agree that at least one RN has performed a thorough Head to Toe Skin Assessment on the patient. ALL assessment sites listed below have been assessed.      Areas assessed by both nurses:    Head, Face, Ears, Shoulders, Back, Chest, Arms, Elbows, Hands, Sacrum. Buttock, Coccyx, Ischium, Legs. Feet and Heels, and Under Medical Devices         Does the Patient have a Wound? No noted wound(s)       King Prevention initiated by RN: Yes  Wound Care Orders initiated by RN: No    Pressure Injury (Stage 1,2,3,4, Unstageable, DTI, NWPT, and Complex wounds) if present, place Wound referral order by RN under : No    New Ostomies, if present place, Ostomy referral order under : No     Nurse 1 eSignature: Electronically signed by SANJAY HEATH RN on 7/14/25 at 11:05 AM EDT    **SHARE this note so that the co-signing nurse can place an eSignature**    Nurse 2 eSignature: Electronically signed by SOLEDAD MCCOY RN on 7/14/25 at 11:08 AM EDT

## 2025-07-14 NOTE — ED NOTES
Patient wheeled to private room to use pure wick. Patient tolerated well. Provided with eye mask and ear plugs to promote resting

## (undated) DEVICE — GOWN,BREATHABLE,IMP SLV 3XL AURORA: Brand: MEDLINE

## (undated) DEVICE — STAPLER INT L340MM 45MM STD 12 FIRING B FRM PWR + GRIPPING

## (undated) DEVICE — TROCAR: Brand: KII SHIELDED BLADED ACCESS SYSTEM

## (undated) DEVICE — GLOVE ORANGE PI 7 1/2   MSG9075

## (undated) DEVICE — DEFENDO AIR WATER SUCTION AND BIOPSY VALVE KIT FOR  OLYMPUS: Brand: DEFENDO AIR/WATER/SUCTION AND BIOPSY VALVE

## (undated) DEVICE — SEALER ENDOSCP L37CM NANO COAT BLNT TIP LAP DIV

## (undated) DEVICE — BLADE,STAINLESS-STEEL,11,STRL,DISPOSABLE: Brand: MEDLINE

## (undated) DEVICE — INSUFFLATION NEEDLE TO ESTABLISH PNEUMOPERITONEUM.: Brand: INSUFFLATION NEEDLE

## (undated) DEVICE — NEEDLE CLOSURE OMNICLOSE

## (undated) DEVICE — TOWEL,OR,DSP,ST,BLUE,STD,6/PK,12PK/CS: Brand: MEDLINE

## (undated) DEVICE — APPLICATOR MEDICATED 26 CC SOLUTION HI LT ORNG CHLORAPREP

## (undated) DEVICE — DOUBLE BASIN SET: Brand: MEDLINE INDUSTRIES, INC.

## (undated) DEVICE — INSUFFLATION TUBING SET WITH FILTER, FUNNEL CONNECTOR AND LUER LOCK: Brand: JOSNOE MEDICAL INC

## (undated) DEVICE — TRAP,MUCUS SPECIMEN,40CC: Brand: MEDLINE

## (undated) DEVICE — BITEBLOCK 54FR W/ DENT RIM BLOX

## (undated) DEVICE — ELECTRODE PT RET AD L9FT HI MOIST COND ADH HYDRGEL CORDED

## (undated) DEVICE — 1LYRTR 16FR10ML 100%SILI SNAP: Brand: MEDLINE INDUSTRIES, INC.

## (undated) DEVICE — TROCAR: Brand: KII® SLEEVE

## (undated) DEVICE — FORCEPS BX L160CM JAW DIA2.4MM YEL L CAP W/ NDL DISP RAD

## (undated) DEVICE — Z DISCONTINUED NO SUB IDED TUBING ETCO2 AD L6.5FT NSL ORAL CVD PRNG NONFLARED TIP OVR

## (undated) DEVICE — SOLUTION IRRIG 1000ML 0.9% SOD CHL USP POUR PLAS BTL

## (undated) DEVICE — CONTAINER SPEC 60ML PH 7NEUTRAL BUFF FRMLN RDY TO USE

## (undated) DEVICE — NEEDLE HYPO 23GA L1.5IN TURQ POLYPR HUB S STL THN WALL IM

## (undated) DEVICE — SYRINGE 20ML LL S/C 50

## (undated) DEVICE — KIT,ANTI FOG,W/SPONGE & FLUID,SOFT PACK: Brand: MEDLINE

## (undated) DEVICE — Device

## (undated) DEVICE — RELOAD STPL L45MM H1.5-3.6MM REG TISS BLU GRIPPING SURF B

## (undated) DEVICE — LIQUIBAND RAPID ADHESIVE 36/CS 0.8ML: Brand: MEDLINE

## (undated) DEVICE — DRAPE,CHEST,FENES,15X10,STERIL: Brand: MEDLINE

## (undated) DEVICE — GAUZE,SPONGE,POST-OP,4X3,STRL,LF: Brand: MEDLINE